# Patient Record
Sex: MALE | Race: WHITE | NOT HISPANIC OR LATINO | Employment: OTHER | URBAN - METROPOLITAN AREA
[De-identification: names, ages, dates, MRNs, and addresses within clinical notes are randomized per-mention and may not be internally consistent; named-entity substitution may affect disease eponyms.]

---

## 2017-01-16 ENCOUNTER — OFFICE VISIT (OUTPATIENT)
Dept: SPORTS MEDICINE | Facility: CLINIC | Age: 62
End: 2017-01-16
Payer: COMMERCIAL

## 2017-01-16 VITALS — HEIGHT: 68 IN | TEMPERATURE: 98 F | BODY MASS INDEX: 31.83 KG/M2 | WEIGHT: 210 LBS

## 2017-01-16 DIAGNOSIS — M12.811 ROTATOR CUFF ARTHROPATHY, RIGHT: Primary | ICD-10-CM

## 2017-01-16 PROCEDURE — 20610 DRAIN/INJ JOINT/BURSA W/O US: CPT | Mod: RT,S$GLB,, | Performed by: FAMILY MEDICINE

## 2017-01-16 PROCEDURE — 99214 OFFICE O/P EST MOD 30 MIN: CPT | Mod: 25,S$GLB,, | Performed by: FAMILY MEDICINE

## 2017-01-16 PROCEDURE — 99999 PR PBB SHADOW E&M-EST. PATIENT-LVL III: CPT | Mod: PBBFAC,,, | Performed by: FAMILY MEDICINE

## 2017-01-16 RX ORDER — TRIAMCINOLONE ACETONIDE 40 MG/ML
40 INJECTION, SUSPENSION INTRA-ARTICULAR; INTRAMUSCULAR
Status: DISCONTINUED | OUTPATIENT
Start: 2017-01-16 | End: 2017-01-16 | Stop reason: HOSPADM

## 2017-01-16 RX ADMIN — TRIAMCINOLONE ACETONIDE 40 MG: 40 INJECTION, SUSPENSION INTRA-ARTICULAR; INTRAMUSCULAR at 04:01

## 2017-01-16 NOTE — PROCEDURES
Large Joint Aspiration/Injection  Date/Time: 1/16/2017 4:34 PM  Performed by: JANAK OLIVER  Authorized by: JANAK OLIVER     Consent Done?:  Yes (Verbal)  Indications:  Pain  Procedure site marked: Yes    Timeout: Prior to procedure the correct patient, procedure, and site was verified      Location:  Shoulder  Site:  R subacromial bursa  Prep: Patient was prepped and draped in usual sterile fashion    Ultrasonic Guidance for needle placement: No  Needle size:  22 G  Approach:  Posterior  Medications:  40 mg triamcinolone acetonide 40 mg/mL  Patient tolerance:  Patient tolerated the procedure well with no immediate complications

## 2017-01-16 NOTE — PROGRESS NOTES
"Subjective:          Chief Complaint: Jm Deleon is a 61 y.o. male who had concerns including Follow-up of the Right Shoulder.    Mr. Deleon returns to [facility] for R shoulder follow up.     HPI  Hand dominance, right    Location:  anterior and lateral, right  Onset:  Insidious, since ~ April 2016   Palliative:     Relative rest   Oral analgesics (NSAIDs)   CSI, SAB, 10/19/16, significant improvement   Provocative:     Abduction and GH flexion   ADLs   Lying in bed   Prior:     09/2014 - L hip - [facility]   Progression: worsening discomfort since early January 2017  Quality:     Deep ache ("deep in the joint")   Pain is worse in the evening/when lying in bed   Radiation:  none  Severity:  per nursing documentation  Timing:  intermittent with use  Trauma:  none known    ROS  Review of systems (ROS):  A 10+ review of systems was performed with pertinent positives and negatives noted above in the history of present illness. Other systems were negative unless otherwise specified.    Pain Related Questions  Over the past 3 days, what was your average pain during activity? (I.e. running, jogging, walking, climbing stairs, getting dressed, ect.): 5  Over the past 3 days, what was your highest pain level?: 7  Over the past 3 days, what was your lowest pain level? : 3    Other  How many nights a week are you awakened by your affected body part?: 7  Was the patient's HEIGHT measured or patient reported?: Patient Reported  Was the patient's WEIGHT measured or patient reported?: Measured      Objective:        General: Jm is well-developed, well-nourished, appears stated age, in no acute distress, alert and oriented to time, place and person.     Ortho/SPM Exam    Constitutional:     -Vital signs: height, weight, and temperature: reviewed    -General appearance: no apparent distress  Dermatologic/skin:     -Inspection: No acute lesions, ulceration, or induration of the skin noted about  the area " evaluated   -Palpation: No subcutaneous crepitus noted about the area evaluated  Musculoskeletal:   Observation:  There is no edema, erythema, or ecchymoses about the affected shoulder.      PAIN WITH active GH abduction to 180 degrees, and THROUGH THE PAINFUL ARC  PAIN WITH active GH flexion to 180 degrees  POSITIVE empty can test (humeral internal rotation and flexion against resistance)   OJuan Franciscos test is negative for evidence of glenoid labral pathology  Active internal humeral rotation against resistance does not produce pain   Active external rotation against rotation does not produce pain   Back scratch test is PAINFUL   POS tenderness with palpation of the long head of the biceps tendon  Neer's and Hawkins tests are negative for evidence of subacromial impingment   Scarf movement does not produce pain  Speed's test is POS     Strength:  Strength testing is 5/5 in all significant muscle groups of the shoulder, EXCEPT AS DESCRIBED ABOVE DURING PAINFUL TESTS    Neg sulcus sign in affected shoulder.  Neg anterior/posterior laxity of humeral head in affected shoulder.  Neg anterior humeral head apprehension in affected shoulder.  Neg posterior humeral head apprehension in affected shoulder.      Cardiovascular:   -Examined extremity is warm and well perfused   Neurologic:   -Examined extremitys sensation is appropriate     AAFP/FPM: Pocket Guide Documentation Guidelines, (c)2014    (70708=9+ systems/areas or 12+ bullets (8 MSK)   37016=1+ systems/areas or 18+ bullets (12 MSK))      Assessment:       Encounter Diagnosis   Name Primary?    Rotator cuff arthropathy, right Yes          Plan:       Assessment:   #1 supraspinatus tendinosis, right    W/ OA changes of the AC joint    No evidence of neurologic pathology  No evidence of vascular pathology    Imaging studies reviewed:   X-ray shoulder, right 16.10    Plan:    We discussed options including:  #1 watchful waiting  #2 physical therapy aimed at:   GH  RoM   Rotator cuff strengthening   Scapular stability  #3 injection therapy:   CSI SAB    Right: worked well, repeat     CSI ia   orthobiologics   #4 perc tenotomy supraspinatus  #5 MRI for further evaluation     The patient chooses #3 csi sab right    Pain management: handout given  Bracing:   Physical therapy:   Activity (e.g. sports, work) restrictions: as tolerated   school/vocation: owns a construction company, very active     Son is an [employer] grad ([employer])    Follow up per pt  Should symptoms worsen or fail to resolve, consider:  Revisiting the above options      Patient questionnaires may have been collected.

## 2017-01-16 NOTE — MR AVS SNAPSHOT
[facility]  [address]  [facility] [address]  Phone: [phone]                  Jm Deleon   2017 4:15 PM   Appointment    Description:  Male : 1955   Provider:  Patricio Multani MD   Department:  [facility]                To Do List           Future Appointments        Provider Department Dept Phone    2017 4:15 PM Patricio Multani MD [facility] [phone]      Goals (5 Years of Data)     None      [facility]     [facility] -  Assistance  Registered nurses in the [facility] provide clinical advisement, health education, appointment booking, and other advisory services.  Call for this free service at [phone].             Medications           Message regarding Medications     Verify the changes and/or additions to your medication regime listed below are the same as discussed with your clinician today.  If any of these changes or additions are incorrect, please notify your healthcare provider.             Verify that the below list of medications is an accurate representation of the medications you are currently taking.  If none reported, the list may be blank. If incorrect, please contact your healthcare provider. Carry this list with you in case of emergency.           Current Medications     aspirin 81 mg Tab Take by mouth once daily.     atenolol (TENORMIN) 25 MG tablet TAKE 1 TABLET EVERY DAY    atorvastatin (LIPITOR) 80 MG tablet TAKE 1 TABLET (80 MG TOTAL) BY MOUTH ONCE DAILY.    atorvastatin (LIPITOR) 80 MG tablet TAKE 1 TABLET (80 MG TOTAL) BY MOUTH ONCE DAILY.    blood sugar diagnostic (ONE TOUCH VERIO) Strp Patient tests sugar 3 times daily please dispense strips and lancets    canagliflozin (INVOKANA) 300 mg Tab Take 1 tablet by mouth once daily.    celecoxib (CELEBREX) 200 MG capsule Take 1 capsule (200 mg total) by mouth once daily.    diphenhydrAMINE  "(BENADRYL) 25 mg capsule Take 25 mg by mouth nightly as needed for Itching.    FOLIC ACID/MULTIVITS-MIN (MULTIVITAMIN FOR CHOLESTEROL ORAL) Take 1 tablet by mouth once daily.     furosemide (LASIX) 40 MG tablet TAKE 1 TABLET BY MOUTH ONCE DAILY    insulin aspart (NOVOLOG FLEXPEN) 100 unit/mL InPn pen Inject 10 Units into the skin 3 (three) times daily with meals.    insulin detemir (LEVEMIR FLEXTOUCH) 100 unit/mL (3 mL) SubQ InPn pen Inject 35 Units into the skin every evening.    insulin needles, disposable, (BD INSULIN PEN NEEDLE UF ORIG) 29 x 1/2 " Ndle USE TWICE DAILY AS DIRECTED    lancets (ONE TOUCH DELICA LANCETS) 33 gauge Misc Use 3 times Daily.    metformin (GLUCOPHAGE) 1000 MG tablet TAKE 1 TABLET (1,000 MG TOTAL) BY MOUTH 2 (TWO) TIMES DAILY.    omega-3 acid ethyl esters (LOVAZA) 1 gram capsule TAKE 4 CAPSULES BY MOUTH DAILY OR AS DIRECTED.    ramipril (ALTACE) 5 MG capsule TAKE 1 CAPSULE (5 MG TOTAL) BY MOUTH ONCE DAILY.           Clinical Reference Information           Allergies as of 1/16/2017     No Known Drug Allergies      Immunizations Administered on Date of Encounter - 1/16/2017     None      "

## 2017-01-23 RX ORDER — ATORVASTATIN CALCIUM 80 MG/1
TABLET, FILM COATED ORAL
Qty: 90 TABLET | Refills: 0 | Status: SHIPPED | OUTPATIENT
Start: 2017-01-23 | End: 2017-04-11

## 2017-02-01 ENCOUNTER — PATIENT MESSAGE (OUTPATIENT)
Dept: ENDOCRINOLOGY | Facility: CLINIC | Age: 62
End: 2017-02-01

## 2017-02-02 RX ORDER — CANAGLIFLOZIN 300 MG/1
TABLET, FILM COATED ORAL
Qty: 30 TABLET | Refills: 4 | Status: SHIPPED | OUTPATIENT
Start: 2017-02-02 | End: 2017-07-09 | Stop reason: SDUPTHER

## 2017-02-28 ENCOUNTER — PATIENT MESSAGE (OUTPATIENT)
Dept: CARDIOLOGY | Facility: CLINIC | Age: 62
End: 2017-02-28

## 2017-02-28 ENCOUNTER — PATIENT MESSAGE (OUTPATIENT)
Dept: ENDOCRINOLOGY | Facility: CLINIC | Age: 62
End: 2017-02-28

## 2017-03-01 ENCOUNTER — TELEPHONE (OUTPATIENT)
Dept: ENDOCRINOLOGY | Facility: CLINIC | Age: 62
End: 2017-03-01

## 2017-03-02 DIAGNOSIS — Z00.00 PHYSICAL EXAM: Primary | ICD-10-CM

## 2017-03-07 ENCOUNTER — PATIENT MESSAGE (OUTPATIENT)
Dept: ENDOCRINOLOGY | Facility: CLINIC | Age: 62
End: 2017-03-07

## 2017-03-07 RX ORDER — METFORMIN HYDROCHLORIDE 1000 MG/1
TABLET ORAL
Qty: 180 TABLET | Refills: 1 | Status: SHIPPED | OUTPATIENT
Start: 2017-03-07 | End: 2017-09-10 | Stop reason: SDUPTHER

## 2017-03-28 ENCOUNTER — OFFICE VISIT (OUTPATIENT)
Dept: SPORTS MEDICINE | Facility: CLINIC | Age: 62
End: 2017-03-28
Payer: COMMERCIAL

## 2017-03-28 VITALS — HEIGHT: 68 IN | TEMPERATURE: 99 F | WEIGHT: 210 LBS | BODY MASS INDEX: 31.83 KG/M2

## 2017-03-28 DIAGNOSIS — M12.811 ROTATOR CUFF ARTHROPATHY, RIGHT: Primary | ICD-10-CM

## 2017-03-28 PROCEDURE — 20610 DRAIN/INJ JOINT/BURSA W/O US: CPT | Mod: RT,S$GLB,, | Performed by: FAMILY MEDICINE

## 2017-03-28 PROCEDURE — 99999 PR PBB SHADOW E&M-EST. PATIENT-LVL III: CPT | Mod: PBBFAC,,, | Performed by: FAMILY MEDICINE

## 2017-03-28 PROCEDURE — 99214 OFFICE O/P EST MOD 30 MIN: CPT | Mod: 25,S$GLB,, | Performed by: FAMILY MEDICINE

## 2017-03-28 RX ORDER — TRIAMCINOLONE ACETONIDE 40 MG/ML
40 INJECTION, SUSPENSION INTRA-ARTICULAR; INTRAMUSCULAR
Status: DISCONTINUED | OUTPATIENT
Start: 2017-03-28 | End: 2017-03-28 | Stop reason: HOSPADM

## 2017-03-28 RX ADMIN — TRIAMCINOLONE ACETONIDE 40 MG: 40 INJECTION, SUSPENSION INTRA-ARTICULAR; INTRAMUSCULAR at 04:03

## 2017-03-28 NOTE — MR AVS SNAPSHOT
[facility]  [address]  [facility] [address]  Phone: [phone]                  Jm Deleon   3/28/2017 3:45 PM   Appointment    Description:  Male : 1955   Provider:  Patricio Multani MD   Department:  [facility]                To Do List           Future Appointments        Provider Department Dept Phone    3/28/2017 3:45 PM Patricio Multani MD [facility] [phone]    2017 7:00 AM LAB, APPOINTMENT [facility] [phone]    2017 7:20 AM LAB, APPOINTMENT [facility] [phone]    2017 2:15 PM EKG, APPT [facility] [phone]    2017 3:00 PM Bc Salomon MD [facility] Cardiology [phone]      Goals (5 Years of Data)     None      [facility]     [facility] On Call Nurse Care Line -  Assistance  Registered nurses in the [facility] On Call Center provide clinical advisement, health education, appointment booking, and other advisory services.  Call for this free service at [phone].             Medications           Message regarding Medications     Verify the changes and/or additions to your medication regime listed below are the same as discussed with your clinician today.  If any of these changes or additions are incorrect, please notify your healthcare provider.             Verify that the below list of medications is an accurate representation of the medications you are currently taking.  If none reported, the list may be blank. If incorrect, please contact your healthcare provider. Carry this list with you in case of emergency.           Current Medications     aspirin 81 mg Tab Take by mouth once daily.     atenolol (TENORMIN) 25 MG tablet TAKE 1 TABLET EVERY DAY    atorvastatin (LIPITOR) 80 MG tablet TAKE 1 TABLET (80 MG TOTAL) BY MOUTH ONCE DAILY.    atorvastatin (LIPITOR) 80 MG tablet TAKE 1 TABLET (80 MG TOTAL) BY  "MOUTH ONCE DAILY.    blood sugar diagnostic (ONE TOUCH VERIO) Strp Patient tests sugar 3 times daily please dispense strips and lancets    celecoxib (CELEBREX) 200 MG capsule Take 1 capsule (200 mg total) by mouth once daily.    diphenhydrAMINE (BENADRYL) 25 mg capsule Take 25 mg by mouth nightly as needed for Itching.    FOLIC ACID/MULTIVITS-MIN (MULTIVITAMIN FOR CHOLESTEROL ORAL) Take 1 tablet by mouth once daily.     furosemide (LASIX) 40 MG tablet TAKE 1 TABLET BY MOUTH ONCE DAILY    insulin aspart (NOVOLOG FLEXPEN) 100 unit/mL InPn pen Inject 10 Units into the skin 3 (three) times daily with meals.    insulin detemir (LEVEMIR FLEXTOUCH) 100 unit/mL (3 mL) SubQ InPn pen Inject 35 Units into the skin every evening.    insulin needles, disposable, (BD INSULIN PEN NEEDLE UF ORIG) 29 x 1/2 " Ndle USE TWICE DAILY AS DIRECTED    INVOKANA 300 mg Tab TAKE 1 TABLET BY MOUTH ONCE DAILY.    lancets (ONE TOUCH DELICA LANCETS) 33 gauge Misc Use 3 times Daily.    metformin (GLUCOPHAGE) 1000 MG tablet TAKE 1 TABLET (1,000 MG TOTAL) BY MOUTH 2 (TWO) TIMES DAILY.    omega-3 acid ethyl esters (LOVAZA) 1 gram capsule TAKE 4 CAPSULES BY MOUTH DAILY OR AS DIRECTED.    ramipril (ALTACE) 5 MG capsule TAKE 1 CAPSULE (5 MG TOTAL) BY MOUTH ONCE DAILY.           Clinical Reference Information           Allergies as of 3/28/2017     No Known Drug Allergies      Immunizations Administered on Date of Encounter - 3/28/2017     None      Language Assistance Services     ATTENTION: Language assistance services are available, free of charge. Please call [phone].      ATENCIÓN: Si habla leana, tiene a whitaker disposición servicios gratuitos de asistencia lingüística. Llame al [phone].     ISMA Ý: N?u b?n nói Ti?ng Vi?t, có các d?ch v? h? tr? ngôn ng? mi?n phí dành cho b?n. G?i s? [phone].         [facility] complies with applicable Federal civil rights laws and does not discriminate on the basis of race, color, " national origin, age, disability, or sex.

## 2017-03-28 NOTE — PROCEDURES
Large Joint Aspiration/Injection  Date/Time: 3/28/2017 4:26 PM  Performed by: JANAK OLIVER  Authorized by: JANAK OLIVER     Consent Done?:  Yes (Verbal)  Indications:  Pain  Procedure site marked: Yes    Timeout: Prior to procedure the correct patient, procedure, and site was verified      Location:  Shoulder  Site:  R subacromial bursa  Prep: Patient was prepped and draped in usual sterile fashion    Ultrasonic Guidance for needle placement: No  Needle size:  22 G  Approach:  Posterior  Medications:  40 mg triamcinolone acetonide 40 mg/mL  Patient tolerance:  Patient tolerated the procedure well with no immediate complications

## 2017-03-28 NOTE — PROGRESS NOTES
"  Jm Deleon, a 62 y.o. male, is here for evaluation of RIGHT shoulder pain.     HISTORY OF PRESENT ILLNESS  Hand dominance, right     Location: anterior and lateral, right  Onset: Insidious, since ~ April 2016   Palliative:    Relative rest   Oral analgesics (NSAIDs)   CSI, DEYVI, 10/19/16, significant improvement   Provocative:    Abduction and GH flexion   ADLs   Lying in bed   Prior:    09/2014 [facility]   Progression: worsening discomfort since early January 2017  Quality:    Deep ache ("deep in the joint")   Pain is worse in the evening/when lying in bed   Radiation: none  Severity: per nursing documentation  Timing: intermittent with use  Trauma: none known     Review of systems (ROS):  A 10+ review of systems was performed with pertinent positives and negatives noted above in the history of present illness. Other systems were negative unless otherwise specified.    PHYSICAL EXAMINATION  General:  The patient is alert and oriented x 3. Mood is pleasant. Observation of ears, eyes and nose reveal no gross abnormalities. HEENT: NCAT, sclera anicteric. Lungs: Respirations are equal and unlabored.     RIGHT SHOULDER EXAMINATION     OBSERVATION:     Swelling  none  Deformity  none   Discoloration  none   Scapular winging none   Scars   none  Atrophy  none    TENDERNESS / CREPITUS (T/C):          T/C      T/C   Clavicle   -/-  SUPRAspinatus    -/-     AC Jt.    -/-  INFRAspinatus  -/-    SC Jt.    -/-  Deltoid    -/-      G. Tuberosity  -/-  LH BICEP groove  -/-   Acromion:  -/-  Midline Neck   -/-     Scapular Spine -/-  Trapezium   -/-   SMA Scapula  -/-  GH jt. line - post  -/-     Scapulothoracic  -/-         ROM:     Right shoulder   Left shoulder        AROM (PROM)   AROM (PROM)   FE    170° (175°)     170° (175°)     ER at 0°    60°  (65°)    60°  (65°)   ER at 90° ABD  90°  (90°)    90°  (90°)   IR at 90°  ABD   NA  (40°)     NA  (40°)      IR (spine level)   T10     T10    STRENGTH: (* = " with pain) RIGHT SHOULDER  LEFT SHOULDER   SCAPTION   5/5    5/5    IR    5/5    5/5   ER    5/5    5/5   BICEPS   5/5    5/5   Deltoid    5/5    5/5     SIGNS:  Painful side       NEER   -   OLORYS        -    HOOVER   -   SPEEDS        -   DROP ARM   -   BELLY PRESS       -    X-Body ADD    -   LIFT-OFF        -   HORNBLOWERS      -              STABILITY TESTING  RIGHT SHOULDER  LEFT SHOULDER     Translation     Anterior up face    up face    Posterior up face   up face    Sulcus  < 10mm   < 10 mm     Signs   Apprehension   neg     neg       Relocation   no change    no change      Jerk test  neg    neg    EXTREMITY NEURO-VASCULAR EXAM    Sensation grossly intact to light touch all dermatomal regions.    DTR 2+ Biceps, Triceps, BR and Negative Usmans sign   Grossly intact motor function at Elbow, Wrist and Hand   Distal pulses radial and ulnar 2+, brisk cap refill, symmetric.      NECK:  Painless FROM and spinous processes non-tender. Negative Spurlings sign.       Other Findings:    ASSESSMENT & PLAN   Assessment:   #1 supraspinatus tendinosis, right    No evidence of neurologic pathology  No evidence of vascular pathology    Imaging studies reviewed:   X-ray shoulder, right 16.10    Plan:  We discussed options including:  #1 watchful waiting  #2 physical therapy aimed at:   RoM glenohumeral joint   Strengthening rotator cuff   Scapular stability  #3 injection therapy:   CSI SAB    Right, effective xx%, repeat     Left,    CSI iaGH    Right,     Left,    orthobiologics   #4 MRI for further evaluation     The patient chooses #3 csi sab right    Pain management: handout given  Bracing:   Physical therapy:    nfpt  Activity (e.g. 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)     Follow up appointment offered, deferred by patient  A/e csi sab right  Did we give hgPT at prior visit?  Should symptoms worsen  or fail to resolve, consider:  Revisiting the above options

## 2017-04-07 ENCOUNTER — LAB VISIT (OUTPATIENT)
Dept: LAB | Facility: HOSPITAL | Age: 62
End: 2017-04-07
Attending: INTERNAL MEDICINE
Payer: COMMERCIAL

## 2017-04-07 LAB
ALBUMIN SERPL BCP-MCNC: 3.7 G/DL
ALP SERPL-CCNC: 64 U/L
ALT SERPL W/O P-5'-P-CCNC: 20 U/L
ANION GAP SERPL CALC-SCNC: 5 MMOL/L
AST SERPL-CCNC: 16 U/L
BILIRUB SERPL-MCNC: 0.7 MG/DL
BUN SERPL-MCNC: 23 MG/DL
CALCIUM SERPL-MCNC: 9.2 MG/DL
CHLORIDE SERPL-SCNC: 103 MMOL/L
CHOLEST/HDLC SERPL: 3.4 {RATIO}
CO2 SERPL-SCNC: 30 MMOL/L
CREAT SERPL-MCNC: 0.9 MG/DL
CREAT UR-MCNC: 70 MG/DL
EST. GFR  (AFRICAN AMERICAN): >60 ML/MIN/1.73 M^2
EST. GFR  (NON AFRICAN AMERICAN): >60 ML/MIN/1.73 M^2
ESTIMATED AVG GLUCOSE: 169 MG/DL
GLUCOSE SERPL-MCNC: 141 MG/DL
HBA1C MFR BLD HPLC: 7.5 %
HDL/CHOLESTEROL RATIO: 29.2 %
HDLC SERPL-MCNC: 137 MG/DL
HDLC SERPL-MCNC: 40 MG/DL
LDLC SERPL CALC-MCNC: 80 MG/DL
MICROALBUMIN UR DL<=1MG/L-MCNC: 8 UG/ML
MICROALBUMIN/CREATININE RATIO: 11.4 UG/MG
NONHDLC SERPL-MCNC: 97 MG/DL
POTASSIUM SERPL-SCNC: 4.6 MMOL/L
PROT SERPL-MCNC: 6.7 G/DL
SODIUM SERPL-SCNC: 138 MMOL/L
TRIGL SERPL-MCNC: 85 MG/DL
TSH SERPL DL<=0.005 MIU/L-ACNC: 1.59 UIU/ML

## 2017-04-07 PROCEDURE — 84443 ASSAY THYROID STIM HORMONE: CPT

## 2017-04-07 PROCEDURE — 36415 COLL VENOUS BLD VENIPUNCTURE: CPT

## 2017-04-07 PROCEDURE — 83036 HEMOGLOBIN GLYCOSYLATED A1C: CPT

## 2017-04-07 PROCEDURE — 80061 LIPID PANEL: CPT

## 2017-04-07 PROCEDURE — 80053 COMPREHEN METABOLIC PANEL: CPT

## 2017-04-07 PROCEDURE — 82570 ASSAY OF URINE CREATININE: CPT

## 2017-04-11 ENCOUNTER — HOSPITAL ENCOUNTER (OUTPATIENT)
Dept: CARDIOLOGY | Facility: CLINIC | Age: 62
Discharge: HOME | End: 2017-04-11
Payer: COMMERCIAL

## 2017-04-11 ENCOUNTER — OFFICE VISIT (OUTPATIENT)
Dept: CARDIOLOGY | Facility: CLINIC | Age: 62
End: 2017-04-11
Payer: COMMERCIAL

## 2017-04-11 VITALS
DIASTOLIC BLOOD PRESSURE: 72 MMHG | HEIGHT: 68 IN | BODY MASS INDEX: 32.74 KG/M2 | SYSTOLIC BLOOD PRESSURE: 130 MMHG | WEIGHT: 216.06 LBS | HEART RATE: 58 BPM

## 2017-04-11 DIAGNOSIS — R94.39 ABNORMAL NUCLEAR STRESS TEST: ICD-10-CM

## 2017-04-11 DIAGNOSIS — I25.10 CORONARY ARTERY DISEASE INVOLVING NATIVE CORONARY ARTERY OF NATIVE HEART WITHOUT ANGINA PECTORIS: Primary | ICD-10-CM

## 2017-04-11 DIAGNOSIS — I10 ESSENTIAL HYPERTENSION: ICD-10-CM

## 2017-04-11 DIAGNOSIS — E78.5 DYSLIPIDEMIA: ICD-10-CM

## 2017-04-11 DIAGNOSIS — I83.10 VARICOSE VEINS OF LOWER EXTREMITY WITH INFLAMMATION, UNSPECIFIED LATERALITY: ICD-10-CM

## 2017-04-11 DIAGNOSIS — I87.2 VENOUS INSUFFICIENCY: ICD-10-CM

## 2017-04-11 DIAGNOSIS — N52.01 ERECTILE DYSFUNCTION DUE TO ARTERIAL INSUFFICIENCY: ICD-10-CM

## 2017-04-11 DIAGNOSIS — E08.59 DIABETES MELLITUS DUE TO UNDERLYING CONDITION WITH OTHER CIRCULATORY COMPLICATION: ICD-10-CM

## 2017-04-11 DIAGNOSIS — Z79.01 LONG TERM (CURRENT) USE OF ANTICOAGULANTS: ICD-10-CM

## 2017-04-11 DIAGNOSIS — M54.40 ACUTE RIGHT-SIDED LOW BACK PAIN WITH SCIATICA, SCIATICA LATERALITY UNSPECIFIED: ICD-10-CM

## 2017-04-11 DIAGNOSIS — Z95.1 S/P CABG (CORONARY ARTERY BYPASS GRAFT): ICD-10-CM

## 2017-04-11 DIAGNOSIS — R94.39 ABNORMAL CARDIOVASCULAR STRESS TEST: ICD-10-CM

## 2017-04-11 DIAGNOSIS — Z00.00 PHYSICAL EXAM: ICD-10-CM

## 2017-04-11 PROCEDURE — 99999 PR PBB SHADOW E&M-EST. PATIENT-LVL III: CPT | Mod: PBBFAC,,, | Performed by: INTERNAL MEDICINE

## 2017-04-11 PROCEDURE — 93000 ELECTROCARDIOGRAM COMPLETE: CPT | Mod: S$GLB,,, | Performed by: INTERNAL MEDICINE

## 2017-04-11 PROCEDURE — 99215 OFFICE O/P EST HI 40 MIN: CPT | Mod: S$GLB,,, | Performed by: INTERNAL MEDICINE

## 2017-04-11 RX ORDER — CARVEDILOL 25 MG/1
25 TABLET ORAL 2 TIMES DAILY
Qty: 180 TABLET | Refills: 3 | Status: SHIPPED | OUTPATIENT
Start: 2017-04-11 | End: 2018-03-27 | Stop reason: SDUPTHER

## 2017-04-11 RX ORDER — EZETIMIBE 10 MG/1
10 TABLET ORAL DAILY
Qty: 90 TABLET | Refills: 3 | Status: SHIPPED | OUTPATIENT
Start: 2017-04-11 | End: 2018-03-28 | Stop reason: SDUPTHER

## 2017-04-11 RX ORDER — SILDENAFIL 100 MG/1
100 TABLET, FILM COATED ORAL DAILY PRN
Qty: 10 TABLET | Refills: 10 | Status: SHIPPED | OUTPATIENT
Start: 2017-04-11 | End: 2017-05-02

## 2017-04-11 RX ORDER — RAMIPRIL 10 MG/1
10 CAPSULE ORAL DAILY
Qty: 90 CAPSULE | Refills: 3 | Status: SHIPPED | OUTPATIENT
Start: 2017-04-11 | End: 2018-03-27 | Stop reason: SDUPTHER

## 2017-04-11 NOTE — MR AVS SNAPSHOT
[facility]  [address]  [facility] [address]  Phone: [phone]                  Jm Deleon   2017 3:00 PM   Office Visit    Description:  Male : 1955   Provider:  Bc Salomon MD   Department:  [facility]           Reason for Visit     Coronary Artery Disease           Diagnoses this Visit        Comments    Coronary artery disease involving native coronary artery of native heart without angina pectoris    -  Primary     Abnormal nuclear stress test         Abnormal cardiovascular stress test         S/P CABG (coronary artery bypass graft)         Varicose veins of lower extremity with inflammation, unspecified laterality         Venous insufficiency         Long term (current) use of anticoagulants         Dyslipidemia         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    Follow-up and Disposition History       These Medications        Disp Refills Start End    ramipril (ALTACE) 10 MG capsule 90 capsule 3 2017     Take 1 capsule (10 mg total) by mouth once daily. - Oral    Pharmacy: [facility]/pharmacy #[id number] - [place], [place] - [address] AT [facility] Ph #: [phone]       ezetimibe (ZETIA) 10 mg tablet 90 tablet 3 2017    Take 1 tablet (10 mg total) by mouth once daily. - Oral    Pharmacy: [facility]/pharmacy #[id number] - [place], [place] - [address]  [facility] #: [phone]       carvedilol (COREG) 25 MG tablet 180 tablet 3 4/11/2017     Take 1 tablet (25 mg total) by mouth 2 (two) times daily. .5-1 twice daily or as directed - Oral    Pharmacy: [facility]/pharmacy #[id number] [facility] [place] - [address] [facility] #: [phone]       sildenafil (VIAGRA) 100 MG tablet 10 tablet 10 4/11/2017 4/11/2018    Take 1 tablet (100 mg total) by mouth daily as needed for Erectile Dysfunction. - Oral    Pharmacy: [facility]/pharmacy #[id number] [facility] [place] - [address] [facility] AT [facility] #: [phone]         [facility]     [facility] Nurse Care Line - 24/7 Assistance  Unless otherwise directed by your provider, please contact [facility], our nurse care line that is available for 24/7 assistance.     Registered nurses in the [facility] provide: appointment scheduling, clinical advisement, health education, and other advisory services.  Call: [phone] (toll free)               Medications           Message regarding Medications     Verify the changes and/or additions to your medication regime listed below are the same as discussed with your clinician today.  If any of these changes or additions are incorrect, please notify your healthcare provider.        START taking these NEW medications        Refills    ezetimibe (ZETIA) 10 mg tablet 3    Sig: Take 1 tablet (10 mg total) by mouth once daily.    Class: Normal    Route: Oral    carvedilol (COREG) 25 MG tablet 3    Sig: Take 1 tablet (25 mg total) by mouth 2 (two) times daily. .5-1 twice daily or as directed    Class: Normal    Route: Oral    sildenafil (VIAGRA) 100 MG tablet 10    Sig: Take 1 tablet (100 mg total) by mouth daily as needed for Erectile Dysfunction.    Class: Normal    Route: Oral      CHANGE how you are taking these medications     Start Taking Instead of    ramipril (ALTACE) 10 MG capsule ramipril (ALTACE) 5 MG capsule    Dosage:  Take 1 capsule  "(10 mg total) by mouth once daily. Dosage:  TAKE 1 CAPSULE (5 MG TOTAL) BY MOUTH ONCE DAILY.    Reason for Change:  Reorder       STOP taking these medications     celecoxib (CELEBREX) 200 MG capsule Take 1 capsule (200 mg total) by mouth once daily.    atenolol (TENORMIN) 25 MG tablet TAKE 1 TABLET EVERY DAY           Verify that the below list of medications is an accurate representation of the medications you are currently taking.  If none reported, the list may be blank. If incorrect, please contact your healthcare provider. Carry this list with you in case of emergency.           Current Medications     aspirin 81 mg Tab Take by mouth once daily.     atorvastatin (LIPITOR) 80 MG tablet TAKE 1 TABLET (80 MG TOTAL) BY MOUTH ONCE DAILY.    blood sugar diagnostic (ONE TOUCH VERIO) Strp Patient tests sugar 3 times daily please dispense strips and lancets    diphenhydrAMINE (BENADRYL) 25 mg capsule Take 25 mg by mouth nightly as needed for Itching.    FOLIC ACID/MULTIVITS-MIN (MULTIVITAMIN FOR CHOLESTEROL ORAL) Take 1 tablet by mouth once daily.     furosemide (LASIX) 40 MG tablet TAKE 1 TABLET BY MOUTH ONCE DAILY    insulin aspart (NOVOLOG FLEXPEN) 100 unit/mL InPn pen Inject 10 Units into the skin 3 (three) times daily with meals.    insulin detemir (LEVEMIR FLEXTOUCH) 100 unit/mL (3 mL) SubQ InPn pen Inject 35 Units into the skin every evening.    insulin needles, disposable, (BD INSULIN PEN NEEDLE UF ORIG) 29 x 1/2 " Ndle USE TWICE DAILY AS DIRECTED    INVOKANA 300 mg Tab TAKE 1 TABLET BY MOUTH ONCE DAILY.    lancets (ONE TOUCH DELICA LANCETS) 33 gauge Misc Use 3 times Daily.    metformin (GLUCOPHAGE) 1000 MG tablet TAKE 1 TABLET (1,000 MG TOTAL) BY MOUTH 2 (TWO) TIMES DAILY.    omega-3 acid ethyl esters (LOVAZA) 1 gram capsule TAKE 4 CAPSULES BY MOUTH DAILY OR AS DIRECTED.    ramipril (ALTACE) 10 MG capsule Take 1 capsule (10 mg total) by mouth once daily.    carvedilol (COREG) 25 MG tablet Take 1 tablet (25 mg " "total) by mouth 2 (two) times daily. .5-1 twice daily or as directed    ezetimibe (ZETIA) 10 mg tablet Take 1 tablet (10 mg total) by mouth once daily.    sildenafil (VIAGRA) 100 MG tablet Take 1 tablet (100 mg total) by mouth daily as needed for Erectile Dysfunction.           Clinical Reference Information           Your Vitals Were     BP Pulse Height Weight BMI    130/72 (BP Location: Left arm, Patient Position: Sitting, BP Method: Automatic) 58 5' 8" (1.727 m) 98 kg (216 lb 0.8 oz) 32.85 kg/m2      Blood Pressure          Most Recent Value    Right Arm BP - Sitting  128/73    Left Arm BP - Sitting  130/72    BP  130/72      Allergies as of 4/11/2017     No Known Drug Allergies      Immunizations Administered on Date of Encounter - 4/11/2017     None      Orders Placed During Today's Visit     Future Labs/Procedures Expected by Expires    NM Multi Tread Stress Cardiac Component  6/11/2017 (Approximate) 6/27/2017    EKG 12-lead  4/11/2018 (Approximate) 7/11/2018    PSA, Screening  4/11/2018 (Approximate) 7/11/2018    TSH  4/11/2018 (Approximate) 7/11/2018    Recurring Lab Work Interval Expires    Comprehensive metabolic panel   4/11/2018    Hemoglobin A1c   4/11/2018    Lipid panel   4/11/2018      Instructions    Discussed diet , achieving and maintaining ideal body weight, and exercise.   We reviewed meds in detail.  Reassured  Discussed goals  Increase Ramipril to 10 mg  Add Zetia half pill  Instead of atenolol, just half of carvedilol 25 mg twice  OK to use generic Viagra .25-1 as needed but no NTG for 24 hours before of after       Language Assistance Services     ATTENTION: Language assistance services are available, free of charge. Please call [phone].      ATENCIÓN: Si habla español, tiene a whitaker disposición servicios gratuitos de asistencia lingüística. Llame al [phone].     CHÚ Ý: N?u b?n nói Ti?ng Vi?t, có các d?ch v? h? tr? ngôn ng? mi?n phí dành cho b?n. G?i s? [phone].         " [facility] complies with applicable Federal civil rights laws and does not discriminate on the basis of race, color, national origin, age, disability, or sex.

## 2017-04-11 NOTE — PROGRESS NOTES
Subjective:   Patient ID:  Jm Deleon is a 62 y.o. male who presents for follow-up of Coronary Artery Disease      HPI: coronary artery disease   The patient has no chest pain, SOB, TIA, palpitations, syncope or pre-syncope.Patient does not exercise as much as directed but more than at times.        Review of Systems   Constitution: Negative for chills, decreased appetite, diaphoresis, fever, weakness, malaise/fatigue, night sweats, weight gain and weight loss.   HENT: Negative for congestion, headaches, hoarse voice, nosebleeds, sore throat and tinnitus.    Eyes: Negative for blurred vision, double vision, vision loss in left eye, vision loss in right eye, visual disturbance and visual halos.   Cardiovascular: Negative for chest pain, claudication, cyanosis, dyspnea on exertion, irregular heartbeat, leg swelling, near-syncope, orthopnea, palpitations, paroxysmal nocturnal dyspnea and syncope.   Respiratory: Negative for cough, hemoptysis, shortness of breath, sleep disturbances due to breathing, snoring, sputum production and wheezing.    Endocrine: Negative for cold intolerance, heat intolerance, polydipsia, polyphagia and polyuria.   Hematologic/Lymphatic: Negative for adenopathy and bleeding problem. Does not bruise/bleed easily.   Skin: Negative for color change, dry skin, flushing, itching, nail changes, poor wound healing, rash, skin cancer, suspicious lesions and unusual hair distribution.   Musculoskeletal: Positive for arthritis and back pain. Negative for falls, gout, joint pain, joint swelling, muscle cramps, muscle weakness, myalgias and stiffness.   Gastrointestinal: Negative for abdominal pain, anorexia, change in bowel habit, constipation, diarrhea, dysphagia, heartburn, hematemesis, hematochezia, melena and vomiting.   Genitourinary: Negative for decreased libido, dysuria, hematuria, hesitancy and urgency.   Neurological: Negative for excessive daytime sleepiness, dizziness, focal weakness,  "light-headedness, loss of balance, numbness, paresthesias, seizures, sensory change, tremors and vertigo.   Psychiatric/Behavioral: Negative for altered mental status, depression, hallucinations, memory loss, substance abuse and suicidal ideas. The patient does not have insomnia and is not nervous/anxious.    Allergic/Immunologic: Negative for environmental allergies and hives.       Objective: /72 (BP Location: Left arm, Patient Position: Sitting, BP Method: Automatic)  Pulse (!) 58  Ht 5' 8" (1.727 m)  Wt 98 kg (216 lb 0.8 oz)  BMI 32.85 kg/m2     Physical Exam   Constitutional: He is oriented to person, place, and time. He appears well-developed and well-nourished. No distress.   HENT:   Head: Normocephalic.   Eyes: EOM are normal. Pupils are equal, round, and reactive to light.   Neck: Normal range of motion. No thyromegaly present.   Cardiovascular: Normal rate, regular rhythm, normal heart sounds and intact distal pulses.  Exam reveals no gallop and no friction rub.    No murmur heard.  Pulses:       Carotid pulses are 3+ on the right side, and 3+ on the left side.       Radial pulses are 3+ on the right side, and 3+ on the left side.        Femoral pulses are 3+ on the right side, and 3+ on the left side.       Popliteal pulses are 3+ on the right side, and 3+ on the left side.        Dorsalis pedis pulses are 3+ on the right side, and 3+ on the left side.        Posterior tibial pulses are 3+ on the right side, and 3+ on the left side.   Pulmonary/Chest: Effort normal and breath sounds normal. No respiratory distress. He has no wheezes. He has no rales. He exhibits no tenderness.   Abdominal: Soft. He exhibits no distension and no mass. There is no tenderness.   Musculoskeletal: Normal range of motion.   Lymphadenopathy:     He has no cervical adenopathy.   Neurological: He is alert and oriented to person, place, and time.   Skin: Skin is warm. He is not diaphoretic. No cyanosis. Nails show no " clubbing.   Psychiatric: He has a normal mood and affect. His speech is normal and behavior is normal. Judgment and thought content normal. Cognition and memory are normal.       Assessment:     1. Coronary artery disease involving native coronary artery of native heart without angina pectoris    2. Abnormal nuclear stress test    3. Abnormal cardiovascular stress test    4. S/P CABG (coronary artery bypass graft)    5. Varicose veins of lower extremity with inflammation, unspecified laterality    6. Venous insufficiency    7. Long term (current) use of anticoagulants    8. Dyslipidemia    9. Diabetes mellitus due to underlying condition with other circulatory complication    10. Acute right-sided low back pain with sciatica, sciatica laterality unspecified    11. Essential hypertension        Plan:   Discussed diet , achieving and maintaining ideal body weight, and exercise.   We reviewed meds in detail.  Reassured  Discussed goals  Increase Ramipril to 10 mg  Add Zetia half pill  Instead of atenolol, just half of carvedilol 25 mg twice  OK to use generic Viagra .25-1 as needed but no NTG for 24 hours before of after    Jm was seen today for coronary artery disease.    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

## 2017-04-11 NOTE — PATIENT INSTRUCTIONS
Discussed diet , achieving and maintaining ideal body weight, and exercise.   We reviewed meds in detail.  Reassured  Discussed goals  Increase Ramipril to 10 mg  Add Zetia half pill  Instead of atenolol, just half of carvedilol 25 mg twice  OK to use generic Viagra .25-1 as needed but no NTG for 24 hours before of after

## 2017-05-01 RX ORDER — ATENOLOL 25 MG/1
TABLET ORAL
Qty: 90 TABLET | Refills: 3 | Status: SHIPPED | OUTPATIENT
Start: 2017-05-01 | End: 2017-09-08

## 2017-05-02 ENCOUNTER — OFFICE VISIT (OUTPATIENT)
Dept: ENDOCRINOLOGY | Facility: CLINIC | Age: 62
End: 2017-05-02
Payer: COMMERCIAL

## 2017-05-02 VITALS
SYSTOLIC BLOOD PRESSURE: 126 MMHG | HEIGHT: 68 IN | WEIGHT: 215.19 LBS | HEART RATE: 53 BPM | DIASTOLIC BLOOD PRESSURE: 78 MMHG | BODY MASS INDEX: 32.61 KG/M2

## 2017-05-02 DIAGNOSIS — E08.59 DIABETES MELLITUS DUE TO UNDERLYING CONDITION WITH OTHER CIRCULATORY COMPLICATION: Primary | ICD-10-CM

## 2017-05-02 PROCEDURE — 99214 OFFICE O/P EST MOD 30 MIN: CPT | Mod: S$GLB,,, | Performed by: INTERNAL MEDICINE

## 2017-05-02 PROCEDURE — 99999 PR PBB SHADOW E&M-EST. PATIENT-LVL III: CPT | Mod: PBBFAC,,, | Performed by: INTERNAL MEDICINE

## 2017-05-02 NOTE — PROGRESS NOTES
CHIEF COMPLAINT:  Diabetes.                                                                                                                               HISTORY OF PRESENT ILLNESS:  The patient comes in for diabetic check.  He    has had diabetes since , about five years.  He is currently on Levemir   35 units daily, Novolog 10-10-10 and metformin 1 g b.i.d.and Invokanna 30 mg daily.  He has lost weight, however, 27 pounds over the past few months.      He has no unusual complaints today.  Blood sugars up to 118 in AM and 120 in PM. Control is better over past 2 months.    Lowest glucose has been 103.                                                                                                                                               PAST MEDICAL HISTORY:  Surgery:  Coronary bypass, left dorsal                torsalexostectomy.  Medical illnesses:  Coronary artery disease, diabetes,   hyperlipidemia.                                                                                                                                           FAMILY HISTORY:  Father  at 63, lung cancer and diabetes.  Mother    at 73, heart disease.                                                                                                                                     SOCIAL HISTORY:  Does not smoke.  Drinks occasionally.                                                                                                    REVIEW OF SYSTEMS:                                                           HEENT:  Good eyesight and hearing.  No evidence of retinopathy on today's eye visit..              CARDIOPULMONARY:  Denies chest pain, cough or shortness of breath.           GASTROINTESTINAL:  Denies nausea, vomiting, diarrhea, constipation or        abdominal pain.                                                              GENITOURINARY:  Denies dysuria, hematuria, has occasional nocturia.          NEUROMUSCULAR:   The patient has some pains in his joints, but denies         paresthesias and numbness in his lower extremities.Left hip pain severe at night.                          All other systems reviewed and are negative.                                                                                                              PHYSICAL EXAMINATION:                                                      VITAL SIGNS:  Weight  211.7, blood pressure 118/70,  Pulse 60  EYES:  No eye signs of Graves' disease.  No scleral icterus.  Conjunctivae   clear.                                                                       ENT:  No lesion on tongue, hard palate, soft palate or posterior pharynx.    NECK:  No thyromegaly.  No neck vein distention.  Trachea midline.           LYMPHATICS:  No anterior or posterior cervical adenopathy.  No               supraclavicular adenopathy.                                                  HEART:  Normal sinus rhythm.  No murmurs.  No rubs.  No carotid bruits.      LUNGS:  Clear.  Percussion normal.  No respiratory difficulty.               ABDOMEN:  No masses, tenderness or organomegaly.                             EXTREMITIES:  No clubbing, cyanosis or edema.  Monofilament intact.          MUSCULOSKELETAL:  Good muscle strength.  Moves all four extremities.         Normal gait.                                                                     Results for orders placed or performed in visit on 04/07/17   TSH   Result Value Ref Range    TSH 1.592 0.400 - 4.000 uIU/mL   Hemoglobin A1c   Result Value Ref Range    Hemoglobin A1C 7.5 (H) 4.5 - 6.2 %    Estimated Avg Glucose 169 (H) 68 - 131 mg/dL   Comprehensive metabolic panel   Result Value Ref Range    Sodium 138 136 - 145 mmol/L    Potassium 4.6 3.5 - 5.1 mmol/L    Chloride 103 95 - 110 mmol/L    CO2 30 (H) 23 - 29 mmol/L    Glucose 141 (H) 70 - 110 mg/dL    BUN, Bld 23 8 - 23 mg/dL    Creatinine 0.9 0.5 - 1.4 mg/dL    Calcium 9.2 8.7 - 10.5  mg/dL    Total Protein 6.7 6.0 - 8.4 g/dL    Albumin 3.7 3.5 - 5.2 g/dL    Total Bilirubin 0.7 0.1 - 1.0 mg/dL    Alkaline Phosphatase 64 55 - 135 U/L    AST 16 10 - 40 U/L    ALT 20 10 - 44 U/L    Anion Gap 5 (L) 8 - 16 mmol/L    eGFR if African American >60.0 >60 mL/min/1.73 m^2    eGFR if non African American >60.0 >60 mL/min/1.73 m^2   Lipid panel   Result Value Ref Range    Cholesterol 137 120 - 199 mg/dL    Triglycerides 85 30 - 150 mg/dL    HDL 40 40 - 75 mg/dL    LDL Cholesterol 80.0 63.0 - 159.0 mg/dL    HDL/Chol Ratio 29.2 20.0 - 50.0 %    Total Cholesterol/HDL Ratio 3.4 2.0 - 5.0    Non-HDL Cholesterol 97 mg/dL         Results for orders placed or performed in visit on 04/07/17   TSH   Result Value Ref Range    TSH 1.592 0.400 - 4.000 uIU/mL   Hemoglobin A1c   Result Value Ref Range    Hemoglobin A1C 7.5 (H) 4.5 - 6.2 %    Estimated Avg Glucose 169 (H) 68 - 131 mg/dL   Comprehensive metabolic panel   Result Value Ref Range    Sodium 138 136 - 145 mmol/L    Potassium 4.6 3.5 - 5.1 mmol/L    Chloride 103 95 - 110 mmol/L    CO2 30 (H) 23 - 29 mmol/L    Glucose 141 (H) 70 - 110 mg/dL    BUN, Bld 23 8 - 23 mg/dL    Creatinine 0.9 0.5 - 1.4 mg/dL    Calcium 9.2 8.7 - 10.5 mg/dL    Total Protein 6.7 6.0 - 8.4 g/dL    Albumin 3.7 3.5 - 5.2 g/dL    Total Bilirubin 0.7 0.1 - 1.0 mg/dL    Alkaline Phosphatase 64 55 - 135 U/L    AST 16 10 - 40 U/L    ALT 20 10 - 44 U/L    Anion Gap 5 (L) 8 - 16 mmol/L    eGFR if African American >60.0 >60 mL/min/1.73 m^2    eGFR if non African American >60.0 >60 mL/min/1.73 m^2   Lipid panel   Result Value Ref Range    Cholesterol 137 120 - 199 mg/dL    Triglycerides 85 30 - 150 mg/dL    HDL 40 40 - 75 mg/dL    LDL Cholesterol 80.0 63.0 - 159.0 mg/dL    HDL/Chol Ratio 29.2 20.0 - 50.0 %    Total Cholesterol/HDL Ratio 3.4 2.0 - 5.0    Non-HDL Cholesterol 97 mg/dL   A1C pending                                                                     IMPRESSION:  Type 2 diabetes,  uncontrolled; obesity.   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                                                                                                     RECOMMENDATIONS:    6 months with an A1c level.      Continue same meds

## 2017-05-03 ENCOUNTER — PATIENT MESSAGE (OUTPATIENT)
Dept: CARDIOLOGY | Facility: CLINIC | Age: 62
End: 2017-05-03

## 2017-05-05 RX ORDER — ATORVASTATIN CALCIUM 80 MG/1
TABLET, FILM COATED ORAL
Qty: 90 TABLET | Refills: 3 | Status: SHIPPED | OUTPATIENT
Start: 2017-05-05 | End: 2018-03-27 | Stop reason: SDUPTHER

## 2017-05-09 ENCOUNTER — TELEPHONE (OUTPATIENT)
Dept: DERMATOLOGY | Facility: CLINIC | Age: 62
End: 2017-05-09

## 2017-05-09 NOTE — TELEPHONE ENCOUNTER
Spoke to pt.Scheduled appt for pt due to a spot he is concern on his scalp and stated he is always under the sun, Scheduled pt his appt and he was very happy with the date and time.Sent reminder to pt.

## 2017-05-09 NOTE — TELEPHONE ENCOUNTER
----- Message from Eileen Guardado sent at 5/9/2017 10:56 AM CDT -----  Contact: [phone]  Please call above patient wife said her  has a spot on top head wants to be seen soon waiting on a call from the nurse thanks

## 2017-05-22 ENCOUNTER — OFFICE VISIT (OUTPATIENT)
Dept: DERMATOLOGY | Facility: CLINIC | Age: 62
End: 2017-05-22
Payer: COMMERCIAL

## 2017-05-22 DIAGNOSIS — L82.1 SEBORRHEIC KERATOSIS: ICD-10-CM

## 2017-05-22 DIAGNOSIS — L57.0 AK (ACTINIC KERATOSIS): ICD-10-CM

## 2017-05-22 DIAGNOSIS — Z12.83 SCREENING EXAM FOR SKIN CANCER: ICD-10-CM

## 2017-05-22 DIAGNOSIS — D23.9 DERMATOFIBROMA: ICD-10-CM

## 2017-05-22 DIAGNOSIS — D22.9 MULTIPLE BENIGN NEVI: ICD-10-CM

## 2017-05-22 DIAGNOSIS — D48.9 NEOPLASM OF UNCERTAIN BEHAVIOR: Primary | ICD-10-CM

## 2017-05-22 DIAGNOSIS — L73.8 SEBACEOUS GLAND HYPERPLASIA: ICD-10-CM

## 2017-05-22 DIAGNOSIS — L81.4 LENTIGO: ICD-10-CM

## 2017-05-22 PROCEDURE — 11101 PR BIOPSY, EACH ADDED LESION: CPT | Mod: 59,S$GLB,, | Performed by: DERMATOLOGY

## 2017-05-22 PROCEDURE — 88305 TISSUE EXAM BY PATHOLOGIST: CPT | Performed by: PATHOLOGY

## 2017-05-22 PROCEDURE — 17000 DESTRUCT PREMALG LESION: CPT | Mod: S$GLB,,, | Performed by: DERMATOLOGY

## 2017-05-22 PROCEDURE — 88305 TISSUE EXAM BY PATHOLOGIST: CPT | Mod: 26,,, | Performed by: PATHOLOGY

## 2017-05-22 PROCEDURE — 99999 PR PBB SHADOW E&M-EST. PATIENT-LVL II: CPT | Mod: PBBFAC,,, | Performed by: DERMATOLOGY

## 2017-05-22 PROCEDURE — 99202 OFFICE O/P NEW SF 15 MIN: CPT | Mod: 25,S$GLB,, | Performed by: DERMATOLOGY

## 2017-05-22 PROCEDURE — 11100 PR BIOPSY OF SKIN LESION: CPT | Mod: 59,S$GLB,, | Performed by: DERMATOLOGY

## 2017-05-22 RX ORDER — OMEGA-3-ACID ETHYL ESTERS 1 G/1
CAPSULE, LIQUID FILLED ORAL
Qty: 120 CAPSULE | Refills: 3 | Status: SHIPPED | OUTPATIENT
Start: 2017-05-22 | End: 2017-09-18 | Stop reason: SDUPTHER

## 2017-05-22 NOTE — PROGRESS NOTES
Subjective:       Patient ID:  Jm Deleon is a 62 y.o. male who presents for   Chief Complaint   Patient presents with    Spot     scalp x 2-3 months, bigger seems non healing shaves everyday no prior tx     Skin Check     UBSE     No h/o nmsc      Spot  - Initial  Affected locations: scalp  Duration: 3 months  Signs / symptoms: growing  Aggravated by: nothing  Relieving factors/Treatments tried: nothing        Review of Systems   Skin: Positive for activity-related sunscreen use (rare). Negative for daily sunscreen use, recent sunburn and wears hat.   Hematologic/Lymphatic: Bruises/bleeds easily (on asa).        Objective:    Physical Exam   Constitutional: He appears well-developed and well-nourished. No distress.   Neurological: He is alert and oriented to person, place, and time. He is not disoriented.   Psychiatric: He has a normal mood and affect.   Skin:   Areas Examined (abnormalities noted in diagram):   Scalp / Hair Palpated and Inspected  Head / Face Inspection Performed  Neck Inspection Performed  Chest / Axilla Inspection Performed  Back Inspection Performed  RUE Inspected  LUE Inspection Performed  Nails and Digits Inspection Performed                       Diagram Legend     Erythematous scaling macule/papule c/w actinic keratosis       Vascular papule c/w angioma      Pigmented verrucoid papule/plaque c/w seborrheic keratosis      Yellow umbilicated papule c/w sebaceous hyperplasia      Irregularly shaped tan macule c/w lentigo     1-2 mm smooth white papules consistent with Milia      Movable subcutaneous cyst with punctum c/w epidermal inclusion cyst      Subcutaneous movable cyst c/w pilar cyst      Firm pink to brown papule c/w dermatofibroma      Pedunculated fleshy papule(s) c/w skin tag(s)      Evenly pigmented macule c/w junctional nevus     Mildly variegated pigmented, slightly irregular-bordered macule c/w mildly atypical nevus      Flesh colored to evenly pigmented papule c/w  intradermal nevus       Pink pearly papule/plaque c/w basal cell carcinoma      Erythematous hyperkeratotic cursted plaque c/w SCC      Surgical scar with no sign of skin cancer recurrence      Open and closed comedones      Inflammatory papules and pustules      Verrucoid papule consistent consistent with wart     Erythematous eczematous patches and plaques     Dystrophic onycholytic nail with subungual debris c/w onychomycosis     Umbilicated papule    Erythematous-base heme-crusted tan verrucoid plaque consistent with inflamed seborrheic keratosis     Erythematous Silvery Scaling Plaque c/w Psoriasis     See annotation              Assessment / Plan:      Pathology Orders:      Normal Orders This Visit    Tissue Specimen To Pathology, Dermatology     Questions:    Directional Terms:  Other(comment)    Clinical information:  r/o scc vs hak    Specific Site:  left ant scalp    Tissue Specimen To Pathology, Dermatology     Questions:    Directional Terms:  Other(comment)    Clinical information:  r/o bcc vs idn    Specific Site:  mid back        Neoplasm of uncertain behavior  -     Tissue Specimen To Pathology, Dermatology  -     Tissue Specimen To Pathology, Dermatology    Shave biopsy procedure note:    Shave biopsy performed after verbal consent including risk of infection, scar, recurrence, need for additional treatment of site. Area prepped with alcohol, anesthetized with approximately 1.0cc of 1% lidocaine with epinephrine. Lesional tissue shaved with razor blade. Hemostasis achieved with application of aluminum chloride followed by hyfrecation. No complications. Dressing applied. Wound care explained.    If back biopsy positive, schedule procedure for definitive excision.   If scalp biopsy positive for malignancy, refer to Dr. Raymundo for Mohs surgery consultation.      AK (actinic keratosis)  Cryosurgery Procedure Note    Verbal consent from the patient is obtained and the patient is aware of the precancerous  quality and need for treatment of these lesions. Liquid nitrogen cryosurgery is applied to the 1 actinic keratoses, as detailed in the physical exam, to produce a freeze injury. The patient is aware that blisters may form and is instructed on wound care with gentle cleansing and use of vaseline ointment to keep moist until healed. The patient is supplied a handout on cryosurgery and is instructed to call if lesions do not completely resolve.  Wear hat    Lentigo  This is a benign hyperpigmented sun induced lesion. Daily sun protection will reduce the number of new lesions. Treatment of these benign lesions are considered cosmetic.      Seborrheic keratosis  These are benign inherited growths without a malignant potential. Reassurance given to patient. No treatment is necessary.       Sebaceous gland hyperplasia  This is a common condition representing benign enlargement of the sebaceous lobule. It typically occurs in adulthood. Reassurance given to patient.     Dermatofibroma  This is a benign scar-like lesion secondary to minor trauma. No treatment required.     Multiple benign nevi  Reassurance.     Screening exam for skin cancer  Area(s) of previous NMSC evaluated with no signs of recurrence.    Upper body skin examination performed today including at least 6 points as noted in physical examination. Suspicious lesions noted.             Return in about 6 months (around 11/22/2017).

## 2017-05-22 NOTE — PATIENT INSTRUCTIONS
Shave Biopsy Wound Care    Your doctor has performed a shave biopsy today.  A band aid and vaseline ointment has been placed over the site.  This should remain in place for 24 hours.  It is recommended that you keep the area dry for the first 24 hours.  After 24 hours, you may remove the band aid and wash the area with warm soap and water and apply Vaseline jelly.  Many patients prefer to use Neosporin or Bacitracin ointment.  This is acceptable; however, know that you can develop an allergy to this medication even if you have used it safely for years.  It is important to keep the area moist.  Letting it dry out and get air slows healing time, and will worsen the scar.  Band aid is optional after first 24 hours.      If you notice increasing redness, tenderness, pain, or yellow drainage at the biopsy site, please notify your doctor.  These are signs of an infection.    If your biopsy site is bleeding, apply firm pressure for 15 minutes straight.  Repeat for another 15 minutes, if it is still bleeding.   If the surgical site continues to bleed, then please contact your doctor.      [facility] [place] [address]/ [phone] FAX/ [email or web address]      CRYOSURGERY      Your doctor has used a method called cryosurgery to treat your skin condition. Cryosurgery refers to the use of very cold substances to treat a variety of skin conditions such as warts, pre-skin cancers, molluscum contagiosum, sun spots, and several benign growths. The substance we use in cryosurgery is liquid nitrogen and is so cold (-195 degrees Celsius) that is burns when administered.     Following treatment in the office, the skin may immediately burn and become red. You may find the area around the lesion is affected as well. It is sometimes necessary to treat not only the lesion, but a small area of the surrounding normal skin to achieve a good response.     A blister, and even a blood filled blister, may form  after treatment.   This is a normal response. If the blister is painful, it is acceptable to sterilize a needle and with rubbing alcohol and gently pop the blister. It is important that you gently wash the area with soap and warm water as the blister fluid may contain wart virus if a wart was treated. Do no remove the roof of the blister.     The area treated can take anywhere from 1-3 weeks to heal. Healing time depends on the kind skin lesion treated, the location, and how aggressively the lesion was treated. It is recommended that the areas treated are covered with Vaseline or bacitracin ointment and a band-aid. If a band-aid is not practical, just ointment applied several times per day will do. Keeping these areas moist will speed the healing time.    Treatment with liquid nitrogen can leave a scar. In dark skin, it may be a light or dark scar, in light skin it may be a white or pink scar. These will generally fade with time, but may never go away completely.     If you have any concerns after your treatment, please feel free to call the office.       [facility] [place] [address]/ [phone] FAX/ [email or web address]

## 2017-05-26 ENCOUNTER — CLINICAL SUPPORT (OUTPATIENT)
Dept: CARDIOLOGY | Facility: CLINIC | Age: 62
End: 2017-05-26
Payer: COMMERCIAL

## 2017-05-26 DIAGNOSIS — I25.10 CORONARY ARTERY DISEASE INVOLVING NATIVE CORONARY ARTERY OF NATIVE HEART WITHOUT ANGINA PECTORIS: ICD-10-CM

## 2017-05-26 DIAGNOSIS — R94.39 ABNORMAL NUCLEAR STRESS TEST: ICD-10-CM

## 2017-05-26 DIAGNOSIS — I10 ESSENTIAL HYPERTENSION: ICD-10-CM

## 2017-05-26 DIAGNOSIS — R94.39 ABNORMAL CARDIOVASCULAR STRESS TEST: ICD-10-CM

## 2017-05-26 DIAGNOSIS — Z95.1 S/P CABG (CORONARY ARTERY BYPASS GRAFT): ICD-10-CM

## 2017-05-26 LAB — DIASTOLIC DYSFUNCTION: NO

## 2017-05-26 PROCEDURE — 93015 CV STRESS TEST SUPVJ I&R: CPT | Mod: S$GLB,,, | Performed by: INTERNAL MEDICINE

## 2017-05-26 PROCEDURE — 99999 PR PBB SHADOW E&M-EST. PATIENT-LVL II: CPT | Mod: PBBFAC,,,

## 2017-05-26 PROCEDURE — A9502 TC99M TETROFOSMIN: HCPCS | Mod: S$GLB,,, | Performed by: INTERNAL MEDICINE

## 2017-05-26 PROCEDURE — 78452 HT MUSCLE IMAGE SPECT MULT: CPT | Mod: S$GLB,,, | Performed by: INTERNAL MEDICINE

## 2017-05-29 ENCOUNTER — OFFICE VISIT (OUTPATIENT)
Dept: SPORTS MEDICINE | Facility: CLINIC | Age: 62
End: 2017-05-29
Payer: COMMERCIAL

## 2017-05-29 VITALS — WEIGHT: 213 LBS | BODY MASS INDEX: 32.28 KG/M2 | TEMPERATURE: 99 F | HEIGHT: 68 IN

## 2017-05-29 DIAGNOSIS — M12.811 ROTATOR CUFF ARTHROPATHY, RIGHT: Primary | ICD-10-CM

## 2017-05-29 PROCEDURE — 20610 DRAIN/INJ JOINT/BURSA W/O US: CPT | Mod: RT,S$GLB,, | Performed by: FAMILY MEDICINE

## 2017-05-29 PROCEDURE — 99999 PR PBB SHADOW E&M-EST. PATIENT-LVL III: CPT | Mod: PBBFAC,,, | Performed by: FAMILY MEDICINE

## 2017-05-29 PROCEDURE — 99214 OFFICE O/P EST MOD 30 MIN: CPT | Mod: 25,S$GLB,, | Performed by: FAMILY MEDICINE

## 2017-05-29 RX ORDER — TRIAMCINOLONE ACETONIDE 40 MG/ML
40 INJECTION, SUSPENSION INTRA-ARTICULAR; INTRAMUSCULAR
Status: DISCONTINUED | OUTPATIENT
Start: 2017-05-29 | End: 2017-05-29 | Stop reason: HOSPADM

## 2017-05-29 RX ADMIN — TRIAMCINOLONE ACETONIDE 40 MG: 40 INJECTION, SUSPENSION INTRA-ARTICULAR; INTRAMUSCULAR at 04:05

## 2017-05-29 NOTE — PROCEDURES
Large Joint Aspiration/Injection  Date/Time: 5/29/2017 4:31 PM  Performed by: JANAK OLIVER  Authorized by: JANAK OLIVER     Consent Done?:  Yes (Verbal)  Indications:  Pain  Procedure site marked: Yes    Timeout: Prior to procedure the correct patient, procedure, and site was verified      Location:  Shoulder  Site:  R subacromial bursa  Prep: Patient was prepped and draped in usual sterile fashion    Ultrasonic Guidance for needle placement: No  Needle size:  22 G  Approach:  Posterior  Medications:  40 mg triamcinolone acetonide 40 mg/mL  Patient tolerance:  Patient tolerated the procedure well with no immediate complications

## 2017-05-29 NOTE — PROGRESS NOTES
"  Jm Deleon, a 62 y.o. male, is here for evaluation of RIGHT shoulder pain.     HISTORY OF PRESENT ILLNESS  Hand dominance, right     Location: anterior and lateral, right  Onset: Insidious, since ~ April 2016   Palliative:    Relative rest   Oral analgesics (NSAIDs)   CSI, SAB, 10/19/16, significant improvement    CSI, SAB, 03/28/17, moderate improvement for ~ 3 weeks   Provocative:    Abduction and GH flexion   ADLs   Lying in bed   Prior:    09/2014 - L hip - [facility] - [facility]    Recent skin cancer diagnosis      Progression: worsening discomfort since mid-May 2017  Quality:    Deep ache ("deep in the joint")   Pain is worse in the evening/when lying in bed   Radiation: none  Severity: per nursing documentation  Timing: intermittent with use  Trauma: none known     Review of systems (ROS):  A 10+ review of systems was performed with pertinent positives and negatives noted above in the history of present illness. Other systems were negative unless otherwise specified.    PHYSICAL EXAMINATION  General:  The patient is alert and oriented x 3. Mood is pleasant. Observation of ears, eyes and nose reveal no gross abnormalities. HEENT: NCAT, sclera anicteric. Lungs: Respirations are equal and unlabored.     RIGHT SHOULDER EXAMINATION     OBSERVATION:     Swelling  none  Deformity  none   Discoloration  none   Scapular winging none   Scars   none  Atrophy  none    TENDERNESS / CREPITUS (T/C):          T/C      T/C   Clavicle   -/-  SUPRAspinatus    -/-     AC Jt.    -/-  INFRAspinatus  -/-    SC Jt.    -/-  Deltoid    -/-      G. Tuberosity  -/-  LH BICEP groove  -/-   Acromion:  -/-  Midline Neck   -/-     Scapular Spine -/-  Trapezium   -/-   SMA Scapula  -/-  GH jt. line - post  -/-     Scapulothoracic  -/-         ROM:     Right shoulder   Left shoulder        AROM (PROM)   AROM (PROM)   FE    170° (175°)*     170° (175°)     ER at 0°    60°  (65°)*   60°  (65°)   ER at 90° ABD  90°  (90°)*    90°  (90°)   IR at " 90°  ABD   NA  (40°)*     NA  (40°)      IR (spine level)   T10     T10    STRENGTH: (* = with pain) RIGHT SHOULDER  LEFT SHOULDER   SCAPTION   5/5    5/5    IR    5/5    5/5   ER    5/5    5/5   BICEPS   5/5    5/5   Deltoid    5/5    5/5     SIGNS:  Painful side       NEER   -   OLORYS        +    HOOVER   +   SPEEDS        +   DROP ARM   -   BELLY PRESS       -    X-Body ADD    -   LIFT-OFF        -   HORNBLOWERS      -              STABILITY TESTING  RIGHT SHOULDER  LEFT SHOULDER     Translation     Anterior up face    up face    Posterior up face   up face    Sulcus  < 10mm   < 10 mm     Signs   Apprehension   neg     neg       Relocation   no change    no change      Jerk test  neg    neg    EXTREMITY NEURO-VASCULAR EXAM    Sensation grossly intact to light touch all dermatomal regions.    DTR 2+ Biceps, Triceps, BR and Negative Usmans sign   Grossly intact motor function at Elbow, Wrist and Hand   Distal pulses radial and ulnar 2+, brisk cap refill, symmetric.      NECK:  Painless FROM and spinous processes non-tender. Negative Spurlings sign.       Other Findings:    ASSESSMENT & PLAN   Assessment:   #1 supraspinatus tendinosis, right    No evidence of neurologic pathology  No evidence of vascular pathology    Imaging studies reviewed:   X-ray shoulder, right 16.10    Plan:  We discussed options including:  #1 watchful waiting  #2 physical therapy aimed at:   RoM glenohumeral joint   Strengthening rotator cuff   Scapular stability  #3 injection therapy:   CSI SAB    Right, effective good%, though not long, repeat    CSI iaGH    Right,    orthobiologics   #4 MRI for further evaluation     The patient chooses #2 and #3 csi sab right    Pain management: handout given  Bracing:   Physical therapy: hgPT, handout given, begin as above   nfpt  Activity (e.g. sports, work) restrictions: as tolerated   school/vocation: owns a construction company, very active     Son is an [employer] grad (Lt[employer]  [employer])     Follow up appointment offered, deferred by patient  How was onc appt?  A/e csi sab right  A/e hgPT  Should symptoms worsen or fail to resolve, consider:  Revisiting the above options    68618 HOME EXERCISE PROGRAM (HEP):  The patient was taught a homegoing physical therapy regimen as described above and based on the appropriate chapter of "The Sports Medicine Patient Advisor," by Markos Arevalo, [id number]. The patient demonstrated understanding of the exercises and proper technique of their execution. This interaction took 15 minutes.     "

## 2017-05-30 ENCOUNTER — INITIAL CONSULT (OUTPATIENT)
Dept: DERMATOLOGY | Facility: CLINIC | Age: 62
End: 2017-05-30
Payer: COMMERCIAL

## 2017-05-30 VITALS
BODY MASS INDEX: 32.28 KG/M2 | HEART RATE: 62 BPM | HEIGHT: 68 IN | SYSTOLIC BLOOD PRESSURE: 129 MMHG | WEIGHT: 213 LBS | DIASTOLIC BLOOD PRESSURE: 74 MMHG

## 2017-05-30 DIAGNOSIS — C44.42 SQUAMOUS CELL CARCINOMA, SCALP/NECK: Primary | ICD-10-CM

## 2017-05-30 PROCEDURE — 99214 OFFICE O/P EST MOD 30 MIN: CPT | Mod: 24,S$GLB,, | Performed by: DERMATOLOGY

## 2017-05-30 PROCEDURE — 99999 PR PBB SHADOW E&M-EST. PATIENT-LVL III: CPT | Mod: PBBFAC,,, | Performed by: DERMATOLOGY

## 2017-05-30 RX ORDER — FUROSEMIDE 40 MG/1
TABLET ORAL
Qty: 90 TABLET | Refills: 3 | Status: SHIPPED | OUTPATIENT
Start: 2017-05-30 | End: 2018-03-28 | Stop reason: SDUPTHER

## 2017-05-30 NOTE — PROGRESS NOTES
REFERRING MD:  Dana Coelho M.D.    CHIEF COMPLAINT:  New patient being consulted for Mohs' surgery evaluation.    HISTORY OF PRESENT ILLNESS:  62 y.o. male presents with a 3 month(s) history of growth on the scalp. Pt states it was a bump that he knicked the area while shaving.    Negative for scabbing.  Negative for crusting.  Negative for bleeding.  Negative for itching.    Biopsy consistent with squamous cell carcinoma.     No prior treatment.    Pacemaker: No  Defibrillator: No  Artificial joints: No  Artificial heart valves: No    PAST MEDICAL HISTORY:  Past Medical History:   Diagnosis Date    Allergy     seasonal    Arthritis     Coronary artery disease     Diabetes mellitus     Hyperlipidemia     Hypertension     Joint pain     Squamous cell carcinoma        PAST SURGICAL HISTORY:  Past Surgical History:   Procedure Laterality Date    BELPHAROPTOSIS REPAIR  10 years ago    both eyes    CORONARY ARTERY BYPASS GRAFT  x4 age 45 2000    HERNIA REPAIR      HIP SURGERY  9-24-14    left YANICK        SOCIAL HISTORY:  Dependencies:  never smoked    PERTINENT MEDICATIONS:  See medications list.  aspirin and fish oil    ALLERGIES:  No known drug allergies    ROS:  Skin: See HPI  Constitutional: No fatigue, fever, malaise, weight loss, or night sweats.  Cardiovascular: No chest pain, palpitations, or edema.  Respiratory: No coughing, wheezing, SOB, or sputum production.    Physical Exam   HENT:   Head:             General: Mood and affect normal. Alert and orient X3. Normal appearance.  Scalp: L frontal scalp with a 4 x 4 mm pink bx site located 4 cm anteriorly from the left superior ear attachment and 10 cm superiorly.   Neck:  no suspicious lesions  Lymph nodes: Bilateral pre-auricular, post-auricular, anterior cervical, posterior cervical, sublingual, submental, and occipital are not enlarged    IMPRESSION:  Biopsy proven well-differentiated squamous cell carcinoma, L anterior scalp, path#  [id number].    PLAN:  The diagnosis and the pathology report were discussed in detail with the patient. Treatment options were reviewed, including Mohs Micrographic Surgery, radiation, topical therapy, and standard excision.  After careful review of patient's history and physical exam, and after discussion of treatment options, the decision was made to perform Mohs micrographic surgery.    Scheduled patient for Mohs Micrographic Surgery. Risks, benefits, and alternatives of Mohs' surgery discussed with the patient. Discussed repair options including complex closure, skin flap, skin graft and second intention healing with the patient. Pre-operative instructions provided to the patient. Okay to stay on aspirin. Stop fish oil a week prior to procedure.    Spent 30 minutes in coordination of care and/or consultation with patient discussing diagnosis, treatment options, risks and benefits of each. All questions answered.

## 2017-06-01 ENCOUNTER — PROCEDURE VISIT (OUTPATIENT)
Dept: DERMATOLOGY | Facility: CLINIC | Age: 62
End: 2017-06-01
Payer: COMMERCIAL

## 2017-06-01 VITALS
DIASTOLIC BLOOD PRESSURE: 68 MMHG | HEART RATE: 61 BPM | HEIGHT: 68 IN | BODY MASS INDEX: 32.28 KG/M2 | SYSTOLIC BLOOD PRESSURE: 102 MMHG | WEIGHT: 213 LBS

## 2017-06-01 DIAGNOSIS — C44.42 SQUAMOUS CELL CARCINOMA OF SCALP: Primary | ICD-10-CM

## 2017-06-01 PROCEDURE — 99499 UNLISTED E&M SERVICE: CPT | Mod: S$GLB,,, | Performed by: DERMATOLOGY

## 2017-06-01 PROCEDURE — 17311 MOHS 1 STAGE H/N/HF/G: CPT | Mod: S$GLB,,, | Performed by: DERMATOLOGY

## 2017-06-01 PROCEDURE — 13121 CMPLX RPR S/A/L 2.6-7.5 CM: CPT | Mod: 51,S$GLB,, | Performed by: DERMATOLOGY

## 2017-06-01 RX ORDER — OXYCODONE AND ACETAMINOPHEN 5; 325 MG/1; MG/1
1 TABLET ORAL
Qty: 20 TABLET | Refills: 0 | Status: SHIPPED | OUTPATIENT
Start: 2017-06-01 | End: 2017-07-18

## 2017-06-01 RX ORDER — CEPHALEXIN 500 MG/1
500 CAPSULE ORAL 3 TIMES DAILY
Qty: 30 CAPSULE | Refills: 0 | Status: SHIPPED | OUTPATIENT
Start: 2017-06-01 | End: 2017-06-11

## 2017-06-01 NOTE — PROGRESS NOTES
PROCEDURE: Mohs' Micrographic Surgery    INDICATION: Biopsy-proven skin cancer of cosmetically and functionally important areas, including head, neck, genital, hand, foot, or areas known for having difficulty in healing, such as the lower anterior legs. Tumor with ill-defined borders.    REFERRING MD: Dana Coelho M.D.    CASE NUMBER:     ANESTHETIC: 4 cc 0.5% Lidocaine with Epi 1:200,000 mixed 1:1 with 0.5% Bupivacaine    SURGICAL PREP: Hibiclens    SURGEON: Agustin Raymundo MD    ASSISTANTS: Zhane Leon PA-C and Melinda Elias, [facility]    PREOPERATIVE DIAGNOSIS: squamous cell carcinoma    POSTOPERATIVE DIAGNOSIS: squamous cell carcinoma    PATHOLOGIC DIAGNOSIS: squamous cell carcinoma- invasive, well to moderately differentiated    HISTOLOGY OF SPECIMENS IN FIRST STAGE:   Tumor Type: No tumor seen.    STAGES OF MOHS' SURGERY PERFORMED: 1    TUMOR-FREE PLANE ACHIEVED: Yes    HEMOSTASIS: electrocoagulation     SPECIMENS: 2     LOCATION: left anterior scalp. Patient verified location.    INITIAL LESION SIZE: 0.5 x 0.5 cm    FINAL DEFECT SIZE: 0.9 x 0.9 cm    WOUND REPAIR/DISPOSITION: The patient tolerated Mohs' Micrographic Surgery for a squamous cell carcinoma very well. When the tumor was completely removed, a repair of the surgical defect was undertaken.      PROCEDURE: Complex Linear Repair    INDICATION: Status post Mohs' Micrographic Surgery for squamous cell carcinoma.    CASE NUMBER:     SURGEON: Agustin Raymundo MD    ASSISTANTS: Zhane Leon PA-C and Aleyda Lay MA    ANESTHETIC: 2 cc 0.5% Lidocaine with Epi 1:200,000 mixed 1:1 with 0.5% Bupivacaine    SURGICAL PREP: Hibiclens    LOCATION: left anterior scalp    DEFECT SIZE: 0.9 x 0.9 cm    WOUND REPAIR/DISPOSITION:  After the patient's carcinoma had been completely removed with Mohs' Micrographic Surgery, a repair of the surgical defect was undertaken. The patient was returned to the operating suite where the area of left anterior scalp  "was prepped, draped, and anesthetized in the usual sterile fashion. The wound was widely undermined in all directions. Then, electrocoagulation was used to obtain meticulous hemostasis. 3-0 Vicryl buried vertical mattress sutures were placed into the subcutaneous and dermal plane to close the wound and alvarez the cutaneous wound edge. Bilateral dog ears were identified and were removed by a standard Burow's triangle technique. The cutaneous wound edges were closed using interrupted 4-0 Prolene suture.    The patient tolerated the procedure well.    The area was cleaned and dressed appropriately and the patient was given wound care instructions, as well as appointment for follow-up evaluation. Patient was placed on Percocet 5 prn postop pain annd Keflex 500 mg TID x 10 days.    LENGTH OF REPAIR: 2.7 cm    Vitals:    06/01/17 0753 06/01/17 0957   BP: 110/71 102/68   BP Location: Right arm    Patient Position: Sitting    BP Method: Automatic    Pulse: 69 61   Weight: 96.6 kg (213 lb)    Height: 5' 8" (1.727 m)          "

## 2017-06-02 ENCOUNTER — TELEPHONE (OUTPATIENT)
Dept: CARDIOLOGY | Facility: CLINIC | Age: 62
End: 2017-06-02

## 2017-06-02 ENCOUNTER — PATIENT MESSAGE (OUTPATIENT)
Dept: ENDOCRINOLOGY | Facility: CLINIC | Age: 62
End: 2017-06-02

## 2017-06-02 RX ORDER — INSULIN ASPART 100 [IU]/ML
10 INJECTION, SOLUTION INTRAVENOUS; SUBCUTANEOUS
Qty: 3 BOX | Refills: 3 | Status: SHIPPED | OUTPATIENT
Start: 2017-06-02 | End: 2018-03-28 | Stop reason: SDUPTHER

## 2017-06-02 NOTE — TELEPHONE ENCOUNTER
Results of recent stress echo were given to patient on 5/26. Pt was told Stress echo looked good.

## 2017-06-08 ENCOUNTER — CLINICAL SUPPORT (OUTPATIENT)
Dept: OPHTHALMOLOGY | Facility: CLINIC | Age: 62
End: 2017-06-08
Payer: COMMERCIAL

## 2017-06-08 ENCOUNTER — OFFICE VISIT (OUTPATIENT)
Dept: OPTOMETRY | Facility: CLINIC | Age: 62
End: 2017-06-08
Payer: COMMERCIAL

## 2017-06-08 DIAGNOSIS — I10 ESSENTIAL HYPERTENSION: ICD-10-CM

## 2017-06-08 DIAGNOSIS — H40.003 GLAUCOMA SUSPECT, BILATERAL: ICD-10-CM

## 2017-06-08 DIAGNOSIS — E11.9 TYPE 2 DIABETES MELLITUS WITHOUT OPHTHALMIC MANIFESTATIONS: Primary | ICD-10-CM

## 2017-06-08 DIAGNOSIS — H04.123 DRY EYE SYNDROME, BILATERAL: ICD-10-CM

## 2017-06-08 DIAGNOSIS — H25.13 NS (NUCLEAR SCLEROSIS), BILATERAL: ICD-10-CM

## 2017-06-08 DIAGNOSIS — H52.4 PRESBYOPIA: ICD-10-CM

## 2017-06-08 PROCEDURE — 99999 PR PBB SHADOW E&M-EST. PATIENT-LVL II: CPT | Mod: PBBFAC,,, | Performed by: OPTOMETRIST

## 2017-06-08 PROCEDURE — 92083 EXTENDED VISUAL FIELD XM: CPT | Mod: S$GLB,,, | Performed by: OPTOMETRIST

## 2017-06-08 PROCEDURE — 92014 COMPRE OPH EXAM EST PT 1/>: CPT | Mod: S$GLB,,, | Performed by: OPTOMETRIST

## 2017-06-08 PROCEDURE — 92133 CPTRZD OPH DX IMG PST SGM ON: CPT | Mod: S$GLB,,, | Performed by: OPTOMETRIST

## 2017-06-08 PROCEDURE — 92015 DETERMINE REFRACTIVE STATE: CPT | Mod: S$GLB,,, | Performed by: OPTOMETRIST

## 2017-06-08 RX ORDER — INSULIN GLARGINE 100 [IU]/ML
35 INJECTION, SOLUTION SUBCUTANEOUS NIGHTLY
Qty: 45 ML | Refills: 1 | Status: SHIPPED | OUTPATIENT
Start: 2017-06-08 | End: 2018-02-09 | Stop reason: SDUPTHER

## 2017-06-08 NOTE — PROGRESS NOTES
HPI     Patient's age: 62 y.o.    Approximate date of last eye examination:  6/26/15  Name of last eye doctor seen: Dr. Long    Pt states that he is here for exam, may need to change glasses have   trouble with vision especially when reading.    Wears glasses? yes      Wears CLs?:  no            Headaches?  no  Eye pain/discomfort?  no                                                                                     Flashes?  no  Floaters?  yes  Diplopia/Double vision?  no    Patient's Ocular History:         Any eye surgeries? no        Family history of eye disease?  no    Significant patient medical history:         1. Diabetes?  yes       If yes, IDDM or NIDDM? both   2. HBP?  Yes, medication and diet control                 ! OTC eyedrops currently using:  none   ! Prescription eye meds currently using:  None    Hemoglobin A1C       Date                     Value               Ref Range             Status                04/07/2017               7.5 (H)             4.5 - 6.2 %           Final                    05/23/2016               7.6 (H)             4.5 - 6.2 %           Final                 02/03/2016               7.0 (H)             4.5 - 6.2 %           Final            ----------             Last edited by Monse Lazo MA on 6/8/2017  3:23 PM. (History)            Assessment /Plan     For exam results, see Encounter Report.    NS (nuclear sclerosis), bilateral   Mild, monitor yearly    Dry eye syndrome, bilateral   Use refresh liquigel BID OU    Type II or unspecified type diabetes mellitus without mention of complication, uncontrolled  Essential hypertension                        No retinopathy, monitor      Presbyopia                        Rx specs     Glaucoma suspect, bilateral    Posterior Segment OCT Optic Nerve-Milde defects OU, stable compared to prior tests  OCT: sectoral thinning inf nasal OD, sup temp OS     (+) large c/d ration OU    HVF 2012: WNL, scattered defects today, no  repeatable defects    PACHY: 541/537     IOP: normal     Return 1 year, DFE, OCT HVF

## 2017-06-08 NOTE — TELEPHONE ENCOUNTER
----- Message from Magda Degroot sent at 6/8/2017 10:29 AM CDT -----  Contact: [facility]  [phone]  dipp   -  Pharmacy calling to get an prior authorization on the pt Levemir , pt is completely out   Call back number [phone]  Thanks,

## 2017-06-15 ENCOUNTER — OFFICE VISIT (OUTPATIENT)
Dept: DERMATOLOGY | Facility: CLINIC | Age: 62
End: 2017-06-15
Payer: COMMERCIAL

## 2017-06-15 DIAGNOSIS — Z09 POSTOP CHECK: Primary | ICD-10-CM

## 2017-06-15 PROCEDURE — 99024 POSTOP FOLLOW-UP VISIT: CPT | Mod: S$GLB,,, | Performed by: DERMATOLOGY

## 2017-06-15 PROCEDURE — 99999 PR PBB SHADOW E&M-EST. PATIENT-LVL III: CPT | Mod: PBBFAC,,, | Performed by: DERMATOLOGY

## 2017-06-15 NOTE — PROGRESS NOTES
62 y.o. male patient is here for suture removal following Mohs' surgery.    Patient reports no problems with left anterior scalp.    WOUND PE:  The left anterior scalp sutures intact. Wound healing well. Good skin edges. No signs or symptoms of infection.    IMPRESSION:  Healing operative site from Mohs' surgery SCC, left anterior scalp s/p Mohs with CLC, postop day # 14..    PLAN:  Sutures removed today. Steri-strips applied.  Continue wound care.  Keep moist with Aquaphor.    RTC:  In 1 month.

## 2017-07-10 RX ORDER — CANAGLIFLOZIN 300 MG/1
TABLET, FILM COATED ORAL
Qty: 30 TABLET | Refills: 4 | Status: SHIPPED | OUTPATIENT
Start: 2017-07-10 | End: 2017-12-11 | Stop reason: SDUPTHER

## 2017-07-17 ENCOUNTER — OFFICE VISIT (OUTPATIENT)
Dept: SPORTS MEDICINE | Facility: CLINIC | Age: 62
End: 2017-07-17
Payer: COMMERCIAL

## 2017-07-17 VITALS — WEIGHT: 205 LBS | BODY MASS INDEX: 31.07 KG/M2 | TEMPERATURE: 98 F | HEIGHT: 68 IN

## 2017-07-17 DIAGNOSIS — M12.811 ROTATOR CUFF ARTHROPATHY, RIGHT: Primary | ICD-10-CM

## 2017-07-17 PROCEDURE — 20610 DRAIN/INJ JOINT/BURSA W/O US: CPT | Mod: RT,S$GLB,, | Performed by: FAMILY MEDICINE

## 2017-07-17 PROCEDURE — 99999 PR PBB SHADOW E&M-EST. PATIENT-LVL III: CPT | Mod: PBBFAC,,, | Performed by: FAMILY MEDICINE

## 2017-07-17 PROCEDURE — 99214 OFFICE O/P EST MOD 30 MIN: CPT | Mod: 25,S$GLB,, | Performed by: FAMILY MEDICINE

## 2017-07-17 RX ORDER — TRIAMCINOLONE ACETONIDE 40 MG/ML
40 INJECTION, SUSPENSION INTRA-ARTICULAR; INTRAMUSCULAR
Status: DISCONTINUED | OUTPATIENT
Start: 2017-07-17 | End: 2017-07-17 | Stop reason: HOSPADM

## 2017-07-17 RX ADMIN — TRIAMCINOLONE ACETONIDE 40 MG: 40 INJECTION, SUSPENSION INTRA-ARTICULAR; INTRAMUSCULAR at 04:07

## 2017-07-17 NOTE — PROGRESS NOTES
"  Jm Deleon, a 62 y.o. male, is here for evaluation of RIGHT shoulder pain.     HISTORY OF PRESENT ILLNESS  Hand dominance, right     Location: anterior and lateral, right  Onset: Insidious, since ~ April 2016   Palliative:    Relative rest   Oral analgesics (NSAIDs)   CSI, SAB, 10/19/16, significant improvement    CSI, SAB, 03/28/17, moderate improvement for ~ 3 weeks    CSI, SAB, 05/29/17, moderate improvement    hgPT  Provocative:    ADLs   Prior:    09/2014 - L hip - [facility] - [facility]    Recent  removal of squamous cell carcinoma (scalp) - [facility]  Progression: worsening discomfort   Quality:    Deep ache ("deep in the joint")   Pain is worse in the evening/when lying in bed   Unable to get comfortable when sleeping   Radiation: none  Severity: per nursing documentation  Timing: intermittent with use  Trauma: none known     Review of systems (ROS):  A 10+ review of systems was performed with pertinent positives and negatives noted above in the history of present illness. Other systems were negative unless otherwise specified.    PHYSICAL EXAMINATION  General:  The patient is alert and oriented x 3. Mood is pleasant. Observation of ears, eyes and nose reveal no gross abnormalities. HEENT: NCAT, sclera anicteric. Lungs: Respirations are equal and unlabored.     RIGHT SHOULDER EXAMINATION     OBSERVATION:     Swelling  none  Deformity  none   Discoloration  none   Scapular winging none   Scars   none  Atrophy  none    TENDERNESS / CREPITUS (T/C):          T/C      T/C   Clavicle   -/-  SUPRAspinatus    -/-     AC Jt.    -/-  INFRAspinatus  -/-    SC Jt.    -/-  Deltoid    -/-      G. Tuberosity  -/-  LH BICEP groove  -/-   Acromion:  -/-  Midline Neck   -/-     Scapular Spine -/-  Trapezium   -/-   SMA Scapula  -/-  GH jt. line - post  -/-     Scapulothoracic  -/-         ROM:     Right shoulder   Left shoulder        AROM (PROM)   AROM (PROM)   FE    170° (175°)*     170° (175°)     ER at 0°    60°  (65°)*   60° "  (65°)   ER at 90° ABD  90°  (90°)*    90°  (90°)   IR at 90°  ABD   NA  (40°)*     NA  (40°)      IR (spine level)   T10     T10    STRENGTH: (* = with pain) RIGHT SHOULDER  LEFT SHOULDER   SCAPTION   5/5*    5/5    IR    5/5*    5/5   ER    5/5*    5/5   BICEPS   5/5*    5/5   Deltoid    5/5*    5/5     SIGNS:  Painful side       NEER   -   OLORYS        +    HOOVER   +   SPEEDS        +   DROP ARM   -   BELLY PRESS       -    X-Body ADD    -   LIFT-OFF        -   HORNBLOWERS      -              STABILITY TESTING  RIGHT SHOULDER  LEFT SHOULDER     Translation     Anterior up face    up face    Posterior up face   up face    Sulcus  < 10mm   < 10 mm     Signs   Apprehension   neg     neg       Relocation   no change    no change      Jerk test  neg    neg    EXTREMITY NEURO-VASCULAR EXAM    Sensation grossly intact to light touch all dermatomal regions.    DTR 2+ Biceps, Triceps, BR and Negative Usmans sign   Grossly intact motor function at Elbow, Wrist and Hand   Distal pulses radial and ulnar 2+, brisk cap refill, symmetric.      NECK:  Painless FROM and spinous processes non-tender. Negative Spurlings sign.       Other Findings:    ASSESSMENT & PLAN   Assessment:   #1 concern for supraspinatus tearing, right    No evidence of neurologic pathology  No evidence of vascular pathology    Imaging studies reviewed:   X-ray shoulder, right 16.10    Plan:  Given extreme dysfunction and discomfort, coupled with physical examination suggesting suprior rotator cuff pathology, and with non-diagnostic x-ray imaging, we will obtain MRI imaging for further evaluation of the soft tissue structures of the shoulder.    [We discussed options including:  #1 watchful waiting  #2 re start physical therapy aimed at:   RoM glenohumeral joint   Strengthening rotator cuff   Scapular stability  #3 injection therapy:   CSI SAB    Right, effective good%, though not long, repeat    CSI iaGH    Right,    orthobiologics   #4 MRI  for further evaluation     The patient chooses #3 csi sab right]    Pain management: handout given  Bracing:   Physical therapy:    hgPT, handout given, prior as above    nfpt  Activity (e.g. sports, work) restrictions: as tolerated   school/vocation: owns a construction company, very active     Son is an [employer] grad ([employer])     Follow up w/ [facility] after MRI shoulder right  Should symptoms worsen or fail to resolve, consider:  Revisiting the above options

## 2017-07-17 NOTE — PROCEDURES
Large Joint Aspiration/Injection  Date/Time: 7/17/2017 4:54 PM  Performed by: JANAK OLIVER  Authorized by: JANAK OLIVER     Consent Done?:  Yes (Verbal)  Indications:  Pain  Procedure site marked: Yes    Timeout: Prior to procedure the correct patient, procedure, and site was verified      Location:  Shoulder  Site:  R subacromial bursa  Prep: Patient was prepped and draped in usual sterile fashion    Ultrasonic Guidance for needle placement: No  Needle size:  22 G  Approach:  Posterior  Medications:  40 mg triamcinolone acetonide 40 mg/mL  Patient tolerance:  Patient tolerated the procedure well with no immediate complications

## 2017-07-18 ENCOUNTER — OFFICE VISIT (OUTPATIENT)
Dept: DERMATOLOGY | Facility: CLINIC | Age: 62
End: 2017-07-18
Payer: COMMERCIAL

## 2017-07-18 DIAGNOSIS — C44.42 SQUAMOUS CELL CARCINOMA, SCALP/NECK: Primary | ICD-10-CM

## 2017-07-18 PROCEDURE — 99999 PR PBB SHADOW E&M-EST. PATIENT-LVL II: CPT | Mod: PBBFAC,,, | Performed by: DERMATOLOGY

## 2017-07-18 PROCEDURE — 99212 OFFICE O/P EST SF 10 MIN: CPT | Mod: S$GLB,,, | Performed by: DERMATOLOGY

## 2017-07-18 NOTE — PROGRESS NOTES
62 y.o. male patient is here for wound check after surgery.    Patient reports no problems.    WOUND PE:  The L anterior scalp incision is well healed. Mild firmness and erythema of scar. No nodularity.      IMPRESSION:  Healing operative site from Mohs' surgery, SCC L anterior scalp s/p Mohs with CLC, postop week #6, well-healed and no evidence of recurrence    PLAN:    Daily SPF.  Regular skin checks.    RTC:  In 3-6 months with Dana Coelho M.D. for skin check or sooner if new concern arises.

## 2017-07-27 ENCOUNTER — HOSPITAL ENCOUNTER (OUTPATIENT)
Dept: RADIOLOGY | Facility: HOSPITAL | Age: 62
Discharge: HOME | End: 2017-07-27
Attending: FAMILY MEDICINE
Payer: COMMERCIAL

## 2017-07-27 DIAGNOSIS — M12.811 ROTATOR CUFF ARTHROPATHY, RIGHT: ICD-10-CM

## 2017-07-27 PROCEDURE — 73221 MRI JOINT UPR EXTREM W/O DYE: CPT | Mod: TC,RT

## 2017-07-27 PROCEDURE — 73221 MRI JOINT UPR EXTREM W/O DYE: CPT | Mod: 26,RT,, | Performed by: RADIOLOGY

## 2017-07-28 ENCOUNTER — HOSPITAL ENCOUNTER (OUTPATIENT)
Dept: RADIOLOGY | Facility: HOSPITAL | Age: 62
Discharge: HOME | End: 2017-07-28
Attending: FAMILY MEDICINE
Payer: COMMERCIAL

## 2017-07-28 DIAGNOSIS — M25.511 CHRONIC RIGHT SHOULDER PAIN: Primary | ICD-10-CM

## 2017-07-28 DIAGNOSIS — M25.511 CHRONIC RIGHT SHOULDER PAIN: ICD-10-CM

## 2017-07-28 DIAGNOSIS — G89.29 CHRONIC RIGHT SHOULDER PAIN: Primary | ICD-10-CM

## 2017-07-28 DIAGNOSIS — G89.29 CHRONIC RIGHT SHOULDER PAIN: ICD-10-CM

## 2017-07-28 PROCEDURE — 73221 MRI JOINT UPR EXTREM W/O DYE: CPT | Mod: 26,RT,, | Performed by: RADIOLOGY

## 2017-07-28 PROCEDURE — 73221 MRI JOINT UPR EXTREM W/O DYE: CPT | Mod: TC,RT

## 2017-07-28 NOTE — TELEPHONE ENCOUNTER
Spoke c pt.     Pt stated MRI informed him he could be worked in this weekend to complete MRI as he was not able to get through more than 10minutes. He stated Pilar was his point of contact.     RX for valium called in per Dr. Multani to pt's pharmacy.     Informed pt I would contact MRI to get pt r/s this weekend.     ==    MRI r/s 07/28/17 @ 6pm.     MRI auth with the assistance of Chilo Navarrete & Brittani Lamb.

## 2017-07-28 NOTE — TELEPHONE ENCOUNTER
----- Message from Cheyanne Marc sent at 7/28/2017  8:55 AM CDT -----  Contact: self   Pt stated that he was not able to complete his MRI last night due his pain level and is requesting a sedative to be prescribed to him so he can have his MRI rescheduled before his appt with in 8/1. Please call him  [phone]

## 2017-07-31 RX ORDER — DIAZEPAM 10 MG/1
10 TABLET ORAL ONCE AS NEEDED
Qty: 1 TABLET | Refills: 0 | Status: SHIPPED | OUTPATIENT
Start: 2017-07-31 | End: 2017-09-29 | Stop reason: CLARIF

## 2017-08-01 ENCOUNTER — OFFICE VISIT (OUTPATIENT)
Dept: SPORTS MEDICINE | Facility: CLINIC | Age: 62
End: 2017-08-01
Payer: COMMERCIAL

## 2017-08-01 ENCOUNTER — PATIENT OUTREACH (OUTPATIENT)
Dept: ADMINISTRATIVE | Facility: HOSPITAL | Age: 62
End: 2017-08-01

## 2017-08-01 VITALS
HEART RATE: 60 BPM | BODY MASS INDEX: 31.07 KG/M2 | HEIGHT: 68 IN | SYSTOLIC BLOOD PRESSURE: 123 MMHG | DIASTOLIC BLOOD PRESSURE: 76 MMHG | WEIGHT: 205 LBS

## 2017-08-01 DIAGNOSIS — M75.100 ROTATOR CUFF TEAR: Primary | ICD-10-CM

## 2017-08-01 DIAGNOSIS — G89.29 CHRONIC RIGHT SHOULDER PAIN: ICD-10-CM

## 2017-08-01 DIAGNOSIS — M25.511 CHRONIC RIGHT SHOULDER PAIN: ICD-10-CM

## 2017-08-01 PROCEDURE — 99999 PR PBB SHADOW E&M-EST. PATIENT-LVL III: CPT | Mod: PBBFAC,,, | Performed by: ORTHOPAEDIC SURGERY

## 2017-08-01 PROCEDURE — 99214 OFFICE O/P EST MOD 30 MIN: CPT | Mod: S$GLB,,, | Performed by: ORTHOPAEDIC SURGERY

## 2017-08-01 NOTE — PROGRESS NOTES
CC: RIGHT shoulder pain     62 y.o. Male with a history of right shoulder pain since April 2016.  Has previously tried cortisone injections on 3 separate occasions.   He states that the pain is severe and not responding to any conservative care.      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    He reports that the pain and weakness is worse with overhead activity. It also bothers him at night.    Is affecting ADLs.  Pain is 4/10 at it's worst.      Past Medical History:   Diagnosis Date    Allergy     seasonal    Arthritis     Coronary artery disease     Diabetes mellitus     Hyperlipidemia     Hypertension     Joint pain     Squamous cell carcinoma        Past Surgical History:   Procedure Laterality Date    BELPHAROPTOSIS REPAIR  10 years ago    both eyes    CORONARY ARTERY BYPASS GRAFT  x4 age 45 2000    HERNIA REPAIR      HIP SURGERY  9-24-14    left YANICK       Family History   Problem Relation Age of Onset    Coronary artery disease Mother     Cancer Father      lung cancer, smoker    Hypertension Brother     Cancer Brother      colon    Amblyopia Neg Hx     Blindness Neg Hx     Cataracts Neg Hx     Glaucoma Neg Hx     Macular degeneration Neg Hx     Retinal detachment Neg Hx     Strabismus Neg Hx     Melanoma Neg Hx          Current Outpatient Prescriptions:     aspirin 81 mg Tab, Take by mouth once daily. , Disp: , Rfl:     atenolol (TENORMIN) 25 MG tablet, TAKE 1 TABLET BY MOUTH EVERY DAY, Disp: 90 tablet, Rfl: 3    atorvastatin (LIPITOR) 80 MG tablet, TAKE 1 TABLET (80 MG TOTAL) BY MOUTH ONCE DAILY., Disp: 90 tablet, Rfl: 3    blood sugar diagnostic (ONE TOUCH VERIO) Strp, Patient tests sugar 3 times daily please dispense strips and lancets, Disp: 300 each, Rfl: 3    carvedilol (COREG) 25 MG tablet, Take 1 tablet (25 mg total) by mouth 2 (two) times daily. .5-1 twice daily or as directed, Disp: 180 tablet, Rfl: 3    diphenhydrAMINE (BENADRYL) 25 mg capsule, Take 25 mg by mouth  "nightly as needed for Itching., Disp: , Rfl:     FOLIC ACID/MULTIVITS-MIN (MULTIVITAMIN FOR CHOLESTEROL ORAL), Take 1 tablet by mouth once daily. , Disp: , Rfl:     furosemide (LASIX) 40 MG tablet, TAKE 1 TABLET BY MOUTH ONCE DAILY, Disp: 90 tablet, Rfl: 3    insulin aspart (NOVOLOG FLEXPEN) 100 unit/mL InPn pen, Inject 10 Units into the skin 3 (three) times daily with meals., Disp: 3 Box, Rfl: 3    insulin glargine (LANTUS SOLOSTAR) 100 unit/mL (3 mL) InPn pen, Inject 35 Units into the skin every evening., Disp: 45 mL, Rfl: 1    insulin needles, disposable, (BD INSULIN PEN NEEDLE UF ORIG) 29 x 1/2 " Ndle, USE TWICE DAILY AS DIRECTED, Disp: 100 each, Rfl: 2    INVOKANA 300 mg Tab tablet, TAKE 1 TABLET BY MOUTH ONCE DAILY., Disp: 30 tablet, Rfl: 4    lancets (ONE TOUCH DELICA LANCETS) 33 gauge Misc, Use 3 times Daily., Disp: 300 each, Rfl: 3    metformin (GLUCOPHAGE) 1000 MG tablet, TAKE 1 TABLET (1,000 MG TOTAL) BY MOUTH 2 (TWO) TIMES DAILY., Disp: 180 tablet, Rfl: 1    omega-3 acid ethyl esters (LOVAZA) 1 gram capsule, TAKE 4 CAPSULES BY MOUTH DAILY OR AS DIRECTED., Disp: 120 capsule, Rfl: 3    ramipril (ALTACE) 10 MG capsule, Take 1 capsule (10 mg total) by mouth once daily., Disp: 90 capsule, Rfl: 3    diazePAM (VALIUM) 10 MG Tab, Take 1 tablet (10 mg total) by mouth once as needed (Take 60 minutes prior to procedure)., Disp: 1 tablet, Rfl: 0    ezetimibe (ZETIA) 10 mg tablet, Take 1 tablet (10 mg total) by mouth once daily., Disp: 90 tablet, Rfl: 3  No current facility-administered medications for this visit.     Facility-Administered Medications Ordered in Other Visits:     triamcinolone acetonide injection 40 mg, 40 mg, Intra-articular, , Patricio Multani MD, 40 mg at 10/26/16 0841    Review of patient's allergies indicates:   Allergen Reactions    No known drug allergies           REVIEW OF SYSTEMS:  Constitution: Negative. Negative for chills, fever and night sweats.   HENT: Negative for " "congestion and headaches.    Eyes: Negative for blurred vision, left vision loss and right vision loss.   Cardiovascular: Negative for chest pain and syncope.   Respiratory: Negative for cough and shortness of breath.    Endocrine: Negative for polydipsia, polyphagia and polyuria.   Hematologic/Lymphatic: Negative for bleeding problem. Does not bruise/bleed easily.   Skin: Negative for dry skin, itching and rash.   Musculoskeletal: Negative for falls.  Positive for right shoulder pain and muscle weakness.   Gastrointestinal: Negative for abdominal pain and bowel incontinence.   Genitourinary: Negative for bladder incontinence and nocturia.   Neurological: Negative for disturbances in coordination, loss of balance and seizures.   Psychiatric/Behavioral: Negative for depression. The patient does not have insomnia.    Allergic/Immunologic: Negative for hives and persistent infections.      PHYSICAL EXAMINATION:  Vitals:  /76   Pulse 60   Ht 5' 8" (1.727 m)   Wt 93 kg (205 lb)   BMI 31.17 kg/m²    General: The patient is alert and oriented x 3.  Mood is pleasant.  Observation of ears, eyes and nose reveal no gross abnormalities.  No labored breathing observed.  Gait is coordinated. Patient can toe walk and heel walk without difficulty.      RIGHT Shoulder / Upper Extremity Exam    OBSERVATION:     Swelling  none  Deformity  none   Discoloration  none   Scapular winging none   Scars   none  Atrophy  none    TENDERNESS / CREPITUS (T/C):          T/C      T/C   Clavicle   -/-  SUPRAspinatus    -/-     AC Jt.    -/-  INFRAspinatus  -/-    SC Jt.    -/-  Deltoid    -/-      G. Tuberosity  -/-  LH BICEP groove  -/-   Acromion:  -/-  Midline Neck   -/-     Scapular Spine -/-  Trapezium   -/-   SMA Scapula  -/-  GH jt. line - post  -/-     Scapulothoracic  -/-         ROM: (* = with pain)  Left shoulder   Right shoulder        AROM (PROM)   AROM (PROM)   FE    170° (175°)     170° (175°)     ER at 0°    60°  (65°) "    60°  (65°)   ER at 90° ABD  90°  (90°)    90°  (90°)   IR at 90°  ABD   NA  (40°)     NA  (40°)      IR (spine level)   T10     T10    STRENGTH: (* = with pain) Left shoulder   Right shoulder   SCAPTION   5/5    4+/5    IR    5/5    4+/5   ER    5/5    4+/5   BICEPS   5/5    5/5   Deltoid    5/5    5/5     SIGNS:  Painful side       NEER   +    OLORYS  neg    HOOVER   +    SPEEDS  neg     DROP ARM   -   BELLY PRESS neg   Superior escape none    LIFT-OFF  neg   X-Body ADD    neg    MOVING VALGUS neg        STABILITY TESTING    Left shoulder   Right shoulder    Translation     Anterior  up face     up face    Posterior  up face    up face    Sulcus   < 10mm    < 10 mm     Signs   Apprehension   neg      neg       Relocation   no change     no change      Jerk test  neg     neg    EXTREMITY NEURO-VASCULAR EXAM:    Sensation grossly intact to light touch all dermatomal regions.    DTR 2+ Biceps, Triceps, BR and Negative Usmans sign   Grossly intact motor function at Elbow, Wrist and Hand   Distal pulses radial and ulnar 2+, brisk cap refill, symmetric.      NECK:  Painless FROM and spinous processes non-tender. Negative Spurlings sign.      OTHER FINDINGS:  + scapular dyskinesia    XRAYS (10/19/16): Mild degenerative changes. No evidence of fracture or dislocation.    MRI (7/28/17):  Complete tear of the supraspinatus, infraspinatus and subscapularis with retraction as noted above.    No significant muscular atrophy.    Subluxation of the biceps tendon come intra-articular portion with severe tendinosis/interstitial tear      ASSESSMENT:     Right shoulder pain  Right shoulder RC tear    PLAN:      1. Treatment options were discussed with the patient about shoulder.  I reviewed the MRI images with his and what this means for his shoulder.    We discussed both non-operative and operative options for his shoulder and the risks and benefits of each. Time was given for questions to be asked and all concerns  were answered.    He would like to go forward with surgery for his shoulder which I think is reasonable.  All specific risks and benefits were reviewed.    These risks include but are not limited to: bleeding, infection, scarring, re-tear of repair, irreparability of the tear, damage to neurovascular structures, damage to cartilage, stiffness, blood clots, pulmonary embolism, swelling, compartment syndrome, need for further surgery, and the risks of anesthesia.      He verbalized her understanding of these risks and wished to proceed with surgery.    Time was spent face-to-face with the patient during this encounter on counseling about treatment options including surgery and coordination of his care for preoperative visits, surgery and post-operative rehab.     The operative plan will be:    right   1. Arthroscopic rotator cuff repair  2. Arthroscopic subacromial decompression  3. Arthroscopic distal clavicle excision  4. Possible open biceps subpectoral tenodesis    Patient will  need medical clearance prior to the pre-operative appointment.    All questions were answered, patient will contact us for questions or concerns in the interim.

## 2017-08-07 ENCOUNTER — PATIENT MESSAGE (OUTPATIENT)
Dept: SPORTS MEDICINE | Facility: CLINIC | Age: 62
End: 2017-08-07

## 2017-08-11 ENCOUNTER — PATIENT MESSAGE (OUTPATIENT)
Dept: CARDIOLOGY | Facility: CLINIC | Age: 62
End: 2017-08-11

## 2017-08-11 ENCOUNTER — LAB VISIT (OUTPATIENT)
Dept: LAB | Facility: HOSPITAL | Age: 62
End: 2017-08-11
Attending: INTERNAL MEDICINE
Payer: COMMERCIAL

## 2017-08-11 DIAGNOSIS — E78.5 DYSLIPIDEMIA: ICD-10-CM

## 2017-08-11 DIAGNOSIS — E08.59 DIABETES MELLITUS DUE TO UNDERLYING CONDITION WITH OTHER CIRCULATORY COMPLICATION: ICD-10-CM

## 2017-08-11 DIAGNOSIS — I10 ESSENTIAL HYPERTENSION: ICD-10-CM

## 2017-08-11 DIAGNOSIS — I25.10 CORONARY ARTERY DISEASE INVOLVING NATIVE CORONARY ARTERY OF NATIVE HEART WITHOUT ANGINA PECTORIS: ICD-10-CM

## 2017-08-11 LAB
ALBUMIN SERPL BCP-MCNC: 3.6 G/DL
ALP SERPL-CCNC: 52 U/L
ALT SERPL W/O P-5'-P-CCNC: 22 U/L
ANION GAP SERPL CALC-SCNC: 6 MMOL/L
AST SERPL-CCNC: 17 U/L
BILIRUB SERPL-MCNC: 0.6 MG/DL
BUN SERPL-MCNC: 20 MG/DL
CALCIUM SERPL-MCNC: 9.3 MG/DL
CHLORIDE SERPL-SCNC: 106 MMOL/L
CHOLEST/HDLC SERPL: 3.2 {RATIO}
CO2 SERPL-SCNC: 28 MMOL/L
CREAT SERPL-MCNC: 1 MG/DL
EST. GFR  (AFRICAN AMERICAN): >60 ML/MIN/1.73 M^2
EST. GFR  (NON AFRICAN AMERICAN): >60 ML/MIN/1.73 M^2
ESTIMATED AVG GLUCOSE: 151 MG/DL
GLUCOSE SERPL-MCNC: 133 MG/DL
HBA1C MFR BLD HPLC: 6.9 %
HDL/CHOLESTEROL RATIO: 31.6 %
HDLC SERPL-MCNC: 114 MG/DL
HDLC SERPL-MCNC: 36 MG/DL
LDLC SERPL CALC-MCNC: 53 MG/DL
NONHDLC SERPL-MCNC: 78 MG/DL
POTASSIUM SERPL-SCNC: 4.8 MMOL/L
PROT SERPL-MCNC: 6.3 G/DL
SODIUM SERPL-SCNC: 140 MMOL/L
TRIGL SERPL-MCNC: 125 MG/DL

## 2017-08-11 PROCEDURE — 83036 HEMOGLOBIN GLYCOSYLATED A1C: CPT

## 2017-08-11 PROCEDURE — 80061 LIPID PANEL: CPT

## 2017-08-11 PROCEDURE — 80053 COMPREHEN METABOLIC PANEL: CPT

## 2017-08-11 PROCEDURE — 36415 COLL VENOUS BLD VENIPUNCTURE: CPT

## 2017-08-16 DIAGNOSIS — M75.101 NON-TRAUMATIC ROTATOR CUFF TEAR, RIGHT: Primary | ICD-10-CM

## 2017-08-16 DIAGNOSIS — M19.019 AC (ACROMIOCLAVICULAR) ARTHRITIS: ICD-10-CM

## 2017-08-16 DIAGNOSIS — M75.21 RIGHT BICIPITAL TENOSYNOVITIS: ICD-10-CM

## 2017-08-29 ENCOUNTER — TELEPHONE (OUTPATIENT)
Dept: INTERNAL MEDICINE | Facility: CLINIC | Age: 62
End: 2017-08-29

## 2017-08-29 DIAGNOSIS — I25.10 CORONARY ARTERY DISEASE INVOLVING NATIVE CORONARY ARTERY OF NATIVE HEART WITHOUT ANGINA PECTORIS: Primary | ICD-10-CM

## 2017-08-29 DIAGNOSIS — I10 ESSENTIAL HYPERTENSION: ICD-10-CM

## 2017-08-29 DIAGNOSIS — Z01.818 PREOP EXAMINATION: ICD-10-CM

## 2017-08-29 NOTE — TELEPHONE ENCOUNTER
----- Message from Karin Irvin sent at 8/29/2017  9:41 AM CDT -----  Contact: pt   Pt would like to be called back regarding speaking with nurse about clearing for surgery.       Pt can be reached at [phone]. Or cell [phone].

## 2017-08-29 NOTE — TELEPHONE ENCOUNTER
Called pt back and pt states that his surgery is oct 2 for rotator cuff surgery. Pt states he is in a lot of pain. Pt also states that he needs a EKG I said that he can just have his EKG done the same day that he comes in to see Dr Martin. Just need order placed

## 2017-09-08 ENCOUNTER — OFFICE VISIT (OUTPATIENT)
Dept: INTERNAL MEDICINE | Facility: CLINIC | Age: 62
End: 2017-09-08
Payer: COMMERCIAL

## 2017-09-08 ENCOUNTER — LAB VISIT (OUTPATIENT)
Dept: LAB | Facility: HOSPITAL | Age: 62
End: 2017-09-08
Attending: INTERNAL MEDICINE
Payer: COMMERCIAL

## 2017-09-08 VITALS
SYSTOLIC BLOOD PRESSURE: 114 MMHG | BODY MASS INDEX: 32.61 KG/M2 | HEIGHT: 68 IN | DIASTOLIC BLOOD PRESSURE: 68 MMHG | WEIGHT: 215.19 LBS | HEART RATE: 58 BPM

## 2017-09-08 DIAGNOSIS — Z12.5 SCREENING FOR PROSTATE CANCER: ICD-10-CM

## 2017-09-08 DIAGNOSIS — I25.10 CORONARY ARTERY DISEASE INVOLVING NATIVE CORONARY ARTERY OF NATIVE HEART WITHOUT ANGINA PECTORIS: ICD-10-CM

## 2017-09-08 DIAGNOSIS — Z79.4 TYPE 2 DIABETES MELLITUS WITHOUT COMPLICATION, WITH LONG-TERM CURRENT USE OF INSULIN: ICD-10-CM

## 2017-09-08 DIAGNOSIS — Z01.818 PREOP EXAMINATION: ICD-10-CM

## 2017-09-08 DIAGNOSIS — Z79.4 TYPE 2 DIABETES MELLITUS WITHOUT COMPLICATION, WITH LONG-TERM CURRENT USE OF INSULIN: Primary | ICD-10-CM

## 2017-09-08 DIAGNOSIS — E78.5 DYSLIPIDEMIA: ICD-10-CM

## 2017-09-08 DIAGNOSIS — E11.9 TYPE 2 DIABETES MELLITUS WITHOUT COMPLICATION, WITH LONG-TERM CURRENT USE OF INSULIN: ICD-10-CM

## 2017-09-08 DIAGNOSIS — I10 ESSENTIAL HYPERTENSION: ICD-10-CM

## 2017-09-08 DIAGNOSIS — E11.9 TYPE 2 DIABETES MELLITUS WITHOUT COMPLICATION, WITH LONG-TERM CURRENT USE OF INSULIN: Primary | ICD-10-CM

## 2017-09-08 LAB
BASOPHILS # BLD AUTO: 0.01 K/UL
BASOPHILS NFR BLD: 0.1 %
COMPLEXED PSA SERPL-MCNC: 0.76 NG/ML
DIFFERENTIAL METHOD: ABNORMAL
EOSINOPHIL # BLD AUTO: 0.1 K/UL
EOSINOPHIL NFR BLD: 0.9 %
ERYTHROCYTE [DISTWIDTH] IN BLOOD BY AUTOMATED COUNT: 13.9 %
HCT VFR BLD AUTO: 44.8 %
HGB BLD-MCNC: 15.5 G/DL
LYMPHOCYTES # BLD AUTO: 3.2 K/UL
LYMPHOCYTES NFR BLD: 34.6 %
MCH RBC QN AUTO: 31.7 PG
MCHC RBC AUTO-ENTMCNC: 34.6 G/DL
MCV RBC AUTO: 92 FL
MONOCYTES # BLD AUTO: 0.6 K/UL
MONOCYTES NFR BLD: 6.3 %
NEUTROPHILS # BLD AUTO: 5.3 K/UL
NEUTROPHILS NFR BLD: 57.7 %
PLATELET # BLD AUTO: 165 K/UL
PMV BLD AUTO: 12.2 FL
RBC # BLD AUTO: 4.89 M/UL
WBC # BLD AUTO: 9.15 K/UL

## 2017-09-08 PROCEDURE — 4010F ACE/ARB THERAPY RXD/TAKEN: CPT | Mod: S$GLB,,, | Performed by: INTERNAL MEDICINE

## 2017-09-08 PROCEDURE — 3008F BODY MASS INDEX DOCD: CPT | Mod: S$GLB,,, | Performed by: INTERNAL MEDICINE

## 2017-09-08 PROCEDURE — 83036 HEMOGLOBIN GLYCOSYLATED A1C: CPT

## 2017-09-08 PROCEDURE — 3078F DIAST BP <80 MM HG: CPT | Mod: S$GLB,,, | Performed by: INTERNAL MEDICINE

## 2017-09-08 PROCEDURE — 3044F HG A1C LEVEL LT 7.0%: CPT | Mod: S$GLB,,, | Performed by: INTERNAL MEDICINE

## 2017-09-08 PROCEDURE — 3074F SYST BP LT 130 MM HG: CPT | Mod: S$GLB,,, | Performed by: INTERNAL MEDICINE

## 2017-09-08 PROCEDURE — 99214 OFFICE O/P EST MOD 30 MIN: CPT | Mod: S$GLB,,, | Performed by: INTERNAL MEDICINE

## 2017-09-08 PROCEDURE — 36415 COLL VENOUS BLD VENIPUNCTURE: CPT | Mod: PO

## 2017-09-08 PROCEDURE — 99999 PR PBB SHADOW E&M-EST. PATIENT-LVL III: CPT | Mod: PBBFAC,,, | Performed by: INTERNAL MEDICINE

## 2017-09-08 PROCEDURE — 93005 ELECTROCARDIOGRAM TRACING: CPT | Mod: S$GLB,,, | Performed by: INTERNAL MEDICINE

## 2017-09-08 PROCEDURE — 85025 COMPLETE CBC W/AUTO DIFF WBC: CPT

## 2017-09-08 PROCEDURE — 84153 ASSAY OF PSA TOTAL: CPT

## 2017-09-08 PROCEDURE — 93010 ELECTROCARDIOGRAM REPORT: CPT | Mod: S$GLB,,, | Performed by: INTERNAL MEDICINE

## 2017-09-08 NOTE — PROGRESS NOTES
Answers for HPI/ROS submitted by the patient on 2017   activity change: No  unexpected weight change: No  neck pain: No  hearing loss: No  rhinorrhea: No  trouble swallowing: No  eye discharge: No  chest tightness: No  wheezing: No  chest pain: No  palpitations: No  blood in stool: No  constipation: No  vomiting: No  diarrhea: No  polydipsia: No  polyuria: No  difficulty urinating: No  urgency: No  hematuria: No  joint swelling: No  arthralgias: Yes  headaches: No  weakness: No  confusion: No  dysphoric mood: No    PAST MEDICAL HISTORY:   Coronary artery disease with coronary bypass surgery in year .  Hypertension.  Hyperlipidemia.  Type 2 diabetes, which he states has done well since he has been on Invokana.  Osteoarthritis of the hip with left total hip replacement.  Right inguinal hernia repair.  Left foot surgery.  Fistulotomy.    SOCIAL HISTORY:  Tobacco use, none.  Alcohol use, limited.  , has three   children.  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    FAMILY HISTORY:  Father is , lung cancer.  Mother is , heart   disease.  One brother , colon cancer, and another brother with   Hypertension.    REASON FOR VISIT:  This is a 62-year-old male who is here for Internal Medicine   evaluation, as well as pre-op evaluation.  He has a significant rotator cuff   tear of the right shoulder and is scheduled to have surgery by Dr. Johnny Ventura on .    Overall in general, he feels very well.  He had a cardiac stress test with Dr. Mateo Salomon on 2017 and the report was very good, free of myocardial   ischemia with a normal ejection fraction.  This is better when compared to the   one in 2014.    He also feels that his Invokana has been very effective in controlling his   diabetes.  Four weeks ago, hemoglobin A1c was 6.9.    MEDICATIONS:  Atorvastatin 80 mg.  Aspirin 81 mg.  Carvedilol 25 mg half tablet twice a day.  Zetia 10  mg.  Multivitamin.  Lasix 40 mg.  Lantus 35 units in the evening.  Invokana 300 mg.  Metformin 1000 mg twice a day.  Omega-3 fatty acids.  Ramipril 10 mg a day.    REVIEW OF SYSTEMS:  No pains in the chest, palpitations, shortness of breath, or   abdominal pain.  The patient reports regular bowel function.  No difficulty   urinating.  Nocturia x1.  He says he has felt the best than he has in a while.    PHYSICAL EXAMINATION:  VITAL SIGNS:  His weight is 215, pulse 60, blood pressure 130/62.  NECK:  No thyromegaly.  LUNGS:  Clear.  HEART:  Regular rate and rhythm.  ABDOMEN:  Active bowel sounds, soft, nontender.  No hepatosplenomegaly or   abdominal masses.  PULSES:  2+ carotid pulses, no bruits.  EXTREMITIES:  No edema.  Hyperkeratotic toenails.    IMPRESSION:  1.  Type 2 diabetes, appears to be under optimal control.  2.  Hypertension.  3.  Hyperlipidemia.  4.  Coronary artery disease, clinically stable.  5.  Pre-op evaluation.    PLAN:  Today for completeness, we will get a CBC, PSA, EKG, and repeat a   hemoglobin A1c.  He is cleared for anesthesia and surgery.  On the morning of   surgery, he can hold his Lasix and Altace and depending on test results, final   disposition regarding his diabetic medication.    /sc [id number] review      JAM/ELLI  dd: 09/08/2017 14:53:45 (CDT)  td: 09/09/2017 03:20:27 (CDT)  Doc ID   #[id number]  Job ID #[id number]    CC:

## 2017-09-09 LAB
ESTIMATED AVG GLUCOSE: 148 MG/DL
HBA1C MFR BLD HPLC: 6.8 %

## 2017-09-11 RX ORDER — METFORMIN HYDROCHLORIDE 1000 MG/1
TABLET ORAL
Qty: 180 TABLET | Refills: 3 | Status: SHIPPED | OUTPATIENT
Start: 2017-09-11 | End: 2018-03-28 | Stop reason: SDUPTHER

## 2017-09-13 ENCOUNTER — DOCUMENTATION ONLY (OUTPATIENT)
Dept: INTERNAL MEDICINE | Facility: CLINIC | Age: 62
End: 2017-09-13

## 2017-09-13 ENCOUNTER — PATIENT MESSAGE (OUTPATIENT)
Dept: INTERNAL MEDICINE | Facility: CLINIC | Age: 62
End: 2017-09-13

## 2017-09-13 NOTE — PROGRESS NOTES
Recent test results and electrocardiogram was fine    He is cleared for anesthesia and surgery    He is to take the carvedilol in the morning and hold the other medications including his diabetic medications     he can continue with Lantus 35 units daily, however I will ask that he contact his endocrinologist regarding management pre-and perioperatively

## 2017-09-18 NOTE — TELEPHONE ENCOUNTER
----- Message from Savita English sent at 9/18/2017  1:23 PM CDT -----  Contact: [facility]([place])  [facility]([place]) called, requesting a 90 day supply for omego 3. Pt of Dr. Salomon and LOV 4/11/17.Ph for pharmacy is [phone]. Thank you

## 2017-09-19 RX ORDER — OMEGA-3-ACID ETHYL ESTERS 1 G/1
CAPSULE, LIQUID FILLED ORAL
Qty: 360 CAPSULE | Refills: 3 | Status: SHIPPED | OUTPATIENT
Start: 2017-09-19 | End: 2019-10-16 | Stop reason: ALTCHOICE

## 2017-09-25 ENCOUNTER — PATIENT MESSAGE (OUTPATIENT)
Dept: ENDOCRINOLOGY | Facility: CLINIC | Age: 62
End: 2017-09-25

## 2017-09-29 ENCOUNTER — HOSPITAL ENCOUNTER (OUTPATIENT)
Dept: PREADMISSION TESTING | Facility: OTHER | Age: 62
Discharge: HOME | End: 2017-09-29
Attending: ORTHOPAEDIC SURGERY
Payer: COMMERCIAL

## 2017-09-29 ENCOUNTER — ANESTHESIA EVENT (OUTPATIENT)
Dept: SURGERY | Facility: OTHER | Age: 62
End: 2017-09-29
Payer: COMMERCIAL

## 2017-09-29 ENCOUNTER — OFFICE VISIT (OUTPATIENT)
Dept: SPORTS MEDICINE | Facility: CLINIC | Age: 62
End: 2017-09-29
Payer: COMMERCIAL

## 2017-09-29 VITALS
BODY MASS INDEX: 32.58 KG/M2 | SYSTOLIC BLOOD PRESSURE: 111 MMHG | HEIGHT: 68 IN | TEMPERATURE: 98 F | DIASTOLIC BLOOD PRESSURE: 69 MMHG | OXYGEN SATURATION: 99 % | HEART RATE: 54 BPM | WEIGHT: 215 LBS

## 2017-09-29 VITALS
WEIGHT: 215 LBS | SYSTOLIC BLOOD PRESSURE: 123 MMHG | BODY MASS INDEX: 32.58 KG/M2 | HEIGHT: 68 IN | HEART RATE: 62 BPM | DIASTOLIC BLOOD PRESSURE: 77 MMHG

## 2017-09-29 DIAGNOSIS — M75.121 COMPLETE TEAR OF RIGHT ROTATOR CUFF: ICD-10-CM

## 2017-09-29 DIAGNOSIS — M25.511 CHRONIC RIGHT SHOULDER PAIN: Primary | ICD-10-CM

## 2017-09-29 DIAGNOSIS — G89.29 CHRONIC RIGHT SHOULDER PAIN: Primary | ICD-10-CM

## 2017-09-29 DIAGNOSIS — M25.511 RIGHT SHOULDER PAIN: Chronic | ICD-10-CM

## 2017-09-29 PROCEDURE — 99999 PR PBB SHADOW E&M-EST. PATIENT-LVL V: CPT | Mod: PBBFAC,,, | Performed by: PHYSICIAN ASSISTANT

## 2017-09-29 PROCEDURE — 99024 POSTOP FOLLOW-UP VISIT: CPT | Mod: S$GLB,,, | Performed by: PHYSICIAN ASSISTANT

## 2017-09-29 RX ORDER — FAMOTIDINE 20 MG/1
20 TABLET, FILM COATED ORAL
Status: CANCELLED | OUTPATIENT
Start: 2017-09-29 | End: 2017-09-29

## 2017-09-29 RX ORDER — LIDOCAINE HYDROCHLORIDE 10 MG/ML
1 INJECTION, SOLUTION EPIDURAL; INFILTRATION; INTRACAUDAL; PERINEURAL ONCE
Status: CANCELLED | OUTPATIENT
Start: 2017-09-29 | End: 2017-09-29

## 2017-09-29 RX ORDER — MUPIROCIN 20 MG/G
1 OINTMENT TOPICAL
Status: CANCELLED | OUTPATIENT
Start: 2017-09-29

## 2017-09-29 RX ORDER — ASPIRIN 325 MG
325 TABLET, DELAYED RELEASE (ENTERIC COATED) ORAL EVERY 12 HOURS
Qty: 42 TABLET | Refills: 0 | Status: SHIPPED | OUTPATIENT
Start: 2017-09-29 | End: 2019-05-31

## 2017-09-29 RX ORDER — SODIUM CHLORIDE, SODIUM LACTATE, POTASSIUM CHLORIDE, CALCIUM CHLORIDE 600; 310; 30; 20 MG/100ML; MG/100ML; MG/100ML; MG/100ML
INJECTION, SOLUTION INTRAVENOUS CONTINUOUS
Status: CANCELLED | OUTPATIENT
Start: 2017-09-29

## 2017-09-29 RX ORDER — TRAMADOL HYDROCHLORIDE 50 MG/1
50-100 TABLET ORAL EVERY 6 HOURS PRN
Qty: 60 TABLET | Refills: 0 | Status: SHIPPED | OUTPATIENT
Start: 2017-09-29 | End: 2017-11-14 | Stop reason: SDUPTHER

## 2017-09-29 RX ORDER — OXYCODONE AND ACETAMINOPHEN 10; 325 MG/1; MG/1
TABLET ORAL
Qty: 60 TABLET | Refills: 0 | Status: SHIPPED | OUTPATIENT
Start: 2017-09-29 | End: 2017-10-16 | Stop reason: SDUPTHER

## 2017-09-29 RX ORDER — PROMETHAZINE HYDROCHLORIDE 25 MG/1
25 TABLET ORAL EVERY 6 HOURS PRN
Qty: 16 TABLET | Refills: 0 | Status: SHIPPED | OUTPATIENT
Start: 2017-09-29 | End: 2018-10-26

## 2017-09-29 NOTE — ANESTHESIA PREPROCEDURE EVALUATION
09/29/2017  Jm Deleon is a 62 y.o., male.    Anesthesia Evaluation    I have reviewed the Patient Summary Reports.    I have reviewed the Nursing Notes.   I have reviewed the Medications.     Review of Systems  Anesthesia Hx:  History of prior surgery of interest to airway management or planning: Previous anesthesia: Epidural  2014 YANICK with epidural.  Denies Family Hx of Anesthesia complications.   Denies Personal Hx of Anesthesia complications.   Social:  Non-Smoker    Hematology/Oncology:  Hematology Normal   Oncology Normal     EENT/Dental:EENT/Dental Normal   Cardiovascular:   Exercise tolerance: good Hypertension CAD (5/17 neg EST) asymptomatic CABG/stent (CABG 2000)  hyperlipidemia ECG has been reviewed.    Pulmonary:  Pulmonary Normal    Renal/:  Renal/ Normal     Hepatic/GI:  Hepatic/GI Normal    Musculoskeletal:   Arthritis     Neurological:  Neurology Normal    Endocrine:   Diabetes, well controlled, type 2    Dermatological:  Skin Normal    Psych:  Psychiatric Normal           Physical Exam  General:  Obesity    Airway/Jaw/Neck:  Airway Findings: Mouth Opening: Normal Mallampati: III  Jaw/Neck Findings:  Neck ROM: Normal ROM  Neck Findings:  Girth Increased      Dental:  Dental Findings: In tact        Mental Status:  Mental Status Findings:  Cooperative, Alert and Oriented         Anesthesia Plan  Type of Anesthesia, risks & benefits discussed:  Anesthesia Type:  general  Patient's Preference:   Intra-op Monitoring Plan:   Intra-op Monitoring Plan Comments:   Post Op Pain Control Plan: peripheral nerve block  Post Op Pain Control Plan Comments:   Induction:    Beta Blocker:         Informed Consent: Patient understands risks and agrees with Anesthesia plan.  Questions answered. Anesthesia consent signed with patient.  ASA Score: 3     Day of Surgery Review of History & Physical:    H&P  update referred to the surgeon.     Anesthesia Plan Notes: Clearance/EKG/labs in Epic, reviewed        Ready For Surgery From Anesthesia Perspective.

## 2017-09-29 NOTE — PRE-PROCEDURE INSTRUCTIONS
Reviewed 's shoulder arthroscopy discharge instructions and demonstrated postoperative equipment (polar ice and BREG arm sling with pillow), with verbalized understanding per patient.                                  .

## 2017-09-29 NOTE — H&P
Jm Deleon  is here for a completion of his perioperative paperwork. he  Is scheduled to undergo     right   1. Arthroscopic rotator cuff repair  2. Arthroscopic subacromial decompression  3. Arthroscopic distal clavicle excision  4. Possible open biceps subpectoral tenodesis on 10/4/17      He is a healthy individual and does need clearance for this procedure which he has received from Dr. Martin at [facility].     Risks, indications and benefits of the surgical procedure were discussed with the patient. All questions with regard to surgery, rehab, expected return to functional activities, activities of daily living and recreational endeavors were answered to his satisfaction.    It was explained to the patient that there may be an increase in surgical risks if the patient has certain co-morbidities such as but not limited to: Obesity, Cardiovascular issues (CHF, CAD, Arrhythmias), chronic pulmonary issues, previous or current neurovascular/neurological issues, previous strokes, diabetes mellitus, previous wound healing issues, previous wound or skin infections, PVD, clotting disorders, if the patient uses chronic steroids, if the patient takes or has immune compromising medications or diseases, or has previously or currently used tobacco products.     The patient verbalized that he/she does not have any additional clotting, bleeding, or blood disorders, other than what is list in her chart on today's review.     Then a brief history and physical exam were performed.      PHYSICAL EXAM:  GEN: A&Ox3, WD WN NAD  HEENT: WNL  CHEST: CTAB, no W/R/R  HEART: RRR, no M/R/G  ABD: Soft, NT ND, BS x4 QUADS  MS; See Epic  NEURO: CN II-XII intact       The surgical consent was then reviewed with the patient, who agreed with all the contents of the consent form and it was signed. he was then given the [facility] surgery packet to bring with him to [facility] for the anesthesia portion of his perioperative paperwork.   For all  physicians except for Dr. Ventura, we will email and possibly fax the consent forms and booking sheets to [facility] pre-admit.    The patient was given the opportunity to ask questions about the surgical plan and consent form, and once no other questions were asked, I proceeded with the pre-op appointment.    PHYSICAL THERAPY:  He was also instructed regarding physical therapy and will begin on  POD3-5. He was given a copy of the original prescription to schedule. Another copy of this prescription was also faxed to [facility].    POST OP CARE:instructions were reviewed including care of the wound and dressing after surgery and when he can shower.     PAIN MANAGEMENT: Jm Deleon was also given his pain management regime, which includes the TENS unit given to him by [facility] along with the education required for its use. He was also instructed regarding the Polar ice unit that will be in place after surgery and his postoperative pain medications.     PAIN MEDICATION:  Percocet 10/325mg 1 po q 4-6 hours prn pain  Ultram 50 mg Take 1-2 p.o. q.6 hours p.r.n. breakthrough pain,   Phenergan 25 mg one p.o. q.6 hours p.r.n. nausea and vomiting.    DVT prophylaxis was discussed with the patient today including risk factors for developing DVTs and history of DVTs. The patient was asked if any specific recommendations were given from the doctor/s that did pre-operative surgical clearance. The patient was then given an education sheet about DVTs and PE with warning signs and symptoms of both and steps to take if they suspect either of these.    This along with the Modified Caprini risk assessment model for VTE in general surgical patients was used to determine the patient's DVT risk.     From: Todd THAO, Adam DA, Kayleigh GODINEZ, et al. Prevention of VTE in nonorthopedic surgical patients: antithrombotic therapy and prevention of thrombosis, 9th ed: American College of Chest Physicians evidence-based clinical  practical guidelines. Chest 2012; 141:e227S. Copyright © 2012. Reproduced with permission from the American College of Chest Physicians.    The below listed DVT prophylaxis regimen along with bilateral SAEID compression stockings will be used post-op. Length of treatment has been determined to be 10-42 days post-op by the above noted Caprini assessment model.     Patient was instructed to buy and take:  Aspirin 325mg BID x 3 weeks for DVT prophylaxis starting on the evening after surgery.  Patient will also use bilateral TEDs on lower extremities, SCDs during surgery, and early ambulation post-op. If the patient was previously taking 81mg baby aspirin, they were told to not take it will using the above stated aspirin and to restart the 81mg aspirin after completion of the aspirin dose.      Patient was also told to buy over the counter Prilosec medication and take it once daily for GI protection as long as they are taking NSAIDs or Aspirin.     Published data in Taurus JEAN BAPTISTE et al. J Arthroplasty. Oct; 31(10):2237-40, 2016; showed that aspirin use as prophylaxis during revision total joint arthroplasty was more effective than warfarin in preventing symptomatic venous thromboembolic events and was associated with lower complications.  Patients in the study were also treated with intermittent pneumatic compression devices. Compression stockings would be our method of mechanical prophylaxis, which has been shown to be similar to pneumatic compression in the systematic review, Rodo RJ, et al. Janna Surg. Feb; 239(2): 162-171, 2004.     Results showed a significantly higher incidence of symptomatic venous thromboembolic events among patients in the warfarin group vs. the aspirin group (1.75% vs. 0.56%). Researchers also noted a bleeding event rate of 1.5% among patients who received warfarin compared with a rate of 0.4% among patients who received aspirin.  Patient denies history of seizures.       The patient was told  that narcotic pain medications may make them drowsy and instructions were given to not sign legal documents, drive or operate heavy machinery, cars, or equipment while under the influence of narcotic medications. The patient was told and understands that narcotic pain medications should only be used as needed to control pain and that other options of pain control include TENs unit and ice packs/unit.     As there were no other questions to be asked, he was given my business card along with Johnny Ventura MD business card if he has any questions or concerns prior to surgery or in the postop period.     At the end of our encounter today, the patient was given the option and asked if they would like to see the surgeon regarding questions or concerns that they have about the consent form or the surgical procedure. The patient declined.

## 2017-09-29 NOTE — DISCHARGE INSTRUCTIONS
PRE-ADMIT TESTING -  [phone]    [address]  [place] [facility]          Your surgery has been scheduled at [facility]. We are pleased to have the opportunity to serve you. For Further Information please call [phone].    On the day of surgery please report to the Information Desk on the 1st floor.    · CONTACT YOUR PHYSICIAN'S OFFICE THE DAY PRIOR TO YOUR SURGERY TO OBTAIN YOUR ARRIVAL TIME.     · The evening before surgery do not eat anything after 9 p.m. ( this includes hard candy, chewing gum and mints).  You may only have GATORADE, POWERADE AND WATER  from 9 p.m. until you leave your home.   DO NOT DRINK ANY LIQUIDS ON THE WAY TO THE HOSPITAL.      SPECIAL MEDICATION INSTRUCTIONS: TAKE medications checked off by the Anesthesiologist on your Medication List.    Angiogram Patients: Take medications as instructed by your physician, including aspirin.     Surgery Patients:    If you take ASPIRIN - Your PHYSICIAN/SURGEON will need to inform you IF/OR when you need to stop taking aspirin prior to your surgery.     Do Not take any medications containing IBUPROFEN.  Do Not Wear any make-up or dark nail polish   (especially eye make-up) to surgery. If you come to surgery with makeup on you will be required to remove the makeup or nail polish.    Do not shave your surgical area at least 5 days prior to your surgery. The surgical prep will be performed at the hospital according to Infection Control regulations.    Leave all valuables at home.   Do Not wear any jewelry or watches, including any metal in body piercings.  Contact Lens must be removed before surgery. Either do not wear the contact lens or bring a case and solution for storage.  Please bring a container for eyeglasses or dentures as required.  Bring any paperwork your physician has provided, such as consent forms,  history and physicals, doctor's orders, etc.   Bring comfortable clothes that are loose fitting to wear upon  discharge. Take into consideration the type of surgery being performed.  Maintain your diet as advised per your physician the day prior to surgery.      Adequate rest the night before surgery is advised.   Park in the Parking lot behind the hospital or in the [facility] across the street from the parking lot. Parking is complimentary.  If you will be discharged the same day as your procedure, please arrange for a responsible adult to drive you home or to accompany you if traveling by taxi.   YOU WILL NOT BE PERMITTED TO DRIVE OR TO LEAVE THE HOSPITAL ALONE AFTER SURGERY.   It is strongly recommended that you arrange for someone to remain with you for the first 24 hrs following your surgery.       Thank you for your cooperation.  The Staff of [facility].        Bathing Instructions                                                                 Please shower the evening before and morning of your procedure with    ANTIBACTERIAL SOAP. ( DIAL, etc )  Concentrate on the surgical area   for at least 3 minutes and rinse completely. Dry off as usual.   Do not use any deodorant, powder, body lotions, perfume, after shave or    cologne.

## 2017-10-04 ENCOUNTER — ANESTHESIA (OUTPATIENT)
Dept: SURGERY | Facility: OTHER | Age: 62
End: 2017-10-04
Payer: COMMERCIAL

## 2017-10-04 ENCOUNTER — HOSPITAL ENCOUNTER (OUTPATIENT)
Facility: OTHER | Age: 62
Discharge: HOME | End: 2017-10-04
Attending: ORTHOPAEDIC SURGERY | Admitting: ORTHOPAEDIC SURGERY
Payer: COMMERCIAL

## 2017-10-04 VITALS
HEART RATE: 78 BPM | OXYGEN SATURATION: 93 % | RESPIRATION RATE: 16 BRPM | SYSTOLIC BLOOD PRESSURE: 150 MMHG | BODY MASS INDEX: 32.58 KG/M2 | DIASTOLIC BLOOD PRESSURE: 88 MMHG | WEIGHT: 215 LBS | HEIGHT: 68 IN | TEMPERATURE: 98 F

## 2017-10-04 DIAGNOSIS — M25.511 RIGHT SHOULDER PAIN: ICD-10-CM

## 2017-10-04 DIAGNOSIS — G89.29 CHRONIC RIGHT SHOULDER PAIN: ICD-10-CM

## 2017-10-04 DIAGNOSIS — M25.511 CHRONIC RIGHT SHOULDER PAIN: ICD-10-CM

## 2017-10-04 LAB
POCT GLUCOSE: 124 MG/DL (ref 70–110)
POCT GLUCOSE: 130 MG/DL (ref 70–110)

## 2017-10-04 PROCEDURE — 71000039 HC RECOVERY, EACH ADD'L HOUR: Performed by: ORTHOPAEDIC SURGERY

## 2017-10-04 PROCEDURE — 82962 GLUCOSE BLOOD TEST: CPT | Performed by: ORTHOPAEDIC SURGERY

## 2017-10-04 PROCEDURE — 90686 IIV4 VACC NO PRSV 0.5 ML IM: CPT | Performed by: ORTHOPAEDIC SURGERY

## 2017-10-04 PROCEDURE — 25000003 PHARM REV CODE 250: Performed by: PHYSICIAN ASSISTANT

## 2017-10-04 PROCEDURE — C1713 ANCHOR/SCREW BN/BN,TIS/BN: HCPCS | Performed by: ORTHOPAEDIC SURGERY

## 2017-10-04 PROCEDURE — 29827 SHO ARTHRS SRG RT8TR CUF RPR: CPT | Mod: 22,RT,, | Performed by: ORTHOPAEDIC SURGERY

## 2017-10-04 PROCEDURE — 90471 IMMUNIZATION ADMIN: CPT | Performed by: ORTHOPAEDIC SURGERY

## 2017-10-04 PROCEDURE — 27201423 OPTIME MED/SURG SUP & DEVICES STERILE SUPPLY: Performed by: ORTHOPAEDIC SURGERY

## 2017-10-04 PROCEDURE — 36000711: Performed by: ORTHOPAEDIC SURGERY

## 2017-10-04 PROCEDURE — 25000003 PHARM REV CODE 250: Performed by: ANESTHESIOLOGY

## 2017-10-04 PROCEDURE — 63600175 PHARM REV CODE 636 W HCPCS: Performed by: ANESTHESIOLOGY

## 2017-10-04 PROCEDURE — 63600175 PHARM REV CODE 636 W HCPCS: Performed by: NURSE ANESTHETIST, CERTIFIED REGISTERED

## 2017-10-04 PROCEDURE — 25000003 PHARM REV CODE 250: Performed by: NURSE ANESTHETIST, CERTIFIED REGISTERED

## 2017-10-04 PROCEDURE — 23430 REPAIR BICEPS TENDON: CPT | Mod: 51,RT,, | Performed by: ORTHOPAEDIC SURGERY

## 2017-10-04 PROCEDURE — 71000015 HC POSTOP RECOV 1ST HR: Performed by: ORTHOPAEDIC SURGERY

## 2017-10-04 PROCEDURE — 29824 SHO ARTHRS SRG DSTL CLAVICLC: CPT | Mod: 51,RT,, | Performed by: ORTHOPAEDIC SURGERY

## 2017-10-04 PROCEDURE — S0077 INJECTION, CLINDAMYCIN PHOSP: HCPCS | Performed by: PHYSICIAN ASSISTANT

## 2017-10-04 PROCEDURE — 29826 SHO ARTHRS SRG DECOMPRESSION: CPT | Mod: RT,,, | Performed by: ORTHOPAEDIC SURGERY

## 2017-10-04 PROCEDURE — 36000710: Performed by: ORTHOPAEDIC SURGERY

## 2017-10-04 PROCEDURE — 71000016 HC POSTOP RECOV ADDL HR: Performed by: ORTHOPAEDIC SURGERY

## 2017-10-04 PROCEDURE — 63600175 PHARM REV CODE 636 W HCPCS: Performed by: ORTHOPAEDIC SURGERY

## 2017-10-04 PROCEDURE — 37000008 HC ANESTHESIA 1ST 15 MINUTES: Performed by: ORTHOPAEDIC SURGERY

## 2017-10-04 PROCEDURE — 71000033 HC RECOVERY, INTIAL HOUR: Performed by: ORTHOPAEDIC SURGERY

## 2017-10-04 PROCEDURE — 37000009 HC ANESTHESIA EA ADD 15 MINS: Performed by: ORTHOPAEDIC SURGERY

## 2017-10-04 DEVICE — ANCHOR HEALIX 5.5 ADV BR3: Type: IMPLANTABLE DEVICE | Site: SHOULDER | Status: FUNCTIONAL

## 2017-10-04 DEVICE — ANCHOR FORKED TIP 7MM PEEI: Type: IMPLANTABLE DEVICE | Site: SHOULDER | Status: FUNCTIONAL

## 2017-10-04 RX ORDER — ROCURONIUM BROMIDE 10 MG/ML
INJECTION, SOLUTION INTRAVENOUS
Status: DISCONTINUED | OUTPATIENT
Start: 2017-10-04 | End: 2017-10-04

## 2017-10-04 RX ORDER — FENTANYL CITRATE 50 UG/ML
25 INJECTION, SOLUTION INTRAMUSCULAR; INTRAVENOUS EVERY 5 MIN PRN
Status: DISCONTINUED | OUTPATIENT
Start: 2017-10-04 | End: 2017-10-04 | Stop reason: HOSPADM

## 2017-10-04 RX ORDER — SODIUM CHLORIDE 0.9 % (FLUSH) 0.9 %
3 SYRINGE (ML) INJECTION
Status: DISCONTINUED | OUTPATIENT
Start: 2017-10-04 | End: 2017-10-04 | Stop reason: HOSPADM

## 2017-10-04 RX ORDER — ROPIVACAINE HYDROCHLORIDE 5 MG/ML
INJECTION, SOLUTION EPIDURAL; INFILTRATION; PERINEURAL
Status: DISCONTINUED | OUTPATIENT
Start: 2017-10-04 | End: 2017-10-04

## 2017-10-04 RX ORDER — MUPIROCIN 20 MG/G
1 OINTMENT TOPICAL
Status: COMPLETED | OUTPATIENT
Start: 2017-10-04 | End: 2017-10-04

## 2017-10-04 RX ORDER — HYDROMORPHONE HYDROCHLORIDE 2 MG/ML
0.4 INJECTION, SOLUTION INTRAMUSCULAR; INTRAVENOUS; SUBCUTANEOUS EVERY 5 MIN PRN
Status: DISCONTINUED | OUTPATIENT
Start: 2017-10-04 | End: 2017-10-04 | Stop reason: HOSPADM

## 2017-10-04 RX ORDER — GLYCOPYRROLATE 0.2 MG/ML
INJECTION INTRAMUSCULAR; INTRAVENOUS
Status: DISCONTINUED | OUTPATIENT
Start: 2017-10-04 | End: 2017-10-04

## 2017-10-04 RX ORDER — PROPOFOL 10 MG/ML
VIAL (ML) INTRAVENOUS
Status: DISCONTINUED | OUTPATIENT
Start: 2017-10-04 | End: 2017-10-04

## 2017-10-04 RX ORDER — CLINDAMYCIN PHOSPHATE 900 MG/50ML
900 INJECTION, SOLUTION INTRAVENOUS
Status: COMPLETED | OUTPATIENT
Start: 2017-10-04 | End: 2017-10-04

## 2017-10-04 RX ORDER — MEPERIDINE HYDROCHLORIDE 50 MG/ML
12.5 INJECTION INTRAMUSCULAR; INTRAVENOUS; SUBCUTANEOUS ONCE AS NEEDED
Status: DISCONTINUED | OUTPATIENT
Start: 2017-10-04 | End: 2017-10-04 | Stop reason: HOSPADM

## 2017-10-04 RX ORDER — MIDAZOLAM HYDROCHLORIDE 1 MG/ML
INJECTION INTRAMUSCULAR; INTRAVENOUS
Status: DISCONTINUED | OUTPATIENT
Start: 2017-10-04 | End: 2017-10-04

## 2017-10-04 RX ORDER — LIDOCAINE HYDROCHLORIDE 10 MG/ML
1 INJECTION, SOLUTION EPIDURAL; INFILTRATION; INTRACAUDAL; PERINEURAL ONCE
Status: DISCONTINUED | OUTPATIENT
Start: 2017-10-04 | End: 2017-10-04 | Stop reason: HOSPADM

## 2017-10-04 RX ORDER — ONDANSETRON 2 MG/ML
INJECTION INTRAMUSCULAR; INTRAVENOUS
Status: DISCONTINUED | OUTPATIENT
Start: 2017-10-04 | End: 2017-10-04

## 2017-10-04 RX ORDER — PHENYLEPHRINE HYDROCHLORIDE 10 MG/ML
INJECTION INTRAVENOUS
Status: DISCONTINUED | OUTPATIENT
Start: 2017-10-04 | End: 2017-10-04

## 2017-10-04 RX ORDER — FENTANYL CITRATE 50 UG/ML
INJECTION, SOLUTION INTRAMUSCULAR; INTRAVENOUS
Status: DISCONTINUED | OUTPATIENT
Start: 2017-10-04 | End: 2017-10-04

## 2017-10-04 RX ORDER — EPINEPHRINE 1 MG/ML
INJECTION, SOLUTION INTRACARDIAC; INTRAMUSCULAR; INTRAVENOUS; SUBCUTANEOUS
Status: DISCONTINUED | OUTPATIENT
Start: 2017-10-04 | End: 2017-10-04 | Stop reason: HOSPADM

## 2017-10-04 RX ORDER — NEOSTIGMINE METHYLSULFATE 1 MG/ML
INJECTION, SOLUTION INTRAVENOUS
Status: DISCONTINUED | OUTPATIENT
Start: 2017-10-04 | End: 2017-10-04

## 2017-10-04 RX ORDER — LIDOCAINE HCL/PF 100 MG/5ML
SYRINGE (ML) INTRAVENOUS
Status: DISCONTINUED | OUTPATIENT
Start: 2017-10-04 | End: 2017-10-04

## 2017-10-04 RX ORDER — ASPIRIN 81 MG/1
81 TABLET ORAL DAILY
Status: ON HOLD | COMMUNITY
End: 2017-10-04 | Stop reason: HOSPADM

## 2017-10-04 RX ORDER — FAMOTIDINE 20 MG/1
20 TABLET, FILM COATED ORAL
Status: COMPLETED | OUTPATIENT
Start: 2017-10-04 | End: 2017-10-04

## 2017-10-04 RX ORDER — OXYCODONE HYDROCHLORIDE 5 MG/1
5 TABLET ORAL
Status: DISCONTINUED | OUTPATIENT
Start: 2017-10-04 | End: 2017-10-04 | Stop reason: HOSPADM

## 2017-10-04 RX ORDER — MIDAZOLAM HYDROCHLORIDE 1 MG/ML
2 INJECTION INTRAMUSCULAR; INTRAVENOUS EVERY 5 MIN PRN
Status: DISCONTINUED | OUTPATIENT
Start: 2017-10-04 | End: 2017-10-04 | Stop reason: HOSPADM

## 2017-10-04 RX ORDER — ACETAMINOPHEN 10 MG/ML
INJECTION, SOLUTION INTRAVENOUS
Status: DISCONTINUED | OUTPATIENT
Start: 2017-10-04 | End: 2017-10-04

## 2017-10-04 RX ORDER — ONDANSETRON 2 MG/ML
4 INJECTION INTRAMUSCULAR; INTRAVENOUS DAILY PRN
Status: DISCONTINUED | OUTPATIENT
Start: 2017-10-04 | End: 2017-10-04 | Stop reason: HOSPADM

## 2017-10-04 RX ORDER — FENTANYL CITRATE 50 UG/ML
100 INJECTION, SOLUTION INTRAMUSCULAR; INTRAVENOUS EVERY 5 MIN PRN
Status: DISCONTINUED | OUTPATIENT
Start: 2017-10-04 | End: 2017-10-04 | Stop reason: HOSPADM

## 2017-10-04 RX ORDER — SODIUM CHLORIDE, SODIUM LACTATE, POTASSIUM CHLORIDE, CALCIUM CHLORIDE 600; 310; 30; 20 MG/100ML; MG/100ML; MG/100ML; MG/100ML
INJECTION, SOLUTION INTRAVENOUS CONTINUOUS
Status: DISCONTINUED | OUTPATIENT
Start: 2017-10-04 | End: 2017-10-04 | Stop reason: HOSPADM

## 2017-10-04 RX ADMIN — ROPIVACAINE HYDROCHLORIDE 25 ML: 5 INJECTION, SOLUTION EPIDURAL; INFILTRATION; PERINEURAL at 11:10

## 2017-10-04 RX ADMIN — EPHEDRINE SULFATE 5 MG: 50 INJECTION INTRAMUSCULAR; INTRAVENOUS; SUBCUTANEOUS at 02:10

## 2017-10-04 RX ADMIN — OXYCODONE HYDROCHLORIDE 5 MG: 5 TABLET ORAL at 05:10

## 2017-10-04 RX ADMIN — EPHEDRINE SULFATE 5 MG: 50 INJECTION INTRAMUSCULAR; INTRAVENOUS; SUBCUTANEOUS at 01:10

## 2017-10-04 RX ADMIN — MIDAZOLAM HYDROCHLORIDE 2 MG: 1 INJECTION, SOLUTION INTRAMUSCULAR; INTRAVENOUS at 11:10

## 2017-10-04 RX ADMIN — PHENYLEPHRINE HYDROCHLORIDE 100 MCG: 10 INJECTION INTRAVENOUS at 12:10

## 2017-10-04 RX ADMIN — OXYCODONE HYDROCHLORIDE 5 MG: 5 TABLET ORAL at 06:10

## 2017-10-04 RX ADMIN — ACETAMINOPHEN 1000 MG: 10 INJECTION, SOLUTION INTRAVENOUS at 12:10

## 2017-10-04 RX ADMIN — EPHEDRINE SULFATE 5 MG: 50 INJECTION INTRAMUSCULAR; INTRAVENOUS; SUBCUTANEOUS at 03:10

## 2017-10-04 RX ADMIN — PROPOFOL 250 MG: 10 INJECTION, EMULSION INTRAVENOUS at 12:10

## 2017-10-04 RX ADMIN — MUPIROCIN 1 G: 20 OINTMENT TOPICAL at 10:10

## 2017-10-04 RX ADMIN — SODIUM CHLORIDE, SODIUM LACTATE, POTASSIUM CHLORIDE, AND CALCIUM CHLORIDE: 600; 310; 30; 20 INJECTION, SOLUTION INTRAVENOUS at 11:10

## 2017-10-04 RX ADMIN — PHENYLEPHRINE HYDROCHLORIDE 0.35 MCG/KG/MIN: 10 INJECTION INTRAVENOUS at 12:10

## 2017-10-04 RX ADMIN — ONDANSETRON 4 MG: 2 INJECTION INTRAMUSCULAR; INTRAVENOUS at 02:10

## 2017-10-04 RX ADMIN — EPHEDRINE SULFATE 10 MG: 50 INJECTION INTRAMUSCULAR; INTRAVENOUS; SUBCUTANEOUS at 12:10

## 2017-10-04 RX ADMIN — GLYCOPYRROLATE 0.6 MG: 0.2 INJECTION, SOLUTION INTRAMUSCULAR; INTRAVENOUS at 03:10

## 2017-10-04 RX ADMIN — FAMOTIDINE 20 MG: 20 TABLET, FILM COATED ORAL at 10:10

## 2017-10-04 RX ADMIN — INFLUENZA A VIRUS A/MICHIGAN/45/2015 X-275 (H1N1) ANTIGEN (FORMALDEHYDE INACTIVATED), INFLUENZA A VIRUS A/HONG KONG/4801/2014 X-263B (H3N2) ANTIGEN (FORMALDEHYDE INACTIVATED), INFLUENZA B VIRUS B/PHUKET/3073/2013 ANTIGEN (FORMALDEHYDE INACTIVATED), AND INFLUENZA B VIRUS B/BRISBANE/60/2008 ANTIGEN (FORMALDEHYDE INACTIVATED) 0.5 ML: 15; 15; 15; 15 INJECTION, SUSPENSION INTRAMUSCULAR at 06:10

## 2017-10-04 RX ADMIN — EPHEDRINE SULFATE 5 MG: 50 INJECTION INTRAMUSCULAR; INTRAVENOUS; SUBCUTANEOUS at 12:10

## 2017-10-04 RX ADMIN — NEOSTIGMINE METHYLSULFATE 4 MG: 1 INJECTION INTRAVENOUS at 03:10

## 2017-10-04 RX ADMIN — ROCURONIUM BROMIDE 50 MG: 10 INJECTION, SOLUTION INTRAVENOUS at 12:10

## 2017-10-04 RX ADMIN — LIDOCAINE HYDROCHLORIDE 75 MG: 20 INJECTION, SOLUTION INTRAVENOUS at 12:10

## 2017-10-04 RX ADMIN — FENTANYL CITRATE 100 MCG: 50 INJECTION, SOLUTION INTRAMUSCULAR; INTRAVENOUS at 11:10

## 2017-10-04 RX ADMIN — CLINDAMYCIN PHOSPHATE 900 MG: 18 INJECTION, SOLUTION INTRAVENOUS at 12:10

## 2017-10-04 NOTE — OR NURSING
Pt O2 D/C'ed. Pt sats drop to 86-87 and maintain there. Bilat lung fields clear and equal. Pt denies SOB. O2 initiated again at 4L to keep Sats at 92%

## 2017-10-04 NOTE — ANESTHESIA POSTPROCEDURE EVALUATION
"Anesthesia Post Evaluation    Patient: mJ Deleon    Procedure(s) Performed: Procedure(s) (LRB):  REPAIR ROTATOR CUFF ARTHROSCOPIC (Right)  REPAIR-TENDON-BICEP (Right)  [id number] END (Right)  ARTHROSCOPY-SHOULDER WITH SUBACROMIAL DECOMPRESSION (Right)  DEBRIDEMENT-LABRUM-ARTHROSCOPIC (Right)    Final Anesthesia Type: general  Patient location during evaluation: PACU  Patient participation: Yes- Able to Participate  Level of consciousness: awake and alert  Post-procedure vital signs: reviewed and stable  Pain management: adequate  Airway patency: patent  PONV status at discharge: No PONV  Anesthetic complications: no      Cardiovascular status: blood pressure returned to baseline and stable  Respiratory status: unassisted, spontaneous ventilation and room air (required prompting to deep breath to get off O2)  Hydration status: euvolemic  Follow-up not needed.        Visit Vitals  BP (!) 162/82   Pulse 83   Temp 37.5 °C (99.5 °F) (Oral)   Resp 16   Ht 5' 8" (1.727 m)   Wt 97.5 kg (215 lb)   SpO2 (!) 92%   BMI 32.69 kg/m²       Pain/Franco Score: Pain Assessment Performed: Yes (10/4/2017  5:24 PM)  Presence of Pain: complains of pain/discomfort (10/4/2017  5:24 PM)  Pain Rating Prior to Med Admin: 5 (10/4/2017  5:24 PM)  Franco Score: 10 (10/4/2017  5:24 PM)      "

## 2017-10-04 NOTE — H&P (VIEW-ONLY)
Jm Deleon  is here for a completion of his perioperative paperwork. he  Is scheduled to undergo     right   1. Arthroscopic rotator cuff repair  2. Arthroscopic subacromial decompression  3. Arthroscopic distal clavicle excision  4. Possible open biceps subpectoral tenodesis on 10/4/17      He is a healthy individual and does need clearance for this procedure which he has received from Dr. Martin at [facility].     Risks, indications and benefits of the surgical procedure were discussed with the patient. All questions with regard to surgery, rehab, expected return to functional activities, activities of daily living and recreational endeavors were answered to his satisfaction.    It was explained to the patient that there may be an increase in surgical risks if the patient has certain co-morbidities such as but not limited to: Obesity, Cardiovascular issues (CHF, CAD, Arrhythmias), chronic pulmonary issues, previous or current neurovascular/neurological issues, previous strokes, diabetes mellitus, previous wound healing issues, previous wound or skin infections, PVD, clotting disorders, if the patient uses chronic steroids, if the patient takes or has immune compromising medications or diseases, or has previously or currently used tobacco products.     The patient verbalized that he/she does not have any additional clotting, bleeding, or blood disorders, other than what is list in her chart on today's review.     Then a brief history and physical exam were performed.      PHYSICAL EXAM:  GEN: A&Ox3, WD WN NAD  HEENT: WNL  CHEST: CTAB, no W/R/R  HEART: RRR, no M/R/G  ABD: Soft, NT ND, BS x4 QUADS  MS; See Epic  NEURO: CN II-XII intact       The surgical consent was then reviewed with the patient, who agreed with all the contents of the consent form and it was signed. he was then given the [facility] surgery packet to bring with him to [facility] for the anesthesia portion of his perioperative paperwork.   For all  physicians except for Dr. Ventura, we will email and possibly fax the consent forms and booking sheets to [facility] pre-admit.    The patient was given the opportunity to ask questions about the surgical plan and consent form, and once no other questions were asked, I proceeded with the pre-op appointment.    PHYSICAL THERAPY:  He was also instructed regarding physical therapy and will begin on  POD3-5. He was given a copy of the original prescription to schedule. Another copy of this prescription was also faxed to [facility].    POST OP CARE:instructions were reviewed including care of the wound and dressing after surgery and when he can shower.     PAIN MANAGEMENT: Jm Deleon was also given his pain management regime, which includes the TENS unit given to him by [facility] along with the education required for its use. He was also instructed regarding the Polar ice unit that will be in place after surgery and his postoperative pain medications.     PAIN MEDICATION:  Percocet 10/325mg 1 po q 4-6 hours prn pain  Ultram 50 mg Take 1-2 p.o. q.6 hours p.r.n. breakthrough pain,   Phenergan 25 mg one p.o. q.6 hours p.r.n. nausea and vomiting.    DVT prophylaxis was discussed with the patient today including risk factors for developing DVTs and history of DVTs. The patient was asked if any specific recommendations were given from the doctor/s that did pre-operative surgical clearance. The patient was then given an education sheet about DVTs and PE with warning signs and symptoms of both and steps to take if they suspect either of these.    This along with the Modified Caprini risk assessment model for VTE in general surgical patients was used to determine the patient's DVT risk.     From: Todd THAO, Adam DA, Kayleigh GODINEZ, et al. Prevention of VTE in nonorthopedic surgical patients: antithrombotic therapy and prevention of thrombosis, 9th ed: American College of Chest Physicians evidence-based clinical  practical guidelines. Chest 2012; 141:e227S. Copyright © 2012. Reproduced with permission from the American College of Chest Physicians.    The below listed DVT prophylaxis regimen along with bilateral SAEID compression stockings will be used post-op. Length of treatment has been determined to be 10-42 days post-op by the above noted Caprini assessment model.     Patient was instructed to buy and take:  Aspirin 325mg BID x 3 weeks for DVT prophylaxis starting on the evening after surgery.  Patient will also use bilateral TEDs on lower extremities, SCDs during surgery, and early ambulation post-op. If the patient was previously taking 81mg baby aspirin, they were told to not take it will using the above stated aspirin and to restart the 81mg aspirin after completion of the aspirin dose.      Patient was also told to buy over the counter Prilosec medication and take it once daily for GI protection as long as they are taking NSAIDs or Aspirin.     Published data in Taurus JEAN BAPTISTE et al. J Arthroplasty. Oct; 31(10):2237-40, 2016; showed that aspirin use as prophylaxis during revision total joint arthroplasty was more effective than warfarin in preventing symptomatic venous thromboembolic events and was associated with lower complications.  Patients in the study were also treated with intermittent pneumatic compression devices. Compression stockings would be our method of mechanical prophylaxis, which has been shown to be similar to pneumatic compression in the systematic review, Rodo RJ, et al. Janna Surg. Feb; 239(2): 162-171, 2004.     Results showed a significantly higher incidence of symptomatic venous thromboembolic events among patients in the warfarin group vs. the aspirin group (1.75% vs. 0.56%). Researchers also noted a bleeding event rate of 1.5% among patients who received warfarin compared with a rate of 0.4% among patients who received aspirin.  Patient denies history of seizures.       The patient was told  that narcotic pain medications may make them drowsy and instructions were given to not sign legal documents, drive or operate heavy machinery, cars, or equipment while under the influence of narcotic medications. The patient was told and understands that narcotic pain medications should only be used as needed to control pain and that other options of pain control include TENs unit and ice packs/unit.     As there were no other questions to be asked, he was given my business card along with Johnny Ventura MD business card if he has any questions or concerns prior to surgery or in the postop period.     At the end of our encounter today, the patient was given the option and asked if they would like to see the surgeon regarding questions or concerns that they have about the consent form or the surgical procedure. The patient declined.

## 2017-10-04 NOTE — OR NURSING
Anesthesia Jonel at bedside. Updated on pt respiratory status. Instructed pt to take deep breaths. Report to Nano Aponte RN. Pt wihtout change from previous assessment. No c/o pain.

## 2017-10-04 NOTE — TRANSFER OF CARE
"Anesthesia Transfer of Care Note    Patient: Jm Deleon    Procedure(s) Performed: Procedure(s) (LRB):  REPAIR ROTATOR CUFF ARTHROSCOPIC (Right)  REPAIR-TENDON-BICEP (Right)  [id number] END (Right)  ARTHROSCOPY-SHOULDER WITH SUBACROMIAL DECOMPRESSION (Right)  DEBRIDEMENT-LABRUM-ARTHROSCOPIC (Right)    Patient location: PACU    Anesthesia Type: general    Transport from OR: Transported from OR on 6-10 L/min O2 by face mask with adequate spontaneous ventilation    Post pain: adequate analgesia    Post assessment: no apparent anesthetic complications    Post vital signs: stable    Level of consciousness: awake, alert and oriented    Nausea/Vomiting: no nausea/vomiting    Complications: none    Transfer of care protocol was followed      Last vitals:   Visit Vitals  BP (!) 161/79 (BP Location: Left arm, Patient Position: Lying)   Pulse 82   Temp 37.5 °C (99.5 °F) (Oral)   Resp 16   Ht 5' 8" (1.727 m)   Wt 97.5 kg (215 lb)   SpO2 (!) 92%   BMI 32.69 kg/m²     "

## 2017-10-04 NOTE — BRIEF OP NOTE
Operative Note       Surgery Date: 10/4/2017     Surgeon(s) and Role:     * Johnny Ventura MD - Primary     * Tony Grant MD - Fellow     * Araceli Argueta PA-C    Pre-op Diagnosis:  AC (acromioclavicular) arthritis [M19.019]  Non-traumatic rotator cuff tear, right [M75.101]  Right bicipital tenosynovitis [M75.21]    Post-op Diagnosis:  Right shoulder arthroscopy with open biceps tenodesis    Procedure(s) (LRB):  REPAIR ROTATOR CUFF ARTHROSCOPIC (Right)  REPAIR-TENDON-BICEP (Right)  [id number] END (Right)  ARTHROSCOPY-SHOULDER WITH SUBACROMIAL DECOMPRESSION (Right)  DEBRIDEMENT-LABRUM-ARTHROSCOPIC (Right)    Anesthesia: General    Findings/Key Components:  As above, see Dr. Ventura's op note for full details    Core Measure Documentation:  Were antibiotics extended? No  Was the patient administered a VTE Prophylaxis? No. Short procedure; low risk  Estimated Blood Loss: minimal           Specimens     None        Implants:   Implant Name Type Inv. Item Serial No.  Lot No. LRB No. Used   ANCHOR FORKED TIP 7MM PEEI - [id number]  ANCHOR FORKED TIP 7MM PEEI  [employer] [id number] Right 1   HEALIX 5.5 ADV BR3 - [id number]   HEALIX 5.5 ADV BR3   [email or web address] MEDICAL [id number] Right 2       Complications: none           Disposition: PACU - hemodynamically stable.           Condition: Stable    Attestation:  I was present for the entire procedure.

## 2017-10-04 NOTE — DISCHARGE SUMMARY
.Discharge Note  Short Stay      SUMMARY     Admit Date: 10/4/2017    Attending Physician: Johnny Ventura MD     Discharge Physician: Johnny Ventura MD    Final Diagnosis: right shoulder arthroscopy with open biceps tenodesis    Disposition: Home or Self Care    Patient Instructions:   Current Discharge Medication List      CONTINUE these medications which have NOT CHANGED    Details   atorvastatin (LIPITOR) 80 MG tablet TAKE 1 TABLET (80 MG TOTAL) BY MOUTH ONCE DAILY.  Qty: 90 tablet, Refills: 3      blood sugar diagnostic (ONE TOUCH VERIO) Strp Patient tests sugar 3 times daily please dispense strips and lancets  Qty: 300 each, Refills: 3    Associated Diagnoses: Type II or unspecified type diabetes mellitus without mention of complication, uncontrolled; CAD (coronary artery disease); Hypertension      carvedilol (COREG) 25 MG tablet Take 1 tablet (25 mg total) by mouth 2 (two) times daily. .5-1 twice daily or as directed  Qty: 180 tablet, Refills: 3    Associated Diagnoses: Coronary artery disease involving native coronary artery of native heart without angina pectoris; Diabetes mellitus due to underlying condition with other circulatory complication; Essential hypertension      diphenhydrAMINE (BENADRYL) 25 mg capsule Take 25 mg by mouth nightly as needed for Itching.      ezetimibe (ZETIA) 10 mg tablet Take 1 tablet (10 mg total) by mouth once daily.  Qty: 90 tablet, Refills: 3    Associated Diagnoses: Coronary artery disease involving native coronary artery of native heart without angina pectoris; Dyslipidemia      FOLIC ACID/MULTIVITS-MIN (MULTIVITAMIN FOR CHOLESTEROL ORAL) Take 1 tablet by mouth once daily.       furosemide (LASIX) 40 MG tablet TAKE 1 TABLET BY MOUTH ONCE DAILY  Qty: 90 tablet, Refills: 3      insulin aspart (NOVOLOG FLEXPEN) 100 unit/mL InPn pen Inject 10 Units into the skin 3 (three) times daily with meals.  Qty: 3 Box, Refills: 3    Associated Diagnoses: Uncontrolled type 2  "diabetes mellitus without complication, with long-term current use of insulin      insulin glargine (LANTUS SOLOSTAR) 100 unit/mL (3 mL) InPn pen Inject 35 Units into the skin every evening.  Qty: 45 mL, Refills: 1      insulin needles, disposable, (BD INSULIN PEN NEEDLE UF ORIG) 29 x 1/2 " Ndle USE TWICE DAILY AS DIRECTED  Qty: 100 each, Refills: 2      INVOKANA 300 mg Tab tablet TAKE 1 TABLET BY MOUTH ONCE DAILY.  Qty: 30 tablet, Refills: 4      lancets (ONE TOUCH DELICA LANCETS) 33 gauge Misc Use 3 times Daily.  Qty: 300 each, Refills: 3      metformin (GLUCOPHAGE) 1000 MG tablet TAKE 1 TABLET (1,000 MG TOTAL) BY MOUTH 2 (TWO) TIMES DAILY.  Qty: 180 tablet, Refills: 3      omega-3 acid ethyl esters (LOVAZA) 1 gram capsule TAKE 4 CAPSULES BY MOUTH DAILY OR AS DIRECTED.  Qty: 360 capsule, Refills: 3      ramipril (ALTACE) 10 MG capsule Take 1 capsule (10 mg total) by mouth once daily.  Qty: 90 capsule, Refills: 3    Associated Diagnoses: Coronary artery disease involving native coronary artery of native heart without angina pectoris; Diabetes mellitus due to underlying condition with other circulatory complication; Essential hypertension      aspirin (ECOTRIN) 325 MG EC tablet Take 1 tablet (325 mg total) by mouth every 12 (twelve) hours.  Qty: 42 tablet, Refills: 0      oxycodone-acetaminophen (PERCOCET)  mg per tablet Take 1 tablet by mouth every 4-6 hours as needed for pain. Take stool softener with this medication.  Qty: 60 tablet, Refills: 0    Associated Diagnoses: Chronic right shoulder pain      promethazine (PHENERGAN) 25 MG tablet Take 1 tablet (25 mg total) by mouth every 6 (six) hours as needed for Nausea.  Qty: 16 tablet, Refills: 0    Associated Diagnoses: Chronic right shoulder pain      tramadol (ULTRAM) 50 mg tablet Take 1-2 tablets ( mg total) by mouth every 6 (six) hours as needed (surgical pain).  Qty: 60 tablet, Refills: 0    Associated Diagnoses: Chronic right shoulder pain       "         Discharge Procedure Orders (must include Diet, Follow-up, Activity)  Diet general     Shower on day dressing removed (No bath)   Order Comments: Keep incisions covered with waterproof bandage     Keep surgical extremity elevated     Ice to affected area     Lifting restrictions     Call MD for:  temperature >100.4     Call MD for:  persistent nausea and vomiting or diarrhea     Call MD for:  severe uncontrolled pain     Call MD for:  redness, tenderness, or signs of infection (pain, swelling, redness, odor or green/yellow discharge around incision site)     Remove dressing in 72 hours

## 2017-10-04 NOTE — ANESTHESIA PROCEDURE NOTES
ISB    Patient location during procedure: holding area   Block not for primary anesthetic.  Reason for block: at surgeon's request and post-op pain management   Post-op Pain Location: SHOULDER PAIN R  Start time: 10/4/2017 11:27 AM  Timeout: 10/4/2017 11:26 AM   End time: 10/4/2017 11:34 AM  Staffing  Anesthesiologist: ADA GONZALEZ  Performed: anesthesiologist   Preanesthetic Checklist  Completed: patient identified, site marked, surgical consent, pre-op evaluation, timeout performed, IV checked, risks and benefits discussed and monitors and equipment checked     Additional Notes  INTERSCALENE BLOCK, R, single shot  Supine position  O2 nasal cannula, sedation titrated  Chloraprep, sterile technique  Monitors: Continuous EKG, pulse Oximeter, Intermittent BP  22G, short-bevel, 3.5 in needle  Ultrasound Guided needle positioning  Local visualized surrounding nerve  Images saved to disc  Muscle twitch elicited at 0.5 mA  Ropivicaine 0.5%, 25cc  Attempt x 1 pass  Negative aspiration every 5 cc  Low injection pressure  Negative paresthesia  Pt. Tolerated procedure well

## 2017-10-05 DIAGNOSIS — Z98.890 S/P ROTATOR CUFF REPAIR: Primary | ICD-10-CM

## 2017-10-05 RX ORDER — OXYCODONE HCL 10 MG/1
10 TABLET, FILM COATED, EXTENDED RELEASE ORAL EVERY 12 HOURS PRN
Qty: 8 TABLET | Refills: 0 | Status: SHIPPED | OUTPATIENT
Start: 2017-10-05 | End: 2018-05-15

## 2017-10-05 NOTE — OP NOTE
DATE OF SURGERY:  10/4/17     PREOPERATIVE DIAGNOSES:   1. Right shoulder rotator cuff tear (massive).   2. Right shoulder AC joint arthritis  3. Right shoulder labral tear / biceps tendinopathy    POSTOPERATIVE DIAGNOSES:   1. Right shoulder rotator cuff tear (massive).   2. Right shoulder AC joint arthritis  3. Right shoulder labral tear / biceps tendinopathy    PROCEDURE:   1. Right shoulder arthroscopic rotator cuff repair (massive / complex).   2. Right shoulder open subpectoral biceps tenodesis  3. Right shoulder arthroscopic distal clavicle excision  4. Right shoulder arthroscopic subacromial decompression.   5. Right shoulder arthroscopic debridement labrum    SURGEON: Johnny Ventura M.D.     **Of note, because of the massive nature of the posterior L-shaped tear of the supra and infra-spinatus tendons, with the need for releases and convergence suturing, twice the normal time and resources were used for the case    ASSISTANTS:   1. Tony Grant M.D.   2. Bee Harmon  3. Araceli Argueta    COMPLICATIONS: None.     POSITION: Beach chair.     ANESTHESIA: General endotracheal plus right upper extremity interscalene   block.     EXAMINATION UNDER ANESTHESIA: Right shoulder forward flexion 180 degrees,   abduction 120 degrees, full internal and external rotation   1+ anterior drawer, 1+ sulcus sign, 1+ posterior drawer.     ARTHROSCOPIC FINDINGS:   1. Full thickness, posterior L-shaped massive size supraspinatus / upper infraspinatus rotator cuff tear.   2. Small anterior acromial spur.   3. Arthritic distal clavicle  4. Intact glenohumeral cartilage surface  5. Biceps: high grade tendinopathy with medial subluxation  6. Subscapularis: partial tear  7. Labrum:  SLAP1      INDICATIONS FOR PROCEDURE: The patient is a 62 y.o.  year-old male  who has pain in his right shoulder. MRI confirms a tear of his rotator cuff.  After risks and benefits of surgery were discussed, he elects to proceed with operative   intervention. All risks and benefits have been discussed including the risks of stiffness for recurrent instability, irreparability of the tear, damage to neurovascular structures, damage to cartilage and the risk of anesthesia including stroke and heart attack. The patient seemed to understand and wished to proceed.     DESCRIPTION OF PROCEDURE: The patient was brought in the room. After undergoing general endotracheal anesthesia and right upper extremity interscalene block, he was placed in a well-padded modified beachchair position. Perioperative antibiotics were given.  his right upper extremity was prepped and draped in usual sterile fashion including the use of a sterile Spider arm cerda.     After time-out was performed, the standard posterior portal and anterior superior portal were created. Diagnostic arthroscopy performed. The glenohumeral joint was entered. There was no chondromalacia noted in the glenoid or humeral head. There was minimal mild fraying at the superior labrum. The anterior inferior labrum and posterior labrum were intact without evidence of tearing. The subscapularis had mild fraying.  Damaged subscap tissue was debrided down.  The remainder of the insertion was intact.    There was a massive size full-thickness posterior L-shaped tear of the supraspinatus and infraspinatus tendons.     DEBRIDEMENT: Oscillating shaver, straight and curved biters were used to debride a small flap of tissue off the superior labrum and the undersurface of the subscap. The biceps was released for planned biceps tenodesis.    SUBACROMIAL DECOMPRESSION: The scope was taken out and redirected in the subacromial space. After excellent visualization was achieved, a lateral portal was created. The bursal tissue was cleaned off. The full-thickness crescent-shaped medium size rotator cuff tear was identified. The area was cleaned off for later repair. The undersurface of the acromion was cleaned off of soft tissues  including releasing the CA ligament. A 5-mm shruthi was introduced through the posterior portal and decompression was completed using cutting block technique down to a type 1 configuration.     DISTAL CLAVICLE EXCISION:  The soft tissues were cleaned off of the undersurface of the AC joint with Mitek VAPR device. The arthritic aspect of the distal clavicle was visualized and excellent hemostasis was achieved.  A 5-mm shruthi was used to resect between 8-9 mm of bone from the distal aspect of the clavicle.  Careful attention was paid to resect adequate bone from the posterior and superior aspect of the AC joint and not to disrupt the superior aspect of the AC joint capsule.    ROTATOR CUFF REPAIR: The footprint of rotator cuff was cleaned off with Mitek VAPR device and oscillating shaver / shruthi. This was judged to be amenable for repair with releases, convergence sutures and suture anchors.    **Significant releases were made superiorly and inferiorly on the supraspinatus and infraspinatus to allow mobilization of the rotator cuff.  Anteriorly, the tissue was released to the base of the coracoid.  Posteriorly, the tissue was released to the base of the scapular spine.  Following releases, this was judged to be amenable for repair as a massive L-shaped tear, with multiple margin convergence sutures and one centrally placed suture anchor.     **Margin convergence sutures were placed starting posterior-medially and finishing garth-laterally.  A total of 3 margin convergence sutures were placed which converged our cuff tissue well and balanced our repair from posterior to anterior.  Our rotator cuff tissue covered the humeral head and was brought to cover the footprint.  The tissue was mobile enough for suture anchor repair on the to cover the supraspinatus and infraspinatus footprint.     Two 5.5 mm triple loaded anchors were placed at the medial aspect of the footprint of the supraspinatus. All 6 suture limbs from each  anchor were brought through in a large horizontal mattress convergence type sutures configuration using a suture penetrator and Esspressew. One lateral convergence suture was placed along our posterior L limb laterally following microvents in the lateral tuberosity.  These were tied arthroscopically down from lateral to medial. Excellent footprint coverage was achieved after all arthroscopic knots were tied down. Internal and external rotation confirmed excellent footprint compression with no evidence of gap formation.     OPEN SUBPECTORAL BICEPS TENODESIS: The scope was taken out of the joint.  A 3 cm incision was made in the axillary fold for our open subpec biceps tenodesis.  Excellent hemostasis was achieved.  Blunt dissection was taken down to the fascia.  The fascia was released.  A pointed Riya was placed laterally under the pec.  A blunt Adalberto was placed medially to protect the neurovascular structures.  An Army-Navy was placed superiorly to complete exposure. Excellent visualization of the biceps groove and biceps tendon was achieved.  The tendon was brought into the wound with a right angle hemostat.  Frayed and tendinopathic aspect were debrided down.  An Arthrex Fiberloop suture started at the musculotendinous junction and was sutured several cm distally.  The excess tendon was removed and excellent control of our biceps was achieved.  A guide wire and 7 mm  hole was drilled into the humerus in the sub-pec position.  Excess bone debris was removed.  An Arthrex 7x15 mm PEEK bio-tenodesis screw was placed along with the tendon into the drilled hole without difficulty.  Excellent purchase was achieved and the sutures were tied to provide a suture anchor effect as well.  The correct length-tension relationship was restored for the biceps as the muscle tendon junction rested directly deep to the pectoralis major.  All areas were irrigated.  The wound was closed with 2-0 vicryl and 4-0 subcuticular  monocryl.      Portal sites were closed with 4-0 Monocryl, covered with Mastisol, Steri-Strips, Xeroform, 4 x 4 fluffs, ABD pads and tape. The patient was placed in a sling and pillow for protection, was extubated in the room, transferred to recovery room in stable condition accompanied by his physician.    There were no complications in the case. I was present, scrubbed and did perform all critical portions of the case.     Postop plan for the patient is to follow the massive size rotator cuff repair protocol.         Johnny Ventura M.D.

## 2017-10-05 NOTE — DISCHARGE INSTRUCTIONS
[address], [place]                                    [phone]             Postoperative Instructions for Shoulder Surgery               Your Surgery Included:   Open              Arthroscopic [] Instability Repair     [] Diagnostic   [] Rotator Cuff Repair     [] Lysis of Adhesions / Manipulation [] Distal Clavicle Resection    [x] Debridement [x] Biceps Tenodesis       [] Labrum  [x] Rotator Cuff   [] Cartilage   [] Contracture Release    [] SLAP Repair   [] Fracture Fixation    [] Instability Repair  [] AC Joint Reconstruction      [x] Rotator Cuff Repair [] Joint Replacement     [x] Subacromial Decompression / Bursectomy   [] Hemiarthroplasty  [] Total Shoulder    [x] Biceps Tenotomy / Tenodesis    [] Reverse Total Shoulder       [x] Distal Clavicle Resection          [] Amniox Arthrocentesis    [] Contracture Release                 Call our office ([phone]) immediately if you experience any of the following:      Excessive bleeding or pus like drainage at the incision site      Uncontrollable pain not relieved by pain medication      Excessive swelling or redness at the incision site      Fever above 101.5 degrees not controlled with Tylenol or Motrin      Shortness of Breath      Any foul odor or blistering from the surgery site   FOR EMERGENCIES: If any unusual problems or difficulties occur, call our office at [phone], or page the  at [phone] who will direct your call appropriately   1.   Pain Management: A cold therapy cuff, pain medications, local injections, and in some cases, regional anesthesia injections are used to manage your post-operative pain. The decision to use each of these options is based on their risks and benefits.    Medications: You were given one or more of the following medication prescriptions before leaving the hospital. Have the prescriptions filled at a pharmacy on your way home and follow the instructions on  the bottles. If you need a refill, please call your pharmacy.     Narcotic Medication (usually Vicodin ES, Lortab, Percocet or Nucynta): Begin taking the medication before your shoulder starts to hurt. Some patients do not like to take any medication, but if you wait until your pain is severe before taking, you will be very uncomfortable for several hours waiting for the narcotic to work. Always take with food.    Nausea / Vomiting: For this issue, we prescribe Phenergan, use this medication as directed.    Cold Therapy: You may have been sent home with a [employer] cold therapy unit and wrap for your shoulder. Fill with ice and water to the indicated fill line and use throughout the day for the first two days and then as needed to help relieve pain and control swelling.     Regional Anesthesia Injections (Blocks): You may have been given a regional nerve block either before or after surgery. This may make your entire shoulder numb for 24-36 hours.                    2.   Diet: Eat a bland diet for the first day after surgery. Progress your diet as tolerated. Constipation may occur with Narcotic usage, contact our office if you are experiencing constipation.   3.   Activity: After you arrive at home, spend most of the first 24 hours resting in bed, on the couch, or in a reclining chair. After the first 24 hours at home, slowly increase your activity level based on your symptoms.   4.   Dressing Change: Remove the dressing on the 3rd day. It is normal for some blood to be seen on the dressings. It is also normal for you to see apparent bruising on the skin around your shoulder when you remove the dressing. If present, leave the steri-strip tape across the incisions. If you are concerned by the drainage or the appearance of your shoulder, please call one of the numbers listed below.   5.   Showering: You may shower on the 3rd day after surgery with waterproof bandages over small incisions. If you have an incision  with Prineo (clear mesh), it does not need to be covered. Do not submerge in any water until after your postoperative appointment in clinic.   6.   Shoulder Sling (with/without Pillow attachment): You may have been sent home with a sling / pillow attachment holding your arm away from your body. You may remove the sling when changing clothes or bathing. Make sure to wear the sling while sleeping unless instructed otherwise. You may remove at rest or for exercises.           [x] You need to wear the sling with pillow for 24 hours a day for 6-8 weeks.   7.  Shoulder Exercises: Begin these exercises the first day after surgery in order to help you regain your motion and strength. You may do the following marked exercises for 2-5 mins five times a day:   [x] Shoulder shrugs - Shrug your shoulders up and down.   [] Pendulums - Bend forward allowing your arm to hang down in front of you. Gently swing your arm side-to-side and front to back.                                                                                                                               [] Passive abduction - Have a family member gently lift your arm away from your body bringing your elbow up to the level of your shoulder.                                                                                                                   [] Shoulder rotation - With your arm at your side, have a family member gently rotate your arm internally and externally.                                                                                                                  [x] Scapular retractions - (Squeeze shoulder blades together): Squeeze shoulder blades together while slightly pulling them down (do not shrug your shoulders upward); You can perform 10-15 reps, several times throughout the day, when seated at your desk, driving in the car, etc.                                                                                                                      [] Pulley exercises - Put a towel or long sleeve shirt over the top of a door. Stand facing the door. Use your good arm to gently pull your operative arm up in front of you.   [] Elbow motion - Straighten and bend your elbow.                                                                                                               [x] Ball squeezes - use ball attached to sling/pillow or soft (nerf) ball for  strengthening                                                                                                                 8.  Physical Therapy: Physical therapy is an essential component to your recovery from surgery. Your physical therapy will start in 3 days.   FIRST POSTOPERATIVE VISIT: As scheduled.         Anesthesia: After Your Surgery  Youve just had surgery. During surgery, you received medication called anesthesia to keep you comfortable and pain-free. After surgery, you may experience some pain or nausea. This is common. Here are some tips for feeling better and recovering after surgery.    Going home  Your doctor or nurse will show you how to take care of yourself when you go home. He or she will also answer your questions. Have an adult family member or friend drive you home. For the first 24 hours after your surgery:  · Do not drive or use heavy equipment.  · Do not make important decisions or sign legal documents.  · Avoid alcohol.  · Have someone stay with you, if needed. He or she can watch for problems and help keep you safe.  Be sure to keep all follow-up appointments with your doctor. And rest after your procedure for as long as your doctor tells you to.    Coping with pain  If you have pain after surgery, pain medication will help you feel better. Take it as directed, before pain becomes severe. Also, ask your doctor or pharmacist about other ways to control pain, such as with heat, ice, and relaxation. And follow any other instructions your surgeon or nurse gives  you.    Tips for taking pain medication  To get the best relief possible, remember these points:  · Pain medications can upset your stomach. Taking them with a little food may help.  · Most pain relievers taken by mouth need at least 20 to 30 minutes to take effect.  · Taking medication on a schedule can help you remember to take it. Try to time your medication so that you can take it before beginning an activity, such as dressing, walking, or sitting down for dinner.  · Constipation is a common side effect of pain medications. Contact your doctor before taking any medications like laxatives or stool softeners to help relieve constipation. Also ask about any dietary restrictions, because drinking lots of fluids and eating foods like fruits and vegetables that are high in fiber can also help. Remember, dont take laxatives unless your surgeon has prescribed them.  · Mixing alcohol and pain medication can cause dizziness and slow your breathing. It can even be fatal. Dont drink alcohol while taking pain medication.  · Pain medication can slow your reflexes. Dont drive or operate machinery while taking pain medication.  If your health care provider tells you to take acetaminophen to help relieve your pain, ask him or her how much you are supposed to take each day. (Acetaminophen is the generic name for Tylenol and other brand-name pain relievers.) Acetaminophen or other pain relievers may interact with your prescription medicines or other over-the-counter (OTC) drugs. Some prescription medications contain acetaminophen along with other active ingredients. Using both prescription and OTC acetaminophen for pain can cause you to overdose. The FDA recommends that you read the labels on your OTC medications carefully. This will help you to clearly understand the list of active ingredients, dosing instructions, and any warnings. It may also help you avoid taking too much acetaminophen. If you have questions or don't  understand the information, ask your pharmacist or health care provider to explain it to you before you take the OTC medication.    Managing nausea  Some people have an upset stomach after surgery. This is often due to anesthesia, pain, pain medications, or the stress of surgery. The following tips will help you manage nausea and get good nutrition as you recover. If you were on a special diet before surgery, ask your doctor if you should follow it during recovery. These tips may help:  · Dont push yourself to eat. Your body will tell you when to eat and how much.  · Start off with clear liquids and soup. They are easier to digest.  · Progress to semi-solid foods (mashed potatoes, applesauce, and gelatin) as you feel ready.  · Slowly move to solid foods. Dont eat fatty, rich, or spicy foods at first.  · Dont force yourself to have three large meals a day. Instead, eat smaller amounts more often.  · Take pain medications with a small amount of solid food, such as crackers or toast to avoid nausea.      Call your surgeon if  · You still have pain an hour after taking medication (it may not be strong enough).  · You feel too sleepy, dizzy, or groggy (medication may be too strong).  · You have side effects like nausea, vomiting, or skin changes (rash, itching, or hives).       Select on Hyperlink below to print updated instructions from [email or web address]  [email or web address]          Select on Hyperlink below to print updated instructions from [email or web address]  Shoulder Sling Shot 2 [address] [place]              Select on Hyperlink below to print updated instructions from [email or web address]  [email or web address]

## 2017-10-09 ENCOUNTER — CLINICAL SUPPORT (OUTPATIENT)
Dept: REHABILITATION | Facility: HOSPITAL | Age: 62
End: 2017-10-09
Attending: PHYSICIAN ASSISTANT
Payer: COMMERCIAL

## 2017-10-09 DIAGNOSIS — R29.898 WEAKNESS OF SHOULDER: ICD-10-CM

## 2017-10-09 DIAGNOSIS — M25.511 ACUTE POSTOPERATIVE PAIN OF RIGHT SHOULDER: Primary | ICD-10-CM

## 2017-10-09 DIAGNOSIS — M25.611 DECREASED RIGHT SHOULDER RANGE OF MOTION: ICD-10-CM

## 2017-10-09 DIAGNOSIS — G89.18 ACUTE POSTOPERATIVE PAIN OF RIGHT SHOULDER: Primary | ICD-10-CM

## 2017-10-09 PROCEDURE — 97161 PT EVAL LOW COMPLEX 20 MIN: CPT

## 2017-10-09 PROCEDURE — 97110 THERAPEUTIC EXERCISES: CPT

## 2017-10-09 NOTE — PLAN OF CARE
"Physical Therapy Evaluation    Name: Jm Deleon  Clinic Number: [id number]    Diagnosis:   Encounter Diagnoses   Name Primary?    Acute postoperative pain of right shoulder Yes    Decreased right shoulder range of motion     Weakness of shoulder      Physician: Dallas Deshpande III, *  Treatment Orders: PT Eval and Treat    History     Past Medical History:   Diagnosis Date    Allergy     seasonal    Arthritis     Coronary artery disease     Diabetes mellitus     Hyperlipidemia     Hypertension     Joint pain     Squamous cell carcinoma      Current Outpatient Prescriptions   Medication Sig    aspirin (ECOTRIN) 325 MG EC tablet Take 1 tablet (325 mg total) by mouth every 12 (twelve) hours.    atorvastatin (LIPITOR) 80 MG tablet TAKE 1 TABLET (80 MG TOTAL) BY MOUTH ONCE DAILY.    blood sugar diagnostic (ONE TOUCH VERIO) Strp Patient tests sugar 3 times daily please dispense strips and lancets    carvedilol (COREG) 25 MG tablet Take 1 tablet (25 mg total) by mouth 2 (two) times daily. .5-1 twice daily or as directed    diphenhydrAMINE (BENADRYL) 25 mg capsule Take 25 mg by mouth nightly as needed for Itching.    ezetimibe (ZETIA) 10 mg tablet Take 1 tablet (10 mg total) by mouth once daily.    FOLIC ACID/MULTIVITS-MIN (MULTIVITAMIN FOR CHOLESTEROL ORAL) Take 1 tablet by mouth once daily.     furosemide (LASIX) 40 MG tablet TAKE 1 TABLET BY MOUTH ONCE DAILY    insulin aspart (NOVOLOG FLEXPEN) 100 unit/mL InPn pen Inject 10 Units into the skin 3 (three) times daily with meals.    insulin glargine (LANTUS SOLOSTAR) 100 unit/mL (3 mL) InPn pen Inject 35 Units into the skin every evening.    insulin needles, disposable, (BD INSULIN PEN NEEDLE UF ORIG) 29 x 1/2 " Ndle USE TWICE DAILY AS DIRECTED    INVOKANA 300 mg Tab tablet TAKE 1 TABLET BY MOUTH ONCE DAILY.    lancets (ONE TOUCH DELICA LANCETS) 33 gauge Misc Use 3 times Daily.    metformin (GLUCOPHAGE) 1000 MG tablet TAKE 1 TABLET (1,000 MG " "TOTAL) BY MOUTH 2 (TWO) TIMES DAILY.    omega-3 acid ethyl esters (LOVAZA) 1 gram capsule TAKE 4 CAPSULES BY MOUTH DAILY OR AS DIRECTED.    oxycodone (OXYCONTIN) 10 mg 12 hr tablet Take 1 tablet (10 mg total) by mouth every 12 (twelve) hours as needed for Pain.    oxycodone-acetaminophen (PERCOCET)  mg per tablet Take 1 tablet by mouth every 4-6 hours as needed for pain. Take stool softener with this medication.    promethazine (PHENERGAN) 25 MG tablet Take 1 tablet (25 mg total) by mouth every 6 (six) hours as needed for Nausea.    ramipril (ALTACE) 10 MG capsule Take 1 capsule (10 mg total) by mouth once daily.    tramadol (ULTRAM) 50 mg tablet Take 1-2 tablets ( mg total) by mouth every 6 (six) hours as needed (surgical pain).     No current facility-administered medications for this visit.      Facility-Administered Medications Ordered in Other Visits   Medication    triamcinolone acetonide injection 40 mg     Review of patient's allergies indicates:   Allergen Reactions    No known drug allergies        Precautions: s/p R RTC Repair; Biceps Tenodesis; Subacromial Decompression; and Distal Clavicle Excision    Evaluation Date: 10/09/2017  Authorization period: 12/31/2017  Plan of care expiration: 12/29/2017  Visit # authorized: 1/30    Subjective     History of Present Condition: Jm is a 62 y.o. male that presents to [facility] secondary to s/p R shoulder RTC repair with biceps tenodesis on 10/5/2017. Pt reports pain in R shoulder has been going on since October of 2016. 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 Pain really increased around October of last year. Pt reports having several injections in his R shoulder - "1st round worked, 2nd round only lasted about 2 days." Had MRI after 2nd shot and they saw a RTC tear. On 10/4/2017 pt underwent R RTC repair surgery with Dr. Ventura. Pt states he has been wearing " "the shoulder sling consistently and has been compliant with post-op precautions. Pt's wife reports the pt has not been doing the exercises prescribed at home (i.e.  squeezes, shoulder shrugs, scapular retractions, etc.) Pt has been using Game Ready Ice machine at home and has a TENS unit, but has not utilized the machine yet.     Overall pt has been tolerating the pain moderately well except reports he had an increase in "R shoulder pain last Wednesday once the block had worn off". Pt reports the only pain medication that helped was the Oxycodone 20 mg - all other pain medication "did not help".  Pt's wife report they will be going to [employer] at the end of October and will be taking their grand-kids to [place] the first week of November; wife expressed concern over patient's ability to attend trip.    PMH: CABG; L Hip Replacement    Onset Date: R shoulder pain in 10/2016; Post-op Pain on 10/5/2017  Date of Surgery: 10/5/2017    Pain Location: R shoulder  Pain Description: Deep Constant Aching; occasional sharp pain  Current Pain: 6/10  Least Pain: 2/10 - on medication or at sleep    Worst Pain: 10/10  Aggravating Factors: Lying down at night, trouble sleeping  Relieving Factors: Ice, stopped taking the Oxycodone although helped take pain away; percocet daily for pain, and Ultram for "break through pain"    Diagnostic Tests: Pre-op MRI 7/28/2017 , "Complete tear of the supraspinatus, infraspinatus and subscapularis with retraction as noted above. No significant muscular atrophy. Subluxation of the biceps tendon come intra-articular portion with severe tendinosis/interstitial tear"  Prior Therapy: No    Occupation: Owns a Construction Company  Job Status: Full time - however right now is not working  Job Duties: Walking around job sites; actively done     Sports/Recreational Activities: None at this time  Extremity Dominance: R hand   Home Environment: Pt lives with wife home     Prior Level of Function: " "Independent  Functional ADL Deficits Leading to Referral/Nature of Injury: Sleeping, Getting Dressed, Showering, Cooking   Patient Therapy Goals: Pt would like to decrease pain and increase function so that he can return to getting back to work pain free as well as pain-free completion of all normal daily activities.   Cultural/Environmental/Spiritual Barriers to Treatment or Learning: None      Objective     Observation/Posture: forward head posture; RUE in sling     Shoulder Passive Range of Motion (measured in degrees; * denotes pain):   Shoulder Left Right   Flexion (180) 175 50 * pain    Abduction (180) 178    ER (90) WNL N/A POD 5   IR (70) WNL N/A POD 5       Strength Assessment: (grading 1-5 out of 5; * denotes pain)   Myotome Right UE Left UE   C4: Shoulder Elevation 3/5 5/5   C5: Shoulder Abduction N/A 5/5          Shoulder Flexion: N/A 4+/5          Shoulder ER: N/A 4+/5          Shoulder IR: N/A 4+/5   C6: Elbow Flexion N/A 5/5   C7: Elbow Extension N/A 5/5   C6: Wrist Extension 3/5 5/5   C7: Wrist Flexion 3/5 5/5   C8: Finger Flexion 3/5 5/5          Finger Extension: 3/5 5/5         Gross Assessment: 75% of WNL WNL     NOTE: RUE MMT not formally assessed as the pt is POD 5 of massive RTC repair       Sensation: Dermatomes: Intact to light touch in BUE     Integumentary: Scar incisions appear closed with no drainage; All but the most superior incision have sterry-strips in place; biceps tendon insertion has cotton stuck in the incision in addition to a sterry-strip. Pt's wife reported "this is how it has looked since we took the initial bandages off since surgery." Post-op bruising noted in the R shoulder and R axilla as expected.     Flexibility: Not formally assessed as the pt is POD 5 of massive RTC repair.    Functional Status Measures:    Intake Score     Pts Physical FS Primary Measure      4                          Risk Adjustment Statistical FOTO     29        PT reviewed FOTO scores for " "Jm ONEAL Adrienne on 10/09/2017.   FOTO scores were entered into [employer] - see media section.    PT Evaluation Completed? Yes  Discussed Plan of Care with patient: Yes    TREATMENT:  Therapeutic exercise: Jm received therapeutic exercises to develop strength and endurance, flexibility for 15 minutes including:  · Shoulder Shrugs: 1x20  · Scapular Retractions: 1x20  ·  Squeezes: 1x15x3" hold  · AROM R wrist extension/flexion: 1x20   · Pendulums: 3x30 sec - n/p today    Manual therapy: Jm  received the following manual therapy techniques x 4 min. To include soft tissue and joint mobilization applied to the R shoulder:  · PROM to the R shoulder from 0-45 degrees in pain free R shoulder ROM      Modalities: Pt. received cold pack x 10 min. To R shoulder following treatment. - n/p today HEP (GameReady Machine at home)    Pt. education:   · Pt education on the importance of keeping the entire RUE relaxed when performing therex at home - pt exhibited improved form and reported no increase in pain with exercises when he was able to do so.   · Pt ed on utilizing ice (GameReady Machine) at home as well as using TENS unit prescribed for home for pain mamagement. Pt ed on not using the TENS immediately after icing the shoulder as it adjusts the patient's sensation; pt also instructed to avoid placing tens unit pads on top or too close to the incisions. Pt & pt wife with verbal agreement and understanding.   · Instructed pt. regarding: Posture reeducation, body mechanics, activity modification/avoidance, POC/goals/outcomes, and proper technique with all exercises.  · Jm lisa good understanding and return demonstration of the education provided.   · No cultural, environmental, or spiritual barriers identified to treatment or learning.    No cultural, environmental, or spiritual barriers identified to treatment or learning.      Patient History Examination Clinical Presentation Clinical Decision Making "   Comorbidities:  CAD; Arthritis; HTN    Personal Factors:  None       Activity and Participation Restriction:  Mobility  General tasks and demands    Body Systems:  Musculoskeletal    Body Regions:  Upper Extremity -   Shoulder Stable and uncomplicated   Low      Medical necessity is demonstrated by the following IMPAIRMENTS/PROBLEM LIST:   1) Function is limited by pain   2) Posture dysfunction   3) RC/jo ann-scapular/serratus anterior weakness   4) Pectoralis major/minor tightness   5) Decreased shoulder A/PROM   6) Lack of HEP    GOALS:   Short Term Goals:  6 weeks  1. Pt. to report decreased R shoulder pain </= 6/10 at worst to increase tolerance for progressed therapy protocol for s/p RTC repair.  2. Pt. to demonstrate proper cervical and scapula retraction requiring min. to no verbal cues from PT  3. Pt. to demonstrate an increase in sh. elevation PROM to 90 deg. to promote greater ease with reaching tasks.  4. Pt to be compliant with post-op protocol and tolerate HEP to improve ROM and independence with ADL's    Long Term Goals: 12 weeks  1. Pt. to report decreased R shoulder pain </= 4/10 at worst to increase tolerance for progressed therapy protocol for s/p RTC repair.  2. Pt. to demonstrate proper cervical and scapula retraction requiring no verbal cues from PT  2. Pt. to increase R shoulder PROM ER/IR from 0 to 20 deg in order to improve ability to perform ADLs.  3. Pt. to demonstrate an increase in sh. elevation PROM to 90 deg. to promote greater ease with reaching tasks.  4. Pt to be compliant with post-op protocol and tolerate HEP to improve ROM and independence with ADL's  5. Pt to be Independent with HEP to improve ROM and independence with ADL's    Assessment     This is a 62 y.o. male referred to outpatient physical therapy who presents with a medical PT diagnosis of acute post-operative pain of the right shoulder demonstrating joint dysfunction and functional limitation as described above. Level of  complexity is Low;  based on patient's past medical history including the above co-morbidities and personal factors; functional limitations, and clinical presentation directly impacting his/her plan of care.     The following goals and POC were discussed with the patient and patient is in agreement with them as to be addressed in the treatment plan. Pt was given a HEP consisting of scapular retractions, Shoulder shrugs, AROM wrist extension/flexion,  squeezes as presrcibed by the physician post-op. Pt and pt wife verbally understood the instructions as they were given and demonstrated proper form and technique during therapy. Pt was advised to perform these exercises free of pain, and to stop performing them if pain occurs. Pt will benefit from physical therapy services in order to maximize pain free and/or functional use of right shoulder.       Plan     Pt will be treated by physical therapy 1-3 times a week for 12 weeks for Pt education, HEP, therapeutic exercises, neuromuscular re-education, soft tissue and joint mobilizations, modalities prn to achieve established goals. Jm may at times be seen by a PTA as part of the Rehab Team.     Cont PT for 12 weeks.     Jazmin Louise, PT, DPT  10/09/2017    I certify the need for these services furnished under this plan of treatment and while under my care.    ______________________________ Physician/Referring Practitioner  Date of Signature

## 2017-10-16 ENCOUNTER — OFFICE VISIT (OUTPATIENT)
Dept: SPORTS MEDICINE | Facility: CLINIC | Age: 62
End: 2017-10-16
Payer: COMMERCIAL

## 2017-10-16 VITALS
SYSTOLIC BLOOD PRESSURE: 138 MMHG | HEART RATE: 59 BPM | BODY MASS INDEX: 31.07 KG/M2 | DIASTOLIC BLOOD PRESSURE: 73 MMHG | WEIGHT: 205 LBS | HEIGHT: 68 IN

## 2017-10-16 DIAGNOSIS — G89.18 ACUTE POST-OPERATIVE PAIN: Primary | ICD-10-CM

## 2017-10-16 DIAGNOSIS — M25.511 CHRONIC RIGHT SHOULDER PAIN: ICD-10-CM

## 2017-10-16 DIAGNOSIS — G89.29 CHRONIC RIGHT SHOULDER PAIN: ICD-10-CM

## 2017-10-16 PROCEDURE — 99024 POSTOP FOLLOW-UP VISIT: CPT | Mod: S$GLB,,, | Performed by: PHYSICIAN ASSISTANT

## 2017-10-16 PROCEDURE — 99999 PR PBB SHADOW E&M-EST. PATIENT-LVL IV: CPT | Mod: PBBFAC,,, | Performed by: PHYSICIAN ASSISTANT

## 2017-10-16 RX ORDER — OXYCODONE AND ACETAMINOPHEN 10; 325 MG/1; MG/1
TABLET ORAL
Qty: 30 TABLET | Refills: 0 | Status: SHIPPED | OUTPATIENT
Start: 2017-10-16 | End: 2018-05-15

## 2017-10-16 NOTE — PROGRESS NOTES
"CC: Right shoulder post op 2 weeks    DATE OF SURGERY:  10/4/17      PROCEDURE:   1. Right shoulder arthroscopic rotator cuff repair (massive / complex).   2. Right shoulder open subpectoral biceps tenodesis  3. Right shoulder arthroscopic distal clavicle excision  4. Right shoulder arthroscopic subacromial decompression.   5. Right shoulder arthroscopic debridement labrum     SURGEON: Johnny Ventura M.D.       Pain well tolerated on pain medication only nightly  Sling in place with pillow  No issues reported    Review of Systems   Constitution: Negative. Negative for chills, fever and night sweats.    Cardiovascular: Negative for chest pain and syncope.   Respiratory: Negative for cough and shortness of breath.    Gastrointestinal: Negative for abdominal pain and bowel incontinence.    PE:    /73   Pulse (!) 59   Ht 5' 8" (1.727 m)   Wt 93 kg (205 lb)   BMI 31.17 kg/m²      Incision clean/dry/intact  No sign of infection  Swelling mild and resolving  Compartments soft  Neurovascular status intact in extremity  Steris removed  -wenceslao's sign and no swelling of the bilateral lower legs.     Assessment:  Appropriate rotator cuff surgery recovery at 2 weeks    Plan:  1. Begin PT at this point; HEP demonstrated including pendulum exercises and elbow/wrist ROM  2. Pain medication as needed for PT; wean off narcotic use  3. Continue Sling and pillow. Advised patient not to  or operate heavy machinery for another 6 weeks. Educated him on the risks of doing so.   4. Return to clinic in 4 weeks for 6 week post op follow up    All patients questions were answered. Patient was advised to call us with any concerns or questions.     "

## 2017-10-17 ENCOUNTER — TELEPHONE (OUTPATIENT)
Dept: INTERNAL MEDICINE | Facility: CLINIC | Age: 62
End: 2017-10-17

## 2017-10-17 NOTE — TELEPHONE ENCOUNTER
----- Message from Maday Lyle sent at 10/17/2017  4:47 PM CDT -----  Contact: pt   Pt called to update Dr. Martin on a procedure he had.pt is constipated from pain meds the doctor gave him and wanted if he could call him in something     Pt can be contacted at [phone]

## 2017-10-17 NOTE — TELEPHONE ENCOUNTER
Called pt to get more information, left vm to give office a call.        Pt called to update Dr. Martin on a procedure he had.pt is constipated from pain meds the doctor gave him and wanted if he could call him in something

## 2017-10-18 ENCOUNTER — CLINICAL SUPPORT (OUTPATIENT)
Dept: REHABILITATION | Facility: HOSPITAL | Age: 62
End: 2017-10-18
Attending: PHYSICIAN ASSISTANT
Payer: COMMERCIAL

## 2017-10-18 DIAGNOSIS — M25.611 DECREASED RIGHT SHOULDER RANGE OF MOTION: ICD-10-CM

## 2017-10-18 DIAGNOSIS — M25.511 ACUTE POSTOPERATIVE PAIN OF RIGHT SHOULDER: Primary | ICD-10-CM

## 2017-10-18 DIAGNOSIS — R29.898 WEAKNESS OF SHOULDER: ICD-10-CM

## 2017-10-18 DIAGNOSIS — G89.18 ACUTE POSTOPERATIVE PAIN OF RIGHT SHOULDER: Primary | ICD-10-CM

## 2017-10-18 PROCEDURE — 97110 THERAPEUTIC EXERCISES: CPT

## 2017-10-18 PROCEDURE — 97140 MANUAL THERAPY 1/> REGIONS: CPT

## 2017-10-18 NOTE — TELEPHONE ENCOUNTER
Spoke to pt wife, she stated that he had surgery on his shoulder and was given percocet for the pain which caused constipation. She gave him a enema which didn't work at first but he eventually had a bowel movement. I advised you said to take mirian lax otc but she said it didn't work. She will give the office a call

## 2017-10-18 NOTE — PROGRESS NOTES
Physical Therapy Progress Note     Name: Jm Deleon  Clinic Number: [id number]  Diagnosis:   Encounter Diagnoses   Name Primary?    Acute postoperative pain of right shoulder Yes    Decreased right shoulder range of motion     Weakness of shoulder      Physician: Dallas Deshpande III, *  Treatment Orders: PT Eval and Treat  Past Medical History:   Diagnosis Date    Allergy     seasonal    Arthritis     Coronary artery disease     Diabetes mellitus     Hyperlipidemia     Hypertension     Joint pain     Squamous cell carcinoma        Precautions: s/p R RTC Repair; Biceps Tenodesis; Subacromial Decompression; and Distal Clavicle Excision     Evaluation Date: 10/09/2017  Authorization period: 12/31/2017  Plan of care expiration: 12/29/2017  Visit # authorized: 2/30  Referring Provider: Dallas Deshpande III, *    Subjective     Pt reports: Pt reports R shoulder is feeling better overall with decreased pain. Pt reports sleeping with less pain. Compliance reported with HEP at home. Pt using ice and TENS unit for pain relief - no issues reported with it.     Pain Scale: before treatment: 2 currently; after treatment: 2    Objective     Objective Findings:   · Pt exhibits decreased mm guarding today as well as improved ability to fully relax muscles during PROM exercises  · PROM R Shoulder Elevation: 90 degrees with no increase in pain/symptoms    RTC Massive Repair Protocol:    Sling  ×  8 Weeks (SLEEP INCLUDED) - AVOID Shoulder IR and Ext               Diagnosis:       Rotator Cuff Repair (Large/Massive)               Post-OP Week 1-7: Begin:        Goals:  1. Minimize pain and inflammation      2. Protect repair, allow healing               Exercises: 1. Pendulums (NO active movement), shoulder shrugs      2. Elbow, wrist and hand AROM, ball squeezes      3. PROM forward elevation in scapular plane (supine to 90°) at physical therapy                                                                  (1 visit per week)       Patient received individual therapy to increase strength, endurance, ROM and flexibility with 0 patients with activities as follows:     Therapeutic Exercise: Jm received therapeutic exercises to develop strength, endurance, ROM and flexibility for 33 minutes including:   · Shoulder Shrugs: 1x20  · Scapular Retractions: 1x20  · Yellow Putty  Squeezes: 2 min  · AROM R wrist extension/flexion: 2x15   · Pendulums vertical: 3x30 sec  · Pendulums horizontal: 3x30 sec    · Cold Pack to the R shoulder x 10 minutes    Manual Therapy: Jm received the following manual therapy techniques: Joint mobilizations and Soft tissue Mobilization were applied to the R shoulder for 22 minutes. Manual techniques include the following:  Joint mobilization:   · PROM to 90 deg - shoulder elevation   · PROM Elbow extension stretch  · PROM Elbow flexion/extension  · PROM Wrist flex/ext stretch    Written Home Exercises Provided:   · HEP Exercises added this visit: Pendulums vertical/horizontal (Hep2Go Code = 9MZNVBE)  · Updated HEP includes the following: scapular retractions, Shoulder shrugs, AROM wrist extension/flexion,  squeezes, Pendulums vertical/horizontal (Hep2Go Code = 9MZNVBE)  · Pt demo good understanding of the education provided. Jm demonstrated good return demonstration of activities.     Pt. Education:  · Posture reeducation: cervical/scapula retraction, body mechanics, HEP, importance of compliance; and activity modification/avoidance   · No spiritual or educational barriers to learning provided  · Pt has no cultural, educational or language barriers to learning provided.    Assessment     Pt tolerated treatment very well today. Incisions are healing well; sterry strips have fallen off. Pt robbie PROM to 90 deg of shoulder elevation well with no increase in symptoms. Pt exhibited significantly improved ability to relax his UE mm during PROM and  Pendulums. Pt exhibited good form with Pendulums today reporting no increase in pain. PT to progress pt continue with protocol progression as prescribed. Pt will continue to benefit from skilled outpatient physical therapy to address the remaining functional deficits, provide pt/family education, and to maximize pt's level of independence in the home and community environment.     Anticipated barriers to physical therapy: None    · Pt's spiritual, cultural and educational needs considered and pt agreeable to plan of care and goals as stated below:        Plan   Continue with established Plan of Care towards PT goals.

## 2017-10-25 ENCOUNTER — CLINICAL SUPPORT (OUTPATIENT)
Dept: REHABILITATION | Facility: HOSPITAL | Age: 62
End: 2017-10-25
Attending: PHYSICIAN ASSISTANT
Payer: COMMERCIAL

## 2017-10-25 DIAGNOSIS — M25.511 ACUTE POSTOPERATIVE PAIN OF RIGHT SHOULDER: Primary | ICD-10-CM

## 2017-10-25 DIAGNOSIS — M25.611 DECREASED RIGHT SHOULDER RANGE OF MOTION: ICD-10-CM

## 2017-10-25 DIAGNOSIS — G89.18 ACUTE POSTOPERATIVE PAIN OF RIGHT SHOULDER: Primary | ICD-10-CM

## 2017-10-25 DIAGNOSIS — R29.898 WEAKNESS OF SHOULDER: ICD-10-CM

## 2017-10-25 PROCEDURE — 97140 MANUAL THERAPY 1/> REGIONS: CPT

## 2017-10-25 PROCEDURE — 97110 THERAPEUTIC EXERCISES: CPT

## 2017-10-25 NOTE — PROGRESS NOTES
Physical Therapy Progress Note     Name: Jm Deleon  Clinic Number: [id number]  Diagnosis:   Encounter Diagnoses   Name Primary?    Acute postoperative pain of right shoulder Yes    Decreased right shoulder range of motion     Weakness of shoulder      Physician: Dallas Deshpande III, *  Treatment Orders: PT Eval and Treat  Past Medical History:   Diagnosis Date    Allergy     seasonal    Arthritis     Coronary artery disease     Diabetes mellitus     Hyperlipidemia     Hypertension     Joint pain     Squamous cell carcinoma        Precautions: s/p R RTC Repair; Biceps Tenodesis; Subacromial Decompression; and Distal Clavicle Excision     Evaluation Date: 10/09/2017  Authorization period: 12/31/2017  Plan of care expiration: 12/29/2017  Visit # authorized: 3/30  Referring Provider: Dallas Deshpande III, *    Subjective     Pt reports: Pt reports R shoulder had increased pain today when he was getting out of his truck that was about 3-4/10 in pain; however pain decreased with hot shower and exercises. Pt reports compliance with HEP and no symptoms with therex. Pt using ice and TENS unit for pain relief - no issues reported with it. Pt is not going to be able to attend therapy next week due to his son's wedding in [place]. Pt reports he will be compliant with HEP while gone.     Pain Scale: before treatment: 2 currently; after treatment: 0    Objective     Objective Findings:   · Pt exhibits decreased mm guarding today as well as improved ability to fully relax muscles during PROM exercises  · PROM R Shoulder Elevation: 90 degrees with no increase in pain/symptoms    RTC Massive Repair Protocol:    Sling  ×  8 Weeks (SLEEP INCLUDED) - AVOID Shoulder IR and Ext               Diagnosis:       Rotator Cuff Repair (Large/Massive)               Post-OP Week 1-7: Begin:        Goals:  1. Minimize pain and inflammation      2. Protect repair, allow healing        "        Exercises: 1. Pendulums (NO active movement), shoulder shrugs      2. Elbow, wrist and hand AROM, ball squeezes      3. PROM forward elevation in scapular plane (supine to 90°) at physical therapy                                                                 (1 visit per week)       Patient received individual therapy to increase strength, endurance, ROM and flexibility with 0 patients with activities as follows:     Therapeutic Exercise: Jm received therapeutic exercises to develop strength, endurance, ROM and flexibility for 32 minutes including:   · Shoulder Shrugs: 1x30  · Scapular Retractions: 1x30x3"   · Yellow Putty  Squeezes: 2 min  · AROM R wrist extension/flexion: 1x30   · Pendulums vertical: 2x1 min   · Pendulums horizontal: 2x1 min    · Cold Pack to the R shoulder x 10 minutes    Manual Therapy: Jm received the following manual therapy techniques: Joint mobilizations and Soft tissue Mobilization were applied to the R shoulder for 20 minutes. Manual techniques include the following:  Joint mobilization:   · PROM to 90 deg - shoulder elevation   · PROM Elbow extension stretch  · PROM Elbow flexion/extension  · PROM Wrist flex/ext stretch     Written Home Exercises Provided:   · HEP Exercises added this visit: None  · Updated HEP includes the following: scapular retractions, Shoulder shrugs, AROM wrist extension/flexion,  squeezes, Pendulums vertical/horizontal (Hep2Go Code = 9MZNVBE)  · Pt demo good understanding of the education provided. Jm demonstrated good return demonstration of activities.     Pt. Education:  · Posture reeducation: cervical/scapula retraction, body mechanics, HEP, importance of compliance; and activity modification/avoidance   · No spiritual or educational barriers to learning provided  · Pt has no cultural, educational or language barriers to learning provided.    Assessment     Pt tolerated manual therapy well today; slight increase in difficulty " maintaining relaxed posture during manual but overall did well keeping the RTC mm relaxed. Pt has been doing well with therex and is progressing well overall. All incisions are healing well and have fully closed except for the distal biceps insertion still has some scabbing over it. PT to progress pt continue with protocol progression as prescribed. Pt will continue to benefit from skilled outpatient physical therapy to address the remaining functional deficits, provide pt/family education, and to maximize pt's level of independence in the home and community environment.     Anticipated barriers to physical therapy: None    · Pt's spiritual, cultural and educational needs considered and pt agreeable to plan of care and goals as stated below:        Plan   Continue with established Plan of Care towards PT goals.

## 2017-11-08 ENCOUNTER — CLINICAL SUPPORT (OUTPATIENT)
Dept: REHABILITATION | Facility: HOSPITAL | Age: 62
End: 2017-11-08
Attending: PHYSICIAN ASSISTANT
Payer: COMMERCIAL

## 2017-11-08 DIAGNOSIS — M25.511 ACUTE POSTOPERATIVE PAIN OF RIGHT SHOULDER: Primary | ICD-10-CM

## 2017-11-08 DIAGNOSIS — G89.18 ACUTE POSTOPERATIVE PAIN OF RIGHT SHOULDER: Primary | ICD-10-CM

## 2017-11-08 DIAGNOSIS — M25.611 DECREASED RIGHT SHOULDER RANGE OF MOTION: ICD-10-CM

## 2017-11-08 DIAGNOSIS — R29.898 WEAKNESS OF SHOULDER: ICD-10-CM

## 2017-11-08 PROCEDURE — 97140 MANUAL THERAPY 1/> REGIONS: CPT

## 2017-11-08 PROCEDURE — 97110 THERAPEUTIC EXERCISES: CPT

## 2017-11-08 NOTE — PROGRESS NOTES
Physical Therapy Progress Note     Name: Jm Deleon  Clinic Number: [id number]  Diagnosis:   Encounter Diagnoses   Name Primary?    Decreased right shoulder range of motion     Weakness of shoulder     Acute postoperative pain of right shoulder Yes     Physician: Dallas Deshpande III, *  Treatment Orders: PT Eval and Treat  Past Medical History:   Diagnosis Date    Allergy     seasonal    Arthritis     Coronary artery disease     Diabetes mellitus     Hyperlipidemia     Hypertension     Joint pain     Squamous cell carcinoma        Precautions: s/p R RTC Repair; Biceps Tenodesis; Subacromial Decompression; and Distal Clavicle Excision     Evaluation Date: 10/09/2017  Authorization period: 12/31/2017  Plan of care expiration: 12/29/2017  Visit # authorized: 4/30  Referring Provider: Dallas Deshpande III, *    Subjective     Pt reports: Pt reports taking a week break for his sons wedding - pt reports minimal to no symptoms and that he was able to do some of his exercises at home. Pt did report that prior to leaving for his trip he fell at work while stooping down - landing directly onto his R post-op shoulder. Pt reported significant increase in pain that day, however after several hours - pain dissipated. Pt reports since then he has had minimal pain and feels that his ROM (within precautions) has improved greatly. Pt expressed concern with insurance coverage after this month as he and his wife are going through job related changes as well as insurance changes.     Pain Scale: before treatment: 0 currently; after treatment: 0    Objective     Objective Findings:   · Pt exhibits decreased mm guarding today as well as improved ability to fully relax muscles during PROM exercises  · PROM R Shoulder Elevation: 90 degrees with no increase in pain/symptoms; less mm guarding noted    RTC Massive Repair Protocol:    Sling  ×  8 Weeks (SLEEP INCLUDED) - AVOID  "Shoulder IR and Ext               Diagnosis:       Rotator Cuff Repair (Large/Massive)               Post-OP Week 1-7: Begin:        Goals:  1. Minimize pain and inflammation      2. Protect repair, allow healing               Exercises: 1. Pendulums (NO active movement), shoulder shrugs      2. Elbow, wrist and hand AROM, ball squeezes      3. PROM forward elevation in scapular plane (supine to 90°) at physical therapy                                                                 (1 visit per week)       Patient received individual therapy to increase strength, endurance, ROM and flexibility with activities as follows:     Therapeutic Exercise: Jm received therapeutic exercises to develop strength, endurance, ROM and flexibility for 33 minutes including:   · Shoulder Shrugs: 1x30  · Scapular Retractions: 1x40x3"   · Yellow Putty  Squeezes: 3 min  · AROM R wrist extension/flexion 1#: 1x30   · AROM R wrist ulnar/radial deviation 1#: 1x30  · Pendulums vertical: 2x1 min   · Pendulums horizontal: 2x1 min    · Cold Pack to the R shoulder x 10 minutes    Manual Therapy: Jm received the following manual therapy techniques: Joint mobilizations and Soft tissue Mobilization were applied to the R shoulder for 22 minutes. Manual techniques include the following:  Joint mobilization:   · PROM to 90 deg - shoulder elevation   · PROM Elbow extension stretch  · PROM Elbow flexion/extension  · PROM Wrist flex/ext stretch     Written Home Exercises Provided:   · HEP Exercises added this visit: None  · Updated HEP includes the following: scapular retractions, Shoulder shrugs, AROM wrist extension/flexion,  squeezes, Pendulums vertical/horizontal (Hep2Go Code = 9MZNVBE)  · Pt demo good understanding of the education provided. Jm demonstrated good return demonstration of activities.     Pt. Education:  · Posture reeducation: cervical/scapula retraction, body mechanics, HEP, importance of compliance; and activity " modification/avoidance   · No spiritual or educational barriers to learning provided  · Pt has no cultural, educational or language barriers to learning provided.    Assessment     Pt robbie therapy well today with minimal to no symptoms. PROM is much smoother with minimal mm guarding. Pt educated on the importance of safety at work and be careful of environments with increased fall risks. PT to progress pt continue with protocol progression as prescribed. Pt will continue to benefit from skilled outpatient physical therapy to address the remaining functional deficits, provide pt/family education, and to maximize pt's level of independence in the home and community environment.     Anticipated barriers to physical therapy: None    · Pt's spiritual, cultural and educational needs considered and pt agreeable to plan of care and goals as stated below:        Plan   Continue with established Plan of Care towards PT goals.

## 2017-11-14 ENCOUNTER — OFFICE VISIT (OUTPATIENT)
Dept: SPORTS MEDICINE | Facility: CLINIC | Age: 62
End: 2017-11-14
Payer: COMMERCIAL

## 2017-11-14 VITALS
SYSTOLIC BLOOD PRESSURE: 103 MMHG | BODY MASS INDEX: 31.07 KG/M2 | WEIGHT: 205 LBS | HEART RATE: 72 BPM | DIASTOLIC BLOOD PRESSURE: 60 MMHG | HEIGHT: 68 IN

## 2017-11-14 DIAGNOSIS — Z98.890 S/P ROTATOR CUFF REPAIR: Primary | ICD-10-CM

## 2017-11-14 DIAGNOSIS — M25.511 CHRONIC RIGHT SHOULDER PAIN: ICD-10-CM

## 2017-11-14 DIAGNOSIS — G89.29 CHRONIC RIGHT SHOULDER PAIN: ICD-10-CM

## 2017-11-14 PROCEDURE — 99024 POSTOP FOLLOW-UP VISIT: CPT | Mod: S$GLB,,, | Performed by: ORTHOPAEDIC SURGERY

## 2017-11-14 PROCEDURE — 99999 PR PBB SHADOW E&M-EST. PATIENT-LVL III: CPT | Mod: PBBFAC,,, | Performed by: ORTHOPAEDIC SURGERY

## 2017-11-14 RX ORDER — TRAMADOL HYDROCHLORIDE 50 MG/1
50 TABLET ORAL EVERY 8 HOURS PRN
Qty: 30 TABLET | Refills: 0 | Status: SHIPPED | OUTPATIENT
Start: 2017-11-14 | End: 2018-10-26

## 2017-11-14 NOTE — PROGRESS NOTES
"CC: Hannah shoulder post op 5 weeks    DATE OF SURGERY:  10/4/17      PREOPERATIVE DIAGNOSES:   1. Right shoulder rotator cuff tear (massive).   2. Right shoulder AC joint arthritis  3. Right shoulder labral tear / biceps tendinopathy     POSTOPERATIVE DIAGNOSES:   1. Right shoulder rotator cuff tear (massive).   2. Right shoulder AC joint arthritis  3. Right shoulder labral tear / biceps tendinopathy     PROCEDURE:   1. Right shoulder arthroscopic rotator cuff repair (massive / complex).   2. Right shoulder open subpectoral biceps tenodesis  3. Right shoulder arthroscopic distal clavicle excision  4. Right shoulder arthroscopic subacromial decompression.   5. Right shoulder arthroscopic debridement labrum     SURGEON: Johnny Ventura M.D.      **Of note, because of the massive nature of the posterior L-shaped tear of the supra and infra-spinatus tendons, with the need for releases and convergence suturing, twice the normal time and resources were used for the case    Pain well tolerated on pain medication  Sling in place  He reports falling   Physical therapy at [facility] 2 x week    Review of Systems   Constitution: Negative. Negative for chills, fever and night sweats.    Cardiovascular: Negative for chest pain and syncope.   Respiratory: Negative for cough and shortness of breath.   Gastrointestinal: Negative for abdominal pain and bowel incontinence.     PE:  Vitals:    11/14/17 1110   BP: 103/60   Pulse: 72   Weight: 93 kg (205 lb)   Height: 5' 8" (1.727 m)   PainSc: 0-No pain       Right shoulder  Incision healed  No sign of infection  Swelling resolved  Compartments soft  Neurovascular status intact in extremity    AROM  Forward elevation 90 degrees  External rotation 60 degrees    Assessment:  Appropriate rotator cuff surgery recovery at 5 weeks    Plan:  1. Continue PT at this point, focusing on ROM and begin active assisted and active range of motion  2. Pain medication as needed for PT; try to wean off for " next visit  3. Refill Tramadol  4. Wean out of sling  5. Return to clinic in 6 weeks for 3 months post op follow up

## 2017-11-15 ENCOUNTER — CLINICAL SUPPORT (OUTPATIENT)
Dept: REHABILITATION | Facility: HOSPITAL | Age: 62
End: 2017-11-15
Attending: PHYSICIAN ASSISTANT
Payer: COMMERCIAL

## 2017-11-15 DIAGNOSIS — G89.18 ACUTE POSTOPERATIVE PAIN OF RIGHT SHOULDER: Primary | ICD-10-CM

## 2017-11-15 DIAGNOSIS — R29.898 WEAKNESS OF SHOULDER: ICD-10-CM

## 2017-11-15 DIAGNOSIS — M25.511 ACUTE POSTOPERATIVE PAIN OF RIGHT SHOULDER: Primary | ICD-10-CM

## 2017-11-15 DIAGNOSIS — M25.611 DECREASED RIGHT SHOULDER RANGE OF MOTION: ICD-10-CM

## 2017-11-15 PROCEDURE — 97140 MANUAL THERAPY 1/> REGIONS: CPT

## 2017-11-15 PROCEDURE — 97110 THERAPEUTIC EXERCISES: CPT

## 2017-11-15 NOTE — PROGRESS NOTES
Physical Therapy Progress Note     Name: Jm Deleon  Clinic Number: [id number]  Diagnosis:   Encounter Diagnoses   Name Primary?    Acute postoperative pain of right shoulder Yes    Decreased right shoulder range of motion     Weakness of shoulder      Physician: Dallas Deshpande III, *  Treatment Orders: PT Eval and Treat  Past Medical History:   Diagnosis Date    Allergy     seasonal    Arthritis     Coronary artery disease     Diabetes mellitus     Hyperlipidemia     Hypertension     Joint pain     Squamous cell carcinoma        Precautions: s/p R RTC Repair; Biceps Tenodesis; Subacromial Decompression; and Distal Clavicle Excision     Evaluation Date: 10/09/2017  Authorization period: 12/31/2017  Plan of care expiration: 12/29/2017  Visit # authorized: 5/30  Referring Provider: Dallas Deshpande III, *    Subjective     Pt reports: Pt reports follow-up visit at the doctor went well and that he was happy with his progress overall. Pt reports good compliance with HEP at home and is motivated to participate in therapy today.      Pain Scale: before treatment: 0 currently; after treatment: 0    Objective     Objective Findings:   · Pt exhibits decreased mm guarding today as well as improved ability to fully relax muscles during PROM exercises  · PROM R Shoulder Elevation: 90 degrees with no increase in pain/symptoms; less mm guarding noted    RTC Massive Repair Protocol:    Sling  ×  8 Weeks (SLEEP INCLUDED) - AVOID Shoulder IR and Ext               Diagnosis:       Rotator Cuff Repair (Large/Massive)               Post-OP Week 1-7: Begin:        Goals:  1. Minimize pain and inflammation      2. Protect repair, allow healing               Exercises: 1. Pendulums (NO active movement), shoulder shrugs      2. Elbow, wrist and hand AROM, ball squeezes      3. PROM forward elevation in scapular plane (supine to 90°) at physical therapy                       "                                           (1 visit per week)       Patient received individual therapy to increase strength, endurance, ROM and flexibility with activities as follows:     Therapeutic Exercise: Jm received therapeutic exercises to develop strength, endurance, ROM and flexibility for 38 minutes including:   · Shoulder Shrugs: 1x30  · Scapular Retractions: 1x40x3"   · Yellow Putty  Squeezes: 3 min  · AROM R wrist extension/flexion 1#: 1x30   · AROM R wrist ulnar/radial deviation 1#: 1x30  · Pendulums vertical: 2x1 min   · Pendulums horizontal: 2x1 min    · Cold Pack to the R shoulder x 10 minutes    Manual Therapy: Jm received the following manual therapy techniques: Joint mobilizations and Soft tissue Mobilization were applied to the R shoulder for 18 minutes. Manual techniques include the following:  Joint mobilization:   · PROM to 90 deg - shoulder elevation   · PROM Elbow extension stretch  · PROM Elbow flexion/extension  · PROM Wrist flex/ext stretch     Written Home Exercises Provided:   · HEP Exercises added this visit: None  · Updated HEP includes the following: scapular retractions, Shoulder shrugs, AROM wrist extension/flexion,  squeezes, Pendulums vertical/horizontal (Hep2Go Code = 9MZNVBE)  · Pt demo good understanding of the education provided. Jm demonstrated good return demonstration of activities.     Pt. Education:  · Posture reeducation: cervical/scapula retraction, body mechanics, HEP, importance of compliance; and activity modification/avoidance   · No spiritual or educational barriers to learning provided  · Pt has no cultural, educational or language barriers to learning provided.    Assessment     Pt continues to exhibit good ROM and robbie to therex. Pt exhibits good form with pendulums and is progressing well overall with therapy. PT to progress pt continue with protocol progression as prescribed. Pt will continue to benefit from skilled outpatient physical " therapy to address the remaining functional deficits, provide pt/family education, and to maximize pt's level of independence in the home and community environment.     Anticipated barriers to physical therapy: None    · Pt's spiritual, cultural and educational needs considered and pt agreeable to plan of care and goals as stated below:        Plan   Continue with established Plan of Care towards PT goals.

## 2017-11-29 ENCOUNTER — CLINICAL SUPPORT (OUTPATIENT)
Dept: REHABILITATION | Facility: HOSPITAL | Age: 62
End: 2017-11-29
Attending: PHYSICIAN ASSISTANT
Payer: COMMERCIAL

## 2017-11-29 DIAGNOSIS — M25.511 ACUTE POSTOPERATIVE PAIN OF RIGHT SHOULDER: Primary | ICD-10-CM

## 2017-11-29 DIAGNOSIS — G89.18 ACUTE POSTOPERATIVE PAIN OF RIGHT SHOULDER: Primary | ICD-10-CM

## 2017-11-29 DIAGNOSIS — M25.611 DECREASED RIGHT SHOULDER RANGE OF MOTION: ICD-10-CM

## 2017-11-29 DIAGNOSIS — R29.898 WEAKNESS OF SHOULDER: ICD-10-CM

## 2017-11-29 PROCEDURE — 97140 MANUAL THERAPY 1/> REGIONS: CPT

## 2017-11-29 PROCEDURE — 97110 THERAPEUTIC EXERCISES: CPT

## 2017-11-29 NOTE — PROGRESS NOTES
"                                                    Physical Therapy Discharge Note     Name: Jm Deleon  Clinic Number: [id number]  Diagnosis:   Encounter Diagnoses   Name Primary?    Acute postoperative pain of right shoulder Yes    Decreased right shoulder range of motion     Weakness of shoulder      Physician: Dallas Deshpande III, *  Treatment Orders: PT Eval and Treat  Past Medical History:   Diagnosis Date    Allergy     seasonal    Arthritis     Coronary artery disease     Diabetes mellitus     Hyperlipidemia     Hypertension     Joint pain     Squamous cell carcinoma        Initial visit: 10/09/2017  Date of Last visit: 11/29/2017  Date of Discharge Note: 11/29/2017  Total Visits Received: 6  Missed Visits: 1    SUBJECTIVE     Pt reports: Pt feels that he has done exceptionally well with therapy thus far post-op and feels that his ROM and strength have really improved. Pt reports he will be unable to attend therapy until February due to insurance coverage not taking effect until February 2018. Overall pt is doing well and is to be discharged from therapy today until he can return in February.    Pain Scale: before treatment: 0 currently; after treatment: 0      OBJECTIVE     Objective Findings:   · Pt exhibits decreased mm guarding today as well as improved ability to fully relax muscles during PROM exercises  · PROM R Shoulder Elevation: 90 degrees with no increase in pain/symptoms; less mm guarding noted      TREATMENT:     Patient received individual therapy to increase strength, endurance, ROM and flexibility with activities as follows:     Therapeutic Exercise: Jm received therapeutic exercises to develop strength, endurance, ROM and flexibility for 40 minutes (15 direct minutes) including:   · AAROM with bar flexion: 1x15 pain free ROM  · AAROM with bar Er: 1x15 pain free ROM  · Shoulder Shrugs: 1x30  · Scapular Retractions: 1x40x3"   · Yellow Putty  Squeezes: 3 min  · AROM R " wrist extension/flexion 2#: 1x30   · AROM R wrist ulnar/radial deviation 2#: 1x30  · Pendulums vertical: 2x1 min   · Pendulums horizontal: 2x1 min    · Cold Pack to the R shoulder x 10 minutes    Manual Therapy: Jm received the following manual therapy techniques: Joint mobilizations and Soft tissue Mobilization were applied to the R shoulder for 18 minutes. Manual techniques include the following:  Joint mobilization:   · PROM to 100 deg - shoulder elevation - pain free  · PROM ER to 20 deg    · PROM Elbow extension stretch  · PROM Elbow flexion/extension  · PROM Wrist flex/ext stretch     Written Home Exercises Provided:   · HEP Exercises added this visit: None  · Updated HEP includes the following: scapular retractions, Shoulder shrugs, AROM wrist extension/flexion,  squeezes, Pendulums vertical/horizontal (Hep2Go Code = 9MZNVBE)  · Pt demo good understanding of the education provided. Jm demonstrated good return demonstration of activities.     Pt. Education:  · Posture reeducation: cervical/scapula retraction, body mechanics, HEP, importance of compliance; and activity modification/avoidance   · No spiritual or educational barriers to learning provided  · Pt has no cultural, educational or language barriers to learning provided.      ASSESSMENT     Pt has done exceptionally well in therapy meeting all of his short and long term goals. Pt is to be discharged from therapy at this time until he is able to return to therapy in February 2017.     Status Towards Goals:   Short Term Goals:  6 weeks  1. Pt. to report decreased R shoulder pain </= 6/10 at worst to increase tolerance for progressed therapy protocol for s/p RTC repair. GOAL MET  2. Pt. to demonstrate proper cervical and scapula retraction requiring min. to no verbal cues from PT  GOAL MET  3. Pt. to demonstrate an increase in sh. elevation PROM to 90 deg. to promote greater ease with reaching tasks.  GOAL MET  4. Pt to be compliant with post-op  protocol and tolerate HEP to improve ROM and independence with ADL's  GOAL MET     Long Term Goals: 12 weeks  1. Pt. to report decreased R shoulder pain </= 4/10 at worst to increase tolerance for progressed therapy protocol for s/p RTC repair. GOAL MET  2. Pt. to demonstrate proper cervical and scapula retraction requiring no verbal cues from PT  GOAL MET  2. Pt. to increase R shoulder PROM ER/IR from 0 to 20 deg in order to improve ability to perform ADLs.  GOAL MET  3. Pt. to demonstrate an increase in sh. elevation PROM to 90 deg. to promote greater ease with reaching tasks.  GOAL MET  4. Pt to be compliant with post-op protocol and tolerate HEP to improve ROM and independence with ADL's  GOAL MET  5. Pt to be Independent with HEP to improve ROM and independence with ADL's GOAL MET    Discharge reason : Pt has had a change of insurance and will not be able to return to therapy until February 2017    Functional Status Measures:    Intake Score   6th Visit Score  Pts Physical FS Primary Measure     4    59                           Risk Adjustment Statistical FOTO  29            PT reviewed FOTO scores for Jm Deleon on 11/29/2017.   FOTO scores were entered into [facility] - see media section.      PLAN   This patient is discharged from Physical Therapy Services.     Jazmin Louise, PT, DPT  11/29/2017

## 2017-12-11 RX ORDER — CANAGLIFLOZIN 300 MG/1
TABLET, FILM COATED ORAL
Qty: 30 TABLET | Refills: 12 | Status: SHIPPED | OUTPATIENT
Start: 2017-12-11 | End: 2018-02-28

## 2018-01-04 ENCOUNTER — OFFICE VISIT (OUTPATIENT)
Dept: SPORTS MEDICINE | Facility: CLINIC | Age: 63
End: 2018-01-04
Payer: COMMERCIAL

## 2018-01-04 VITALS
HEART RATE: 81 BPM | DIASTOLIC BLOOD PRESSURE: 86 MMHG | WEIGHT: 205 LBS | BODY MASS INDEX: 31.07 KG/M2 | SYSTOLIC BLOOD PRESSURE: 151 MMHG | HEIGHT: 68 IN

## 2018-01-04 DIAGNOSIS — M25.511 RIGHT SHOULDER PAIN: Primary | ICD-10-CM

## 2018-01-04 DIAGNOSIS — Z98.890 POST-OPERATIVE STATE: Primary | ICD-10-CM

## 2018-01-04 DIAGNOSIS — M62.838 MUSCLE SPASMS OF NECK: ICD-10-CM

## 2018-01-04 PROCEDURE — 99024 POSTOP FOLLOW-UP VISIT: CPT | Mod: S$GLB,,, | Performed by: ORTHOPAEDIC SURGERY

## 2018-01-04 PROCEDURE — 99999 PR PBB SHADOW E&M-EST. PATIENT-LVL III: CPT | Mod: PBBFAC,,, | Performed by: ORTHOPAEDIC SURGERY

## 2018-01-04 RX ORDER — CYCLOBENZAPRINE HCL 10 MG
10 TABLET ORAL 3 TIMES DAILY PRN
Qty: 30 TABLET | Refills: 0 | Status: SHIPPED | OUTPATIENT
Start: 2018-01-04 | End: 2018-01-14

## 2018-01-04 NOTE — PROGRESS NOTES
CC: Hannah shoulder post op 3 mo    DATE OF SURGERY:  10/4/17      PREOPERATIVE DIAGNOSES:   1. Right shoulder rotator cuff tear (massive).   2. Right shoulder AC joint arthritis  3. Right shoulder labral tear / biceps tendinopathy     POSTOPERATIVE DIAGNOSES:   1. Right shoulder rotator cuff tear (massive).   2. Right shoulder AC joint arthritis  3. Right shoulder labral tear / biceps tendinopathy     PROCEDURE:   1. Right shoulder arthroscopic rotator cuff repair (massive / complex).   2. Right shoulder open subpectoral biceps tenodesis  3. Right shoulder arthroscopic distal clavicle excision  4. Right shoulder arthroscopic subacromial decompression.   5. Right shoulder arthroscopic debridement labrum     SURGEON: Johnny Ventura M.D.      **Of note, because of the massive nature of the posterior L-shaped tear of the supra and infra-spinatus tendons, with the need for releases and convergence suturing, twice the normal time and resources were used for the case    Pain well tolerated on pain medication. More pain over last two days  Physical therapy at [facility] 2 x week    Review of Systems   Constitution: Negative. Negative for chills, fever and night sweats.    Cardiovascular: Negative for chest pain and syncope.   Respiratory: Negative for cough and shortness of breath.   Gastrointestinal: Negative for abdominal pain and bowel incontinence.     PAST MEDICAL HISTORY:   Past Medical History:   Diagnosis Date    Allergy     seasonal    Arthritis     Coronary artery disease     Diabetes mellitus     Hyperlipidemia     Hypertension     Joint pain     Squamous cell carcinoma      PAST SURGICAL HISTORY:   Past Surgical History:   Procedure Laterality Date    BELPHAROPTOSIS REPAIR  10 years ago    both eyes    CARDIAC SURGERY      3 vessel bypass    CORONARY ARTERY BYPASS GRAFT  x4 age 45 2000    HERNIA REPAIR      HIP SURGERY  9-24-14    left YANICK    JOINT REPLACEMENT      left hip    SHOULDER ARTHROSCOPY       SHOULDER SURGERY       FAMILY HISTORY:   Family History   Problem Relation Age of Onset    Coronary artery disease Mother     Cancer Father      lung cancer, smoker    Hypertension Brother     Cancer Brother      colon    Amblyopia Neg Hx     Blindness Neg Hx     Cataracts Neg Hx     Glaucoma Neg Hx     Macular degeneration Neg Hx     Retinal detachment Neg Hx     Strabismus Neg Hx     Melanoma Neg Hx      SOCIAL HISTORY:   Social History     Social History    Marital status:      Spouse name: N/A    Number of children: N/A    Years of education: N/A     Occupational History    Not on file.     Social History Main Topics    Smoking status: Never Smoker    Smokeless tobacco: Not on file    Alcohol use Not on file    Drug use: Unknown    Sexual activity: Not on file     Other Topics Concern    Not on file     Social History Narrative    No narrative on file       MEDICATIONS:   Current Outpatient Prescriptions:     aspirin (ECOTRIN) 325 MG EC tablet, Take 1 tablet (325 mg total) by mouth every 12 (twelve) hours. (Patient taking differently: Take 81 mg by mouth every 12 (twelve) hours. ), Disp: 42 tablet, Rfl: 0    atorvastatin (LIPITOR) 80 MG tablet, TAKE 1 TABLET (80 MG TOTAL) BY MOUTH ONCE DAILY., Disp: 90 tablet, Rfl: 3    blood sugar diagnostic (ONE TOUCH VERIO) Strp, Patient tests sugar 3 times daily please dispense strips and lancets, Disp: 300 each, Rfl: 3    carvedilol (COREG) 25 MG tablet, Take 1 tablet (25 mg total) by mouth 2 (two) times daily. .5-1 twice daily or as directed, Disp: 180 tablet, Rfl: 3    diphenhydrAMINE (BENADRYL) 25 mg capsule, Take 25 mg by mouth nightly as needed for Itching., Disp: , Rfl:     ezetimibe (ZETIA) 10 mg tablet, Take 1 tablet (10 mg total) by mouth once daily., Disp: 90 tablet, Rfl: 3    FOLIC ACID/MULTIVITS-MIN (MULTIVITAMIN FOR CHOLESTEROL ORAL), Take 1 tablet by mouth once daily. , Disp: , Rfl:     furosemide (LASIX) 40 MG  "tablet, TAKE 1 TABLET BY MOUTH ONCE DAILY, Disp: 90 tablet, Rfl: 3    insulin aspart (NOVOLOG FLEXPEN) 100 unit/mL InPn pen, Inject 10 Units into the skin 3 (three) times daily with meals., Disp: 3 Box, Rfl: 3    insulin glargine (LANTUS SOLOSTAR) 100 unit/mL (3 mL) InPn pen, Inject 35 Units into the skin every evening., Disp: 45 mL, Rfl: 1    insulin needles, disposable, (BD INSULIN PEN NEEDLE UF ORIG) 29 x 1/2 " Ndle, USE TWICE DAILY AS DIRECTED, Disp: 100 each, Rfl: 2    INVOKANA 300 mg Tab tablet, TAKE 1 TABLET BY MOUTH EVERY DAY, Disp: 30 tablet, Rfl: 12    lancets (ONE TOUCH DELICA LANCETS) 33 gauge Misc, Use 3 times Daily., Disp: 300 each, Rfl: 3    metformin (GLUCOPHAGE) 1000 MG tablet, TAKE 1 TABLET (1,000 MG TOTAL) BY MOUTH 2 (TWO) TIMES DAILY., Disp: 180 tablet, Rfl: 3    omega-3 acid ethyl esters (LOVAZA) 1 gram capsule, TAKE 4 CAPSULES BY MOUTH DAILY OR AS DIRECTED., Disp: 360 capsule, Rfl: 3    ramipril (ALTACE) 10 MG capsule, Take 1 capsule (10 mg total) by mouth once daily., Disp: 90 capsule, Rfl: 3    oxycodone (OXYCONTIN) 10 mg 12 hr tablet, Take 1 tablet (10 mg total) by mouth every 12 (twelve) hours as needed for Pain., Disp: 8 tablet, Rfl: 0    oxycodone-acetaminophen (PERCOCET)  mg per tablet, Take 1 tablet by mouth every 4-6 hours as needed for pain. Take stool softener with this medication., Disp: 30 tablet, Rfl: 0    promethazine (PHENERGAN) 25 MG tablet, Take 1 tablet (25 mg total) by mouth every 6 (six) hours as needed for Nausea., Disp: 16 tablet, Rfl: 0    traMADol (ULTRAM) 50 mg tablet, Take 1 tablet (50 mg total) by mouth every 8 (eight) hours as needed (surgical pain)., Disp: 30 tablet, Rfl: 0  No current facility-administered medications for this visit.     Facility-Administered Medications Ordered in Other Visits:     triamcinolone acetonide injection 40 mg, 40 mg, Intra-articular, , Patricio Multani MD, 40 mg at 10/26/16 7814  ALLERGIES:   Review of " "patient's allergies indicates:   Allergen Reactions    No known drug allergies        VITAL SIGNS: BP (!) 151/86   Pulse 81   Ht 5' 8" (1.727 m)   Wt 93 kg (205 lb)   BMI 31.17 kg/m²      PE:  Vitals:    01/04/18 1405   BP: (!) 151/86   Pulse: 81   Weight: 93 kg (205 lb)   Height: 5' 8" (1.727 m)   PainSc: 0-No pain       Right shoulder  Incision healed  No sign of infection  Swelling resolved  Compartments soft  Neurovascular status intact in extremity    AROM  Forward elevation 130 degrees  External rotation 50 degrees    Assessment:  Appropriate rotator cuff surgery recovery at 3 mo    Plan:  1. Continue PT  2. Pain medication as needed for PT  3. Return to clinic in 2-3 mo      "

## 2018-01-08 PROBLEM — M25.511 ACUTE POSTOPERATIVE PAIN OF RIGHT SHOULDER: Status: RESOLVED | Noted: 2017-10-04 | Resolved: 2018-01-08

## 2018-01-08 PROBLEM — G89.18 ACUTE POSTOPERATIVE PAIN OF RIGHT SHOULDER: Status: RESOLVED | Noted: 2017-10-04 | Resolved: 2018-01-08

## 2018-02-05 ENCOUNTER — PATIENT MESSAGE (OUTPATIENT)
Dept: INTERNAL MEDICINE | Facility: CLINIC | Age: 63
End: 2018-02-05

## 2018-02-05 NOTE — TELEPHONE ENCOUNTER
I have recently enrolled in Medicare and [employer] SNP-Diabetes and Heart supplement. [employer] has requested that I get a Verification of Chronic Condition form signed by Dr. Martin. It needs to have the Diabetes and Cardiovascular Disease boxes checked off. If it is OK I would like to fax this to you and have you fax it back after Dr. Martin has signed it. Please let me know if this can be done. Thanks for all your help and have a great day!     Jm Deleon

## 2018-02-09 ENCOUNTER — PATIENT MESSAGE (OUTPATIENT)
Dept: ENDOCRINOLOGY | Facility: CLINIC | Age: 63
End: 2018-02-09

## 2018-02-09 RX ORDER — INSULIN GLARGINE 100 [IU]/ML
35 INJECTION, SOLUTION SUBCUTANEOUS NIGHTLY
Qty: 45 ML | Refills: 1 | Status: SHIPPED | OUTPATIENT
Start: 2018-02-09 | End: 2018-03-28 | Stop reason: SDUPTHER

## 2018-02-09 RX ORDER — INSULIN GLARGINE 100 [IU]/ML
35 INJECTION, SOLUTION SUBCUTANEOUS NIGHTLY
Qty: 45 SYRINGE | Refills: 1 | Status: CANCELLED | OUTPATIENT
Start: 2018-02-09

## 2018-02-11 ENCOUNTER — PATIENT MESSAGE (OUTPATIENT)
Dept: ENDOCRINOLOGY | Facility: CLINIC | Age: 63
End: 2018-02-11

## 2018-02-28 ENCOUNTER — PATIENT MESSAGE (OUTPATIENT)
Dept: ENDOCRINOLOGY | Facility: CLINIC | Age: 63
End: 2018-02-28

## 2018-03-27 DIAGNOSIS — I10 ESSENTIAL HYPERTENSION: ICD-10-CM

## 2018-03-27 DIAGNOSIS — I25.10 CORONARY ARTERY DISEASE INVOLVING NATIVE CORONARY ARTERY OF NATIVE HEART WITHOUT ANGINA PECTORIS: ICD-10-CM

## 2018-03-27 DIAGNOSIS — E78.5 DYSLIPIDEMIA: ICD-10-CM

## 2018-03-27 RX ORDER — CARVEDILOL 25 MG/1
25 TABLET ORAL 2 TIMES DAILY
Qty: 180 TABLET | Refills: 3 | Status: SHIPPED | OUTPATIENT
Start: 2018-03-27 | End: 2018-03-29 | Stop reason: SDUPTHER

## 2018-03-27 RX ORDER — RAMIPRIL 10 MG/1
10 CAPSULE ORAL DAILY
Qty: 90 CAPSULE | Refills: 3 | Status: SHIPPED | OUTPATIENT
Start: 2018-03-27 | End: 2019-03-11 | Stop reason: SDUPTHER

## 2018-03-27 RX ORDER — ATORVASTATIN CALCIUM 80 MG/1
TABLET, FILM COATED ORAL
Qty: 90 TABLET | Refills: 3 | Status: SHIPPED | OUTPATIENT
Start: 2018-03-27 | End: 2019-03-11 | Stop reason: SDUPTHER

## 2018-03-28 RX ORDER — BLOOD SUGAR DIAGNOSTIC
1 STRIP MISCELLANEOUS 3 TIMES DAILY
Qty: 300 STRIP | Refills: 0 | Status: SHIPPED | OUTPATIENT
Start: 2018-03-28 | End: 2018-04-09 | Stop reason: SDUPTHER

## 2018-03-28 RX ORDER — DEXTROSE 4 G
TABLET,CHEWABLE ORAL
Qty: 1 EACH | Refills: 0 | Status: SHIPPED | OUTPATIENT
Start: 2018-03-28 | End: 2018-04-09 | Stop reason: SDUPTHER

## 2018-03-28 RX ORDER — PEN NEEDLE, DIABETIC 30 GX3/16"
1 NEEDLE, DISPOSABLE MISCELLANEOUS 4 TIMES DAILY
Qty: 400 EACH | Refills: 0 | Status: SHIPPED | OUTPATIENT
Start: 2018-03-28 | End: 2018-04-09 | Stop reason: SDUPTHER

## 2018-03-28 RX ORDER — EZETIMIBE 10 MG/1
10 TABLET ORAL DAILY
Qty: 90 TABLET | Refills: 3 | Status: SHIPPED | OUTPATIENT
Start: 2018-03-28 | End: 2019-03-31 | Stop reason: SDUPTHER

## 2018-03-28 RX ORDER — FUROSEMIDE 40 MG/1
40 TABLET ORAL DAILY
Qty: 90 TABLET | Refills: 3 | Status: SHIPPED | OUTPATIENT
Start: 2018-03-28 | End: 2019-03-11 | Stop reason: SDUPTHER

## 2018-03-28 RX ORDER — INSULIN GLARGINE 100 [IU]/ML
35 INJECTION, SOLUTION SUBCUTANEOUS NIGHTLY
Qty: 3 BOX | Refills: 0 | Status: SHIPPED | OUTPATIENT
Start: 2018-03-28 | End: 2018-04-09 | Stop reason: SDUPTHER

## 2018-03-28 RX ORDER — LANCETS
1 EACH MISCELLANEOUS 3 TIMES DAILY
Qty: 300 EACH | Refills: 0 | Status: SHIPPED | OUTPATIENT
Start: 2018-03-28 | End: 2018-04-09 | Stop reason: SDUPTHER

## 2018-03-28 RX ORDER — METFORMIN HYDROCHLORIDE 1000 MG/1
TABLET ORAL
Qty: 180 TABLET | Refills: 0 | Status: SHIPPED | OUTPATIENT
Start: 2018-03-28 | End: 2018-04-09 | Stop reason: SDUPTHER

## 2018-03-28 RX ORDER — INSULIN ASPART 100 [IU]/ML
10 INJECTION, SOLUTION INTRAVENOUS; SUBCUTANEOUS
Qty: 2 BOX | Refills: 0 | Status: SHIPPED | OUTPATIENT
Start: 2018-03-28 | End: 2018-04-09 | Stop reason: SDUPTHER

## 2018-03-29 DIAGNOSIS — I25.10 CORONARY ARTERY DISEASE INVOLVING NATIVE CORONARY ARTERY OF NATIVE HEART WITHOUT ANGINA PECTORIS: ICD-10-CM

## 2018-03-29 DIAGNOSIS — I10 ESSENTIAL HYPERTENSION: ICD-10-CM

## 2018-03-29 RX ORDER — CARVEDILOL 25 MG/1
TABLET ORAL
Qty: 180 TABLET | Refills: 3 | Status: SHIPPED | OUTPATIENT
Start: 2018-03-29 | End: 2020-08-19 | Stop reason: SDUPTHER

## 2018-04-02 ENCOUNTER — OFFICE VISIT (OUTPATIENT)
Dept: SURGERY | Facility: CLINIC | Age: 63
End: 2018-04-02
Payer: MEDICARE

## 2018-04-02 VITALS
SYSTOLIC BLOOD PRESSURE: 116 MMHG | DIASTOLIC BLOOD PRESSURE: 70 MMHG | HEART RATE: 54 BPM | BODY MASS INDEX: 32.16 KG/M2 | TEMPERATURE: 98 F | HEIGHT: 68 IN | WEIGHT: 212.19 LBS

## 2018-04-02 DIAGNOSIS — L72.3 SEBACEOUS CYST: Primary | ICD-10-CM

## 2018-04-02 PROCEDURE — 99999 PR PBB SHADOW E&M-EST. PATIENT-LVL IV: CPT | Mod: PBBFAC,,, | Performed by: SURGERY

## 2018-04-02 PROCEDURE — 99202 OFFICE O/P NEW SF 15 MIN: CPT | Mod: S$GLB,,, | Performed by: SURGERY

## 2018-04-02 PROCEDURE — 3078F DIAST BP <80 MM HG: CPT | Mod: CPTII,S$GLB,, | Performed by: SURGERY

## 2018-04-02 PROCEDURE — 3074F SYST BP LT 130 MM HG: CPT | Mod: CPTII,S$GLB,, | Performed by: SURGERY

## 2018-04-02 NOTE — PROGRESS NOTES
Office Visit  General Surgery    CC:   Chief Complaint   Patient presents with    Consult    Cyst     HPI: 62 yo male with hx of CAD s/p CABGx3 in 2000, DM, HTN, HLD who presents to clinic for cyst to lower back. Has had for several years. About 2 weeks ago, it swelled and became painful to touch. No longer causing him pain. Denies any drainage from the wound. No fever/chills.     No blood thinners.     PMH:   Past Medical History:   Diagnosis Date    Allergy     seasonal    Arthritis     Coronary artery disease     Diabetes mellitus     Hyperlipidemia     Hypertension     Joint pain     Squamous cell carcinoma        PSH:   Past Surgical History:   Procedure Laterality Date    BELPHAROPTOSIS REPAIR  10 years ago    both eyes    CARDIAC SURGERY      3 vessel bypass    CORONARY ARTERY BYPASS GRAFT  x4 age 45 2000    HERNIA REPAIR      HIP SURGERY  9-24-14    left YANICK    JOINT REPLACEMENT      left hip    SHOULDER ARTHROSCOPY      SHOULDER SURGERY         Allergies:   Review of patient's allergies indicates:   Allergen Reactions    No known drug allergies      Meds:   Patient's Medications   New Prescriptions    No medications on file   Previous Medications    ACCU-CHEK SMARTVIEW TEST STRIP STRP    1 strip by Misc.(Non-Drug; Combo Route) route 3 (three) times daily. Patient needs an appointment    ASPIRIN (ECOTRIN) 325 MG EC TABLET    Take 1 tablet (325 mg total) by mouth every 12 (twelve) hours.    ATORVASTATIN (LIPITOR) 80 MG TABLET    TAKE 1 TABLET (80 MG TOTAL) BY MOUTH ONCE DAILY.    BLOOD-GLUCOSE METER MISC    Use to check sugar 3 times daily. Accu-chek Emily. Patient needs an appointment    CARVEDILOL (COREG) 25 MG TABLET    Take .5-1 tablet twice daily or as directed    DIPHENHYDRAMINE (BENADRYL) 25 MG CAPSULE    Take 25 mg by mouth nightly as needed for Itching.    EMPAGLIFLOZIN (JARDIANCE) 25 MG TAB    Take 25 mg by mouth once daily. Patient needs an appointment    EZETIMIBE (ZETIA) 10 MG  "TABLET    Take 1 tablet (10 mg total) by mouth once daily.    FOLIC ACID/MULTIVITS-MIN (MULTIVITAMIN FOR CHOLESTEROL ORAL)    Take 1 tablet by mouth once daily.     FUROSEMIDE (LASIX) 40 MG TABLET    Take 1 tablet (40 mg total) by mouth once daily.    INSULIN ASPART U-100 (NOVOLOG FLEXPEN U-100 INSULIN) 100 UNIT/ML INPN PEN    Inject 10 Units into the skin 3 (three) times daily with meals. Patient needs an appointment    INSULIN GLARGINE (LANTUS SOLOSTAR U-100 INSULIN) 100 UNIT/ML (3 ML) INPN PEN    Inject 35 Units into the skin every evening.    INSULIN NEEDLES, DISPOSABLE, (BD INSULIN PEN NEEDLE UF ORIG) 29 X 1/2 " NDLE    USE TWICE DAILY AS DIRECTED    LANCETS MISC    1 lancet by Misc.(Non-Drug; Combo Route) route 3 (three) times daily. accu-chek Fastclix. Patient needs an appointment    METFORMIN (GLUCOPHAGE) 1000 MG TABLET    TAKE 1 TABLET (1,000 MG TOTAL) BY MOUTH 2 (TWO) TIMES DAILY..Patient needs an appointment    OMEGA-3 ACID ETHYL ESTERS (LOVAZA) 1 GRAM CAPSULE    TAKE 4 CAPSULES BY MOUTH DAILY OR AS DIRECTED.    OXYCODONE (OXYCONTIN) 10 MG 12 HR TABLET    Take 1 tablet (10 mg total) by mouth every 12 (twelve) hours as needed for Pain.    OXYCODONE-ACETAMINOPHEN (PERCOCET)  MG PER TABLET    Take 1 tablet by mouth every 4-6 hours as needed for pain. Take stool softener with this medication.    PEN NEEDLE, DIABETIC (BD INSULIN PEN NEEDLE UF MINI) 31 GAUGE X 3/16" NDLE    1 each by Misc.(Non-Drug; Combo Route) route 4 (four) times daily. Patient needs an appointment    PROMETHAZINE (PHENERGAN) 25 MG TABLET    Take 1 tablet (25 mg total) by mouth every 6 (six) hours as needed for Nausea.    RAMIPRIL (ALTACE) 10 MG CAPSULE    Take 1 capsule (10 mg total) by mouth once daily.    TRAMADOL (ULTRAM) 50 MG TABLET    Take 1 tablet (50 mg total) by mouth every 8 (eight) hours as needed (surgical pain).   Modified Medications    No medications on file   Discontinued Medications    No medications on file "       Family History   Problem Relation Age of Onset    Coronary artery disease Mother     Cancer Father      lung cancer, smoker    Hypertension Brother     Cancer Brother      colon    Amblyopia Neg Hx     Blindness Neg Hx     Cataracts Neg Hx     Glaucoma Neg Hx     Macular degeneration Neg Hx     Retinal detachment Neg Hx     Strabismus Neg Hx     Melanoma Neg Hx        Review of Systems - For pertinent positives please see HPI   Constitutional: Negative for fever and chills.   HENT: Negative for congestion and ear pain.   Respiratory: Negative for cough and shortness of breath.   Cardiovascular: Negative for chest pain and palpitations.   Gastrointestinal: Negative for nausea, vomiting, abdominal pain, diarrhea, constipation and abdominal distention.   Genitourinary: Negative for dysuria and hematuria.   Musculoskeletal: Negative for myalgias and arthralgias.   Skin: See HPI   Neurological: Negative for weakness and numbness.   Psychiatric/Behavioral: Negative for confusion and dysphoric mood    OBJECTIVE:     Vital Signs (Most Recent)  Temp: 98.4 °F (36.9 °C) (04/02/18 1036)  Pulse: (!) 54 (04/02/18 1036)  BP: 116/70 (04/02/18 1036)      Physical Exam:  General: well developed, well nourished, no distress  Lungs:  clear to auscultation bilaterally and normal respiratory effort  Heart: regular rate and rhythm, S1, S2 normal, no murmur, rub or gallop  Abdomen: soft, non-tender non-distented; bowel sounds normal; no masses,  no organomegaly   Back: lower right back with subcutaneous mass - possible cyst vs lipoma. No erythema, punctum or drainage. Not superficial.   Neuro: A&O x3, no focal deficits   Ext: wwp, no edema       ASSESSMENT/PLAN:     62 yo male with subcutaneous cyst vs. Lipoma on lower back    Will plan for removal in minors   Will wait a couple weeks due to recent infection     Maximo Hernandez MD

## 2018-04-10 RX ORDER — METFORMIN HYDROCHLORIDE 1000 MG/1
TABLET ORAL
Qty: 180 TABLET | Refills: 0 | Status: SHIPPED | OUTPATIENT
Start: 2018-04-10 | End: 2018-09-06 | Stop reason: SDUPTHER

## 2018-04-10 RX ORDER — INSULIN ASPART 100 [IU]/ML
10 INJECTION, SOLUTION INTRAVENOUS; SUBCUTANEOUS
Qty: 2 BOX | Refills: 0 | Status: SHIPPED | OUTPATIENT
Start: 2018-04-10 | End: 2019-03-21 | Stop reason: SDUPTHER

## 2018-04-10 RX ORDER — INSULIN GLARGINE 100 [IU]/ML
35 INJECTION, SOLUTION SUBCUTANEOUS NIGHTLY
Qty: 3 BOX | Refills: 0 | Status: SHIPPED | OUTPATIENT
Start: 2018-04-10 | End: 2018-09-06 | Stop reason: SDUPTHER

## 2018-04-10 RX ORDER — DEXTROSE 4 G
TABLET,CHEWABLE ORAL
Qty: 1 EACH | Refills: 0 | Status: SHIPPED | OUTPATIENT
Start: 2018-04-10

## 2018-04-10 RX ORDER — LANCETS
1 EACH MISCELLANEOUS 3 TIMES DAILY
Qty: 300 EACH | Refills: 0 | Status: SHIPPED | OUTPATIENT
Start: 2018-04-10

## 2018-04-10 RX ORDER — BLOOD SUGAR DIAGNOSTIC
1 STRIP MISCELLANEOUS 3 TIMES DAILY
Qty: 300 STRIP | Refills: 0 | Status: SHIPPED | OUTPATIENT
Start: 2018-04-10

## 2018-04-10 RX ORDER — PEN NEEDLE, DIABETIC 30 GX3/16"
1 NEEDLE, DISPOSABLE MISCELLANEOUS 4 TIMES DAILY
Qty: 400 EACH | Refills: 0 | Status: SHIPPED | OUTPATIENT
Start: 2018-04-10

## 2018-04-12 ENCOUNTER — OFFICE VISIT (OUTPATIENT)
Dept: SPORTS MEDICINE | Facility: CLINIC | Age: 63
End: 2018-04-12
Payer: MEDICARE

## 2018-04-12 VITALS
SYSTOLIC BLOOD PRESSURE: 120 MMHG | DIASTOLIC BLOOD PRESSURE: 73 MMHG | WEIGHT: 212 LBS | BODY MASS INDEX: 32.13 KG/M2 | HEART RATE: 63 BPM | HEIGHT: 68 IN

## 2018-04-12 DIAGNOSIS — Z98.890 S/P ROTATOR CUFF REPAIR: Primary | ICD-10-CM

## 2018-04-12 PROCEDURE — 99999 PR PBB SHADOW E&M-EST. PATIENT-LVL III: CPT | Mod: PBBFAC,,, | Performed by: ORTHOPAEDIC SURGERY

## 2018-04-12 PROCEDURE — 99214 OFFICE O/P EST MOD 30 MIN: CPT | Mod: S$GLB,,, | Performed by: ORTHOPAEDIC SURGERY

## 2018-04-12 PROCEDURE — 3074F SYST BP LT 130 MM HG: CPT | Mod: CPTII,S$GLB,, | Performed by: ORTHOPAEDIC SURGERY

## 2018-04-12 PROCEDURE — 3078F DIAST BP <80 MM HG: CPT | Mod: CPTII,S$GLB,, | Performed by: ORTHOPAEDIC SURGERY

## 2018-04-12 NOTE — PROGRESS NOTES
CC: Sonyasamantha shoulder post op 6 mo.  Finished with physical therapy (due to insurance in November 2017).  No issues; happy with his shoulder.    DATE OF SURGERY:  10/4/17      PREOPERATIVE DIAGNOSES:   1. Right shoulder rotator cuff tear (massive).   2. Right shoulder AC joint arthritis  3. Right shoulder labral tear / biceps tendinopathy     POSTOPERATIVE DIAGNOSES:   1. Right shoulder rotator cuff tear (massive).   2. Right shoulder AC joint arthritis  3. Right shoulder labral tear / biceps tendinopathy     PROCEDURE:   1. Right shoulder arthroscopic rotator cuff repair (massive / complex).   2. Right shoulder open subpectoral biceps tenodesis  3. Right shoulder arthroscopic distal clavicle excision  4. Right shoulder arthroscopic subacromial decompression.   5. Right shoulder arthroscopic debridement labrum     SURGEON: Jhonny Ventura M.D.      **Of note, because of the massive nature of the posterior L-shaped tear of the supra and infra-spinatus tendons, with the need for releases and convergence suturing, twice the normal time and resources were used for the case    Review of Systems   Constitution: Negative. Negative for chills, fever and night sweats.    Cardiovascular: Negative for chest pain and syncope.   Respiratory: Negative for cough and shortness of breath.   Gastrointestinal: Negative for abdominal pain and bowel incontinence.     PAST MEDICAL HISTORY:   Past Medical History:   Diagnosis Date    Allergy     seasonal    Arthritis     Coronary artery disease     Diabetes mellitus     Hyperlipidemia     Hypertension     Joint pain     Squamous cell carcinoma      PAST SURGICAL HISTORY:   Past Surgical History:   Procedure Laterality Date    BELPHAROPTOSIS REPAIR  10 years ago    both eyes    CARDIAC SURGERY      3 vessel bypass    CORONARY ARTERY BYPASS GRAFT  x4 age 45 2000    HERNIA REPAIR      HIP SURGERY  9-24-14    left YANICK    JOINT REPLACEMENT      left hip    SHOULDER ARTHROSCOPY       SHOULDER SURGERY       FAMILY HISTORY:   Family History   Problem Relation Age of Onset    Coronary artery disease Mother     Cancer Father      lung cancer, smoker    Hypertension Brother     Cancer Brother      colon    Amblyopia Neg Hx     Blindness Neg Hx     Cataracts Neg Hx     Glaucoma Neg Hx     Macular degeneration Neg Hx     Retinal detachment Neg Hx     Strabismus Neg Hx     Melanoma Neg Hx      SOCIAL HISTORY:   Social History     Social History    Marital status:      Spouse name: N/A    Number of children: N/A    Years of education: N/A     Occupational History    Not on file.     Social History Main Topics    Smoking status: Never Smoker    Smokeless tobacco: Not on file    Alcohol use Not on file    Drug use: Unknown    Sexual activity: Not on file     Other Topics Concern    Not on file     Social History Narrative    No narrative on file       MEDICATIONS:   Current Outpatient Prescriptions:     ACCU-CHEK SMARTVIEW TEST STRIP Strp, 1 strip by Misc.(Non-Drug; Combo Route) route 3 (three) times daily. Patient needs an appointment, Disp: 300 strip, Rfl: 0    aspirin (ECOTRIN) 325 MG EC tablet, Take 1 tablet (325 mg total) by mouth every 12 (twelve) hours. (Patient taking differently: Take 81 mg by mouth every 12 (twelve) hours. ), Disp: 42 tablet, Rfl: 0    atorvastatin (LIPITOR) 80 MG tablet, TAKE 1 TABLET (80 MG TOTAL) BY MOUTH ONCE DAILY., Disp: 90 tablet, Rfl: 3    blood-glucose meter Misc, Use to check sugar 3 times daily. Accu-chek Emily. Patient needs an appointment, Disp: 1 each, Rfl: 0    carvedilol (COREG) 25 MG tablet, Take .5-1 tablet twice daily or as directed, Disp: 180 tablet, Rfl: 3    diphenhydrAMINE (BENADRYL) 25 mg capsule, Take 25 mg by mouth nightly as needed for Itching., Disp: , Rfl:     empagliflozin (JARDIANCE) 25 mg Tab, Take 25 mg by mouth once daily. Patient needs an appointment, Disp: 90 tablet, Rfl: 1    ezetimibe (ZETIA) 10 mg  "tablet, Take 1 tablet (10 mg total) by mouth once daily., Disp: 90 tablet, Rfl: 3    FOLIC ACID/MULTIVITS-MIN (MULTIVITAMIN FOR CHOLESTEROL ORAL), Take 1 tablet by mouth once daily. , Disp: , Rfl:     furosemide (LASIX) 40 MG tablet, Take 1 tablet (40 mg total) by mouth once daily., Disp: 90 tablet, Rfl: 3    insulin aspart U-100 (NOVOLOG FLEXPEN U-100 INSULIN) 100 unit/mL InPn pen, Inject 10 Units into the skin 3 (three) times daily with meals. Patient needs an appointment, Disp: 2 Box, Rfl: 0    insulin glargine (LANTUS SOLOSTAR U-100 INSULIN) 100 unit/mL (3 mL) InPn pen, Inject 35 Units into the skin every evening., Disp: 3 Box, Rfl: 0    insulin needles, disposable, (BD INSULIN PEN NEEDLE UF ORIG) 29 x 1/2 " Ndle, USE TWICE DAILY AS DIRECTED, Disp: 100 each, Rfl: 2    lancets Misc, 1 lancet by Misc.(Non-Drug; Combo Route) route 3 (three) times daily. accu-chek Fastclix. Patient needs an appointment, Disp: 300 each, Rfl: 0    metFORMIN (GLUCOPHAGE) 1000 MG tablet, TAKE 1 TABLET (1,000 MG TOTAL) BY MOUTH 2 (TWO) TIMES DAILY..Patient needs an appointment, Disp: 180 tablet, Rfl: 0    omega-3 acid ethyl esters (LOVAZA) 1 gram capsule, TAKE 4 CAPSULES BY MOUTH DAILY OR AS DIRECTED., Disp: 360 capsule, Rfl: 3    pen needle, diabetic (BD INSULIN PEN NEEDLE UF MINI) 31 gauge x 3/16" Ndle, 1 each by Misc.(Non-Drug; Combo Route) route 4 (four) times daily. Patient needs an appointment, Disp: 400 each, Rfl: 0    ramipril (ALTACE) 10 MG capsule, Take 1 capsule (10 mg total) by mouth once daily., Disp: 90 capsule, Rfl: 3    oxycodone (OXYCONTIN) 10 mg 12 hr tablet, Take 1 tablet (10 mg total) by mouth every 12 (twelve) hours as needed for Pain., Disp: 8 tablet, Rfl: 0    oxycodone-acetaminophen (PERCOCET)  mg per tablet, Take 1 tablet by mouth every 4-6 hours as needed for pain. Take stool softener with this medication., Disp: 30 tablet, Rfl: 0    promethazine (PHENERGAN) 25 MG tablet, Take 1 tablet (25 mg " "total) by mouth every 6 (six) hours as needed for Nausea., Disp: 16 tablet, Rfl: 0    traMADol (ULTRAM) 50 mg tablet, Take 1 tablet (50 mg total) by mouth every 8 (eight) hours as needed (surgical pain)., Disp: 30 tablet, Rfl: 0  No current facility-administered medications for this visit.     Facility-Administered Medications Ordered in Other Visits:     triamcinolone acetonide injection 40 mg, 40 mg, Intra-articular, , Patricio Multani MD, 40 mg at 10/26/16 0841  ALLERGIES:   Review of patient's allergies indicates:   Allergen Reactions    No known drug allergies        VITAL SIGNS: /73   Pulse 63   Ht 5' 8" (1.727 m)   Wt 96.2 kg (212 lb)   BMI 32.23 kg/m²      PE:  Vitals:    04/12/18 1339   BP: 120/73   Pulse: 63   Weight: 96.2 kg (212 lb)   Height: 5' 8" (1.727 m)   PainSc: 0-No pain       Right shoulder  Incision healed  No sign of infection  Swelling resolved  Compartments soft  Neurovascular status intact in extremity    AROM  Forward elevation 170 degrees  External rotation 85 degrees  Internal rotation L5 *    Assessment:  Appropriate rotator cuff surgery recovery at 6 mo    Plan:  Follow up as needed      "

## 2018-04-18 DIAGNOSIS — E11.9 TYPE 2 DIABETES MELLITUS WITHOUT COMPLICATION: ICD-10-CM

## 2018-05-02 ENCOUNTER — PROCEDURE VISIT (OUTPATIENT)
Dept: SURGERY | Facility: CLINIC | Age: 63
End: 2018-05-02
Payer: MEDICARE

## 2018-05-02 VITALS
HEIGHT: 68 IN | WEIGHT: 212 LBS | DIASTOLIC BLOOD PRESSURE: 73 MMHG | HEART RATE: 55 BPM | TEMPERATURE: 99 F | SYSTOLIC BLOOD PRESSURE: 128 MMHG | BODY MASS INDEX: 32.13 KG/M2

## 2018-05-02 DIAGNOSIS — L72.9 CYST OF SKIN: Primary | ICD-10-CM

## 2018-05-02 PROCEDURE — 88304 TISSUE EXAM BY PATHOLOGIST: CPT | Mod: 26,,, | Performed by: PATHOLOGY

## 2018-05-02 PROCEDURE — 88304 TISSUE EXAM BY PATHOLOGIST: CPT | Performed by: PATHOLOGY

## 2018-05-02 PROCEDURE — 11402 EXC TR-EXT B9+MARG 1.1-2 CM: CPT | Mod: S$GLB,,, | Performed by: SURGERY

## 2018-05-04 NOTE — PROCEDURES
PREOPERATIVE DIAGNOSIS:    1. Cyst of skin  Tissue Specimen To Pathology, Surgery       POSTOPERATIVE DIAGNOSIS:   1. Cyst of skin  Tissue Specimen To Pathology, Surgery       PROCEDURE PERFORMED: excision sebaceous cyst trunk    ATTENDING SURGEON: Markus Yoder MD      HOUSESTAFF SURGEON:Leonard    ANESTHESIA:  Local    ESTIMATED BLOOD LOSS: 3 ml    FINDINGS: cyst    INDICATIONS: Jm Deleon is a 63 y.o. male referred to my General Surgery Clinic with soft tissue mass in the torso.  We recommended excisional biopsy.  This procedure has been fully reviewed with the patient and written informed consent has been obtained.      PROCEDURE IN DETAIL: The patient was identified and brought back to minor   procedure room in the General Surgery Clinic. Patient was positioned appropriately   and prepped and draped sterilely. Local anesthesia was administered and a 2cm skin incision was made with the scalpel. Subcutaneous tissue was divided sharply and the cyst was sharply excised away from the normal surrounding   subcutaneous tissue sharply, was passed off the field as specimen. Hemostasis   was obtained. The wound was closed in layers including vicryl deep and monocryl on the skin. A sterile dressing was applied. The patient tolerated the procedure well, was discharged from the minor procedure room and will follow up in two weeks.

## 2018-05-15 ENCOUNTER — OFFICE VISIT (OUTPATIENT)
Dept: SURGERY | Facility: CLINIC | Age: 63
End: 2018-05-15
Payer: MEDICARE

## 2018-05-15 VITALS
DIASTOLIC BLOOD PRESSURE: 74 MMHG | SYSTOLIC BLOOD PRESSURE: 115 MMHG | HEART RATE: 57 BPM | HEIGHT: 68 IN | WEIGHT: 212 LBS | BODY MASS INDEX: 32.13 KG/M2

## 2018-05-15 DIAGNOSIS — D17.9 LIPOMA, UNSPECIFIED SITE: Primary | ICD-10-CM

## 2018-05-15 PROCEDURE — 99024 POSTOP FOLLOW-UP VISIT: CPT | Mod: S$GLB,,, | Performed by: PHYSICIAN ASSISTANT

## 2018-05-15 PROCEDURE — 99999 PR PBB SHADOW E&M-EST. PATIENT-LVL IV: CPT | Mod: PBBFAC,,, | Performed by: PHYSICIAN ASSISTANT

## 2018-05-15 NOTE — PROGRESS NOTES
Jm Deleon  63 y.o.    No diagnosis found.    SUBJECTIVE:  63 y.o.male presents s/p Excision of fibroadipose tissue.  Patient reports that area is healing well.  Denies drainage, fever or chills. States the swelling returned after surgery. Has minimal pain, but this is getting better.    OBJECTIVE:  Posterior right hip - incision healing well, no erythema, no drainage, no tenderness with palpation.     Pathology- reviewed  FINAL PATHOLOGIC DIAGNOSIS  Right posterior hip, biopsy:  Benign fibroadipose tissue and vascular tissue with focal congested vasculature.  No evidence of malignancy.    ASSESSMENT:  62 yo male s/p fibroadipose tissue excision    PLAN:  Patient doing well after removal  Return to clinic prn.

## 2018-07-02 RX ORDER — EMPAGLIFLOZIN 25 MG/1
TABLET, FILM COATED ORAL
Qty: 90 TABLET | Refills: 0 | Status: SHIPPED | OUTPATIENT
Start: 2018-07-02 | End: 2018-09-30 | Stop reason: SDUPTHER

## 2018-07-13 ENCOUNTER — OFFICE VISIT (OUTPATIENT)
Dept: OPTOMETRY | Facility: CLINIC | Age: 63
End: 2018-07-13
Payer: COMMERCIAL

## 2018-07-13 DIAGNOSIS — H52.4 PRESBYOPIA: ICD-10-CM

## 2018-07-13 DIAGNOSIS — H52.223 REGULAR ASTIGMATISM OF BOTH EYES: Primary | ICD-10-CM

## 2018-07-13 DIAGNOSIS — H25.13 NS (NUCLEAR SCLEROSIS), BILATERAL: ICD-10-CM

## 2018-07-13 DIAGNOSIS — E11.9 TYPE 2 DIABETES MELLITUS WITHOUT OPHTHALMIC MANIFESTATIONS: ICD-10-CM

## 2018-07-13 DIAGNOSIS — H40.023 OPEN ANGLE WITH BORDERLINE FINDINGS AND HIGH GLAUCOMA RISK IN BOTH EYES: ICD-10-CM

## 2018-07-13 PROCEDURE — 92014 COMPRE OPH EXAM EST PT 1/>: CPT | Mod: S$GLB,,, | Performed by: OPTOMETRIST

## 2018-07-13 PROCEDURE — 92015 DETERMINE REFRACTIVE STATE: CPT | Mod: S$GLB,,, | Performed by: OPTOMETRIST

## 2018-07-13 PROCEDURE — 99999 PR PBB SHADOW E&M-EST. PATIENT-LVL II: CPT | Mod: PBBFAC,,, | Performed by: OPTOMETRIST

## 2018-07-13 NOTE — PROGRESS NOTES
HPI     63yr old male present for Annual IDDM DFE. Patient states BSL average at   128 in the mornings. Hemoglobin A1C       Date                     Value               Ref Range             Status                09/08/2017               6.8 (H)             4.0 - 5.6 %           Final     Patient feels his reading vision is getting very difficult this past year.   Pt seeing floaters on occasion, with no flashes of light. Denies   headaches.               Last edited by Chente Keith on 7/13/2018  2:52 PM. (History)            Assessment /Plan     For exam results, see Encounter Report.    Regular astigmatism of both eyes  Presbyopia   Rx specs    Open angle with borderline findings and high glaucoma risk in both eyes  -     Yang Visual Field - OU - Extended - Both Eyes; Future  -     OCT, Optic Nerve - OU - Both Eyes; Future     Glaucoma suspect, bilateral  -           Posterior Segment OCT Optic Nerve- today  HVF 2012: WNL, 2017 scattered defects  OCT: sectoral thinning inf nasal OD, sup temp OS  PACHY: 541/537   IOP: normal     Return for HVF 24-2/ OCT optic nerve    NS (nuclear sclerosis), bilateral   Mild, monitor    Type II or unspecified type diabetes mellitus without mention of complication, uncontrolled  Type 2 diabetes mellitus without ophthalmic manifestations  Essential hypertension              No retinopathy, monitor

## 2018-07-20 ENCOUNTER — CLINICAL SUPPORT (OUTPATIENT)
Dept: OPHTHALMOLOGY | Facility: CLINIC | Age: 63
End: 2018-07-20
Payer: MEDICARE

## 2018-07-20 DIAGNOSIS — H40.023 OPEN ANGLE WITH BORDERLINE FINDINGS AND HIGH GLAUCOMA RISK IN BOTH EYES: ICD-10-CM

## 2018-08-22 DIAGNOSIS — E11.9 TYPE 2 DIABETES MELLITUS WITHOUT COMPLICATION: ICD-10-CM

## 2018-09-06 RX ORDER — METFORMIN HYDROCHLORIDE 1000 MG/1
TABLET ORAL
Qty: 180 TABLET | Refills: 0 | Status: SHIPPED | OUTPATIENT
Start: 2018-09-06 | End: 2018-12-16 | Stop reason: SDUPTHER

## 2018-09-06 RX ORDER — INSULIN GLARGINE 100 [IU]/ML
INJECTION, SOLUTION SUBCUTANEOUS
Qty: 30 ML | Refills: 0 | Status: SHIPPED | OUTPATIENT
Start: 2018-09-06 | End: 2019-03-12 | Stop reason: SDUPTHER

## 2018-09-11 ENCOUNTER — OFFICE VISIT (OUTPATIENT)
Dept: ORTHOPEDICS | Facility: CLINIC | Age: 63
End: 2018-09-11
Payer: MEDICARE

## 2018-09-11 ENCOUNTER — HOSPITAL ENCOUNTER (OUTPATIENT)
Dept: RADIOLOGY | Facility: HOSPITAL | Age: 63
Discharge: HOME | End: 2018-09-11
Attending: ORTHOPAEDIC SURGERY
Payer: MEDICARE

## 2018-09-11 VITALS — HEIGHT: 68 IN | WEIGHT: 213.75 LBS | BODY MASS INDEX: 32.4 KG/M2

## 2018-09-11 DIAGNOSIS — M16.11 PRIMARY OSTEOARTHRITIS OF RIGHT HIP: ICD-10-CM

## 2018-09-11 DIAGNOSIS — M25.551 RIGHT HIP PAIN: ICD-10-CM

## 2018-09-11 DIAGNOSIS — M25.551 RIGHT HIP PAIN: Primary | ICD-10-CM

## 2018-09-11 PROCEDURE — 3008F BODY MASS INDEX DOCD: CPT | Mod: CPTII,,, | Performed by: ORTHOPAEDIC SURGERY

## 2018-09-11 PROCEDURE — 99999 PR PBB SHADOW E&M-EST. PATIENT-LVL III: CPT | Mod: PBBFAC,,, | Performed by: ORTHOPAEDIC SURGERY

## 2018-09-11 PROCEDURE — 99213 OFFICE O/P EST LOW 20 MIN: CPT | Mod: PBBFAC,25 | Performed by: ORTHOPAEDIC SURGERY

## 2018-09-11 PROCEDURE — 73502 X-RAY EXAM HIP UNI 2-3 VIEWS: CPT | Mod: TC,RT

## 2018-09-11 PROCEDURE — 99214 OFFICE O/P EST MOD 30 MIN: CPT | Mod: S$PBB,,, | Performed by: ORTHOPAEDIC SURGERY

## 2018-09-11 PROCEDURE — 1101F PT FALLS ASSESS-DOCD LE1/YR: CPT | Mod: CPTII,,, | Performed by: ORTHOPAEDIC SURGERY

## 2018-09-11 PROCEDURE — 73502 X-RAY EXAM HIP UNI 2-3 VIEWS: CPT | Mod: 26,RT,, | Performed by: RADIOLOGY

## 2018-09-11 NOTE — PROGRESS NOTES
"HPI:    Patient is a 63 year old male  with history of left YANICK  In 2014 for osteoarthritis presents for evaluation of right sided hip pain similar to that in the left hip prior to replacement.  The patient reports that the pain in the right hip began about 2-3 months ago. Takes NSAIDS for pain relief. Pain is worst in the middle of the day and when he's reclined. Also worse when ambulating. Describes the pain as on the posterior side of his buttocks and pain in his groin.    Past Medical History:   Diagnosis Date    Allergy     seasonal    Arthritis     Coronary artery disease     Diabetes mellitus     Hyperlipidemia     Hypertension     Joint pain     Squamous cell carcinoma           Current Outpatient Medications:     atorvastatin (LIPITOR) 80 MG tablet, TAKE 1 TABLET (80 MG TOTAL) BY MOUTH ONCE DAILY., Disp: 90 tablet, Rfl: 3    blood-glucose meter Misc, Use to check sugar 3 times daily. Accu-chek Emily. Patient needs an appointment, Disp: 1 each, Rfl: 0    carvedilol (COREG) 25 MG tablet, Take .5-1 tablet twice daily or as directed, Disp: 180 tablet, Rfl: 3    diphenhydrAMINE (BENADRYL) 25 mg capsule, Take 25 mg by mouth nightly as needed for Itching., Disp: , Rfl:     FOLIC ACID/MULTIVITS-MIN (MULTIVITAMIN FOR CHOLESTEROL ORAL), Take 1 tablet by mouth once daily. , Disp: , Rfl:     furosemide (LASIX) 40 MG tablet, Take 1 tablet (40 mg total) by mouth once daily., Disp: 90 tablet, Rfl: 3    insulin aspart U-100 (NOVOLOG FLEXPEN U-100 INSULIN) 100 unit/mL InPn pen, Inject 10 Units into the skin 3 (three) times daily with meals. Patient needs an appointment, Disp: 2 Box, Rfl: 0    insulin needles, disposable, (BD INSULIN PEN NEEDLE UF ORIG) 29 x 1/2 " Ndle, USE TWICE DAILY AS DIRECTED, Disp: 100 each, Rfl: 2    JARDIANCE 25 mg Tab, TAKE 1 TABLET ONE TIME DAILY (NEED MD APPOINTMENT), Disp: 90 tablet, Rfl: 0    lancets Misc, 1 lancet by Misc.(Non-Drug; Combo Route) route 3 " "(three) times daily. accu-chek Fastclix. Patient needs an appointment, Disp: 300 each, Rfl: 0    LANTUS SOLOSTAR U-100 INSULIN 100 unit/mL (3 mL) InPn pen, INJECT 35 UNITS INTO THE SKIN EVERY EVENING., Disp: 30 mL, Rfl: 0    metFORMIN (GLUCOPHAGE) 1000 MG tablet, TAKE 1 TABLET (1,000 MG TOTAL) BY MOUTH 2 (TWO) TIMES DAILY.  PATIENT NEEDS AN APPOINTMENT, Disp: 180 tablet, Rfl: 0    omega-3 acid ethyl esters (LOVAZA) 1 gram capsule, TAKE 4 CAPSULES BY MOUTH DAILY OR AS DIRECTED., Disp: 360 capsule, Rfl: 3    pen needle, diabetic (BD INSULIN PEN NEEDLE UF MINI) 31 gauge x 3/16" Ndle, 1 each by Misc.(Non-Drug; Combo Route) route 4 (four) times daily. Patient needs an appointment, Disp: 400 each, Rfl: 0    ramipril (ALTACE) 10 MG capsule, Take 1 capsule (10 mg total) by mouth once daily., Disp: 90 capsule, Rfl: 3    ACCU-CHEK SMARTVIEW TEST STRIP Strp, 1 strip by Misc.(Non-Drug; Combo Route) route 3 (three) times daily. Patient needs an appointment, Disp: 300 strip, Rfl: 0    aspirin (ECOTRIN) 325 MG EC tablet, Take 1 tablet (325 mg total) by mouth every 12 (twelve) hours. (Patient taking differently: Take 81 mg by mouth every 12 (twelve) hours. ), Disp: 42 tablet, Rfl: 0    ezetimibe (ZETIA) 10 mg tablet, Take 1 tablet (10 mg total) by mouth once daily., Disp: 90 tablet, Rfl: 3    promethazine (PHENERGAN) 25 MG tablet, Take 1 tablet (25 mg total) by mouth every 6 (six) hours as needed for Nausea., Disp: 16 tablet, Rfl: 0    traMADol (ULTRAM) 50 mg tablet, Take 1 tablet (50 mg total) by mouth every 8 (eight) hours as needed (surgical pain)., Disp: 30 tablet, Rfl: 0  No current facility-administered medications for this visit.     Facility-Administered Medications Ordered in Other Visits:     triamcinolone acetonide injection 40 mg, 40 mg, Intra-articular, , Patricio Multani MD, 40 mg at 10/26/16 0858     Review of patient's allergies indicates:   Allergen Reactions    No known drug allergies     " "    Exam:  Ht 5' 8" (1.727 m)   Wt 97 kg (213 lb 11.8 oz)   BMI 32.50 kg/m²   The R hip is examined and there is mild tenderness about the posterior aspect.  ROM show flexion 0-90 , internal rotation 0-35, external rotation 0-40 .   There is No back tenderness.     Radiograph   Osteoarthritis: mild changes from film in 2014 of R hip  Left hip YANICK component in place.    Assessment:  Patient is 63 year old male with right hip pain.  Plan:  Patient's pain is not limiting his daily activities currently. With only mild joint space narrowing and no significant limitations of his activity, the patient will return when he so desires.         "

## 2018-10-02 RX ORDER — EMPAGLIFLOZIN 25 MG/1
TABLET, FILM COATED ORAL
Qty: 90 TABLET | Refills: 0 | Status: SHIPPED | OUTPATIENT
Start: 2018-10-02 | End: 2019-01-06 | Stop reason: SDUPTHER

## 2018-10-19 ENCOUNTER — PATIENT MESSAGE (OUTPATIENT)
Dept: ENDOCRINOLOGY | Facility: CLINIC | Age: 63
End: 2018-10-19

## 2018-10-19 ENCOUNTER — PATIENT MESSAGE (OUTPATIENT)
Dept: CARDIOLOGY | Facility: CLINIC | Age: 63
End: 2018-10-19

## 2018-10-26 ENCOUNTER — LAB VISIT (OUTPATIENT)
Dept: LAB | Facility: HOSPITAL | Age: 63
End: 2018-10-26
Attending: INTERNAL MEDICINE
Payer: MEDICARE

## 2018-10-26 ENCOUNTER — IMMUNIZATION (OUTPATIENT)
Dept: INTERNAL MEDICINE | Facility: CLINIC | Age: 63
End: 2018-10-26
Payer: MEDICARE

## 2018-10-26 ENCOUNTER — OFFICE VISIT (OUTPATIENT)
Dept: INTERNAL MEDICINE | Facility: CLINIC | Age: 63
End: 2018-10-26
Payer: MEDICARE

## 2018-10-26 VITALS
DIASTOLIC BLOOD PRESSURE: 70 MMHG | OXYGEN SATURATION: 98 % | WEIGHT: 213 LBS | SYSTOLIC BLOOD PRESSURE: 130 MMHG | HEART RATE: 69 BPM | HEIGHT: 68 IN | BODY MASS INDEX: 32.28 KG/M2

## 2018-10-26 DIAGNOSIS — Z79.4 TYPE 2 DIABETES MELLITUS WITHOUT COMPLICATION, WITH LONG-TERM CURRENT USE OF INSULIN: ICD-10-CM

## 2018-10-26 DIAGNOSIS — M19.90 OSTEOARTHRITIS, UNSPECIFIED OSTEOARTHRITIS TYPE, UNSPECIFIED SITE: ICD-10-CM

## 2018-10-26 DIAGNOSIS — Z00.00 ANNUAL PHYSICAL EXAM: ICD-10-CM

## 2018-10-26 DIAGNOSIS — E11.9 TYPE 2 DIABETES MELLITUS WITHOUT COMPLICATION, WITH LONG-TERM CURRENT USE OF INSULIN: ICD-10-CM

## 2018-10-26 DIAGNOSIS — I10 ESSENTIAL HYPERTENSION: ICD-10-CM

## 2018-10-26 DIAGNOSIS — N52.9 ERECTILE DYSFUNCTION, UNSPECIFIED ERECTILE DYSFUNCTION TYPE: ICD-10-CM

## 2018-10-26 DIAGNOSIS — I25.10 CORONARY ARTERY DISEASE INVOLVING NATIVE CORONARY ARTERY OF NATIVE HEART WITHOUT ANGINA PECTORIS: ICD-10-CM

## 2018-10-26 DIAGNOSIS — Z12.5 ENCOUNTER FOR SCREENING FOR MALIGNANT NEOPLASM OF PROSTATE: ICD-10-CM

## 2018-10-26 DIAGNOSIS — Z00.00 ANNUAL PHYSICAL EXAM: Primary | ICD-10-CM

## 2018-10-26 DIAGNOSIS — E78.5 DYSLIPIDEMIA: ICD-10-CM

## 2018-10-26 LAB
ALBUMIN SERPL BCP-MCNC: 3.8 G/DL
ALP SERPL-CCNC: 65 U/L
ALT SERPL W/O P-5'-P-CCNC: 33 U/L
ANION GAP SERPL CALC-SCNC: 6 MMOL/L
ANISOCYTOSIS BLD QL SMEAR: SLIGHT
AST SERPL-CCNC: 27 U/L
BASOPHILS # BLD AUTO: 0.05 K/UL
BASOPHILS NFR BLD: 0.5 %
BILIRUB SERPL-MCNC: 0.8 MG/DL
BUN SERPL-MCNC: 19 MG/DL
BURR CELLS BLD QL SMEAR: ABNORMAL
CALCIUM SERPL-MCNC: 9.5 MG/DL
CHLORIDE SERPL-SCNC: 104 MMOL/L
CHOLEST SERPL-MCNC: 117 MG/DL
CHOLEST/HDLC SERPL: 3.7 {RATIO}
CO2 SERPL-SCNC: 29 MMOL/L
COMPLEXED PSA SERPL-MCNC: 0.97 NG/ML
CREAT SERPL-MCNC: 0.9 MG/DL
DIFFERENTIAL METHOD: ABNORMAL
EOSINOPHIL # BLD AUTO: 0 K/UL
EOSINOPHIL NFR BLD: 0.1 %
ERYTHROCYTE [DISTWIDTH] IN BLOOD BY AUTOMATED COUNT: 13.7 %
EST. GFR  (AFRICAN AMERICAN): >60 ML/MIN/1.73 M^2
EST. GFR  (NON AFRICAN AMERICAN): >60 ML/MIN/1.73 M^2
ESTIMATED AVG GLUCOSE: 154 MG/DL
GLUCOSE SERPL-MCNC: 160 MG/DL
HBA1C MFR BLD HPLC: 7 %
HCT VFR BLD AUTO: 48.3 %
HDLC SERPL-MCNC: 32 MG/DL
HDLC SERPL: 27.4 %
HGB BLD-MCNC: 15.9 G/DL
IMM GRANULOCYTES # BLD AUTO: 0.03 K/UL
IMM GRANULOCYTES NFR BLD AUTO: 0.3 %
LDLC SERPL CALC-MCNC: 56.2 MG/DL
LYMPHOCYTES # BLD AUTO: 2.7 K/UL
LYMPHOCYTES NFR BLD: 29.5 %
MCH RBC QN AUTO: 30.8 PG
MCHC RBC AUTO-ENTMCNC: 32.9 G/DL
MCV RBC AUTO: 94 FL
MONOCYTES # BLD AUTO: 0.7 K/UL
MONOCYTES NFR BLD: 7.7 %
NEUTROPHILS # BLD AUTO: 5.8 K/UL
NEUTROPHILS NFR BLD: 61.9 %
NONHDLC SERPL-MCNC: 85 MG/DL
NRBC BLD-RTO: 0 /100 WBC
PLATELET # BLD AUTO: 156 K/UL
PLATELET BLD QL SMEAR: ABNORMAL
PMV BLD AUTO: 14.1 FL
POIKILOCYTOSIS BLD QL SMEAR: SLIGHT
POTASSIUM SERPL-SCNC: 5 MMOL/L
PROT SERPL-MCNC: 6.7 G/DL
RBC # BLD AUTO: 5.16 M/UL
SODIUM SERPL-SCNC: 139 MMOL/L
TESTOST SERPL-MCNC: 339 NG/DL
TRIGL SERPL-MCNC: 144 MG/DL
TSH SERPL DL<=0.005 MIU/L-ACNC: 1.51 UIU/ML
WBC # BLD AUTO: 9.3 K/UL

## 2018-10-26 PROCEDURE — 84153 ASSAY OF PSA TOTAL: CPT | Mod: HCNC

## 2018-10-26 PROCEDURE — 80053 COMPREHEN METABOLIC PANEL: CPT | Mod: HCNC

## 2018-10-26 PROCEDURE — 80061 LIPID PANEL: CPT | Mod: HCNC

## 2018-10-26 PROCEDURE — 90662 IIV NO PRSV INCREASED AG IM: CPT | Mod: PBBFAC,HCNC,PO

## 2018-10-26 PROCEDURE — 84403 ASSAY OF TOTAL TESTOSTERONE: CPT | Mod: HCNC

## 2018-10-26 PROCEDURE — 36415 COLL VENOUS BLD VENIPUNCTURE: CPT | Mod: HCNC,PO

## 2018-10-26 PROCEDURE — 3075F SYST BP GE 130 - 139MM HG: CPT | Mod: CPTII,HCNC,, | Performed by: INTERNAL MEDICINE

## 2018-10-26 PROCEDURE — 85025 COMPLETE CBC W/AUTO DIFF WBC: CPT | Mod: HCNC

## 2018-10-26 PROCEDURE — 3045F PR MOST RECENT HEMOGLOBIN A1C LEVEL 7.0-9.0%: CPT | Mod: CPTII,HCNC,, | Performed by: INTERNAL MEDICINE

## 2018-10-26 PROCEDURE — 99215 OFFICE O/P EST HI 40 MIN: CPT | Mod: PBBFAC,25,HCNC,PO | Performed by: INTERNAL MEDICINE

## 2018-10-26 PROCEDURE — 83036 HEMOGLOBIN GLYCOSYLATED A1C: CPT | Mod: HCNC

## 2018-10-26 PROCEDURE — 90670 PCV13 VACCINE IM: CPT | Mod: PBBFAC,HCNC,PO

## 2018-10-26 PROCEDURE — 3078F DIAST BP <80 MM HG: CPT | Mod: CPTII,HCNC,, | Performed by: INTERNAL MEDICINE

## 2018-10-26 PROCEDURE — 99397 PER PM REEVAL EST PAT 65+ YR: CPT | Mod: S$PBB,HCNC,, | Performed by: INTERNAL MEDICINE

## 2018-10-26 PROCEDURE — 99999 PR PBB SHADOW E&M-EST. PATIENT-LVL V: CPT | Mod: PBBFAC,HCNC,, | Performed by: INTERNAL MEDICINE

## 2018-10-26 PROCEDURE — 84443 ASSAY THYROID STIM HORMONE: CPT | Mod: HCNC

## 2018-10-26 RX ORDER — TADALAFIL 20 MG/1
20 TABLET ORAL DAILY PRN
Qty: 10 TABLET | Refills: 11 | Status: SHIPPED | OUTPATIENT
Start: 2018-10-26 | End: 2018-11-29 | Stop reason: SDUPTHER

## 2018-10-26 NOTE — PROGRESS NOTES
PAST MEDICAL HISTORY:   Coronary artery disease with coronary bypass surgery in year .  Hypertension.  Hyperlipidemia.  Type 2 diabetes, which he states has done well since he has been on Invokana.  Osteoarthritis of the hip with left total hip replacement.  Right inguinal hernia repair.  Left foot surgery.  Fistulotomy.    SOCIAL HISTORY:  Tobacco use, none.  Alcohol use, limited.  , has three   children.  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    FAMILY HISTORY:  Father is , lung cancer.  Mother is , heart   disease.  One brother , colon cancer, and another brother with   Hypertension.    MEDICATIONS:  Atorvastatin 80 mg.  Aspirin 81 mg.  Carvedilol 25 mg half tablet twice a day.  Zetia 10 mg.  Multivitamin.  Lasix 40 mg.  Lantus 35 units in the evening.  Jardiance 25 mg  Metformin 1000 mg twice a day.  Omega-3 fatty acids.  Ramipril 10 mg a day.            REASON FOR VISIT:  This is a 63-year-old male who comes in for an annual routine   visit.  Presently, he feels well, no particular complaints.  He is inquiring   about a prescription for Cialis regarding erectile dysfunction.    In the interim, he had surgery on his right shoulder for rotator cuff repair.    He responded well, but it was very painful at first.    A month ago, he met with Orthopedics regarding arthritic flare involving the   right hip, but this has passed.  He has had a hip replacement on the left.    In regard to his medicines, he is now on Jardiance in place of Invokana because   of insurance coverage.    Regarding diabetes, occasional blood glucose readings in the morning will be in   the 130s.    SYSTEMS REVIEW:  Endorses no chest pain, shortness of breath, palpitations or   abdominal pain.  Regular bowel function.  No difficulty urinating.  It is   frequent because of Lasix, but there is no incontinence and maybe nocturia x1 at   most.  Presently, no arthralgias, headaches or  indigestion.    PHYSICAL EXAMINATION:  VITAL SIGNS:  Weight is 213 pounds, blood pressure by me 130/68, pulse 68.  HEENT:  Tympanic membranes normal.  Nasal mucosa is clear.  Oropharynx, no   abnormal findings.  NECK:  No thyromegaly.  LUNGS:  Clear.  HEART:  Regular rate and rhythm.  ABDOMEN:  Active bowel sounds, soft, nontender.  No hepatosplenomegaly or   abdominal masses.  PULSES:  2+ carotid pulses, 2+ pedal pulses.  EXTREMITIES:  No edema.  LYMPH GLAND:  No palpable adenopathy.  GENITALIA:  No scrotal masses, no hernias.  DIGITAL RECTAL EXAM:  Stool is brown, heme-negative.  Prostate minimally   enlarged at most.    IMPRESSION:  1.  General exam.  2.  Type 2 diabetes.  3.  Hypertension.  4.  Hyperlipidemia.  5.  Coronary artery disease.  6.  Osteoarthritis.  7.  Erectile dysfunction.    PLAN:  Routine labs, also get testosterone and PSA.  Refer to Podiatry.    Prescription for Cialis was given.  Continue with attention to proper diet and   physical activity and phone review to follow up.        /kanwal [id number] review        AUGUSTO/ELLI  dd: 10/26/2018 09:39:42 (CDT)  td: 10/26/2018 22:44:27 (CDT)  Doc ID   #[id number]  Job ID #[id number]    CC:

## 2018-11-29 ENCOUNTER — PATIENT MESSAGE (OUTPATIENT)
Dept: INTERNAL MEDICINE | Facility: CLINIC | Age: 63
End: 2018-11-29

## 2018-11-29 RX ORDER — TADALAFIL 20 MG/1
20 TABLET ORAL DAILY PRN
Qty: 10 TABLET | Refills: 3 | Status: SHIPPED | OUTPATIENT
Start: 2018-11-29 | End: 2022-01-26

## 2018-11-30 ENCOUNTER — OFFICE VISIT (OUTPATIENT)
Dept: PODIATRY | Facility: CLINIC | Age: 63
End: 2018-11-30
Payer: MEDICARE

## 2018-11-30 VITALS
BODY MASS INDEX: 30.49 KG/M2 | SYSTOLIC BLOOD PRESSURE: 135 MMHG | HEART RATE: 62 BPM | DIASTOLIC BLOOD PRESSURE: 75 MMHG | WEIGHT: 213 LBS | HEIGHT: 70 IN

## 2018-11-30 DIAGNOSIS — E08.59 DIABETES MELLITUS DUE TO UNDERLYING CONDITION WITH OTHER CIRCULATORY COMPLICATION, UNSPECIFIED WHETHER LONG TERM INSULIN USE: Primary | ICD-10-CM

## 2018-11-30 DIAGNOSIS — L85.3 DRY SKIN: ICD-10-CM

## 2018-11-30 PROCEDURE — 1101F PT FALLS ASSESS-DOCD LE1/YR: CPT | Mod: CPTII,HCNC,S$GLB, | Performed by: PODIATRIST

## 2018-11-30 PROCEDURE — 3078F DIAST BP <80 MM HG: CPT | Mod: CPTII,HCNC,S$GLB, | Performed by: PODIATRIST

## 2018-11-30 PROCEDURE — 3008F BODY MASS INDEX DOCD: CPT | Mod: CPTII,HCNC,S$GLB, | Performed by: PODIATRIST

## 2018-11-30 PROCEDURE — 3075F SYST BP GE 130 - 139MM HG: CPT | Mod: CPTII,HCNC,S$GLB, | Performed by: PODIATRIST

## 2018-11-30 PROCEDURE — 99999 PR PBB SHADOW E&M-EST. PATIENT-LVL IV: CPT | Mod: PBBFAC,HCNC,, | Performed by: PODIATRIST

## 2018-11-30 PROCEDURE — 99203 OFFICE O/P NEW LOW 30 MIN: CPT | Mod: HCNC,S$GLB,, | Performed by: PODIATRIST

## 2018-11-30 RX ORDER — AMMONIUM LACTATE 12 G/100G
CREAM TOPICAL
Qty: 1 TUBE | Refills: 3 | Status: SHIPPED | OUTPATIENT
Start: 2018-11-30

## 2018-11-30 NOTE — PATIENT INSTRUCTIONS
Lotrimin Spray for in between the toes    Diabetic Foot Care  Diabetes can lead to a number of foot complications. Fortunately, you can prevent most of these with a little extra foot care. If diabetes is not well controlled, it can cause damage to blood vessels and result in poor circulation to the foot. When the skin does not get enough blood flow, it becomes prone to pressure sores and ulcers, which heal slowly.  Diabetes can also damage nerves, interfering with the ability to feel pain and pressure. When you cant feel your foot normally, it is easy to injure your skin, bones, and joints without knowing it. For these reasons diabetes increases the risk of fungal infections, bunions, and ulcers. An ulcer is a sore or break in the skin. With ulcers, often the skin seems to have worn away. Deep ulcers can lead to bone infection.  Gangrene is the most serious foot complication of diabetes. It usually occurs on the tips of the toes as blackened areas of skin. The black area is dead tissue. In severe cases, gangrene spreads to involve the entire toe, other toes, and the entire foot. Foot or toe amputation may be required. Good foot care and blood sugar control can prevent this.  Home care  · Wear comfortable, well-fitting shoes.  · Wash your feet daily with warm water and mild soap.  · After drying, apply a moisturizing cream or lotion to the top and bottom of your feet. Don't put lotion between toes.  · Check your feet daily for skin breaks, blisters, swelling, or redness. Look between your toes as well. If you cannot see the bottoms of your feet, ask someone to look or use a mirror.  · Wear cotton socks and change them every day.  · Trim toenails carefully, and do not cut your cuticles.  · Strive to keep your blood sugar under control with a combination of medicines, diet, and activity.  · If you smoke and have diabetes, it is very important that you stop. Smoking reduces blood flow to your foot.  · Schedule foot  exams at least every year, or more often if you have foot problems.  · Put your feet up when sitting, wiggle toes, and move ankles to help improve blood flow.  Avoid activities that increase your risk of foot injury:  · Do not walk barefoot.  · Do not use heating pads or hot water bottles on your feet.  · Do not put your foot in a hot tub without first checking the temperature with your hand.  Follow-up care  Follow up with your healthcare provider, or as advised. Be sure to take off your shoes and socks before your appointment starts so your healthcare provider will be sure to check your feet. Report any cut, puncture, scrape, blister, or other injury to your foot. Also report if you have a bunion, hammertoes, ingrown toenail, or ulcer on your foot.  When to seek medical advice  Call your healthcare provider right away if any of these occur:  · Black skin color anywhere on the foot  · Open ulcer with pus draining from the wound  · Increasing foot or leg pain  · New areas of redness or swelling or tender areas of the foot  · Fever of 100.4°F (38°C) or greater  Date Last Reviewed: 5/25/2016  © 9999-3771 [employer]. [address] [place] [address], [place] [address]. All rights reserved. This information is not intended as a substitute for professional medical care. Always follow your healthcare professional's instructions.

## 2018-11-30 NOTE — LETTER
December 4, 2018      Dallas Martin MD  [address]  [place] [address]           [facility] Podiatry  [address]  [place] [address]  Phone: [phone]          Patient: Jm Deleon   MR Number: [id number]   YOB: 1955   Date of Visit: 11/30/2018       Dear Dr. Dallas Martin:    Thank you for referring Jm Deleon to me for evaluation. Attached you will find relevant portions of my assessment and plan of care.    If you have questions, please do not hesitate to call me. I look forward to following Jm Deleon along with you.    Sincerely,    Cristian Hagen, PAZ    Enclosure  CC:  No Recipients    If you would like to receive this communication electronically, please contact [email or web address] or [phone] to request more information on [facility] Link access.    For providers and/or their staff who would like to refer a patient to [facility], please contact us through our one-stop-shop provider referral line, [facility], at [phone].    If you feel you have received this communication in error or would no longer like to receive these types of communications, please e-mail [email or web address]

## 2018-12-04 NOTE — PROGRESS NOTES
Subjective:      Patient ID: Jm Deleon is a 63 y.o. male.    Chief Complaint: Diabetes Mellitus (dr martin10/26/2018) and Diabetic Foot Exam    Jm is a 63 y.o. male who presents to the clinic for evaluation and treatment of high risk feet. Jm has a past medical history of Allergy, Arthritis, Coronary artery disease, Diabetes mellitus, Hyperlipidemia, Hypertension, Joint pain, and Squamous cell carcinoma. The patient's chief complaint is diabetic foot exam. This patient has documented high risk feet requiring routine maintenance secondary to peripheral vascular disease.    PCP: Dallas Martin MD    Date Last Seen by PCP: dr martin10/26/2018    Current shoe gear:  Affected Foot: Tennis shoes     Unaffected Foot: Tennis shoes    Hemoglobin A1C   Date Value Ref Range Status   10/26/2018 7.0 (H) 4.0 - 5.6 % Final     Comment:     ADA Screening Guidelines:  5.7-6.4%  Consistent with prediabetes  >or=6.5%  Consistent with diabetes  High levels of fetal hemoglobin interfere with the HbA1C  assay. Heterozygous hemoglobin variants (HbS, HgC, etc)do  not significantly interfere with this assay.   However, presence of multiple variants may affect accuracy.     09/08/2017 6.8 (H) 4.0 - 5.6 % Final     Comment:     According to ADA guidelines, hemoglobin A1c <7.0% represents  optimal control in non-pregnant diabetic patients. Different  metrics may apply to specific patient populations.   Standards of Medical Care in Diabetes-2016.  For the purpose of screening for the presence of diabetes:  <5.7%     Consistent with the absence of diabetes  5.7-6.4%  Consistent with increasing risk for diabetes   (prediabetes)  >or=6.5%  Consistent with diabetes  Currently, no consensus exists for use of hemoglobin A1c  for diagnosis of diabetes for children.  This Hemoglobin A1c assay has significant interference with fetal   hemoglobin   (HbF). The results are invalid for patients with abnormal amounts of   HbF,   including those  with known Hereditary Persistence   of Fetal Hemoglobin. Heterozygous hemoglobin variants (HbAS, HbAC,   HbAD, HbAE, HbA2) do not significantly interfere with this assay;   however, presence of multiple variants in a sample may impact the %   interference.     08/11/2017 6.9 (H) 4.0 - 5.6 % Final     Comment:     According to ADA guidelines, hemoglobin A1c <7.0% represents  optimal control in non-pregnant diabetic patients. Different  metrics may apply to specific patient populations.   Standards of Medical Care in Diabetes-2016.  For the purpose of screening for the presence of diabetes:  <5.7%     Consistent with the absence of diabetes  5.7-6.4%  Consistent with increasing risk for diabetes   (prediabetes)  >or=6.5%  Consistent with diabetes  Currently, no consensus exists for use of hemoglobin A1c  for diagnosis of diabetes for children.  This Hemoglobin A1c assay has significant interference with fetal   hemoglobin   (HbF). The results are invalid for patients with abnormal amounts of   HbF,   including those with known Hereditary Persistence   of Fetal Hemoglobin. Heterozygous hemoglobin variants (HbAS, HbAC,   HbAD, HbAE, HbA2) do not significantly interfere with this assay;   however, presence of multiple variants in a sample may impact the %   interference.         Review of Systems   Constitution: Negative for chills, fever and malaise/fatigue.   HENT: Negative for hearing loss.    Cardiovascular: Negative for claudication.   Respiratory: Negative for shortness of breath.    Skin: Positive for color change, dry skin and nail changes. Negative for flushing and rash.   Musculoskeletal: Negative for joint pain and myalgias.   Neurological: Negative for loss of balance, numbness, paresthesias and sensory change.   Psychiatric/Behavioral: Negative for altered mental status.           Objective:      Physical Exam   Constitutional: He is oriented to person, place, and time. He appears well-developed and  well-nourished.   Cardiovascular: Exam reveals decreased pulses.   no edema noted to b/L LEs   Musculoskeletal:        Right knee: He exhibits no swelling and no ecchymosis.        Left knee: He exhibits no swelling and no ecchymosis.        Right ankle: He exhibits normal range of motion, no swelling, no ecchymosis and normal pulse. No lateral malleolus, no medial malleolus and no head of 5th metatarsal tenderness found. Achilles tendon exhibits no pain, no defect and normal Parson's test results.        Left ankle: He exhibits normal range of motion, no swelling, no ecchymosis and normal pulse. No lateral malleolus, no medial malleolus and no head of 5th metatarsal tenderness found. Achilles tendon exhibits no pain and normal Parson's test results.        Right lower leg: He exhibits no tenderness, no bony tenderness, no swelling, no edema and no deformity.        Left lower leg: He exhibits no tenderness, no swelling and no edema.        Right foot: There is normal range of motion and no deformity.        Left foot: There is normal range of motion and no deformity.   Adequate joint ROM noted to all lower extremity muscle groups with no pain or crepitation noted. Muscle strength is 5/5 in all groups bilaterally.     Feet:   Right Foot:   Protective Sensation: 5 sites tested. 5 sites sensed.   Left Foot:   Protective Sensation: 5 sites tested. 5 sites sensed.   Neurological: He is alert and oriented to person, place, and time.   Gross sensation intact to b/L lower extremities   Skin: Skin is warm. Capillary refill takes more than 3 seconds. No abrasion, no bruising, no burn and no ecchymosis noted.   No open lesions noted to b/L lower extremities.   Xerosis noted to b/L heels  Nails x10 short and dystrophic     Psychiatric: He has a normal mood and affect. His speech is normal and behavior is normal. He is attentive.             Assessment:       Encounter Diagnoses   Name Primary?    Diabetes mellitus due to  underlying condition with other circulatory complication, unspecified whether long term insulin use Yes    Dry skin          Plan:       Jm was seen today for diabetes mellitus and diabetic foot exam.    Diagnoses and all orders for this visit:    Diabetes mellitus due to underlying condition with other circulatory complication, unspecified whether long term insulin use    Dry skin  -     ammonium lactate 12 % Crea; Apply small amount to feet except for in between toes twice a day      I counseled the patient on his conditions, their implications and medical management.        - Shoe inspection. Diabetic Foot Education. Patient reminded of the importance of good nutrition and blood sugar control to help prevent podiatric complications of diabetes. Patient instructed on proper foot hygeine. We discussed wearing proper shoe gear, daily foot inspections, never walking without protective shoe gear, never putting sharp instruments to feet,   Rx uche lact for dry skin  RTC in 6 months or sooner if any new pedal problems should arise.

## 2018-12-12 ENCOUNTER — PATIENT MESSAGE (OUTPATIENT)
Dept: INTERNAL MEDICINE | Facility: CLINIC | Age: 63
End: 2018-12-12

## 2018-12-16 ENCOUNTER — PATIENT MESSAGE (OUTPATIENT)
Dept: CARDIOLOGY | Facility: CLINIC | Age: 63
End: 2018-12-16

## 2018-12-17 RX ORDER — METFORMIN HYDROCHLORIDE 1000 MG/1
TABLET ORAL
Qty: 180 TABLET | Refills: 0 | Status: SHIPPED | OUTPATIENT
Start: 2018-12-17 | End: 2019-03-18 | Stop reason: SDUPTHER

## 2019-01-04 ENCOUNTER — PATIENT MESSAGE (OUTPATIENT)
Dept: CARDIOLOGY | Facility: CLINIC | Age: 64
End: 2019-01-04

## 2019-01-06 RX ORDER — EMPAGLIFLOZIN 25 MG/1
TABLET, FILM COATED ORAL
Qty: 90 TABLET | Refills: 0 | Status: SHIPPED | OUTPATIENT
Start: 2019-01-06 | End: 2019-03-18 | Stop reason: SDUPTHER

## 2019-01-08 ENCOUNTER — PATIENT MESSAGE (OUTPATIENT)
Dept: CARDIOLOGY | Facility: CLINIC | Age: 64
End: 2019-01-08

## 2019-01-08 RX ORDER — NITROGLYCERIN 0.4 MG/1
0.4 TABLET SUBLINGUAL EVERY 5 MIN PRN
Qty: 25 TABLET | Refills: 4 | Status: SHIPPED | OUTPATIENT
Start: 2019-01-08 | End: 2020-01-23

## 2019-03-11 ENCOUNTER — PATIENT MESSAGE (OUTPATIENT)
Dept: CARDIOLOGY | Facility: CLINIC | Age: 64
End: 2019-03-11

## 2019-03-11 ENCOUNTER — TELEPHONE (OUTPATIENT)
Dept: ENDOCRINOLOGY | Facility: CLINIC | Age: 64
End: 2019-03-11

## 2019-03-11 ENCOUNTER — PATIENT MESSAGE (OUTPATIENT)
Dept: ENDOCRINOLOGY | Facility: CLINIC | Age: 64
End: 2019-03-11

## 2019-03-11 DIAGNOSIS — I10 ESSENTIAL HYPERTENSION: ICD-10-CM

## 2019-03-11 DIAGNOSIS — I25.10 CORONARY ARTERY DISEASE INVOLVING NATIVE CORONARY ARTERY OF NATIVE HEART WITHOUT ANGINA PECTORIS: ICD-10-CM

## 2019-03-11 RX ORDER — RAMIPRIL 10 MG/1
10 CAPSULE ORAL DAILY
Qty: 90 CAPSULE | Refills: 3 | Status: SHIPPED | OUTPATIENT
Start: 2019-03-11 | End: 2019-08-27 | Stop reason: ALTCHOICE

## 2019-03-11 RX ORDER — INSULIN GLARGINE 100 [IU]/ML
INJECTION, SOLUTION SUBCUTANEOUS
Qty: 30 ML | Refills: 0 | OUTPATIENT
Start: 2019-03-11

## 2019-03-11 RX ORDER — PEN NEEDLE, DIABETIC 31 GX5/16"
NEEDLE, DISPOSABLE MISCELLANEOUS
Refills: 0 | OUTPATIENT
Start: 2019-03-11

## 2019-03-11 RX ORDER — FUROSEMIDE 40 MG/1
40 TABLET ORAL DAILY
Qty: 90 TABLET | Refills: 3 | Status: SHIPPED | OUTPATIENT
Start: 2019-03-11 | End: 2020-12-02 | Stop reason: SDUPTHER

## 2019-03-11 RX ORDER — ATORVASTATIN CALCIUM 80 MG/1
TABLET, FILM COATED ORAL
Qty: 90 TABLET | Refills: 3 | Status: SHIPPED | OUTPATIENT
Start: 2019-03-11 | End: 2020-05-07

## 2019-03-11 NOTE — TELEPHONE ENCOUNTER
----- Message from Fany Astudillo sent at 3/11/2019  4:12 PM CDT -----  Contact: Patient   Patient needed to make an appointment to get his medication refill. He also needs orders for blood work.i attempted to make an appointment but was unable to    Callback: [phone]    Thank you

## 2019-03-12 ENCOUNTER — PATIENT MESSAGE (OUTPATIENT)
Dept: INTERNAL MEDICINE | Facility: CLINIC | Age: 64
End: 2019-03-12

## 2019-03-12 ENCOUNTER — PATIENT MESSAGE (OUTPATIENT)
Dept: CARDIOLOGY | Facility: CLINIC | Age: 64
End: 2019-03-12

## 2019-03-12 DIAGNOSIS — E78.5 DYSLIPIDEMIA: ICD-10-CM

## 2019-03-12 DIAGNOSIS — E08.59 DIABETES MELLITUS DUE TO UNDERLYING CONDITION WITH OTHER CIRCULATORY COMPLICATION, UNSPECIFIED WHETHER LONG TERM INSULIN USE: ICD-10-CM

## 2019-03-12 DIAGNOSIS — I10 ESSENTIAL HYPERTENSION: Primary | ICD-10-CM

## 2019-03-12 DIAGNOSIS — I25.10 CORONARY ARTERY DISEASE INVOLVING NATIVE CORONARY ARTERY OF NATIVE HEART WITHOUT ANGINA PECTORIS: ICD-10-CM

## 2019-03-12 RX ORDER — INSULIN GLARGINE 100 [IU]/ML
INJECTION, SOLUTION SUBCUTANEOUS
Qty: 30 ML | Refills: 1 | Status: SHIPPED | OUTPATIENT
Start: 2019-03-12 | End: 2019-03-21 | Stop reason: SDUPTHER

## 2019-03-12 NOTE — TELEPHONE ENCOUNTER
Control is very good   Please contact pcp for refills and visit   Ok with me to send in 1 months worth of refills in the interim   rtc if control worsens or new problems  pc

## 2019-03-18 ENCOUNTER — PATIENT MESSAGE (OUTPATIENT)
Dept: INTERNAL MEDICINE | Facility: CLINIC | Age: 64
End: 2019-03-18

## 2019-03-18 RX ORDER — METFORMIN HYDROCHLORIDE 1000 MG/1
TABLET ORAL
Qty: 180 TABLET | Refills: 1 | Status: SHIPPED | OUTPATIENT
Start: 2019-03-18 | End: 2019-03-21 | Stop reason: SDUPTHER

## 2019-03-21 RX ORDER — INSULIN GLARGINE 100 [IU]/ML
INJECTION, SOLUTION SUBCUTANEOUS
Qty: 30 ML | Refills: 1 | Status: SHIPPED | OUTPATIENT
Start: 2019-03-21 | End: 2019-09-24 | Stop reason: SDUPTHER

## 2019-03-21 RX ORDER — METFORMIN HYDROCHLORIDE 1000 MG/1
TABLET ORAL
Qty: 180 TABLET | Refills: 1 | Status: SHIPPED | OUTPATIENT
Start: 2019-03-21 | End: 2019-12-05 | Stop reason: SDUPTHER

## 2019-03-21 RX ORDER — INSULIN ASPART 100 [IU]/ML
10 INJECTION, SOLUTION INTRAVENOUS; SUBCUTANEOUS
Qty: 2 BOX | Refills: 0 | Status: SHIPPED | OUTPATIENT
Start: 2019-03-21 | End: 2020-10-27 | Stop reason: SDUPTHER

## 2019-03-31 DIAGNOSIS — E78.5 DYSLIPIDEMIA: ICD-10-CM

## 2019-03-31 DIAGNOSIS — I25.10 CORONARY ARTERY DISEASE INVOLVING NATIVE CORONARY ARTERY OF NATIVE HEART WITHOUT ANGINA PECTORIS: ICD-10-CM

## 2019-04-01 RX ORDER — EZETIMIBE 10 MG/1
10 TABLET ORAL DAILY
Qty: 90 TABLET | Refills: 3 | Status: SHIPPED | OUTPATIENT
Start: 2019-04-01 | End: 2021-01-04 | Stop reason: SDUPTHER

## 2019-05-27 ENCOUNTER — PATIENT MESSAGE (OUTPATIENT)
Dept: INTERNAL MEDICINE | Facility: CLINIC | Age: 64
End: 2019-05-27

## 2019-05-30 NOTE — PROGRESS NOTES
PAST MEDICAL HISTORY:   Coronary artery disease with coronary bypass surgery in year 2000.  Hypertension.  Hyperlipidemia.  Type 2 diabetes  Osteoarthritis of the hip with left total hip replacement.  Right inguinal hernia repair.  Left foot surgery.  Fistulotomy.     SOCIAL HISTORY:  Tobacco use, none.  Alcohol use, limited    MEDICATIONS:  Atorvastatin 80 mg.  Aspirin 81 mg.  Carvedilol 25 mg half tablet twice a day.  Zetia 10 mg.half  Multivitamin.  Lasix 40 mg.  Lantus 35 units in the evening.  Jardiance 25 mg  Metformin 1000 mg twice a day.  Omega-3 fatty acids.  Ramipril 10 mg a day.          REASON FOR VISIT:  This is a 64-year-old male who is coming in for a regular   routine follow-up.  Overall in general, no acute changes, may feel achy in the   morning, but as he continues to work, it gets better.    PAST MEDICAL HISTORY & MEDICATIONS:  Outlined above.& MEDICATIONS:  Outlined   above.    Glucose readings in the morning average anywhere from 120 to 150 and he states   it is usually low in the afternoon.    Again, he feels that when he was started on Invokana, now Jardiance his blood   sugar readings have been better.    REVIEW OF SYMPTOMS:  Reports no pains in the chest, palpitations, shortness of   breath or abdominal pain.  Bowel function is regular.  As far as urination, it   tends to be frequent, nocturia x1.    PHYSICAL EXAMINATION:  VITAL SIGNS:  Weight is 218, pulse 60, blood pressure 108/62.  NECK:  No thyromegaly.  No masses.  LUNGS:  Clear breath sounds, good effort.  HEART:  Regular rate and rhythm.  ABDOMEN:  Active bowel sounds, soft, nontender.  No hepatosplenomegaly or   abdominal masses.  PULSES:  2+ carotid pulses, no bruits.  1+ pedal pulses.    IMPRESSION:  1. Type 2 diabetes.  2. Hypertension.  3. Hyperlipidemia.  4. Coronary artery disease.    PLAN:  Today we will get a chemistry, lipid, and hemoglobin A1c.  Discussed more   active physical therapy and proper diet was discussed.  Phone  review to follow   up.        AUGUSTO/ELLI  dd: 05/31/2019 08:12:49 (CDT)  td: 05/31/2019 22:51:18 (CDT)  Doc ID   #[id number]  Job ID #[id number]    CC:

## 2019-05-31 ENCOUNTER — OFFICE VISIT (OUTPATIENT)
Dept: INTERNAL MEDICINE | Facility: CLINIC | Age: 64
End: 2019-05-31
Payer: MEDICARE

## 2019-05-31 ENCOUNTER — LAB VISIT (OUTPATIENT)
Dept: LAB | Facility: HOSPITAL | Age: 64
End: 2019-05-31
Attending: INTERNAL MEDICINE
Payer: MEDICARE

## 2019-05-31 VITALS
HEIGHT: 69 IN | WEIGHT: 218 LBS | BODY MASS INDEX: 32.29 KG/M2 | OXYGEN SATURATION: 98 % | DIASTOLIC BLOOD PRESSURE: 62 MMHG | SYSTOLIC BLOOD PRESSURE: 108 MMHG | HEART RATE: 57 BPM

## 2019-05-31 DIAGNOSIS — I25.10 CORONARY ARTERY DISEASE INVOLVING NATIVE CORONARY ARTERY OF NATIVE HEART WITHOUT ANGINA PECTORIS: ICD-10-CM

## 2019-05-31 DIAGNOSIS — E11.9 TYPE 2 DIABETES MELLITUS WITHOUT COMPLICATION, WITH LONG-TERM CURRENT USE OF INSULIN: Primary | ICD-10-CM

## 2019-05-31 DIAGNOSIS — E78.5 DYSLIPIDEMIA: ICD-10-CM

## 2019-05-31 DIAGNOSIS — I10 ESSENTIAL HYPERTENSION: ICD-10-CM

## 2019-05-31 DIAGNOSIS — E08.59 DIABETES MELLITUS DUE TO UNDERLYING CONDITION WITH OTHER CIRCULATORY COMPLICATION, UNSPECIFIED WHETHER LONG TERM INSULIN USE: ICD-10-CM

## 2019-05-31 DIAGNOSIS — Z79.4 TYPE 2 DIABETES MELLITUS WITHOUT COMPLICATION, WITH LONG-TERM CURRENT USE OF INSULIN: Primary | ICD-10-CM

## 2019-05-31 LAB
ALBUMIN SERPL BCP-MCNC: 3.9 G/DL (ref 3.5–5.2)
ALP SERPL-CCNC: 56 U/L (ref 55–135)
ALT SERPL W/O P-5'-P-CCNC: 27 U/L (ref 10–44)
ANION GAP SERPL CALC-SCNC: 7 MMOL/L (ref 8–16)
AST SERPL-CCNC: 19 U/L (ref 10–40)
BILIRUB SERPL-MCNC: 0.7 MG/DL (ref 0.1–1)
BUN SERPL-MCNC: 18 MG/DL (ref 8–23)
CALCIUM SERPL-MCNC: 9.8 MG/DL (ref 8.7–10.5)
CHLORIDE SERPL-SCNC: 104 MMOL/L (ref 95–110)
CHOLEST SERPL-MCNC: 115 MG/DL (ref 120–199)
CHOLEST/HDLC SERPL: 3.8 {RATIO} (ref 2–5)
CO2 SERPL-SCNC: 27 MMOL/L (ref 23–29)
CREAT SERPL-MCNC: 0.9 MG/DL (ref 0.5–1.4)
EST. GFR  (AFRICAN AMERICAN): >60 ML/MIN/1.73 M^2
EST. GFR  (NON AFRICAN AMERICAN): >60 ML/MIN/1.73 M^2
ESTIMATED AVG GLUCOSE: 171 MG/DL (ref 68–131)
GLUCOSE SERPL-MCNC: 166 MG/DL (ref 70–110)
HBA1C MFR BLD HPLC: 7.6 % (ref 4–5.6)
HDLC SERPL-MCNC: 30 MG/DL (ref 40–75)
HDLC SERPL: 26.1 % (ref 20–50)
LDLC SERPL CALC-MCNC: 59 MG/DL (ref 63–159)
NONHDLC SERPL-MCNC: 85 MG/DL
POTASSIUM SERPL-SCNC: 4.5 MMOL/L (ref 3.5–5.1)
PROT SERPL-MCNC: 6.6 G/DL (ref 6–8.4)
SODIUM SERPL-SCNC: 138 MMOL/L (ref 136–145)
TRIGL SERPL-MCNC: 130 MG/DL (ref 30–150)

## 2019-05-31 PROCEDURE — 36415 COLL VENOUS BLD VENIPUNCTURE: CPT | Mod: HCNC

## 2019-05-31 PROCEDURE — 3045F PR MOST RECENT HEMOGLOBIN A1C LEVEL 7.0-9.0%: ICD-10-PCS | Mod: HCNC,CPTII,S$GLB, | Performed by: INTERNAL MEDICINE

## 2019-05-31 PROCEDURE — 3045F PR MOST RECENT HEMOGLOBIN A1C LEVEL 7.0-9.0%: CPT | Mod: HCNC,CPTII,S$GLB, | Performed by: INTERNAL MEDICINE

## 2019-05-31 PROCEDURE — 3074F SYST BP LT 130 MM HG: CPT | Mod: HCNC,CPTII,S$GLB, | Performed by: INTERNAL MEDICINE

## 2019-05-31 PROCEDURE — 3078F PR MOST RECENT DIASTOLIC BLOOD PRESSURE < 80 MM HG: ICD-10-PCS | Mod: HCNC,CPTII,S$GLB, | Performed by: INTERNAL MEDICINE

## 2019-05-31 PROCEDURE — 3074F PR MOST RECENT SYSTOLIC BLOOD PRESSURE < 130 MM HG: ICD-10-PCS | Mod: HCNC,CPTII,S$GLB, | Performed by: INTERNAL MEDICINE

## 2019-05-31 PROCEDURE — 99214 OFFICE O/P EST MOD 30 MIN: CPT | Mod: HCNC,S$GLB,, | Performed by: INTERNAL MEDICINE

## 2019-05-31 PROCEDURE — 99999 PR PBB SHADOW E&M-EST. PATIENT-LVL IV: ICD-10-PCS | Mod: PBBFAC,HCNC,, | Performed by: INTERNAL MEDICINE

## 2019-05-31 PROCEDURE — 80053 COMPREHEN METABOLIC PANEL: CPT | Mod: HCNC

## 2019-05-31 PROCEDURE — 3078F DIAST BP <80 MM HG: CPT | Mod: HCNC,CPTII,S$GLB, | Performed by: INTERNAL MEDICINE

## 2019-05-31 PROCEDURE — 99214 PR OFFICE/OUTPT VISIT, EST, LEVL IV, 30-39 MIN: ICD-10-PCS | Mod: HCNC,S$GLB,, | Performed by: INTERNAL MEDICINE

## 2019-05-31 PROCEDURE — 83036 HEMOGLOBIN GLYCOSYLATED A1C: CPT | Mod: HCNC

## 2019-05-31 PROCEDURE — 1101F PR PT FALLS ASSESS DOC 0-1 FALLS W/OUT INJ PAST YR: ICD-10-PCS | Mod: HCNC,CPTII,S$GLB, | Performed by: INTERNAL MEDICINE

## 2019-05-31 PROCEDURE — 99999 PR PBB SHADOW E&M-EST. PATIENT-LVL IV: CPT | Mod: PBBFAC,HCNC,, | Performed by: INTERNAL MEDICINE

## 2019-05-31 PROCEDURE — 1101F PT FALLS ASSESS-DOCD LE1/YR: CPT | Mod: HCNC,CPTII,S$GLB, | Performed by: INTERNAL MEDICINE

## 2019-05-31 PROCEDURE — 80061 LIPID PANEL: CPT | Mod: HCNC

## 2019-06-01 ENCOUNTER — PATIENT MESSAGE (OUTPATIENT)
Dept: INTERNAL MEDICINE | Facility: CLINIC | Age: 64
End: 2019-06-01

## 2019-06-01 DIAGNOSIS — E11.9 TYPE 2 DIABETES MELLITUS WITHOUT COMPLICATION, WITH LONG-TERM CURRENT USE OF INSULIN: Primary | ICD-10-CM

## 2019-06-01 DIAGNOSIS — Z79.4 TYPE 2 DIABETES MELLITUS WITHOUT COMPLICATION, WITH LONG-TERM CURRENT USE OF INSULIN: Primary | ICD-10-CM

## 2019-06-01 NOTE — PROGRESS NOTES
Results discussed with patient    He will attempt to make coreections and adjustments with diet     Repeat in 3 months

## 2019-06-03 ENCOUNTER — TELEPHONE (OUTPATIENT)
Dept: INTERNAL MEDICINE | Facility: CLINIC | Age: 64
End: 2019-06-03

## 2019-06-03 DIAGNOSIS — Z79.4 TYPE 2 DIABETES MELLITUS WITHOUT COMPLICATION, WITH LONG-TERM CURRENT USE OF INSULIN: ICD-10-CM

## 2019-06-03 DIAGNOSIS — E11.9 TYPE 2 DIABETES MELLITUS WITHOUT COMPLICATION, WITH LONG-TERM CURRENT USE OF INSULIN: ICD-10-CM

## 2019-06-03 NOTE — TELEPHONE ENCOUNTER
Set up lab order in 3 months    Main clinic early morning 7 am    Routing comment      Patient scheduled for labs, mailed out.

## 2019-07-21 ENCOUNTER — PATIENT MESSAGE (OUTPATIENT)
Dept: INTERNAL MEDICINE | Facility: CLINIC | Age: 64
End: 2019-07-21

## 2019-08-05 ENCOUNTER — PATIENT MESSAGE (OUTPATIENT)
Dept: DERMATOLOGY | Facility: CLINIC | Age: 64
End: 2019-08-05

## 2019-08-06 ENCOUNTER — TELEPHONE (OUTPATIENT)
Dept: DERMATOLOGY | Facility: CLINIC | Age: 64
End: 2019-08-06

## 2019-08-13 ENCOUNTER — PATIENT MESSAGE (OUTPATIENT)
Dept: CARDIOLOGY | Facility: CLINIC | Age: 64
End: 2019-08-13

## 2019-08-20 ENCOUNTER — TELEPHONE (OUTPATIENT)
Dept: INTERNAL MEDICINE | Facility: CLINIC | Age: 64
End: 2019-08-20

## 2019-08-20 ENCOUNTER — PATIENT MESSAGE (OUTPATIENT)
Dept: INTERNAL MEDICINE | Facility: CLINIC | Age: 64
End: 2019-08-20

## 2019-08-26 ENCOUNTER — TELEPHONE (OUTPATIENT)
Dept: ENDOSCOPY | Facility: HOSPITAL | Age: 64
End: 2019-08-26

## 2019-08-26 DIAGNOSIS — Z80.0 FAMILY HISTORY OF COLON CANCER: Primary | ICD-10-CM

## 2019-08-26 DIAGNOSIS — Z12.11 SPECIAL SCREENING FOR MALIGNANT NEOPLASMS, COLON: Primary | ICD-10-CM

## 2019-08-26 RX ORDER — SODIUM, POTASSIUM,MAG SULFATES 17.5-3.13G
1 SOLUTION, RECONSTITUTED, ORAL ORAL DAILY
Qty: 1 KIT | Refills: 0 | Status: SHIPPED | OUTPATIENT
Start: 2019-08-26 | End: 2019-08-28

## 2019-08-26 NOTE — TELEPHONE ENCOUNTER
Dr. Salomon,    Patient needs to be scheduled for Colonoscopy procedure.  Is patient cleared from a Cardiology Standpoint to proceed with Colonoscopy?  Please advise.    Thank you,  Marly

## 2019-08-26 NOTE — TELEPHONE ENCOUNTER
Bc Salomon MD  You; Umm ANDRE Staff; Vikki Packer MA; Mala Gill MA 11 minutes ago (2:05 PM)      Yes cleared -I do not see ASA listed but he definitely should be on ASA and if so he can hold 5 days before. I have been changing everyone from ACEI to ARB so if his BP is great below 130< 80, he can switch to losartan 100 mg but if higher instead switch to Valsartan 320 mg! Chip    Routing comment

## 2019-08-27 ENCOUNTER — TELEPHONE (OUTPATIENT)
Dept: CARDIOLOGY | Facility: CLINIC | Age: 64
End: 2019-08-27

## 2019-08-27 NOTE — TELEPHONE ENCOUNTER
Spoke with patient about switching from Ramipril to Losartan as per Dr. Salomon's note below. Patient does not know how his blood pressures have been because he has not been checking it. He is going to check it for one week and send us the readings so that Dr. Salomon can prescribe either Losartan or valsartan , depending on how the blood pressures are.    MD Tayler Messina, TAHIRA Cc: ELAINE ANDRE Staff; Vikki Packer MA; Mala Gill MA   Caller: Unspecified (Yesterday, 10:31 AM)             Yes cleared -I do not see ASA listed but he definitely should be on ASA and if so he can hold 5 days before. I have been changing everyone from ACEI to ARB so if his BP is great below 130< 80, he can switch to losartan 100 mg but if higher instead switch to Valsartan 320 mg! Chip

## 2019-09-06 DIAGNOSIS — H40.023 OPEN ANGLE WITH BORDERLINE FINDINGS AND HIGH GLAUCOMA RISK IN BOTH EYES: Primary | ICD-10-CM

## 2019-09-09 ENCOUNTER — PATIENT MESSAGE (OUTPATIENT)
Dept: CARDIOLOGY | Facility: CLINIC | Age: 64
End: 2019-09-09

## 2019-09-09 DIAGNOSIS — I25.10 CORONARY ARTERY DISEASE INVOLVING NATIVE CORONARY ARTERY OF NATIVE HEART WITHOUT ANGINA PECTORIS: Primary | ICD-10-CM

## 2019-09-09 DIAGNOSIS — E78.5 DYSLIPIDEMIA: ICD-10-CM

## 2019-09-11 DIAGNOSIS — E11.9 TYPE 2 DIABETES MELLITUS WITHOUT COMPLICATION: ICD-10-CM

## 2019-09-18 ENCOUNTER — CLINICAL SUPPORT (OUTPATIENT)
Dept: OPHTHALMOLOGY | Facility: CLINIC | Age: 64
End: 2019-09-18
Payer: MEDICARE

## 2019-09-18 ENCOUNTER — OFFICE VISIT (OUTPATIENT)
Dept: OPTOMETRY | Facility: CLINIC | Age: 64
End: 2019-09-18
Payer: COMMERCIAL

## 2019-09-18 DIAGNOSIS — H25.13 NS (NUCLEAR SCLEROSIS), BILATERAL: ICD-10-CM

## 2019-09-18 DIAGNOSIS — H40.023 OPEN ANGLE WITH BORDERLINE FINDINGS AND HIGH GLAUCOMA RISK IN BOTH EYES: ICD-10-CM

## 2019-09-18 DIAGNOSIS — E11.9 TYPE 2 DIABETES MELLITUS WITHOUT OPHTHALMIC MANIFESTATIONS: ICD-10-CM

## 2019-09-18 DIAGNOSIS — H52.4 PRESBYOPIA: Primary | ICD-10-CM

## 2019-09-18 DIAGNOSIS — H52.223 REGULAR ASTIGMATISM OF BOTH EYES: ICD-10-CM

## 2019-09-18 PROCEDURE — 99499 RISK ADDL DX/OHS AUDIT: ICD-10-PCS | Mod: S$GLB,,, | Performed by: OPTOMETRIST

## 2019-09-18 PROCEDURE — 92133 OCT, OPTIC NERVE - OU - BOTH EYES: ICD-10-PCS | Mod: HCNC,S$GLB,, | Performed by: OPTOMETRIST

## 2019-09-18 PROCEDURE — 92083 HUMPHREY VISUAL FIELD - OU - BOTH EYES: ICD-10-PCS | Mod: HCNC,S$GLB,, | Performed by: OPTOMETRIST

## 2019-09-18 PROCEDURE — 92133 CPTRZD OPH DX IMG PST SGM ON: CPT | Mod: HCNC,S$GLB,, | Performed by: OPTOMETRIST

## 2019-09-18 PROCEDURE — 99999 PR PBB SHADOW E&M-EST. PATIENT-LVL III: CPT | Mod: PBBFAC,,, | Performed by: OPTOMETRIST

## 2019-09-18 PROCEDURE — 92083 EXTENDED VISUAL FIELD XM: CPT | Mod: HCNC,S$GLB,, | Performed by: OPTOMETRIST

## 2019-09-18 PROCEDURE — 92014 COMPRE OPH EXAM EST PT 1/>: CPT | Mod: S$GLB,,, | Performed by: OPTOMETRIST

## 2019-09-18 PROCEDURE — 92014 PR EYE EXAM, EST PATIENT,COMPREHESV: ICD-10-PCS | Mod: S$GLB,,, | Performed by: OPTOMETRIST

## 2019-09-18 PROCEDURE — 99499 UNLISTED E&M SERVICE: CPT | Mod: S$GLB,,, | Performed by: OPTOMETRIST

## 2019-09-18 PROCEDURE — 92015 DETERMINE REFRACTIVE STATE: CPT | Mod: S$GLB,,, | Performed by: OPTOMETRIST

## 2019-09-18 PROCEDURE — 99999 PR PBB SHADOW E&M-EST. PATIENT-LVL III: ICD-10-PCS | Mod: PBBFAC,,, | Performed by: OPTOMETRIST

## 2019-09-18 PROCEDURE — 92015 PR REFRACTION: ICD-10-PCS | Mod: S$GLB,,, | Performed by: OPTOMETRIST

## 2019-09-19 NOTE — PROGRESS NOTES
HPI     Last eye exam was 7/13/18 with Dr Long.    Pt here for annual  Having problems with OS x recently blurs up randomly throughout the day   almost every day.  Pt states he will rubs his eye and usually will subside after a little   while.   Has floaters sometimes  Patient denies diplopia, headaches, flashes, pain, and   itching/burning/tearing.    Pt does not use any eye drops.        Last edited by Jazz Elliott on 9/18/2019  3:05 PM. (History)            Assessment /Plan     For exam results, see Encounter Report.              Regular astigmatism of both eyes  Presbyopia              Rx specs     Open angle with borderline findings and high glaucoma risk in both eyes  -     Yang Visual Field - OU - Extended - WNL  -     OCT, Optic Nerve - OU - WNL     Glaucoma suspect, bilateral  -          HVF 2012: WNL, 2017 scattered defects/ stable WNL  OCT: sectoral thinning inf nasal OD, sup temp OS  PACHY: 541/537   IOP: normal     Repeat tests in 2 years     NS (nuclear sclerosis), bilateral              Mild, monitor     Type II or unspecified type diabetes mellitus without mention of complication, uncontrolled  Type 2 diabetes mellitus without ophthalmic manifestations  Essential hypertension              No retinopathy, monitor       Dry eye OU  Soothe XP daily/ PRN         RTC 1 year annual

## 2019-09-20 ENCOUNTER — OFFICE VISIT (OUTPATIENT)
Dept: DERMATOLOGY | Facility: CLINIC | Age: 64
End: 2019-09-20
Payer: MEDICARE

## 2019-09-20 DIAGNOSIS — Z85.828 HISTORY OF NONMELANOMA SKIN CANCER: ICD-10-CM

## 2019-09-20 DIAGNOSIS — L81.4 LENTIGO: ICD-10-CM

## 2019-09-20 DIAGNOSIS — L57.0 AK (ACTINIC KERATOSIS): ICD-10-CM

## 2019-09-20 DIAGNOSIS — L82.1 SK (SEBORRHEIC KERATOSIS): ICD-10-CM

## 2019-09-20 DIAGNOSIS — D23.9 DERMATOFIBROMA: ICD-10-CM

## 2019-09-20 DIAGNOSIS — D18.00 ANGIOMA: ICD-10-CM

## 2019-09-20 DIAGNOSIS — D48.5 NEOPLASM OF UNCERTAIN BEHAVIOR OF SKIN: Primary | ICD-10-CM

## 2019-09-20 PROCEDURE — 88305 TISSUE EXAM BY PATHOLOGIST: CPT | Mod: 26,HCNC,, | Performed by: PATHOLOGY

## 2019-09-20 PROCEDURE — 17000 DESTRUCT PREMALG LESION: CPT | Mod: 59,HCNC,S$GLB, | Performed by: DERMATOLOGY

## 2019-09-20 PROCEDURE — 1101F PT FALLS ASSESS-DOCD LE1/YR: CPT | Mod: HCNC,CPTII,S$GLB, | Performed by: DERMATOLOGY

## 2019-09-20 PROCEDURE — 99213 OFFICE O/P EST LOW 20 MIN: CPT | Mod: 25,HCNC,S$GLB, | Performed by: DERMATOLOGY

## 2019-09-20 PROCEDURE — 11102 PR TANGENTIAL BIOPSY, SKIN, SINGLE LESION: ICD-10-PCS | Mod: HCNC,S$GLB,, | Performed by: DERMATOLOGY

## 2019-09-20 PROCEDURE — 99999 PR PBB SHADOW E&M-EST. PATIENT-LVL III: CPT | Mod: PBBFAC,HCNC,, | Performed by: DERMATOLOGY

## 2019-09-20 PROCEDURE — 1101F PR PT FALLS ASSESS DOC 0-1 FALLS W/OUT INJ PAST YR: ICD-10-PCS | Mod: HCNC,CPTII,S$GLB, | Performed by: DERMATOLOGY

## 2019-09-20 PROCEDURE — 11102 TANGNTL BX SKIN SINGLE LES: CPT | Mod: HCNC,S$GLB,, | Performed by: DERMATOLOGY

## 2019-09-20 PROCEDURE — 88305 TISSUE EXAM BY PATHOLOGIST: CPT | Mod: HCNC | Performed by: PATHOLOGY

## 2019-09-20 PROCEDURE — 17000 PR DESTRUCTION(LASER SURGERY,CRYOSURGERY,CHEMOSURGERY),PREMALIGNANT LESIONS,FIRST LESION: ICD-10-PCS | Mod: 59,HCNC,S$GLB, | Performed by: DERMATOLOGY

## 2019-09-20 PROCEDURE — 99213 PR OFFICE/OUTPT VISIT, EST, LEVL III, 20-29 MIN: ICD-10-PCS | Mod: 25,HCNC,S$GLB, | Performed by: DERMATOLOGY

## 2019-09-20 PROCEDURE — 88305 TISSUE SPECIMEN TO PATHOLOGY, DERMATOLOGY: ICD-10-PCS | Mod: 26,HCNC,, | Performed by: PATHOLOGY

## 2019-09-20 PROCEDURE — 99999 PR PBB SHADOW E&M-EST. PATIENT-LVL III: ICD-10-PCS | Mod: PBBFAC,HCNC,, | Performed by: DERMATOLOGY

## 2019-09-20 NOTE — PROGRESS NOTES
Subjective:       Patient ID:  Jm Deleon is a 64 y.o. male who presents for   Chief Complaint   Patient presents with    Skin Check     spot on scalp     History of Present Illness: The patient presents for follow up of skin check.    The patient was last seen on: 5/2017 for cryosurgery to actinic keratoses which have resolved and bx of scc left ant scalp - excised by SSW and bx of focal mucinosis on mid back - wife is concerned about area  This is a high risk patient here to check for the development of new lesions.      Other skin complaints:   Patient complains of lesion(s)  Location: scalp  Duration: 3 months  Symptoms: tender  Relieving factors/Previous treatments: none          Review of Systems   Skin: Positive for activity-related sunscreen use and wears hat (always). Negative for daily sunscreen use and recent sunburn.   Hematologic/Lymphatic: Bruises/bleeds easily (on asa).        Objective:    Physical Exam   Constitutional: He appears well-developed and well-nourished. No distress.   Neurological: He is alert and oriented to person, place, and time. He is not disoriented.   Psychiatric: He has a normal mood and affect.   Skin:   Areas Examined (abnormalities noted in diagram):   Scalp / Hair Palpated and Inspected  Head / Face Inspection Performed  Neck Inspection Performed  Chest / Axilla Inspection Performed  Back Inspection Performed  RUE Inspected  LUE Inspection Performed                   Diagram Legend     Erythematous scaling macule/papule c/w actinic keratosis       Vascular papule c/w angioma      Pigmented verrucoid papule/plaque c/w seborrheic keratosis      Yellow umbilicated papule c/w sebaceous hyperplasia      Irregularly shaped tan macule c/w lentigo     1-2 mm smooth white papules consistent with Milia      Movable subcutaneous cyst with punctum c/w epidermal inclusion cyst      Subcutaneous movable cyst c/w pilar cyst      Firm pink to brown papule c/w dermatofibroma       Pedunculated fleshy papule(s) c/w skin tag(s)      Evenly pigmented macule c/w junctional nevus     Mildly variegated pigmented, slightly irregular-bordered macule c/w mildly atypical nevus      Flesh colored to evenly pigmented papule c/w intradermal nevus       Pink pearly papule/plaque c/w basal cell carcinoma      Erythematous hyperkeratotic cursted plaque c/w SCC      Surgical scar with no sign of skin cancer recurrence      Open and closed comedones      Inflammatory papules and pustules      Verrucoid papule consistent consistent with wart     Erythematous eczematous patches and plaques     Dystrophic onycholytic nail with subungual debris c/w onychomycosis     Umbilicated papule    Erythematous-base heme-crusted tan verrucoid plaque consistent with inflamed seborrheic keratosis     Erythematous Silvery Scaling Plaque c/w Psoriasis     See annotation              Assessment / Plan:      Pathology Orders:     Normal Orders This Visit    Tissue Specimen To Pathology, Dermatology     Questions:    Directional Terms:  Other(comment)    Clinical Information:  r/o inflamed sk vs bcc    Specific Site:  left back        Neoplasm of uncertain behavior of skin  -     Tissue Specimen To Pathology, Dermatology    Shave biopsy procedure note:    Shave biopsy performed after verbal consent including risk of infection, scar, recurrence, need for additional treatment of site. Area prepped with alcohol, anesthetized with approximately 1.0cc of 1% lidocaine with epinephrine. Lesional tissue shaved with razor blade. Hemostasis achieved with application of aluminum chloride followed by hyfrecation. No complications. Dressing applied. Wound care explained.        Lentigo  This is a benign hyperpigmented sun induced lesion. Daily sun protection will reduce the number of new lesions. Treatment of these benign lesions are considered cosmetic.    SK (seborrheic keratosis)  These are benign inherited growths without a malignant  potential. Reassurance given to patient. No treatment is necessary.       Dermatofibromas  This is a benign scar-like lesion secondary to minor trauma. No treatment required.       Angioma  This is a benign vascular lesion. Reassurance given. No treatment required.       History of nonmelanoma skin cancer  Area(s) of previous NMSC evaluated with no signs of recurrence.    Upper body skin examination performed today including at least 6 points as noted in physical examination. Suspicious lesions noted.      AK (actinic keratosis) - scalp  Cryosurgery Procedure Note    Verbal consent from the patient is obtained including, but not limited to, risk of hypopigmentation/hyperpigmentation, scar, recurrence of lesion. The patient is aware of the precancerous quality and need for treatment of these lesions. Liquid nitrogen cryosurgery is applied to the 1 actinic keratoses, as detailed in the physical exam, to produce a freeze injury. The patient is aware that blisters may form and is instructed on wound care with gentle cleansing and use of vaseline ointment to keep moist until healed. The patient is supplied a handout on cryosurgery and is instructed to call if lesions do not completely resolve.    Cont wear hat always           Follow up in about 1 year (around 9/20/2020).

## 2019-09-20 NOTE — PATIENT INSTRUCTIONS
" Shave Biopsy Wound Care    Your doctor has performed a shave biopsy today.  A band aid and vaseline ointment has been placed over the site.  This should remain in place for 24 hours.  It is recommended that you keep the area dry for the first 24 hours.  After 24 hours, you may remove the band aid and wash the area with warm soap and water and apply Vaseline jelly.  Many patients prefer to use Neosporin or Bacitracin ointment.  This is acceptable; however, know that you can develop an allergy to this medication even if you have used it safely for years.  It is important to keep the area moist.  Letting it dry out and get air slows healing time, and will worsen the scar.  Band aid is optional after first 24 hours.      If you notice increasing redness, tenderness, pain, or yellow drainage at the biopsy site, please notify your doctor.  These are signs of an infection.    If your biopsy site is bleeding, apply firm pressure for 15 minutes straight.  Repeat for another 15 minutes, if it is still bleeding.   If the surgical site continues to bleed, then please contact your doctor.      For [facility] users:   You will receive a [facility] notification after the pathologist has finished reviewing your biopsy specimen. Pathology results, however, will not be released online so you will see a "no content" message. Once your dermatologist reviews and clinically correlates your biopsy results, you will either receive a letter in the mail with the results of a phone call from your doctor's office if further explanation or treatment is warranted.       [address] [place] [address]/ [phone] FAX/ [email or web address]      CRYOSURGERY      Your doctor has used a method called cryosurgery to treat your skin condition. Cryosurgery refers to the use of very cold substances to treat a variety of skin conditions such as warts, pre-skin cancers, molluscum contagiosum, sun spots, and several benign " growths. The substance we use in cryosurgery is liquid nitrogen and is so cold (-195 degrees Celsius) that is burns when administered.     Following treatment in the office, the skin may immediately burn and become red. You may find the area around the lesion is affected as well. It is sometimes necessary to treat not only the lesion, but a small area of the surrounding normal skin to achieve a good response.     A blister, and even a blood filled blister, may form after treatment.   This is a normal response. If the blister is painful, it is acceptable to sterilize a needle and with rubbing alcohol and gently pop the blister. It is important that you gently wash the area with soap and warm water as the blister fluid may contain wart virus if a wart was treated. Do no remove the roof of the blister.     The area treated can take anywhere from 1-3 weeks to heal. Healing time depends on the kind skin lesion treated, the location, and how aggressively the lesion was treated. It is recommended that the areas treated are covered with Vaseline or bacitracin ointment and a band-aid. If a band-aid is not practical, just ointment applied several times per day will do. Keeping these areas moist will speed the healing time.    Treatment with liquid nitrogen can leave a scar. In dark skin, it may be a light or dark scar, in light skin it may be a white or pink scar. These will generally fade with time, but may never go away completely.     If you have any concerns after your treatment, please feel free to call the office.       [facility] [place] [address]/ [phone] FAX/ [email or web address]

## 2019-09-23 ENCOUNTER — PATIENT MESSAGE (OUTPATIENT)
Dept: INTERNAL MEDICINE | Facility: CLINIC | Age: 64
End: 2019-09-23

## 2019-09-23 ENCOUNTER — PATIENT MESSAGE (OUTPATIENT)
Dept: CARDIOLOGY | Facility: CLINIC | Age: 64
End: 2019-09-23

## 2019-09-24 RX ORDER — INSULIN GLARGINE 100 [IU]/ML
35 INJECTION, SOLUTION SUBCUTANEOUS NIGHTLY
Qty: 31.5 ML | Refills: 3 | Status: SHIPPED | OUTPATIENT
Start: 2019-09-24 | End: 2020-08-15

## 2019-10-04 ENCOUNTER — ANESTHESIA (OUTPATIENT)
Dept: ENDOSCOPY | Facility: HOSPITAL | Age: 64
End: 2019-10-04
Payer: MEDICARE

## 2019-10-04 ENCOUNTER — HOSPITAL ENCOUNTER (OUTPATIENT)
Facility: HOSPITAL | Age: 64
Discharge: HOME | End: 2019-10-04
Attending: COLON & RECTAL SURGERY | Admitting: COLON & RECTAL SURGERY
Payer: MEDICARE

## 2019-10-04 ENCOUNTER — ANESTHESIA EVENT (OUTPATIENT)
Dept: ENDOSCOPY | Facility: HOSPITAL | Age: 64
End: 2019-10-04
Payer: MEDICARE

## 2019-10-04 VITALS
BODY MASS INDEX: 33.34 KG/M2 | WEIGHT: 220 LBS | OXYGEN SATURATION: 97 % | HEART RATE: 54 BPM | DIASTOLIC BLOOD PRESSURE: 58 MMHG | TEMPERATURE: 98 F | RESPIRATION RATE: 16 BRPM | HEIGHT: 68 IN | SYSTOLIC BLOOD PRESSURE: 127 MMHG

## 2019-10-04 DIAGNOSIS — Z80.0 FAMILY HISTORY OF COLON CANCER REQUIRING SCREENING COLONOSCOPY: Primary | ICD-10-CM

## 2019-10-04 LAB — POCT GLUCOSE: 117 MG/DL (ref 70–110)

## 2019-10-04 PROCEDURE — 37000008 HC ANESTHESIA 1ST 15 MINUTES: Mod: HCNC | Performed by: COLON & RECTAL SURGERY

## 2019-10-04 PROCEDURE — 82962 GLUCOSE BLOOD TEST: CPT | Mod: HCNC | Performed by: COLON & RECTAL SURGERY

## 2019-10-04 PROCEDURE — E9220 PRA ENDO ANESTHESIA: ICD-10-PCS | Mod: PT,HCNC,, | Performed by: NURSE ANESTHETIST, CERTIFIED REGISTERED

## 2019-10-04 PROCEDURE — 45385 COLONOSCOPY W/LESION REMOVAL: CPT | Mod: HCNC | Performed by: COLON & RECTAL SURGERY

## 2019-10-04 PROCEDURE — E9220 PRA ENDO ANESTHESIA: HCPCS | Mod: PT,HCNC,, | Performed by: NURSE ANESTHETIST, CERTIFIED REGISTERED

## 2019-10-04 PROCEDURE — 45385 PR COLONOSCOPY,REMV LESN,SNARE: ICD-10-PCS | Mod: HCNC,PT,, | Performed by: COLON & RECTAL SURGERY

## 2019-10-04 PROCEDURE — 45385 COLONOSCOPY W/LESION REMOVAL: CPT | Mod: HCNC,PT,, | Performed by: COLON & RECTAL SURGERY

## 2019-10-04 PROCEDURE — 27201089 HC SNARE, DISP (ANY): Mod: HCNC | Performed by: COLON & RECTAL SURGERY

## 2019-10-04 PROCEDURE — 88305 TISSUE SPECIMEN TO PATHOLOGY - SURGERY: ICD-10-PCS | Mod: 26,HCNC,, | Performed by: PATHOLOGY

## 2019-10-04 PROCEDURE — 63600175 PHARM REV CODE 636 W HCPCS: Mod: HCNC | Performed by: NURSE ANESTHETIST, CERTIFIED REGISTERED

## 2019-10-04 PROCEDURE — 37000009 HC ANESTHESIA EA ADD 15 MINS: Mod: HCNC | Performed by: COLON & RECTAL SURGERY

## 2019-10-04 PROCEDURE — 88305 TISSUE EXAM BY PATHOLOGIST: CPT | Mod: HCNC | Performed by: PATHOLOGY

## 2019-10-04 PROCEDURE — 88305 TISSUE EXAM BY PATHOLOGIST: CPT | Mod: 26,HCNC,, | Performed by: PATHOLOGY

## 2019-10-04 PROCEDURE — 63600175 PHARM REV CODE 636 W HCPCS: Mod: HCNC | Performed by: COLON & RECTAL SURGERY

## 2019-10-04 RX ORDER — SODIUM CHLORIDE 9 MG/ML
INJECTION, SOLUTION INTRAVENOUS CONTINUOUS
Status: DISCONTINUED | OUTPATIENT
Start: 2019-10-04 | End: 2019-10-04 | Stop reason: HOSPADM

## 2019-10-04 RX ORDER — PROPOFOL 10 MG/ML
INJECTION, EMULSION INTRAVENOUS CONTINUOUS PRN
Status: DISCONTINUED | OUTPATIENT
Start: 2019-10-04 | End: 2019-10-04

## 2019-10-04 RX ORDER — PROPOFOL 10 MG/ML
INJECTION, EMULSION INTRAVENOUS
Status: DISCONTINUED | OUTPATIENT
Start: 2019-10-04 | End: 2019-10-04

## 2019-10-04 RX ORDER — LIDOCAINE HCL/PF 100 MG/5ML
SYRINGE (ML) INTRAVENOUS
Status: DISCONTINUED | OUTPATIENT
Start: 2019-10-04 | End: 2019-10-04

## 2019-10-04 RX ADMIN — PROPOFOL 90 MG: 10 INJECTION, EMULSION INTRAVENOUS at 08:10

## 2019-10-04 RX ADMIN — SODIUM CHLORIDE: 0.9 INJECTION, SOLUTION INTRAVENOUS at 07:10

## 2019-10-04 RX ADMIN — PROPOFOL 200 MCG/KG/MIN: 10 INJECTION, EMULSION INTRAVENOUS at 08:10

## 2019-10-04 RX ADMIN — LIDOCAINE HYDROCHLORIDE 100 MG: 20 INJECTION, SOLUTION INTRAVENOUS at 08:10

## 2019-10-04 NOTE — ANESTHESIA POSTPROCEDURE EVALUATION
Anesthesia Post Evaluation    Patient: Jm Deleon    Procedure(s) Performed: Procedure(s) (LRB):  COLONOSCOPY (N/A)    Final Anesthesia Type: general  Patient location during evaluation: PACU  Patient participation: Yes- Able to Participate  Level of consciousness: awake and alert and oriented  Post-procedure vital signs: reviewed and stable  Pain management: adequate  Airway patency: patent  PONV status at discharge: No PONV  Anesthetic complications: no      Cardiovascular status: blood pressure returned to baseline  Respiratory status: unassisted, spontaneous ventilation and room air  Hydration status: euvolemic  Follow-up not needed.          Vitals Value Taken Time   /58 10/4/2019  9:12 AM   Temp 36.6 °C (97.9 °F) 10/4/2019  8:42 AM   Pulse 54 10/4/2019  9:12 AM   Resp 16 10/4/2019  9:12 AM   SpO2 97 % 10/4/2019  9:12 AM         Event Time     Out of Recovery 09:17:49          Pain/Franco Score: Franco Score: 10 (10/4/2019  9:17 AM)

## 2019-10-04 NOTE — [employer] PATIENT INSTRUCTIONS
Discharge Summary/Instructions after an Endoscopic Procedure  Patient Name: Jm Deleon  Patient MRN: [id number]  Patient YOB: 1955  Friday, October 04, 2019  Isaiah Booker MD  RESTRICTIONS:  During your procedure today, you received medications for sedation.  These   medications may affect your judgment, balance and coordination.  Therefore,   for 24 hours, you have the following restrictions:   - DO NOT drive a car, operate machinery, make legal/financial decisions,   sign important papers or drink alcohol.    ACTIVITY:  Today: no heavy lifting, straining or running due to procedural   sedation/anesthesia.  The following day: return to full activity including work.  DIET:  Eat and drink normally unless instructed otherwise.     TREATMENT FOR COMMON SIDE EFFECTS:  - Mild abdominal pain, nausea, belching, bloating or excessive gas:  rest,   eat lightly and use a heating pad.  - Sore Throat: treat with throat lozenges and/or gargle with warm salt   water.  - Because air was used during the procedure, expelling large amounts of air   from your rectum or belching is normal.  - If a bowel prep was taken, you may not have a bowel movement for 1-3 days.    This is normal.  SYMPTOMS TO WATCH FOR AND REPORT TO YOUR PHYSICIAN:  1. Abdominal pain or bloating, other than gas cramps.  2. Chest pain.  3. Back pain.  4. Signs of infection such as: chills or fever occurring within 24 hours   after the procedure.  5. Rectal bleeding, which would show as bright red, maroon, or black stools.   (A tablespoon of blood from the rectum is not serious, especially if   hemorrhoids are present.)  6. Vomiting.  7. Weakness or dizziness.  GO DIRECTLY TO THE NEAREST EMERGENCY ROOM IF YOU HAVE ANY OF THE FOLLOWING:      Difficulty breathing              Chills and/or fever over 101 F   Persistent vomiting and/or vomiting blood   Severe abdominal pain   Severe chest pain   Black, tarry stools   Bleeding- more than one  tablespoon   Any other symptom or condition that you feel may need urgent attention  Your doctor recommends these additional instructions:  If any biopsies were taken, your doctors clinic will contact you in 1 to 2   weeks with any results.  - Discharge patient to home (ambulatory).   - Resume previous diet.   - Continue present medications.   - Await pathology results.   - Repeat colonoscopy in 5 years for adenoma surveillance.  For questions, problems or results please call your physician - Isaiah Booker MD at Work:  [phone].  [facility], EMERGENCY ROOM PHONE NUMBER: [phone]  IF A COMPLICATION OR EMERGENCY SITUATION ARISES AND YOU ARE UNABLE TO REACH   YOUR PHYSICIAN - GO DIRECTLY TO THE EMERGENCY ROOM.  Isaiah Booker MD  10/4/2019 8:37:43 AM  This report has been verified and signed electronically.  [employer]

## 2019-10-04 NOTE — DISCHARGE INSTRUCTIONS
Understanding Colon and Rectal Polyps    The colon (also called the large intestine) is a muscular tube that forms the last part of the digestive tract. It absorbs water and stores food waste. The colon is about 4 to 6 feet long. The rectum is the last 6 inches of the colon. The colon and rectum have a smooth lining composed of millions of cells. Changes in these cells can lead to growths in the colon that can become cancerous and should be removed. Multiple tests are available to screen for colon cancer, but the colonoscopy is the most recommended test. During colonoscopy, these polyps can be removed. How often you need this test depends on many things including your condition, your family history, symptoms, and what the findings were at the previous colonoscopy.   When the colon lining changes  Changes that happen in the cells that line the colon or rectum can lead to growths called polyps. Over a period of years, polyps can turn cancerous. Removing polyps early may prevent cancer from ever forming.  Polyps  Polyps are fleshy clumps of tissue that form on the lining of the colon or rectum. Small polyps are usually benign (not cancerous). However, over time, cells in a polyp can change and become cancerous. Certain types of polyps known as adenomatous polyps are premalignant. The risk for invasive cancer increases with the size of the polyp and certain cell and gene features. This means that they can become cancerous if they're not removed. Hyperplastic polyps are benign. They can grow quite large and not turn cancerous.   Cancer  Almost all colorectal cancers start when polyp cells begin growing abnormally. As a cancerous tumor grows, it may involve more and more of the colon or rectum. In time, cancer can also grow beyond the colon or rectum and spread to nearby organs or to glands called lymph nodes. The cells can also travel to other parts of the body. This is known as metastasis. The earlier a cancerous  tumor is removed, the better the chance of preventing its spread.    Date Last Reviewed: 8/1/2016  © 4427-0676 [employer]. [address] [place] [address], [place] [address]. All rights reserved. This information is not intended as a substitute for professional medical care. Always follow your healthcare professional's instructions.        Colonoscopy     A camera attached to a flexible tube with a viewing lens is used to take video pictures.     Colonoscopy is a test to view the inside of your lower digestive tract (colon and rectum). Sometimes it can show the last part of the small intestine (ileum). During the test, small pieces of tissue may be removed for testing. This is called a biopsy. Small growths, such as polyps, may also be removed.   Why is colonoscopy done?  The test is done to help look for colon cancer. And it can help find the source of abdominal pain, bleeding, and changes in bowel habits. It may be needed once a year, depending on factors such as your:  · Age  · Health history  · Family health history  · Symptoms  · Results from any prior colonoscopy  Risks and possible complications  These include:  · Bleeding               · A puncture or tear in the colon   · Risks of anesthesia  · A cancer lesion not being seen  Getting ready   To prepare for the test:  · Talk with your healthcare provider about the risks of the test (see below). Also ask your healthcare provider about alternatives to the test.  · Tell your healthcare provider about any medicines you take. Also tell him or her about any health conditions you may have.  · Make sure your rectum and colon are empty for the test. Follow the diet and bowel prep instructions exactly. If you dont, the test may need to be rescheduled.  · Plan for a friend or family member to drive you home after the test.     Colonoscopy provides an inside view of the entire colon.     You may discuss the results with your doctor right away or at a future  visit.  During the test   The test is usually done in the hospital on an outpatient basis. This means you go home the same day. The procedure takes about 30 minutes. During that time:  · You are given relaxing (sedating) medicine through an IV line. You may be drowsy, or fully asleep.  · The healthcare provider will first give you a physical exam to check for anal and rectal problems.  · Then the anus is lubricated and the scope inserted.  · If you are awake, you may have a feeling similar to needing to have a bowel movement. You may also feel pressure as air is pumped into the colon. Its OK to pass gas during the procedure.  · Biopsy, polyp removal, or other treatments may be done during the test.  After the test   You may have gas right after the test. It can help to try to pass it to help prevent later bloating. Your healthcare provider may discuss the results with you right away. Or you may need to schedule a follow-up visit to talk about the results. After the test, you can go back to your normal eating and other activities. You may be tired from the sedation and need to rest for a few hours.  Date Last Reviewed: 11/1/2016  © 7231-0483 [employer]. [address] [place] [address], [place] [address]. All rights reserved. This information is not intended as a substitute for professional medical care. Always follow your healthcare professional's instructions.

## 2019-10-04 NOTE — ANESTHESIA PREPROCEDURE EVALUATION
10/04/2019  Jm Deleon is a 64 y.o., male.  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    CAD (coronary artery disease)    Hypertension    S/P CABG (coronary artery bypass graft)    Lack of physical exercise    Venous insufficiency    Abnormal cardiovascular stress test    Obese    Backache    Dyslipidemia    Varicose veins    Hyperkalemia    Hip disease    Left leg weakness    Osteoarthritis of left hip    Long term (current) use of anticoagulants    Deep vein thrombosis prophylaxis    Status post hip replacement left, 9/24/2014    Abnormal nuclear stress test    Dehydration    Special screening for malignant neoplasms, colon    Varicose veins of lower extremities with inflammation    Decreased right shoulder range of motion    Weakness of shoulder    Family history of colon cancer requiring screening colonoscopy     Past Surgical History:   Procedure Laterality Date    BELPHAROPTOSIS REPAIR  10 years ago    both eyes    CARDIAC SURGERY      3 vessel bypass    CORONARY ARTERY BYPASS GRAFT  x4 age 45 2000    HERNIA REPAIR      HIP SURGERY  9-24-14    left YANICK    JOINT REPLACEMENT      left hip    SHOULDER ARTHROSCOPY      SHOULDER SURGERY       Current Facility-Administered Medications on File Prior to Encounter   Medication Dose Route Frequency Provider Last Rate Last Dose    triamcinolone acetonide injection 40 mg  40 mg Intra-articular  Patricio Multani MD   40 mg at 10/26/16 0841     Current Outpatient Medications on File Prior to Encounter   Medication Sig Dispense Refill    atorvastatin (LIPITOR) 80 MG tablet TAKE 1 TABLET (80 MG TOTAL) BY MOUTH ONCE DAILY. 90 tablet 3    carvedilol (COREG) 25 MG tablet Take .5-1 tablet twice daily or as directed (Patient taking differently: Take  "12.5 mg by mouth 2 (two) times daily with meals. ) 180 tablet 3    diphenhydrAMINE (BENADRYL) 25 mg capsule Take 25 mg by mouth nightly as needed for Itching.      ezetimibe (ZETIA) 10 mg tablet TAKE 1 TABLET (10 MG TOTAL) BY MOUTH ONCE DAILY. (Patient taking differently: Take 5 mg by mouth once daily. ) 90 tablet 3    FOLIC ACID/MULTIVITS-MIN (MULTIVITAMIN FOR CHOLESTEROL ORAL) Take 1 tablet by mouth once daily.       furosemide (LASIX) 40 MG tablet Take 1 tablet (40 mg total) by mouth once daily. 90 tablet 3    metFORMIN (GLUCOPHAGE) 1000 MG tablet TAKE 1 TABLET TWICE DAILY (NEED MD APPOINTMENT) 180 tablet 1    omega-3 acid ethyl esters (LOVAZA) 1 gram capsule TAKE 4 CAPSULES BY MOUTH DAILY OR AS DIRECTED. 360 capsule 3    ACCU-CHEK SMARTVIEW TEST STRIP Strp 1 strip by Misc.(Non-Drug; Combo Route) route 3 (three) times daily. Patient needs an appointment 300 strip 0    ammonium lactate 12 % Crea Apply small amount to feet except for in between toes twice a day 1 Tube 3    blood-glucose meter Misc Use to check sugar 3 times daily. Accu-chek Emily. Patient needs an appointment 1 each 0    empagliflozin (JARDIANCE) 25 mg Tab Take 25 mg by mouth once daily. 90 tablet 1    insulin aspart U-100 (NOVOLOG FLEXPEN U-100 INSULIN) 100 unit/mL InPn pen Inject 10 Units into the skin 3 (three) times daily with meals. Patient needs an appointment 2 Box 0    insulin needles, disposable, (BD INSULIN PEN NEEDLE UF ORIG) 29 x 1/2 " Ndle USE TWICE DAILY AS DIRECTED 100 each 2    lancets Misc 1 lancet by Misc.(Non-Drug; Combo Route) route 3 (three) times daily. accu-chek Fastclix. Patient needs an appointment 300 each 0    nitroGLYCERIN (NITROSTAT) 0.4 MG SL tablet Place 1 tablet (0.4 mg total) under the tongue every 5 (five) minutes as needed for Chest pain. 25 tablet 4    pen needle, diabetic (BD INSULIN PEN NEEDLE UF MINI) 31 gauge x 3/16" Ndle 1 each by Misc.(Non-Drug; Combo Route) route 4 (four) times daily. Patient " needs an appointment 400 each 0    tadalafil (CIALIS) 20 MG Tab Take 1 tablet (20 mg total) by mouth daily as needed. 10 tablet 3     Anesthesia Evaluation    I have reviewed the Patient Summary Reports.    I have reviewed the Nursing Notes.      Review of Systems  Anesthesia Hx:  History of prior surgery of interest to airway management or planning: Previous anesthesia: General   Cardiovascular:   Hypertension CAD  CABG/stent   Functional Capacity good / => 4 METS    Endocrine:   Diabetes        Physical Exam  General:  Obesity    Airway/Jaw/Neck:  Airway Findings: Mouth Opening: Normal Tongue: Normal  General Airway Assessment: Adult  Mallampati: II  TM Distance: Normal, at least 6 cm  Jaw/Neck Findings:  Neck ROM: Normal ROM     Eyes/Ears/Nose:  EYES/EARS/NOSE FINDINGS: Normal   Dental:  Dental Findings: In tact   Chest/Lungs:  Chest/Lungs Findings: Clear to auscultation, Normal Respiratory Rate     Heart/Vascular:  Heart Findings: Rate: Normal  Rhythm: Regular Rhythm  Sounds: Normal  Vascular Findings: Normal    Abdomen:  Abdomen Findings:  Normal, Soft, Nontender      Skin:  Skin Findings: Normal    Mental Status:  Mental Status Findings:  Cooperative, Alert and Oriented         Anesthesia Plan  Type of Anesthesia, risks & benefits discussed:  Anesthesia Type:  general, MAC  Patient's Preference:   Intra-op Monitoring Plan: standard ASA monitors  Intra-op Monitoring Plan Comments:   Post Op Pain Control Plan: multimodal analgesia, IV/PO Opioids PRN and per primary service following discharge from PACU  Post Op Pain Control Plan Comments:   Induction:   IV  Beta Blocker:  Patient is on a Beta-Blocker and has received one dose within the past 24 hours (No further documentation required).       Informed Consent: Patient understands risks and agrees with Anesthesia plan.  Questions answered. Anesthesia consent signed with patient.  ASA Score: 3     Day of Surgery Review of History & Physical:    H&P update  referred to the surgeon.         Ready For Surgery From Anesthesia Perspective.

## 2019-10-04 NOTE — H&P
COLONOSCOPY HISTORY AND PHYSICAL EXAM    Procedure : Colonoscopy      INDICATIONS: family history of colon cancer (brother)    Family Hx of CRC: yes, brother diagnosed at 53 yoa    Last Colonoscopy:  2015  Findings: no polyps       Past Medical History:   Diagnosis Date    Allergy     seasonal    Arthritis     Coronary artery disease     Diabetes mellitus     Hyperlipidemia     Hypertension     Joint pain     Squamous cell carcinoma 2017    scalp - SSW     Sedation Problems: NO  Family History   Problem Relation Age of Onset    Coronary artery disease Mother     Cancer Father         lung cancer, smoker    Hypertension Brother     Cancer Brother         colon    Amblyopia Neg Hx     Blindness Neg Hx     Cataracts Neg Hx     Glaucoma Neg Hx     Macular degeneration Neg Hx     Retinal detachment Neg Hx     Strabismus Neg Hx     Melanoma Neg Hx      Fam Hx of Sedation Problems: NO  Social History     Socioeconomic History    Marital status:      Spouse name: Not on file    Number of children: Not on file    Years of education: Not on file    Highest education level: Not on file   Occupational History    Not on file   Social Needs    Financial resource strain: Not on file    Food insecurity:     Worry: Not on file     Inability: Not on file    Transportation needs:     Medical: Not on file     Non-medical: Not on file   Tobacco Use    Smoking status: Never Smoker   Substance and Sexual Activity    Alcohol use: Yes     Alcohol/week: 3.0 standard drinks     Types: 3 Cans of beer per week     Comment: on weekend    Drug use: Never    Sexual activity: Not on file   Lifestyle    Physical activity:     Days per week: Not on file     Minutes per session: Not on file    Stress: Not on file   Relationships    Social connections:     Talks on phone: Not on file     Gets together: Not on file     Attends [employer] service: Not on file     Active member of club or organization: Not on file  "    Attends meetings of clubs or organizations: Not on file     Relationship status: Not on file   Other Topics Concern    Not on file   Social History Narrative    Not on file       Review of Systems - Negative except   Respiratory ROS: no dyspnea  Cardiovascular ROS: no exertional chest pain  Gastrointestinal ROS: NO abdominal discomfort,  NO rectal bleeding  Musculoskeletal ROS: no muscular pain  Neurological ROS: no recent stroke    Physical Exam:  /63   Pulse (!) 58   Temp 97.7 °F (36.5 °C) (Temporal)   Ht 5' 8" (1.727 m)   Wt 99.8 kg (220 lb)   SpO2 95%   BMI 33.45 kg/m²   General: no distress  Head: normocephalic  Mallampati Score   Neck: supple, symmetrical, trachea midline  Lungs:  clear to auscultation bilaterally and normal respiratory effort  Heart: regular rate and rhythm and no murmur  Abdomen: soft, non-tender non-distented; bowel sounds normal; no masses,  no organomegaly  Extremities: no cyanosis or edema, or clubbing    ASA:  II    PLAN  COLONOSCOPY.    SedationPlan :MAC    The details of the procedure, the possible need for biopsy or polypectomy and the potential risks including bleeding, perforation, missed polyps were discussed in detail.    "

## 2019-10-04 NOTE — TRANSFER OF CARE
"Anesthesia Transfer of Care Note    Patient: Jm Deleon    Procedure(s) Performed: Procedure(s) (LRB):  COLONOSCOPY (N/A)    Patient location: PACU    Anesthesia Type: general    Transport from OR: Transported from OR on 6-10 L/min O2 by face mask with adequate spontaneous ventilation    Post pain: adequate analgesia    Post assessment: no apparent anesthetic complications    Post vital signs: stable    Level of consciousness: sedated    Nausea/Vomiting: no nausea/vomiting    Complications: none          Last vitals:   Visit Vitals  /63   Pulse (!) 58   Temp 36.5 °C (97.7 °F) (Temporal)   Ht 5' 8" (1.727 m)   Wt 99.8 kg (220 lb)   SpO2 95%   BMI 33.45 kg/m²     "

## 2019-10-09 ENCOUNTER — TELEPHONE (OUTPATIENT)
Dept: SURGERY | Facility: CLINIC | Age: 64
End: 2019-10-09

## 2019-10-09 NOTE — TELEPHONE ENCOUNTER
Spoke with patient to confirm appointment with Dr. Villeda tomorrow. Patient advised to report to 2nd floor at [facility] to Gen/Surg Clinic. Patient verbalized understanding.

## 2019-10-10 ENCOUNTER — INITIAL CONSULT (OUTPATIENT)
Dept: SURGERY | Facility: CLINIC | Age: 64
End: 2019-10-10
Payer: MEDICARE

## 2019-10-10 VITALS
WEIGHT: 219.38 LBS | HEIGHT: 68 IN | DIASTOLIC BLOOD PRESSURE: 67 MMHG | BODY MASS INDEX: 33.25 KG/M2 | SYSTOLIC BLOOD PRESSURE: 127 MMHG

## 2019-10-10 DIAGNOSIS — C43.59 MALIGNANT MELANOMA OF BACK: Primary | ICD-10-CM

## 2019-10-10 PROCEDURE — 99499 RISK ADDL DX/OHS AUDIT: ICD-10-PCS | Mod: HCNC,S$GLB,, | Performed by: SURGERY

## 2019-10-10 PROCEDURE — 99999 PR PBB SHADOW E&M-EST. PATIENT-LVL III: ICD-10-PCS | Mod: PBBFAC,HCNC,, | Performed by: SURGERY

## 2019-10-10 PROCEDURE — 99999 PR PBB SHADOW E&M-EST. PATIENT-LVL III: CPT | Mod: PBBFAC,HCNC,, | Performed by: SURGERY

## 2019-10-10 PROCEDURE — 3074F PR MOST RECENT SYSTOLIC BLOOD PRESSURE < 130 MM HG: ICD-10-PCS | Mod: HCNC,CPTII,S$GLB, | Performed by: SURGERY

## 2019-10-10 PROCEDURE — 3078F PR MOST RECENT DIASTOLIC BLOOD PRESSURE < 80 MM HG: ICD-10-PCS | Mod: HCNC,CPTII,S$GLB, | Performed by: SURGERY

## 2019-10-10 PROCEDURE — 1101F PR PT FALLS ASSESS DOC 0-1 FALLS W/OUT INJ PAST YR: ICD-10-PCS | Mod: HCNC,CPTII,S$GLB, | Performed by: SURGERY

## 2019-10-10 PROCEDURE — 1101F PT FALLS ASSESS-DOCD LE1/YR: CPT | Mod: HCNC,CPTII,S$GLB, | Performed by: SURGERY

## 2019-10-10 PROCEDURE — 99499 UNLISTED E&M SERVICE: CPT | Mod: HCNC,S$GLB,, | Performed by: SURGERY

## 2019-10-10 PROCEDURE — 99214 PR OFFICE/OUTPT VISIT, EST, LEVL IV, 30-39 MIN: ICD-10-PCS | Mod: HCNC,S$GLB,, | Performed by: SURGERY

## 2019-10-10 PROCEDURE — 3074F SYST BP LT 130 MM HG: CPT | Mod: HCNC,CPTII,S$GLB, | Performed by: SURGERY

## 2019-10-10 PROCEDURE — 3078F DIAST BP <80 MM HG: CPT | Mod: HCNC,CPTII,S$GLB, | Performed by: SURGERY

## 2019-10-10 PROCEDURE — 99214 OFFICE O/P EST MOD 30 MIN: CPT | Mod: HCNC,S$GLB,, | Performed by: SURGERY

## 2019-10-10 NOTE — PROGRESS NOTES
GENERAL SURGERY  History and Physical  Clinic Note    SUBJECTIVE:     HISTORY OF PRESENT ILLNESS:  Jm Deleon is a 64 y.o. male with a history of DMII, CAD (s/p CABGx4 in 2000), and SCC who has been referred to surgery clinic for newly diagnosed melanoma. Patient and wife state they first noticed darkening of a mole on his back, left of midline, two years ago. At that time a dermatologist looked at it but was not concerned. Since then, the lesion has begun to itch but otherwise has stayed the same without change in size or coloration. He was seen back at the dermatologist for SCC on his scalp, and due to continued concern regarding back lesion it was shave biopsied. Biopsy revealed melanoma, superficial spreading at least 2.7mm in depth- pT3a.  Patient does report history of sun exposure in the area. No additional lesions of concern. No family history of melanoma.   Of note he had a SCC left forehead removed surgically 2-3 years ago.     Pathology:   FINAL PATHOLOGIC DIAGNOSIS  Skin, left back, shave biopsy:  -MALIGNANT MELANOMA, SUPERFICIAL SPREADING, NON-ULCERATED, at least 2.70 MM IN DEPTH (at least  pT3a), see synoptic report below  SYNOPTIC REPORT  Procedure: biopsy, shave  Specimen Laterality: left  Tumor Site: back  Macroscopic Satellite Nodule(s): not identified  Histologic Type: Superficial spreading  Maximum Tumor (Breslow) Thickness: at least 2.70 mm  Ulceration: Not identified  Microsatellite(s): not identified  Margins:  Peripheral: Involved by invasive melanoma  Deep: Involved by invasive melanoma.  Mitotic rate: 2 mm2  Anatomic (Parker) level: at least IV  Lympho-vascular invasion: not identified  Neurotropism: not identified  Tumor infiltrating lymphocytes: present, non-brisk  Tumor regression: not identified  Pathologic Stage Classification (pTNM, AJCC 8th Ed): at least pT3a    MEDICATIONS:  Home Medications:  Current Outpatient Medications on File Prior to Visit   Medication Sig Dispense Refill  "   ACCU-CHEK SMARTVIEW TEST STRIP Strp 1 strip by Misc.(Non-Drug; Combo Route) route 3 (three) times daily. Patient needs an appointment 300 strip 0    ammonium lactate 12 % Crea Apply small amount to feet except for in between toes twice a day 1 Tube 3    atorvastatin (LIPITOR) 80 MG tablet TAKE 1 TABLET (80 MG TOTAL) BY MOUTH ONCE DAILY. 90 tablet 3    blood-glucose meter Misc Use to check sugar 3 times daily. Accu-chek Emily. Patient needs an appointment 1 each 0    carvedilol (COREG) 25 MG tablet Take .5-1 tablet twice daily or as directed (Patient taking differently: Take 12.5 mg by mouth 2 (two) times daily with meals. ) 180 tablet 3    diphenhydrAMINE (BENADRYL) 25 mg capsule Take 25 mg by mouth nightly as needed for Itching.      empagliflozin (JARDIANCE) 25 mg Tab Take 25 mg by mouth once daily. 90 tablet 1    FOLIC ACID/MULTIVITS-MIN (MULTIVITAMIN FOR CHOLESTEROL ORAL) Take 1 tablet by mouth once daily.       furosemide (LASIX) 40 MG tablet Take 1 tablet (40 mg total) by mouth once daily. 90 tablet 3    insulin (LANTUS SOLOSTAR U-100 INSULIN) glargine 100 units/mL (3mL) SubQ pen Inject 35 Units into the skin every evening. 31.5 mL 3    insulin aspart U-100 (NOVOLOG FLEXPEN U-100 INSULIN) 100 unit/mL InPn pen Inject 10 Units into the skin 3 (three) times daily with meals. Patient needs an appointment 2 Box 0    insulin needles, disposable, (BD INSULIN PEN NEEDLE UF ORIG) 29 x 1/2 " Ndle USE TWICE DAILY AS DIRECTED 100 each 2    lancets Misc 1 lancet by Misc.(Non-Drug; Combo Route) route 3 (three) times daily. accu-chek Fastclix. Patient needs an appointment 300 each 0    metFORMIN (GLUCOPHAGE) 1000 MG tablet TAKE 1 TABLET TWICE DAILY (NEED MD APPOINTMENT) 180 tablet 1    nitroGLYCERIN (NITROSTAT) 0.4 MG SL tablet Place 1 tablet (0.4 mg total) under the tongue every 5 (five) minutes as needed for Chest pain. 25 tablet 4    omega-3 acid ethyl esters (LOVAZA) 1 gram capsule TAKE 4 CAPSULES BY " "MOUTH DAILY OR AS DIRECTED. 360 capsule 3    pen needle, diabetic (BD INSULIN PEN NEEDLE UF MINI) 31 gauge x 3/16" Ndle 1 each by Misc.(Non-Drug; Combo Route) route 4 (four) times daily. Patient needs an appointment 400 each 0    tadalafil (CIALIS) 20 MG Tab Take 1 tablet (20 mg total) by mouth daily as needed. 10 tablet 3    ezetimibe (ZETIA) 10 mg tablet TAKE 1 TABLET (10 MG TOTAL) BY MOUTH ONCE DAILY. (Patient taking differently: Take 5 mg by mouth once daily. ) 90 tablet 3     Current Facility-Administered Medications on File Prior to Visit   Medication Dose Route Frequency Provider Last Rate Last Dose    triamcinolone acetonide injection 40 mg  40 mg Intra-articular  Patricio Multani MD   40 mg at 10/26/16 0841       ALLERGIES:    Review of patient's allergies indicates:   Allergen Reactions    No known drug allergies        PAST MEDICAL HISTORY:    Past Medical History:   Diagnosis Date    Allergy     seasonal    Arthritis     Coronary artery disease     Diabetes mellitus     Hyperlipidemia     Hypertension     Joint pain     Squamous cell carcinoma 2017    scalp - SSW       SURGICAL HISTORY:  Past Surgical History:   Procedure Laterality Date    BELPHAROPTOSIS REPAIR  10 years ago    both eyes    CARDIAC SURGERY      3 vessel bypass    COLONOSCOPY N/A 10/4/2019    Procedure: COLONOSCOPY;  Surgeon: Isaiah Booker MD;  Location: [facility] ([address] [place] [address]);  Service: Endoscopy;  Laterality: N/A;  Cardilogy Clearance received rom Dr. QUEENIE Salomon, see telephone encounter 8/26/19-BB    CORONARY ARTERY BYPASS GRAFT  x4 age 45 2000    HERNIA REPAIR      HIP SURGERY  9-24-14    left YANICK    JOINT REPLACEMENT      left hip    SHOULDER ARTHROSCOPY      SHOULDER SURGERY         FAMILY HISTORY:  Family History   Problem Relation Age of Onset    Coronary artery disease Mother     Cancer Father         lung cancer, smoker    Hypertension Brother     Cancer Brother         colon    Amblyopia Neg Hx "     Blindness Neg Hx     Cataracts Neg Hx     Glaucoma Neg Hx     Macular degeneration Neg Hx     Retinal detachment Neg Hx     Strabismus Neg Hx     Melanoma Neg Hx        SOCIAL HISTORY:  Social History     Tobacco Use    Smoking status: Never Smoker   Substance Use Topics    Alcohol use: Yes     Alcohol/week: 3.0 standard drinks     Types: 3 Cans of beer per week     Comment: on weekend    Drug use: Never        REVIEW OF SYSTEMS:  Review of Systems   Constitutional: Negative for activity change, chills, diaphoresis, fatigue, fever and unexpected weight change.   Respiratory: Negative for apnea, cough and shortness of breath.    Cardiovascular: Negative for chest pain, palpitations and leg swelling.   Gastrointestinal: Negative for abdominal distention, nausea and vomiting.   Neurological: Negative for dizziness and weakness.   Hematological: Negative for adenopathy. Does not bruise/bleed easily.   All other systems reviewed and are negative.      OBJECTIVE:     Most Recent Vitals:  BP: 127/67 (10/10/19 1614)    PHYSICAL EXAM:  Physical Exam   Constitutional: He is oriented to person, place, and time and well-developed, well-nourished, and in no distress.   HENT:   Head: Normocephalic.   Cardiovascular: Normal rate and intact distal pulses.   Pulmonary/Chest: Effort normal. No respiratory distress.   Musculoskeletal: Normal range of motion. He exhibits no edema or deformity.   Neurological: He is alert and oriented to person, place, and time.   Skin: Skin is warm and dry. He is not diaphoretic.                LABORATORY VALUES:  Lab Results   Component Value Date    WBC 9.30 10/26/2018    HGB 15.9 10/26/2018    HCT 48.3 10/26/2018     10/26/2018    HGBA1C 7.6 (H) 05/31/2019     Lab Results   Component Value Date     05/31/2019    K 4.5 05/31/2019     05/31/2019    CO2 27 05/31/2019    BUN 18 05/31/2019    CREATININE 0.9 05/31/2019     (H) 05/31/2019    CALCIUM 9.8 05/31/2019     MG 2.2 09/26/2014    PHOS 3.5 09/26/2014    AST 19 05/31/2019    ALT 27 05/31/2019    ALKPHOS 56 05/31/2019    BILITOT 0.7 05/31/2019    BILIDIR 0.2 04/04/2005    PROT 6.6 05/31/2019    ALBUMIN 3.9 05/31/2019    AMYLASE 48 03/27/2006    LIPASE 22 03/27/2006     Lab Results   Component Value Date    INR 1.1 10/20/2014     Lab Results   Component Value Date    TSH 1.514 10/26/2018       ASSESSMENT:     Jm Deleon is a 64 y.o. male with newly diagnosed melanoma of the back (2.7mm deep, superficial spreading, no ulceration)    PLAN:  -Will plan for wide local excision with 2cm margins of left back melanoma with SLNBx on 10/18/19.   -Will schedule for full body pre-operative lymphoscintigraphy to locate sentinel lymph node, likely left axillary   -Consent signed for the procedure today in clinic   -Patient schedule for cardiology appointment on 10/16, will require cardiac clearance due to history of CAD with CABGx4    L. Deanne Love MD   General Surgery- PGYIII  [phone]    I have personally taken the history and examined this patient and agree with the resident's note as stated above.  As above.  T3 melanoma of the back.  Needs pre-op lymphoscintigraphy mapping of the back to assess for drainage to regional court basin, likely axilla.

## 2019-10-10 NOTE — H&P (VIEW-ONLY)
GENERAL SURGERY  History and Physical  Clinic Note    SUBJECTIVE:     HISTORY OF PRESENT ILLNESS:  Jm Deleon is a 64 y.o. male with a history of DMII, CAD (s/p CABGx4 in 2000), and SCC who has been referred to surgery clinic for newly diagnosed melanoma. Patient and wife state they first noticed darkening of a mole on his back, left of midline, two years ago. At that time a dermatologist looked at it but was not concerned. Since then, the lesion has begun to itch but otherwise has stayed the same without change in size or coloration. He was seen back at the dermatologist for SCC on his scalp, and due to continued concern regarding back lesion it was shave biopsied. Biopsy revealed melanoma, superficial spreading at least 2.7mm in depth- pT3a.  Patient does report history of sun exposure in the area. No additional lesions of concern. No family history of melanoma.   Of note he had a SCC left forehead removed surgically 2-3 years ago.     Pathology:   FINAL PATHOLOGIC DIAGNOSIS  Skin, left back, shave biopsy:  -MALIGNANT MELANOMA, SUPERFICIAL SPREADING, NON-ULCERATED, at least 2.70 MM IN DEPTH (at least  pT3a), see synoptic report below  SYNOPTIC REPORT  Procedure: biopsy, shave  Specimen Laterality: left  Tumor Site: back  Macroscopic Satellite Nodule(s): not identified  Histologic Type: Superficial spreading  Maximum Tumor (Breslow) Thickness: at least 2.70 mm  Ulceration: Not identified  Microsatellite(s): not identified  Margins:  Peripheral: Involved by invasive melanoma  Deep: Involved by invasive melanoma.  Mitotic rate: 2 mm2  Anatomic (Parker) level: at least IV  Lympho-vascular invasion: not identified  Neurotropism: not identified  Tumor infiltrating lymphocytes: present, non-brisk  Tumor regression: not identified  Pathologic Stage Classification (pTNM, AJCC 8th Ed): at least pT3a    MEDICATIONS:  Home Medications:  Current Outpatient Medications on File Prior to Visit   Medication Sig Dispense Refill  "   ACCU-CHEK SMARTVIEW TEST STRIP Strp 1 strip by Misc.(Non-Drug; Combo Route) route 3 (three) times daily. Patient needs an appointment 300 strip 0    ammonium lactate 12 % Crea Apply small amount to feet except for in between toes twice a day 1 Tube 3    atorvastatin (LIPITOR) 80 MG tablet TAKE 1 TABLET (80 MG TOTAL) BY MOUTH ONCE DAILY. 90 tablet 3    blood-glucose meter Misc Use to check sugar 3 times daily. Accu-chek Emily. Patient needs an appointment 1 each 0    carvedilol (COREG) 25 MG tablet Take .5-1 tablet twice daily or as directed (Patient taking differently: Take 12.5 mg by mouth 2 (two) times daily with meals. ) 180 tablet 3    diphenhydrAMINE (BENADRYL) 25 mg capsule Take 25 mg by mouth nightly as needed for Itching.      empagliflozin (JARDIANCE) 25 mg Tab Take 25 mg by mouth once daily. 90 tablet 1    FOLIC ACID/MULTIVITS-MIN (MULTIVITAMIN FOR CHOLESTEROL ORAL) Take 1 tablet by mouth once daily.       furosemide (LASIX) 40 MG tablet Take 1 tablet (40 mg total) by mouth once daily. 90 tablet 3    insulin (LANTUS SOLOSTAR U-100 INSULIN) glargine 100 units/mL (3mL) SubQ pen Inject 35 Units into the skin every evening. 31.5 mL 3    insulin aspart U-100 (NOVOLOG FLEXPEN U-100 INSULIN) 100 unit/mL InPn pen Inject 10 Units into the skin 3 (three) times daily with meals. Patient needs an appointment 2 Box 0    insulin needles, disposable, (BD INSULIN PEN NEEDLE UF ORIG) 29 x 1/2 " Ndle USE TWICE DAILY AS DIRECTED 100 each 2    lancets Misc 1 lancet by Misc.(Non-Drug; Combo Route) route 3 (three) times daily. accu-chek Fastclix. Patient needs an appointment 300 each 0    metFORMIN (GLUCOPHAGE) 1000 MG tablet TAKE 1 TABLET TWICE DAILY (NEED MD APPOINTMENT) 180 tablet 1    nitroGLYCERIN (NITROSTAT) 0.4 MG SL tablet Place 1 tablet (0.4 mg total) under the tongue every 5 (five) minutes as needed for Chest pain. 25 tablet 4    omega-3 acid ethyl esters (LOVAZA) 1 gram capsule TAKE 4 CAPSULES BY " "MOUTH DAILY OR AS DIRECTED. 360 capsule 3    pen needle, diabetic (BD INSULIN PEN NEEDLE UF MINI) 31 gauge x 3/16" Ndle 1 each by Misc.(Non-Drug; Combo Route) route 4 (four) times daily. Patient needs an appointment 400 each 0    tadalafil (CIALIS) 20 MG Tab Take 1 tablet (20 mg total) by mouth daily as needed. 10 tablet 3    ezetimibe (ZETIA) 10 mg tablet TAKE 1 TABLET (10 MG TOTAL) BY MOUTH ONCE DAILY. (Patient taking differently: Take 5 mg by mouth once daily. ) 90 tablet 3     Current Facility-Administered Medications on File Prior to Visit   Medication Dose Route Frequency Provider Last Rate Last Dose    triamcinolone acetonide injection 40 mg  40 mg Intra-articular  Patricio Multani MD   40 mg at 10/26/16 0841       ALLERGIES:    Review of patient's allergies indicates:   Allergen Reactions    No known drug allergies        PAST MEDICAL HISTORY:    Past Medical History:   Diagnosis Date    Allergy     seasonal    Arthritis     Coronary artery disease     Diabetes mellitus     Hyperlipidemia     Hypertension     Joint pain     Squamous cell carcinoma 2017    scalp - SSW       SURGICAL HISTORY:  Past Surgical History:   Procedure Laterality Date    BELPHAROPTOSIS REPAIR  10 years ago    both eyes    CARDIAC SURGERY      3 vessel bypass    COLONOSCOPY N/A 10/4/2019    Procedure: COLONOSCOPY;  Surgeon: Isaiah Booker MD;  Location: [facility] ([address] [place] [address]);  Service: Endoscopy;  Laterality: N/A;  Cardilogy Clearance received rom Dr. QUEENIE Salomon, see telephone encounter 8/26/19-BB    CORONARY ARTERY BYPASS GRAFT  x4 age 45 2000    HERNIA REPAIR      HIP SURGERY  9-24-14    left YANICK    JOINT REPLACEMENT      left hip    SHOULDER ARTHROSCOPY      SHOULDER SURGERY         FAMILY HISTORY:  Family History   Problem Relation Age of Onset    Coronary artery disease Mother     Cancer Father         lung cancer, smoker    Hypertension Brother     Cancer Brother         colon    Amblyopia Neg Hx "     Blindness Neg Hx     Cataracts Neg Hx     Glaucoma Neg Hx     Macular degeneration Neg Hx     Retinal detachment Neg Hx     Strabismus Neg Hx     Melanoma Neg Hx        SOCIAL HISTORY:  Social History     Tobacco Use    Smoking status: Never Smoker   Substance Use Topics    Alcohol use: Yes     Alcohol/week: 3.0 standard drinks     Types: 3 Cans of beer per week     Comment: on weekend    Drug use: Never        REVIEW OF SYSTEMS:  Review of Systems   Constitutional: Negative for activity change, chills, diaphoresis, fatigue, fever and unexpected weight change.   Respiratory: Negative for apnea, cough and shortness of breath.    Cardiovascular: Negative for chest pain, palpitations and leg swelling.   Gastrointestinal: Negative for abdominal distention, nausea and vomiting.   Neurological: Negative for dizziness and weakness.   Hematological: Negative for adenopathy. Does not bruise/bleed easily.   All other systems reviewed and are negative.      OBJECTIVE:     Most Recent Vitals:  BP: 127/67 (10/10/19 1614)    PHYSICAL EXAM:  Physical Exam   Constitutional: He is oriented to person, place, and time and well-developed, well-nourished, and in no distress.   HENT:   Head: Normocephalic.   Cardiovascular: Normal rate and intact distal pulses.   Pulmonary/Chest: Effort normal. No respiratory distress.   Musculoskeletal: Normal range of motion. He exhibits no edema or deformity.   Neurological: He is alert and oriented to person, place, and time.   Skin: Skin is warm and dry. He is not diaphoretic.                LABORATORY VALUES:  Lab Results   Component Value Date    WBC 9.30 10/26/2018    HGB 15.9 10/26/2018    HCT 48.3 10/26/2018     10/26/2018    HGBA1C 7.6 (H) 05/31/2019     Lab Results   Component Value Date     05/31/2019    K 4.5 05/31/2019     05/31/2019    CO2 27 05/31/2019    BUN 18 05/31/2019    CREATININE 0.9 05/31/2019     (H) 05/31/2019    CALCIUM 9.8 05/31/2019     MG 2.2 09/26/2014    PHOS 3.5 09/26/2014    AST 19 05/31/2019    ALT 27 05/31/2019    ALKPHOS 56 05/31/2019    BILITOT 0.7 05/31/2019    BILIDIR 0.2 04/04/2005    PROT 6.6 05/31/2019    ALBUMIN 3.9 05/31/2019    AMYLASE 48 03/27/2006    LIPASE 22 03/27/2006     Lab Results   Component Value Date    INR 1.1 10/20/2014     Lab Results   Component Value Date    TSH 1.514 10/26/2018       ASSESSMENT:     Jm Deleon is a 64 y.o. male with newly diagnosed melanoma of the back (2.7mm deep, superficial spreading, no ulceration)    PLAN:  -Will plan for wide local excision with 2cm margins of left back melanoma with SLNBx on 10/18/19.   -Will schedule for full body pre-operative lymphoscintigraphy to locate sentinel lymph node, likely left axillary   -Consent signed for the procedure today in clinic   -Patient schedule for cardiology appointment on 10/16, will require cardiac clearance due to history of CAD with CABGx4    L. Deanne Love MD   General Surgery- PGYIII  [phone]    I have personally taken the history and examined this patient and agree with the resident's note as stated above.  As above.  T3 melanoma of the back.  Needs pre-op lymphoscintigraphy mapping of the back to assess for drainage to regional court basin, likely axilla.

## 2019-10-10 NOTE — LETTER
October 12, 2019      Dana Coelho MD  [address]  [facility] [address]           [facility] - Gen S[facility]/Surg Onc  [address]  [facility] [address]  Phone: [phone]          Patient: Jm Deleon   MR Number: [id number]   YOB: 1955   Date of Visit: 10/10/2019       Dear Dr. Dana Coelho:    Thank you for referring Jm Deleon to me for evaluation. Attached you will find relevant portions of my assessment and plan of care.    If you have questions, please do not hesitate to call me. I look forward to following Jm Deleon along with you.    Sincerely,    Fabián Villeda MD    Enclosure  CC:  No Recipients    If you would like to receive this communication electronically, please contact [email or web address] or [phone] to request more information on TM Link access.    For providers and/or their staff who would like to refer a patient to [facility], please contact us through our one-stop-shop provider referral line, [facility], at [phone].    If you feel you have received this communication in error or would no longer like to receive these types of communications, please e-mail [email or web address]

## 2019-10-11 ENCOUNTER — TELEPHONE (OUTPATIENT)
Dept: ENDOSCOPY | Facility: HOSPITAL | Age: 64
End: 2019-10-11

## 2019-10-11 DIAGNOSIS — C43.59 MELANOMA OF BACK: Primary | ICD-10-CM

## 2019-10-12 PROBLEM — C43.59 MALIGNANT MELANOMA OF BACK: Status: ACTIVE | Noted: 2019-10-12

## 2019-10-14 ENCOUNTER — LAB VISIT (OUTPATIENT)
Dept: LAB | Facility: HOSPITAL | Age: 64
End: 2019-10-14
Attending: INTERNAL MEDICINE
Payer: MEDICARE

## 2019-10-14 DIAGNOSIS — E78.5 DYSLIPIDEMIA: ICD-10-CM

## 2019-10-14 DIAGNOSIS — I25.10 CORONARY ARTERY DISEASE INVOLVING NATIVE CORONARY ARTERY OF NATIVE HEART WITHOUT ANGINA PECTORIS: ICD-10-CM

## 2019-10-14 DIAGNOSIS — C43.59 MELANOMA OF BACK: Primary | ICD-10-CM

## 2019-10-14 LAB
ALBUMIN SERPL BCP-MCNC: 4.2 G/DL (ref 3.5–5.2)
ALP SERPL-CCNC: 64 U/L (ref 55–135)
ALT SERPL W/O P-5'-P-CCNC: 24 U/L (ref 10–44)
ANION GAP SERPL CALC-SCNC: 10 MMOL/L (ref 8–16)
AST SERPL-CCNC: 16 U/L (ref 10–40)
BILIRUB SERPL-MCNC: 0.6 MG/DL (ref 0.1–1)
BUN SERPL-MCNC: 17 MG/DL (ref 8–23)
CALCIUM SERPL-MCNC: 9.8 MG/DL (ref 8.7–10.5)
CHLORIDE SERPL-SCNC: 104 MMOL/L (ref 95–110)
CHOLEST SERPL-MCNC: 141 MG/DL (ref 120–199)
CHOLEST/HDLC SERPL: 4.7 {RATIO} (ref 2–5)
CO2 SERPL-SCNC: 27 MMOL/L (ref 23–29)
CREAT SERPL-MCNC: 1 MG/DL (ref 0.5–1.4)
CRP SERPL-MCNC: 0.88 MG/L (ref 0–3.19)
EST. GFR  (AFRICAN AMERICAN): >60 ML/MIN/1.73 M^2
EST. GFR  (NON AFRICAN AMERICAN): >60 ML/MIN/1.73 M^2
GLUCOSE SERPL-MCNC: 227 MG/DL (ref 70–110)
HDLC SERPL-MCNC: 30 MG/DL (ref 40–75)
HDLC SERPL: 21.3 % (ref 20–50)
LDLC SERPL CALC-MCNC: 58.8 MG/DL (ref 63–159)
NONHDLC SERPL-MCNC: 111 MG/DL
POTASSIUM SERPL-SCNC: 4.5 MMOL/L (ref 3.5–5.1)
PROT SERPL-MCNC: 7.2 G/DL (ref 6–8.4)
SODIUM SERPL-SCNC: 141 MMOL/L (ref 136–145)
TRIGL SERPL-MCNC: 261 MG/DL (ref 30–150)

## 2019-10-14 PROCEDURE — 80053 COMPREHEN METABOLIC PANEL: CPT | Mod: HCNC

## 2019-10-14 PROCEDURE — 80061 LIPID PANEL: CPT | Mod: HCNC

## 2019-10-14 PROCEDURE — 36415 COLL VENOUS BLD VENIPUNCTURE: CPT | Mod: HCNC

## 2019-10-14 PROCEDURE — 86141 C-REACTIVE PROTEIN HS: CPT | Mod: HCNC

## 2019-10-15 ENCOUNTER — PATIENT OUTREACH (OUTPATIENT)
Dept: ADMINISTRATIVE | Facility: OTHER | Age: 64
End: 2019-10-15

## 2019-10-15 NOTE — PROGRESS NOTES
Subjective:   Patient ID:  Jm Deleon is a 64 y.o. male who presents for follow-up of CAD    HPI: The patient is here for CAD.     The patient has no chest pain, SOB, TIA, palpitations, syncope or pre-syncope.Patient does not exercise.  BP has been up and down.      Review of Systems   Constitution: Negative for chills, decreased appetite, diaphoresis, fever, malaise/fatigue, night sweats, weight gain and weight loss.   HENT: Negative for congestion, hoarse voice, nosebleeds, sore throat and tinnitus.    Eyes: Negative for blurred vision, double vision, vision loss in left eye, vision loss in right eye, visual disturbance and visual halos.   Cardiovascular: Negative for chest pain, claudication, cyanosis, dyspnea on exertion, irregular heartbeat, leg swelling, near-syncope, orthopnea, palpitations, paroxysmal nocturnal dyspnea and syncope.   Respiratory: Negative for cough, hemoptysis, shortness of breath, sleep disturbances due to breathing, snoring, sputum production and wheezing.    Endocrine: Negative for cold intolerance, heat intolerance, polydipsia, polyphagia and polyuria.   Hematologic/Lymphatic: Negative for adenopathy and bleeding problem. Does not bruise/bleed easily.   Skin: Negative for color change, dry skin, flushing, itching, nail changes, poor wound healing, rash, skin cancer, suspicious lesions and unusual hair distribution.   Musculoskeletal: Positive for arthritis, back pain and joint pain. Negative for falls, gout, joint swelling, muscle cramps, muscle weakness, myalgias and stiffness.   Gastrointestinal: Negative for abdominal pain, anorexia, change in bowel habit, constipation, diarrhea, dysphagia, heartburn, hematemesis, hematochezia, melena and vomiting.   Genitourinary: Negative for decreased libido, dysuria, hematuria, hesitancy and urgency.   Neurological: Negative for excessive daytime sleepiness, dizziness, focal weakness, headaches, light-headedness, loss of balance, numbness,  "paresthesias, seizures, sensory change, tremors, vertigo and weakness.   Psychiatric/Behavioral: Negative for altered mental status, depression, hallucinations, memory loss, substance abuse and suicidal ideas. The patient does not have insomnia and is not nervous/anxious.    Allergic/Immunologic: Negative for environmental allergies and hives.       Objective: BP (!) 149/76 (BP Location: Left arm, Patient Position: Sitting, BP Method: X-Large (Automatic))   Pulse 70   Ht 5' 8" (1.727 m)   Wt 98.7 kg (217 lb 9.5 oz)   BMI 33.09 kg/m²      Physical Exam   Constitutional: He is oriented to person, place, and time. He appears well-developed and well-nourished. No distress.   HENT:   Head: Normocephalic.   Eyes: Pupils are equal, round, and reactive to light. EOM are normal.   Neck: Normal range of motion. No thyromegaly present.   Cardiovascular: Normal rate, regular rhythm, normal heart sounds and intact distal pulses. Exam reveals no gallop and no friction rub.   No murmur heard.  Pulses:       Carotid pulses are 3+ on the right side, and 3+ on the left side.       Radial pulses are 3+ on the right side, and 3+ on the left side.        Femoral pulses are 3+ on the right side, and 3+ on the left side.       Popliteal pulses are 3+ on the right side, and 3+ on the left side.        Dorsalis pedis pulses are 3+ on the right side, and 3+ on the left side.        Posterior tibial pulses are 3+ on the right side, and 3+ on the left side.   Pulmonary/Chest: Effort normal and breath sounds normal. No respiratory distress. He has no wheezes. He has no rales. He exhibits no tenderness.   Abdominal: Soft. He exhibits no distension and no mass. There is no tenderness.   Musculoskeletal: Normal range of motion.   Lymphadenopathy:     He has no cervical adenopathy.   Neurological: He is alert and oriented to person, place, and time.   Skin: Skin is warm. He is not diaphoretic. No cyanosis. Nails show no clubbing.   Psychiatric: " He has a normal mood and affect. His speech is normal and behavior is normal. Judgment and thought content normal. Cognition and memory are normal.       Assessment:     1. Coronary artery disease involving native coronary artery of native heart without angina pectoris    2. Abnormal nuclear stress test    3. Abnormal cardiovascular stress test    4. Low back pain with sciatica, sciatica laterality unspecified, unspecified back pain laterality, unspecified chronicity    5. Dyslipidemia    6. Hyperkalemia    7. Essential hypertension    8. Lack of physical exercise    9. Long term (current) use of anticoagulants    10. Varicose veins of lower extremity with inflammation, unspecified laterality    11. Venous insufficiency    12. Status post hip replacement, unspecified laterality    13. S/P CABG (coronary artery bypass graft)    14. 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 Preop cardiovascular exam    16. Type 2 diabetes, uncontrolled, with peripheral circulatory disorder        Plan:   Discussed diet , achieving and maintaining ideal body weight, and exercise.   We reviewed meds in detail.  Reassured-discussed goals, options, plan.    Good candidate for Vascepa 2 twice daily  Valsartan 320 mg instead of Ramipril  Cleared for A/S        Jm was seen today for coronary artery disease involving native coronary artery of  and pre-op exam.    Diagnoses and all orders for this visit:    Coronary artery disease involving native coronary artery of native heart without angina pectoris  -     valsartan (DIOVAN) 320 MG tablet; Take 1 tablet (320 mg total) by mouth once daily.  -     Lipid panel; Standing  -     Comprehensive metabolic panel; Standing  -     icosapent ethyl (VASCEPA) 1 gram Cap; Take 2 capsules by mouth 2 (two) times daily.    Abnormal nuclear stress test    Abnormal cardiovascular stress test    Low back pain with sciatica, sciatica laterality unspecified,  unspecified back pain laterality, unspecified chronicity    Dyslipidemia  -     Comprehensive metabolic panel; Standing  -     icosapent ethyl (VASCEPA) 1 gram Cap; Take 2 capsules by mouth 2 (two) times daily.    Hyperkalemia  -     Comprehensive metabolic panel; Standing    Essential hypertension  -     valsartan (DIOVAN) 320 MG tablet; Take 1 tablet (320 mg total) by mouth once daily.  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            Follow up in about 15 months (around 1/16/2021) for WITH LABS; LABS 3 MONTHS.

## 2019-10-16 ENCOUNTER — PATIENT MESSAGE (OUTPATIENT)
Dept: SURGERY | Facility: CLINIC | Age: 64
End: 2019-10-16

## 2019-10-16 ENCOUNTER — OFFICE VISIT (OUTPATIENT)
Dept: CARDIOLOGY | Facility: CLINIC | Age: 64
End: 2019-10-16
Payer: MEDICARE

## 2019-10-16 VITALS
DIASTOLIC BLOOD PRESSURE: 76 MMHG | HEIGHT: 68 IN | SYSTOLIC BLOOD PRESSURE: 149 MMHG | HEART RATE: 70 BPM | BODY MASS INDEX: 32.98 KG/M2 | WEIGHT: 217.63 LBS

## 2019-10-16 DIAGNOSIS — I10 ESSENTIAL HYPERTENSION: ICD-10-CM

## 2019-10-16 DIAGNOSIS — Z72.3 LACK OF PHYSICAL EXERCISE: ICD-10-CM

## 2019-10-16 DIAGNOSIS — R94.39 ABNORMAL NUCLEAR STRESS TEST: ICD-10-CM

## 2019-10-16 DIAGNOSIS — I87.2 VENOUS INSUFFICIENCY: ICD-10-CM

## 2019-10-16 DIAGNOSIS — Z96.649 STATUS POST HIP REPLACEMENT, UNSPECIFIED LATERALITY: ICD-10-CM

## 2019-10-16 DIAGNOSIS — M54.40 LOW BACK PAIN WITH SCIATICA, SCIATICA LATERALITY UNSPECIFIED, UNSPECIFIED BACK PAIN LATERALITY, UNSPECIFIED CHRONICITY: ICD-10-CM

## 2019-10-16 DIAGNOSIS — R94.39 ABNORMAL CARDIOVASCULAR STRESS TEST: ICD-10-CM

## 2019-10-16 DIAGNOSIS — E87.5 HYPERKALEMIA: ICD-10-CM

## 2019-10-16 DIAGNOSIS — E78.5 DYSLIPIDEMIA: ICD-10-CM

## 2019-10-16 DIAGNOSIS — Z01.810 PREOP CARDIOVASCULAR EXAM: ICD-10-CM

## 2019-10-16 DIAGNOSIS — I25.10 CORONARY ARTERY DISEASE INVOLVING NATIVE CORONARY ARTERY OF NATIVE HEART WITHOUT ANGINA PECTORIS: Primary | ICD-10-CM

## 2019-10-16 DIAGNOSIS — E66.1 CLASS 1 DRUG-INDUCED OBESITY WITH SERIOUS COMORBIDITY IN ADULT, UNSPECIFIED BMI: ICD-10-CM

## 2019-10-16 DIAGNOSIS — Z79.01 LONG TERM (CURRENT) USE OF ANTICOAGULANTS: ICD-10-CM

## 2019-10-16 DIAGNOSIS — Z95.1 S/P CABG (CORONARY ARTERY BYPASS GRAFT): ICD-10-CM

## 2019-10-16 DIAGNOSIS — I83.10 VARICOSE VEINS OF LOWER EXTREMITY WITH INFLAMMATION, UNSPECIFIED LATERALITY: ICD-10-CM

## 2019-10-16 PROCEDURE — 99499 RISK ADDL DX/OHS AUDIT: ICD-10-PCS | Mod: HCNC,S$GLB,, | Performed by: INTERNAL MEDICINE

## 2019-10-16 PROCEDURE — 3077F SYST BP >= 140 MM HG: CPT | Mod: HCNC,CPTII,S$GLB, | Performed by: INTERNAL MEDICINE

## 2019-10-16 PROCEDURE — 99499 UNLISTED E&M SERVICE: CPT | Mod: HCNC,S$GLB,, | Performed by: INTERNAL MEDICINE

## 2019-10-16 PROCEDURE — 3078F PR MOST RECENT DIASTOLIC BLOOD PRESSURE < 80 MM HG: ICD-10-PCS | Mod: HCNC,CPTII,S$GLB, | Performed by: INTERNAL MEDICINE

## 2019-10-16 PROCEDURE — 1101F PT FALLS ASSESS-DOCD LE1/YR: CPT | Mod: HCNC,CPTII,S$GLB, | Performed by: INTERNAL MEDICINE

## 2019-10-16 PROCEDURE — 99999 PR PBB SHADOW E&M-EST. PATIENT-LVL III: CPT | Mod: PBBFAC,HCNC,, | Performed by: INTERNAL MEDICINE

## 2019-10-16 PROCEDURE — 99215 PR OFFICE/OUTPT VISIT, EST, LEVL V, 40-54 MIN: ICD-10-PCS | Mod: HCNC,S$GLB,, | Performed by: INTERNAL MEDICINE

## 2019-10-16 PROCEDURE — 3078F DIAST BP <80 MM HG: CPT | Mod: HCNC,CPTII,S$GLB, | Performed by: INTERNAL MEDICINE

## 2019-10-16 PROCEDURE — 99215 OFFICE O/P EST HI 40 MIN: CPT | Mod: HCNC,S$GLB,, | Performed by: INTERNAL MEDICINE

## 2019-10-16 PROCEDURE — 3077F PR MOST RECENT SYSTOLIC BLOOD PRESSURE >= 140 MM HG: ICD-10-PCS | Mod: HCNC,CPTII,S$GLB, | Performed by: INTERNAL MEDICINE

## 2019-10-16 PROCEDURE — 1101F PR PT FALLS ASSESS DOC 0-1 FALLS W/OUT INJ PAST YR: ICD-10-PCS | Mod: HCNC,CPTII,S$GLB, | Performed by: INTERNAL MEDICINE

## 2019-10-16 PROCEDURE — 99999 PR PBB SHADOW E&M-EST. PATIENT-LVL III: ICD-10-PCS | Mod: PBBFAC,HCNC,, | Performed by: INTERNAL MEDICINE

## 2019-10-16 RX ORDER — VALSARTAN 320 MG/1
320 TABLET ORAL DAILY
Qty: 90 TABLET | Refills: 3 | Status: SHIPPED | OUTPATIENT
Start: 2019-10-16 | End: 2020-10-05

## 2019-10-16 RX ORDER — ICOSAPENT ETHYL 1000 MG/1
2 CAPSULE ORAL 2 TIMES DAILY
Qty: 360 CAPSULE | Refills: 3 | Status: SHIPPED | OUTPATIENT
Start: 2019-10-16 | End: 2021-05-28 | Stop reason: SDUPTHER

## 2019-10-16 NOTE — PATIENT INSTRUCTIONS
Discussed diet , achieving and maintaining ideal body weight, and exercise.   We reviewed meds in detail.  Reassured-discussed goals, options, plan.  Good candidate for Vascepa 2 twice daily  Valsartan 320 mg instead of Ramipril  Cleared for A/S

## 2019-10-17 ENCOUNTER — TELEPHONE (OUTPATIENT)
Dept: SURGERY | Facility: CLINIC | Age: 64
End: 2019-10-17

## 2019-10-17 ENCOUNTER — ANESTHESIA EVENT (OUTPATIENT)
Dept: SURGERY | Facility: HOSPITAL | Age: 64
End: 2019-10-17
Payer: MEDICARE

## 2019-10-17 NOTE — PRE-PROCEDURE INSTRUCTIONS
Preop instructions:     NPO instructions: NO solids foods,non-clear liquids including milk, milk products, creamers, gum, mints, or hard candy after midnight the night prior to your surgery.     We encourage a limited consumption of CLEAR LIQUIDS after midnight the night before your surgery.     Acceptable Clear Liquids are listed below:    Water,  SUGAR-FREE Garorade/Powerade sports drinks, Black coffee with without sugar/NO milk, cream or creamer or Jello.   You may drink a total of 20 ounces of any of the acceptable Clear Liquids after midnight up to ONE (1) HOUR before you are told to arrive for your surgery.0700   Your last clear liquid beverage should NOT be more than eight (8) ounces, consumed within the last hour you are allowed to have clear liquids.   If you have received specific instructions from your Surgeon/Surgeon's staff, please follow their instructions.     Showering instructions, directions, leave all valuables at home, medication instructions for PM prior & am of procedure explained. Patient stated an understanding.      Patient denies any side effects or issues with anesthesia or sedation.

## 2019-10-17 NOTE — TELEPHONE ENCOUNTER
Spoke to Patient's Wife - a retired [facility] Nurse.  Arrival time of 0800 given to check in at Nuclear Medicine on the 2nd Floor of the Hospital on [facility].  Will report to the Surgery Center whenever he is finished in Nuclear Medicine.  Instructed that he can have clear liquids between 3703-6282 and then to remain NPO after 0700, to wash up tonight and in the morning with an antibacterial soap, not to wear anything metal or to apply any lotions, powders, or deodorant, and to wear something easy to remove.  Cleared for surgery by Dr. Salomon - Cardiology.  Wife verbalized understanding of instructions.

## 2019-10-18 ENCOUNTER — HOSPITAL ENCOUNTER (OUTPATIENT)
Facility: HOSPITAL | Age: 64
Discharge: HOME | End: 2019-10-18
Attending: SURGERY | Admitting: SURGERY
Payer: MEDICARE

## 2019-10-18 ENCOUNTER — ANESTHESIA (OUTPATIENT)
Dept: SURGERY | Facility: HOSPITAL | Age: 64
End: 2019-10-18
Payer: MEDICARE

## 2019-10-18 ENCOUNTER — HOSPITAL ENCOUNTER (OUTPATIENT)
Dept: RADIOLOGY | Facility: HOSPITAL | Age: 64
Discharge: HOME | End: 2019-10-18
Attending: SURGERY | Admitting: SURGERY
Payer: MEDICARE

## 2019-10-18 VITALS
OXYGEN SATURATION: 96 % | TEMPERATURE: 98 F | HEIGHT: 68 IN | SYSTOLIC BLOOD PRESSURE: 140 MMHG | DIASTOLIC BLOOD PRESSURE: 71 MMHG | BODY MASS INDEX: 32.89 KG/M2 | WEIGHT: 217 LBS | RESPIRATION RATE: 16 BRPM | HEART RATE: 76 BPM

## 2019-10-18 DIAGNOSIS — C43.59 MELANOMA OF BACK: ICD-10-CM

## 2019-10-18 DIAGNOSIS — C43.9 MELANOMA: Primary | ICD-10-CM

## 2019-10-18 LAB
POCT GLUCOSE: 127 MG/DL (ref 70–110)
POCT GLUCOSE: 167 MG/DL (ref 70–110)

## 2019-10-18 PROCEDURE — 88341 PR IHC OR ICC EACH ADD'L SINGLE ANTIBODY  STAINPR: ICD-10-PCS | Mod: 26,HCNC,, | Performed by: PATHOLOGY

## 2019-10-18 PROCEDURE — 11606 PR EXC SKIN MALIG >4 CM TRUNK,ARM,LEG: ICD-10-PCS | Mod: 51,HCNC,, | Performed by: SURGERY

## 2019-10-18 PROCEDURE — 38525 BIOPSY/REMOVAL LYMPH NODES: CPT | Mod: HCNC,LT,, | Performed by: SURGERY

## 2019-10-18 PROCEDURE — 12035 INTMD RPR S/A/T/EXT 12.6-20: CPT | Mod: 59,HCNC,, | Performed by: SURGERY

## 2019-10-18 PROCEDURE — 63600175 PHARM REV CODE 636 W HCPCS: Mod: HCNC | Performed by: STUDENT IN AN ORGANIZED HEALTH CARE EDUCATION/TRAINING PROGRAM

## 2019-10-18 PROCEDURE — 63600175 PHARM REV CODE 636 W HCPCS: Mod: HCNC | Performed by: NURSE ANESTHETIST, CERTIFIED REGISTERED

## 2019-10-18 PROCEDURE — 71000033 HC RECOVERY, INTIAL HOUR: Mod: HCNC | Performed by: SURGERY

## 2019-10-18 PROCEDURE — 71000039 HC RECOVERY, EACH ADD'L HOUR: Mod: HCNC | Performed by: SURGERY

## 2019-10-18 PROCEDURE — 25000003 PHARM REV CODE 250: Mod: HCNC

## 2019-10-18 PROCEDURE — 27201423 OPTIME MED/SURG SUP & DEVICES STERILE SUPPLY: Mod: HCNC | Performed by: SURGERY

## 2019-10-18 PROCEDURE — 12035 PR LAYR CLOS WND TRUNK,ARM,LEG 12.6-20 CM: ICD-10-PCS | Mod: 59,HCNC,, | Performed by: SURGERY

## 2019-10-18 PROCEDURE — 88305 TISSUE EXAM BY PATHOLOGIST: CPT | Mod: HCNC,59 | Performed by: PATHOLOGY

## 2019-10-18 PROCEDURE — D9220A PRA ANESTHESIA: ICD-10-PCS | Mod: HCNC,ANES,, | Performed by: ANESTHESIOLOGY

## 2019-10-18 PROCEDURE — 63600175 PHARM REV CODE 636 W HCPCS: Mod: HCNC | Performed by: SURGERY

## 2019-10-18 PROCEDURE — 88342 TISSUE SPECIMEN TO PATHOLOGY - SURGERY: ICD-10-PCS | Mod: 26,HCNC,, | Performed by: PATHOLOGY

## 2019-10-18 PROCEDURE — 36000707: Mod: HCNC | Performed by: SURGERY

## 2019-10-18 PROCEDURE — 63600175 PHARM REV CODE 636 W HCPCS: Mod: HCNC

## 2019-10-18 PROCEDURE — 37000009 HC ANESTHESIA EA ADD 15 MINS: Mod: HCNC | Performed by: SURGERY

## 2019-10-18 PROCEDURE — 94761 N-INVAS EAR/PLS OXIMETRY MLT: CPT | Mod: HCNC

## 2019-10-18 PROCEDURE — 82962 GLUCOSE BLOOD TEST: CPT | Mod: HCNC | Performed by: SURGERY

## 2019-10-18 PROCEDURE — 36000706: Mod: HCNC | Performed by: SURGERY

## 2019-10-18 PROCEDURE — 78195 NM LYMPHATICS AND LYMPH NODE IMAGING: ICD-10-PCS | Mod: 26,HCNC,, | Performed by: RADIOLOGY

## 2019-10-18 PROCEDURE — 38900 PR INTRAOPERATIVE SENTINEL LYMPH NODE ID W DYE INJECTION: ICD-10-PCS | Mod: HCNC,LT,, | Performed by: SURGERY

## 2019-10-18 PROCEDURE — D9220A PRA ANESTHESIA: Mod: HCNC,ANES,, | Performed by: ANESTHESIOLOGY

## 2019-10-18 PROCEDURE — 78195 LYMPH SYSTEM IMAGING: CPT | Mod: 26,HCNC,, | Performed by: RADIOLOGY

## 2019-10-18 PROCEDURE — 88305 TISSUE SPECIMEN TO PATHOLOGY - SURGERY: ICD-10-PCS | Mod: 26,HCNC,, | Performed by: PATHOLOGY

## 2019-10-18 PROCEDURE — 38900 IO MAP OF SENT LYMPH NODE: CPT | Mod: HCNC,LT,, | Performed by: SURGERY

## 2019-10-18 PROCEDURE — 63600175 PHARM REV CODE 636 W HCPCS: Mod: HCNC | Performed by: ANESTHESIOLOGY

## 2019-10-18 PROCEDURE — 11606 EXC TR-EXT MAL+MARG >4 CM: CPT | Mod: 51,HCNC,, | Performed by: SURGERY

## 2019-10-18 PROCEDURE — A9520 TC99 TILMANOCEPT DIAG 0.5MCI: HCPCS | Mod: HCNC

## 2019-10-18 PROCEDURE — 25000003 PHARM REV CODE 250: Mod: HCNC | Performed by: NURSE ANESTHETIST, CERTIFIED REGISTERED

## 2019-10-18 PROCEDURE — 88341 IMHCHEM/IMCYTCHM EA ADD ANTB: CPT | Mod: 26,HCNC,, | Performed by: PATHOLOGY

## 2019-10-18 PROCEDURE — 71000015 HC POSTOP RECOV 1ST HR: Mod: HCNC | Performed by: SURGERY

## 2019-10-18 PROCEDURE — 71000016 HC POSTOP RECOV ADDL HR: Mod: HCNC | Performed by: SURGERY

## 2019-10-18 PROCEDURE — 88342 IMHCHEM/IMCYTCHM 1ST ANTB: CPT | Mod: 26,HCNC,, | Performed by: PATHOLOGY

## 2019-10-18 PROCEDURE — 82962 GLUCOSE BLOOD TEST: CPT | Mod: HCNC,91 | Performed by: SURGERY

## 2019-10-18 PROCEDURE — 37000008 HC ANESTHESIA 1ST 15 MINUTES: Mod: HCNC | Performed by: SURGERY

## 2019-10-18 PROCEDURE — 38525 PR BIOPSY/REM LYMPH NODES, AXILLARY: ICD-10-PCS | Mod: HCNC,LT,, | Performed by: SURGERY

## 2019-10-18 RX ORDER — ROCURONIUM BROMIDE 10 MG/ML
INJECTION, SOLUTION INTRAVENOUS
Status: DISCONTINUED | OUTPATIENT
Start: 2019-10-18 | End: 2019-10-18

## 2019-10-18 RX ORDER — MIDAZOLAM HYDROCHLORIDE 1 MG/ML
INJECTION, SOLUTION INTRAMUSCULAR; INTRAVENOUS
Status: DISCONTINUED | OUTPATIENT
Start: 2019-10-18 | End: 2019-10-18

## 2019-10-18 RX ORDER — HYDROCODONE BITARTRATE AND ACETAMINOPHEN 5; 325 MG/1; MG/1
TABLET ORAL
Status: COMPLETED
Start: 2019-10-18 | End: 2019-10-18

## 2019-10-18 RX ORDER — FENTANYL CITRATE 50 UG/ML
INJECTION, SOLUTION INTRAMUSCULAR; INTRAVENOUS
Status: DISCONTINUED | OUTPATIENT
Start: 2019-10-18 | End: 2019-10-18

## 2019-10-18 RX ORDER — HYDROMORPHONE HYDROCHLORIDE 1 MG/ML
0.2 INJECTION, SOLUTION INTRAMUSCULAR; INTRAVENOUS; SUBCUTANEOUS EVERY 5 MIN PRN
Status: DISCONTINUED | OUTPATIENT
Start: 2019-10-18 | End: 2019-10-18 | Stop reason: HOSPADM

## 2019-10-18 RX ORDER — LORAZEPAM 2 MG/ML
0.25 INJECTION INTRAMUSCULAR ONCE AS NEEDED
Status: DISCONTINUED | OUTPATIENT
Start: 2019-10-18 | End: 2019-10-18 | Stop reason: HOSPADM

## 2019-10-18 RX ORDER — ONDANSETRON 8 MG/1
8 TABLET, ORALLY DISINTEGRATING ORAL EVERY 6 HOURS PRN
Qty: 15 TABLET | Refills: 0 | Status: SHIPPED | OUTPATIENT
Start: 2019-10-18 | End: 2022-01-26

## 2019-10-18 RX ORDER — KETAMINE HCL IN 0.9 % NACL 50 MG/5 ML
SYRINGE (ML) INTRAVENOUS
Status: DISCONTINUED | OUTPATIENT
Start: 2019-10-18 | End: 2019-10-18

## 2019-10-18 RX ORDER — HYDROCODONE BITARTRATE AND ACETAMINOPHEN 5; 325 MG/1; MG/1
1 TABLET ORAL EVERY 4 HOURS PRN
Status: DISCONTINUED | OUTPATIENT
Start: 2019-10-18 | End: 2019-10-18 | Stop reason: HOSPADM

## 2019-10-18 RX ORDER — SODIUM CHLORIDE 9 MG/ML
INJECTION, SOLUTION INTRAVENOUS CONTINUOUS
Status: DISCONTINUED | OUTPATIENT
Start: 2019-10-18 | End: 2019-10-18 | Stop reason: HOSPADM

## 2019-10-18 RX ORDER — ONDANSETRON 2 MG/ML
INJECTION INTRAMUSCULAR; INTRAVENOUS
Status: DISCONTINUED | OUTPATIENT
Start: 2019-10-18 | End: 2019-10-18

## 2019-10-18 RX ORDER — LIDOCAINE HYDROCHLORIDE 10 MG/ML
1 INJECTION, SOLUTION EPIDURAL; INFILTRATION; INTRACAUDAL; PERINEURAL ONCE
Status: ACTIVE | OUTPATIENT
Start: 2019-10-18

## 2019-10-18 RX ORDER — HYDROCODONE BITARTRATE AND ACETAMINOPHEN 5; 325 MG/1; MG/1
1 TABLET ORAL EVERY 6 HOURS PRN
Qty: 30 TABLET | Refills: 0 | Status: SHIPPED | OUTPATIENT
Start: 2019-10-18 | End: 2020-07-20

## 2019-10-18 RX ORDER — LIDOCAINE HCL/PF 100 MG/5ML
SYRINGE (ML) INTRAVENOUS
Status: DISCONTINUED | OUTPATIENT
Start: 2019-10-18 | End: 2019-10-18

## 2019-10-18 RX ORDER — PROPOFOL 10 MG/ML
VIAL (ML) INTRAVENOUS
Status: DISCONTINUED | OUTPATIENT
Start: 2019-10-18 | End: 2019-10-18

## 2019-10-18 RX ORDER — ONDANSETRON 8 MG/1
8 TABLET, ORALLY DISINTEGRATING ORAL EVERY 6 HOURS PRN
Status: DISCONTINUED | OUTPATIENT
Start: 2019-10-18 | End: 2019-10-18 | Stop reason: HOSPADM

## 2019-10-18 RX ORDER — ISOSULFAN BLUE 50 MG/5ML
INJECTION, SOLUTION SUBCUTANEOUS
Status: DISCONTINUED | OUTPATIENT
Start: 2019-10-18 | End: 2019-10-18 | Stop reason: HOSPADM

## 2019-10-18 RX ORDER — MEPERIDINE HYDROCHLORIDE 50 MG/ML
12.5 INJECTION INTRAMUSCULAR; INTRAVENOUS; SUBCUTANEOUS ONCE AS NEEDED
Status: COMPLETED | OUTPATIENT
Start: 2019-10-18 | End: 2019-10-18

## 2019-10-18 RX ORDER — MEPERIDINE HYDROCHLORIDE 50 MG/ML
INJECTION INTRAMUSCULAR; INTRAVENOUS; SUBCUTANEOUS
Status: COMPLETED
Start: 2019-10-18 | End: 2019-10-18

## 2019-10-18 RX ORDER — GLYCOPYRROLATE 0.2 MG/ML
INJECTION INTRAMUSCULAR; INTRAVENOUS
Status: DISCONTINUED | OUTPATIENT
Start: 2019-10-18 | End: 2019-10-18

## 2019-10-18 RX ORDER — CEFAZOLIN SODIUM 1 G/3ML
2 INJECTION, POWDER, FOR SOLUTION INTRAMUSCULAR; INTRAVENOUS
Status: COMPLETED | OUTPATIENT
Start: 2019-10-18 | End: 2019-10-18

## 2019-10-18 RX ADMIN — Medication 30 MG: at 11:10

## 2019-10-18 RX ADMIN — HYDROCODONE BITARTRATE AND ACETAMINOPHEN 1 TABLET: 5; 325 TABLET ORAL at 02:10

## 2019-10-18 RX ADMIN — HYDROMORPHONE HYDROCHLORIDE 0.2 MG: 1 INJECTION, SOLUTION INTRAMUSCULAR; INTRAVENOUS; SUBCUTANEOUS at 02:10

## 2019-10-18 RX ADMIN — ONDANSETRON 4 MG: 2 INJECTION INTRAMUSCULAR; INTRAVENOUS at 12:10

## 2019-10-18 RX ADMIN — SODIUM CHLORIDE: 0.9 INJECTION, SOLUTION INTRAVENOUS at 11:10

## 2019-10-18 RX ADMIN — MEPERIDINE HYDROCHLORIDE 12.5 MG: 50 INJECTION INTRAMUSCULAR; INTRAVENOUS; SUBCUTANEOUS at 02:10

## 2019-10-18 RX ADMIN — SODIUM CHLORIDE, SODIUM GLUCONATE, SODIUM ACETATE, POTASSIUM CHLORIDE, MAGNESIUM CHLORIDE, SODIUM PHOSPHATE, DIBASIC, AND POTASSIUM PHOSPHATE: .53; .5; .37; .037; .03; .012; .00082 INJECTION, SOLUTION INTRAVENOUS at 12:10

## 2019-10-18 RX ADMIN — MIDAZOLAM HYDROCHLORIDE 2 MG: 1 INJECTION, SOLUTION INTRAMUSCULAR; INTRAVENOUS at 11:10

## 2019-10-18 RX ADMIN — HYDROMORPHONE HYDROCHLORIDE 0.2 MG: 1 INJECTION, SOLUTION INTRAMUSCULAR; INTRAVENOUS; SUBCUTANEOUS at 04:10

## 2019-10-18 RX ADMIN — ROCURONIUM BROMIDE 20 MG: 10 INJECTION, SOLUTION INTRAVENOUS at 12:10

## 2019-10-18 RX ADMIN — CEFAZOLIN 2 G: 330 INJECTION, POWDER, FOR SOLUTION INTRAMUSCULAR; INTRAVENOUS at 11:10

## 2019-10-18 RX ADMIN — Medication 20 MG: at 12:10

## 2019-10-18 RX ADMIN — SODIUM CHLORIDE: 0.9 INJECTION, SOLUTION INTRAVENOUS at 10:10

## 2019-10-18 RX ADMIN — HYDROMORPHONE HYDROCHLORIDE 0.2 MG: 1 INJECTION, SOLUTION INTRAMUSCULAR; INTRAVENOUS; SUBCUTANEOUS at 05:10

## 2019-10-18 RX ADMIN — LIDOCAINE HYDROCHLORIDE 100 MG: 20 INJECTION, SOLUTION INTRAVENOUS at 11:10

## 2019-10-18 RX ADMIN — PROPOFOL 200 MG: 10 INJECTION, EMULSION INTRAVENOUS at 11:10

## 2019-10-18 RX ADMIN — SUGAMMADEX 200 MG: 100 INJECTION, SOLUTION INTRAVENOUS at 01:10

## 2019-10-18 RX ADMIN — FENTANYL CITRATE 100 MCG: 50 INJECTION, SOLUTION INTRAMUSCULAR; INTRAVENOUS at 11:10

## 2019-10-18 RX ADMIN — SUGAMMADEX 150 MG: 100 INJECTION, SOLUTION INTRAVENOUS at 01:10

## 2019-10-18 RX ADMIN — ROCURONIUM BROMIDE 50 MG: 10 INJECTION, SOLUTION INTRAVENOUS at 11:10

## 2019-10-18 RX ADMIN — GLYCOPYRROLATE 0.2 MG: 0.2 INJECTION, SOLUTION INTRAMUSCULAR; INTRAVENOUS at 11:10

## 2019-10-18 NOTE — BRIEF OP NOTE
[facility]  Brief Operative Note     SUMMARY     Surgery Date: 10/18/2019     Surgeon(s) and Role:     * Fabián Villeda MD - Primary     * Maureen Love MD - Resident - Assisting    Pre-op Diagnosis:  Melanoma of back [C43.59]    Post-op Diagnosis:  Post-Op Diagnosis Codes:     * Melanoma of back [C43.59]    Procedure(s) (LRB):  EXCISION-WIDE LOCAL-Left back (Left)  BIOPSY, LYMPH NODE, SENTINEL (Left)    Anesthesia: General    Description of the findings of the procedure: Left upper back wide local excision for melanoma-2cm margins. Elliptical, vertical incision measuring 5cm x15cm. Closed with interrupted nylon sutures.   Left axillary sentinel lymph node biopsy using Neoprobe as well as blue dye. One hot, blue node removed. See detail below     Findings/Key Components: See above    Estimated Blood Loss: minimal         Specimens:   Specimen (12h ago, onward)     Start     Ordered    10/18/19 1321  Specimen to Pathology - Surgery  Once     Comments:  Pre-op Diagnosis: Melanoma of back [C43.59]Procedure(s):EXCISION-WIDE LOCAL-Left backBIOPSY, LYMPH NODE, SENTINEL-possible Right vs Left axilla or groinRECONSTRUCTION, DEFECT, USING PEDICLE FLAP OR EXISTING ROTATIONAL FLAP REVISION Number of specimens: 2Name of specimens: 1)2 CM wide local excision melanoma, LEFT upper back; short stitch superior, long stitch lateral. PERM.2)Left axillary sentinel lymph node HOT/BLUE 250. PERM.      10/18/19 1335                Discharge Note    SUMMARY     Admit Date: 10/18/2019    Discharge Date and Time:  10/18/2019 2:02 PM    Hospital Course (synopsis of major diagnoses, care, treatment, and services provided during the course of the hospital stay): Patient presented for scheduled procedure today. Tolerated the procedure well, without complication. Extubated in OR and transferred to recovery. Plan to discharge home today with follow up appointment in 2 weeks.       Final Diagnosis: Post-Op Diagnosis  "Codes:     * Melanoma of back [C43.59]    Disposition: Home or Self Care    Follow Up/Patient Instructions:     Medications:  Reconciled Home Medications:      Medication List      START taking these medications    HYDROcodone-acetaminophen 5-325 mg per tablet  Commonly known as:  NORCO  Take 1 tablet by mouth every 6 (six) hours as needed.     ondansetron 8 MG Tbdl  Commonly known as:  ZOFRAN-ODT  Take 1 tablet (8 mg total) by mouth every 6 (six) hours as needed.        CHANGE how you take these medications    atorvastatin 80 MG tablet  Commonly known as:  LIPITOR  TAKE 1 TABLET (80 MG TOTAL) BY MOUTH ONCE DAILY.  What changed:    · how much to take  · how to take this  · when to take this     carvedilol 25 MG tablet  Commonly known as:  COREG  Take .5-1 tablet twice daily or as directed  What changed:    · how much to take  · how to take this  · when to take this  · additional instructions     empagliflozin 25 mg Tab  Commonly known as:  JARDIANCE  Take 25 mg by mouth once daily.  What changed:  when to take this     ezetimibe 10 mg tablet  Commonly known as:  ZETIA  TAKE 1 TABLET (10 MG TOTAL) BY MOUTH ONCE DAILY.  What changed:    · how much to take  · when to take this        CONTINUE taking these medications    ACCU-CHEK SMARTVIEW TEST STRIP Strp  Generic drug:  blood sugar diagnostic  1 strip by Misc.(Non-Drug; Combo Route) route 3 (three) times daily. Patient needs an appointment     ammonium lactate 12 % Crea  Apply small amount to feet except for in between toes twice a day     BD ULTRA-FINE ORIG PEN NEEDLE 29 gauge x 1/2" Ndle  Generic drug:  pen needle, diabetic  USE TWICE DAILY AS DIRECTED     blood-glucose meter Misc  Use to check sugar 3 times daily. Accu-chek Emily. Patient needs an appointment     diphenhydrAMINE 25 mg capsule  Commonly known as:  BENADRYL  Take 50 mg by mouth nightly as needed for Itching.     furosemide 40 MG tablet  Commonly known as:  LASIX  Take 1 tablet (40 mg total) by mouth " "once daily.     icosapent ethyl 1 gram Cap  Commonly known as:  VASCEPA  Take 2 capsules by mouth 2 (two) times daily.     insulin aspart U-100 100 unit/mL (3 mL) Inpn pen  Commonly known as:  NovoLOG Flexpen U-100 Insulin  Inject 10 Units into the skin 3 (three) times daily with meals. Patient needs an appointment     insulin glargine 100 units/mL (3mL) SubQ pen  Commonly known as:  LANTUS SOLOSTAR U-100 INSULIN  Inject 35 Units into the skin every evening.     lancets Misc  1 lancet by Misc.(Non-Drug; Combo Route) route 3 (three) times daily. accu-chek Fastclix. Patient needs an appointment     metFORMIN 1000 MG tablet  Commonly known as:  GLUCOPHAGE  TAKE 1 TABLET TWICE DAILY (NEED MD APPOINTMENT)     MULTIVITAMIN FOR CHOLESTEROL ORAL  Take 1 tablet by mouth nightly.     nitroGLYCERIN 0.4 MG SL tablet  Commonly known as:  NITROSTAT  Place 1 tablet (0.4 mg total) under the tongue every 5 (five) minutes as needed for Chest pain.     pen needle, diabetic 31 gauge x 3/16" Ndle  Commonly known as:  BD ULTRA-FINE MINI PEN NEEDLE  1 each by Misc.(Non-Drug; Combo Route) route 4 (four) times daily. Patient needs an appointment     tadalafil 20 MG Tab  Commonly known as:  CIALIS  Take 1 tablet (20 mg total) by mouth daily as needed.     valsartan 320 MG tablet  Commonly known as:  DIOVAN  Take 1 tablet (320 mg total) by mouth once daily.          Discharge Procedure Orders   Diet Adult Regular     Other restrictions (specify):   Order Comments: Okay to sleep on back if comfortable.     Notify your health care provider if you experience any of the following:  increased confusion or weakness     Notify your health care provider if you experience any of the following:  persistent dizziness, light-headedness, or visual disturbances     Notify your health care provider if you experience any of the following:  worsening rash     Notify your health care provider if you experience any of the following:  severe persistent headache "     Notify your health care provider if you experience any of the following:  difficulty breathing or increased cough     Notify your health care provider if you experience any of the following:  redness, tenderness, or signs of infection (pain, swelling, redness, odor or green/yellow discharge around incision site)     Notify your health care provider if you experience any of the following:  severe uncontrolled pain     Notify your health care provider if you experience any of the following:  temperature >100.4     Notify your health care provider if you experience any of the following:  persistent nausea and vomiting or diarrhea     Remove dressing in 48 hours   Order Comments: Okay to remove back dressing in 24-48 hours. After this, it is okay to keep back incision uncovered but you can cover it if it some comfortable. Incision is closed with nylon sutures that will removed in clinic   Your left underarm incision closed with under-the-skin sutures. Incisions covered with purple skin glue. Okay to get wet in the shower. Glue will start to peel off in 7-10 days     Activity as tolerated     Shower on day dressing removed (No bath)   Order Comments: Okay to take a shower in 48 hours after surgery. Wash with soap and water, pat dry. Do not soak/submerge incision (aka no bathing, swimming)until cleared in clinic.     Follow-up Information     Fabián Villeda MD In 2 weeks.    Specialty:  General Surgery  Contact information:  [address]  [facility] [address]  [phone]                 BOB Love MD   General Surgery- PGYIII  [phone]

## 2019-10-18 NOTE — PLAN OF CARE
Awake and alert. VSS. Deniesnausea. Tolerating liquids well.Pain is tolerable. DC instructions given to patient and family and they verbalize understanding.

## 2019-10-18 NOTE — ANESTHESIA PREPROCEDURE EVALUATION
10/18/2019  Jm Deleon is a 64 y.o., male.    Anesthesia Evaluation    I have reviewed the Patient Summary Reports.    I have reviewed the Nursing Notes.      Review of Systems  Anesthesia Hx:  No problems with previous Anesthesia    Hematology/Oncology:  Hematology Normal   Oncology Normal     EENT/Dental:EENT/Dental Normal   Cardiovascular:   Hypertension CAD      Pulmonary:  Pulmonary Normal    Renal/:  Renal/ Normal     Hepatic/GI:  Hepatic/GI Normal    Musculoskeletal:   Arthritis     Neurological:  Neurology Normal    Endocrine:   Diabetes    Dermatological:  Skin Normal    Psych:  Psychiatric Normal           Physical Exam  General:  Well nourished    Airway/Jaw/Neck:  Airway Findings: Mouth Opening: Normal Tongue: Normal  General Airway Assessment: Adult  Mallampati: III  Improves to II with phonation.  TM Distance: Normal, at least 6 cm        Eyes/Ears/Nose:  EYES/EARS/NOSE FINDINGS: Normal   Dental:  Dental Findings: In tact   Chest/Lungs:  Chest/Lungs Clear    Heart/Vascular:  Heart Findings: Normal Heart murmur: negative Vascular Findings: Normal    Abdomen:  Abdomen Findings: Normal    Musculoskeletal:  Musculoskeletal Findings: Normal   Skin:  Skin Findings: Normal    Mental Status:  Mental Status Findings: Normal        Anesthesia Plan  Type of Anesthesia, risks & benefits discussed:  Anesthesia Type:  general  Patient's Preference:   Intra-op Monitoring Plan:   Intra-op Monitoring Plan Comments:   Post Op Pain Control Plan:   Post Op Pain Control Plan Comments:   Induction:   IV  Beta Blocker:  Patient is on a Beta-Blocker and has received one dose within the past 24 hours (No further documentation required).       Informed Consent: Patient understands risks and agrees with Anesthesia plan.  Questions answered. Anesthesia consent signed with patient.  ASA Score: 3     Day of Surgery  Review of History & Physical:    H&P update referred to the surgeon.         Ready For Surgery From Anesthesia Perspective.

## 2019-10-18 NOTE — TRANSFER OF CARE
"Anesthesia Transfer of Care Note    Patient: Jm Deleon    Procedure(s) Performed: Procedure(s) (LRB):  EXCISION-WIDE LOCAL-Left back (Left)  BIOPSY, LYMPH NODE, SENTINEL (Left)    Patient location: PACU    Anesthesia Type: MAC    Transport from OR: Transported from OR on 6-10 L/min O2 by face mask with adequate spontaneous ventilation    Post pain: adequate analgesia    Post assessment: no apparent anesthetic complications and tolerated procedure well    Post vital signs: stable    Level of consciousness: responds to stimulation and sedated    Nausea/Vomiting: no nausea/vomiting    Complications: none    Transfer of care protocol was followed      Last vitals:   Visit Vitals  BP (!) 176/100   Pulse 77   Temp 36.7 °C (98 °F) (Axillary)   Resp (!) 22   Ht 5' 8" (1.727 m)   Wt 98.4 kg (217 lb)   SpO2 95%   BMI 32.99 kg/m²     "

## 2019-10-18 NOTE — ANESTHESIA POSTPROCEDURE EVALUATION
Anesthesia Post Evaluation    Patient: Jm Deleon    Procedure(s) Performed: Procedure(s) (LRB):  EXCISION-WIDE LOCAL-Left back (Left)  BIOPSY, LYMPH NODE, SENTINEL (Left)    Final Anesthesia Type: general  Patient location during evaluation: PACU  Patient participation: Yes- Able to Participate  Level of consciousness: awake and alert  Post-procedure vital signs: reviewed and stable  Pain management: adequate  Airway patency: patent  PONV status at discharge: No PONV  Anesthetic complications: no      Cardiovascular status: blood pressure returned to baseline  Respiratory status: spontaneous ventilation and room air  Hydration status: euvolemic  Follow-up not needed.          Vitals Value Taken Time   /71 10/18/2019  2:16 PM   Temp 36.7 °C (98 °F) 10/18/2019  2:06 PM   Pulse 72 10/18/2019  2:30 PM   Resp 23 10/18/2019  2:30 PM   SpO2 91 % 10/18/2019  2:30 PM   Vitals shown include unvalidated device data.      No case tracking events are documented in the log.      Pain/Franco Score: Pain Rating Prior to Med Admin: 5 (10/18/2019  2:20 PM)  Franco Score: 4 (10/18/2019  2:06 PM)

## 2019-10-18 NOTE — OP NOTE
DATE OF PROCEDURE:  10/18/2019    PRIMARY SURGEON:  Fabián Villeda M.D.    ASSISTANT SURGEON:  Maureen Love M.D. (RES), Surgery Resident.    PREOPERATIVE DIAGNOSIS:  At least 2.70 mm Breslow depth T3a melanoma of the mid   upper back just to the left of midline.    POSTOPERATIVE DIAGNOSIS:  At least 2.70 mm Breslow depth T3a melanoma of the mid   upper back just to the left of midline.    PROCEDURES PERFORMED:  A 2 cm wide local excision of the prior 1 cm biopsy site   making specimen of resection from the mid upper back just to the left of   midline, measuring 5 cm x 15 cm; primary closure of wound defect in multiple   layers of suture with incision length 15 cm; injection of left mid upper back   intradermally around the biopsy site with isosulfan Lymphazurin blue dye for   sentinel lymph node mapping and identification; identification and mapping of   left deep axillary sentinel lymph node biopsy x1;  left deep axillary excisional   sentinel lymph node biopsy x1.    PROCEDURE IN DETAIL:  The patient underwent informed consent.  The history and   physical examination was reviewed and updated.  The mid upper back just to the   left of midline was marked and confirmed with the patient and his wife in the   preop holding area.  The left axilla was marked by the Nuclear Medicine   Department after the patient underwent preoperative lymphoscintigraphy, which   revealed a single sentinel lymph node on lymphoscintigraphy mapping to the left   axilla.  He was brought to the Operating Room.  He underwent a general   endotracheal anesthesia.  He was placed in a prone position.  We then marked a   vertical ellipse measuring 5 cm wide to achieve a 2 cm wide local excision   around the primary one cm biopsy site.  The length of the ellipse from superior   to inferior was approximately 15 cm.  We intradermally injected isosulfan   Lymphazurin blue dye using approximately 3 mL  This was allowed to map to the   left axilla  while the upper back was prepped and draped in a sterile fashion.    Under sterile prep and drape, the skin was incised sharply and the   full-thickness skin and subcutaneous tissue was taken down to the underlying   muscular fascia, which was preserved.  Specimen of resection was performed with   electrocautery and the specimen was oriented with a short stitch superiorly on   the long axis of the ellipse and a long stitch laterally on the short axis of   the ellipse.  Hemostasis was achieved with electrocautery.  We then   reapproximated the subcutaneous tissue and superficial fascial layer with   interrupted 2-0 Vicryl suture with intermittent deep 3-point fixation sutures to   obliterate any potential dead space within the subcutaneous tissue and wound   bed.  Approximately seven of these sutures were placed.  We then reapproximated   the deep dermal layer with 3-0 Vicryl suture to reapproximate the skin without   tension.  With the skin closed without tension, the skin itself was closed with   interrupted 3-0 nylon vertical mattress sutures.  Antibiotic ointment and   sterile dressing was applied.  He tolerated this portion of the procedure well   without complication and the specimen was sent to Pathology for permanent   sectioning.    We then repositioned the patient in a supine position.  We shaved and prepped   and draped the right axilla in a sterile fashion.  A hot spot was noted using   the handheld gamma probe in the left axilla.  A small transverse inferior   axillary incision was made over the hot spot.  The skin was incised sharply.    The subcutaneous tissue was dissected with electrocautery.  We opened the   clavipectoral fascia and we found a hot blue level 1 sentinel lymph node, which   was dissected and completely excised.  It was hot and blue with an ex vivo count   of approximately 250.  It was submitted for permanent sectioning as left deep   axillary sentinel node #1, hot and blue with a  count of 250.  Once that lymph   node was removed, there was no further palpable nodes in the axilla and no   further blue dye staining and the background radioactivity counts were actually   zero with no radioactivity noted in the axilla once the sentinel node was   removed, thus indicating adequate and proper removal and identification of the   true left axillary sentinel lymph node for axillary staging of his T3a melanoma.    With hemostasis achieved, the axilla was irrigated.  The clavipectoral fascia   was reapproximated with interrupted Vicryl sutures.  Hemostasis had been   achieved with clips and cautery.  The subcutaneous and deep dermal layers were   further closed with Vicryl suture and the skin closed with a running 4-0   Monocryl subcuticular skin closure.  Sterile skin Dermabond was applied to the   axilla and sterile dressing was applied.  He tolerated the entire procedure well   without complication.  No drains were placed.  Estimated blood loss was   minimal.  All needle, instrument and sponge counts were correct.  All specimens   were sent to Pathology for permanent sectioning as he only had a single sentinel   lymph node identified at the time of axillary staging.  There may be some   additional lymphatic tissue within that single sentinel lymph node biopsy   specimen.      JUAN C/IN  dd: 10/18/2019 14:43:30 (CDT)  td: 10/18/2019 17:09:01 (CDT)  Doc ID   #[id number]  Job ID #[id number]    CC:

## 2019-10-18 NOTE — INTERVAL H&P NOTE
The patient has been examined and the H&P has been reviewed:    No acute interval changes.    Anesthesia/Surgery risks, benefits and alternative options discussed and understood by patient/family.          Active Hospital Problems    Diagnosis  POA    Melanoma [C43.9]  Yes      Resolved Hospital Problems   No resolved problems to display.

## 2019-10-22 ENCOUNTER — TELEPHONE (OUTPATIENT)
Dept: SURGERY | Facility: CLINIC | Age: 64
End: 2019-10-22

## 2019-10-22 NOTE — TELEPHONE ENCOUNTER
Spoke with patient to reschedule post-op appointment. Patient will be going out of town and needs his stiches taken out before original post-op appointment. Patient rescheduled for 10-31-19 at 1130 at [facility]. Patient verbalized understanding.

## 2019-10-28 ENCOUNTER — PATIENT MESSAGE (OUTPATIENT)
Dept: CARDIOLOGY | Facility: CLINIC | Age: 64
End: 2019-10-28

## 2019-10-31 ENCOUNTER — TELEPHONE (OUTPATIENT)
Dept: HEMATOLOGY/ONCOLOGY | Facility: CLINIC | Age: 64
End: 2019-10-31

## 2019-10-31 ENCOUNTER — OFFICE VISIT (OUTPATIENT)
Dept: SURGERY | Facility: CLINIC | Age: 64
End: 2019-10-31
Payer: MEDICARE

## 2019-10-31 VITALS
SYSTOLIC BLOOD PRESSURE: 118 MMHG | WEIGHT: 216.94 LBS | HEART RATE: 62 BPM | BODY MASS INDEX: 32.88 KG/M2 | DIASTOLIC BLOOD PRESSURE: 72 MMHG | HEIGHT: 68 IN | TEMPERATURE: 99 F

## 2019-10-31 DIAGNOSIS — C43.59 MELANOMA OF BACK: Primary | ICD-10-CM

## 2019-10-31 DIAGNOSIS — C43.59 MALIGNANT MELANOMA OF BACK: ICD-10-CM

## 2019-10-31 PROCEDURE — 99024 POSTOP FOLLOW-UP VISIT: CPT | Mod: HCNC,S$GLB,, | Performed by: SURGERY

## 2019-10-31 PROCEDURE — 99999 PR PBB SHADOW E&M-EST. PATIENT-LVL III: ICD-10-PCS | Mod: PBBFAC,HCNC,, | Performed by: SURGERY

## 2019-10-31 PROCEDURE — 99999 PR PBB SHADOW E&M-EST. PATIENT-LVL III: CPT | Mod: PBBFAC,HCNC,, | Performed by: SURGERY

## 2019-10-31 PROCEDURE — 99024 PR POST-OP FOLLOW-UP VISIT: ICD-10-PCS | Mod: HCNC,S$GLB,, | Performed by: SURGERY

## 2019-10-31 NOTE — TELEPHONE ENCOUNTER
----- Message from Sushila Pickens RN sent at 10/31/2019 11:44 AM CDT -----  Good morning,    Dr. Villeda would like attached patient to see Dr. Colon for melanoma consult. Please contact patient to schedule appointment.     Thank you,    Sushila Pickens RN

## 2019-10-31 NOTE — PROGRESS NOTES
GENERAL SURGERY  Post Operative Follow Up Note    SUBJECTIVE:     INTERVAL HISTORY: s/p Left upper back wide local excision for melanoma-2cm margins and left axillary slnbx. Pathology revealed residual melanoma in situ, no additional malignancy.  5 negative nodes. T3aN0 -Stage 2  Patient doing well with no complaints other than itching at the incision.     HISTORY OF PRESENT ILLNESS:  Jm Deleon is a 64 y.o. male with a history of DMII, CAD (s/p CABGx4 in 2000), and SCC who has been referred to surgery clinic for newly diagnosed melanoma. Patient and wife state they first noticed darkening of a mole on his back, left of midline, two years ago. At that time a dermatologist looked at it but was not concerned. Since then, the lesion has begun to itch but otherwise has stayed the same without change in size or coloration. He was seen back at the dermatologist for SCC on his scalp, and due to continued concern regarding back lesion it was shave biopsied. Biopsy revealed melanoma, superficial spreading at least 2.7mm in depth- pT3a.  Patient does report history of sun exposure in the area. No additional lesions of concern. No family history of melanoma.   Of note he had a SCC left forehead removed surgically 2-3 years ago.     Pathology: SPECIMEN  1) 2 cm wide local excision melanoma, left upper back, short stitch superior, long stitch lateral.  2) Left axillary sentinel lymph node, hot/blue 250.  FINAL PATHOLOGIC DIAGNOSIS  1. Skin, left upper back, excision:  - RESIDUAL MALIGNANT MELANOMA IN SITU, PRESENT WITHIN BLOCK AB. MARGINS ARE NEGATIVE.  THE TUMOR IS 15.6 MM FROM THE NEAREST PERIPHERAL MARGIN (GREEN).  - PREVIOUS BIOPSY SITE CHANGES.  - INCIDENTAL MELANOCYTIC NEVI, JUNCTIONAL TYPE WITH ARCHITECTURAL DISORDER AND MILD  CYTOLOGIC ATYPIA (YOSI'S NEVI), PRESENT WITHIN BLOCKS Y AND AK. MARGINS ARE NEGATIVE.  - INCIDENTAL MELANOCYTIC NEVI, COMPOUND TYPE WITH ARCHITECTURAL DISORDER AND MILD  CYTOLOGIC ATYPIA  (PARKER'S NEVUS), PRESENT WITHIN BLOCKS G AND U. THE NEVUS WITHIN BLOCK  G IS ADJACENT TO AND EXTENDS TO THE BLUE INKED (12:00 TO 6:00) PERIPHERAL MARGIN. MARGINS  ARE NEGATIVE FOR THE NEVUS WITHIN BLOCK U.  - INCIDENTAL MELANOCYTIC NEVI, JUNCTIONAL TYPE WITH ARCHITECTURAL DISORDER AND  MODERATE CYTOLOGIC ATYPIA (PARKER'S NEVI), PRESENT WITHIN BLOCKS F AND I. MARGINS ARE  NEGATIVE.  2. Lymph nodes, left axillary sentinel lymph node, excision:  - FIVE LYMPH NODES NEGATIVE FOR METASTATIC MELANOMA (0/5).  - INCIDENTAL ANTHRACOSIS VERSUS TATTOO PIGMENTATION PRESENT IN BLOCKS B AND D.  MICROSCOPIC DESCRIPTION: Sections show lymphoid tissue. Atypical melanocytes are not noted. S-100,  Mart-1 and HMB-45 immunohistochemical stains are performed and fail to identify atypical melanocytes.  Appropriately reactive controls are reviewed. Multiple levels are examined.  MELANOMA STAGING SUMMARY:  - Procedure: excision  - Malignant melanoma, invasive  - Histologic type: Superficial spreading melanoma  - Breslow depth: 2.7 mm.  - Parker's level: IV  - Ulceration: Not identified  - Peripheral margins: Negative; melanoma in situ extends to within 15.6 mm of the closest peripheral margin  (green inked, 6:00-12:00)  - Deep margin: Negative  - Mitotic index: 2/mm2.  - Lymphovascular invasion: Not identified  - Perineural invasion: Not identified  - Tumor-infiltrating lymphocytes: Present, non-brisk  - Tumor regression: Not identified  - Growth Phase: Vertical  - Pathologic staging: pT3aN0    OBJECTIVE:     Most Recent Vitals:  Temp: 98.7 °F (37.1 °C) (10/31/19 1123)  Pulse: 62 (10/31/19 1123)  BP: 118/72 (10/31/19 1123)    PHYSICAL EXAM:  Physical Exam   Constitutional: He is oriented to person, place, and time and well-developed, well-nourished, and in no distress.   HENT:   Head: Normocephalic.   Cardiovascular: Normal rate and intact distal pulses.   Pulmonary/Chest: Effort normal. No respiratory distress.   Musculoskeletal: Normal range  of motion. He exhibits no edema or deformity.   Neurological: He is alert and oriented to person, place, and time.   Skin: Skin is warm and dry. He is not diaphoretic.                LABORATORY VALUES:  Lab Results   Component Value Date    WBC 9.30 10/26/2018    HGB 15.9 10/26/2018    HCT 48.3 10/26/2018     10/26/2018    HGBA1C 7.6 (H) 05/31/2019     Lab Results   Component Value Date     10/14/2019    K 4.5 10/14/2019     10/14/2019    CO2 27 10/14/2019    BUN 17 10/14/2019    CREATININE 1.0 10/14/2019     (H) 10/14/2019    CALCIUM 9.8 10/14/2019    MG 2.2 09/26/2014    PHOS 3.5 09/26/2014    AST 16 10/14/2019    ALT 24 10/14/2019    ALKPHOS 64 10/14/2019    BILITOT 0.6 10/14/2019    BILIDIR 0.2 04/04/2005    PROT 7.2 10/14/2019    ALBUMIN 4.2 10/14/2019    AMYLASE 48 03/27/2006    LIPASE 22 03/27/2006     Lab Results   Component Value Date    INR 1.1 10/20/2014     Lab Results   Component Value Date    TSH 1.514 10/26/2018       ASSESSMENT:     Jm Deleon is a 64 y.o. male with newly diagnosed melanoma of the back (2.7mm deep, superficial spreading, no ulceration) s/p WLE and slnbx. Pathology residual melanoma in situ, no additional malignancy.  5 negative nodes. T3aN0. Stage 2    -No further surgical intervention required. Follow up PRN.  -Will refer to melanoma medical oncology for follow up.   -Continue to see dermatologist regularly.    I have personally taken the history and examined this patient and agree with the resident's note as stated above.  Doing well s/p 2 cm WLE of T3aN0 melanoma of the left upper back.  Vertical paramedian WLE incision well-healed.  Nylon sutures removed.  Left axillary SLNB site CDI without sign of seroma, hematoma, drainage, or infection.  No role for adjuvant therapy with stage II melanoma.    Will refer to medical oncology for placement in high risk screening and surveillance follow up protocol for patient with stage II melanoma.

## 2019-11-01 ENCOUNTER — TELEPHONE (OUTPATIENT)
Dept: HEMATOLOGY/ONCOLOGY | Facility: CLINIC | Age: 64
End: 2019-11-01

## 2019-11-01 NOTE — TELEPHONE ENCOUNTER
----- Message from Casimiro Colon MD sent at 11/1/2019  9:57 AM CDT -----  Regarding: New melanoma patient  I was forwarded this chart by Fabián Villeda, could we get him scheduled next week sometime?  Thanks    Shravan    ----- Message -----  From: Fabián Villeda MD  Sent: 11/1/2019   7:16 AM CDT  To: Dana Coelho MD, Dallas Martin MD, #

## 2019-11-04 ENCOUNTER — OFFICE VISIT (OUTPATIENT)
Dept: HEMATOLOGY/ONCOLOGY | Facility: CLINIC | Age: 64
End: 2019-11-04
Payer: MEDICARE

## 2019-11-04 ENCOUNTER — TELEPHONE (OUTPATIENT)
Dept: HEMATOLOGY/ONCOLOGY | Facility: CLINIC | Age: 64
End: 2019-11-04

## 2019-11-04 VITALS
HEIGHT: 68 IN | SYSTOLIC BLOOD PRESSURE: 127 MMHG | WEIGHT: 220.88 LBS | HEART RATE: 57 BPM | DIASTOLIC BLOOD PRESSURE: 66 MMHG | TEMPERATURE: 98 F | BODY MASS INDEX: 33.48 KG/M2 | RESPIRATION RATE: 18 BRPM

## 2019-11-04 DIAGNOSIS — E08.59 DIABETES MELLITUS DUE TO UNDERLYING CONDITION WITH OTHER CIRCULATORY COMPLICATION, UNSPECIFIED WHETHER LONG TERM INSULIN USE: ICD-10-CM

## 2019-11-04 DIAGNOSIS — Z95.1 S/P CABG (CORONARY ARTERY BYPASS GRAFT): ICD-10-CM

## 2019-11-04 DIAGNOSIS — C43.59 MALIGNANT MELANOMA OF BACK: Primary | ICD-10-CM

## 2019-11-04 DIAGNOSIS — I25.10 CORONARY ARTERY DISEASE INVOLVING NATIVE CORONARY ARTERY OF NATIVE HEART WITHOUT ANGINA PECTORIS: ICD-10-CM

## 2019-11-04 PROCEDURE — 99204 OFFICE O/P NEW MOD 45 MIN: CPT | Mod: HCNC,S$GLB,, | Performed by: INTERNAL MEDICINE

## 2019-11-04 PROCEDURE — 99499 RISK ADDL DX/OHS AUDIT: ICD-10-PCS | Mod: HCNC,S$GLB,, | Performed by: INTERNAL MEDICINE

## 2019-11-04 PROCEDURE — 3078F DIAST BP <80 MM HG: CPT | Mod: HCNC,CPTII,S$GLB, | Performed by: INTERNAL MEDICINE

## 2019-11-04 PROCEDURE — 3078F PR MOST RECENT DIASTOLIC BLOOD PRESSURE < 80 MM HG: ICD-10-PCS | Mod: HCNC,CPTII,S$GLB, | Performed by: INTERNAL MEDICINE

## 2019-11-04 PROCEDURE — 99204 PR OFFICE/OUTPT VISIT, NEW, LEVL IV, 45-59 MIN: ICD-10-PCS | Mod: HCNC,S$GLB,, | Performed by: INTERNAL MEDICINE

## 2019-11-04 PROCEDURE — 1101F PR PT FALLS ASSESS DOC 0-1 FALLS W/OUT INJ PAST YR: ICD-10-PCS | Mod: HCNC,CPTII,S$GLB, | Performed by: INTERNAL MEDICINE

## 2019-11-04 PROCEDURE — 99999 PR PBB SHADOW E&M-EST. PATIENT-LVL III: CPT | Mod: PBBFAC,HCNC,, | Performed by: INTERNAL MEDICINE

## 2019-11-04 PROCEDURE — 99999 PR PBB SHADOW E&M-EST. PATIENT-LVL III: ICD-10-PCS | Mod: PBBFAC,HCNC,, | Performed by: INTERNAL MEDICINE

## 2019-11-04 PROCEDURE — 1101F PT FALLS ASSESS-DOCD LE1/YR: CPT | Mod: HCNC,CPTII,S$GLB, | Performed by: INTERNAL MEDICINE

## 2019-11-04 PROCEDURE — 3074F SYST BP LT 130 MM HG: CPT | Mod: HCNC,CPTII,S$GLB, | Performed by: INTERNAL MEDICINE

## 2019-11-04 PROCEDURE — 99499 UNLISTED E&M SERVICE: CPT | Mod: HCNC,S$GLB,, | Performed by: INTERNAL MEDICINE

## 2019-11-04 PROCEDURE — 3074F PR MOST RECENT SYSTOLIC BLOOD PRESSURE < 130 MM HG: ICD-10-PCS | Mod: HCNC,CPTII,S$GLB, | Performed by: INTERNAL MEDICINE

## 2019-11-04 NOTE — PROGRESS NOTES
NEW ONCOLOGY VISIT-ESTABLISH CARE.     Reason for visit: New Medical Oncology visit-establish care for stage IIA melanoma    Best Contact Phone Number(s): [phone] (home) [phone] (work)     Cancer/Stage/TNM:   Cancer Staging  Malignant melanoma of back  Staging form: Melanoma of the Skin, AJCC 8th Edition  - Pathologic: Stage IIA (pT3a, pN0, cM0) - Unsigned      HPI:   64 y.o. male with suspicious mole on back identified 2 years ago, diagnosed with invasive melanoma this year now s/p WLE with SNLBx 10/18/19.  Patient had a T3a lesion with 0/5 lymph nodes positive.  He has recovered well from surgery without issues.  Does not endorse HA, vision changes, SOB, cough, lymphadenopathy.     Practical Problems Physical Problems   : No Appearance: No   Housing: No Bathing / Dressing: No   Insurance / Financial: No Breathing: No    Transportation: No  Changes in Urination: No    Work / School: Yes  Constipation: No   Treatment Decisions: No  Diarrhea: No     Eating: No    Family Problems Fatigue: No    Dealing with Children: No Feeling Swollen: No    Dealing with Partner: No Fevers: No    Ability to Have Children: No  Getting Around: No    Family Health Issues: No  Indigestion: No     Memory / Concentration: No   Emotional Problems Mouth Sores: No    Depression: No  Nausea: No    Fears: No  Nose Dry / Congested: No    Nervousness: No  Pain: No    Sadness: No Sexual: No    Worry: No Skin Dry / Itchy: No    Loss of Interest in Usual Activities: No Sleep: No     Substance Abuse: No    Spiritual/Religions Concerns Tingling in Hands / Feet: No   Spritual / Orthodox Concerns: No         Other Problems            ROS:   Review of Systems   Constitutional: Negative for activity change, appetite change, chills, fatigue, fever and unexpected weight change.   HENT: Negative for mouth sores, nosebleeds and sore throat.    Eyes: Negative for visual disturbance.   Respiratory: Negative for cough, shortness of breath  and wheezing.    Cardiovascular: Negative for chest pain, palpitations and leg swelling.   Gastrointestinal: Negative for abdominal distention, abdominal pain, blood in stool and diarrhea.   Endocrine: Negative for cold intolerance and heat intolerance.   Genitourinary: Negative for dysuria, flank pain and frequency.   Musculoskeletal: Negative for arthralgias, back pain, joint swelling and myalgias.   Skin: Negative for color change, rash and wound.   Neurological: Negative for dizziness, light-headedness and headaches.   Hematological: Negative for adenopathy. Does not bruise/bleed easily.   Psychiatric/Behavioral: The patient is not nervous/anxious.         Past Medical History:   Past Medical History:   Diagnosis Date    Allergy     seasonal    Arthritis     Coronary artery disease     Diabetes mellitus     Hyperlipidemia     Hypertension     Joint pain     Squamous cell carcinoma 2017    scalp - SSW        Past Surgical History:   Past Surgical History:   Procedure Laterality Date    BELPHAROPTOSIS REPAIR  10 years ago    both eyes    CARDIAC SURGERY      3 vessel bypass    COLONOSCOPY N/A 10/4/2019    Procedure: COLONOSCOPY;  Surgeon: Isaiah Booker MD;  Location: [facility] (4TH FLR);  Service: Endoscopy;  Laterality: N/A;  Cardilogy Clearance received rom Dr. QUEENIE Salomon, see telephone encounter 8/26/19-BB    CORONARY ARTERY BYPASS GRAFT  x4 age 45 2000    HERNIA REPAIR      HIP SURGERY  9-24-14    left YANICK    JOINT REPLACEMENT      left hip    SENTINEL LYMPH NODE BIOPSY Left 10/18/2019    Procedure: BIOPSY, LYMPH NODE, SENTINEL;  Surgeon: Fabián Villeda MD;  Location: [facility] 2ND FLR;  Service: General;  Laterality: Left;    SHOULDER ARTHROSCOPY      SHOULDER SURGERY          Family History:   Family History   Problem Relation Age of Onset    Coronary artery disease Mother     Cancer Father         lung cancer, smoker    Hypertension Brother     Cancer Brother         colon     Amblyopia Neg Hx     Blindness Neg Hx     Cataracts Neg Hx     Glaucoma Neg Hx     Macular degeneration Neg Hx     Retinal detachment Neg Hx     Strabismus Neg Hx     Melanoma Neg Hx         Social History:   Social History     Tobacco Use    Smoking status: Never Smoker   Substance Use Topics    Alcohol use: Yes     Alcohol/week: 3.0 standard drinks     Types: 3 Cans of beer per week     Comment: on weekend        Allergies:   Review of patient's allergies indicates:   Allergen Reactions    No known drug allergies         Medications:   Current Outpatient Medications   Medication Sig Dispense Refill    ACCU-CHEK SMARTVIEW TEST STRIP Strp 1 strip by Misc.(Non-Drug; Combo Route) route 3 (three) times daily. Patient needs an appointment 300 strip 0    atorvastatin (LIPITOR) 80 MG tablet TAKE 1 TABLET (80 MG TOTAL) BY MOUTH ONCE DAILY. (Patient taking differently: Take 80 mg by mouth every evening. TAKE 1 TABLET (80 MG TOTAL) BY MOUTH ONCE DAILY.) 90 tablet 3    blood-glucose meter Misc Use to check sugar 3 times daily. Accu-chek Emily. Patient needs an appointment 1 each 0    carvedilol (COREG) 25 MG tablet Take .5-1 tablet twice daily or as directed (Patient taking differently: Take 12.5 mg by mouth 2 (two) times daily with meals. ) 180 tablet 3    diphenhydrAMINE (BENADRYL) 25 mg capsule Take 50 mg by mouth nightly as needed for Itching.       empagliflozin (JARDIANCE) 25 mg Tab Take 25 mg by mouth once daily. (Patient taking differently: Take 25 mg by mouth every morning. ) 90 tablet 1    FOLIC ACID/MULTIVITS-MIN (MULTIVITAMIN FOR CHOLESTEROL ORAL) Take 1 tablet by mouth nightly.       furosemide (LASIX) 40 MG tablet Take 1 tablet (40 mg total) by mouth once daily. 90 tablet 3    icosapent ethyl (VASCEPA) 1 gram Cap Take 2 capsules by mouth 2 (two) times daily. 360 capsule 3    insulin (LANTUS SOLOSTAR U-100 INSULIN) glargine 100 units/mL (3mL) SubQ pen Inject 35 Units into the skin every  "evening. 31.5 mL 3    insulin aspart U-100 (NOVOLOG FLEXPEN U-100 INSULIN) 100 unit/mL InPn pen Inject 10 Units into the skin 3 (three) times daily with meals. Patient needs an appointment (Patient taking differently: Inject 12 Units into the skin 3 (three) times daily with meals. Patient needs an appointment) 2 Box 0    insulin needles, disposable, (BD INSULIN PEN NEEDLE UF ORIG) 29 x 1/2 " Ndle USE TWICE DAILY AS DIRECTED 100 each 2    lancets Misc 1 lancet by Misc.(Non-Drug; Combo Route) route 3 (three) times daily. accu-chek Fastclix. Patient needs an appointment 300 each 0    metFORMIN (GLUCOPHAGE) 1000 MG tablet TAKE 1 TABLET TWICE DAILY (NEED MD APPOINTMENT) 180 tablet 1    pen needle, diabetic (BD INSULIN PEN NEEDLE UF MINI) 31 gauge x 3/16" Ndle 1 each by Misc.(Non-Drug; Combo Route) route 4 (four) times daily. Patient needs an appointment 400 each 0    tadalafil (CIALIS) 20 MG Tab Take 1 tablet (20 mg total) by mouth daily as needed. 10 tablet 3    ammonium lactate 12 % Crea Apply small amount to feet except for in between toes twice a day (Patient not taking: Reported on 11/4/2019) 1 Tube 3    ezetimibe (ZETIA) 10 mg tablet TAKE 1 TABLET (10 MG TOTAL) BY MOUTH ONCE DAILY. (Patient taking differently: Take 5 mg by mouth every evening. ) 90 tablet 3    HYDROcodone-acetaminophen (NORCO) 5-325 mg per tablet Take 1 tablet by mouth every 6 (six) hours as needed. (Patient not taking: Reported on 11/4/2019) 30 tablet 0    nitroGLYCERIN (NITROSTAT) 0.4 MG SL tablet Place 1 tablet (0.4 mg total) under the tongue every 5 (five) minutes as needed for Chest pain. (Patient not taking: Reported on 11/4/2019) 25 tablet 4    ondansetron (ZOFRAN-ODT) 8 MG TbDL Dissolve 1 tablet (8 mg total) by mouth every 6 (six) hours as needed. (Patient not taking: Reported on 11/4/2019) 15 tablet 0    valsartan (DIOVAN) 320 MG tablet Take 1 tablet (320 mg total) by mouth once daily. (Patient not taking: Reported on " "11/4/2019) 90 tablet 3     No current facility-administered medications for this visit.      Facility-Administered Medications Ordered in Other Visits   Medication Dose Route Frequency Provider Last Rate Last Dose    lidocaine (PF) 10 mg/ml (1%) injection 10 mg  1 mL Intradermal Once Maureen Love MD        triamcinolone acetonide injection 40 mg  40 mg Intra-articular  Patricio Multani MD   40 mg at 10/26/16 0841        Physical Exam:   /66 (BP Location: Right arm, Patient Position: Sitting, BP Method: Medium (Automatic))   Pulse (!) 57   Temp 97.8 °F (36.6 °C) (Oral)   Resp 18   Ht 5' 8" (1.727 m)   Wt 100.2 kg (220 lb 14.4 oz)   BMI 33.59 kg/m²      ECOG Performance Status: (foot note - ECOG PS provided by Eastern Cooperative Oncology Group) 0 - Asymptomatic    Physical Exam   Constitutional: He is oriented to person, place, and time. He appears well-developed and well-nourished.   HENT:   Head: Normocephalic and atraumatic.   Eyes: Pupils are equal, round, and reactive to light. Conjunctivae and EOM are normal.   Neck: Normal range of motion. Neck supple.   Pulmonary/Chest: Effort normal. No respiratory distress.   Abdominal: Soft. Bowel sounds are normal. There is no tenderness.   Musculoskeletal: Normal range of motion. He exhibits no edema.   Lymphadenopathy:     He has no cervical adenopathy.     He has no axillary adenopathy.   Neurological: He is alert and oriented to person, place, and time.   Skin: Skin is warm and dry.   Psychiatric: He has a normal mood and affect. His behavior is normal.              Diagnoses:       1. Malignant melanoma of back    2. Diabetes mellitus due to underlying condition with other circulatory complication, unspecified whether long term insulin use    3. S/P CABG (coronary artery bypass graft)    4. Coronary artery disease involving native coronary artery of native heart without angina pectoris          Assessment and Plan:         1. Stage IIA Cutaneous " Melanoma:   -s/p WLE and SNLBx with 0/5 negative lymph nodes.  No evidence for adjuvant immunotherapy or targeted for stage IIA melanoma.  Will initiate active surveillance with CT scans every 3 months.  Will broaden to every 6 months after 2 years.  - needs to continue skin exams every 3-6 months with dermatology  - CT CAP in early December with follow up with scans 3 months after.    2. DMII  Last HbA1c 7.6, follows with PCP          40 minutes were spent face to face with the patient and his family to discuss the disease, natural history, treatment options and survival statistics. Greater than 50% of this time involved counseling or coordination of care. I have provided the patient with an opportunity to ask questions and have all questions answered to his satisfaction.     he will return to clinic in 3 months, but knows to call in the interim if symptoms change or should a problem arise.    Casimiro Colon MD  Medical Oncology [facility]

## 2019-11-04 NOTE — TELEPHONE ENCOUNTER
----- Message from Casimiro Colon MD sent at 11/4/2019  3:37 PM CST -----  - RTC 3/2/2020 with CBC, CMP, LDH  - Schedule CT CAP 12/2/19  - Schedule next CT CAP 3/2/2020 in AM.

## 2019-11-04 NOTE — LETTER
November 4, 2019      Fabián iVlleda MD  [address]  [facility] [address]           [facility]  [address]  [facility] [address]  Phone: [phone]          Patient: Jm Deleon   MR Number: [id number]   YOB: 1955   Date of Visit: 11/4/2019       Dear Dr. Fabián Villeda:    Thank you for referring Jm Deleon to me for evaluation. Attached you will find relevant portions of my assessment and plan of care.    If you have questions, please do not hesitate to call me. I look forward to following Jm Deleon along with you.    Sincerely,    Casimiro Colon MD    Enclosure  CC:  No Recipients    If you would like to receive this communication electronically, please contact [email or web address] or [phone] to request more information on [employer] access.    For providers and/or their staff who would like to refer a patient to [facility], please contact us through our one-stop-shop provider referral line, [facility], at [phone].    If you feel you have received this communication in error or would no longer like to receive these types of communications, please e-mail [email or web address]

## 2019-12-05 ENCOUNTER — PATIENT MESSAGE (OUTPATIENT)
Dept: CARDIOLOGY | Facility: CLINIC | Age: 64
End: 2019-12-05

## 2019-12-05 DIAGNOSIS — I10 ESSENTIAL HYPERTENSION: ICD-10-CM

## 2019-12-05 DIAGNOSIS — I25.10 CORONARY ARTERY DISEASE INVOLVING NATIVE CORONARY ARTERY OF NATIVE HEART WITHOUT ANGINA PECTORIS: ICD-10-CM

## 2019-12-05 RX ORDER — CARVEDILOL 25 MG/1
25 TABLET ORAL 2 TIMES DAILY WITH MEALS
COMMUNITY
End: 2019-12-09 | Stop reason: SDUPTHER

## 2019-12-05 RX ORDER — CARVEDILOL 25 MG/1
TABLET ORAL
Qty: 180 TABLET | Refills: 3 | Status: CANCELLED | OUTPATIENT
Start: 2019-12-05

## 2019-12-06 ENCOUNTER — HOSPITAL ENCOUNTER (OUTPATIENT)
Dept: RADIOLOGY | Facility: HOSPITAL | Age: 64
Discharge: HOME | End: 2019-12-06
Attending: INTERNAL MEDICINE
Payer: MEDICARE

## 2019-12-06 DIAGNOSIS — C43.59 MALIGNANT MELANOMA OF BACK: ICD-10-CM

## 2019-12-06 LAB
CREAT SERPL-MCNC: 0.9 MG/DL (ref 0.5–1.4)
SAMPLE: NORMAL

## 2019-12-06 PROCEDURE — 74177 CT CHEST ABDOMEN PELVIS WITH CONTRAST (XPD): ICD-10-PCS | Mod: 26,HCNC,, | Performed by: RADIOLOGY

## 2019-12-06 PROCEDURE — 71260 CT THORAX DX C+: CPT | Mod: 26,HCNC,, | Performed by: RADIOLOGY

## 2019-12-06 PROCEDURE — 74177 CT ABD & PELVIS W/CONTRAST: CPT | Mod: 26,HCNC,, | Performed by: RADIOLOGY

## 2019-12-06 PROCEDURE — 25500020 PHARM REV CODE 255: Mod: HCNC | Performed by: INTERNAL MEDICINE

## 2019-12-06 PROCEDURE — 71260 CT CHEST ABDOMEN PELVIS WITH CONTRAST (XPD): ICD-10-PCS | Mod: 26,HCNC,, | Performed by: RADIOLOGY

## 2019-12-06 PROCEDURE — 74177 CT ABD & PELVIS W/CONTRAST: CPT | Mod: TC,HCNC

## 2019-12-06 RX ADMIN — IOHEXOL 15 ML: 350 INJECTION, SOLUTION INTRAVENOUS at 07:12

## 2019-12-06 RX ADMIN — IOHEXOL 15 ML: 350 INJECTION, SOLUTION INTRAVENOUS at 08:12

## 2019-12-06 RX ADMIN — IOHEXOL 100 ML: 350 INJECTION, SOLUTION INTRAVENOUS at 09:12

## 2019-12-07 RX ORDER — EMPAGLIFLOZIN 25 MG/1
TABLET, FILM COATED ORAL
Qty: 90 TABLET | Refills: 1 | Status: SHIPPED | OUTPATIENT
Start: 2019-12-07 | End: 2019-12-09 | Stop reason: SDUPTHER

## 2019-12-07 RX ORDER — METFORMIN HYDROCHLORIDE 1000 MG/1
1000 TABLET ORAL 2 TIMES DAILY
Qty: 180 TABLET | Refills: 1 | Status: SHIPPED | OUTPATIENT
Start: 2019-12-07 | End: 2019-12-09 | Stop reason: SDUPTHER

## 2019-12-08 ENCOUNTER — PATIENT MESSAGE (OUTPATIENT)
Dept: INTERNAL MEDICINE | Facility: CLINIC | Age: 64
End: 2019-12-08

## 2019-12-09 ENCOUNTER — PATIENT MESSAGE (OUTPATIENT)
Dept: CARDIOLOGY | Facility: CLINIC | Age: 64
End: 2019-12-09

## 2019-12-10 RX ORDER — METFORMIN HYDROCHLORIDE 1000 MG/1
1000 TABLET ORAL 2 TIMES DAILY
Qty: 180 TABLET | Refills: 1 | Status: SHIPPED | OUTPATIENT
Start: 2019-12-10 | End: 2020-05-07

## 2019-12-10 RX ORDER — CARVEDILOL 25 MG/1
25 TABLET ORAL 2 TIMES DAILY WITH MEALS
Qty: 90 TABLET | Refills: 3 | Status: SHIPPED | OUTPATIENT
Start: 2019-12-10 | End: 2020-10-05

## 2019-12-15 ENCOUNTER — PATIENT MESSAGE (OUTPATIENT)
Dept: CARDIOLOGY | Facility: CLINIC | Age: 64
End: 2019-12-15

## 2019-12-15 ENCOUNTER — PATIENT MESSAGE (OUTPATIENT)
Dept: HEMATOLOGY/ONCOLOGY | Facility: CLINIC | Age: 64
End: 2019-12-15

## 2019-12-16 DIAGNOSIS — C43.59 MALIGNANT MELANOMA OF BACK: Primary | ICD-10-CM

## 2020-01-14 ENCOUNTER — PATIENT OUTREACH (OUTPATIENT)
Dept: ADMINISTRATIVE | Facility: OTHER | Age: 65
End: 2020-01-14

## 2020-01-16 ENCOUNTER — HOSPITAL ENCOUNTER (OUTPATIENT)
Dept: RADIOLOGY | Facility: HOSPITAL | Age: 65
Discharge: HOME | End: 2020-01-16
Attending: ORTHOPAEDIC SURGERY
Payer: MEDICARE

## 2020-01-16 ENCOUNTER — OFFICE VISIT (OUTPATIENT)
Dept: SPORTS MEDICINE | Facility: CLINIC | Age: 65
End: 2020-01-16
Payer: MEDICARE

## 2020-01-16 VITALS
SYSTOLIC BLOOD PRESSURE: 103 MMHG | WEIGHT: 220 LBS | HEIGHT: 68 IN | HEART RATE: 65 BPM | BODY MASS INDEX: 33.34 KG/M2 | DIASTOLIC BLOOD PRESSURE: 61 MMHG

## 2020-01-16 DIAGNOSIS — M25.512 LEFT SHOULDER PAIN, UNSPECIFIED CHRONICITY: ICD-10-CM

## 2020-01-16 DIAGNOSIS — M25.512 LEFT SHOULDER PAIN, UNSPECIFIED CHRONICITY: Primary | ICD-10-CM

## 2020-01-16 DIAGNOSIS — M75.102 ROTATOR CUFF SYNDROME OF LEFT SHOULDER: ICD-10-CM

## 2020-01-16 PROCEDURE — 1159F MED LIST DOCD IN RCRD: CPT | Mod: HCNC,S$GLB,, | Performed by: ORTHOPAEDIC SURGERY

## 2020-01-16 PROCEDURE — 3078F PR MOST RECENT DIASTOLIC BLOOD PRESSURE < 80 MM HG: ICD-10-PCS | Mod: HCNC,CPTII,S$GLB, | Performed by: ORTHOPAEDIC SURGERY

## 2020-01-16 PROCEDURE — 73030 X-RAY EXAM OF SHOULDER: CPT | Mod: 26,HCNC,LT, | Performed by: RADIOLOGY

## 2020-01-16 PROCEDURE — 1101F PT FALLS ASSESS-DOCD LE1/YR: CPT | Mod: HCNC,CPTII,S$GLB, | Performed by: ORTHOPAEDIC SURGERY

## 2020-01-16 PROCEDURE — 1159F PR MEDICATION LIST DOCUMENTED IN MEDICAL RECORD: ICD-10-PCS | Mod: HCNC,S$GLB,, | Performed by: ORTHOPAEDIC SURGERY

## 2020-01-16 PROCEDURE — 1101F PR PT FALLS ASSESS DOC 0-1 FALLS W/OUT INJ PAST YR: ICD-10-PCS | Mod: HCNC,CPTII,S$GLB, | Performed by: ORTHOPAEDIC SURGERY

## 2020-01-16 PROCEDURE — 99214 OFFICE O/P EST MOD 30 MIN: CPT | Mod: HCNC,25,S$GLB, | Performed by: ORTHOPAEDIC SURGERY

## 2020-01-16 PROCEDURE — 99999 PR PBB SHADOW E&M-EST. PATIENT-LVL IV: CPT | Mod: PBBFAC,HCNC,, | Performed by: ORTHOPAEDIC SURGERY

## 2020-01-16 PROCEDURE — 1125F PR PAIN SEVERITY QUANTIFIED, PAIN PRESENT: ICD-10-PCS | Mod: HCNC,S$GLB,, | Performed by: ORTHOPAEDIC SURGERY

## 2020-01-16 PROCEDURE — 20610 DRAIN/INJ JOINT/BURSA W/O US: CPT | Mod: HCNC,LT,S$GLB, | Performed by: ORTHOPAEDIC SURGERY

## 2020-01-16 PROCEDURE — 99999 PR PBB SHADOW E&M-EST. PATIENT-LVL IV: ICD-10-PCS | Mod: PBBFAC,HCNC,, | Performed by: ORTHOPAEDIC SURGERY

## 2020-01-16 PROCEDURE — 73030 XR SHOULDER COMPLETE 2 OR MORE VIEWS LEFT: ICD-10-PCS | Mod: 26,HCNC,LT, | Performed by: RADIOLOGY

## 2020-01-16 PROCEDURE — 73030 X-RAY EXAM OF SHOULDER: CPT | Mod: TC,HCNC,LT

## 2020-01-16 PROCEDURE — 99214 PR OFFICE/OUTPT VISIT, EST, LEVL IV, 30-39 MIN: ICD-10-PCS | Mod: HCNC,25,S$GLB, | Performed by: ORTHOPAEDIC SURGERY

## 2020-01-16 PROCEDURE — 3074F SYST BP LT 130 MM HG: CPT | Mod: HCNC,CPTII,S$GLB, | Performed by: ORTHOPAEDIC SURGERY

## 2020-01-16 PROCEDURE — 20610 LARGE JOINT ASPIRATION/INJECTION: L SUBACROMIAL BURSA: ICD-10-PCS | Mod: HCNC,LT,S$GLB, | Performed by: ORTHOPAEDIC SURGERY

## 2020-01-16 PROCEDURE — 3078F DIAST BP <80 MM HG: CPT | Mod: HCNC,CPTII,S$GLB, | Performed by: ORTHOPAEDIC SURGERY

## 2020-01-16 PROCEDURE — 1125F AMNT PAIN NOTED PAIN PRSNT: CPT | Mod: HCNC,S$GLB,, | Performed by: ORTHOPAEDIC SURGERY

## 2020-01-16 PROCEDURE — 3074F PR MOST RECENT SYSTOLIC BLOOD PRESSURE < 130 MM HG: ICD-10-PCS | Mod: HCNC,CPTII,S$GLB, | Performed by: ORTHOPAEDIC SURGERY

## 2020-01-16 RX ORDER — TRIAMCINOLONE ACETONIDE 40 MG/ML
40 INJECTION, SUSPENSION INTRA-ARTICULAR; INTRAMUSCULAR
Status: DISCONTINUED | OUTPATIENT
Start: 2020-01-16 | End: 2020-01-16 | Stop reason: HOSPADM

## 2020-01-16 RX ORDER — MELOXICAM 15 MG/1
15 TABLET ORAL DAILY
Qty: 90 TABLET | Refills: 0 | Status: SHIPPED | OUTPATIENT
Start: 2020-01-16 | End: 2020-04-15

## 2020-01-16 RX ADMIN — TRIAMCINOLONE ACETONIDE 40 MG: 40 INJECTION, SUSPENSION INTRA-ARTICULAR; INTRAMUSCULAR at 03:01

## 2020-01-16 NOTE — PROGRESS NOTES
CC: LEFT shoulder pain     64 y.o. Male with a history of left shoulder pain who presents for evaluation.  He states that the pain is severe and not responding to any conservative care.  The patient reports having a right rotator cuff repair 2 years ago.  The right side is doing well, however now reports with complaints of left shoulder pain for the past 3 months.  Denies any inciting traumatic event, falls, or twisting or injuries.  He has not tried any anti-inflammatory medications, physical therapy or injections. He has pain at night and with overhead activities.  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  He is right-hand dominant.  He had melanoma removal from the back recently, which is stage I.  Denies any neck pain or radiculopathy.  No other concerns or complaints.    He reports that the pain and weakness is worse with overhead activity. It also bothers him at night.    Is affecting ADLs.  Pain is 7/10 at it's worst.      Past Medical History:   Diagnosis Date    Allergy     seasonal    Arthritis     Coronary artery disease     Diabetes mellitus     Hyperlipidemia     Hypertension     Joint pain     Squamous cell carcinoma 2017    scalp - SSW       Past Surgical History:   Procedure Laterality Date    BELPHAROPTOSIS REPAIR  10 years ago    both eyes    CARDIAC SURGERY      3 vessel bypass    COLONOSCOPY N/A 10/4/2019    Procedure: COLONOSCOPY;  Surgeon: Isaiah Booker MD;  Location: [facility] (4TH FLR);  Service: Endoscopy;  Laterality: N/A;  Cardilogy Clearance received rom Dr. QUEENIE Salomon, see telephone encounter 8/26/19-BB    CORONARY ARTERY BYPASS GRAFT  x4 age 45 2000    HERNIA REPAIR      HIP SURGERY  9-24-14    left YANICK    JOINT REPLACEMENT      left hip    SENTINEL LYMPH NODE BIOPSY Left 10/18/2019    Procedure: BIOPSY, LYMPH NODE, SENTINEL;  Surgeon: Fabián Villeda MD;  Location: [facility] OR 2ND FLR;  Service: General;  Laterality: Left;     SHOULDER ARTHROSCOPY      SHOULDER SURGERY         Family History   Problem Relation Age of Onset    Coronary artery disease Mother     Cancer Father         lung cancer, smoker    Hypertension Brother     Cancer Brother         colon    Amblyopia Neg Hx     Blindness Neg Hx     Cataracts Neg Hx     Glaucoma Neg Hx     Macular degeneration Neg Hx     Retinal detachment Neg Hx     Strabismus Neg Hx     Melanoma Neg Hx          Current Outpatient Medications:     ACCU-CHEK SMARTVIEW TEST STRIP Strp, 1 strip by Misc.(Non-Drug; Combo Route) route 3 (three) times daily. Patient needs an appointment, Disp: 300 strip, Rfl: 0    ammonium lactate 12 % Crea, Apply small amount to feet except for in between toes twice a day, Disp: 1 Tube, Rfl: 3    atorvastatin (LIPITOR) 80 MG tablet, TAKE 1 TABLET (80 MG TOTAL) BY MOUTH ONCE DAILY. (Patient taking differently: Take 80 mg by mouth every evening. TAKE 1 TABLET (80 MG TOTAL) BY MOUTH ONCE DAILY.), Disp: 90 tablet, Rfl: 3    blood-glucose meter Misc, Use to check sugar 3 times daily. Accu-chek Emily. Patient needs an appointment, Disp: 1 each, Rfl: 0    carvedilol (COREG) 25 MG tablet, Take .5-1 tablet twice daily or as directed (Patient taking differently: Take 12.5 mg by mouth 2 (two) times daily with meals. ), Disp: 180 tablet, Rfl: 3    carvedilol (COREG) 25 MG tablet, Take 1 tablet (25 mg total) by mouth 2 (two) times daily with meals. One twice a day or as directed, Disp: 90 tablet, Rfl: 3    diphenhydrAMINE (BENADRYL) 25 mg capsule, Take 50 mg by mouth nightly as needed for Itching. , Disp: , Rfl:     empagliflozin (JARDIANCE) 25 mg Tab, Take 1 tablet by mouth once daily., Disp: 90 tablet, Rfl: 1    FOLIC ACID/MULTIVITS-MIN (MULTIVITAMIN FOR CHOLESTEROL ORAL), Take 1 tablet by mouth nightly. , Disp: , Rfl:     furosemide (LASIX) 40 MG tablet, Take 1 tablet (40 mg total) by mouth once daily., Disp: 90 tablet, Rfl: 3    HYDROcodone-acetaminophen  "(NORCO) 5-325 mg per tablet, Take 1 tablet by mouth every 6 (six) hours as needed., Disp: 30 tablet, Rfl: 0    icosapent ethyl (VASCEPA) 1 gram Cap, Take 2 capsules by mouth 2 (two) times daily., Disp: 360 capsule, Rfl: 3    insulin (LANTUS SOLOSTAR U-100 INSULIN) glargine 100 units/mL (3mL) SubQ pen, Inject 35 Units into the skin every evening., Disp: 31.5 mL, Rfl: 3    insulin aspart U-100 (NOVOLOG FLEXPEN U-100 INSULIN) 100 unit/mL InPn pen, Inject 10 Units into the skin 3 (three) times daily with meals. Patient needs an appointment (Patient taking differently: Inject 12 Units into the skin 3 (three) times daily with meals. Patient needs an appointment), Disp: 2 Box, Rfl: 0    insulin needles, disposable, (BD INSULIN PEN NEEDLE UF ORIG) 29 x 1/2 " Ndle, USE TWICE DAILY AS DIRECTED, Disp: 100 each, Rfl: 2    lancets Misc, 1 lancet by Misc.(Non-Drug; Combo Route) route 3 (three) times daily. accu-chek Fastclix. Patient needs an appointment, Disp: 300 each, Rfl: 0    metFORMIN (GLUCOPHAGE) 1000 MG tablet, Take 1 tablet (1,000 mg total) by mouth 2 (two) times daily., Disp: 180 tablet, Rfl: 1    ondansetron (ZOFRAN-ODT) 8 MG TbDL, Dissolve 1 tablet (8 mg total) by mouth every 6 (six) hours as needed., Disp: 15 tablet, Rfl: 0    pen needle, diabetic (BD INSULIN PEN NEEDLE UF MINI) 31 gauge x 3/16" Ndle, 1 each by Misc.(Non-Drug; Combo Route) route 4 (four) times daily. Patient needs an appointment, Disp: 400 each, Rfl: 0    tadalafil (CIALIS) 20 MG Tab, Take 1 tablet (20 mg total) by mouth daily as needed., Disp: 10 tablet, Rfl: 3    valsartan (DIOVAN) 320 MG tablet, Take 1 tablet (320 mg total) by mouth once daily., Disp: 90 tablet, Rfl: 3    ezetimibe (ZETIA) 10 mg tablet, TAKE 1 TABLET (10 MG TOTAL) BY MOUTH ONCE DAILY. (Patient taking differently: Take 5 mg by mouth every evening. ), Disp: 90 tablet, Rfl: 3    nitroGLYCERIN (NITROSTAT) 0.4 MG SL tablet, Place 1 tablet (0.4 mg total) under the tongue " "every 5 (five) minutes as needed for Chest pain. (Patient not taking: Reported on 11/4/2019), Disp: 25 tablet, Rfl: 4  No current facility-administered medications for this visit.     Facility-Administered Medications Ordered in Other Visits:     lidocaine (PF) 10 mg/ml (1%) injection 10 mg, 1 mL, Intradermal, Once, Maureen Love MD    triamcinolone acetonide injection 40 mg, 40 mg, Intra-articular, , Patricio Multani MD, 40 mg at 10/26/16 0841    Review of patient's allergies indicates:   Allergen Reactions    No known drug allergies           REVIEW OF SYSTEMS:  Constitution: Negative. Negative for chills, fever and night sweats.   HENT: Negative for congestion and headaches.    Eyes: Negative for blurred vision, left vision loss and right vision loss.   Cardiovascular: Negative for chest pain and syncope.   Respiratory: Negative for cough and shortness of breath.    Endocrine: Negative for polydipsia, polyphagia and polyuria.   Hematologic/Lymphatic: Negative for bleeding problem. Does not bruise/bleed easily.   Skin: Negative for dry skin, itching and rash.   Musculoskeletal: Negative for falls.  Positive for left shoulder pain and muscle weakness.   Gastrointestinal: Negative for abdominal pain and bowel incontinence.   Genitourinary: Negative for bladder incontinence and nocturia.   Neurological: Negative for disturbances in coordination, loss of balance and seizures.   Psychiatric/Behavioral: Negative for depression. The patient does not have insomnia.    Allergic/Immunologic: Negative for hives and persistent infections.      PHYSICAL EXAMINATION:  Vitals:  /61   Pulse 65   Ht 5' 8" (1.727 m)   Wt 99.8 kg (220 lb)   BMI 33.45 kg/m²    General: The patient is alert and oriented x 3.  Mood is pleasant.  Observation of ears, eyes and nose reveal no gross abnormalities.  No labored breathing observed.  Gait is coordinated. Patient can toe walk and heel walk without difficulty.      LEFT " Shoulder / Upper Extremity Exam    OBSERVATION:     Swelling  none  Deformity  none   Discoloration  none   Scapular winging none   Scars   none  Atrophy  none    TENDERNESS / CREPITUS (T/C):          T/C      T/C   Clavicle   -/-  SUPRAspinatus    -/-     AC Jt.    +/-  INFRAspinatus  -/-    SC Jt.    -/-  Deltoid    -/-      G. Tuberosity  -/-  LH BICEP groove  -/-   Acromion:  -/-  Midline Neck   -/-     Scapular Spine -/-  Trapezium   -/-   SMA Scapula  -/-  GH jt. line - post  -/-     Scapulothoracic  -/-         ROM: (* = with pain)  Right shoulder   Left shoulder        AROM (PROM)   AROM (PROM)   FE    170° (175°)     160° (175°)     ER at 0°    60°  (65°)    40°  (65°)   ER at 90° ABD  90°  (90°)    40°  (60°)   IR at 90°  ABD   NA  (40°)     NA  (40°)      IR (spine level)   T10     T10    STRENGTH: (* = with pain) Right shoulder   Left shoulder    SCAPTION   5/5    4+/5    IR    5/5    5/5   ER    5/5    5/5   BICEPS   5/5    4+/5   Deltoid    5/5    5/5     SIGNS:  Painful side       NEER   +    OLORYS  neg    HOOVER   +    SPEEDS  pos     DROP ARM   -   BELLY PRESS neg   Superior escape none    LIFT-OFF  neg   X-Body ADD    neg    MOVING VALGUS neg        STABILITY TESTING    Right shoulder   Left shoulder    Translation     Anterior  up face     up face    Posterior  up face    up face    Sulcus   < 10mm    < 10 mm     Signs   Apprehension   neg      neg       Relocation   no change     no change      Jerk test  neg     neg    EXTREMITY NEURO-VASCULAR EXAM:    Sensation grossly intact to light touch all dermatomal regions.    DTR 2+ Biceps, Triceps, BR and Negative Usmans sign   Grossly intact motor function at Elbow, Wrist and Hand   Distal pulses radial and ulnar 2+, brisk cap refill, symmetric.      NECK:  Painless FROM and spinous processes non-tender. Negative Spurlings sign.      OTHER FINDINGS:  Mild scapular dyskinesia    XRAYS:  Xrays including AP, Outlet and Axillary Lateral of  Left shoulder are ordered / images reviewed by me:   No fracture dislocation or other pathology   Acromion type 2   Proximal migration of humeral head: None   GH arthritis: None          ASSESSMENT:   Left shoulder pain, possible:  1. SLAP   2.  Partial RC tear  3.    PLAN:      1.  Steroid injection provided left subacromial space  2.  Mobic 15 mg daily prescribed  3.  Physical therapy prescribed for rotator cuff strengthening and scapular stabilizing exercises.  4.  HEP 87341 - Tyrese Madden MD, instructed and demonstrated a Shoulder HEP. The patient then demonstrated understanding of exercises and proper technique. This program was performed for 15 minutes.       All questions were answered, patient will contact us for questions or concerns in the interim.

## 2020-01-16 NOTE — PROCEDURES
Large Joint Aspiration/Injection: L subacromial bursa  Date/Time: 1/16/2020 3:45 PM  Performed by: Johnny Ventura MD  Authorized by: Johnny Ventura MD     Consent Done?:  Yes (Verbal)  Indications:  Pain  Procedure site marked: Yes    Timeout: Prior to procedure the correct patient, procedure, and site was verified    Anesthesia  Local anesthesia not used      Location:  Shoulder  Site:  L subacromial bursa  Prep: Patient was prepped and draped in usual sterile fashion    Needle size:  22 G  Approach:  Posterior  Medications:  40 mg triamcinolone acetonide 40 mg/mL  Patient tolerance:  Patient tolerated the procedure well with no immediate complications

## 2020-01-23 RX ORDER — NITROGLYCERIN 0.4 MG/1
TABLET SUBLINGUAL
Qty: 25 TABLET | Refills: 4 | Status: SHIPPED | OUTPATIENT
Start: 2020-01-23 | End: 2023-11-20 | Stop reason: SDUPTHER

## 2020-01-27 ENCOUNTER — OFFICE VISIT (OUTPATIENT)
Dept: DERMATOLOGY | Facility: CLINIC | Age: 65
End: 2020-01-27
Payer: MEDICARE

## 2020-01-27 DIAGNOSIS — Z85.828 HISTORY OF NONMELANOMA SKIN CANCER: ICD-10-CM

## 2020-01-27 DIAGNOSIS — D48.5 NEOPLASM OF UNCERTAIN BEHAVIOR OF SKIN: Primary | ICD-10-CM

## 2020-01-27 DIAGNOSIS — L81.4 LENTIGO: ICD-10-CM

## 2020-01-27 DIAGNOSIS — D18.01 ANGIOMA OF SKIN: ICD-10-CM

## 2020-01-27 DIAGNOSIS — L82.1 SK (SEBORRHEIC KERATOSIS): ICD-10-CM

## 2020-01-27 DIAGNOSIS — Z85.820 HISTORY OF MALIGNANT MELANOMA OF SKIN: ICD-10-CM

## 2020-01-27 DIAGNOSIS — D23.9 DERMATOFIBROMA: ICD-10-CM

## 2020-01-27 PROCEDURE — 88305 TISSUE EXAM BY PATHOLOGIST: CPT | Mod: 26,HCNC,, | Performed by: PATHOLOGY

## 2020-01-27 PROCEDURE — 99999 PR PBB SHADOW E&M-EST. PATIENT-LVL III: ICD-10-PCS | Mod: PBBFAC,HCNC,, | Performed by: DERMATOLOGY

## 2020-01-27 PROCEDURE — 88342 IMHCHEM/IMCYTCHM 1ST ANTB: CPT | Mod: 26,HCNC,, | Performed by: PATHOLOGY

## 2020-01-27 PROCEDURE — 1101F PT FALLS ASSESS-DOCD LE1/YR: CPT | Mod: HCNC,CPTII,S$GLB, | Performed by: DERMATOLOGY

## 2020-01-27 PROCEDURE — 11103 TANGNTL BX SKIN EA SEP/ADDL: CPT | Mod: HCNC,S$GLB,, | Performed by: DERMATOLOGY

## 2020-01-27 PROCEDURE — 1159F MED LIST DOCD IN RCRD: CPT | Mod: HCNC,S$GLB,, | Performed by: DERMATOLOGY

## 2020-01-27 PROCEDURE — 88342 IMHCHEM/IMCYTCHM 1ST ANTB: CPT | Mod: HCNC | Performed by: PATHOLOGY

## 2020-01-27 PROCEDURE — 1126F AMNT PAIN NOTED NONE PRSNT: CPT | Mod: HCNC,S$GLB,, | Performed by: DERMATOLOGY

## 2020-01-27 PROCEDURE — 88305 TISSUE EXAM BY PATHOLOGIST: ICD-10-PCS | Mod: 26,HCNC,, | Performed by: PATHOLOGY

## 2020-01-27 PROCEDURE — 99214 PR OFFICE/OUTPT VISIT, EST, LEVL IV, 30-39 MIN: ICD-10-PCS | Mod: 25,HCNC,S$GLB, | Performed by: DERMATOLOGY

## 2020-01-27 PROCEDURE — 88305 TISSUE EXAM BY PATHOLOGIST: CPT | Mod: HCNC | Performed by: PATHOLOGY

## 2020-01-27 PROCEDURE — 1101F PR PT FALLS ASSESS DOC 0-1 FALLS W/OUT INJ PAST YR: ICD-10-PCS | Mod: HCNC,CPTII,S$GLB, | Performed by: DERMATOLOGY

## 2020-01-27 PROCEDURE — 1159F PR MEDICATION LIST DOCUMENTED IN MEDICAL RECORD: ICD-10-PCS | Mod: HCNC,S$GLB,, | Performed by: DERMATOLOGY

## 2020-01-27 PROCEDURE — 99999 PR PBB SHADOW E&M-EST. PATIENT-LVL III: CPT | Mod: PBBFAC,HCNC,, | Performed by: DERMATOLOGY

## 2020-01-27 PROCEDURE — 11103 PR TANGENTIAL BIOPSY, SKIN, EA ADDTL LESION: ICD-10-PCS | Mod: HCNC,S$GLB,, | Performed by: DERMATOLOGY

## 2020-01-27 PROCEDURE — 1126F PR PAIN SEVERITY QUANTIFIED, NO PAIN PRESENT: ICD-10-PCS | Mod: HCNC,S$GLB,, | Performed by: DERMATOLOGY

## 2020-01-27 PROCEDURE — 88342 CHG IMMUNOCYTOCHEMISTRY: ICD-10-PCS | Mod: 26,HCNC,, | Performed by: PATHOLOGY

## 2020-01-27 PROCEDURE — 11102 PR TANGENTIAL BIOPSY, SKIN, SINGLE LESION: ICD-10-PCS | Mod: HCNC,S$GLB,, | Performed by: DERMATOLOGY

## 2020-01-27 PROCEDURE — 11102 TANGNTL BX SKIN SINGLE LES: CPT | Mod: HCNC,S$GLB,, | Performed by: DERMATOLOGY

## 2020-01-27 PROCEDURE — 99214 OFFICE O/P EST MOD 30 MIN: CPT | Mod: 25,HCNC,S$GLB, | Performed by: DERMATOLOGY

## 2020-01-27 NOTE — PROGRESS NOTES
Subjective:       Patient ID:  Jm Deleon is a 64 y.o. male who presents for   Chief Complaint   Patient presents with    Skin Check     Pt presents today for TBSE h/o MM, SCC     History of Present Illness: The patient presents for follow up of skin check.    The patient was last seen on: 9/19 for cryosurgery to actinic keratoses which have resolved and bx of MM 2.7mm left back - excised by Ronal with neg LN's. Also seen by EMANI Colon (med onc) - ordering ct scans q 3 months   This is a high risk patient here to check for the development of new lesions.       Other skin complaints: none        Review of Systems   Constitutional: Negative for fever, chills, weight loss, fatigue, night sweats and malaise.   HENT: Negative for headaches.    Respiratory: Negative for cough and shortness of breath.    Gastrointestinal: Negative for indigestion.   Skin: Positive for activity-related sunscreen use and wears hat. Negative for daily sunscreen use and recent sunburn.   Neurological: Negative for headaches.   Hematologic/Lymphatic: Negative for adenopathy. Does not bruise/bleed easily.        Objective:    Physical Exam   Constitutional: He appears well-developed and well-nourished. No distress.   Neurological: He is alert and oriented to person, place, and time. He is not disoriented.   Psychiatric: He has a normal mood and affect.   Skin:   Areas Examined (abnormalities noted in diagram):   Scalp / Hair Palpated and Inspected  Head / Face Inspection Performed  Neck Inspection Performed  Chest / Axilla Inspection Performed  Abdomen Inspection Performed  Genitals / Buttocks / Groin Inspection Performed  Back Inspection Performed  RUE Inspected  LUE Inspection Performed  RLE Inspected  LLE Inspection Performed  Nails and Digits Inspection Performed                       Diagram Legend     Erythematous scaling macule/papule c/w actinic keratosis       Vascular papule c/w angioma      Pigmented verrucoid papule/plaque  c/w seborrheic keratosis      Yellow umbilicated papule c/w sebaceous hyperplasia      Irregularly shaped tan macule c/w lentigo     1-2 mm smooth white papules consistent with Milia      Movable subcutaneous cyst with punctum c/w epidermal inclusion cyst      Subcutaneous movable cyst c/w pilar cyst      Firm pink to brown papule c/w dermatofibroma      Pedunculated fleshy papule(s) c/w skin tag(s)      Evenly pigmented macule c/w junctional nevus     Mildly variegated pigmented, slightly irregular-bordered macule c/w mildly atypical nevus      Flesh colored to evenly pigmented papule c/w intradermal nevus       Pink pearly papule/plaque c/w basal cell carcinoma      Erythematous hyperkeratotic cursted plaque c/w SCC      Surgical scar with no sign of skin cancer recurrence      Open and closed comedones      Inflammatory papules and pustules      Verrucoid papule consistent consistent with wart     Erythematous eczematous patches and plaques     Dystrophic onycholytic nail with subungual debris c/w onychomycosis     Umbilicated papule    Erythematous-base heme-crusted tan verrucoid plaque consistent with inflamed seborrheic keratosis     Erythematous Silvery Scaling Plaque c/w Psoriasis     See annotation              Assessment / Plan:      Pathology Orders:     Normal Orders This Visit    Specimen to Pathology, Dermatology     Questions:    Procedure Type:  Dermatology and skin neoplasms    Number of Specimens:  2    ------------------------:  -------------------------    Spec 1 Procedure:  Biopsy    Spec 1 Clinical Impression:  r/o atypical nevus    Spec 1 Source:  left back    ------------------------:  -------------------------    Spec 2 Procedure:  Biopsy    Spec 2 Clinical Impression:  r/o atypical nevus    Spec 2 Source:  right lower back        Neoplasm of uncertain behavior of skin  -     Specimen to Pathology, Dermatology    ,Shave biopsy procedure note:    Shave biopsy x 2 performed after verbal  consent including risk of infection, scar, recurrence, need for additional treatment of site. Area prepped with alcohol, anesthetized with approximately 1.0cc of 1% lidocaine with epinephrine. Lesional tissue shaved with razor blade. Hemostasis achieved with application of aluminum chloride followed by hyfrecation. No complications. Dressing applied. Wound care explained.        SK (seborrheic keratosis)  These are benign inherited growths without a malignant potential. Reassurance given to patient. No treatment is necessary.       Dermatofibromas   - stable and chronic      Lentigo   - stable and chronic      Angioma of skin   - stable and chronic      History of nonmelanoma skin cancer and History of malignant melanoma of skin  Area of previous melanoma examined. Site well healed with no signs of recurrence.    Total body skin examination performed today including at least 12 points as noted in physical examination. Suspicious lesions noted.             Follow up in about 3 months (around 4/27/2020) for for MM clinic.

## 2020-01-27 NOTE — PATIENT INSTRUCTIONS

## 2020-02-05 LAB
FINAL PATHOLOGIC DIAGNOSIS: NORMAL
GROSS: NORMAL

## 2020-02-26 ENCOUNTER — CLINICAL SUPPORT (OUTPATIENT)
Dept: REHABILITATION | Facility: HOSPITAL | Age: 65
End: 2020-02-26
Payer: MEDICARE

## 2020-02-26 DIAGNOSIS — M25.511 BILATERAL SHOULDER PAIN, UNSPECIFIED CHRONICITY: ICD-10-CM

## 2020-02-26 DIAGNOSIS — M25.512 BILATERAL SHOULDER PAIN, UNSPECIFIED CHRONICITY: ICD-10-CM

## 2020-02-26 DIAGNOSIS — M25.612 DECREASED RANGE OF MOTION OF LEFT SHOULDER: ICD-10-CM

## 2020-02-26 DIAGNOSIS — R29.898 DECREASED STRENGTH OF UPPER EXTREMITY: ICD-10-CM

## 2020-02-26 PROCEDURE — 97110 THERAPEUTIC EXERCISES: CPT | Mod: HCNC

## 2020-02-26 PROCEDURE — 97161 PT EVAL LOW COMPLEX 20 MIN: CPT | Mod: HCNC

## 2020-02-27 NOTE — PLAN OF CARE
[facility]  Physical Therapy Initial Evaluation    Date: 2/26/2020   Name: Jm Deleon  Clinic Number: [id number]    Therapy Diagnosis:   Encounter Diagnoses   Name Primary?    Bilateral shoulder pain, unspecified chronicity     Decreased range of motion of left shoulder     Decreased strength of upper extremity      Physician: Tyrese Madden MD    Physician Orders: PT Eval and Treat. From 1/16/2020 referral: Right shoulder partial rotator cuff tear and slap tear. Work on rotator cuff strengthening, scapular stabilizing exercises, and range of motion.  Medical Diagnosis from Referral:   Diagnosis   M25.512 (ICD-10-CM) - Left shoulder pain, unspecified chronicity   M75.102 (ICD-10-CM) - Rotator cuff syndrome of left shoulder     Evaluation Date: 2/26/2020  Authorization Period Expiration: 2/29/2020  Plan of Care Expiration: 6/26/2020   Visit # / Visits authorized: 1/ 1 - pending    Time In: 2:03 pm  Time Out: 3:05 pm  Total Appointment Time (timed & untimed codes): 62 minutes    Precautions: Standard    Subjective   Date of onset: R shoulder has been in pain since surgery on 10/04/2017. Was only in PT for 1 month before his wife retired and he lost his insurance. Did not return to PT for R shoulder after. L shoulder started getting worse around the end of summer. No known MARSHA.  History of current condition - Jm reports: L shoulder pain started around the end of the summer. Overhead activities are difficult for him to perform at this time. Would normally spend about 75% of day performing overhead tasks at work but only able to do these tasks for about half an hour with multiple breaks before having to delegate to his employees. Difficulty holding 2 lb screwdriver away from body at shoulder height. 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 Plans on continuing work for 2-3 more years. Also having difficulty putting on shirt, reaching behind back to put on  belt. Wife currently helps him to put on jackets due to his increased shoulder pain. Has had a cortisone injection in L shoulder that improved sx for about 2 days. Also taking Mobic. Got 4-5 injections for R shoulder before 2017 surgery. Goal is to avoid surgery with L shoulder. B shoulders hurt with weather changes and when he sleeps. Usually sleeps on L side but is in pain when he wakes up. RHD. Son sent him crossover symmetry bands and told him it would help- advised too much resistance for hi at this time.      Medical History:   Past Medical History:   Diagnosis Date    Allergy     seasonal    Arthritis     Coronary artery disease     Diabetes mellitus     Hyperlipidemia     Hypertension     Joint pain     Melanoma 10/2019    MM left back 2.7mm; neg LNs    Squamous cell carcinoma 2017    scalp - SSW       Surgical History:   Jm Deleon  has a past surgical history that includes Blepharoptosis repair (10 years ago); Hernia repair; Coronary artery bypass graft (x4 age 45 2000); Hip surgery (9-24-14); Cardiac surgery; Joint replacement; Shoulder arthroscopy; Shoulder surgery; Colonoscopy (N/A, 10/4/2019); and [place] lymph node biopsy (Left, 10/18/2019).    Medications:   Jm has a current medication list which includes the following prescription(s): accu-chek smartview test strip, ammonium lactate, atorvastatin, blood-glucose meter, carvedilol, carvedilol, diphenhydramine, empagliflozin, ezetimibe, multivit-minerals/folic acid, furosemide, hydrocodone-acetaminophen, icosapent ethyl, insulin, insulin aspart u-100, pen needle, diabetic, lancets, meloxicam, metformin, nitroglycerin, ondansetron, pen needle, diabetic, tadalafil, and valsartan, and the following Facility-Administered Medications: lidocaine (pf) 10 mg/ml (1%) and triamcinolone acetonide.    Allergies:   Review of patient's allergies indicates:   Allergen Reactions    No known drug allergies         Imaging, X-ray 1/16/2020: The  glenohumeral joint demonstrates normal alignment.  There is minimal osteophyte formation at the glenoid region. The acromioclavicular joint appears normal.  There are small ossicles projecting just superior to the humeral head could be imbedded in the supraspinatus tendon, could represent intra-articular ossicles.  It is unchanged from 08/27/2014.  No acute fracture or osseous lesions. Sternotomy wires are noted.    No plans for MRI of L shoulder at this time.    Prior Therapy: PT for 1 month following R shoulder surgery in October 2017-massive RTC and biceps tenodesis  Social History: Lives with their spouse  Occupation: . 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   Current Level of Function: Painful for him to get dressed. Requires wife's assistance to help him put on jacket. Difficult to put on belt. Able to perform overhead tasks at work for about 30 minutes with multiple breaks before having to delegate to other employees.     Pain:  Current 2/10, worst 7/10, best 2/10; pain goes back to baseline levels after he changes his arm position  Location: bilateral shoulder   Description: Throbbing  Aggravating Factors: Overhead activities, putting on shirt or belt  Easing Factors: Injection (eased sx for 2 days), pain medication, taking hot showers, changing arm position    Pts goals: Get strength back in B shoulders, be able to dress himself without his wife's assistance    Objective     Observation:  Appears hesitant to move B shoulders due to increased pain with overhead motions.     Posture: Increased throacic kyphosis. Anterior tilt and internal rotation of B scapula. B scapular abduction. R scapula appears to be elevated compared to L scapula.     Passive Range of Motion (*= pain):   Shoulder Right Left   Flexion 120* 155*   Abduction  "115* 150*   ER at 45 10* 50*   IR at 45 25* 40*      Active Range of Motion(*= pain):   Shoulder Right Left   Flexion 115* shrug 85* shrug   Abduction 100* 95*   ER at 0  40  35   IR at 0 To stomach To stomach     Strength (*= pain):  Shoulder Right Left   Flexion 3-/5* 3-/5*   Abduction 3-/5* 3-/5*   ER 3-/5* 4/5   IR 4-/5* 5/5   Elbow flx 3+/5* 5/5   Elbow ext 5/5 5/5     Scapular Control/Dyskinesis:     Normal / Subtle / Obvious  Comments    Left  Subtle    Right  Subtle        Joint Mobility: B limitation of posterior and inferior glides of GHJ, more obvious on the R than the L. Increased pain noted with both posterior and inferior joint glides of R GHJ but more notable with inferior joint glide. Contraction of R shoulder musculature upon feelings of pain.     Palpation: Pt denies any tenderness to palpation about B shoulders    Proximal/distal screen:   Cervical: Pt denies hx of cervical radiculopathy, or N/T into the UEs.    Sensation: Pt denies altered sensation of BUEs.     Flexibility: Flexibility deficits of suboccipitals, pectoral musculature noted at this time.       See media for details.       TREATMENT   Treatment Time In: 2:45 pm  Treatment Time Out: 3:00 pm  Total Treatment time (time-based codes) separate from Evaluation: 15 minutes    Jm received therapeutic exercises to develop strength, endurance and posture for 15 minutes including:  Scapular retractions 10 x 5" hold  Shoulder isometrics flex/abd/ER/IR 10 x 5" hold e margarita      Home Exercises and Patient Education Provided    Education provided:   - HEP, prognosis, treatment timeline, treatment expectations and rationale, review of prior surgery including shoulder anatomy    Written Home Exercises Provided: yes.  Exercises were reviewed and Jm was able to demonstrate them prior to the end of the session.  Jm demonstrated good  understanding of the education provided.     See EMR under Patient Instructions for exercises provided " 2/26/2020.    Assessment   Jm is a 64 y.o. male referred to outpatient Physical Therapy with a medical diagnosis of L shoulder pain and rotator cuff syndrome of L shoulder. Pt presents with increased B shoulder pain as well as decreased B shoulder ROM and strength. Jm also presents with hypomobility of B shoulder joints. Due to pt's deficits, he is currently having difficulty performing overhead activities as well as ADLs such as putting on a shirt or belt.    Pt prognosis is Good.   Pt will benefit from skilled outpatient Physical Therapy to address the deficits stated above and in the chart below, provide pt/family education, and to maximize pt's level of independence.     Plan of care discussed with patient: Yes  Pt's spiritual, cultural and educational needs considered and patient is agreeable to the plan of care and goals as stated below:     Anticipated Barriers for therapy: Delay in R shoulder rehabilitation, continued performance of overhead tasks at job    Medical Necessity is demonstrated by the following  History  Co-morbidities and personal factors that may impact the plan of care Co-morbidities:   See problem list for details    Personal Factors:   no deficits     low   Examination  Body Structures and Functions, activity limitations and participation restrictions that may impact the plan of care Body Regions:   Bilateral shoulders    Body Systems:    ROM  strength  motor control    Participation Restrictions:   Requires wife's assistance to dress    Activity limitations:   Learning and applying knowledge  no deficits    General Tasks and Commands  no deficits    Communication  no deficits    Mobility  Performing overhead tasks    Self care  dressing    Domestic Life  doing house work (cleaning house, washing dishes, laundry)    Interactions/Relationships  no deficits    Life Areas  no deficits    Community and Social Life  community life         low   Clinical Presentation stable and  uncomplicated low   Decision Making/ Complexity Score: low     GOALS: Short Term Goals: 6 weeks  1. Report decreased B shoulder pain < / =  5/10  to increase tolerance for ADLs such as dressing and performing overhead activities.   2. Increase B shoulder PROM by > / = 30 degrees where limited to improve ability to dress independently.  3. Increased strength by 1/3 MMT grade where limited in B shoulder musculature to increase tolerance for ADL and work activities.  4. Pt to tolerate HEP to improve ROM and independence with ADL's.    Long Term Goals: 12 weeks  1. Report decreased B shoulder pain  < / =  3 /10  to increase tolerance for work activities such as using screw  at shoulder height for prolonged periods.   2. Increase B shoulder AROM to = PROM to improve ability to perform overhead work tasks.   3. Increase strength to >/= 4/5 in B shoulder musculature to increase tolerance for overhead work activities.  4. Pt will be able to hold a 2 lb weight at 90 degrees of shoulder flexion for 3 minutes without significant increase in R shoulder pain to demonstrate increased strength for performing work activities.   5. Pt will have score of 20-40% limitation on FOTO shoulder in order to demonstrate true functional improvement.      Plan   Plan of care Certification: 2/26/2020 to 6/26/2020.    Outpatient Physical Therapy 2 times weekly weaning to 1 time biweekly for 3 months to include the following interventions: Electrical Stimulation NMES/IFC/TENS, Manual Therapy, Moist Heat/ Ice, Neuromuscular Re-ed, Patient Education, Self Care, Therapeutic Activites, Therapeutic Exercise and dry needling, BFR, IASTYM.     Pascual Blue, PT   Mi Garcia SPT

## 2020-03-03 ENCOUNTER — CLINICAL SUPPORT (OUTPATIENT)
Dept: REHABILITATION | Facility: HOSPITAL | Age: 65
End: 2020-03-03
Payer: MEDICARE

## 2020-03-03 DIAGNOSIS — M25.511 BILATERAL SHOULDER PAIN, UNSPECIFIED CHRONICITY: ICD-10-CM

## 2020-03-03 DIAGNOSIS — M25.612 DECREASED RANGE OF MOTION OF LEFT SHOULDER: ICD-10-CM

## 2020-03-03 DIAGNOSIS — R29.898 DECREASED STRENGTH OF UPPER EXTREMITY: ICD-10-CM

## 2020-03-03 DIAGNOSIS — M25.512 BILATERAL SHOULDER PAIN, UNSPECIFIED CHRONICITY: ICD-10-CM

## 2020-03-03 PROCEDURE — 97110 THERAPEUTIC EXERCISES: CPT | Mod: HCNC,CQ

## 2020-03-03 PROCEDURE — 97140 MANUAL THERAPY 1/> REGIONS: CPT | Mod: HCNC,CQ

## 2020-03-03 NOTE — PROGRESS NOTES
"  Physical Therapy Daily Treatment Note     Name: Jm Deleon  Clinic Number: [id number]    Therapy Diagnosis:   Encounter Diagnoses   Name Primary?    Bilateral shoulder pain, unspecified chronicity     Decreased range of motion of left shoulder     Decreased strength of upper extremity      Physician: Tyrese Madden MD    Visit Date: 3/3/2020    Physician Orders: PT Eval and Treat. From 1/16/2020 referral: Right shoulder partial rotator cuff tear and slap tear. Work on rotator cuff strengthening, scapular stabilizing exercises, and range of motion.  Medical Diagnosis from Referral:   Diagnosis   M25.512 (ICD-10-CM) - Left shoulder pain, unspecified chronicity   M75.102 (ICD-10-CM) - Rotator cuff syndrome of left shoulder      Evaluation Date: 2/26/2020  Authorization Period Expiration: 2/29/2020  Plan of Care Expiration: 6/26/2020   Visit # / Visits authorized: 1/30    Time In: 1507  Time Out: 1600  Total Billable Time: 23 minutes    Precautions: Standard  Procedures: R RTC repair, biceps tenodesis, SAD, DCE 10/04/17    Subjective     Pt reports: Pt returns to therapy this pm with c/o continued R shldr pain with activity. No pain at rest upon entry.    He was compliant with home exercise program.  Response to previous treatment: No adverse effects  Functional change: N/A    Pain: 0/10  Location: bilateral shoulder      Objective     Jm received therapeutic exercises to develop strength, endurance, ROM and flexibility for 35 minutes including:    UBE (fwd/bwd) x 4'/4'  Pulleys (flx) x 5'  Rows c blue tb 30 x 5"  IR c green tb x 30 choco   Choco ER c orange tb 3 x 10 x 3"    Jm received the following manual therapy techniques: Joint mobilizations and PROM were applied for 15 minutes including:    Choco inferior, posterior GH mobs (gr3)  Choco PROM in all planes    Jm participated in neuromuscular re-education activities to improve: Posture and Neuromuscular Control for 00 minutes. The following activities were " included:      Jm participated in dynamic functional therapeutic activities to improve functional performance for 00 minutes, including:      Jm declined ice at the end of tx.       Home Exercises Provided and Patient Education Provided     Education provided:   - HEP, prognosis, treatment timeline, treatment expectations and rationale, review of prior surgery including shoulder anatomy    Written Home Exercises Provided: yes.  Exercises were reviewed and Jm was able to demonstrate them prior to the end of the session.  Jm demonstrated good  understanding of the education provided.     See EMR under Patient Instructions for exercises provided 2/26/2020.    Assessment     Pt was able to complete all exercises including progressions with c/o increased R shldr pain at end range PROM flx/ER and R posterior GH mob. Pt demonstrates difficulty with [place] AROM d/t pain and weakness. Pt required verbal and tactile cues for proper scapular retraction/posture throughout tx. Sig R posterior capsular restrictions with sig pain reported during posterior GH mob. Encouraged continued compliance with HEP. No adverse effects reported p tx.     Jm is progressing well towards his goals.   Pt prognosis is Good.     Pt will continue to benefit from skilled outpatient physical therapy to address the deficits listed in the problem list box on initial evaluation, provide pt/family education and to maximize pt's level of independence in the home and community environment.     Pt's spiritual, cultural and educational needs considered and pt agreeable to plan of care and goals.     Anticipated barriers to physical therapy: Delay in R shoulder rehabilitation, continued performance of overhead tasks at job    GOALS: Short Term Goals: 6 weeks  1. Report decreased B shoulder pain < / =  5/10  to increase tolerance for ADLs such as dressing and performing overhead activities.   2. Increase B shoulder PROM by > / = 30 degrees where  limited to improve ability to dress independently.  3. Increased strength by 1/3 MMT grade where limited in B shoulder musculature to increase tolerance for ADL and work activities.  4. Pt to tolerate HEP to improve ROM and independence with ADL's.     Long Term Goals: 12 weeks  1. Report decreased B shoulder pain  < / =  3 /10  to increase tolerance for work activities such as using screw  at shoulder height for prolonged periods.   2. Increase B shoulder AROM to = PROM to improve ability to perform overhead work tasks.   3. Increase strength to >/= 4/5 in B shoulder musculature to increase tolerance for overhead work activities.  4. Pt will be able to hold a 2 lb weight at 90 degrees of shoulder flexion for 3 minutes without significant increase in R shoulder pain to demonstrate increased strength for performing work activities.   5. Pt will have score of 20-40% limitation on FOTO shoulder in order to demonstrate true functional improvement.      Plan   Plan of care Certification: 2/26/2020 to 6/26/2020.     Outpatient Physical Therapy 2 times weekly weaning to 1 time biweekly for 3 months to include the following interventions: Electrical Stimulation NMES/IFC/TENS, Manual Therapy, Moist Heat/ Ice, Neuromuscular Re-ed, Patient Education, Self Care, Therapeutic Activites, Therapeutic Exercise and dry needling, BFR, IASTYM.     Continue to progress as tolerated according to PT POC and MD order.     Debbie Olmstead, PTA

## 2020-03-05 ENCOUNTER — CLINICAL SUPPORT (OUTPATIENT)
Dept: REHABILITATION | Facility: HOSPITAL | Age: 65
End: 2020-03-05
Payer: MEDICARE

## 2020-03-05 DIAGNOSIS — R29.898 DECREASED STRENGTH OF UPPER EXTREMITY: ICD-10-CM

## 2020-03-05 DIAGNOSIS — M25.512 BILATERAL SHOULDER PAIN, UNSPECIFIED CHRONICITY: ICD-10-CM

## 2020-03-05 DIAGNOSIS — M25.612 DECREASED RANGE OF MOTION OF LEFT SHOULDER: ICD-10-CM

## 2020-03-05 DIAGNOSIS — M25.511 BILATERAL SHOULDER PAIN, UNSPECIFIED CHRONICITY: ICD-10-CM

## 2020-03-05 PROCEDURE — 97140 MANUAL THERAPY 1/> REGIONS: CPT | Mod: HCNC,CQ

## 2020-03-05 PROCEDURE — 97110 THERAPEUTIC EXERCISES: CPT | Mod: HCNC,CQ

## 2020-03-05 NOTE — PROGRESS NOTES
"  Physical Therapy Daily Treatment Note     Name: Jm Deleon  Clinic Number: [id number]    Therapy Diagnosis:   Encounter Diagnoses   Name Primary?    Bilateral shoulder pain, unspecified chronicity     Decreased range of motion of left shoulder     Decreased strength of upper extremity      Physician: Tyrese Madden MD    Visit Date: 3/5/2020    Physician Orders: PT Eval and Treat. From 1/16/2020 referral: Right shoulder partial rotator cuff tear and slap tear. Work on rotator cuff strengthening, scapular stabilizing exercises, and range of motion.  Medical Diagnosis from Referral:   Diagnosis   M25.512 (ICD-10-CM) - Left shoulder pain, unspecified chronicity   M75.102 (ICD-10-CM) - Rotator cuff syndrome of left shoulder      Evaluation Date: 2/26/2020  Authorization Period Expiration: 2/29/2020  Plan of Care Expiration: 6/26/2020   Visit # / Visits authorized: 2/30    Time In: 1703  Time Out: 1803  Total Billable Time: 53 minutes    Precautions: Standard  Procedures: R RTC repair, biceps tenodesis, SAD, DCE 10/04/17    Subjective     Pt reports: Pt returns to therapy this pm with no c/o shldr pain upon entry.    He was compliant with home exercise program.  Response to previous treatment: No adverse effects  Functional change: N/A    Pain: 0/10  Location: bilateral shoulder      Objective     Jm received therapeutic exercises to develop strength, endurance, ROM and flexibility for 45 minutes including:    UBE (fwd/bwd) x 4'/4'  Pulleys (flx) x 5'  Wall slides 20 x 5" choco  Rows c blue tb 30 x 5"  Shldr ext c green tb x 30  IR c green tb x 20 choco   Choco ER c peach tb 2 x 10 x 3"    Next tx:  Half foam roller pec stretch     Jm received the following manual therapy techniques: Joint mobilizations and PROM were applied for 15 minutes including:    Choco inferior, posterior GH mobs (gr3)  Choco PROM in all planes    Jm participated in neuromuscular re-education activities to improve: Posture and Neuromuscular " Control for 00 minutes. The following activities were included:      Jm participated in dynamic functional therapeutic activities to improve functional performance for 00 minutes, including:      Jm declined ice at the end of tx.       Home Exercises Provided and Patient Education Provided     Education provided:   - HEP, prognosis, treatment timeline, treatment expectations and rationale, review of prior surgery including shoulder anatomy    Written Home Exercises Provided: yes.  Exercises were reviewed and Jm was able to demonstrate them prior to the end of the session.  Jm demonstrated good  understanding of the education provided.     See EMR under Patient Instructions for exercises provided 2/26/2020.    Assessment     Pt was able to complete all exercises including progressions with c/o increased R shldr pain at end range PROM flx/ER and R posterior GH mob. Choco active and passive ROM much improved since IE. Pt required verbal and tactile cues for proper scapular retraction/posture throughout tx and to avoid compensatory shrug with OH movement. Continued sig R posterior capsular restrictions. Issued updated HEP: row, ext, ER, IR. Encouraged continued compliance with HEP. No adverse effects reported p tx.     Jm is progressing well towards his goals.   Pt prognosis is Good.     Pt will continue to benefit from skilled outpatient physical therapy to address the deficits listed in the problem list box on initial evaluation, provide pt/family education and to maximize pt's level of independence in the home and community environment.     Pt's spiritual, cultural and educational needs considered and pt agreeable to plan of care and goals.     Anticipated barriers to physical therapy: Delay in R shoulder rehabilitation, continued performance of overhead tasks at job    GOALS: Short Term Goals: 6 weeks  1. Report decreased B shoulder pain < / =  5/10  to increase tolerance for ADLs such as dressing and  performing overhead activities.   2. Increase B shoulder PROM by > / = 30 degrees where limited to improve ability to dress independently.  3. Increased strength by 1/3 MMT grade where limited in B shoulder musculature to increase tolerance for ADL and work activities.  4. Pt to tolerate HEP to improve ROM and independence with ADL's.     Long Term Goals: 12 weeks  1. Report decreased B shoulder pain  < / =  3 /10  to increase tolerance for work activities such as using screw  at shoulder height for prolonged periods.   2. Increase B shoulder AROM to = PROM to improve ability to perform overhead work tasks.   3. Increase strength to >/= 4/5 in B shoulder musculature to increase tolerance for overhead work activities.  4. Pt will be able to hold a 2 lb weight at 90 degrees of shoulder flexion for 3 minutes without significant increase in R shoulder pain to demonstrate increased strength for performing work activities.   5. Pt will have score of 20-40% limitation on FOTO shoulder in order to demonstrate true functional improvement.      Plan   Plan of care Certification: 2/26/2020 to 6/26/2020.     Outpatient Physical Therapy 2 times weekly weaning to 1 time biweekly for 3 months to include the following interventions: Electrical Stimulation NMES/IFC/TENS, Manual Therapy, Moist Heat/ Ice, Neuromuscular Re-ed, Patient Education, Self Care, Therapeutic Activites, Therapeutic Exercise and dry needling, BFR, IASTYM.     Continue to progress as tolerated according to PT POC and MD order.     Debbie Olmstead, PTA

## 2020-03-06 ENCOUNTER — OFFICE VISIT (OUTPATIENT)
Dept: HEMATOLOGY/ONCOLOGY | Facility: CLINIC | Age: 65
End: 2020-03-06
Payer: MEDICARE

## 2020-03-06 ENCOUNTER — HOSPITAL ENCOUNTER (OUTPATIENT)
Dept: RADIOLOGY | Facility: HOSPITAL | Age: 65
Discharge: HOME | End: 2020-03-06
Attending: INTERNAL MEDICINE
Payer: MEDICARE

## 2020-03-06 VITALS
DIASTOLIC BLOOD PRESSURE: 70 MMHG | SYSTOLIC BLOOD PRESSURE: 134 MMHG | OXYGEN SATURATION: 97 % | WEIGHT: 222.25 LBS | HEIGHT: 68 IN | BODY MASS INDEX: 33.68 KG/M2 | HEART RATE: 52 BPM | RESPIRATION RATE: 16 BRPM | TEMPERATURE: 98 F

## 2020-03-06 DIAGNOSIS — C43.59 MALIGNANT MELANOMA OF BACK: Primary | ICD-10-CM

## 2020-03-06 DIAGNOSIS — C43.59 MALIGNANT MELANOMA OF BACK: ICD-10-CM

## 2020-03-06 DIAGNOSIS — Z95.1 S/P CABG (CORONARY ARTERY BYPASS GRAFT): ICD-10-CM

## 2020-03-06 DIAGNOSIS — E08.59 DIABETES MELLITUS DUE TO UNDERLYING CONDITION WITH OTHER CIRCULATORY COMPLICATION, UNSPECIFIED WHETHER LONG TERM INSULIN USE: ICD-10-CM

## 2020-03-06 PROCEDURE — 99214 PR OFFICE/OUTPT VISIT, EST, LEVL IV, 30-39 MIN: ICD-10-PCS | Mod: HCNC,S$GLB,, | Performed by: INTERNAL MEDICINE

## 2020-03-06 PROCEDURE — 25500020 PHARM REV CODE 255: Mod: HCNC | Performed by: INTERNAL MEDICINE

## 2020-03-06 PROCEDURE — 99999 PR PBB SHADOW E&M-EST. PATIENT-LVL III: ICD-10-PCS | Mod: PBBFAC,HCNC,, | Performed by: INTERNAL MEDICINE

## 2020-03-06 PROCEDURE — 71260 CT THORAX DX C+: CPT | Mod: 26,HCNC,, | Performed by: RADIOLOGY

## 2020-03-06 PROCEDURE — 3078F PR MOST RECENT DIASTOLIC BLOOD PRESSURE < 80 MM HG: ICD-10-PCS | Mod: HCNC,CPTII,S$GLB, | Performed by: INTERNAL MEDICINE

## 2020-03-06 PROCEDURE — 74177 CT CHEST ABDOMEN PELVIS WITH CONTRAST (XPD): ICD-10-PCS | Mod: 26,HCNC,, | Performed by: RADIOLOGY

## 2020-03-06 PROCEDURE — 99499 UNLISTED E&M SERVICE: CPT | Mod: HCNC,S$GLB,, | Performed by: INTERNAL MEDICINE

## 2020-03-06 PROCEDURE — 3078F DIAST BP <80 MM HG: CPT | Mod: HCNC,CPTII,S$GLB, | Performed by: INTERNAL MEDICINE

## 2020-03-06 PROCEDURE — 1100F PR PT FALLS ASSESS DOC 2+ FALLS/FALL W/INJURY/YR: ICD-10-PCS | Mod: HCNC,CPTII,S$GLB, | Performed by: INTERNAL MEDICINE

## 2020-03-06 PROCEDURE — 74177 CT ABD & PELVIS W/CONTRAST: CPT | Mod: TC,HCNC

## 2020-03-06 PROCEDURE — 1159F MED LIST DOCD IN RCRD: CPT | Mod: HCNC,S$GLB,, | Performed by: INTERNAL MEDICINE

## 2020-03-06 PROCEDURE — 3075F SYST BP GE 130 - 139MM HG: CPT | Mod: HCNC,CPTII,S$GLB, | Performed by: INTERNAL MEDICINE

## 2020-03-06 PROCEDURE — 1159F PR MEDICATION LIST DOCUMENTED IN MEDICAL RECORD: ICD-10-PCS | Mod: HCNC,S$GLB,, | Performed by: INTERNAL MEDICINE

## 2020-03-06 PROCEDURE — 3075F PR MOST RECENT SYSTOLIC BLOOD PRESS GE 130-139MM HG: ICD-10-PCS | Mod: HCNC,CPTII,S$GLB, | Performed by: INTERNAL MEDICINE

## 2020-03-06 PROCEDURE — 1126F PR PAIN SEVERITY QUANTIFIED, NO PAIN PRESENT: ICD-10-PCS | Mod: HCNC,S$GLB,, | Performed by: INTERNAL MEDICINE

## 2020-03-06 PROCEDURE — 1126F AMNT PAIN NOTED NONE PRSNT: CPT | Mod: HCNC,S$GLB,, | Performed by: INTERNAL MEDICINE

## 2020-03-06 PROCEDURE — 3288F PR FALLS RISK ASSESSMENT DOCUMENTED: ICD-10-PCS | Mod: HCNC,CPTII,S$GLB, | Performed by: INTERNAL MEDICINE

## 2020-03-06 PROCEDURE — 74177 CT ABD & PELVIS W/CONTRAST: CPT | Mod: 26,HCNC,, | Performed by: RADIOLOGY

## 2020-03-06 PROCEDURE — 99214 OFFICE O/P EST MOD 30 MIN: CPT | Mod: HCNC,S$GLB,, | Performed by: INTERNAL MEDICINE

## 2020-03-06 PROCEDURE — 99499 RISK ADDL DX/OHS AUDIT: ICD-10-PCS | Mod: HCNC,S$GLB,, | Performed by: INTERNAL MEDICINE

## 2020-03-06 PROCEDURE — 3288F FALL RISK ASSESSMENT DOCD: CPT | Mod: HCNC,CPTII,S$GLB, | Performed by: INTERNAL MEDICINE

## 2020-03-06 PROCEDURE — 71260 CT CHEST ABDOMEN PELVIS WITH CONTRAST (XPD): ICD-10-PCS | Mod: 26,HCNC,, | Performed by: RADIOLOGY

## 2020-03-06 PROCEDURE — 1100F PTFALLS ASSESS-DOCD GE2>/YR: CPT | Mod: HCNC,CPTII,S$GLB, | Performed by: INTERNAL MEDICINE

## 2020-03-06 PROCEDURE — 99999 PR PBB SHADOW E&M-EST. PATIENT-LVL III: CPT | Mod: PBBFAC,HCNC,, | Performed by: INTERNAL MEDICINE

## 2020-03-06 RX ADMIN — IOHEXOL 15 ML: 350 INJECTION, SOLUTION INTRAVENOUS at 10:03

## 2020-03-06 RX ADMIN — IOHEXOL 15 ML: 350 INJECTION, SOLUTION INTRAVENOUS at 09:03

## 2020-03-06 RX ADMIN — IOHEXOL 100 ML: 350 INJECTION, SOLUTION INTRAVENOUS at 11:03

## 2020-03-06 NOTE — PROGRESS NOTES
ONCOLOGY FOLLOW UP VISIT.     Reason for visit: surveillance for stage IIA melanoma    Best Contact Phone Number(s): [phone] (home) [phone] (work)     Cancer/Stage/TNM:   Cancer Staging  Malignant melanoma of back  Staging form: Melanoma of the Skin, AJCC 8th Edition  - Pathologic: Stage IIA (pT3a, pN0, cM0) - Unsigned        No history exists.        Interval History:   Today patient reports no new symptoms     ROS:  Review of Systems   Constitutional: Negative for activity change, appetite change, chills, fatigue, fever and unexpected weight change.   HENT: Negative for mouth sores, nosebleeds and sore throat.    Eyes: Negative for visual disturbance.   Respiratory: Positive for cough. Negative for shortness of breath and wheezing.    Cardiovascular: Negative for chest pain, palpitations and leg swelling.   Gastrointestinal: Negative for abdominal distention, abdominal pain, blood in stool and diarrhea.   Endocrine: Negative for cold intolerance and heat intolerance.   Genitourinary: Negative for dysuria, flank pain and frequency.   Musculoskeletal: Negative for arthralgias, back pain, joint swelling and myalgias.   Skin: Negative for color change, rash and wound.   Neurological: Negative for dizziness, light-headedness and headaches.   Hematological: Negative for adenopathy. Does not bruise/bleed easily.   Psychiatric/Behavioral: The patient is not nervous/anxious.       A complete 12-point review of systems was reviewed and is negative except as mentioned above.     Past Medical History:   Past Medical History:   Diagnosis Date    Allergy     seasonal    Arthritis     Coronary artery disease     Diabetes mellitus     Hyperlipidemia     Hypertension     Joint pain     Melanoma 10/2019    MM left back 2.7mm; neg LNs    Squamous cell carcinoma 2017    scalp - SSW        Allergies:   Review of patient's allergies indicates:   Allergen Reactions    No known drug allergies         Medications:  "  Current Outpatient Medications   Medication Sig Dispense Refill    ACCU-CHEK SMARTVIEW TEST STRIP Strp 1 strip by Misc.(Non-Drug; Combo Route) route 3 (three) times daily. Patient needs an appointment 300 strip 0    ammonium lactate 12 % Crea Apply small amount to feet except for in between toes twice a day 1 Tube 3    atorvastatin (LIPITOR) 80 MG tablet TAKE 1 TABLET (80 MG TOTAL) BY MOUTH ONCE DAILY. (Patient taking differently: Take 80 mg by mouth every evening. TAKE 1 TABLET (80 MG TOTAL) BY MOUTH ONCE DAILY.) 90 tablet 3    blood-glucose meter Misc Use to check sugar 3 times daily. Accu-chek Emily. Patient needs an appointment 1 each 0    carvedilol (COREG) 25 MG tablet Take .5-1 tablet twice daily or as directed (Patient taking differently: Take 12.5 mg by mouth 2 (two) times daily with meals. ) 180 tablet 3    diphenhydrAMINE (BENADRYL) 25 mg capsule Take 50 mg by mouth nightly as needed for Itching.       empagliflozin (JARDIANCE) 25 mg Tab Take 1 tablet by mouth once daily. 90 tablet 1    FOLIC ACID/MULTIVITS-MIN (MULTIVITAMIN FOR CHOLESTEROL ORAL) Take 1 tablet by mouth nightly.       furosemide (LASIX) 40 MG tablet Take 1 tablet (40 mg total) by mouth once daily. 90 tablet 3    icosapent ethyl (VASCEPA) 1 gram Cap Take 2 capsules by mouth 2 (two) times daily. 360 capsule 3    insulin (LANTUS SOLOSTAR U-100 INSULIN) glargine 100 units/mL (3mL) SubQ pen Inject 35 Units into the skin every evening. 31.5 mL 3    insulin aspart U-100 (NOVOLOG FLEXPEN U-100 INSULIN) 100 unit/mL InPn pen Inject 10 Units into the skin 3 (three) times daily with meals. Patient needs an appointment (Patient taking differently: Inject 12 Units into the skin 3 (three) times daily with meals. Patient needs an appointment) 2 Box 0    insulin needles, disposable, (BD INSULIN PEN NEEDLE UF ORIG) 29 x 1/2 " Ndle USE TWICE DAILY AS DIRECTED 100 each 2    lancets Misc 1 lancet by Misc.(Non-Drug; Combo Route) route 3 (three) " "times daily. accu-chek Fastclix. Patient needs an appointment 300 each 0    meloxicam (MOBIC) 15 MG tablet Take 1 tablet (15 mg total) by mouth once daily. 90 tablet 0    metFORMIN (GLUCOPHAGE) 1000 MG tablet Take 1 tablet (1,000 mg total) by mouth 2 (two) times daily. 180 tablet 1    pen needle, diabetic (BD INSULIN PEN NEEDLE UF MINI) 31 gauge x 3/16" Ndle 1 each by Misc.(Non-Drug; Combo Route) route 4 (four) times daily. Patient needs an appointment 400 each 0    valsartan (DIOVAN) 320 MG tablet Take 1 tablet (320 mg total) by mouth once daily. 90 tablet 3    carvedilol (COREG) 25 MG tablet Take 1 tablet (25 mg total) by mouth 2 (two) times daily with meals. One twice a day or as directed 90 tablet 3    ezetimibe (ZETIA) 10 mg tablet TAKE 1 TABLET (10 MG TOTAL) BY MOUTH ONCE DAILY. (Patient taking differently: Take 5 mg by mouth every evening. ) 90 tablet 3    HYDROcodone-acetaminophen (NORCO) 5-325 mg per tablet Take 1 tablet by mouth every 6 (six) hours as needed. (Patient not taking: Reported on 3/6/2020) 30 tablet 0    nitroGLYCERIN (NITROSTAT) 0.4 MG SL tablet PLACE 1 TABLET (0.4 MG TOTAL) UNDER THE TONGUE EVERY 5 (FIVE) MINUTES AS NEEDED FOR CHEST PAIN AS DIRECTED (Patient not taking: Reported on 3/6/2020) 25 tablet 4    ondansetron (ZOFRAN-ODT) 8 MG TbDL Dissolve 1 tablet (8 mg total) by mouth every 6 (six) hours as needed. (Patient not taking: Reported on 3/6/2020) 15 tablet 0    tadalafil (CIALIS) 20 MG Tab Take 1 tablet (20 mg total) by mouth daily as needed. (Patient not taking: Reported on 3/6/2020) 10 tablet 3     No current facility-administered medications for this visit.      Facility-Administered Medications Ordered in Other Visits   Medication Dose Route Frequency Provider Last Rate Last Dose    lidocaine (PF) 10 mg/ml (1%) injection 10 mg  1 mL Intradermal Once Maureen Love MD        triamcinolone acetonide injection 40 mg  40 mg Intra-articular  Patricio Multani MD   40 mg " "at 10/26/16 0841        Physical Exam:   /70 (BP Location: Right arm, Patient Position: Sitting, BP Method: Large (Automatic))   Pulse (!) 52   Temp 97.9 °F (36.6 °C) (Oral)   Resp 16   Ht 5' 8" (1.727 m)   Wt 100.8 kg (222 lb 3.6 oz)   SpO2 97%   BMI 33.79 kg/m²      ECOG Performance Status: (foot note - ECOG PS provided by Eastern Cooperative Oncology Group) 0 - Asymptomatic    Physical Exam   Constitutional: He is oriented to person, place, and time. He appears well-developed and well-nourished.   HENT:   Head: Normocephalic and atraumatic.   Eyes: Pupils are equal, round, and reactive to light. Conjunctivae and EOM are normal.   Neck: Normal range of motion. Neck supple.   Cardiovascular: Normal rate and regular rhythm. Exam reveals no friction rub.   No murmur heard.  Pulmonary/Chest: Effort normal and breath sounds normal.   Abdominal: Soft. Bowel sounds are normal. There is no tenderness.   Musculoskeletal: Normal range of motion. He exhibits no edema.   Lymphadenopathy:     He has no cervical adenopathy.     He has no axillary adenopathy.   Neurological: He is alert and oriented to person, place, and time.   Skin: Skin is warm and dry.   Psychiatric: He has a normal mood and affect. His behavior is normal.         Labs:   No results found for this or any previous visit (from the past 48 hour(s)).       Imaging: I have personally reviewed patient's CT scnas imaging showing no evidence of recurrence.  CT Chest Abdomen Pelvis With Contrast  Narrative: EXAMINATION:  CT CHEST ABDOMEN PELVIS WITH CONTRAST (XPD)    CLINICAL HISTORY:  Stage II Melanoma re-staging scans; Malignant melanoma of other part of trunk    TECHNIQUE:  Low dose axial images, sagittal and coronal reformations were obtained from the thoracic inlet to the pubic symphysis following the IV administration of 75 mL of Omnipaque 350.  30 mL of oral Omnipaque 350 was administered.    COMPARISON:  CT 12/06/2019.    FINDINGS:  Structures at " the base of the neck are unremarkable.    There is a left-sided aortic arch with 3 branch vessels.  The thoracic aorta tapers normally without significant atherosclerotic calcification.  No dissection.    Pulmonary arteries distribute normally.  No central filling defects to suggest a thromboembolus.    Heart is normal in size.  There are postoperative changes of CABG.  No pericardial effusion.    No axillary, supraclavicular or mediastinal lymphadenopathy.  Hilar contours are unremarkable.    The esophagus is normal in caliber and course.  There are a few slightly prominent periesophageal lymph nodes, similar when compared to the previous exam.    Trachea and proximal airways are patent.  Vocal cords are opposed, presumably from breath-holding.    Lung volumes are normal and symmetric.  No mass, consolidation, pneumothorax or pleural effusions.  Patchy subsegmental atelectasis noted within the right lower lobe.  There is a stable 0.4 cm solid pulmonary nodule within the right upper lobe (series 2, image 40).  No new pulmonary nodules.    The liver is normal in size.  Hepatic parenchyma demonstrates homogeneous enhancement without focal lesions.  No intrahepatic bile duct dilatation.  Portal vasculature is patent.    Gallbladder is normal.  Common bile duct is normal in caliber.    Stomach, duodenum, spleen, and adrenal glands are within normal limits.    Kidneys are normal in size and location.  No cortical enhancing lesions.  No nephrolithiasis.  No hydronephrosis or hydroureter.  Bladder is fluid filled and unremarkable.  Prostate demonstrates a few dystrophic calcifications.    The small bowel is normal in caliber.  There is a moderate amount of retained fecal material throughout the visualized colon.  The appendix is normal.  No obstruction or inflammatory changes.    The aorta tapers normally with prominent atherosclerotic calcification.  Celiac artery, SMA, bilateral renal arteries and JONI are patent.    No  ascites or intra-abdominal free air.  No abdominal or pelvic lymphadenopathy.  No focal mesenteric masses.    Suspected surgical scar noted within the left posterior back.  No discrete measurable lesions.  Remaining visualized subcutaneous soft tissues demonstrate no significant abnormalities.    There are postoperative changes of left total hip arthroplasty.  There are degenerative changes of the spine.  No suspicious lytic or blastic lesions.  Sternotomy wires are well aligned.  Impression: 1. Postoperative changes of the left posterior paramedian back for melanoma resection.  No discrete measurable lesions.  2. Stable 0.4 cm right upper lobe pulmonary nodule.  No new pulmonary nodules.  3. Additional stable chronic findings as detailed in the body of the report.    Electronically signed by: Cruz Wolfe MD  Date:    03/06/2020  Time:    12:16            Diagnoses:       1. Malignant melanoma of back    2. Diabetes mellitus due to underlying condition with other circulatory complication, unspecified whether long term insulin use    3. S/P CABG (coronary artery bypass graft)          Assessment and Plan:         1. Stage IIA Cutaneous Melanoma:   -s/p WLE and SNLBx with 0/5 negative lymph nodes.  No evidence for adjuvant immunotherapy or targeted for stage IIA melanoma.  Will initiate active surveillance with CT scans every 3 months.  Will broaden to every 6 months after 2 years.  - needs to continue skin exams every 3-6 months with dermatology  - CT CAP every 3 months for first 2 years, then every 6 months thereafter.  Will get every 6-12 months until patient reaches 3 years     2. DMII  Last HbA1c 7.6, follows with PCP          I have provided the patient with an opportunity to ask questions and have all questions answered to his satisfaction.     he will return to clinic in 3 months, but knows to call in the interim if symptoms change or should a problem arise.    Casimiro Colon MD  Medical Oncology [facility]  and [facility]

## 2020-03-10 ENCOUNTER — DOCUMENTATION ONLY (OUTPATIENT)
Dept: REHABILITATION | Facility: HOSPITAL | Age: 65
End: 2020-03-10

## 2020-03-10 NOTE — PROGRESS NOTES
Patient canceled, was called at 2:45 PM. Patient states that he over did it at work today so decided to stay home, rest it, and cancel for today. He states that he will see us back on Thursday and thanked me for the courtesy call.

## 2020-03-12 ENCOUNTER — CLINICAL SUPPORT (OUTPATIENT)
Dept: REHABILITATION | Facility: HOSPITAL | Age: 65
End: 2020-03-12
Payer: MEDICARE

## 2020-03-12 DIAGNOSIS — M25.612 DECREASED RANGE OF MOTION OF LEFT SHOULDER: ICD-10-CM

## 2020-03-12 DIAGNOSIS — M25.511 BILATERAL SHOULDER PAIN, UNSPECIFIED CHRONICITY: ICD-10-CM

## 2020-03-12 DIAGNOSIS — M25.512 BILATERAL SHOULDER PAIN, UNSPECIFIED CHRONICITY: ICD-10-CM

## 2020-03-12 DIAGNOSIS — R29.898 DECREASED STRENGTH OF UPPER EXTREMITY: ICD-10-CM

## 2020-03-12 PROCEDURE — 97110 THERAPEUTIC EXERCISES: CPT | Mod: HCNC,CQ

## 2020-03-12 PROCEDURE — 97140 MANUAL THERAPY 1/> REGIONS: CPT | Mod: HCNC,CQ

## 2020-03-12 NOTE — PROGRESS NOTES
"Patient canceled, was called at 2:45 PM. Patient states that he over did it at work today so decided to stay home, rest it, and cancel for today. He states that he will see us back on Thursday and thanked me for the courtesy call.    Physical Therapy Daily Treatment Note     Name: Jm Deleon  Clinic Number: [id number]    Therapy Diagnosis:   Encounter Diagnoses   Name Primary?    Bilateral shoulder pain, unspecified chronicity     Decreased range of motion of left shoulder     Decreased strength of upper extremity      Physician: Tyrese Madden MD    Visit Date: 3/12/2020    Physician Orders: PT Eval and Treat. From 1/16/2020 referral: Right shoulder partial rotator cuff tear and slap tear. Work on rotator cuff strengthening, scapular stabilizing exercises, and range of motion.  Medical Diagnosis from Referral:   Diagnosis   M25.512 (ICD-10-CM) - Left shoulder pain, unspecified chronicity   M75.102 (ICD-10-CM) - Rotator cuff syndrome of left shoulder      Evaluation Date: 2/26/2020  Authorization Period Expiration: 2/29/2020  Plan of Care Expiration: 6/26/2020   Visit # / Visits authorized: 2/30    Time In: 1703  Time Out: 1803  Total Billable Time: 23 minutes    Precautions: Standard  Procedures: R RTC repair, biceps tenodesis, SAD, DCE 10/04/17    Subjective     Pt reports: Pt returns to therapy this pm with no c/o shldr pain upon entry. Pt states he was really sore on Tuesday because he overdid it at work.    He was compliant with home exercise program.  Response to previous treatment: No adverse effects  Functional change: N/A    Pain: 0/10  Location: bilateral shoulder      Objective     Jm received therapeutic exercises to develop strength, endurance, ROM and flexibility for 45 minutes including:    UBE (fwd/bwd) x 4'/4'  Pulleys (flx) x 5'  Wall slides 20 x 5" choco  Rows c blue tb 30 x 5"  Shldr ext c green tb x 30  IR c green tb x 20 choco   Choco ER c peach tb 2 x 10 x 3"  Supine SA punches 3# 2 x 10 x 5" " choco    Jm received the following manual therapy techniques: Joint mobilizations and PROM were applied for 15 minutes including:    Choco inferior, posterior GH mobs (gr3)  Choco PROM in all planes    Jm participated in neuromuscular re-education activities to improve: Posture and Neuromuscular Control for 00 minutes. The following activities were included:      Jm participated in dynamic functional therapeutic activities to improve functional performance for 00 minutes, including:      Jm declined ice at the end of tx.       Home Exercises Provided and Patient Education Provided     Education provided:   - HEP, prognosis, treatment timeline, treatment expectations and rationale, review of prior surgery including shoulder anatomy    Written Home Exercises Provided: yes.  Exercises were reviewed and Jm was able to demonstrate them prior to the end of the session.  Jm demonstrated good  understanding of the education provided.     See EMR under Patient Instructions for exercises provided 2/26/2020.    Assessment     Pt was able to complete all exercises including progressions with c/o increased R shldr pain at end range PROM flx/ER and R posterior GH mob. Pt required verbal and tactile cues for proper scapular retraction/posture throughout tx. Continued sig R posterior capsular restrictions. Encouraged continued compliance with HEP. No adverse effects reported p tx.     Jm is progressing well towards his goals.   Pt prognosis is Good.     Pt will continue to benefit from skilled outpatient physical therapy to address the deficits listed in the problem list box on initial evaluation, provide pt/family education and to maximize pt's level of independence in the home and community environment.     Pt's spiritual, cultural and educational needs considered and pt agreeable to plan of care and goals.     Anticipated barriers to physical therapy: Delay in R shoulder rehabilitation, continued performance of  overhead tasks at job    GOALS: Short Term Goals: 6 weeks  1. Report decreased B shoulder pain < / =  5/10  to increase tolerance for ADLs such as dressing and performing overhead activities.   2. Increase B shoulder PROM by > / = 30 degrees where limited to improve ability to dress independently.  3. Increased strength by 1/3 MMT grade where limited in B shoulder musculature to increase tolerance for ADL and work activities.  4. Pt to tolerate HEP to improve ROM and independence with ADL's.     Long Term Goals: 12 weeks  1. Report decreased B shoulder pain  < / =  3 /10  to increase tolerance for work activities such as using screw  at shoulder height for prolonged periods.   2. Increase B shoulder AROM to = PROM to improve ability to perform overhead work tasks.   3. Increase strength to >/= 4/5 in B shoulder musculature to increase tolerance for overhead work activities.  4. Pt will be able to hold a 2 lb weight at 90 degrees of shoulder flexion for 3 minutes without significant increase in R shoulder pain to demonstrate increased strength for performing work activities.   5. Pt will have score of 20-40% limitation on FOTO shoulder in order to demonstrate true functional improvement.      Plan   Plan of care Certification: 2/26/2020 to 6/26/2020.     Outpatient Physical Therapy 2 times weekly weaning to 1 time biweekly for 3 months to include the following interventions: Electrical Stimulation NMES/IFC/TENS, Manual Therapy, Moist Heat/ Ice, Neuromuscular Re-ed, Patient Education, Self Care, Therapeutic Activites, Therapeutic Exercise and dry needling, BFR, IASTYM.     Continue to progress as tolerated according to PT POC and MD order.     Debbie Olmstead, PTA

## 2020-03-17 ENCOUNTER — PATIENT MESSAGE (OUTPATIENT)
Dept: REHABILITATION | Facility: HOSPITAL | Age: 65
End: 2020-03-17

## 2020-03-18 ENCOUNTER — TELEPHONE (OUTPATIENT)
Dept: REHABILITATION | Facility: HOSPITAL | Age: 65
End: 2020-03-18

## 2020-03-18 NOTE — TELEPHONE ENCOUNTER
Spoke to patient and because they are not an  immediate post-op we are cancelling because of COVID-19. Patient will be contacted once everything is back to normal.

## 2020-03-19 ENCOUNTER — TELEPHONE (OUTPATIENT)
Dept: SPORTS MEDICINE | Facility: CLINIC | Age: 65
End: 2020-03-19

## 2020-03-19 NOTE — TELEPHONE ENCOUNTER
Reaching out to patient regarding appointment with Dr. Ventura for 3/24.  Calling to discuss if patient feels they need to be seen.    He is doing well.  His physical therapy is currently cancelled due to the Covid-19 virus.  He has been doing his HEP.    PLAN:  1. Return to PT when safe to do so  2. Follow up as needed

## 2020-04-17 ENCOUNTER — PATIENT MESSAGE (OUTPATIENT)
Dept: ADMINISTRATIVE | Facility: OTHER | Age: 65
End: 2020-04-17

## 2020-04-17 ENCOUNTER — PATIENT MESSAGE (OUTPATIENT)
Dept: INTERNAL MEDICINE | Facility: CLINIC | Age: 65
End: 2020-04-17

## 2020-05-07 RX ORDER — ATORVASTATIN CALCIUM 80 MG/1
TABLET, FILM COATED ORAL
Qty: 90 TABLET | Refills: 1 | Status: SHIPPED | OUTPATIENT
Start: 2020-05-07 | End: 2020-11-24 | Stop reason: SDUPTHER

## 2020-05-07 RX ORDER — METFORMIN HYDROCHLORIDE 1000 MG/1
1000 TABLET ORAL 2 TIMES DAILY
Qty: 180 TABLET | Refills: 1 | Status: SHIPPED | OUTPATIENT
Start: 2020-05-07 | End: 2020-11-11 | Stop reason: SDUPTHER

## 2020-05-14 ENCOUNTER — TELEPHONE (OUTPATIENT)
Dept: REHABILITATION | Facility: HOSPITAL | Age: 65
End: 2020-05-14

## 2020-05-25 ENCOUNTER — TELEPHONE (OUTPATIENT)
Dept: HEMATOLOGY/ONCOLOGY | Facility: CLINIC | Age: 65
End: 2020-05-25

## 2020-06-02 ENCOUNTER — PATIENT OUTREACH (OUTPATIENT)
Dept: OTHER | Facility: OTHER | Age: 65
End: 2020-06-02

## 2020-06-02 NOTE — LETTER
June 30, 2020     Jm Deleon  [id number] [facility] [id number]       Dear Jm,    Welcome to [facility]! Our goal is to make care effective, proactive and convenient by using data you send us from home to better treat your chronic conditions.              My name is Khanh Gee, and I am your dedicated Digital Medicine clinician. As an expert in medication management, I will help ensure that the medications you are taking continue to provide the intended benefits and help you reach your goals. You can reach me directly at [phone] or by sending me a message directly through your [facility] account.      I am Mandy Gupta and I will be your health . My job is to help you identify lifestyle changes to improve your disease control. We will talk about nutrition, exercise, and other ways you may be able to adjust your current habits to better your health. Additionally, we will help ensure you are completing the tests and screenings that are necessary to help manage your conditions. You can reach me directly at [phone] or by sending me a message directly through your [facility] account.    Most importantly, YOU are at the center of this team. Together, we will work to improve your overall health and encourage you to meet your goals for a healthier lifestyle.     What we expect from YOU:  · Please take frequent home blood pressure measurements. We ask that you take at least 1 blood pressure reading per week, but more information will better help us get you know you. Be sure you rest for a few minutes before taking the reading in a quiet, comfortable place.     Be available to receive phone calls or [employer] messages, when appropriate, from your care team. Please let us know if there are any specific days or times that work best for us to reach you via phone.     Complete routine tests and screenings. Dont worry, we will help keep you on track!           What you should  expect from your Digital Medicine Care Team:   We will work with you to create a personalized plan of care and provide you with encouragement and education, including regarding lifestyle changes, that could help you manage your disease states.     We will adjust your current medications, if needed, and continue to monitor your long-term progress.     We will provide you and your physician with monthly progress reports after you have been in the program for more than 30 days.     We will send you reminders through [employer] and text messages to help ensure you do not miss any testing deadlines to help manage your disease states.    You will be able to reach us by phone or through your [facility] account by clicking our names under Care Team on the right side of the home screen.    I look forward to working with you to achieve your blood pressure goals!    We look forward to working with you to help manage your health,    Sincerely,    Your Digital Medicine Team    Please visit our websites to learn more:   · Hypertension: [email or web address]      Remember, we are not available for emergencies. If you have an emergency, please contact your doctors office directly or call [facility] ([phone] or [phone]) or [phone].

## 2020-06-12 ENCOUNTER — HOSPITAL ENCOUNTER (OUTPATIENT)
Dept: RADIOLOGY | Facility: HOSPITAL | Age: 65
Discharge: HOME | End: 2020-06-12
Attending: INTERNAL MEDICINE
Payer: MEDICARE

## 2020-06-12 ENCOUNTER — OFFICE VISIT (OUTPATIENT)
Dept: HEMATOLOGY/ONCOLOGY | Facility: CLINIC | Age: 65
End: 2020-06-12
Payer: MEDICARE

## 2020-06-12 VITALS
HEART RATE: 58 BPM | DIASTOLIC BLOOD PRESSURE: 58 MMHG | HEIGHT: 68 IN | WEIGHT: 224.44 LBS | SYSTOLIC BLOOD PRESSURE: 122 MMHG | BODY MASS INDEX: 34.01 KG/M2

## 2020-06-12 DIAGNOSIS — Z95.1 S/P CABG (CORONARY ARTERY BYPASS GRAFT): ICD-10-CM

## 2020-06-12 DIAGNOSIS — E08.59 DIABETES MELLITUS DUE TO UNDERLYING CONDITION WITH OTHER CIRCULATORY COMPLICATION, UNSPECIFIED WHETHER LONG TERM INSULIN USE: ICD-10-CM

## 2020-06-12 DIAGNOSIS — C43.59 MALIGNANT MELANOMA OF BACK: ICD-10-CM

## 2020-06-12 DIAGNOSIS — C43.59 MALIGNANT MELANOMA OF BACK: Primary | ICD-10-CM

## 2020-06-12 LAB
CREAT SERPL-MCNC: 0.8 MG/DL (ref 0.5–1.4)
SAMPLE: NORMAL

## 2020-06-12 PROCEDURE — 99499 RISK ADDL DX/OHS AUDIT: ICD-10-PCS | Mod: HCNC,S$GLB,, | Performed by: INTERNAL MEDICINE

## 2020-06-12 PROCEDURE — 99999 PR PBB SHADOW E&M-EST. PATIENT-LVL III: ICD-10-PCS | Mod: PBBFAC,HCNC,, | Performed by: INTERNAL MEDICINE

## 2020-06-12 PROCEDURE — 74177 CT CHEST ABDOMEN PELVIS WITH CONTRAST (XPD): ICD-10-PCS | Mod: 26,HCNC,, | Performed by: RADIOLOGY

## 2020-06-12 PROCEDURE — 99499 UNLISTED E&M SERVICE: CPT | Mod: HCNC,S$GLB,, | Performed by: INTERNAL MEDICINE

## 2020-06-12 PROCEDURE — 99214 PR OFFICE/OUTPT VISIT, EST, LEVL IV, 30-39 MIN: ICD-10-PCS | Mod: HCNC,S$GLB,, | Performed by: INTERNAL MEDICINE

## 2020-06-12 PROCEDURE — 3074F SYST BP LT 130 MM HG: CPT | Mod: HCNC,CPTII,S$GLB, | Performed by: INTERNAL MEDICINE

## 2020-06-12 PROCEDURE — 3078F DIAST BP <80 MM HG: CPT | Mod: HCNC,CPTII,S$GLB, | Performed by: INTERNAL MEDICINE

## 2020-06-12 PROCEDURE — 74177 CT ABD & PELVIS W/CONTRAST: CPT | Mod: 26,HCNC,, | Performed by: RADIOLOGY

## 2020-06-12 PROCEDURE — 3074F PR MOST RECENT SYSTOLIC BLOOD PRESSURE < 130 MM HG: ICD-10-PCS | Mod: HCNC,CPTII,S$GLB, | Performed by: INTERNAL MEDICINE

## 2020-06-12 PROCEDURE — 1126F PR PAIN SEVERITY QUANTIFIED, NO PAIN PRESENT: ICD-10-PCS | Mod: HCNC,S$GLB,, | Performed by: INTERNAL MEDICINE

## 2020-06-12 PROCEDURE — 3078F PR MOST RECENT DIASTOLIC BLOOD PRESSURE < 80 MM HG: ICD-10-PCS | Mod: HCNC,CPTII,S$GLB, | Performed by: INTERNAL MEDICINE

## 2020-06-12 PROCEDURE — 1159F MED LIST DOCD IN RCRD: CPT | Mod: HCNC,S$GLB,, | Performed by: INTERNAL MEDICINE

## 2020-06-12 PROCEDURE — 1101F PT FALLS ASSESS-DOCD LE1/YR: CPT | Mod: HCNC,CPTII,S$GLB, | Performed by: INTERNAL MEDICINE

## 2020-06-12 PROCEDURE — 1101F PR PT FALLS ASSESS DOC 0-1 FALLS W/OUT INJ PAST YR: ICD-10-PCS | Mod: HCNC,CPTII,S$GLB, | Performed by: INTERNAL MEDICINE

## 2020-06-12 PROCEDURE — 25500020 PHARM REV CODE 255: Mod: HCNC | Performed by: INTERNAL MEDICINE

## 2020-06-12 PROCEDURE — 71260 CT CHEST ABDOMEN PELVIS WITH CONTRAST (XPD): ICD-10-PCS | Mod: 26,HCNC,, | Performed by: RADIOLOGY

## 2020-06-12 PROCEDURE — 74177 CT ABD & PELVIS W/CONTRAST: CPT | Mod: TC,HCNC

## 2020-06-12 PROCEDURE — 1126F AMNT PAIN NOTED NONE PRSNT: CPT | Mod: HCNC,S$GLB,, | Performed by: INTERNAL MEDICINE

## 2020-06-12 PROCEDURE — 99999 PR PBB SHADOW E&M-EST. PATIENT-LVL III: CPT | Mod: PBBFAC,HCNC,, | Performed by: INTERNAL MEDICINE

## 2020-06-12 PROCEDURE — 71260 CT THORAX DX C+: CPT | Mod: 26,HCNC,, | Performed by: RADIOLOGY

## 2020-06-12 PROCEDURE — 1159F PR MEDICATION LIST DOCUMENTED IN MEDICAL RECORD: ICD-10-PCS | Mod: HCNC,S$GLB,, | Performed by: INTERNAL MEDICINE

## 2020-06-12 PROCEDURE — 99214 OFFICE O/P EST MOD 30 MIN: CPT | Mod: HCNC,S$GLB,, | Performed by: INTERNAL MEDICINE

## 2020-06-12 RX ADMIN — IOHEXOL 100 ML: 350 INJECTION, SOLUTION INTRAVENOUS at 12:06

## 2020-06-12 NOTE — PROGRESS NOTES
ONCOLOGY FOLLOW UP VISIT.     Reason for visit: surveillance for stage IIA melanoma    Best Contact Phone Number(s): [phone] (home) [phone] (work)     Cancer/Stage/TNM:   Cancer Staging  Malignant melanoma of back  Staging form: Melanoma of the Skin, AJCC 8th Edition  - Pathologic: Stage IIA (pT3a, pN0, cM0) - Unsigned     Interval History:   No new symptoms     ROS:  Review of Systems   Constitutional: Negative for activity change, appetite change, chills, fatigue, fever and unexpected weight change.   HENT: Negative for mouth sores, nosebleeds and sore throat.    Eyes: Negative for visual disturbance.   Respiratory: Negative for cough, shortness of breath and wheezing.    Cardiovascular: Negative for chest pain, palpitations and leg swelling.   Gastrointestinal: Negative for abdominal distention, abdominal pain, blood in stool and diarrhea.   Endocrine: Negative for cold intolerance and heat intolerance.   Genitourinary: Negative for dysuria, flank pain and frequency.   Musculoskeletal: Negative for arthralgias, back pain, joint swelling and myalgias.   Skin: Negative for color change, rash and wound.   Neurological: Negative for dizziness, light-headedness and headaches.   Hematological: Negative for adenopathy. Does not bruise/bleed easily.   Psychiatric/Behavioral: The patient is not nervous/anxious.       A complete 12-point review of systems was reviewed and is negative except as mentioned above.     Past Medical History:   Past Medical History:   Diagnosis Date    Allergy     seasonal    Arthritis     Coronary artery disease     Diabetes mellitus     Hyperlipidemia     Hypertension     Joint pain     Melanoma 10/2019    MM left back 2.7mm; neg LNs    Squamous cell carcinoma 2017    scalp - SSW        Allergies:   Review of patient's allergies indicates:   Allergen Reactions    No known drug allergies         Medications:   Current Outpatient Medications   Medication Sig Dispense Refill  "   ACCU-CHEK SMARTVIEW TEST STRIP Strp 1 strip by Misc.(Non-Drug; Combo Route) route 3 (three) times daily. Patient needs an appointment 300 strip 0    ammonium lactate 12 % Crea Apply small amount to feet except for in between toes twice a day 1 Tube 3    atorvastatin (LIPITOR) 80 MG tablet TAKE 1 TABLET EVERY DAY 90 tablet 1    blood-glucose meter Misc Use to check sugar 3 times daily. Accu-chek Emily. Patient needs an appointment 1 each 0    carvedilol (COREG) 25 MG tablet Take .5-1 tablet twice daily or as directed (Patient taking differently: Take 12.5 mg by mouth 2 (two) times daily with meals. ) 180 tablet 3    diphenhydrAMINE (BENADRYL) 25 mg capsule Take 50 mg by mouth nightly as needed for Itching.       empagliflozin (JARDIANCE) 25 mg Tab Take 1 tablet by mouth once daily. 90 tablet 1    ezetimibe (ZETIA) 10 mg tablet TAKE 1 TABLET (10 MG TOTAL) BY MOUTH ONCE DAILY. (Patient taking differently: Take 5 mg by mouth every evening. ) 90 tablet 3    FOLIC ACID/MULTIVITS-MIN (MULTIVITAMIN FOR CHOLESTEROL ORAL) Take 1 tablet by mouth nightly.       furosemide (LASIX) 40 MG tablet Take 1 tablet (40 mg total) by mouth once daily. 90 tablet 3    icosapent ethyl (VASCEPA) 1 gram Cap Take 2 capsules by mouth 2 (two) times daily. 360 capsule 3    insulin (LANTUS SOLOSTAR U-100 INSULIN) glargine 100 units/mL (3mL) SubQ pen Inject 35 Units into the skin every evening. 31.5 mL 3    insulin aspart U-100 (NOVOLOG FLEXPEN U-100 INSULIN) 100 unit/mL InPn pen Inject 10 Units into the skin 3 (three) times daily with meals. Patient needs an appointment (Patient taking differently: Inject 12 Units into the skin 3 (three) times daily with meals. Patient needs an appointment) 2 Box 0    insulin needles, disposable, (BD INSULIN PEN NEEDLE UF ORIG) 29 x 1/2 " Ndle USE TWICE DAILY AS DIRECTED 100 each 2    lancets Misc 1 lancet by Misc.(Non-Drug; Combo Route) route 3 (three) times daily. accu-chek Fastclix. Patient " "needs an appointment 300 each 0    metFORMIN (GLUCOPHAGE) 1000 MG tablet Take 1 tablet (1,000 mg total) by mouth 2 (two) times daily. 180 tablet 1    nitroGLYCERIN (NITROSTAT) 0.4 MG SL tablet PLACE 1 TABLET (0.4 MG TOTAL) UNDER THE TONGUE EVERY 5 (FIVE) MINUTES AS NEEDED FOR CHEST PAIN AS DIRECTED 25 tablet 4    pen needle, diabetic (BD INSULIN PEN NEEDLE UF MINI) 31 gauge x 3/16" Ndle 1 each by Misc.(Non-Drug; Combo Route) route 4 (four) times daily. Patient needs an appointment 400 each 0    tadalafil (CIALIS) 20 MG Tab Take 1 tablet (20 mg total) by mouth daily as needed. 10 tablet 3    valsartan (DIOVAN) 320 MG tablet Take 1 tablet (320 mg total) by mouth once daily. 90 tablet 3    carvedilol (COREG) 25 MG tablet Take 1 tablet (25 mg total) by mouth 2 (two) times daily with meals. One twice a day or as directed 90 tablet 3    HYDROcodone-acetaminophen (NORCO) 5-325 mg per tablet Take 1 tablet by mouth every 6 (six) hours as needed. (Patient not taking: Reported on 3/6/2020) 30 tablet 0    ondansetron (ZOFRAN-ODT) 8 MG TbDL Dissolve 1 tablet (8 mg total) by mouth every 6 (six) hours as needed. (Patient not taking: Reported on 6/12/2020) 15 tablet 0     No current facility-administered medications for this visit.      Facility-Administered Medications Ordered in Other Visits   Medication Dose Route Frequency Provider Last Rate Last Dose    lidocaine (PF) 10 mg/ml (1%) injection 10 mg  1 mL Intradermal Once Maureen Love MD        triamcinolone acetonide injection 40 mg  40 mg Intra-articular  Patricio Multani MD   40 mg at 10/26/16 0841        Physical Exam:   BP (!) 122/58   Pulse (!) 58   Ht 5' 8" (1.727 m)   Wt 101.8 kg (224 lb 6.9 oz)   BMI 34.12 kg/m²      ECOG Performance Status: (foot note - ECOG PS provided by Eastern Cooperative Oncology Group) 0 - Asymptomatic    Physical Exam   Constitutional: He is oriented to person, place, and time. He appears well-developed and well-nourished. "   HENT:   Head: Normocephalic and atraumatic.   Eyes: Pupils are equal, round, and reactive to light. Conjunctivae and EOM are normal.   Neck: Normal range of motion. Neck supple.   Cardiovascular: Normal rate and regular rhythm. Exam reveals no friction rub.   No murmur heard.  Pulmonary/Chest: Effort normal and breath sounds normal.   Abdominal: Soft. Bowel sounds are normal. There is no tenderness.   Musculoskeletal: Normal range of motion. He exhibits no edema.   Lymphadenopathy:     He has no cervical adenopathy.     He has no axillary adenopathy.   Neurological: He is alert and oriented to person, place, and time.   Skin: Skin is warm and dry.   Psychiatric: He has a normal mood and affect. His behavior is normal.         Labs:   Recent Results (from the past 48 hour(s))   CBC auto differential    Collection Time: 06/12/20  1:06 PM   Result Value Ref Range    WBC 7.46 3.90 - 12.70 K/uL    RBC 4.98 4.60 - 6.20 M/uL    Hemoglobin 15.5 14.0 - 18.0 g/dL    Hematocrit 48.9 40.0 - 54.0 %    Mean Corpuscular Volume 98 82 - 98 fL    Mean Corpuscular Hemoglobin 31.1 (H) 27.0 - 31.0 pg    Mean Corpuscular Hemoglobin Conc 31.7 (L) 32.0 - 36.0 g/dL    RDW 12.6 11.5 - 14.5 %    Platelets 137 (L) 150 - 350 K/uL    MPV 12.3 9.2 - 12.9 fL    Immature Granulocytes 0.3 0.0 - 0.5 %    Gran # (ANC) 4.2 1.8 - 7.7 K/uL    Immature Grans (Abs) 0.02 0.00 - 0.04 K/uL    Lymph # 2.7 1.0 - 4.8 K/uL    Mono # 0.6 0.3 - 1.0 K/uL    Eos # 0.0 0.0 - 0.5 K/uL    Baso # 0.02 0.00 - 0.20 K/uL    nRBC 0 0 /100 WBC    Gran% 55.7 38.0 - 73.0 %    Lymph% 35.9 18.0 - 48.0 %    Mono% 7.8 4.0 - 15.0 %    Eosinophil% 0.0 0.0 - 8.0 %    Basophil% 0.3 0.0 - 1.9 %    Differential Method Automated    Comprehensive metabolic panel    Collection Time: 06/12/20  1:06 PM   Result Value Ref Range    Sodium 138 136 - 145 mmol/L    Potassium 5.5 (H) 3.5 - 5.1 mmol/L    Chloride 104 95 - 110 mmol/L    CO2 29 23 - 29 mmol/L    Glucose 166 (H) 70 - 110 mg/dL     BUN, Bld 14 8 - 23 mg/dL    Creatinine 0.9 0.5 - 1.4 mg/dL    Calcium 9.2 8.7 - 10.5 mg/dL    Total Protein 6.6 6.0 - 8.4 g/dL    Albumin 3.8 3.5 - 5.2 g/dL    Total Bilirubin 0.8 0.1 - 1.0 mg/dL    Alkaline Phosphatase 56 55 - 135 U/L    AST 18 10 - 40 U/L    ALT 24 10 - 44 U/L    Anion Gap 5 (L) 8 - 16 mmol/L    eGFR if African American >60.0 >60 mL/min/1.73 m^2    eGFR if non African American >60.0 >60 mL/min/1.73 m^2   Lactate dehydrogenase    Collection Time: 06/12/20  1:06 PM   Result Value Ref Range     110 - 260 U/L          Imaging: I have personally reviewed patient's CT scnas imaging showing no evidence of recurrence.  CT Chest Abdomen Pelvis With Contrast  Narrative: EXAMINATION:  CT CHEST ABDOMEN PELVIS WITH CONTRAST (XPD)    CLINICAL HISTORY:  Re-staging stage IIA melanoma s/p excision; Malignant melanoma of other part of trunk    TECHNIQUE:  Low dose axial images, sagittal and coronal reformations were obtained from the thoracic inlet to the pubic symphysis following the IV administration of 100 mL of Omnipaque 350 and the oral administration of 30 ml of Omnipaque 350.    COMPARISON:  03/06/2020    FINDINGS:  CT chest:    Structures at the base of the neck demonstrate no significant abnormalities.  There is no abnormal mediastinal or axillary lymph node enlargement.  Postoperative changes of prior CABG.  There is coronary atherosclerosis.  Mild aortic atherosclerosis.    Trachea and central airways are patent.  Lungs are symmetrically expanded and show no evidence of significant focal consolidation, large effusion or pneumothorax.  Stable 4 mm right upper lobe pulmonary nodule and stable punctate left upper lobe pulmonary nodule possibly small granuloma.  No new nodules or masses.    Esophagus is normal in course and caliber.    CT abdomen and pelvis: Liver is normal in contour and shows no focal lesions.  Portal vein is patent.  No biliary ductal dilatation.  Gallbladder shows no significant  abnormalities.  Stomach, spleen, pancreas and adrenal glands show no significant abnormalities.    Kidneys are normal in contour and attenuation.  No hydronephrosis.    Small and large bowel show no evidence of obstruction.  No free air or ascites.    Urinary bladder shows no significant abnormality.  Prostate is mildly enlarged.  Small fat containing inguinal hernias.    Bones show no acute abnormalities.  Postoperative changes of the left hip.  There are postoperative changes of the left posterior paraspinal soft tissues and musculature.  No recurrent lesion is in that location.    Abdominal aorta is normal in caliber with moderate atherosclerosis.  Impression: Postoperative changes of melanoma resection.  No evidence of recurrent or metastatic disease.  No measurable lesions by recist criteria.    Electronically signed by: Enrico Concepcion MD  Date:    06/12/2020  Time:    13:06            Diagnoses:       1. Malignant melanoma of back    2. Diabetes mellitus due to underlying condition with other circulatory complication, unspecified whether long term insulin use    3. S/P CABG (coronary artery bypass graft)          Assessment and Plan:         1. Stage IIA Cutaneous Melanoma:   -s/p WLE and SNLBx with 0/5 negative lymph nodes.  No evidence for adjuvant immunotherapy or targeted for stage IIA melanoma.  Will initiate active surveillance with CT scans every 3 months.  Will broaden to every 6 months after 2 years.  - needs to continue skin exams every 3-6 months with dermatology  - CT CAP every 3 months for first 2 years, then every 6 months thereafter.  Will get every 6-12 months until patient reaches 3 years     2. DMII  Last HbA1c 7.6, follows with PCP    3. Follow with PCP          I have provided the patient with an opportunity to ask questions and have all questions answered to his satisfaction.     he will return to clinic in 3 months, but knows to call in the interim if symptoms change or should a problem  arise.    Casimiro Colon MD  Medical Oncology [facility]

## 2020-06-15 NOTE — PROGRESS NOTES
Patient, Jm Deleon (MRN #[id number]), presented with a recent Platelet count less than 150 K/uL consistent with the definition of thrombocytopenia (ICD10 - D69.6).    Platelets   Date Value Ref Range Status   06/12/2020 137 (L) 150 - 350 K/uL Final     The patient's thrombocytopenia was monitored, evaluated, addressed and/or treated. This addendum to the medical record is made on 06/15/2020.

## 2020-06-17 ENCOUNTER — OFFICE VISIT (OUTPATIENT)
Dept: DERMATOLOGY | Facility: CLINIC | Age: 65
End: 2020-06-17
Payer: MEDICARE

## 2020-06-17 ENCOUNTER — PATIENT OUTREACH (OUTPATIENT)
Dept: ADMINISTRATIVE | Facility: OTHER | Age: 65
End: 2020-06-17

## 2020-06-17 DIAGNOSIS — L82.1 SK (SEBORRHEIC KERATOSIS): ICD-10-CM

## 2020-06-17 DIAGNOSIS — D48.5 NEOPLASM OF UNCERTAIN BEHAVIOR OF SKIN: ICD-10-CM

## 2020-06-17 DIAGNOSIS — C43.59 MALIGNANT MELANOMA OF BACK: ICD-10-CM

## 2020-06-17 DIAGNOSIS — D18.01 CHERRY ANGIOMA: ICD-10-CM

## 2020-06-17 DIAGNOSIS — L90.5 SCAR: ICD-10-CM

## 2020-06-17 DIAGNOSIS — Z85.820 HISTORY OF MALIGNANT MELANOMA: ICD-10-CM

## 2020-06-17 DIAGNOSIS — L81.4 LENTIGO: ICD-10-CM

## 2020-06-17 DIAGNOSIS — L57.0 AK (ACTINIC KERATOSIS): Primary | ICD-10-CM

## 2020-06-17 DIAGNOSIS — D22.9 NEVUS: ICD-10-CM

## 2020-06-17 DIAGNOSIS — Z85.828 PERSONAL HISTORY OF SKIN CANCER: ICD-10-CM

## 2020-06-17 PROCEDURE — 1159F MED LIST DOCD IN RCRD: CPT | Mod: HCNC,S$GLB,, | Performed by: DERMATOLOGY

## 2020-06-17 PROCEDURE — 11102 TANGNTL BX SKIN SINGLE LES: CPT | Mod: HCNC,S$GLB,, | Performed by: DERMATOLOGY

## 2020-06-17 PROCEDURE — 99214 PR OFFICE/OUTPT VISIT, EST, LEVL IV, 30-39 MIN: ICD-10-PCS | Mod: 25,HCNC,S$GLB, | Performed by: DERMATOLOGY

## 2020-06-17 PROCEDURE — 11102 PR TANGENTIAL BIOPSY, SKIN, SINGLE LESION: ICD-10-PCS | Mod: HCNC,S$GLB,, | Performed by: DERMATOLOGY

## 2020-06-17 PROCEDURE — 88305 TISSUE EXAM BY PATHOLOGIST: CPT | Mod: 26,HCNC,, | Performed by: PATHOLOGY

## 2020-06-17 PROCEDURE — 99999 PR PBB SHADOW E&M-EST. PATIENT-LVL IV: ICD-10-PCS | Mod: PBBFAC,HCNC,, | Performed by: DERMATOLOGY

## 2020-06-17 PROCEDURE — 1101F PT FALLS ASSESS-DOCD LE1/YR: CPT | Mod: HCNC,CPTII,S$GLB, | Performed by: DERMATOLOGY

## 2020-06-17 PROCEDURE — 1101F PR PT FALLS ASSESS DOC 0-1 FALLS W/OUT INJ PAST YR: ICD-10-PCS | Mod: HCNC,CPTII,S$GLB, | Performed by: DERMATOLOGY

## 2020-06-17 PROCEDURE — 1126F AMNT PAIN NOTED NONE PRSNT: CPT | Mod: HCNC,S$GLB,, | Performed by: DERMATOLOGY

## 2020-06-17 PROCEDURE — 17000 PR DESTRUCTION(LASER SURGERY,CRYOSURGERY,CHEMOSURGERY),PREMALIGNANT LESIONS,FIRST LESION: ICD-10-PCS | Mod: 59,HCNC,S$GLB, | Performed by: DERMATOLOGY

## 2020-06-17 PROCEDURE — 88305 TISSUE EXAM BY PATHOLOGIST: ICD-10-PCS | Mod: 26,HCNC,, | Performed by: PATHOLOGY

## 2020-06-17 PROCEDURE — 17000 DESTRUCT PREMALG LESION: CPT | Mod: 59,HCNC,S$GLB, | Performed by: DERMATOLOGY

## 2020-06-17 PROCEDURE — 99999 PR PBB SHADOW E&M-EST. PATIENT-LVL IV: CPT | Mod: PBBFAC,HCNC,, | Performed by: DERMATOLOGY

## 2020-06-17 PROCEDURE — 99214 OFFICE O/P EST MOD 30 MIN: CPT | Mod: 25,HCNC,S$GLB, | Performed by: DERMATOLOGY

## 2020-06-17 PROCEDURE — 1126F PR PAIN SEVERITY QUANTIFIED, NO PAIN PRESENT: ICD-10-PCS | Mod: HCNC,S$GLB,, | Performed by: DERMATOLOGY

## 2020-06-17 PROCEDURE — 88305 TISSUE EXAM BY PATHOLOGIST: CPT | Mod: HCNC | Performed by: PATHOLOGY

## 2020-06-17 PROCEDURE — 1159F PR MEDICATION LIST DOCUMENTED IN MEDICAL RECORD: ICD-10-PCS | Mod: HCNC,S$GLB,, | Performed by: DERMATOLOGY

## 2020-06-17 NOTE — PROGRESS NOTES
Subjective:       Patient ID:  Jm Deleon is a 65 y.o. male who presents for   Chief Complaint   Patient presents with    Skin Check         Date of biopsy: 09/20/2019  [place] test  no  Location: L Back  Depth: 2.7 mm  LN: yes L axilla x5  Date of excision: 10/18/2019    Pt has a history of  extensive sun exposure in the past.   Pt recalls several blistering sunburns in the past:  Yes teenage years  Pt has history of tanning bed use: no  Pt has had atypical moles removed in the past: yes  Pt has personal history of breast cancer: no  Pt has history of melanoma in first degree relatives:  no  Pt has family history of pancreatic cancer: no  Pt is currently immunosuppressed: no    [place] Type: 2    Last dental exam: 2015  Last eye exam: 09/2019    Pt denies any new or changing lesions today            Review of Systems   Constitutional: Negative for fever, chills, weight loss, fatigue, night sweats and malaise.   HENT: Negative for headaches.    Respiratory: Negative for cough and shortness of breath.    Gastrointestinal: Negative for indigestion.   Skin: Positive for activity-related sunscreen use and wears hat. Negative for daily sunscreen use, tendency to form keloidal scars and recent sunburn.   Neurological: Negative for headaches.   Hematologic/Lymphatic: Negative for adenopathy. Bruises/bleeds easily.        Objective:    Physical Exam   Constitutional: He appears well-developed and well-nourished. No distress.   Lymphadenopathy: No supraclavicular adenopathy is present.     He has no cervical adenopathy.     He has no axillary adenopathy.   Neurological: He is alert and oriented to person, place, and time. He is not disoriented.   Psychiatric: He has a normal mood and affect.   Skin:   Areas Examined (abnormalities noted in diagram):   Scalp / Hair Palpated and Inspected  Head / Face Inspection Performed  Neck Inspection Performed  Chest / Axilla Inspection Performed  Abdomen Inspection  Performed  Back Inspection Performed  RUE Inspected  LUE Inspection Performed  RLE Inspected  LLE Inspection Performed  Nails and Digits Inspection Performed                        * scope broken today so no dermatoscopic image       Diagram Legend     Erythematous scaling macule/papule c/w actinic keratosis       Vascular papule c/w angioma      Pigmented verrucoid papule/plaque c/w seborrheic keratosis      Yellow umbilicated papule c/w sebaceous hyperplasia      Irregularly shaped tan macule c/w lentigo     1-2 mm smooth white papules consistent with Milia      Movable subcutaneous cyst with punctum c/w epidermal inclusion cyst      Subcutaneous movable cyst c/w pilar cyst      Firm pink to brown papule c/w dermatofibroma      Pedunculated fleshy papule(s) c/w skin tag(s)      Evenly pigmented macule c/w junctional nevus     Mildly variegated pigmented, slightly irregular-bordered macule c/w mildly atypical nevus      Flesh colored to evenly pigmented papule c/w intradermal nevus       Pink pearly papule/plaque c/w basal cell carcinoma      Erythematous hyperkeratotic cursted plaque c/w SCC      Surgical scar with no sign of skin cancer recurrence      Open and closed comedones      Inflammatory papules and pustules      Verrucoid papule consistent consistent with wart     Erythematous eczematous patches and plaques     Dystrophic onycholytic nail with subungual debris c/w onychomycosis     Umbilicated papule    Erythematous-base heme-crusted tan verrucoid plaque consistent with inflamed seborrheic keratosis     Erythematous Silvery Scaling Plaque c/w Psoriasis     See annotation      Assessment / Plan:      Pathology Orders:     Normal Orders This Visit    Specimen to Pathology, Dermatology     Comments:    Number of Specimens:->1  ------------------------->-------------------------  Spec 1 Procedure:->Biopsy  Spec 1 Clinical Impression:->r/o atypical nevus  Spec 1 Source:->right lower back    Questions:     Procedure Type: Dermatology and skin neoplasms    Number of Specimens: 1    ------------------------: -------------------------    Spec 1 Procedure: Biopsy    Spec 1 Clinical Impression: r/o atypical nevus    Spec 1 Source: right lower back        AK (actinic keratosis)  Cryosurgery Procedure Note    Verbal consent from the patient is obtained including, but not limited to, risk of hypopigmentation/hyperpigmentation, scar, recurrence of lesion. The patient is aware of the precancerous quality and need for treatment of these lesions. Liquid nitrogen cryosurgery is applied to the 1 actinic keratoses, as detailed in the physical exam, to produce a freeze injury. The patient is aware that blisters may form and is instructed on wound care with gentle cleansing and use of vaseline ointment to keep moist until healed. The patient is supplied a handout on cryosurgery and is instructed to call if lesions do not completely resolve.    Neoplasm of uncertain behavior of skin  Shave biopsy procedure note:    Shave biopsy performed after verbal consent including risk of infection, scar, recurrence, need for additional treatment of site. Area prepped with alcohol, anesthetized with approximately 1.0cc of 1% lidocaine with epinephrine. Lesional tissue shaved with razor blade. Hemostasis achieved with application of aluminum chloride followed by hyfrecation. No complications. Dressing applied. Wound care explained.      -     Specimen to Pathology, Dermatology    Lentigo  This is a benign hyperpigmented sun induced lesion. Daily sun protection will reduce the number of new lesions. Treatment of these benign lesions are considered cosmetic.  The nature of sun-induced photo-aging and skin cancers is discussed.  Sun avoidance, protective clothing, and the use of 30-SPF sunscreens is advised. Observe closely for skin damage/changes, and call if such occurs.    SK (seborrheic keratosis)  These are benign inherited growths without a  malignant potential. Reassurance given to patient. No treatment is necessary.     Nevus  Discussed ABCDE's of nevi.  Monitor for new mole or moles that are becoming bigger, darker, irritated, or developing irregular borders. Brochure provided.    Cherry angioma  These are benign vascular lesions that are inherited.  Treatment is not necessary.    History of malignant melanoma-  Back 2.7 mms/p WLE and neg sln bx 10/18/2019  Personal history of skin cancer  Scar  Area of previous melanoma examined. Site well healed with no signs of recurrence.  Stage IIA     Total body skin examination performed today including at least 12 points as noted in physical examination. No lesions suspicious for malignancy noted.      Patient health maintenance:     Monthly Self Skin examinations   Regular dermatologic examinations   Need for sunscreen and sun protection- SPF 30 +, sun protective clothing,           regular use of broad brim hat for outdoor exposure, avoidance of sun in peak     hours   Family member screening/risk for 1st degree relatives   Need for yearly checks by dentist and ophthamology   Need for radiologic follow up yes   Anti-inflammatory diet and no smoking- smoking can decrease survival of melanoma patients of any stage        Referral to : no  Referral to hematology/oncology: yes, already seen       F/U for TBSE 3 months                     Follow up in about 3 months (around 9/17/2020).

## 2020-06-22 LAB
FINAL PATHOLOGIC DIAGNOSIS: NORMAL
GROSS: NORMAL

## 2020-06-30 NOTE — PROGRESS NOTES
Digital Medicine: Health  Introduction    Introduced Jm Deleon to Digital Medicine. Discussed health  role and recommended lifestyle modifications.    Patient is a 65 year old male who is newly enrolled in the Digital Medicine Program.     Patient reported he maybe joining the Diabetes program as well.     The history is provided by the patient.     HYPERTENSION  Our goal is to get BP to consistently below 130/80mmHg and make the process convenient so patient can avoid extra trips to the office. Getting your blood pressure below 130/80mmHg (definition of control) will reduce your risk for heart attack, kidney failure, stroke and death (as well as kidney failure, eye disease, & dementia)      Reviewed that the Digital Medicine care team - consisting of a clinician and a health  - will follow the most current evidence-based national guidelines for treating your condition.  The health  will focus on lifestyle modifications and motivation while the clinician will focus on medication therapy.  The care team will review all data on a regular basis and reach out as needed.      Explained that one of the key parts of the program is communication with the care team.  Asked patient to respond to outreach attempts and complete questionnaires.  Stressed importance of medication adherence.    Reviewed non-pharmacologic therapies and impact on BP.      Explained that we expect patient to obtain several blood pressures per week at random times of day.  Instructed patient not to allow anyone else to use phone and monitoring device.  Confirmed appropriate BP monitoring technique.      Explained to patient that the digital medicine team is not available for emergencies.  Patient will call [facility] on-call ([phone] or [phone]) or [phone] if needed.      Patient's BP goal is 130/80.Patient's BP average is 121/67 mmHg, which is above goal, per 2017 ACC/AHA Hypertension Guidelines.          Last 5 Patient  Entered Readings                                      Current 30 Day Average: 121/67     Recent Readings 6/24/2020 6/10/2020 6/4/2020 6/3/2020 6/2/2020    SBP (mmHg) 140 132 111 110 113    DBP (mmHg) 72 68 69 67 61    Pulse 53 58 56 60 57            INTERVENTION(S)  reviewed monitoring technique and encouragement/support    PLAN  patient verbalizes understanding and demonstrates understanding via teach back    Plan to follow up in 3-4 weeks to review readings and progress towards his goals.       There are no preventive care reminders to display for this patient.    Reviewed the importance of self-monitoring, medication adherence, and that the health  can be used as a resource for lifestyle modifications to help reduce or maintain a healthy lifestyle.    Sent link to [email or web address] webpages and my contact information via InMyShow for future questions. Follow up scheduled.             Medication Adherence Screening       Patient takes medication in the morning.       SDOH- deferred

## 2020-07-01 ENCOUNTER — TELEPHONE (OUTPATIENT)
Dept: SPORTS MEDICINE | Facility: CLINIC | Age: 65
End: 2020-07-01

## 2020-07-06 RX ORDER — MELOXICAM 15 MG/1
15 TABLET ORAL DAILY
Qty: 90 TABLET | Refills: 0 | Status: SHIPPED | OUTPATIENT
Start: 2020-07-06 | End: 2020-10-05

## 2020-07-08 ENCOUNTER — PATIENT OUTREACH (OUTPATIENT)
Dept: ADMINISTRATIVE | Facility: HOSPITAL | Age: 65
End: 2020-07-08

## 2020-07-20 ENCOUNTER — LAB VISIT (OUTPATIENT)
Dept: LAB | Facility: HOSPITAL | Age: 65
End: 2020-07-20
Attending: INTERNAL MEDICINE
Payer: MEDICARE

## 2020-07-20 ENCOUNTER — OFFICE VISIT (OUTPATIENT)
Dept: INTERNAL MEDICINE | Facility: CLINIC | Age: 65
End: 2020-07-20
Payer: MEDICARE

## 2020-07-20 VITALS
DIASTOLIC BLOOD PRESSURE: 80 MMHG | SYSTOLIC BLOOD PRESSURE: 128 MMHG | HEIGHT: 68 IN | BODY MASS INDEX: 33.34 KG/M2 | HEART RATE: 80 BPM | OXYGEN SATURATION: 98 % | WEIGHT: 220 LBS

## 2020-07-20 DIAGNOSIS — I10 ESSENTIAL HYPERTENSION: ICD-10-CM

## 2020-07-20 DIAGNOSIS — I25.10 CORONARY ARTERY DISEASE INVOLVING NATIVE CORONARY ARTERY OF NATIVE HEART WITHOUT ANGINA PECTORIS: ICD-10-CM

## 2020-07-20 DIAGNOSIS — E78.5 DYSLIPIDEMIA: ICD-10-CM

## 2020-07-20 DIAGNOSIS — Z79.4 TYPE 2 DIABETES MELLITUS WITHOUT COMPLICATION, WITH LONG-TERM CURRENT USE OF INSULIN: ICD-10-CM

## 2020-07-20 DIAGNOSIS — Z12.5 ENCOUNTER FOR SCREENING FOR MALIGNANT NEOPLASM OF PROSTATE: ICD-10-CM

## 2020-07-20 DIAGNOSIS — E11.9 TYPE 2 DIABETES MELLITUS WITHOUT COMPLICATION, WITH LONG-TERM CURRENT USE OF INSULIN: ICD-10-CM

## 2020-07-20 DIAGNOSIS — Z00.00 ANNUAL PHYSICAL EXAM: ICD-10-CM

## 2020-07-20 DIAGNOSIS — Z00.00 ANNUAL PHYSICAL EXAM: Primary | ICD-10-CM

## 2020-07-20 DIAGNOSIS — C43.9 MALIGNANT MELANOMA, UNSPECIFIED SITE: ICD-10-CM

## 2020-07-20 PROCEDURE — 99999 PR PBB SHADOW E&M-EST. PATIENT-LVL IV: CPT | Mod: PBBFAC,HCNC,, | Performed by: INTERNAL MEDICINE

## 2020-07-20 PROCEDURE — 80048 BASIC METABOLIC PNL TOTAL CA: CPT | Mod: HCNC

## 2020-07-20 PROCEDURE — 36415 COLL VENOUS BLD VENIPUNCTURE: CPT | Mod: HCNC

## 2020-07-20 PROCEDURE — 3046F PR MOST RECENT HEMOGLOBIN A1C LEVEL > 9.0%: ICD-10-PCS | Mod: HCNC,CPTII,S$GLB, | Performed by: INTERNAL MEDICINE

## 2020-07-20 PROCEDURE — 3079F DIAST BP 80-89 MM HG: CPT | Mod: HCNC,CPTII,S$GLB, | Performed by: INTERNAL MEDICINE

## 2020-07-20 PROCEDURE — 99397 PER PM REEVAL EST PAT 65+ YR: CPT | Mod: HCNC,S$GLB,, | Performed by: INTERNAL MEDICINE

## 2020-07-20 PROCEDURE — 99499 UNLISTED E&M SERVICE: CPT | Mod: S$GLB,,, | Performed by: INTERNAL MEDICINE

## 2020-07-20 PROCEDURE — 84443 ASSAY THYROID STIM HORMONE: CPT | Mod: HCNC

## 2020-07-20 PROCEDURE — 99999 PR PBB SHADOW E&M-EST. PATIENT-LVL IV: ICD-10-PCS | Mod: PBBFAC,HCNC,, | Performed by: INTERNAL MEDICINE

## 2020-07-20 PROCEDURE — 84153 ASSAY OF PSA TOTAL: CPT | Mod: HCNC

## 2020-07-20 PROCEDURE — 99499 RISK ADDL DX/OHS AUDIT: ICD-10-PCS | Mod: S$GLB,,, | Performed by: INTERNAL MEDICINE

## 2020-07-20 PROCEDURE — 3074F PR MOST RECENT SYSTOLIC BLOOD PRESSURE < 130 MM HG: ICD-10-PCS | Mod: HCNC,CPTII,S$GLB, | Performed by: INTERNAL MEDICINE

## 2020-07-20 PROCEDURE — 99397 PR PREVENTIVE VISIT,EST,65 & OVER: ICD-10-PCS | Mod: HCNC,S$GLB,, | Performed by: INTERNAL MEDICINE

## 2020-07-20 PROCEDURE — 80061 LIPID PANEL: CPT | Mod: HCNC

## 2020-07-20 PROCEDURE — 3046F HEMOGLOBIN A1C LEVEL >9.0%: CPT | Mod: HCNC,CPTII,S$GLB, | Performed by: INTERNAL MEDICINE

## 2020-07-20 PROCEDURE — 3074F SYST BP LT 130 MM HG: CPT | Mod: HCNC,CPTII,S$GLB, | Performed by: INTERNAL MEDICINE

## 2020-07-20 PROCEDURE — 83036 HEMOGLOBIN GLYCOSYLATED A1C: CPT | Mod: HCNC

## 2020-07-20 PROCEDURE — 3079F PR MOST RECENT DIASTOLIC BLOOD PRESSURE 80-89 MM HG: ICD-10-PCS | Mod: HCNC,CPTII,S$GLB, | Performed by: INTERNAL MEDICINE

## 2020-07-20 NOTE — PROGRESS NOTES
PAST MEDICAL HISTORY:   Coronary artery disease with coronary bypass surgery in year .  Hypertension.  Hyperlipidemia.  Type 2 diabetes  Osteoarthritis of the hip with left total hip replacement.  Right inguinal hernia repair.  Left foot surgery.  Fistulotomy.     SOCIAL HISTORY:  Tobacco use, none.  Alcohol use, limited.  , has three   children.  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     FAMILY HISTORY:  Father is , lung cancer.  Mother is , heart   disease.  One brother , colon cancer, and another brother with   Hypertension.     MEDICATIONS:  Atorvastatin 80 mg.  Aspirin 81 mg.  Carvedilol 25 mg  tablet twice a day.  Zetia 10 mg.half  Multivitamin.  Lasix 40 mg.  Lantus 40 units in the evening.  Humalog 10 units 3 times a day  Jardiance 25 mg  Metformin 1000 mg twice a day.  Omega-3 fatty acids.  Valsartan 320 mg            65-year-old male      Routine follow-up check      Developed melanoma of the back in 2019 and underwent wide local excision, has followed up with surgical Oncology, recent CT scan of the chest abdomen pelvis was unrevealing      Also following up with sports medicine regarding left shoulder pain due to rotator cuff arthropathy, had rotator cuff repair right shoulder    Last year followed up with cardiology in which medications were adjusted particularly valsartan and place of ramipril and vascepa was added, he is being followed by the digital hypertension medicine program in his glucose readings have been fine        Review of symptoms  Reports no chest pain palpitations shortness of breath or abdominal pain  Has regular bowel function  No difficulty urinating but has nocturia times 2-3  No indigestion or heartburn  No chronic headaches  Arthralgia of the left shoulder      Glucose readings are not check consistently but he states that he is getting readings in the 120s    Examination  Weight 220 lb  Pulse 76  Blood pressure 120/72  HEENT  exam no abnormal findings  Neck no thyromegaly no masses  Chest clear breath sounds  Heart regular rate and rhythm  Abdominal exam nontender soft no hepatosplenomegaly abdominal masses  Pulses 2+ carotid pulses 2+ pedal pulses no bruits  Extremities no edema  Rectal stool is brown prostate is mildly enlarged    Impression  General exam   type 2 diabetes  Hypertension  Hyperlipidemia  Coronary artery disease  Melanoma  BPH  Rotator cuff arthropathy    Plan  Basic metabolic profile, hemoglobin A1c, lipid, TSH, PSA        recently had a CBC and comprehensive metabolic profile  Discussed getting the shingles vaccine  Follow-up with sports medicine as needed    Answers for HPI/ROS submitted by the patient on 7/20/2020   activity change: No  unexpected weight change: No  neck pain: No  hearing loss: No  rhinorrhea: No  trouble swallowing: No  eye discharge: No  visual disturbance: No  chest tightness: No  wheezing: No  chest pain: No  palpitations: No  blood in stool: No  constipation: No  vomiting: No  diarrhea: No  polydipsia: No  polyuria: No  difficulty urinating: No  urgency: Yes  hematuria: No  joint swelling: No  arthralgias: Yes  headaches: No  weakness: No  confusion: No  dysphoric mood: No

## 2020-07-21 LAB
ANION GAP SERPL CALC-SCNC: 10 MMOL/L (ref 8–16)
BUN SERPL-MCNC: 19 MG/DL (ref 8–23)
CALCIUM SERPL-MCNC: 9.7 MG/DL (ref 8.7–10.5)
CHLORIDE SERPL-SCNC: 105 MMOL/L (ref 95–110)
CHOLEST SERPL-MCNC: 142 MG/DL (ref 120–199)
CHOLEST/HDLC SERPL: 4.7 {RATIO} (ref 2–5)
CO2 SERPL-SCNC: 26 MMOL/L (ref 23–29)
COMPLEXED PSA SERPL-MCNC: 0.89 NG/ML (ref 0–4)
CREAT SERPL-MCNC: 1 MG/DL (ref 0.5–1.4)
EST. GFR  (AFRICAN AMERICAN): >60 ML/MIN/1.73 M^2
EST. GFR  (NON AFRICAN AMERICAN): >60 ML/MIN/1.73 M^2
ESTIMATED AVG GLUCOSE: 209 MG/DL (ref 68–131)
GLUCOSE SERPL-MCNC: 140 MG/DL (ref 70–110)
HBA1C MFR BLD HPLC: 8.9 % (ref 4–5.6)
HDLC SERPL-MCNC: 30 MG/DL (ref 40–75)
HDLC SERPL: 21.1 % (ref 20–50)
LDLC SERPL CALC-MCNC: 50.2 MG/DL (ref 63–159)
NONHDLC SERPL-MCNC: 112 MG/DL
POTASSIUM SERPL-SCNC: 4.2 MMOL/L (ref 3.5–5.1)
SODIUM SERPL-SCNC: 141 MMOL/L (ref 136–145)
TRIGL SERPL-MCNC: 309 MG/DL (ref 30–150)
TSH SERPL DL<=0.005 MIU/L-ACNC: 1.66 UIU/ML (ref 0.4–4)

## 2020-07-30 DIAGNOSIS — Z96.649 PRESENCE OF HIP JOINT PROSTHESIS: Primary | ICD-10-CM

## 2020-07-31 ENCOUNTER — HOSPITAL ENCOUNTER (OUTPATIENT)
Dept: RADIOLOGY | Facility: HOSPITAL | Age: 65
Discharge: HOME | End: 2020-07-31
Attending: ORTHOPAEDIC SURGERY
Payer: MEDICARE

## 2020-07-31 ENCOUNTER — OFFICE VISIT (OUTPATIENT)
Dept: ORTHOPEDICS | Facility: CLINIC | Age: 65
End: 2020-07-31
Payer: MEDICARE

## 2020-07-31 VITALS — WEIGHT: 220 LBS | BODY MASS INDEX: 33.34 KG/M2 | HEIGHT: 68 IN

## 2020-07-31 DIAGNOSIS — Z96.649 PRESENCE OF HIP JOINT PROSTHESIS: ICD-10-CM

## 2020-07-31 DIAGNOSIS — M54.9 DORSALGIA, UNSPECIFIED: ICD-10-CM

## 2020-07-31 DIAGNOSIS — M54.42 ACUTE LEFT-SIDED LOW BACK PAIN WITH LEFT-SIDED SCIATICA: Primary | ICD-10-CM

## 2020-07-31 PROCEDURE — 1101F PT FALLS ASSESS-DOCD LE1/YR: CPT | Mod: HCNC,CPTII,S$GLB, | Performed by: ORTHOPAEDIC SURGERY

## 2020-07-31 PROCEDURE — 73502 XR HIP 2 VIEW LEFT: ICD-10-PCS | Mod: 26,HCNC,LT, | Performed by: RADIOLOGY

## 2020-07-31 PROCEDURE — 1125F PR PAIN SEVERITY QUANTIFIED, PAIN PRESENT: ICD-10-PCS | Mod: HCNC,S$GLB,, | Performed by: ORTHOPAEDIC SURGERY

## 2020-07-31 PROCEDURE — 72100 X-RAY EXAM L-S SPINE 2/3 VWS: CPT | Mod: 26,HCNC,, | Performed by: RADIOLOGY

## 2020-07-31 PROCEDURE — 73502 X-RAY EXAM HIP UNI 2-3 VIEWS: CPT | Mod: 26,HCNC,LT, | Performed by: RADIOLOGY

## 2020-07-31 PROCEDURE — 3008F BODY MASS INDEX DOCD: CPT | Mod: HCNC,CPTII,S$GLB, | Performed by: ORTHOPAEDIC SURGERY

## 2020-07-31 PROCEDURE — 72100 XR LUMBAR SPINE AP AND LATERAL: ICD-10-PCS | Mod: 26,HCNC,, | Performed by: RADIOLOGY

## 2020-07-31 PROCEDURE — 3008F PR BODY MASS INDEX (BMI) DOCUMENTED: ICD-10-PCS | Mod: HCNC,CPTII,S$GLB, | Performed by: ORTHOPAEDIC SURGERY

## 2020-07-31 PROCEDURE — 99999 PR PBB SHADOW E&M-EST. PATIENT-LVL IV: ICD-10-PCS | Mod: PBBFAC,HCNC,, | Performed by: ORTHOPAEDIC SURGERY

## 2020-07-31 PROCEDURE — 1101F PR PT FALLS ASSESS DOC 0-1 FALLS W/OUT INJ PAST YR: ICD-10-PCS | Mod: HCNC,CPTII,S$GLB, | Performed by: ORTHOPAEDIC SURGERY

## 2020-07-31 PROCEDURE — 99214 OFFICE O/P EST MOD 30 MIN: CPT | Mod: HCNC,S$GLB,, | Performed by: ORTHOPAEDIC SURGERY

## 2020-07-31 PROCEDURE — 72100 X-RAY EXAM L-S SPINE 2/3 VWS: CPT | Mod: TC,HCNC

## 2020-07-31 PROCEDURE — 1159F PR MEDICATION LIST DOCUMENTED IN MEDICAL RECORD: ICD-10-PCS | Mod: HCNC,S$GLB,, | Performed by: ORTHOPAEDIC SURGERY

## 2020-07-31 PROCEDURE — 1125F AMNT PAIN NOTED PAIN PRSNT: CPT | Mod: HCNC,S$GLB,, | Performed by: ORTHOPAEDIC SURGERY

## 2020-07-31 PROCEDURE — 73502 X-RAY EXAM HIP UNI 2-3 VIEWS: CPT | Mod: TC,HCNC,LT

## 2020-07-31 PROCEDURE — 1159F MED LIST DOCD IN RCRD: CPT | Mod: HCNC,S$GLB,, | Performed by: ORTHOPAEDIC SURGERY

## 2020-07-31 PROCEDURE — 99999 PR PBB SHADOW E&M-EST. PATIENT-LVL IV: CPT | Mod: PBBFAC,HCNC,, | Performed by: ORTHOPAEDIC SURGERY

## 2020-07-31 PROCEDURE — 99214 PR OFFICE/OUTPT VISIT, EST, LEVL IV, 30-39 MIN: ICD-10-PCS | Mod: HCNC,S$GLB,, | Performed by: ORTHOPAEDIC SURGERY

## 2020-07-31 NOTE — LETTER
July 31, 2020      [facility]  [address]  [facility] [address]           [facility] - [facility]oped[facility]  [address]  [facility] [address]  Phone: [phone]          Patient: Jm Deleon   MR Number: [id number]   YOB: 1955   Date of Visit: 7/31/2020       Dear Dr. [facility].:    Thank you for referring Jm Deleon to me for evaluation. Attached you will find relevant portions of my assessment and plan of care.    If you have questions, please do not hesitate to call me. I look forward to following Jm Deleon along with you.    Sincerely,    Brett Sweeney III, MD    Enclosure  CC:  No Recipients    If you would like to receive this communication electronically, please contact [email or web address] or [phone] to request more information on [facility] access.    For providers and/or their staff who would like to refer a patient to [facility], please contact us through our one-stop-shop provider referral line, [facility], at [phone].    If you feel you have received this communication in error or would no longer like to receive these types of communications, please e-mail [email or web address]

## 2020-07-31 NOTE — PROGRESS NOTES
"Subjective:     HPI:   Jm Deleon is a 65 y.o. male who presents for evaluation of left hip pain.  He has a "friend of [facility]" and a friend of Dr. [facility]    He had a left posterior approach total hip by Dr. [facility] September 24, 2014.  No wound healing issues no perioperative complications happy with his results.    About 2 months ago started having some new onset of pain he points to his low back and buttock.  Some occasional posterior.  No groin anterior thigh lateral hip no anterior thigh pain no radicular pain or paresthesias.  He is starting to have some balance issues.  He feels like he has difficulty picking up his foot which drags and causes him to stumble  No bowel or bladder changes does have some baseline urinary incontinence but has not changed over the past few months    He 1st noticed this at work when he is getting up and down out of a chair up and down stairs kneeling and squatting    He says this feels similar to the pain that he had prior to his hip replacement    He takes Aleve 1 tablet twice a day he has been also given some Mobic recently he combines them frequently we discussed that he should not do that.  No injections or aspirations in the hip or back since surgery.  No therapy no assistive devices he can walk a mile.    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  His wife is here with him today         ROS:  The updated medical history is in the chart and has been reviewed. A review of systems is updated and there is no reported vision changes, ear/nose/mouth/throat complaints,  chest pain, shortness of breath, abdominal pain, urological complaints, fevers or chills, psychiatric complaints. Musculoskeletal and neurologcial symptoms are as documented. All other systems are negative.      Objective:   Exam:  There were no vitals filed for this visit.  Body mass index is 33.45 kg/m².    Physical examination included assessment of the " patient's general appearance with particular attention to development, nutrition, body habitus, attention to grooming, and any evidence of distress.  Constitutional: The patient is a well-developed, well-nourished patient in no acute distress.   Cardiovascular: Vascular examination included warmth and capillary refill as well inspection for edema and assessment of pedal pulses. Pulses are palpable and regular.  Musculoskeletal: Gait was assessed as to whether it was steady, non-antalgic, and/or required the use of an assist device. The patient was also asked to walk independently and get onto the examination table.  Skin: The skin was examined for any obvious rashes or lesions in the extremity.  Neurologic: Sensation is intact to light touch in the extremity. The patient has good coordination without hyperreflexia and is alert and oriented to person, place and time and has normal mood and affect.     All of the above were examined and found to be within normal limits except for the following pertinent clinical findings:    No limp slight antalgic gait negative Trendelenburg.  He can do a single leg stance without difficulty.  He has got some minimal soreness around the greater troch but that is not where the bulk of his pain as he is much more tender at his low back buttock area.  Not the SI joint.  His incisions well-healed.  Active and passive straight leg raise recreate his back pain but no groin or anterior thigh pain.  0-110 hip flexion 30 abduction 20 abduction to 40 external 20 internal rotation does have some mild anterior lateral discomfort with flexion and internal rotation.  He has 5/5 tibialis anterior and EHL strength and good sensation distally and no increased tone or clonus      Imaging:  Indication:  Exam status post left total hip arthroplasty  Exam Ordered: Radiographs taken today include an anteroposterior pelvis, an AP and  lateral view of the proximal femur including the hip joint  Details of  Examination: Today's exam show a well fixed, well positioned total hip arthroplasty with no evidence of wear, osteolysis, or loosening.  Impression:  Status post left total hip arthroplasty, implant in good position with no abnormality    No change compared to x-rays from 2014, 2015, 2017    Lumbar spine x-rays obtained:   FINDINGS:  There is mild retrolisthesis of L2 on L3 which does not change with flexion or extension.  No evidence of dynamic instability.  There is no acute fracture or dislocation lumbar spine.  The vertebral body heights are maintained.  There is mild disc height loss at the L2-L3 and L5-S1 with associated osteophytes.  Partially imaged left hip total arthroplasty.       Assessment:     Acute left-sided low back pain with left-sided sciatica [M54.42]  Well-functioning left total hip replacement    History of melanoma wide local excision from his back October 2019  Diabetes, hemoglobin 8.9  Coronary artery disease CABG 2000, aspirin 81 daily  Right inguinal hernia repair     Plan:       The above findings were discussed with patient length. At this point I do believe that the patients discomfort is mostly related to a lower back pain exacerbation.  The treatment of lower back pain was discussed with the patient and it includes a course of anti-inflammatory medications, rest, and physical therapy for lower back stretching and strengthening.  All of the patients questions were answered.    I think his hip replacement is well-functioning.  I think his symptoms and exam localize more towards his lumbar spine, he has got significant lumbosacral degenerative changes.    We will get some lumbar spine x-rays today.    Given the fact that he has got new onset of balance issues, stumbling, subjective footdrop although good strength on exam I do think an MRI is indicated especially with his recent melanoma cancer history    I will give him a spine home exercise conditioning program.  We discussed  appropriate anti-inflammatory medication dosing of Advil or Aleve not both +Tylenol    Would appreciate evaluation by our spine team after MRI    Orders Placed This Encounter   Procedures    MRI Lumbar Spine Without Contrast     MRI L spine non-contrast: left low back pain with radicular pain, subjective weakness and balance issues, history of melanoma resection from back     Standing Status:   Future     Standing Expiration Date:   7/31/2021     Scheduling Instructions:      MRI L spine non-contrast: left low back pain with radicular pain, subjective weakness and balance issues, history of melanoma resection from back     Order Specific Question:   Does the patient have a pacemaker or a defibrilator?     Answer:   No     Order Specific Question:   Does the patient have a cerebral aneurysm or surgical clip, pump, nerve or brain stimulator, middle or inner ear prosthesis, or other metal implant or  been injured by a metal object(i.e. bullet, bb, shrapnel)?     Answer:   No     Order Specific Question:   Is the patient claustrophobic?     Answer:   No     Order Specific Question:   Will the patient require sedation?     Answer:   No     Order Specific Question:   Does the patient have any of the following conditions? Diabetes, History of Renal Disease or Hypertension requiring medical therapy?     Answer:   No     Order Specific Question:   May the Radiologist modify the order per protocol to meet the clinical needs of the patient?     Answer:   Yes     Order Specific Question:   Is this part of a Research Study?     Answer:   No     Order Specific Question:   Recist criteria?     Answer:   Yes     Order Specific Question:   Does the patient have on a skin patch for medication with aluminized backing?     Answer:   No             Past Medical History:   Diagnosis Date    Allergy     seasonal    Arthritis     Coronary artery disease     Diabetes mellitus     Hyperlipidemia     Hypertension     Joint pain      Melanoma 10/2019    MM left back 2.7mm; neg LNs    Squamous cell carcinoma 2017    scalp - SSW       Past Surgical History:   Procedure Laterality Date    BELPHAROPTOSIS REPAIR  10 years ago    both eyes    CARDIAC SURGERY      3 vessel bypass    COLONOSCOPY N/A 10/4/2019    Procedure: COLONOSCOPY;  Surgeon: Isaiah Booker MD;  Location: [facility] (4TH FLR);  Service: Endoscopy;  Laterality: N/A;  Cardilogy Clearance received rom Dr. QUEENIE Salomon, see telephone encounter 8/26/19-BB    CORONARY ARTERY BYPASS GRAFT  x4 age 45 2000    HERNIA REPAIR      HIP SURGERY  9-24-14    left YANICK    JOINT REPLACEMENT      left hip    SENTINEL LYMPH NODE BIOPSY Left 10/18/2019    Procedure: BIOPSY, LYMPH NODE, SENTINEL;  Surgeon: Fabián Villeda MD;  [facility];  Service: General;  Laterality: Left;    SHOULDER ARTHROSCOPY      SHOULDER SURGERY         Family History   Problem Relation Age of Onset    Coronary artery disease Mother     Cancer Father         lung cancer, smoker    Hypertension Brother     Cancer Brother         colon    Amblyopia Neg Hx     Blindness Neg Hx     Cataracts Neg Hx     Glaucoma Neg Hx     Macular degeneration Neg Hx     Retinal detachment Neg Hx     Strabismus Neg Hx     Melanoma Neg Hx        Social History     Socioeconomic History    Marital status:      Spouse name: Not on file    Number of children: Not on file    Years of education: Not on file    Highest education level: Not on file   Occupational History    Not on file   Social Needs    Financial resource strain: Not hard at all    Food insecurity     Worry: Never true     Inability: Never true    Transportation needs     Medical: No     Non-medical: No   Tobacco Use    Smoking status: Never Smoker   Substance and Sexual Activity    Alcohol use: Yes     Alcohol/week: 3.0 standard drinks     Types: 3 Cans of beer per week     Frequency: 2-3 times a week     Drinks per session: 3 or 4      Binge frequency: Less than monthly     Comment: on weekend    Drug use: Never    Sexual activity: Not on file   Lifestyle    Physical activity     Days per week: 0 days     Minutes per session: Not on file    Stress: Only a little   Relationships    Social connections     Talks on phone: More than three times a week     Gets together: Never     Attends Mandaen service: Not on file     Active member of club or organization: No     Attends meetings of clubs or organizations: Never     Relationship status:    Other Topics Concern    Not on file   Social History Narrative    Not on file

## 2020-08-01 ENCOUNTER — HOSPITAL ENCOUNTER (OUTPATIENT)
Dept: RADIOLOGY | Facility: HOSPITAL | Age: 65
Discharge: HOME | End: 2020-08-01
Attending: ORTHOPAEDIC SURGERY
Payer: MEDICARE

## 2020-08-01 DIAGNOSIS — M54.42 ACUTE LEFT-SIDED LOW BACK PAIN WITH LEFT-SIDED SCIATICA: ICD-10-CM

## 2020-08-01 DIAGNOSIS — M54.9 DORSALGIA, UNSPECIFIED: ICD-10-CM

## 2020-08-01 PROCEDURE — 72148 MRI LUMBAR SPINE W/O DYE: CPT | Mod: 26,HCNC,, | Performed by: RADIOLOGY

## 2020-08-01 PROCEDURE — 72148 MRI LUMBAR SPINE W/O DYE: CPT | Mod: TC,HCNC

## 2020-08-01 PROCEDURE — 72148 MRI LUMBAR SPINE WITHOUT CONTRAST: ICD-10-PCS | Mod: 26,HCNC,, | Performed by: RADIOLOGY

## 2020-08-03 ENCOUNTER — OFFICE VISIT (OUTPATIENT)
Dept: ORTHOPEDICS | Facility: CLINIC | Age: 65
End: 2020-08-03
Payer: MEDICARE

## 2020-08-03 VITALS
HEART RATE: 59 BPM | BODY MASS INDEX: 33.62 KG/M2 | SYSTOLIC BLOOD PRESSURE: 120 MMHG | DIASTOLIC BLOOD PRESSURE: 73 MMHG | HEIGHT: 68 IN | WEIGHT: 221.81 LBS

## 2020-08-03 DIAGNOSIS — M54.16 LUMBAR RADICULOPATHY: ICD-10-CM

## 2020-08-03 DIAGNOSIS — Z96.649 PRESENCE OF HIP JOINT PROSTHESIS: Primary | ICD-10-CM

## 2020-08-03 PROCEDURE — 99214 OFFICE O/P EST MOD 30 MIN: CPT | Mod: HCNC,S$GLB,, | Performed by: PHYSICIAN ASSISTANT

## 2020-08-03 PROCEDURE — 3078F DIAST BP <80 MM HG: CPT | Mod: HCNC,CPTII,S$GLB, | Performed by: PHYSICIAN ASSISTANT

## 2020-08-03 PROCEDURE — 3078F PR MOST RECENT DIASTOLIC BLOOD PRESSURE < 80 MM HG: ICD-10-PCS | Mod: HCNC,CPTII,S$GLB, | Performed by: PHYSICIAN ASSISTANT

## 2020-08-03 PROCEDURE — 99999 PR PBB SHADOW E&M-EST. PATIENT-LVL V: ICD-10-PCS | Mod: PBBFAC,HCNC,, | Performed by: PHYSICIAN ASSISTANT

## 2020-08-03 PROCEDURE — 1101F PT FALLS ASSESS-DOCD LE1/YR: CPT | Mod: HCNC,CPTII,S$GLB, | Performed by: PHYSICIAN ASSISTANT

## 2020-08-03 PROCEDURE — 99999 PR PBB SHADOW E&M-EST. PATIENT-LVL V: CPT | Mod: PBBFAC,HCNC,, | Performed by: PHYSICIAN ASSISTANT

## 2020-08-03 PROCEDURE — 3008F BODY MASS INDEX DOCD: CPT | Mod: HCNC,CPTII,S$GLB, | Performed by: PHYSICIAN ASSISTANT

## 2020-08-03 PROCEDURE — 3074F PR MOST RECENT SYSTOLIC BLOOD PRESSURE < 130 MM HG: ICD-10-PCS | Mod: HCNC,CPTII,S$GLB, | Performed by: PHYSICIAN ASSISTANT

## 2020-08-03 PROCEDURE — 1125F PR PAIN SEVERITY QUANTIFIED, PAIN PRESENT: ICD-10-PCS | Mod: HCNC,S$GLB,, | Performed by: PHYSICIAN ASSISTANT

## 2020-08-03 PROCEDURE — 3074F SYST BP LT 130 MM HG: CPT | Mod: HCNC,CPTII,S$GLB, | Performed by: PHYSICIAN ASSISTANT

## 2020-08-03 PROCEDURE — 1159F PR MEDICATION LIST DOCUMENTED IN MEDICAL RECORD: ICD-10-PCS | Mod: HCNC,S$GLB,, | Performed by: PHYSICIAN ASSISTANT

## 2020-08-03 PROCEDURE — 1125F AMNT PAIN NOTED PAIN PRSNT: CPT | Mod: HCNC,S$GLB,, | Performed by: PHYSICIAN ASSISTANT

## 2020-08-03 PROCEDURE — 99214 PR OFFICE/OUTPT VISIT, EST, LEVL IV, 30-39 MIN: ICD-10-PCS | Mod: HCNC,S$GLB,, | Performed by: PHYSICIAN ASSISTANT

## 2020-08-03 PROCEDURE — 3008F PR BODY MASS INDEX (BMI) DOCUMENTED: ICD-10-PCS | Mod: HCNC,CPTII,S$GLB, | Performed by: PHYSICIAN ASSISTANT

## 2020-08-03 PROCEDURE — 1101F PR PT FALLS ASSESS DOC 0-1 FALLS W/OUT INJ PAST YR: ICD-10-PCS | Mod: HCNC,CPTII,S$GLB, | Performed by: PHYSICIAN ASSISTANT

## 2020-08-03 PROCEDURE — 1159F MED LIST DOCD IN RCRD: CPT | Mod: HCNC,S$GLB,, | Performed by: PHYSICIAN ASSISTANT

## 2020-08-03 NOTE — PROGRESS NOTES
DATE: 8/3/2020  PATIENT: Jm Deleon    Supervising Physician: Enrico Gomez M.D.    CHIEF COMPLAINT: low back and left buttock pain    HISTORY:  Jm Deleon is a 65 y.o. male s/p left YANICK by Dr. [facility] 9/24/2014 here for initial evaluation of low back and left buttock pain (Back - 7, Leg - 0).  The pain in the back and buttock is what bothers him most.  The pain has been present for about 2 months. The patient describes the pain as dull and constant and sometimes sharp.  The pain is worse with going from sitting to standing, walking, standing and at night and improved by sitting. There is no associated numbness and tingling. There is subjective weakness.  He says he feels like he is stumbling.  Prior treatments have included tramadol, but no ESIs or surgery.    The patient denies myelopathic symptoms such as handwriting changes or difficulty with buttons/coins/keys. Denies perineal paresthesias, bowel/bladder dysfunction.    PAST MEDICAL/SURGICAL HISTORY:  Past Medical History:   Diagnosis Date    Allergy     seasonal    Arthritis     Coronary artery disease     Diabetes mellitus     Hyperlipidemia     Hypertension     Joint pain     Melanoma 10/2019    MM left back 2.7mm; neg LNs    Squamous cell carcinoma 2017    scalp - SSW     Past Surgical History:   Procedure Laterality Date    BELPHAROPTOSIS REPAIR  10 years ago    both eyes    CARDIAC SURGERY      3 vessel bypass    COLONOSCOPY N/A 10/4/2019    Procedure: COLONOSCOPY;  Surgeon: Isaiah Booker MD;  Location: [address]);  Service: Endoscopy;  Laterality: N/A;  Cardilogy Clearance received rom Dr. QUEENIE Salomon, see telephone encounter 8/26/19-BB    CORONARY ARTERY BYPASS GRAFT  x4 age 45 2000    HERNIA REPAIR      HIP SURGERY  9-24-14    left YANICK    JOINT REPLACEMENT      left hip    SENTINEL LYMPH NODE BIOPSY Left 10/18/2019    Procedure: BIOPSY, LYMPH NODE, SENTINEL;  Surgeon: Fabián Villeda MD;  Location: [facility]  "OR 2ND FLR;  Service: General;  Laterality: Left;    SHOULDER ARTHROSCOPY      SHOULDER SURGERY         Medications:   Current Outpatient Medications on File Prior to Visit   Medication Sig Dispense Refill    ACCU-CHEK SMARTVIEW TEST STRIP Strp 1 strip by Misc.(Non-Drug; Combo Route) route 3 (three) times daily. Patient needs an appointment 300 strip 0    ammonium lactate 12 % Crea Apply small amount to feet except for in between toes twice a day 1 Tube 3    atorvastatin (LIPITOR) 80 MG tablet TAKE 1 TABLET EVERY DAY 90 tablet 1    blood-glucose meter Misc Use to check sugar 3 times daily. Accu-chek Emily. Patient needs an appointment 1 each 0    carvedilol (COREG) 25 MG tablet Take .5-1 tablet twice daily or as directed (Patient taking differently: Take 12.5 mg by mouth 2 (two) times daily with meals. ) 180 tablet 3    carvedilol (COREG) 25 MG tablet Take 1 tablet (25 mg total) by mouth 2 (two) times daily with meals. One twice a day or as directed 90 tablet 3    diphenhydrAMINE (BENADRYL) 25 mg capsule Take 50 mg by mouth nightly as needed for Itching.       empagliflozin (JARDIANCE) 25 mg Tab Take 1 tablet by mouth once daily. 90 tablet 1    FOLIC ACID/MULTIVITS-MIN (MULTIVITAMIN FOR CHOLESTEROL ORAL) Take 1 tablet by mouth nightly.       furosemide (LASIX) 40 MG tablet Take 1 tablet (40 mg total) by mouth once daily. 90 tablet 3    icosapent ethyl (VASCEPA) 1 gram Cap Take 2 capsules by mouth 2 (two) times daily. 360 capsule 3    insulin (LANTUS SOLOSTAR U-100 INSULIN) glargine 100 units/mL (3mL) SubQ pen Inject 35 Units into the skin every evening. (Patient taking differently: Inject 40 Units into the skin every evening. ) 31.5 mL 3    insulin needles, disposable, (BD INSULIN PEN NEEDLE UF ORIG) 29 x 1/2 " Ndle USE TWICE DAILY AS DIRECTED 100 each 2    lancets Misc 1 lancet by Misc.(Non-Drug; Combo Route) route 3 (three) times daily. accu-chek Fastclix. Patient needs an appointment 300 each 0    " "meloxicam (MOBIC) 15 MG tablet Take 1 tablet (15 mg total) by mouth once daily. 90 tablet 0    metFORMIN (GLUCOPHAGE) 1000 MG tablet Take 1 tablet (1,000 mg total) by mouth 2 (two) times daily. 180 tablet 1    nitroGLYCERIN (NITROSTAT) 0.4 MG SL tablet PLACE 1 TABLET (0.4 MG TOTAL) UNDER THE TONGUE EVERY 5 (FIVE) MINUTES AS NEEDED FOR CHEST PAIN AS DIRECTED 25 tablet 4    ondansetron (ZOFRAN-ODT) 8 MG TbDL Dissolve 1 tablet (8 mg total) by mouth every 6 (six) hours as needed. 15 tablet 0    pen needle, diabetic (BD INSULIN PEN NEEDLE UF MINI) 31 gauge x 3/16" Ndle 1 each by Misc.(Non-Drug; Combo Route) route 4 (four) times daily. Patient needs an appointment 400 each 0    tadalafil (CIALIS) 20 MG Tab Take 1 tablet (20 mg total) by mouth daily as needed. 10 tablet 3    valsartan (DIOVAN) 320 MG tablet Take 1 tablet (320 mg total) by mouth once daily. 90 tablet 3    ezetimibe (ZETIA) 10 mg tablet TAKE 1 TABLET (10 MG TOTAL) BY MOUTH ONCE DAILY. (Patient taking differently: Take 5 mg by mouth every evening. ) 90 tablet 3    insulin aspart U-100 (NOVOLOG FLEXPEN U-100 INSULIN) 100 unit/mL InPn pen Inject 10 Units into the skin 3 (three) times daily with meals. Patient needs an appointment (Patient taking differently: Inject 12 Units into the skin 3 (three) times daily with meals. Patient needs an appointment) 2 Box 0     Current Facility-Administered Medications on File Prior to Visit   Medication Dose Route Frequency Provider Last Rate Last Dose    lidocaine (PF) 10 mg/ml (1%) injection 10 mg  1 mL Intradermal Once Maureen Love MD        triamcinolone acetonide injection 40 mg  40 mg Intra-articular  Patricio Multani MD   40 mg at 10/26/16 0841       Social History:   Social History     Socioeconomic History    Marital status:      Spouse name: Not on file    Number of children: Not on file    Years of education: Not on file    Highest education level: Not on file   Occupational History    " "Not on file   Social Needs    Financial resource strain: Not hard at all    Food insecurity     Worry: Never true     Inability: Never true    Transportation needs     Medical: No     Non-medical: No   Tobacco Use    Smoking status: Never Smoker   Substance and Sexual Activity    Alcohol use: Yes     Alcohol/week: 3.0 standard drinks     Types: 3 Cans of beer per week     Frequency: 2-3 times a week     Drinks per session: 3 or 4     Binge frequency: Less than monthly     Comment: on weekend    Drug use: Never    Sexual activity: Not on file   Lifestyle    Physical activity     Days per week: 0 days     Minutes per session: Not on file    Stress: Only a little   Relationships    Social connections     Talks on phone: More than three times a week     Gets together: Never     Attends Islam service: Not on file     Active member of club or organization: No     Attends meetings of clubs or organizations: Never     Relationship status:    Other Topics Concern    Not on file   Social History Narrative    Not on file       REVIEW OF SYSTEMS:  Constitution: Negative. Negative for chills, fever and night sweats.   Cardiovascular: Negative for chest pain and syncope.   Respiratory: Negative for cough and shortness of breath.   Gastrointestinal: See HPI. Negative for nausea/vomiting. Negative for abdominal pain.  Genitourinary: See HPI. Negative for discoloration or dysuria.  Skin: Negative for dry skin, itching and rash.   Hematologic/Lymphatic: Negative for bleeding problem. Does not bruise/bleed easily.   Musculoskeletal: Negative for falls and muscle weakness.   Neurological: See HPI. No seizures.   Endocrine: Negative for polydipsia, polyphagia and polyuria.   Allergic/Immunologic: Negative for hives and persistent infections.     EXAM:  /73 (BP Location: Left arm, Patient Position: Sitting, BP Method: Medium (Automatic))   Pulse (!) 59   Ht 5' 8" (1.727 m)   Wt 100.6 kg (221 lb 12.5 oz)   " BMI 33.72 kg/m²     General: The patient is a very pleasant 65 y.o. male in no apparent distress, the patient is oriented to person, place and time.  Psych: Normal mood and affect  HEENT: Vision grossly intact, hearing intact to the spoken word.  Lungs: Respirations unlabored.  Gait: Antalgic station and gait, no difficulty with toe or heel walk.   Skin: Dorsal lumbar skin negative for rashes, lesions, hairy patches and surgical scars. There is mild left sided lumbar tenderness to palpation.  Range of motion: Lumbar range of motion is acceptable.  Spinal Balance: Global saggital and coronal spinal balance acceptable, not significant for scoliosis and kyphosis.  Musculoskeletal: No pain with the range of motion of the bilateral hips. No trochanteric tenderness to palpation.  Vascular: Bilateral lower extremities warm and well perfused, dorsalis pedis pulses 2+ bilaterally.  Neurological: Normal strength and tone in all major motor groups in the bilateral lower extremities. Normal sensation to light touch in the L2-S1 dermatomes bilaterally.  Deep tendon reflexes symmetric 2+ in the bilateral lower extremities.  Negative Babinski bilaterally. Straight leg raise negative bilaterally.    IMAGING:      Today I personally reviewed AP and Lat upright L-spine films that demonstrate mild degenerative changes with retrolisthesis at L2/3      MRI lumbar spine demonstrates mild stenosis at L3/4.  There is a small right/central paracentral disc protrusion at L5/S1.       Body mass index is 33.72 kg/m².    Hemoglobin A1C   Date Value Ref Range Status   07/20/2020 8.9 (H) 4.0 - 5.6 % Final     Comment:     ADA Screening Guidelines:  5.7-6.4%  Consistent with prediabetes  >or=6.5%  Consistent with diabetes  High levels of fetal hemoglobin interfere with the HbA1C  assay. Heterozygous hemoglobin variants (HbS, HgC, etc)do  not significantly interfere with this assay.   However, presence of multiple variants may affect accuracy.      01/16/2020 9.2 (H) 4.0 - 5.6 % Final     Comment:     ADA Screening Guidelines:  5.7-6.4%  Consistent with prediabetes  >or=6.5%  Consistent with diabetes  High levels of fetal hemoglobin interfere with the HbA1C  assay. Heterozygous hemoglobin variants (HbS, HgC, etc)do  not significantly interfere with this assay.   However, presence of multiple variants may affect accuracy.     05/31/2019 7.6 (H) 4.0 - 5.6 % Final     Comment:     ADA Screening Guidelines:  5.7-6.4%  Consistent with prediabetes  >or=6.5%  Consistent with diabetes  High levels of fetal hemoglobin interfere with the HbA1C  assay. Heterozygous hemoglobin variants (HbS, HgC, etc)do  not significantly interfere with this assay.   However, presence of multiple variants may affect accuracy.             ASSESSMENT/PLAN:    Jm was seen today for low-back pain.    Diagnoses and all orders for this visit:    Presence of hip joint prosthesis    Lumbar radiculopathy  -     Procedure Order to Pain Management; Future        Today we discussed at length all of the different treatment options including anti-inflammatories, acetaminophen, rest, ice, heat, physical therapy including strengthening and stretching exercises, home exercises, ROM, aerobic conditioning, aqua therapy, other modalities including ultrasound, massage, and dry needling, epidural steroid injections and finally surgical intervention.      Plan for TFESI.  Follow up 2 weeks after injection.

## 2020-08-03 NOTE — LETTER
August 3, 2020      [place]rett [place]ween[place] III[place]  [address] [facility] [address]           [place] [address]  [place] [address]  Phone: [phone]          Patient: Jm Deleon   MR Number: [id number]   YOB: 1955   Date of Visit: 8/3/2020       Dear Dr. Brett Sweeney III:    Thank you for referring Jm Deleon to me for evaluation. Attached you will find relevant portions of my assessment and plan of care.    If you have questions, please do not hesitate to call me. I look forward to following Jm Deleon along with you.    Sincerely,    LEONILA Batres  CC:  No Recipients    If you would like to receive this communication electronically, please contact [email or web address] or [phone] to request more information on [facility] Link access.    For providers and/or their staff who would like to refer a patient to [facility], please contact us through our one-stop-shop provider referral line, [facility] , at [phone].    If you feel you have received this communication in error or would no longer like to receive these types of communications, please e-mail [email or web address]

## 2020-08-04 ENCOUNTER — TELEPHONE (OUTPATIENT)
Dept: PAIN MEDICINE | Facility: CLINIC | Age: 65
End: 2020-08-04

## 2020-08-04 DIAGNOSIS — M54.16 LUMBAR RADICULOPATHY: Primary | ICD-10-CM

## 2020-08-07 ENCOUNTER — HOSPITAL ENCOUNTER (OUTPATIENT)
Facility: OTHER | Age: 65
Discharge: HOME | End: 2020-08-07
Attending: ANESTHESIOLOGY | Admitting: ANESTHESIOLOGY
Payer: MEDICARE

## 2020-08-07 VITALS
WEIGHT: 220 LBS | SYSTOLIC BLOOD PRESSURE: 103 MMHG | DIASTOLIC BLOOD PRESSURE: 59 MMHG | OXYGEN SATURATION: 95 % | HEIGHT: 68 IN | BODY MASS INDEX: 33.34 KG/M2 | HEART RATE: 53 BPM | TEMPERATURE: 98 F | RESPIRATION RATE: 16 BRPM

## 2020-08-07 DIAGNOSIS — G89.29 CHRONIC PAIN: ICD-10-CM

## 2020-08-07 DIAGNOSIS — M54.16 LUMBAR RADICULOPATHY: Primary | ICD-10-CM

## 2020-08-07 LAB — POCT GLUCOSE: 197 MG/DL (ref 70–110)

## 2020-08-07 PROCEDURE — 99152 MOD SED SAME PHYS/QHP 5/>YRS: CPT | Mod: HCNC | Performed by: ANESTHESIOLOGY

## 2020-08-07 PROCEDURE — 82947 ASSAY GLUCOSE BLOOD QUANT: CPT | Mod: HCNC | Performed by: ANESTHESIOLOGY

## 2020-08-07 PROCEDURE — 64483 PR EPIDURAL INJ, ANES/STEROID, TRANSFORAMINAL, LUMB/SACR, SNGL LEVL: ICD-10-PCS | Mod: HCNC,LT,, | Performed by: ANESTHESIOLOGY

## 2020-08-07 PROCEDURE — 64484 NJX AA&/STRD TFRM EPI L/S EA: CPT | Mod: HCNC,LT,, | Performed by: ANESTHESIOLOGY

## 2020-08-07 PROCEDURE — 63600175 PHARM REV CODE 636 W HCPCS: Mod: HCNC | Performed by: ANESTHESIOLOGY

## 2020-08-07 PROCEDURE — 99152 MOD SED SAME PHYS/QHP 5/>YRS: CPT | Mod: HCNC,,, | Performed by: ANESTHESIOLOGY

## 2020-08-07 PROCEDURE — 64484 PRA INJECT ANES/STEROID FORAMEN LUMBAR/SACRAL W IMG GUIDE ,EA ADD LEVEL: ICD-10-PCS | Mod: HCNC,LT,, | Performed by: ANESTHESIOLOGY

## 2020-08-07 PROCEDURE — 64484 NJX AA&/STRD TFRM EPI L/S EA: CPT | Mod: HCNC,LT | Performed by: ANESTHESIOLOGY

## 2020-08-07 PROCEDURE — 25500020 PHARM REV CODE 255: Mod: HCNC | Performed by: ANESTHESIOLOGY

## 2020-08-07 PROCEDURE — 64483 NJX AA&/STRD TFRM EPI L/S 1: CPT | Mod: HCNC,LT,, | Performed by: ANESTHESIOLOGY

## 2020-08-07 PROCEDURE — 64483 NJX AA&/STRD TFRM EPI L/S 1: CPT | Mod: HCNC,LT | Performed by: ANESTHESIOLOGY

## 2020-08-07 PROCEDURE — 99152 PR MOD CONSCIOUS SEDATION, SAME PHYS, 5+ YRS, FIRST 15 MIN: ICD-10-PCS | Mod: HCNC,,, | Performed by: ANESTHESIOLOGY

## 2020-08-07 PROCEDURE — 25000003 PHARM REV CODE 250: Mod: HCNC | Performed by: ANESTHESIOLOGY

## 2020-08-07 RX ORDER — FENTANYL CITRATE 50 UG/ML
INJECTION, SOLUTION INTRAMUSCULAR; INTRAVENOUS
Status: DISCONTINUED | OUTPATIENT
Start: 2020-08-07 | End: 2020-08-07 | Stop reason: HOSPADM

## 2020-08-07 RX ORDER — DEXAMETHASONE SODIUM PHOSPHATE 10 MG/ML
INJECTION INTRAMUSCULAR; INTRAVENOUS
Status: DISCONTINUED | OUTPATIENT
Start: 2020-08-07 | End: 2020-08-07 | Stop reason: HOSPADM

## 2020-08-07 RX ORDER — LIDOCAINE HYDROCHLORIDE 10 MG/ML
INJECTION INFILTRATION; PERINEURAL
Status: DISCONTINUED | OUTPATIENT
Start: 2020-08-07 | End: 2020-08-07 | Stop reason: HOSPADM

## 2020-08-07 RX ORDER — MIDAZOLAM HYDROCHLORIDE 1 MG/ML
INJECTION INTRAMUSCULAR; INTRAVENOUS
Status: DISCONTINUED | OUTPATIENT
Start: 2020-08-07 | End: 2020-08-07 | Stop reason: HOSPADM

## 2020-08-07 RX ORDER — LIDOCAINE HYDROCHLORIDE 5 MG/ML
INJECTION, SOLUTION INFILTRATION; INTRAVENOUS
Status: DISCONTINUED | OUTPATIENT
Start: 2020-08-07 | End: 2020-08-07 | Stop reason: HOSPADM

## 2020-08-07 RX ORDER — SODIUM CHLORIDE 9 MG/ML
500 INJECTION, SOLUTION INTRAVENOUS CONTINUOUS
Status: DISCONTINUED | OUTPATIENT
Start: 2020-08-07 | End: 2020-08-07 | Stop reason: HOSPADM

## 2020-08-07 RX ADMIN — SODIUM CHLORIDE 500 ML: 0.9 INJECTION, SOLUTION INTRAVENOUS at 07:08

## 2020-08-07 NOTE — OP NOTE
Patient Name: Jm Deleon  MRN: [id number]    INFORMED CONSENT: The procedure, risks, benefits and options were discussed with patient. There are no contraindications to the procedure. The patient expressed understanding and agreed to proceed. The personnel performing the procedure was discussed. I verify that I personally obtained Jm's consent prior to the start of the procedure and the signed consent can be found on the patient's chart.    Procedure Date: 08/07/2020    Anesthesia: Topical    Pre Procedure diagnosis: Lumbar radiculopathy [M54.16]  1. Lumbar radiculopathy    2. Chronic pain      Post-Procedure diagnosis: SAME      Sedation: Yes - Fentanyl 50 mcg and Midazolam 2 mg  Sedation time: Less than 15 minutes    PROCEDURE:Left L3/L4 and l5/S1   TRANSFORAMINAL EPIDURAL STEROID INJECTION        DESCRIPTION OF PROCEDURE: The patient was brought to the procedure room. After performing time out IV access was obtained prior to the procedure. The patient was positioned prone on the fluoroscopy table. Continuous hemodynamic monitoring was initiated including blood pressure, EKG, and pulse oximetry. . The skin was prepped with chlorhexidine three times and draped in a sterile fashion. Skin anesthesia was achieved using 3 mL of lidocaine 1% over the respective injection site.     An oblique fluoroscopic view was obtained, with the superior articular process of the inferior vertebral body aligned with the pedicle. The tip of a 22-gauge 5-inch Quincke-type spinal needle was advanced toward the 6 oclock position of the L3  And L5 pedicle under intermittent fluoroscopic guidance. Confirmation of proper needle position was made with AP, oblique, and lateral fluoroscopic views. Negative aspiration for blood or CSF was confirmed. 2 mL of Omnipaque 300 was injected. Live fluoroscopic imaging revealed a clear outline of the spinal nerve with proximal spread of agent through the neural foramen into the anterior epidural  space. A total combination of 3 mL of Bupivacaine 0.25% and 10 mg decadron was injected at each level. Contrast spread was noted from L3 to L5 level. There was no pain on injection. The needle was removed and bleeding was nil.  A sterile dressing was applied. Jm was taken back to the recovery room for further observation.     Blood Loss: Nill  Specimen: None    The procedure is considered time sensitive and medically-necessary given the patient's current clinical condition with moderate to severe pain, and/or significant functional impairment, and /or lack of alternative that present less risk of adverse event such as but not limited to a medication-related adverse event or care needed in the emergency department or hospitalization  due to inadequate pain relief.       Ronny Rangel MD

## 2020-08-07 NOTE — DISCHARGE SUMMARY
Discharge Note  Short Stay      SUMMARY     Admit Date: 8/7/2020    Attending Physician: Patricio Rey      Discharge Physician: Patricio Rey      Discharge Date: 8/7/2020 8:40 AM    Procedure(s) (LRB):  LUMBAR TRANSFORAMINAL LEFT L3/4 AND L5/S1 DIRECT REFERRAL (Left)    Final Diagnosis: Lumbar radiculopathy [M54.16]    Disposition: Home or self care    Patient Instructions:   Current Discharge Medication List      CONTINUE these medications which have NOT CHANGED    Details   ACCU-CHEK SMARTVIEW TEST STRIP Strp 1 strip by Misc.(Non-Drug; Combo Route) route 3 (three) times daily. Patient needs an appointment  Qty: 300 strip, Refills: 0      ammonium lactate 12 % Crea Apply small amount to feet except for in between toes twice a day  Qty: 1 Tube, Refills: 3    Associated Diagnoses: Dry skin      atorvastatin (LIPITOR) 80 MG tablet TAKE 1 TABLET EVERY DAY  Qty: 90 tablet, Refills: 1      blood-glucose meter Misc Use to check sugar 3 times daily. Accu-chek Emily. Patient needs an appointment  Qty: 1 each, Refills: 0      !! carvedilol (COREG) 25 MG tablet Take .5-1 tablet twice daily or as directed  Qty: 180 tablet, Refills: 3    Associated Diagnoses: Coronary artery disease involving native coronary artery of native heart without angina pectoris; Essential hypertension      !! carvedilol (COREG) 25 MG tablet Take 1 tablet (25 mg total) by mouth 2 (two) times daily with meals. One twice a day or as directed  Qty: 90 tablet, Refills: 3      diphenhydrAMINE (BENADRYL) 25 mg capsule Take 50 mg by mouth nightly as needed for Itching.       empagliflozin (JARDIANCE) 25 mg Tab Take 1 tablet by mouth once daily.  Qty: 90 tablet, Refills: 1      ezetimibe (ZETIA) 10 mg tablet TAKE 1 TABLET (10 MG TOTAL) BY MOUTH ONCE DAILY.  Qty: 90 tablet, Refills: 3    Associated Diagnoses: Coronary artery disease involving native coronary artery of native heart without angina pectoris; Dyslipidemia      FOLIC ACID/MULTIVITS-MIN  "(MULTIVITAMIN FOR CHOLESTEROL ORAL) Take 1 tablet by mouth nightly.       furosemide (LASIX) 40 MG tablet Take 1 tablet (40 mg total) by mouth once daily.  Qty: 90 tablet, Refills: 3      icosapent ethyl (VASCEPA) 1 gram Cap Take 2 capsules by mouth 2 (two) times daily.  Qty: 360 capsule, Refills: 3    Associated Diagnoses: Coronary artery disease involving native coronary artery of native heart without angina pectoris; Dyslipidemia; Type 2 diabetes, uncontrolled, with peripheral circulatory disorder      insulin (LANTUS SOLOSTAR U-100 INSULIN) glargine 100 units/mL (3mL) SubQ pen Inject 35 Units into the skin every evening.  Qty: 31.5 mL, Refills: 3      insulin aspart U-100 (NOVOLOG FLEXPEN U-100 INSULIN) 100 unit/mL InPn pen Inject 10 Units into the skin 3 (three) times daily with meals. Patient needs an appointment  Qty: 2 Box, Refills: 0    Associated Diagnoses: Uncontrolled type 2 diabetes mellitus without complication, with long-term current use of insulin      insulin needles, disposable, (BD INSULIN PEN NEEDLE UF ORIG) 29 x 1/2 " Ndle USE TWICE DAILY AS DIRECTED  Qty: 100 each, Refills: 2      lancets Misc 1 lancet by Misc.(Non-Drug; Combo Route) route 3 (three) times daily. accu-chek Fastclix. Patient needs an appointment  Qty: 300 each, Refills: 0      meloxicam (MOBIC) 15 MG tablet Take 1 tablet (15 mg total) by mouth once daily.  Qty: 90 tablet, Refills: 0      metFORMIN (GLUCOPHAGE) 1000 MG tablet Take 1 tablet (1,000 mg total) by mouth 2 (two) times daily.  Qty: 180 tablet, Refills: 1      nitroGLYCERIN (NITROSTAT) 0.4 MG SL tablet PLACE 1 TABLET (0.4 MG TOTAL) UNDER THE TONGUE EVERY 5 (FIVE) MINUTES AS NEEDED FOR CHEST PAIN AS DIRECTED  Qty: 25 tablet, Refills: 4      ondansetron (ZOFRAN-ODT) 8 MG TbDL Dissolve 1 tablet (8 mg total) by mouth every 6 (six) hours as needed.  Qty: 15 tablet, Refills: 0      pen needle, diabetic (BD INSULIN PEN NEEDLE UF MINI) 31 gauge x 3/16" Ndle 1 each by " Misc.(Non-Drug; Combo Route) route 4 (four) times daily. Patient needs an appointment  Qty: 400 each, Refills: 0      tadalafil (CIALIS) 20 MG Tab Take 1 tablet (20 mg total) by mouth daily as needed.  Qty: 10 tablet, Refills: 3      valsartan (DIOVAN) 320 MG tablet Take 1 tablet (320 mg total) by mouth once daily.  Qty: 90 tablet, Refills: 3    Associated Diagnoses: Coronary artery disease involving native coronary artery of native heart without angina pectoris; Essential hypertension       !! - Potential duplicate medications found. Please discuss with provider.              Discharge Diagnosis: Lumbar radiculopathy [M54.16]  Condition on Discharge: Stable with no complications to procedure   Diet on Discharge: Same as before.  Activity: as per instruction sheet.  Discharge to: Home with a responsible adult.  Follow up: 2-4 weeks       Please call my office or pager at [phone] if experienced any weakness or loss of sensation, fever > 101.5, pain uncontrolled with oral medications, persistent nausea/vomiting/or diarrhea, redness or drainage from the incisions, or any other worrisome concerns. If physician on call was not reached or could not communicate with our office for any reason please go to the nearest emergency department

## 2020-08-07 NOTE — DISCHARGE INSTRUCTIONS

## 2020-08-07 NOTE — H&P
"HPI  65 year old male with chronic lower back pain consistent with lumbar radiculopathy        Past Medical History:   Diagnosis Date    Allergy     seasonal    Arthritis     Coronary artery disease     Diabetes mellitus     Hyperlipidemia     Hypertension     Joint pain     Melanoma 10/2019    MM left back 2.7mm; neg LNs    Squamous cell carcinoma 2017    scalp - SSW     Past Surgical History:   Procedure Laterality Date    BELPHAROPTOSIS REPAIR  10 years ago    both eyes    CARDIAC SURGERY      3 vessel bypass    COLONOSCOPY N/A 10/4/2019    Procedure: COLONOSCOPY;  Surgeon: Isaiah Booker MD;  Location: [facility] (4TH FLR);  Service: Endoscopy;  Laterality: N/A;  Cardilogy Clearance received rom Dr. QUEENIE Salomon, see telephone encounter 8/26/19-BB    CORONARY ARTERY BYPASS GRAFT  x4 age 45 2000    HERNIA REPAIR      HIP SURGERY  9-24-14    left YANICK    JOINT REPLACEMENT      left hip    SENTINEL LYMPH NODE BIOPSY Left 10/18/2019    Procedure: BIOPSY, LYMPH NODE, SENTINEL;  Surgeon: Fabián Villeda MD;  [facility];  Service: General;  Laterality: Left;    SHOULDER ARTHROSCOPY      SHOULDER SURGERY       Review of patient's allergies indicates:   Allergen Reactions    No known drug allergies         PMHx, PSHx, Allergies, Medications reviewed in epic      ROS negative except pain complaints in HPI    OBJECTIVE:    BP (!) 110/51   Pulse (!) 59   Temp 97.8 °F (36.6 °C) (Oral)   Resp 18   Ht 5' 8" (1.727 m)   Wt 99.8 kg (220 lb)   SpO2 (!) 93%   BMI 33.45 kg/m²     PHYSICAL EXAMINATION:    GENERAL: Well appearing, in no acute distress, alert and oriented x3.  PSYCH:  Mood and affect appropriate.  SKIN: Skin color, texture, turgor normal, no rashes or lesions.  CV: RRR with palpation of the radial artery.  PULM: No evidence of respiratory difficulty, symmetric chest rise. Clear to auscultation.  NEURO: Cranial nerves grossly intact.    Plan:    Proceed with procedure as " planned    Ronny Rangel  08/07/2020

## 2020-08-11 ENCOUNTER — PATIENT OUTREACH (OUTPATIENT)
Dept: OTHER | Facility: OTHER | Age: 65
End: 2020-08-11

## 2020-08-11 NOTE — PROGRESS NOTES
Digital Medicine: Health  Follow-Up    The history is provided by the patient.             Reason for review: Blood pressure at goal        Topics Covered on Call: physical activity and Diet    Additional Follow-up details:   Patient reported he was doing well and no concerns. Patient's current BP average is 107/60.     Patient's lifestyle was reviewed.     Patient's goal while in the program is stay healthy and lose weight.   Patient shared team can support him by checking in on him and following up with any concerns.             Diet-Change  24 hour dietary recall  Breakfast is typically between 7-8 am. Mabye a breakfast bar .  Lunch is typically between 11-12 pm. Grapes, a few chips and peanut butter crackers .   Dinner is typically between 5-6 pm.  Red beans and rice and chicken sausage with a salad .    Barriers to a Healthy Diet: Patient shared he enjoys oatmeal cookies and will have those late in the evening. Discussed monitoring portion control.     Intervention(s): portion control and DASH diet  Additional diet details: Patient reported he does not drink a lot of water. He shared he drinks diet soda and one cup of coffee. Patient shared he may have one fluid of day. Discuss adding one glass of water in his day. Patient shared he could increase water at end of the day and add flavoring into his water.     Patient reported he eats two meals a day of lunch and dinner. Patient shared he is not a breakfast eater. Patient shared his wife cooks all his meals and manages his meals. Patient shared wife monitor his carb intake. Patient shared he eats a salad every day. Patient shared he loves to eat vegetables and limits meat intake.     Physical Activity-Change      He added walking to His physical activity routine.        Additional physical activity details: Patient shared he walks about 2-3 miles a day. He shared works at [employer] and walks to each building.     Patient shared he did suffered from back and hip  pain. He stated he had injection last week and feels better.       Medication Adherence-Medication adherence was assessed.        Substance, Sleep, Stress-change  stress-assessed  Details:Patient shared he manages stress well and wade well.   Intervention(s):    Sleep-assessed  Details:Patient report improvement since having injection in his back. Patient reported he gets about 8 hours of sleep  Intervention(s):    Alcohol -assessed  Details:Patient shared he drinks 1-2 glasses of wine or beer a night  Intervention(s):    Tobacco-Assessed  Details:Denies use  Intervention(s):        Plan  There are no preventive care reminders to display for this patient.    Last 5 Patient Entered Readings                                      Current 30 Day Average: 107/60     Recent Readings 8/8/2020 8/4/2020 7/28/2020 7/26/2020 7/23/2020    SBP (mmHg) 105 98 102 100 111    DBP (mmHg) 57 55 56 63 59    Pulse 66 57 73 62 68

## 2020-08-19 ENCOUNTER — PATIENT OUTREACH (OUTPATIENT)
Dept: OTHER | Facility: OTHER | Age: 65
End: 2020-08-19

## 2020-08-19 DIAGNOSIS — I10 ESSENTIAL HYPERTENSION: Primary | ICD-10-CM

## 2020-08-19 NOTE — PROGRESS NOTES
Digital Medicine: Clinician Introduction    Jm Deleon is a 65 y.o. male who is newly enrolled in the Digital Medicine Clinic.    Patient reports doing well without concern.  Does note feeling some dizziness for the first time this morning.  Also reports that he really dislikes the furosemide due to the amount of bathroom trips that it causes him.    Does reports being in the donut hole and paying a lot of money for his diabetes medications.    The history is provided by the patient.      Review of patient's allergies indicates:   -- No known drug allergies   Completed Medication Reconciliation  Verified pharmacy information.    HYPERTENSION  Explained hypertension digital medicine goals including BP goal less than or equal to 130/80mmHg, improved convenience of BP management and reduced risk of heart attack, kidney failure, stroke, eye disease, dementia, and death.     Explained non-pharmacologic therapies like low salt diet and physical activity can reduce blood pressure.       Explained that we expect patient to submit several blood pressure readings per week at random times of the day, but at least 30 minutes after taking blood pressure medications. Instructed patient not to allow anyone else to use their blood pressure monitor and phone as data submitted is directly entered into medical record. Reviewed and confirmed appropriate blood pressure monitoring technique.         Reviewed signs/symptoms of hypotension (lightheaded, dizziness, weakness)     Patient's BP goal is less than or equal to 130/80. Patients BP average is 104/56 mmHg, which is at goal, per 2017 ACC/AHA Hypertension Guidelines.       Last 5 Patient Entered Readings                                      Current 30 Day Average: 104/56     Recent Readings 8/17/2020 8/16/2020 8/13/2020 8/8/2020 8/4/2020    SBP (mmHg) 108 101 107 105 98    DBP (mmHg) 57 51 53 57 55    Pulse 60 60 58 66 57              Depression Screening  Did not address  depression screening.  Not answered    Sleep Apnea Screening    Did not address sleep apnea screening. Not answered.     Medication Affordability Screening  Patient did not answer the medication affordability questionnaires. Patient is currently having problems affording medications  Reason: Medicare donut hole    This is not preventing medication adherence.  He is interested in speaking with someone in the patient assistance department to see if there's a way to help reduce some of these costs.           ASSESSMENT(S)  Patients BP average is 104/56 mmHg, which is at goal. Patient's BP goal is less than or equal to 130/80 per 2017 ACC/AHA Hypertension Guidelines.      Hypertension Plan  Continue current therapy. At goal.  Defer changes for now but keep close eye on symptoms  Referral to pharmacy patient assistance to investigate medication cost: Entered referral.  Provided patient education. Hypotensive symptoms and when to reach out.       Addressed any questions or concerns and patient has my contact information if needed prior to next outreach. Patient verbalizes understanding.      Explained the importance of self-monitoring and medication adherence. Encouraged the patient to communicate with their health  for lifestyle modifications to help improve or maintain a healthy lifestyle.        Sent link to [facility] Digital [facility] webpages and my contact information via [employer] for future questions.        Explained to the patient that the Digital Medicine team is not available for emergencies. Advised patient call [facility] ([phone] or [phone]) or [phone] if needed.             Topic    Eye Exam        Current Medication Regimen:  Hypertension Medications             carvedilol (COREG) 25 MG tablet Take .5-1 tablet twice daily or as directed    carvedilol (COREG) 25 MG tablet Take 1 tablet (25 mg total) by mouth 2 (two) times daily with meals. One twice a day or as directed    furosemide  (LASIX) 40 MG tablet Take 1 tablet (40 mg total) by mouth once daily.    nitroGLYCERIN (NITROSTAT) 0.4 MG SL tablet PLACE 1 TABLET (0.4 MG TOTAL) UNDER THE TONGUE EVERY 5 (FIVE) MINUTES AS NEEDED FOR CHEST PAIN AS DIRECTED    valsartan (DIOVAN) 320 MG tablet Take 1 tablet (320 mg total) by mouth once daily.

## 2020-08-31 PROCEDURE — 99457 RPM TX MGMT 1ST 20 MIN: CPT | Mod: S$GLB,,, | Performed by: INTERNAL MEDICINE

## 2020-08-31 PROCEDURE — 99458 RPM TX MGMT EA ADDL 20 MIN: CPT | Mod: S$GLB,,, | Performed by: INTERNAL MEDICINE

## 2020-08-31 PROCEDURE — 99458 PR REMOTE PHYSIOL MONIT, EA ADDTL 20 MINS: ICD-10-PCS | Mod: S$GLB,,, | Performed by: INTERNAL MEDICINE

## 2020-08-31 PROCEDURE — 99457 PR MONITORING, PHYSIOL PARAM, REMOTE, 1ST 20 MINS, PER MONTH: ICD-10-PCS | Mod: S$GLB,,, | Performed by: INTERNAL MEDICINE

## 2020-09-02 DIAGNOSIS — C43.59 MALIGNANT MELANOMA OF BACK: Primary | ICD-10-CM

## 2020-09-20 ENCOUNTER — PATIENT MESSAGE (OUTPATIENT)
Dept: INTERNAL MEDICINE | Facility: CLINIC | Age: 65
End: 2020-09-20

## 2020-09-20 DIAGNOSIS — E11.9 TYPE 2 DIABETES MELLITUS WITHOUT COMPLICATION, WITH LONG-TERM CURRENT USE OF INSULIN: Primary | ICD-10-CM

## 2020-09-20 DIAGNOSIS — Z79.4 TYPE 2 DIABETES MELLITUS WITHOUT COMPLICATION, WITH LONG-TERM CURRENT USE OF INSULIN: Primary | ICD-10-CM

## 2020-09-20 DIAGNOSIS — E78.5 DYSLIPIDEMIA: ICD-10-CM

## 2020-09-21 ENCOUNTER — PATIENT OUTREACH (OUTPATIENT)
Dept: OTHER | Facility: OTHER | Age: 65
End: 2020-09-21

## 2020-09-21 NOTE — PROGRESS NOTES
Digital Medicine: Health  Follow-Up    The history is provided by the patient.             Reason for review: Blood pressure not at goal        Topics Covered on Call: physical activity, Diet, device use and stress and sleep     Additional Follow-up details: Patient reported she was doing well with no concerns or symptoms. Patient's current BP is 119/81. At last outreach BP was 107/60 on 8/11/20.     Patient shared higher readings on 9/10/20 was due to incorrect technique. Patient retook BP and decreased to average levels.     Patient shared pharmacist is assisting him with medication affordability issues for some of his medication. Patient shared he spends over $800 on his medication.             Diet-Change      Dietary Improvements:Patient continues to follow a low sodium diet. Patient shared he is more mindful about his water intake and portion. Patient shared he noticed he is not finishing meals and following his hungry cues.               Physical Activity-no change to routine  No change to exercise routine.       Additional physical activity details: Patient shared no changes in his exercise       Medication Adherence-Medication adherence was assessed.        Substance, Sleep, Stress-change  stress-assessed  Details:Mary reported no change in stress level.   Intervention(s):    Sleep-assessed  Details:Patient reported increase in sleep quaility. Patient shared he feels more rested.   Intervention(s):    Alcohol -assessed  Details:Patient shared he only drinks for special events.   Intervention(s):    Tobacco-Not Assessed  Details:  Intervention(s):          Continue current diet/physical activity routine.  Provided patient education.  Reviewed Device Techniques.     Patient verbalizes understanding.      Explained the importance of self-monitoring and medication adherence. Encouraged the patient to communicate with their health  for lifestyle modifications to help improve or maintain a healthy  lifestyle.                Topic    Eye Exam        Last 5 Patient Entered Readings                                      Current 30 Day Average: 119/81     Recent Readings 9/20/2020 9/14/2020 9/10/2020 9/10/2020 9/8/2020    SBP (mmHg) 124 112 134 168 113    DBP (mmHg) 67 65 71 152 66    Pulse 58 60 54 55 58

## 2020-09-25 ENCOUNTER — OFFICE VISIT (OUTPATIENT)
Dept: HEMATOLOGY/ONCOLOGY | Facility: CLINIC | Age: 65
End: 2020-09-25
Payer: MEDICARE

## 2020-09-25 ENCOUNTER — HOSPITAL ENCOUNTER (OUTPATIENT)
Dept: RADIOLOGY | Facility: HOSPITAL | Age: 65
Discharge: HOME | End: 2020-09-25
Attending: INTERNAL MEDICINE
Payer: MEDICARE

## 2020-09-25 VITALS
DIASTOLIC BLOOD PRESSURE: 58 MMHG | HEART RATE: 60 BPM | HEIGHT: 68 IN | WEIGHT: 224.19 LBS | BODY MASS INDEX: 33.98 KG/M2 | TEMPERATURE: 98 F | SYSTOLIC BLOOD PRESSURE: 115 MMHG

## 2020-09-25 DIAGNOSIS — Z95.1 S/P CABG (CORONARY ARTERY BYPASS GRAFT): ICD-10-CM

## 2020-09-25 DIAGNOSIS — C43.59 MALIGNANT MELANOMA OF BACK: Primary | ICD-10-CM

## 2020-09-25 DIAGNOSIS — I25.10 CORONARY ARTERY DISEASE INVOLVING NATIVE CORONARY ARTERY OF NATIVE HEART WITHOUT ANGINA PECTORIS: ICD-10-CM

## 2020-09-25 DIAGNOSIS — E08.59 DIABETES MELLITUS DUE TO UNDERLYING CONDITION WITH OTHER CIRCULATORY COMPLICATION, UNSPECIFIED WHETHER LONG TERM INSULIN USE: ICD-10-CM

## 2020-09-25 DIAGNOSIS — C43.59 MALIGNANT MELANOMA OF BACK: ICD-10-CM

## 2020-09-25 PROCEDURE — 3008F PR BODY MASS INDEX (BMI) DOCUMENTED: ICD-10-PCS | Mod: HCNC,CPTII,S$GLB, | Performed by: INTERNAL MEDICINE

## 2020-09-25 PROCEDURE — 3074F PR MOST RECENT SYSTOLIC BLOOD PRESSURE < 130 MM HG: ICD-10-PCS | Mod: HCNC,CPTII,S$GLB, | Performed by: INTERNAL MEDICINE

## 2020-09-25 PROCEDURE — 99999 PR PBB SHADOW E&M-EST. PATIENT-LVL V: ICD-10-PCS | Mod: PBBFAC,HCNC,, | Performed by: INTERNAL MEDICINE

## 2020-09-25 PROCEDURE — 1125F PR PAIN SEVERITY QUANTIFIED, PAIN PRESENT: ICD-10-PCS | Mod: HCNC,S$GLB,, | Performed by: INTERNAL MEDICINE

## 2020-09-25 PROCEDURE — 99214 PR OFFICE/OUTPT VISIT, EST, LEVL IV, 30-39 MIN: ICD-10-PCS | Mod: HCNC,S$GLB,, | Performed by: INTERNAL MEDICINE

## 2020-09-25 PROCEDURE — 99214 OFFICE O/P EST MOD 30 MIN: CPT | Mod: HCNC,S$GLB,, | Performed by: INTERNAL MEDICINE

## 2020-09-25 PROCEDURE — 99999 PR PBB SHADOW E&M-EST. PATIENT-LVL V: CPT | Mod: PBBFAC,HCNC,, | Performed by: INTERNAL MEDICINE

## 2020-09-25 PROCEDURE — 1101F PT FALLS ASSESS-DOCD LE1/YR: CPT | Mod: HCNC,CPTII,S$GLB, | Performed by: INTERNAL MEDICINE

## 2020-09-25 PROCEDURE — 3078F DIAST BP <80 MM HG: CPT | Mod: HCNC,CPTII,S$GLB, | Performed by: INTERNAL MEDICINE

## 2020-09-25 PROCEDURE — 71260 CT CHEST ABDOMEN PELVIS WITH CONTRAST (XPD): ICD-10-PCS | Mod: 26,HCNC,, | Performed by: INTERNAL MEDICINE

## 2020-09-25 PROCEDURE — 99499 RISK ADDL DX/OHS AUDIT: ICD-10-PCS | Mod: S$GLB,,, | Performed by: INTERNAL MEDICINE

## 2020-09-25 PROCEDURE — 25500020 PHARM REV CODE 255: Mod: HCNC | Performed by: INTERNAL MEDICINE

## 2020-09-25 PROCEDURE — 1125F AMNT PAIN NOTED PAIN PRSNT: CPT | Mod: HCNC,S$GLB,, | Performed by: INTERNAL MEDICINE

## 2020-09-25 PROCEDURE — 74177 CT CHEST ABDOMEN PELVIS WITH CONTRAST (XPD): ICD-10-PCS | Mod: 26,HCNC,, | Performed by: INTERNAL MEDICINE

## 2020-09-25 PROCEDURE — 74177 CT ABD & PELVIS W/CONTRAST: CPT | Mod: TC,HCNC

## 2020-09-25 PROCEDURE — 1159F MED LIST DOCD IN RCRD: CPT | Mod: HCNC,S$GLB,, | Performed by: INTERNAL MEDICINE

## 2020-09-25 PROCEDURE — 1159F PR MEDICATION LIST DOCUMENTED IN MEDICAL RECORD: ICD-10-PCS | Mod: HCNC,S$GLB,, | Performed by: INTERNAL MEDICINE

## 2020-09-25 PROCEDURE — 74177 CT ABD & PELVIS W/CONTRAST: CPT | Mod: 26,HCNC,, | Performed by: INTERNAL MEDICINE

## 2020-09-25 PROCEDURE — 3008F BODY MASS INDEX DOCD: CPT | Mod: HCNC,CPTII,S$GLB, | Performed by: INTERNAL MEDICINE

## 2020-09-25 PROCEDURE — 99499 UNLISTED E&M SERVICE: CPT | Mod: S$GLB,,, | Performed by: INTERNAL MEDICINE

## 2020-09-25 PROCEDURE — 3074F SYST BP LT 130 MM HG: CPT | Mod: HCNC,CPTII,S$GLB, | Performed by: INTERNAL MEDICINE

## 2020-09-25 PROCEDURE — 1101F PR PT FALLS ASSESS DOC 0-1 FALLS W/OUT INJ PAST YR: ICD-10-PCS | Mod: HCNC,CPTII,S$GLB, | Performed by: INTERNAL MEDICINE

## 2020-09-25 PROCEDURE — 71260 CT THORAX DX C+: CPT | Mod: 26,HCNC,, | Performed by: INTERNAL MEDICINE

## 2020-09-25 PROCEDURE — 3078F PR MOST RECENT DIASTOLIC BLOOD PRESSURE < 80 MM HG: ICD-10-PCS | Mod: HCNC,CPTII,S$GLB, | Performed by: INTERNAL MEDICINE

## 2020-09-25 RX ADMIN — IOHEXOL 15 ML: 350 INJECTION, SOLUTION INTRAVENOUS at 11:09

## 2020-09-25 RX ADMIN — IOHEXOL 100 ML: 350 INJECTION, SOLUTION INTRAVENOUS at 12:09

## 2020-09-25 NOTE — PROGRESS NOTES
ONCOLOGY FOLLOW UP VISIT.     Reason for visit: surveillance for stage IIA melanoma    Best Contact Phone Number(s): [phone] (home) [phone] (work)     Cancer/Stage/TNM:   Cancer Staging  Malignant melanoma of back  Staging form: Melanoma of the Skin, AJCC 8th Edition  - Pathologic: Stage IIA (pT3a, pN0, cM0) - Unsigned     Interval History:   Patient returns to clinic to review imaging results.     ROS:  Review of Systems   Constitutional: Negative for activity change, appetite change, chills, fatigue, fever and unexpected weight change.   HENT: Negative for mouth sores, nosebleeds and sore throat.    Eyes: Negative for visual disturbance.   Respiratory: Negative for cough, shortness of breath and wheezing.    Cardiovascular: Negative for chest pain, palpitations and leg swelling.   Gastrointestinal: Negative for abdominal distention, abdominal pain, blood in stool and diarrhea.   Endocrine: Negative for cold intolerance and heat intolerance.   Genitourinary: Negative for dysuria, flank pain and frequency.   Musculoskeletal: Negative for arthralgias, back pain, joint swelling and myalgias.   Skin: Negative for color change, rash and wound.   Neurological: Negative for dizziness, light-headedness and headaches.   Hematological: Negative for adenopathy. Does not bruise/bleed easily.   Psychiatric/Behavioral: The patient is not nervous/anxious.       A complete 12-point review of systems was reviewed and is negative except as mentioned above.     Past Medical History:   Past Medical History:   Diagnosis Date    Allergy     seasonal    Arthritis     Coronary artery disease     Diabetes mellitus     Hyperlipidemia     Hypertension     Joint pain     Melanoma 10/2019    MM left back 2.7mm; neg LNs    Squamous cell carcinoma 2017    scalp - SSW        Allergies:   Review of patient's allergies indicates:   Allergen Reactions    No known drug allergies         Medications:   Current Outpatient  "Medications   Medication Sig Dispense Refill    ACCU-CHEK SMARTVIEW TEST STRIP Strp 1 strip by Misc.(Non-Drug; Combo Route) route 3 (three) times daily. Patient needs an appointment 300 strip 0    ammonium lactate 12 % Crea Apply small amount to feet except for in between toes twice a day 1 Tube 3    atorvastatin (LIPITOR) 80 MG tablet TAKE 1 TABLET EVERY DAY 90 tablet 1    blood-glucose meter Misc Use to check sugar 3 times daily. Accu-chek Emily. Patient needs an appointment 1 each 0    carvedilol (COREG) 25 MG tablet Take 1 tablet (25 mg total) by mouth 2 (two) times daily with meals. One twice a day or as directed 90 tablet 3    diphenhydrAMINE (BENADRYL) 25 mg capsule Take 50 mg by mouth nightly as needed for Itching.       empagliflozin (JARDIANCE) 25 mg tablet Take 1 tablet (25 mg total) by mouth once daily. 90 tablet 1    ezetimibe (ZETIA) 10 mg tablet TAKE 1 TABLET (10 MG TOTAL) BY MOUTH ONCE DAILY. (Patient taking differently: Take 5 mg by mouth every evening. ) 90 tablet 3    FOLIC ACID/MULTIVITS-MIN (MULTIVITAMIN FOR CHOLESTEROL ORAL) Take 1 tablet by mouth nightly.       furosemide (LASIX) 40 MG tablet Take 1 tablet (40 mg total) by mouth once daily. 90 tablet 3    icosapent ethyl (VASCEPA) 1 gram Cap Take 2 capsules by mouth 2 (two) times daily. 360 capsule 3    insulin (LANTUS SOLOSTAR U-100 INSULIN) glargine 100 units/mL (3mL) SubQ pen Inject 40 Units into the skin every evening. 30 mL 5    insulin (LANTUS SOLOSTAR U-100 INSULIN) glargine 100 units/mL (3mL) SubQ pen Inject 40 Units into the skin every evening. 36 mL 3    insulin aspart U-100 (NOVOLOG FLEXPEN U-100 INSULIN) 100 unit/mL InPn pen Inject 10 Units into the skin 3 (three) times daily with meals. Patient needs an appointment (Patient taking differently: Inject 12 Units into the skin 3 (three) times daily with meals. Patient needs an appointment) 2 Box 0    insulin needles, disposable, (BD INSULIN PEN NEEDLE UF ORIG) 29 x 1/2 " " "Ndle USE TWICE DAILY AS DIRECTED 100 each 2    lancets Misc 1 lancet by Misc.(Non-Drug; Combo Route) route 3 (three) times daily. accu-chek Fastclix. Patient needs an appointment 300 each 0    meloxicam (MOBIC) 15 MG tablet Take 1 tablet (15 mg total) by mouth once daily. 90 tablet 0    metFORMIN (GLUCOPHAGE) 1000 MG tablet Take 1 tablet (1,000 mg total) by mouth 2 (two) times daily. 180 tablet 1    nitroGLYCERIN (NITROSTAT) 0.4 MG SL tablet PLACE 1 TABLET (0.4 MG TOTAL) UNDER THE TONGUE EVERY 5 (FIVE) MINUTES AS NEEDED FOR CHEST PAIN AS DIRECTED 25 tablet 4    ondansetron (ZOFRAN-ODT) 8 MG TbDL Dissolve 1 tablet (8 mg total) by mouth every 6 (six) hours as needed. 15 tablet 0    pen needle, diabetic (BD INSULIN PEN NEEDLE UF MINI) 31 gauge x 3/16" Ndle 1 each by Misc.(Non-Drug; Combo Route) route 4 (four) times daily. Patient needs an appointment 400 each 0    tadalafil (CIALIS) 20 MG Tab Take 1 tablet (20 mg total) by mouth daily as needed. 10 tablet 3    valsartan (DIOVAN) 320 MG tablet Take 1 tablet (320 mg total) by mouth once daily. 90 tablet 3     No current facility-administered medications for this visit.      Facility-Administered Medications Ordered in Other Visits   Medication Dose Route Frequency Provider Last Rate Last Dose    lidocaine (PF) 10 mg/ml (1%) injection 10 mg  1 mL Intradermal Once Maureen Love MD        triamcinolone acetonide injection 40 mg  40 mg Intra-articular  Patricio Multani MD   40 mg at 10/26/16 0841        Physical Exam:   There were no vitals taken for this visit.     ECOG Performance Status: (foot note - ECOG PS provided by [employer]cology [employer]) 0 - Asymptomatic    Physical Exam  Constitutional:       Appearance: He is well-developed.   HENT:      Head: Normocephalic and atraumatic.   Eyes:      Conjunctiva/sclera: Conjunctivae normal.      Pupils: Pupils are equal, round, and reactive to light.   Neck:      Musculoskeletal: Normal range of " motion and neck supple.   Cardiovascular:      Rate and Rhythm: Normal rate and regular rhythm.      Heart sounds: No murmur. No friction rub.   Pulmonary:      Effort: Pulmonary effort is normal.      Breath sounds: Normal breath sounds.   Abdominal:      General: Bowel sounds are normal.      Palpations: Abdomen is soft.      Tenderness: There is no abdominal tenderness.   Musculoskeletal: Normal range of motion.   Lymphadenopathy:      Cervical: No cervical adenopathy.   Skin:     General: Skin is warm and dry.   Neurological:      Mental Status: He is alert and oriented to person, place, and time.   Psychiatric:         Behavior: Behavior normal.           Labs:   Recent Results (from the past 48 hour(s))   CBC auto differential    Collection Time: 09/25/20 12:56 PM   Result Value Ref Range    WBC 8.20 3.90 - 12.70 K/uL    RBC 5.02 4.60 - 6.20 M/uL    Hemoglobin 15.7 14.0 - 18.0 g/dL    Hematocrit 48.8 40.0 - 54.0 %    Mean Corpuscular Volume 97 82 - 98 fL    Mean Corpuscular Hemoglobin 31.3 (H) 27.0 - 31.0 pg    Mean Corpuscular Hemoglobin Conc 32.2 32.0 - 36.0 g/dL    RDW 14.0 11.5 - 14.5 %    Platelets 146 (L) 150 - 350 K/uL    MPV 12.2 9.2 - 12.9 fL    Immature Granulocytes 0.2 0.0 - 0.5 %    Gran # (ANC) 4.8 1.8 - 7.7 K/uL    Immature Grans (Abs) 0.02 0.00 - 0.04 K/uL    Lymph # 2.6 1.0 - 4.8 K/uL    Mono # 0.7 0.3 - 1.0 K/uL    Eos # 0.0 0.0 - 0.5 K/uL    Baso # 0.03 0.00 - 0.20 K/uL    nRBC 0 0 /100 WBC    Gran% 58.5 38.0 - 73.0 %    Lymph% 32.2 18.0 - 48.0 %    Mono% 8.7 4.0 - 15.0 %    Eosinophil% 0.0 0.0 - 8.0 %    Basophil% 0.4 0.0 - 1.9 %    Differential Method Automated    Comprehensive metabolic panel    Collection Time: 09/25/20 12:56 PM   Result Value Ref Range    Sodium 136 136 - 145 mmol/L    Potassium 4.8 3.5 - 5.1 mmol/L    Chloride 100 95 - 110 mmol/L    CO2 27 23 - 29 mmol/L    Glucose 204 (H) 70 - 110 mg/dL    BUN, Bld 17 8 - 23 mg/dL    Creatinine 1.0 0.5 - 1.4 mg/dL    Calcium 9.0 8.7 -  10.5 mg/dL    Total Protein 6.6 6.0 - 8.4 g/dL    Albumin 3.9 3.5 - 5.2 g/dL    Total Bilirubin 0.9 0.1 - 1.0 mg/dL    Alkaline Phosphatase 57 55 - 135 U/L    AST 16 10 - 40 U/L    ALT 25 10 - 44 U/L    Anion Gap 9 8 - 16 mmol/L    eGFR if African American >60.0 >60 mL/min/1.73 m^2    eGFR if non African American >60.0 >60 mL/min/1.73 m^2   Hemoglobin A1C    Collection Time: 09/25/20 12:56 PM   Result Value Ref Range    Hemoglobin A1C 9.1 (H) 4.0 - 5.6 %    Estimated Avg Glucose 214 (H) 68 - 131 mg/dL   Lipid Panel    Collection Time: 09/25/20 12:56 PM   Result Value Ref Range    Cholesterol 143 120 - 199 mg/dL    Triglycerides 318 (H) 30 - 150 mg/dL    HDL 28 (L) 40 - 75 mg/dL    LDL Cholesterol 51.4 (L) 63.0 - 159.0 mg/dL    Hdl/Cholesterol Ratio 19.6 (L) 20.0 - 50.0 %    Total Cholesterol/HDL Ratio 5.1 (H) 2.0 - 5.0    Non-HDL Cholesterol 115 mg/dL          Imaging: I have personally reviewed patient's CT scnas imaging showing no evidence of recurrence.  CT Chest Abdomen Pelvis With Contrast  Narrative: EXAMINATION:  CT CHEST ABDOMEN PELVIS WITH CONTRAST (XPD)    CLINICAL HISTORY:  malignant melanoma of back; Malignant melanoma of other part of trunk    TECHNIQUE:  Axial images of the chest, abdomen, and pelvis were acquired  after the use of 100 cc [id number] IV contrast and 30 cc [id number] oral contrast.   Coronal and sagittal reconstructions were also obtained    COMPARISON:  CT chest abdomen pelvis 06/12/2020    FINDINGS:  Additional history: Wide local excision left back melanoma/left axillary sentinel lymph node biopsy 10/18/2019.  Final path T3aN0 melanoma.    Thoracic soft tissues: Normal thyroid. Median sternotomy wires intact and aligned.  Scarring in the left upper back consistent with prior excision.    Aorta: Thoracic aorta is normal in caliber and contour with mild calcific atherosclerosis.    Heart: Normal in size. No pericardial effusion. Multivessel calcific coronary  atherosclerosis.    Malgorzata/Mediastinum: Scattered nonenlarged mediastinal lymph nodes.    Lungs: Trachea and bronchi are patent.  Lungs are well aerated, without consolidation or pleural fluid. Stable 0.4 cm solid nodule in the right upper lobe (axial series 2, image 44).  Stable left upper lobe solid micronodule, possibly calcified (axial series 2, image 35).    Liver: Normal in size and contour.  No focal hepatic lesion.    Gallbladder: No calcified gallstones.    Bile Ducts: No evidence of dilated ducts.    Pancreas: No mass or peripancreatic fat stranding.    Spleen: Unremarkable.    Stomach and duodenum: Unremarkable.    Adrenals: Unremarkable.    Kidneys/ Ureters: Normal in size and location. Normal enhancement. No hydronephrosis or nephrolithiasis. No ureteral dilatation.    Bladder: No evidence of wall thickening.    Reproductive organs: Unremarkable.    Bowel/Mesentery: Small bowel is normal in caliber with no evidence of obstruction.  No evidence of inflammation or wall thickening.  Colon demonstrates no focal wall thickening.    Lymph nodes: No retroperitoneal lymphadenopathy.    Abdominal wall:  Fat containing left inguinal hernia.    Vasculature: No aneurysm. Moderate calcific atherosclerosis of the abdominal aorta and its branches..    Bones: Degenerative changes of the spine without acute fracture or suspicious osseous lesions.  Left hip prosthesis  Impression: 1. No evidence of metastatic disease in the chest, abdomen, or pelvis in this patient with history of back melanoma.  There are no measurable lesions per RECIST criteria.  2. Additional stable findings as detailed above.    Electronically signed by resident: Broderick Velasco  Date:    09/25/2020  Time:    13:28    Electronically signed by: Dana Summers MD  Date:    09/25/2020  Time:    14:02            Diagnoses:       1. Malignant melanoma of back    2. Diabetes mellitus due to underlying condition with other circulatory complication, unspecified  whether long term insulin use    3. S/P CABG (coronary artery bypass graft)    4. Coronary artery disease involving native coronary artery of native heart without angina pectoris          Assessment and Plan:         1. Stage IIA Cutaneous Melanoma:   -s/p WLE and SNLBx 10/18/2019 with 0/5 negative lymph nodes.  No evidence for adjuvant immunotherapy or targeted for stage IIA melanoma.  Will initiate active surveillance with CT scans every 3 months.  Will broaden to every 6 months after 2 years.  - needs to continue skin exams every 3-6 months with dermatology  - CT CAP every 4 months until 10/2021, then every 6-12 months thereafter until 5 years.  - CT CAP (9/25) shows no evidence of metastatic disease in the chest, abdomen, or pelvis in this patient with history of back melanoma. There are no measurable lesions per RECIST criteria.     2. DMII  Last HbA1c 7.6, follows with PCP    3. Follow with PCP          I have provided the patient with an opportunity to ask questions and have all questions answered to his satisfaction.     he will return to clinic in 4 months, but knows to call in the interim if symptoms change or should a problem arise.    Casimiro Colon MD  Medical Oncology [facility]

## 2020-09-26 ENCOUNTER — PATIENT MESSAGE (OUTPATIENT)
Dept: INTERNAL MEDICINE | Facility: CLINIC | Age: 65
End: 2020-09-26

## 2020-10-14 ENCOUNTER — PATIENT OUTREACH (OUTPATIENT)
Dept: OTHER | Facility: OTHER | Age: 65
End: 2020-10-14

## 2020-10-15 ENCOUNTER — PATIENT OUTREACH (OUTPATIENT)
Dept: OTHER | Facility: OTHER | Age: 65
End: 2020-10-15

## 2020-10-27 ENCOUNTER — PATIENT MESSAGE (OUTPATIENT)
Dept: INTERNAL MEDICINE | Facility: CLINIC | Age: 65
End: 2020-10-27

## 2020-10-27 DIAGNOSIS — E11.9 DIABETES MELLITUS WITHOUT COMPLICATION: ICD-10-CM

## 2020-10-27 RX ORDER — INSULIN ASPART 100 [IU]/ML
12 INJECTION, SOLUTION INTRAVENOUS; SUBCUTANEOUS
Qty: 32.4 ML | Refills: 1 | Status: SHIPPED | OUTPATIENT
Start: 2020-10-27 | End: 2021-02-01 | Stop reason: SDUPTHER

## 2020-10-27 NOTE — PROGRESS NOTES
Digital Medicine: Clinician Follow-Up    Patient reports doing well without concern.    The history is provided by the patient.      Review of patient's allergies indicates:   -- No known drug allergies   Follow-up reason(s): routine follow up.     Hypertension    Patient's blood pressure is stable.   Patient is not experiencing signs/symptoms of hypotension.            Last 5 Patient Entered Readings                                      Current 30 Day Average: 110/66     Recent Readings 10/19/2020 10/17/2020 10/15/2020 10/7/2020 9/30/2020    SBP (mmHg) 114 118 107 111 122    DBP (mmHg) 71 66 60 61 70    Pulse 58 61 61 64 52                 Depression Screening  Did not address depression screening.    Sleep Apnea Screening    Did not address sleep apnea screening.     Medication Affordability Screening  Did not address medication affordability screening.     Medication Adherence-Medication adherence was assessed.          ASSESSMENT(S)  Patients BP average is 110/66 mmHg, which is at goal. Patient's BP goal is less than or equal to 130/80.    Hypertension Plan  Continue current therapy. No changes  Continue current diet/physical activity routine.       Addressed patient questions and patient has my contact information if needed prior to next outreach. Patient verbalizes understanding.                 Topic    Eye Exam      There are no preventive care reminders to display for this patient.      Hypertension Medications             carvediloL (COREG) 25 MG tablet TAKE 1 TABLET TWICE DAILY WITH MEALS  OR AS DIRECTED    furosemide (LASIX) 40 MG tablet Take 1 tablet (40 mg total) by mouth once daily.    nitroGLYCERIN (NITROSTAT) 0.4 MG SL tablet PLACE 1 TABLET (0.4 MG TOTAL) UNDER THE TONGUE EVERY 5 (FIVE) MINUTES AS NEEDED FOR CHEST PAIN AS DIRECTED    valsartan (DIOVAN) 320 MG tablet TAKE 1 TABLET EVERY DAY

## 2020-11-05 ENCOUNTER — PATIENT OUTREACH (OUTPATIENT)
Dept: ADMINISTRATIVE | Facility: OTHER | Age: 65
End: 2020-11-05

## 2020-11-06 ENCOUNTER — OFFICE VISIT (OUTPATIENT)
Dept: OPTOMETRY | Facility: CLINIC | Age: 65
End: 2020-11-06
Payer: COMMERCIAL

## 2020-11-06 DIAGNOSIS — H40.023 OPEN ANGLE WITH BORDERLINE FINDINGS AND HIGH GLAUCOMA RISK IN BOTH EYES: ICD-10-CM

## 2020-11-06 DIAGNOSIS — H52.223 REGULAR ASTIGMATISM OF BOTH EYES: ICD-10-CM

## 2020-11-06 DIAGNOSIS — E11.9 TYPE 2 DIABETES MELLITUS WITHOUT OPHTHALMIC MANIFESTATIONS: ICD-10-CM

## 2020-11-06 DIAGNOSIS — H52.4 PRESBYOPIA: Primary | ICD-10-CM

## 2020-11-06 DIAGNOSIS — H25.13 NS (NUCLEAR SCLEROSIS), BILATERAL: ICD-10-CM

## 2020-11-06 PROCEDURE — 92015 PR REFRACTION: ICD-10-PCS | Mod: S$GLB,,, | Performed by: OPTOMETRIST

## 2020-11-06 PROCEDURE — 99999 PR PBB SHADOW E&M-EST. PATIENT-LVL I: CPT | Mod: PBBFAC,,, | Performed by: OPTOMETRIST

## 2020-11-06 PROCEDURE — 92015 DETERMINE REFRACTIVE STATE: CPT | Mod: S$GLB,,, | Performed by: OPTOMETRIST

## 2020-11-06 PROCEDURE — 99499 RISK ADDL DX/OHS AUDIT: ICD-10-PCS | Mod: S$GLB,,, | Performed by: OPTOMETRIST

## 2020-11-06 PROCEDURE — 92014 COMPRE OPH EXAM EST PT 1/>: CPT | Mod: S$GLB,,, | Performed by: OPTOMETRIST

## 2020-11-06 PROCEDURE — 99999 PR PBB SHADOW E&M-EST. PATIENT-LVL I: ICD-10-PCS | Mod: PBBFAC,,, | Performed by: OPTOMETRIST

## 2020-11-06 PROCEDURE — 92014 PR EYE EXAM, EST PATIENT,COMPREHESV: ICD-10-PCS | Mod: S$GLB,,, | Performed by: OPTOMETRIST

## 2020-11-06 PROCEDURE — 99499 UNLISTED E&M SERVICE: CPT | Mod: S$GLB,,, | Performed by: OPTOMETRIST

## 2020-11-06 NOTE — PROGRESS NOTES
Chart reviewed.   Immunizations: updated  Orders placed: n/a  Upcoming appts to satisfy [facility] topics: Optometry 11/06

## 2020-11-06 NOTE — PROGRESS NOTES
HPI     Pt here for annual visit   Floaters come and go  Feels like [place] is getting worse  Night driving is becoming more of a struggle   Patient denies diplopia, headaches, flashes, pain, and   itching/burning/tearing.    Pt does not use any eye drops.    Hemoglobin A1C       Date                     Value               Ref Range             Status                09/25/2020               9.1 (H)             4.0 - 5.6 %           Final                     07/20/2020               8.9 (H)             4.0 - 5.6 %           Final                     01/16/2020               9.2 (H)             4.0 - 5.6 %           Final                  Last edited by Jazz Elliott on 11/6/2020  3:09 PM. (History)            Assessment /Plan     For exam results, see Encounter Report.    Presbyopia    Regular astigmatism of both eyes    NS (nuclear sclerosis), bilateral    Type 2 diabetes mellitus without ophthalmic manifestations    Open angle with borderline findings and high glaucoma risk in both eyes         Regular astigmatism of both eyes  Presbyopia              Rx specs     Open angle with borderline findings and high glaucoma risk in both eyes  Glaucoma suspect, bilateral  -          HVF 2012: WNL, 2017 scattered defects/ stable WNL  OCT: sectoral thinning inf nasal OD, sup temp OS  PACHY: 541/537   IOP: normal     Repeat tests Next year     NS (nuclear sclerosis), bilateral              Mild, monitor     Type II or unspecified type diabetes mellitus without mention of complication, uncontrolled  Type 2 diabetes mellitus without ophthalmic manifestations  Essential hypertension              No retinopathy, monitor       Dry eye OU  Soothe XP daily/ PRN          RTC 1 year annual

## 2020-11-10 ENCOUNTER — PATIENT MESSAGE (OUTPATIENT)
Dept: INTERNAL MEDICINE | Facility: CLINIC | Age: 65
End: 2020-11-10

## 2020-11-11 RX ORDER — METFORMIN HYDROCHLORIDE 1000 MG/1
1000 TABLET ORAL 2 TIMES DAILY
Qty: 180 TABLET | Refills: 1 | Status: SHIPPED | OUTPATIENT
Start: 2020-11-11 | End: 2022-02-08 | Stop reason: SDUPTHER

## 2020-11-15 ENCOUNTER — PATIENT MESSAGE (OUTPATIENT)
Dept: CARDIOLOGY | Facility: CLINIC | Age: 65
End: 2020-11-15

## 2020-11-15 ENCOUNTER — PATIENT MESSAGE (OUTPATIENT)
Dept: INTERNAL MEDICINE | Facility: CLINIC | Age: 65
End: 2020-11-15

## 2020-11-16 RX ORDER — EMPAGLIFLOZIN 25 MG/1
25 TABLET, FILM COATED ORAL DAILY
Qty: 90 TABLET | Refills: 1 | Status: CANCELLED | OUTPATIENT
Start: 2020-11-16

## 2020-11-24 ENCOUNTER — PATIENT MESSAGE (OUTPATIENT)
Dept: CARDIOLOGY | Facility: CLINIC | Age: 65
End: 2020-11-24

## 2020-11-25 RX ORDER — ATORVASTATIN CALCIUM 80 MG/1
TABLET, FILM COATED ORAL
Qty: 90 TABLET | Refills: 3 | Status: SHIPPED | OUTPATIENT
Start: 2020-11-25 | End: 2021-09-18

## 2020-12-02 ENCOUNTER — PATIENT MESSAGE (OUTPATIENT)
Dept: CARDIOLOGY | Facility: CLINIC | Age: 65
End: 2020-12-02

## 2020-12-02 RX ORDER — FUROSEMIDE 40 MG/1
40 TABLET ORAL DAILY
Qty: 90 TABLET | Refills: 3 | Status: SHIPPED | OUTPATIENT
Start: 2020-12-02 | End: 2022-03-18

## 2021-01-03 ENCOUNTER — PATIENT MESSAGE (OUTPATIENT)
Dept: CARDIOLOGY | Facility: CLINIC | Age: 66
End: 2021-01-03

## 2021-01-04 DIAGNOSIS — E78.5 DYSLIPIDEMIA: ICD-10-CM

## 2021-01-04 DIAGNOSIS — I25.10 CORONARY ARTERY DISEASE INVOLVING NATIVE CORONARY ARTERY OF NATIVE HEART WITHOUT ANGINA PECTORIS: ICD-10-CM

## 2021-01-04 RX ORDER — EZETIMIBE 10 MG/1
10 TABLET ORAL DAILY
Qty: 90 TABLET | Refills: 3 | Status: SHIPPED | OUTPATIENT
Start: 2021-01-04 | End: 2022-03-18

## 2021-01-08 ENCOUNTER — TELEPHONE (OUTPATIENT)
Dept: HEMATOLOGY/ONCOLOGY | Facility: CLINIC | Age: 66
End: 2021-01-08

## 2021-01-20 ENCOUNTER — PATIENT MESSAGE (OUTPATIENT)
Dept: INTERNAL MEDICINE | Facility: CLINIC | Age: 66
End: 2021-01-20

## 2021-01-27 ENCOUNTER — HOSPITAL ENCOUNTER (OUTPATIENT)
Dept: RADIOLOGY | Facility: HOSPITAL | Age: 66
Discharge: HOME | End: 2021-01-27
Attending: INTERNAL MEDICINE
Payer: MEDICARE

## 2021-01-27 DIAGNOSIS — C43.59 MALIGNANT MELANOMA OF BACK: Primary | ICD-10-CM

## 2021-01-27 DIAGNOSIS — C43.59 MALIGNANT MELANOMA OF BACK: ICD-10-CM

## 2021-01-27 LAB
CREAT SERPL-MCNC: 1 MG/DL (ref 0.5–1.4)
SAMPLE: NORMAL

## 2021-01-27 PROCEDURE — 74177 CT ABD & PELVIS W/CONTRAST: CPT | Mod: 26,,, | Performed by: INTERNAL MEDICINE

## 2021-01-27 PROCEDURE — 25500020 PHARM REV CODE 255: Performed by: INTERNAL MEDICINE

## 2021-01-27 PROCEDURE — 74177 CT ABD & PELVIS W/CONTRAST: CPT | Mod: TC

## 2021-01-27 PROCEDURE — 71260 CT THORAX DX C+: CPT | Mod: 26,,, | Performed by: INTERNAL MEDICINE

## 2021-01-27 PROCEDURE — 71260 CT CHEST ABDOMEN PELVIS WITH CONTRAST (XPD): ICD-10-PCS | Mod: 26,,, | Performed by: INTERNAL MEDICINE

## 2021-01-27 PROCEDURE — 74177 CT CHEST ABDOMEN PELVIS WITH CONTRAST (XPD): ICD-10-PCS | Mod: 26,,, | Performed by: INTERNAL MEDICINE

## 2021-01-27 RX ADMIN — IOHEXOL 100 ML: 350 INJECTION, SOLUTION INTRAVENOUS at 08:01

## 2021-01-29 ENCOUNTER — OFFICE VISIT (OUTPATIENT)
Dept: HEMATOLOGY/ONCOLOGY | Facility: CLINIC | Age: 66
End: 2021-01-29
Payer: MEDICARE

## 2021-01-29 VITALS
SYSTOLIC BLOOD PRESSURE: 144 MMHG | BODY MASS INDEX: 33.88 KG/M2 | DIASTOLIC BLOOD PRESSURE: 62 MMHG | WEIGHT: 223.56 LBS | HEIGHT: 68 IN | TEMPERATURE: 98 F | HEART RATE: 62 BPM

## 2021-01-29 DIAGNOSIS — C43.59 MALIGNANT MELANOMA OF BACK: Primary | ICD-10-CM

## 2021-01-29 DIAGNOSIS — I25.10 CORONARY ARTERY DISEASE INVOLVING NATIVE CORONARY ARTERY OF NATIVE HEART WITHOUT ANGINA PECTORIS: ICD-10-CM

## 2021-01-29 DIAGNOSIS — E08.59 DIABETES MELLITUS DUE TO UNDERLYING CONDITION WITH OTHER CIRCULATORY COMPLICATION, UNSPECIFIED WHETHER LONG TERM INSULIN USE: ICD-10-CM

## 2021-01-29 DIAGNOSIS — Z95.1 S/P CABG (CORONARY ARTERY BYPASS GRAFT): ICD-10-CM

## 2021-01-29 PROCEDURE — 99214 PR OFFICE/OUTPT VISIT, EST, LEVL IV, 30-39 MIN: ICD-10-PCS | Mod: S$GLB,,, | Performed by: INTERNAL MEDICINE

## 2021-01-29 PROCEDURE — 3008F PR BODY MASS INDEX (BMI) DOCUMENTED: ICD-10-PCS | Mod: CPTII,S$GLB,, | Performed by: INTERNAL MEDICINE

## 2021-01-29 PROCEDURE — 3077F PR MOST RECENT SYSTOLIC BLOOD PRESSURE >= 140 MM HG: ICD-10-PCS | Mod: CPTII,S$GLB,, | Performed by: INTERNAL MEDICINE

## 2021-01-29 PROCEDURE — 99499 UNLISTED E&M SERVICE: CPT | Mod: S$GLB,,, | Performed by: INTERNAL MEDICINE

## 2021-01-29 PROCEDURE — 3008F BODY MASS INDEX DOCD: CPT | Mod: CPTII,S$GLB,, | Performed by: INTERNAL MEDICINE

## 2021-01-29 PROCEDURE — 3077F SYST BP >= 140 MM HG: CPT | Mod: CPTII,S$GLB,, | Performed by: INTERNAL MEDICINE

## 2021-01-29 PROCEDURE — 1101F PR PT FALLS ASSESS DOC 0-1 FALLS W/OUT INJ PAST YR: ICD-10-PCS | Mod: CPTII,S$GLB,, | Performed by: INTERNAL MEDICINE

## 2021-01-29 PROCEDURE — 99214 OFFICE O/P EST MOD 30 MIN: CPT | Mod: S$GLB,,, | Performed by: INTERNAL MEDICINE

## 2021-01-29 PROCEDURE — 3288F FALL RISK ASSESSMENT DOCD: CPT | Mod: CPTII,S$GLB,, | Performed by: INTERNAL MEDICINE

## 2021-01-29 PROCEDURE — 1101F PT FALLS ASSESS-DOCD LE1/YR: CPT | Mod: CPTII,S$GLB,, | Performed by: INTERNAL MEDICINE

## 2021-01-29 PROCEDURE — 99999 PR PBB SHADOW E&M-EST. PATIENT-LVL III: ICD-10-PCS | Mod: PBBFAC,,, | Performed by: INTERNAL MEDICINE

## 2021-01-29 PROCEDURE — 3072F PR LOW RISK FOR RETINOPATHY: ICD-10-PCS | Mod: S$GLB,,, | Performed by: INTERNAL MEDICINE

## 2021-01-29 PROCEDURE — 3078F DIAST BP <80 MM HG: CPT | Mod: CPTII,S$GLB,, | Performed by: INTERNAL MEDICINE

## 2021-01-29 PROCEDURE — 3072F LOW RISK FOR RETINOPATHY: CPT | Mod: S$GLB,,, | Performed by: INTERNAL MEDICINE

## 2021-01-29 PROCEDURE — 99999 PR PBB SHADOW E&M-EST. PATIENT-LVL III: CPT | Mod: PBBFAC,,, | Performed by: INTERNAL MEDICINE

## 2021-01-29 PROCEDURE — 1126F PR PAIN SEVERITY QUANTIFIED, NO PAIN PRESENT: ICD-10-PCS | Mod: S$GLB,,, | Performed by: INTERNAL MEDICINE

## 2021-01-29 PROCEDURE — 1126F AMNT PAIN NOTED NONE PRSNT: CPT | Mod: S$GLB,,, | Performed by: INTERNAL MEDICINE

## 2021-01-29 PROCEDURE — 1159F MED LIST DOCD IN RCRD: CPT | Mod: S$GLB,,, | Performed by: INTERNAL MEDICINE

## 2021-01-29 PROCEDURE — 1159F PR MEDICATION LIST DOCUMENTED IN MEDICAL RECORD: ICD-10-PCS | Mod: S$GLB,,, | Performed by: INTERNAL MEDICINE

## 2021-01-29 PROCEDURE — 3288F PR FALLS RISK ASSESSMENT DOCUMENTED: ICD-10-PCS | Mod: CPTII,S$GLB,, | Performed by: INTERNAL MEDICINE

## 2021-01-29 PROCEDURE — 99499 RISK ADDL DX/OHS AUDIT: ICD-10-PCS | Mod: S$GLB,,, | Performed by: INTERNAL MEDICINE

## 2021-01-29 PROCEDURE — 3078F PR MOST RECENT DIASTOLIC BLOOD PRESSURE < 80 MM HG: ICD-10-PCS | Mod: CPTII,S$GLB,, | Performed by: INTERNAL MEDICINE

## 2021-01-31 ENCOUNTER — PATIENT MESSAGE (OUTPATIENT)
Dept: INTERNAL MEDICINE | Facility: CLINIC | Age: 66
End: 2021-01-31

## 2021-01-31 DIAGNOSIS — E11.9 DIABETES MELLITUS WITHOUT COMPLICATION: ICD-10-CM

## 2021-02-01 DIAGNOSIS — C43.59 MALIGNANT MELANOMA OF BACK: Primary | ICD-10-CM

## 2021-02-01 RX ORDER — INSULIN ASPART 100 [IU]/ML
12 INJECTION, SOLUTION INTRAVENOUS; SUBCUTANEOUS
Qty: 32.4 ML | Refills: 1 | Status: SHIPPED | OUTPATIENT
Start: 2021-02-01 | End: 2022-04-25 | Stop reason: SDUPTHER

## 2021-02-10 ENCOUNTER — IMMUNIZATION (OUTPATIENT)
Dept: PRIMARY CARE CLINIC | Facility: CLINIC | Age: 66
End: 2021-02-10
Payer: MEDICARE

## 2021-02-10 DIAGNOSIS — Z23 NEED FOR VACCINATION: Primary | ICD-10-CM

## 2021-02-10 PROCEDURE — 0001A PR IMMUNIZ ADMIN, SARS-COV-2 COVID-19 VACC, 30MCG/0.3ML, 1ST DOSE: CPT | Mod: PBBFAC | Performed by: INTERNAL MEDICINE

## 2021-02-10 PROCEDURE — 91300 PR SARS-COV- 2 COVID-19 VACCINE, NO PRSV, 30MCG/0.3ML, IM: CPT | Mod: PBBFAC | Performed by: INTERNAL MEDICINE

## 2021-02-10 RX ADMIN — RNA INGREDIENT BNT-162B2 0.3 ML: 0.23 INJECTION, SUSPENSION INTRAMUSCULAR at 03:02

## 2021-03-03 ENCOUNTER — IMMUNIZATION (OUTPATIENT)
Dept: PRIMARY CARE CLINIC | Facility: CLINIC | Age: 66
End: 2021-03-03
Payer: MEDICARE

## 2021-03-03 ENCOUNTER — PES CALL (OUTPATIENT)
Dept: ADMINISTRATIVE | Facility: CLINIC | Age: 66
End: 2021-03-03

## 2021-03-03 DIAGNOSIS — Z23 NEED FOR VACCINATION: Primary | ICD-10-CM

## 2021-03-03 PROCEDURE — 91300 PR SARS-COV- 2 COVID-19 VACCINE, NO PRSV, 30MCG/0.3ML, IM: CPT | Mod: S$GLB,,, | Performed by: INTERNAL MEDICINE

## 2021-03-03 PROCEDURE — 91300 PR SARS-COV- 2 COVID-19 VACCINE, NO PRSV, 30MCG/0.3ML, IM: ICD-10-PCS | Mod: S$GLB,,, | Performed by: INTERNAL MEDICINE

## 2021-03-03 PROCEDURE — 0002A PR IMMUNIZ ADMIN, SARS-COV-2 COVID-19 VACC, 30MCG/0.3ML, 2ND DOSE: ICD-10-PCS | Mod: CV19,S$GLB,, | Performed by: INTERNAL MEDICINE

## 2021-03-03 PROCEDURE — 0002A PR IMMUNIZ ADMIN, SARS-COV-2 COVID-19 VACC, 30MCG/0.3ML, 2ND DOSE: CPT | Mod: CV19,S$GLB,, | Performed by: INTERNAL MEDICINE

## 2021-03-03 RX ADMIN — Medication 0.3 ML: at 04:03

## 2021-04-17 ENCOUNTER — PATIENT MESSAGE (OUTPATIENT)
Dept: INTERNAL MEDICINE | Facility: CLINIC | Age: 66
End: 2021-04-17

## 2021-04-19 ENCOUNTER — PATIENT OUTREACH (OUTPATIENT)
Dept: ADMINISTRATIVE | Facility: HOSPITAL | Age: 66
End: 2021-04-19

## 2021-04-19 DIAGNOSIS — E11.9 DIABETES MELLITUS WITHOUT COMPLICATION: Primary | ICD-10-CM

## 2021-04-21 ENCOUNTER — PATIENT OUTREACH (OUTPATIENT)
Dept: ADMINISTRATIVE | Facility: OTHER | Age: 66
End: 2021-04-21

## 2021-04-22 ENCOUNTER — OFFICE VISIT (OUTPATIENT)
Dept: SPORTS MEDICINE | Facility: CLINIC | Age: 66
End: 2021-04-22
Payer: MEDICARE

## 2021-04-22 ENCOUNTER — HOSPITAL ENCOUNTER (OUTPATIENT)
Dept: RADIOLOGY | Facility: HOSPITAL | Age: 66
Discharge: HOME | End: 2021-04-22
Attending: ORTHOPAEDIC SURGERY
Payer: MEDICARE

## 2021-04-22 VITALS
HEIGHT: 68 IN | WEIGHT: 223 LBS | HEART RATE: 59 BPM | BODY MASS INDEX: 33.8 KG/M2 | SYSTOLIC BLOOD PRESSURE: 113 MMHG | DIASTOLIC BLOOD PRESSURE: 67 MMHG

## 2021-04-22 DIAGNOSIS — M25.512 LEFT SHOULDER PAIN, UNSPECIFIED CHRONICITY: ICD-10-CM

## 2021-04-22 DIAGNOSIS — M25.512 LEFT SHOULDER PAIN, UNSPECIFIED CHRONICITY: Primary | ICD-10-CM

## 2021-04-22 PROCEDURE — 3072F PR LOW RISK FOR RETINOPATHY: ICD-10-PCS | Mod: S$GLB,,, | Performed by: ORTHOPAEDIC SURGERY

## 2021-04-22 PROCEDURE — 3072F LOW RISK FOR RETINOPATHY: CPT | Mod: S$GLB,,, | Performed by: ORTHOPAEDIC SURGERY

## 2021-04-22 PROCEDURE — 1159F MED LIST DOCD IN RCRD: CPT | Mod: S$GLB,,, | Performed by: ORTHOPAEDIC SURGERY

## 2021-04-22 PROCEDURE — 3288F PR FALLS RISK ASSESSMENT DOCUMENTED: ICD-10-PCS | Mod: CPTII,S$GLB,, | Performed by: ORTHOPAEDIC SURGERY

## 2021-04-22 PROCEDURE — 1159F PR MEDICATION LIST DOCUMENTED IN MEDICAL RECORD: ICD-10-PCS | Mod: S$GLB,,, | Performed by: ORTHOPAEDIC SURGERY

## 2021-04-22 PROCEDURE — 73030 X-RAY EXAM OF SHOULDER: CPT | Mod: TC,LT

## 2021-04-22 PROCEDURE — 1126F PR PAIN SEVERITY QUANTIFIED, NO PAIN PRESENT: ICD-10-PCS | Mod: S$GLB,,, | Performed by: ORTHOPAEDIC SURGERY

## 2021-04-22 PROCEDURE — 99214 OFFICE O/P EST MOD 30 MIN: CPT | Mod: S$GLB,,, | Performed by: ORTHOPAEDIC SURGERY

## 2021-04-22 PROCEDURE — 73030 XR SHOULDER COMPLETE 2 OR MORE VIEWS LEFT: ICD-10-PCS | Mod: 26,LT,, | Performed by: RADIOLOGY

## 2021-04-22 PROCEDURE — 1101F PR PT FALLS ASSESS DOC 0-1 FALLS W/OUT INJ PAST YR: ICD-10-PCS | Mod: CPTII,S$GLB,, | Performed by: ORTHOPAEDIC SURGERY

## 2021-04-22 PROCEDURE — 99214 PR OFFICE/OUTPT VISIT, EST, LEVL IV, 30-39 MIN: ICD-10-PCS | Mod: S$GLB,,, | Performed by: ORTHOPAEDIC SURGERY

## 2021-04-22 PROCEDURE — 3288F FALL RISK ASSESSMENT DOCD: CPT | Mod: CPTII,S$GLB,, | Performed by: ORTHOPAEDIC SURGERY

## 2021-04-22 PROCEDURE — 3008F BODY MASS INDEX DOCD: CPT | Mod: CPTII,S$GLB,, | Performed by: ORTHOPAEDIC SURGERY

## 2021-04-22 PROCEDURE — 99999 PR PBB SHADOW E&M-EST. PATIENT-LVL IV: CPT | Mod: PBBFAC,,, | Performed by: ORTHOPAEDIC SURGERY

## 2021-04-22 PROCEDURE — 1126F AMNT PAIN NOTED NONE PRSNT: CPT | Mod: S$GLB,,, | Performed by: ORTHOPAEDIC SURGERY

## 2021-04-22 PROCEDURE — 73030 X-RAY EXAM OF SHOULDER: CPT | Mod: 26,LT,, | Performed by: RADIOLOGY

## 2021-04-22 PROCEDURE — 3008F PR BODY MASS INDEX (BMI) DOCUMENTED: ICD-10-PCS | Mod: CPTII,S$GLB,, | Performed by: ORTHOPAEDIC SURGERY

## 2021-04-22 PROCEDURE — 1101F PT FALLS ASSESS-DOCD LE1/YR: CPT | Mod: CPTII,S$GLB,, | Performed by: ORTHOPAEDIC SURGERY

## 2021-04-22 PROCEDURE — 99999 PR PBB SHADOW E&M-EST. PATIENT-LVL IV: ICD-10-PCS | Mod: PBBFAC,,, | Performed by: ORTHOPAEDIC SURGERY

## 2021-04-22 RX ORDER — DIAZEPAM 5 MG/1
5 TABLET ORAL ONCE AS NEEDED
Qty: 2 TABLET | Refills: 0 | Status: SHIPPED | OUTPATIENT
Start: 2021-04-22 | End: 2021-10-12 | Stop reason: SDUPTHER

## 2021-04-30 ENCOUNTER — HOSPITAL ENCOUNTER (OUTPATIENT)
Dept: RADIOLOGY | Facility: HOSPITAL | Age: 66
Discharge: HOME | End: 2021-04-30
Attending: STUDENT IN AN ORGANIZED HEALTH CARE EDUCATION/TRAINING PROGRAM
Payer: MEDICARE

## 2021-04-30 ENCOUNTER — OFFICE VISIT (OUTPATIENT)
Dept: INTERNAL MEDICINE | Facility: CLINIC | Age: 66
End: 2021-04-30
Payer: MEDICARE

## 2021-04-30 VITALS
WEIGHT: 224 LBS | BODY MASS INDEX: 33.95 KG/M2 | HEART RATE: 60 BPM | OXYGEN SATURATION: 95 % | DIASTOLIC BLOOD PRESSURE: 70 MMHG | SYSTOLIC BLOOD PRESSURE: 122 MMHG | HEIGHT: 68 IN

## 2021-04-30 DIAGNOSIS — E78.5 HYPERLIPIDEMIA, UNSPECIFIED HYPERLIPIDEMIA TYPE: ICD-10-CM

## 2021-04-30 DIAGNOSIS — Z12.5 ENCOUNTER FOR SCREENING FOR MALIGNANT NEOPLASM OF PROSTATE: ICD-10-CM

## 2021-04-30 DIAGNOSIS — H91.90 HEARING LOSS, UNSPECIFIED HEARING LOSS TYPE, UNSPECIFIED LATERALITY: ICD-10-CM

## 2021-04-30 DIAGNOSIS — E11.9 TYPE 2 DIABETES MELLITUS WITHOUT COMPLICATION, WITH LONG-TERM CURRENT USE OF INSULIN: ICD-10-CM

## 2021-04-30 DIAGNOSIS — M67.912 TENDINOPATHY OF ROTATOR CUFF, LEFT: ICD-10-CM

## 2021-04-30 DIAGNOSIS — I10 ESSENTIAL HYPERTENSION: ICD-10-CM

## 2021-04-30 DIAGNOSIS — Z00.00 ANNUAL PHYSICAL EXAM: Primary | ICD-10-CM

## 2021-04-30 DIAGNOSIS — M47.816 LUMBAR SPONDYLOSIS: ICD-10-CM

## 2021-04-30 DIAGNOSIS — Z79.4 TYPE 2 DIABETES MELLITUS WITHOUT COMPLICATION, WITH LONG-TERM CURRENT USE OF INSULIN: ICD-10-CM

## 2021-04-30 DIAGNOSIS — D22.9 NEVUS: ICD-10-CM

## 2021-04-30 DIAGNOSIS — C43.9 MALIGNANT MELANOMA, UNSPECIFIED SITE: ICD-10-CM

## 2021-04-30 DIAGNOSIS — I25.10 CORONARY ARTERY DISEASE INVOLVING NATIVE CORONARY ARTERY OF NATIVE HEART WITHOUT ANGINA PECTORIS: ICD-10-CM

## 2021-04-30 DIAGNOSIS — M25.512 LEFT SHOULDER PAIN, UNSPECIFIED CHRONICITY: ICD-10-CM

## 2021-04-30 LAB
ALBUMIN/CREAT UR: NORMAL UG/MG (ref 0–30)
CREAT UR-MCNC: 57 MG/DL (ref 23–375)
MICROALBUMIN UR DL<=1MG/L-MCNC: <2.5 UG/ML

## 2021-04-30 PROCEDURE — 99397 PER PM REEVAL EST PAT 65+ YR: CPT | Mod: S$GLB,,, | Performed by: INTERNAL MEDICINE

## 2021-04-30 PROCEDURE — 1101F PR PT FALLS ASSESS DOC 0-1 FALLS W/OUT INJ PAST YR: ICD-10-PCS | Mod: CPTII,S$GLB,, | Performed by: INTERNAL MEDICINE

## 2021-04-30 PROCEDURE — 82043 UR ALBUMIN QUANTITATIVE: CPT | Performed by: INTERNAL MEDICINE

## 2021-04-30 PROCEDURE — 3008F BODY MASS INDEX DOCD: CPT | Mod: CPTII,S$GLB,, | Performed by: INTERNAL MEDICINE

## 2021-04-30 PROCEDURE — 3288F PR FALLS RISK ASSESSMENT DOCUMENTED: ICD-10-PCS | Mod: CPTII,S$GLB,, | Performed by: INTERNAL MEDICINE

## 2021-04-30 PROCEDURE — 1101F PT FALLS ASSESS-DOCD LE1/YR: CPT | Mod: CPTII,S$GLB,, | Performed by: INTERNAL MEDICINE

## 2021-04-30 PROCEDURE — 3072F LOW RISK FOR RETINOPATHY: CPT | Mod: S$GLB,,, | Performed by: INTERNAL MEDICINE

## 2021-04-30 PROCEDURE — 99999 PR PBB SHADOW E&M-EST. PATIENT-LVL IV: ICD-10-PCS | Mod: PBBFAC,,, | Performed by: INTERNAL MEDICINE

## 2021-04-30 PROCEDURE — 73221 MRI JOINT UPR EXTREM W/O DYE: CPT | Mod: TC,LT

## 2021-04-30 PROCEDURE — 73221 MRI SHOULDER WITHOUT CONTRAST LEFT: ICD-10-PCS | Mod: 26,LT,, | Performed by: RADIOLOGY

## 2021-04-30 PROCEDURE — 82570 ASSAY OF URINE CREATININE: CPT | Performed by: INTERNAL MEDICINE

## 2021-04-30 PROCEDURE — 73221 MRI JOINT UPR EXTREM W/O DYE: CPT | Mod: 26,LT,, | Performed by: RADIOLOGY

## 2021-04-30 PROCEDURE — 3072F PR LOW RISK FOR RETINOPATHY: ICD-10-PCS | Mod: S$GLB,,, | Performed by: INTERNAL MEDICINE

## 2021-04-30 PROCEDURE — 3288F FALL RISK ASSESSMENT DOCD: CPT | Mod: CPTII,S$GLB,, | Performed by: INTERNAL MEDICINE

## 2021-04-30 PROCEDURE — 99999 PR PBB SHADOW E&M-EST. PATIENT-LVL IV: CPT | Mod: PBBFAC,,, | Performed by: INTERNAL MEDICINE

## 2021-04-30 PROCEDURE — 99397 PR PREVENTIVE VISIT,EST,65 & OVER: ICD-10-PCS | Mod: S$GLB,,, | Performed by: INTERNAL MEDICINE

## 2021-04-30 PROCEDURE — 3008F PR BODY MASS INDEX (BMI) DOCUMENTED: ICD-10-PCS | Mod: CPTII,S$GLB,, | Performed by: INTERNAL MEDICINE

## 2021-05-04 ENCOUNTER — PATIENT MESSAGE (OUTPATIENT)
Dept: HEMATOLOGY/ONCOLOGY | Facility: CLINIC | Age: 66
End: 2021-05-04

## 2021-05-04 ENCOUNTER — PATIENT MESSAGE (OUTPATIENT)
Dept: CARDIOLOGY | Facility: CLINIC | Age: 66
End: 2021-05-04

## 2021-05-04 ENCOUNTER — OFFICE VISIT (OUTPATIENT)
Dept: SPORTS MEDICINE | Facility: CLINIC | Age: 66
End: 2021-05-04
Payer: MEDICARE

## 2021-05-04 ENCOUNTER — PATIENT MESSAGE (OUTPATIENT)
Dept: INTERNAL MEDICINE | Facility: CLINIC | Age: 66
End: 2021-05-04

## 2021-05-04 VITALS
WEIGHT: 225 LBS | HEART RATE: 68 BPM | BODY MASS INDEX: 34.1 KG/M2 | DIASTOLIC BLOOD PRESSURE: 55 MMHG | SYSTOLIC BLOOD PRESSURE: 100 MMHG | HEIGHT: 68 IN

## 2021-05-04 DIAGNOSIS — Z01.818 PRE-OP TESTING: ICD-10-CM

## 2021-05-04 DIAGNOSIS — M75.102 ROTATOR CUFF SYNDROME OF LEFT SHOULDER: Primary | ICD-10-CM

## 2021-05-04 PROCEDURE — 1125F AMNT PAIN NOTED PAIN PRSNT: CPT | Mod: S$GLB,,, | Performed by: ORTHOPAEDIC SURGERY

## 2021-05-04 PROCEDURE — 99214 OFFICE O/P EST MOD 30 MIN: CPT | Mod: S$GLB,,, | Performed by: ORTHOPAEDIC SURGERY

## 2021-05-04 PROCEDURE — 3288F PR FALLS RISK ASSESSMENT DOCUMENTED: ICD-10-PCS | Mod: CPTII,S$GLB,, | Performed by: ORTHOPAEDIC SURGERY

## 2021-05-04 PROCEDURE — 99214 PR OFFICE/OUTPT VISIT, EST, LEVL IV, 30-39 MIN: ICD-10-PCS | Mod: S$GLB,,, | Performed by: ORTHOPAEDIC SURGERY

## 2021-05-04 PROCEDURE — 3072F LOW RISK FOR RETINOPATHY: CPT | Mod: S$GLB,,, | Performed by: ORTHOPAEDIC SURGERY

## 2021-05-04 PROCEDURE — 3008F PR BODY MASS INDEX (BMI) DOCUMENTED: ICD-10-PCS | Mod: CPTII,S$GLB,, | Performed by: ORTHOPAEDIC SURGERY

## 2021-05-04 PROCEDURE — 99999 PR PBB SHADOW E&M-EST. PATIENT-LVL IV: ICD-10-PCS | Mod: PBBFAC,,, | Performed by: ORTHOPAEDIC SURGERY

## 2021-05-04 PROCEDURE — 3008F BODY MASS INDEX DOCD: CPT | Mod: CPTII,S$GLB,, | Performed by: ORTHOPAEDIC SURGERY

## 2021-05-04 PROCEDURE — 3288F FALL RISK ASSESSMENT DOCD: CPT | Mod: CPTII,S$GLB,, | Performed by: ORTHOPAEDIC SURGERY

## 2021-05-04 PROCEDURE — 99999 PR PBB SHADOW E&M-EST. PATIENT-LVL IV: CPT | Mod: PBBFAC,,, | Performed by: ORTHOPAEDIC SURGERY

## 2021-05-04 PROCEDURE — 1159F MED LIST DOCD IN RCRD: CPT | Mod: S$GLB,,, | Performed by: ORTHOPAEDIC SURGERY

## 2021-05-04 PROCEDURE — 1159F PR MEDICATION LIST DOCUMENTED IN MEDICAL RECORD: ICD-10-PCS | Mod: S$GLB,,, | Performed by: ORTHOPAEDIC SURGERY

## 2021-05-04 PROCEDURE — 3072F PR LOW RISK FOR RETINOPATHY: ICD-10-PCS | Mod: S$GLB,,, | Performed by: ORTHOPAEDIC SURGERY

## 2021-05-04 PROCEDURE — 1101F PR PT FALLS ASSESS DOC 0-1 FALLS W/OUT INJ PAST YR: ICD-10-PCS | Mod: CPTII,S$GLB,, | Performed by: ORTHOPAEDIC SURGERY

## 2021-05-04 PROCEDURE — 1101F PT FALLS ASSESS-DOCD LE1/YR: CPT | Mod: CPTII,S$GLB,, | Performed by: ORTHOPAEDIC SURGERY

## 2021-05-04 PROCEDURE — 1125F PR PAIN SEVERITY QUANTIFIED, PAIN PRESENT: ICD-10-PCS | Mod: S$GLB,,, | Performed by: ORTHOPAEDIC SURGERY

## 2021-05-05 ENCOUNTER — TELEPHONE (OUTPATIENT)
Dept: SPORTS MEDICINE | Facility: CLINIC | Age: 66
End: 2021-05-05

## 2021-05-05 ENCOUNTER — PATIENT MESSAGE (OUTPATIENT)
Dept: CARDIOLOGY | Facility: CLINIC | Age: 66
End: 2021-05-05

## 2021-05-05 DIAGNOSIS — Z01.810 PRE-OPERATIVE CARDIOVASCULAR EXAMINATION: Primary | ICD-10-CM

## 2021-05-05 DIAGNOSIS — M19.012 ARTHRITIS OF LEFT ACROMIOCLAVICULAR JOINT: ICD-10-CM

## 2021-05-05 DIAGNOSIS — M75.102 NONTRAUMATIC TEAR OF LEFT ROTATOR CUFF, UNSPECIFIED TEAR EXTENT: Primary | ICD-10-CM

## 2021-05-05 DIAGNOSIS — M75.22 BICEPS TENDINITIS OF LEFT UPPER EXTREMITY: ICD-10-CM

## 2021-05-06 ENCOUNTER — OFFICE VISIT (OUTPATIENT)
Dept: CARDIOLOGY | Facility: CLINIC | Age: 66
End: 2021-05-06
Payer: MEDICARE

## 2021-05-06 VITALS
HEART RATE: 56 BPM | DIASTOLIC BLOOD PRESSURE: 61 MMHG | HEIGHT: 68 IN | BODY MASS INDEX: 34.11 KG/M2 | SYSTOLIC BLOOD PRESSURE: 104 MMHG | WEIGHT: 225.06 LBS

## 2021-05-06 DIAGNOSIS — Z95.1 S/P CABG (CORONARY ARTERY BYPASS GRAFT): ICD-10-CM

## 2021-05-06 DIAGNOSIS — R94.39 ABNORMAL CARDIOVASCULAR STRESS TEST: ICD-10-CM

## 2021-05-06 DIAGNOSIS — M54.40 LOW BACK PAIN WITH SCIATICA, SCIATICA LATERALITY UNSPECIFIED, UNSPECIFIED BACK PAIN LATERALITY, UNSPECIFIED CHRONICITY: ICD-10-CM

## 2021-05-06 DIAGNOSIS — E78.5 DYSLIPIDEMIA: ICD-10-CM

## 2021-05-06 DIAGNOSIS — Z01.810 PREOP CARDIOVASCULAR EXAM: Primary | ICD-10-CM

## 2021-05-06 DIAGNOSIS — G89.29 OTHER CHRONIC PAIN: ICD-10-CM

## 2021-05-06 DIAGNOSIS — I87.2 VENOUS INSUFFICIENCY: ICD-10-CM

## 2021-05-06 DIAGNOSIS — I25.10 CORONARY ARTERY DISEASE INVOLVING NATIVE CORONARY ARTERY OF NATIVE HEART WITHOUT ANGINA PECTORIS: ICD-10-CM

## 2021-05-06 DIAGNOSIS — Z01.810 PRE-OPERATIVE CARDIOVASCULAR EXAMINATION: ICD-10-CM

## 2021-05-06 PROCEDURE — 3008F BODY MASS INDEX DOCD: CPT | Mod: CPTII,S$GLB,, | Performed by: INTERNAL MEDICINE

## 2021-05-06 PROCEDURE — 99215 PR OFFICE/OUTPT VISIT, EST, LEVL V, 40-54 MIN: ICD-10-PCS | Mod: S$GLB,,, | Performed by: INTERNAL MEDICINE

## 2021-05-06 PROCEDURE — 3072F PR LOW RISK FOR RETINOPATHY: ICD-10-PCS | Mod: S$GLB,,, | Performed by: INTERNAL MEDICINE

## 2021-05-06 PROCEDURE — 93000 EKG 12-LEAD: ICD-10-PCS | Mod: S$GLB,,, | Performed by: INTERNAL MEDICINE

## 2021-05-06 PROCEDURE — 1159F MED LIST DOCD IN RCRD: CPT | Mod: S$GLB,,, | Performed by: INTERNAL MEDICINE

## 2021-05-06 PROCEDURE — 1159F PR MEDICATION LIST DOCUMENTED IN MEDICAL RECORD: ICD-10-PCS | Mod: S$GLB,,, | Performed by: INTERNAL MEDICINE

## 2021-05-06 PROCEDURE — 1125F AMNT PAIN NOTED PAIN PRSNT: CPT | Mod: S$GLB,,, | Performed by: INTERNAL MEDICINE

## 2021-05-06 PROCEDURE — 1125F PR PAIN SEVERITY QUANTIFIED, PAIN PRESENT: ICD-10-PCS | Mod: S$GLB,,, | Performed by: INTERNAL MEDICINE

## 2021-05-06 PROCEDURE — 3072F LOW RISK FOR RETINOPATHY: CPT | Mod: S$GLB,,, | Performed by: INTERNAL MEDICINE

## 2021-05-06 PROCEDURE — 99999 PR PBB SHADOW E&M-EST. PATIENT-LVL III: CPT | Mod: PBBFAC,,, | Performed by: INTERNAL MEDICINE

## 2021-05-06 PROCEDURE — 3008F PR BODY MASS INDEX (BMI) DOCUMENTED: ICD-10-PCS | Mod: CPTII,S$GLB,, | Performed by: INTERNAL MEDICINE

## 2021-05-06 PROCEDURE — 93000 ELECTROCARDIOGRAM COMPLETE: CPT | Mod: S$GLB,,, | Performed by: INTERNAL MEDICINE

## 2021-05-06 PROCEDURE — 99215 OFFICE O/P EST HI 40 MIN: CPT | Mod: S$GLB,,, | Performed by: INTERNAL MEDICINE

## 2021-05-06 PROCEDURE — 99999 PR PBB SHADOW E&M-EST. PATIENT-LVL III: ICD-10-PCS | Mod: PBBFAC,,, | Performed by: INTERNAL MEDICINE

## 2021-05-09 ENCOUNTER — LAB VISIT (OUTPATIENT)
Dept: SPORTS MEDICINE | Facility: CLINIC | Age: 66
End: 2021-05-09
Payer: MEDICARE

## 2021-05-09 DIAGNOSIS — Z01.818 PRE-OP TESTING: ICD-10-CM

## 2021-05-09 PROCEDURE — U0005 INFEC AGEN DETEC AMPLI PROBE: HCPCS | Performed by: ORTHOPAEDIC SURGERY

## 2021-05-09 PROCEDURE — U0003 INFECTIOUS AGENT DETECTION BY NUCLEIC ACID (DNA OR RNA); SEVERE ACUTE RESPIRATORY SYNDROME CORONAVIRUS 2 (SARS-COV-2) (CORONAVIRUS DISEASE [COVID-19]), AMPLIFIED PROBE TECHNIQUE, MAKING USE OF HIGH THROUGHPUT TECHNOLOGIES AS DESCRIBED BY CMS-2020-01-R: HCPCS | Performed by: ORTHOPAEDIC SURGERY

## 2021-05-10 LAB — SARS-COV-2 RNA RESP QL NAA+PROBE: NOT DETECTED

## 2021-05-11 ENCOUNTER — ANESTHESIA EVENT (OUTPATIENT)
Dept: SURGERY | Facility: HOSPITAL | Age: 66
End: 2021-05-11
Payer: MEDICARE

## 2021-05-11 ENCOUNTER — OFFICE VISIT (OUTPATIENT)
Dept: SPORTS MEDICINE | Facility: CLINIC | Age: 66
End: 2021-05-11
Payer: MEDICARE

## 2021-05-11 VITALS
DIASTOLIC BLOOD PRESSURE: 66 MMHG | BODY MASS INDEX: 34.71 KG/M2 | WEIGHT: 229 LBS | SYSTOLIC BLOOD PRESSURE: 110 MMHG | HEIGHT: 68 IN | HEART RATE: 58 BPM

## 2021-05-11 DIAGNOSIS — M75.102 NONTRAUMATIC TEAR OF LEFT ROTATOR CUFF, UNSPECIFIED TEAR EXTENT: Primary | ICD-10-CM

## 2021-05-11 PROCEDURE — 99999 PR PBB SHADOW E&M-EST. PATIENT-LVL V: ICD-10-PCS | Mod: PBBFAC,,, | Performed by: PHYSICIAN ASSISTANT

## 2021-05-11 PROCEDURE — 99499 NO LOS: ICD-10-PCS | Mod: S$GLB,,, | Performed by: PHYSICIAN ASSISTANT

## 2021-05-11 PROCEDURE — 99499 UNLISTED E&M SERVICE: CPT | Mod: S$GLB,,, | Performed by: PHYSICIAN ASSISTANT

## 2021-05-11 PROCEDURE — 1101F PR PT FALLS ASSESS DOC 0-1 FALLS W/OUT INJ PAST YR: ICD-10-PCS | Mod: CPTII,S$GLB,, | Performed by: PHYSICIAN ASSISTANT

## 2021-05-11 PROCEDURE — 3008F BODY MASS INDEX DOCD: CPT | Mod: CPTII,S$GLB,, | Performed by: PHYSICIAN ASSISTANT

## 2021-05-11 PROCEDURE — 3288F PR FALLS RISK ASSESSMENT DOCUMENTED: ICD-10-PCS | Mod: CPTII,S$GLB,, | Performed by: PHYSICIAN ASSISTANT

## 2021-05-11 PROCEDURE — 3072F PR LOW RISK FOR RETINOPATHY: ICD-10-PCS | Mod: S$GLB,,, | Performed by: PHYSICIAN ASSISTANT

## 2021-05-11 PROCEDURE — 99999 PR PBB SHADOW E&M-EST. PATIENT-LVL V: CPT | Mod: PBBFAC,,, | Performed by: PHYSICIAN ASSISTANT

## 2021-05-11 PROCEDURE — 1125F PR PAIN SEVERITY QUANTIFIED, PAIN PRESENT: ICD-10-PCS | Mod: S$GLB,,, | Performed by: PHYSICIAN ASSISTANT

## 2021-05-11 PROCEDURE — 3072F LOW RISK FOR RETINOPATHY: CPT | Mod: S$GLB,,, | Performed by: PHYSICIAN ASSISTANT

## 2021-05-11 PROCEDURE — 1101F PT FALLS ASSESS-DOCD LE1/YR: CPT | Mod: CPTII,S$GLB,, | Performed by: PHYSICIAN ASSISTANT

## 2021-05-11 PROCEDURE — 3008F PR BODY MASS INDEX (BMI) DOCUMENTED: ICD-10-PCS | Mod: CPTII,S$GLB,, | Performed by: PHYSICIAN ASSISTANT

## 2021-05-11 PROCEDURE — 1125F AMNT PAIN NOTED PAIN PRSNT: CPT | Mod: S$GLB,,, | Performed by: PHYSICIAN ASSISTANT

## 2021-05-11 PROCEDURE — 3288F FALL RISK ASSESSMENT DOCD: CPT | Mod: CPTII,S$GLB,, | Performed by: PHYSICIAN ASSISTANT

## 2021-05-11 RX ORDER — CEFAZOLIN SODIUM 2 G/50ML
2 SOLUTION INTRAVENOUS
Status: CANCELLED | OUTPATIENT
Start: 2021-05-11

## 2021-05-11 RX ORDER — PROMETHAZINE HYDROCHLORIDE 25 MG/1
25 TABLET ORAL EVERY 6 HOURS PRN
Qty: 20 TABLET | Refills: 0 | Status: SHIPPED | OUTPATIENT
Start: 2021-05-11 | End: 2021-11-23

## 2021-05-11 RX ORDER — OXYCODONE AND ACETAMINOPHEN 10; 325 MG/1; MG/1
1 TABLET ORAL EVERY 6 HOURS PRN
Qty: 28 TABLET | Refills: 0 | Status: SHIPPED | OUTPATIENT
Start: 2021-05-11 | End: 2021-11-23

## 2021-05-11 RX ORDER — SODIUM CHLORIDE 9 MG/ML
INJECTION, SOLUTION INTRAVENOUS CONTINUOUS
Status: CANCELLED | OUTPATIENT
Start: 2021-05-11

## 2021-05-11 RX ORDER — TRAMADOL HYDROCHLORIDE 50 MG/1
50 TABLET ORAL EVERY 6 HOURS PRN
Qty: 28 TABLET | Refills: 0 | Status: SHIPPED | OUTPATIENT
Start: 2021-05-11 | End: 2022-01-26

## 2021-05-11 RX ORDER — ASPIRIN 325 MG
325 TABLET ORAL DAILY
Qty: 21 TABLET | Refills: 0 | Status: SHIPPED | OUTPATIENT
Start: 2021-05-11 | End: 2021-11-23

## 2021-05-12 ENCOUNTER — HOSPITAL ENCOUNTER (OUTPATIENT)
Facility: HOSPITAL | Age: 66
Discharge: HOME | End: 2021-05-12
Attending: ORTHOPAEDIC SURGERY | Admitting: ORTHOPAEDIC SURGERY
Payer: MEDICARE

## 2021-05-12 ENCOUNTER — ANESTHESIA (OUTPATIENT)
Dept: SURGERY | Facility: HOSPITAL | Age: 66
End: 2021-05-12
Payer: MEDICARE

## 2021-05-12 VITALS
TEMPERATURE: 98 F | HEART RATE: 61 BPM | OXYGEN SATURATION: 99 % | DIASTOLIC BLOOD PRESSURE: 60 MMHG | BODY MASS INDEX: 34.1 KG/M2 | RESPIRATION RATE: 17 BRPM | SYSTOLIC BLOOD PRESSURE: 126 MMHG | HEIGHT: 68 IN | WEIGHT: 225 LBS

## 2021-05-12 DIAGNOSIS — M75.102 NONTRAUMATIC TEAR OF LEFT ROTATOR CUFF, UNSPECIFIED TEAR EXTENT: Primary | ICD-10-CM

## 2021-05-12 LAB — POCT GLUCOSE: 171 MG/DL (ref 70–110)

## 2021-05-12 PROCEDURE — D9220A PRA ANESTHESIA: ICD-10-PCS | Mod: ANES,,, | Performed by: ANESTHESIOLOGY

## 2021-05-12 PROCEDURE — 64450 PR NERVE BLOCK INJ, ANES/STEROID, OTHER PERIPHERAL: ICD-10-PCS | Mod: 59,LT,, | Performed by: ANESTHESIOLOGY

## 2021-05-12 PROCEDURE — 23430 PR REPAIR BICEPS LONG TENDON: ICD-10-PCS | Mod: 51,LT,, | Performed by: ORTHOPAEDIC SURGERY

## 2021-05-12 PROCEDURE — 37000009 HC ANESTHESIA EA ADD 15 MINS: Performed by: ORTHOPAEDIC SURGERY

## 2021-05-12 PROCEDURE — 64450 NJX AA&/STRD OTHER PN/BRANCH: CPT | Mod: 59,LT,, | Performed by: ANESTHESIOLOGY

## 2021-05-12 PROCEDURE — 63600175 PHARM REV CODE 636 W HCPCS: Performed by: ANESTHESIOLOGY

## 2021-05-12 PROCEDURE — D9220A PRA ANESTHESIA: Mod: CRNA,,, | Performed by: NURSE ANESTHETIST, CERTIFIED REGISTERED

## 2021-05-12 PROCEDURE — 27201423 OPTIME MED/SURG SUP & DEVICES STERILE SUPPLY: Performed by: ORTHOPAEDIC SURGERY

## 2021-05-12 PROCEDURE — 29826 PR SHLDR ARTHROSCOP,PART ACROMIOPLAS: ICD-10-PCS | Mod: LT,,, | Performed by: ORTHOPAEDIC SURGERY

## 2021-05-12 PROCEDURE — 64416 NJX AA&/STRD BRCH PL NFS IMG: CPT | Mod: 59,LT,, | Performed by: ANESTHESIOLOGY

## 2021-05-12 PROCEDURE — 29824 SHO ARTHRS SRG DSTL CLAVICLC: CPT | Mod: 51,LT,, | Performed by: ORTHOPAEDIC SURGERY

## 2021-05-12 PROCEDURE — D9220A PRA ANESTHESIA: Mod: ANES,,, | Performed by: ANESTHESIOLOGY

## 2021-05-12 PROCEDURE — 76942 ECHO GUIDE FOR BIOPSY: CPT | Mod: 26,,, | Performed by: ANESTHESIOLOGY

## 2021-05-12 PROCEDURE — 63600175 PHARM REV CODE 636 W HCPCS: Performed by: STUDENT IN AN ORGANIZED HEALTH CARE EDUCATION/TRAINING PROGRAM

## 2021-05-12 PROCEDURE — 71000015 HC POSTOP RECOV 1ST HR: Performed by: ORTHOPAEDIC SURGERY

## 2021-05-12 PROCEDURE — 64416 NJX AA&/STRD BRCH PL NFS IMG: CPT | Mod: 59,LT | Performed by: STUDENT IN AN ORGANIZED HEALTH CARE EDUCATION/TRAINING PROGRAM

## 2021-05-12 PROCEDURE — 25000003 PHARM REV CODE 250: Performed by: PHYSICIAN ASSISTANT

## 2021-05-12 PROCEDURE — 99900035 HC TECH TIME PER 15 MIN (STAT)

## 2021-05-12 PROCEDURE — 25000003 PHARM REV CODE 250: Performed by: STUDENT IN AN ORGANIZED HEALTH CARE EDUCATION/TRAINING PROGRAM

## 2021-05-12 PROCEDURE — 64416 PR NERVE BLOCK INJ, ANES/STEROID, BRACHIAL PLEXUS, CONT INFUSION, INCL IMAG GUIDANCE: ICD-10-PCS | Mod: 59,LT,, | Performed by: ANESTHESIOLOGY

## 2021-05-12 PROCEDURE — C1713 ANCHOR/SCREW BN/BN,TIS/BN: HCPCS | Performed by: ORTHOPAEDIC SURGERY

## 2021-05-12 PROCEDURE — 76942 PR U/S GUIDANCE FOR NEEDLE GUIDANCE: ICD-10-PCS | Mod: 26,,, | Performed by: ANESTHESIOLOGY

## 2021-05-12 PROCEDURE — 63600175 PHARM REV CODE 636 W HCPCS: Performed by: ORTHOPAEDIC SURGERY

## 2021-05-12 PROCEDURE — D9220A PRA ANESTHESIA: ICD-10-PCS | Mod: CRNA,,, | Performed by: NURSE ANESTHETIST, CERTIFIED REGISTERED

## 2021-05-12 PROCEDURE — 29827 SHO ARTHRS SRG RT8TR CUF RPR: CPT | Mod: LT,,, | Performed by: ORTHOPAEDIC SURGERY

## 2021-05-12 PROCEDURE — 25000003 PHARM REV CODE 250: Performed by: NURSE ANESTHETIST, CERTIFIED REGISTERED

## 2021-05-12 PROCEDURE — 64450 NJX AA&/STRD OTHER PN/BRANCH: CPT | Mod: 59,LT | Performed by: ANESTHESIOLOGY

## 2021-05-12 PROCEDURE — 29826 SHO ARTHRS SRG DECOMPRESSION: CPT | Mod: LT,,, | Performed by: ORTHOPAEDIC SURGERY

## 2021-05-12 PROCEDURE — 36000711: Performed by: ORTHOPAEDIC SURGERY

## 2021-05-12 PROCEDURE — 71000016 HC POSTOP RECOV ADDL HR: Performed by: ORTHOPAEDIC SURGERY

## 2021-05-12 PROCEDURE — 37000008 HC ANESTHESIA 1ST 15 MINUTES: Performed by: ORTHOPAEDIC SURGERY

## 2021-05-12 PROCEDURE — 23430 REPAIR BICEPS TENDON: CPT | Mod: 51,LT,, | Performed by: ORTHOPAEDIC SURGERY

## 2021-05-12 PROCEDURE — 63600175 PHARM REV CODE 636 W HCPCS: Performed by: NURSE ANESTHETIST, CERTIFIED REGISTERED

## 2021-05-12 PROCEDURE — 36000710: Performed by: ORTHOPAEDIC SURGERY

## 2021-05-12 PROCEDURE — 94761 N-INVAS EAR/PLS OXIMETRY MLT: CPT

## 2021-05-12 PROCEDURE — 63600175 PHARM REV CODE 636 W HCPCS: Performed by: PHYSICIAN ASSISTANT

## 2021-05-12 PROCEDURE — 27100025 HC TUBING, SET FLUID WARMER: Performed by: ANESTHESIOLOGY

## 2021-05-12 PROCEDURE — 29827 PR SHLDR ARTHROSCOP,SURG,W/ROTAT CUFF REPR: ICD-10-PCS | Mod: LT,,, | Performed by: ORTHOPAEDIC SURGERY

## 2021-05-12 PROCEDURE — 71000033 HC RECOVERY, INTIAL HOUR: Performed by: ORTHOPAEDIC SURGERY

## 2021-05-12 PROCEDURE — 82962 GLUCOSE BLOOD TEST: CPT | Performed by: ORTHOPAEDIC SURGERY

## 2021-05-12 PROCEDURE — 29824 PR SHLDR ARTHROSCOP,SURG,DIS CLAVICULECTOMY: ICD-10-PCS | Mod: 51,LT,, | Performed by: ORTHOPAEDIC SURGERY

## 2021-05-12 DEVICE — ANCHOR FORKED TIP 7MM PEEI: Type: IMPLANTABLE DEVICE | Site: SHOULDER | Status: FUNCTIONAL

## 2021-05-12 DEVICE — ANCHOR HEALIX 5.5 ADV BR3: Type: IMPLANTABLE DEVICE | Site: SHOULDER | Status: FUNCTIONAL

## 2021-05-12 RX ORDER — ROPIVACAINE HYDROCHLORIDE 2 MG/ML
INJECTION, SOLUTION EPIDURAL; INFILTRATION; PERINEURAL CONTINUOUS
Status: DISPENSED | OUTPATIENT
Start: 2021-05-12

## 2021-05-12 RX ORDER — FENTANYL CITRATE 50 UG/ML
25 INJECTION, SOLUTION INTRAMUSCULAR; INTRAVENOUS EVERY 5 MIN PRN
Status: DISPENSED | OUTPATIENT
Start: 2021-05-12

## 2021-05-12 RX ORDER — MIDAZOLAM HYDROCHLORIDE 1 MG/ML
INJECTION, SOLUTION INTRAMUSCULAR; INTRAVENOUS
Status: DISCONTINUED | OUTPATIENT
Start: 2021-05-12 | End: 2021-05-12

## 2021-05-12 RX ORDER — OXYCODONE HYDROCHLORIDE 5 MG/1
5 TABLET ORAL
Status: DISCONTINUED | OUTPATIENT
Start: 2021-05-12 | End: 2021-05-12 | Stop reason: HOSPADM

## 2021-05-12 RX ORDER — EPHEDRINE SULFATE 50 MG/ML
INJECTION, SOLUTION INTRAVENOUS
Status: DISCONTINUED | OUTPATIENT
Start: 2021-05-12 | End: 2021-05-12

## 2021-05-12 RX ORDER — FAMOTIDINE 10 MG/ML
INJECTION INTRAVENOUS
Status: DISCONTINUED | OUTPATIENT
Start: 2021-05-12 | End: 2021-05-12

## 2021-05-12 RX ORDER — FENTANYL CITRATE 50 UG/ML
INJECTION, SOLUTION INTRAMUSCULAR; INTRAVENOUS
Status: DISCONTINUED | OUTPATIENT
Start: 2021-05-12 | End: 2021-05-12

## 2021-05-12 RX ORDER — ROPIVACAINE HYDROCHLORIDE 5 MG/ML
INJECTION, SOLUTION EPIDURAL; INFILTRATION; PERINEURAL
Status: COMPLETED | OUTPATIENT
Start: 2021-05-12 | End: 2021-05-12

## 2021-05-12 RX ORDER — KETAMINE HCL IN 0.9 % NACL 50 MG/5 ML
SYRINGE (ML) INTRAVENOUS
Status: DISCONTINUED | OUTPATIENT
Start: 2021-05-12 | End: 2021-05-12

## 2021-05-12 RX ORDER — SODIUM CHLORIDE 9 MG/ML
INJECTION, SOLUTION INTRAVENOUS CONTINUOUS
Status: DISCONTINUED | OUTPATIENT
Start: 2021-05-12 | End: 2021-05-12 | Stop reason: HOSPADM

## 2021-05-12 RX ORDER — FENTANYL CITRATE 50 UG/ML
25 INJECTION, SOLUTION INTRAMUSCULAR; INTRAVENOUS EVERY 5 MIN PRN
Status: DISCONTINUED | OUTPATIENT
Start: 2021-05-12 | End: 2021-05-12 | Stop reason: HOSPADM

## 2021-05-12 RX ORDER — SODIUM CHLORIDE 0.9 % (FLUSH) 0.9 %
10 SYRINGE (ML) INJECTION
Status: DISCONTINUED | OUTPATIENT
Start: 2021-05-12 | End: 2021-05-12 | Stop reason: HOSPADM

## 2021-05-12 RX ORDER — DEXAMETHASONE SODIUM PHOSPHATE 4 MG/ML
INJECTION, SOLUTION INTRA-ARTICULAR; INTRALESIONAL; INTRAMUSCULAR; INTRAVENOUS; SOFT TISSUE
Status: DISCONTINUED | OUTPATIENT
Start: 2021-05-12 | End: 2021-05-12

## 2021-05-12 RX ORDER — EPINEPHRINE 1 MG/ML
INJECTION, SOLUTION INTRACARDIAC; INTRAMUSCULAR; INTRAVENOUS; SUBCUTANEOUS
Status: DISCONTINUED | OUTPATIENT
Start: 2021-05-12 | End: 2021-05-12 | Stop reason: HOSPADM

## 2021-05-12 RX ORDER — CEFAZOLIN SODIUM 1 G/3ML
2 INJECTION, POWDER, FOR SOLUTION INTRAMUSCULAR; INTRAVENOUS
Status: COMPLETED | OUTPATIENT
Start: 2021-05-12 | End: 2021-05-12

## 2021-05-12 RX ORDER — CARBOXYMETHYLCELLULOSE SODIUM 10 MG/ML
GEL OPHTHALMIC
Status: DISCONTINUED | OUTPATIENT
Start: 2021-05-12 | End: 2021-05-12

## 2021-05-12 RX ORDER — ONDANSETRON 2 MG/ML
INJECTION INTRAMUSCULAR; INTRAVENOUS
Status: DISCONTINUED | OUTPATIENT
Start: 2021-05-12 | End: 2021-05-12

## 2021-05-12 RX ORDER — CELECOXIB 200 MG/1
400 CAPSULE ORAL ONCE
Status: COMPLETED | OUTPATIENT
Start: 2021-05-12 | End: 2021-05-12

## 2021-05-12 RX ORDER — ONDANSETRON 2 MG/ML
4 INJECTION INTRAMUSCULAR; INTRAVENOUS DAILY PRN
Status: DISCONTINUED | OUTPATIENT
Start: 2021-05-12 | End: 2021-05-12 | Stop reason: HOSPADM

## 2021-05-12 RX ORDER — PHENYLEPHRINE HYDROCHLORIDE 10 MG/ML
INJECTION INTRAVENOUS
Status: DISCONTINUED | OUTPATIENT
Start: 2021-05-12 | End: 2021-05-12

## 2021-05-12 RX ORDER — HALOPERIDOL 5 MG/ML
0.5 INJECTION INTRAMUSCULAR EVERY 10 MIN PRN
Status: DISCONTINUED | OUTPATIENT
Start: 2021-05-12 | End: 2021-05-12 | Stop reason: HOSPADM

## 2021-05-12 RX ORDER — MIDAZOLAM HYDROCHLORIDE 1 MG/ML
0.5 INJECTION INTRAMUSCULAR; INTRAVENOUS
Status: DISPENSED | OUTPATIENT
Start: 2021-05-12

## 2021-05-12 RX ORDER — PROPOFOL 10 MG/ML
VIAL (ML) INTRAVENOUS
Status: DISCONTINUED | OUTPATIENT
Start: 2021-05-12 | End: 2021-05-12

## 2021-05-12 RX ORDER — ACETAMINOPHEN 500 MG
1000 TABLET ORAL
Status: COMPLETED | OUTPATIENT
Start: 2021-05-12 | End: 2021-05-12

## 2021-05-12 RX ORDER — NEOSTIGMINE METHYLSULFATE 1 MG/ML
INJECTION, SOLUTION INTRAVENOUS
Status: DISCONTINUED | OUTPATIENT
Start: 2021-05-12 | End: 2021-05-12

## 2021-05-12 RX ORDER — LIDOCAINE HYDROCHLORIDE 20 MG/ML
INJECTION INTRAVENOUS
Status: DISCONTINUED | OUTPATIENT
Start: 2021-05-12 | End: 2021-05-12

## 2021-05-12 RX ORDER — ROCURONIUM BROMIDE 10 MG/ML
INJECTION, SOLUTION INTRAVENOUS
Status: DISCONTINUED | OUTPATIENT
Start: 2021-05-12 | End: 2021-05-12

## 2021-05-12 RX ADMIN — ROCURONIUM BROMIDE 50 MG: 10 INJECTION, SOLUTION INTRAVENOUS at 01:05

## 2021-05-12 RX ADMIN — Medication 10 MG: at 04:05

## 2021-05-12 RX ADMIN — ONDANSETRON 4 MG: 2 INJECTION, SOLUTION INTRAMUSCULAR; INTRAVENOUS at 03:05

## 2021-05-12 RX ADMIN — PROPOFOL 150 MG: 10 INJECTION, EMULSION INTRAVENOUS at 01:05

## 2021-05-12 RX ADMIN — MIDAZOLAM HYDROCHLORIDE 1 MG: 1 INJECTION, SOLUTION INTRAMUSCULAR; INTRAVENOUS at 01:05

## 2021-05-12 RX ADMIN — LIDOCAINE HYDROCHLORIDE 100 MG: 20 INJECTION, SOLUTION INTRAVENOUS at 01:05

## 2021-05-12 RX ADMIN — MIDAZOLAM HYDROCHLORIDE 2 MG: 1 INJECTION, SOLUTION INTRAMUSCULAR; INTRAVENOUS at 12:05

## 2021-05-12 RX ADMIN — PHENYLEPHRINE HYDROCHLORIDE 200 MCG: 10 INJECTION INTRAVENOUS at 01:05

## 2021-05-12 RX ADMIN — FENTANYL CITRATE 50 MCG: 50 INJECTION INTRAMUSCULAR; INTRAVENOUS at 12:05

## 2021-05-12 RX ADMIN — EPHEDRINE SULFATE 5 MG: 50 INJECTION INTRAVENOUS at 02:05

## 2021-05-12 RX ADMIN — CEFAZOLIN 2 G: 330 INJECTION, POWDER, FOR SOLUTION INTRAMUSCULAR; INTRAVENOUS at 01:05

## 2021-05-12 RX ADMIN — EPHEDRINE SULFATE 5 MG: 50 INJECTION INTRAVENOUS at 01:05

## 2021-05-12 RX ADMIN — GLYCOPYRROLATE 0.6 MCG: 0.2 INJECTION, SOLUTION INTRAMUSCULAR; INTRAVITREAL at 04:05

## 2021-05-12 RX ADMIN — NEOSTIGMINE METHYLSULFATE 4 MG: 1 INJECTION INTRAVENOUS at 04:05

## 2021-05-12 RX ADMIN — CARBOXYMETHYLCELLULOSE SODIUM 4 DROP: 10 GEL OPHTHALMIC at 01:05

## 2021-05-12 RX ADMIN — FENTANYL CITRATE 75 MCG: 50 INJECTION, SOLUTION INTRAMUSCULAR; INTRAVENOUS at 01:05

## 2021-05-12 RX ADMIN — PROPOFOL 20 MG: 10 INJECTION, EMULSION INTRAVENOUS at 04:05

## 2021-05-12 RX ADMIN — DEXAMETHASONE SODIUM PHOSPHATE 8 MG: 4 INJECTION, SOLUTION INTRAMUSCULAR; INTRAVENOUS at 01:05

## 2021-05-12 RX ADMIN — CELECOXIB 400 MG: 200 CAPSULE ORAL at 11:05

## 2021-05-12 RX ADMIN — Medication: at 04:05

## 2021-05-12 RX ADMIN — SODIUM CHLORIDE: 0.9 INJECTION, SOLUTION INTRAVENOUS at 11:05

## 2021-05-12 RX ADMIN — FENTANYL CITRATE 25 MCG: 50 INJECTION, SOLUTION INTRAMUSCULAR; INTRAVENOUS at 04:05

## 2021-05-12 RX ADMIN — Medication 10 MG: at 03:05

## 2021-05-12 RX ADMIN — ACETAMINOPHEN 1000 MG: 500 TABLET ORAL at 11:05

## 2021-05-12 RX ADMIN — ROPIVACAINE HYDROCHLORIDE 7.5 ML: 5 INJECTION, SOLUTION EPIDURAL; INFILTRATION; PERINEURAL at 12:05

## 2021-05-12 RX ADMIN — ROPIVACAINE HYDROCHLORIDE 10 ML: 5 INJECTION, SOLUTION EPIDURAL; INFILTRATION; PERINEURAL at 01:05

## 2021-05-12 RX ADMIN — FAMOTIDINE 20 MG: 10 INJECTION, SOLUTION INTRAVENOUS at 01:05

## 2021-05-17 ENCOUNTER — TELEPHONE (OUTPATIENT)
Dept: SPORTS MEDICINE | Facility: CLINIC | Age: 66
End: 2021-05-17

## 2021-05-17 ENCOUNTER — DOCUMENTATION ONLY (OUTPATIENT)
Dept: REHABILITATION | Facility: HOSPITAL | Age: 66
End: 2021-05-17

## 2021-05-17 ENCOUNTER — CLINICAL SUPPORT (OUTPATIENT)
Dept: REHABILITATION | Facility: HOSPITAL | Age: 66
End: 2021-05-17
Attending: ORTHOPAEDIC SURGERY
Payer: MEDICARE

## 2021-05-17 DIAGNOSIS — M75.102 NONTRAUMATIC TEAR OF LEFT ROTATOR CUFF, UNSPECIFIED TEAR EXTENT: ICD-10-CM

## 2021-05-18 ENCOUNTER — TELEPHONE (OUTPATIENT)
Dept: SPORTS MEDICINE | Facility: CLINIC | Age: 66
End: 2021-05-18

## 2021-05-18 ENCOUNTER — PATIENT MESSAGE (OUTPATIENT)
Dept: SPORTS MEDICINE | Facility: CLINIC | Age: 66
End: 2021-05-18

## 2021-05-18 DIAGNOSIS — M75.102 NONTRAUMATIC TEAR OF LEFT ROTATOR CUFF, UNSPECIFIED TEAR EXTENT: Primary | ICD-10-CM

## 2021-05-19 ENCOUNTER — CLINICAL SUPPORT (OUTPATIENT)
Dept: REHABILITATION | Facility: HOSPITAL | Age: 66
End: 2021-05-19
Payer: MEDICARE

## 2021-05-19 DIAGNOSIS — M25.619 LIMITED RANGE OF MOTION (ROM) OF SHOULDER: ICD-10-CM

## 2021-05-19 DIAGNOSIS — M75.102 NONTRAUMATIC TEAR OF LEFT ROTATOR CUFF, UNSPECIFIED TEAR EXTENT: ICD-10-CM

## 2021-05-19 DIAGNOSIS — M25.512 ACUTE PAIN OF LEFT SHOULDER: ICD-10-CM

## 2021-05-19 PROCEDURE — 97162 PT EVAL MOD COMPLEX 30 MIN: CPT | Performed by: PHYSICAL THERAPIST

## 2021-05-19 PROCEDURE — 97110 THERAPEUTIC EXERCISES: CPT | Performed by: PHYSICAL THERAPIST

## 2021-05-23 ENCOUNTER — PATIENT MESSAGE (OUTPATIENT)
Dept: SPORTS MEDICINE | Facility: CLINIC | Age: 66
End: 2021-05-23

## 2021-05-23 ENCOUNTER — PATIENT MESSAGE (OUTPATIENT)
Dept: HEMATOLOGY/ONCOLOGY | Facility: CLINIC | Age: 66
End: 2021-05-23

## 2021-05-25 ENCOUNTER — OFFICE VISIT (OUTPATIENT)
Dept: SPORTS MEDICINE | Facility: CLINIC | Age: 66
End: 2021-05-25
Payer: MEDICARE

## 2021-05-25 VITALS
BODY MASS INDEX: 32.74 KG/M2 | HEART RATE: 64 BPM | DIASTOLIC BLOOD PRESSURE: 72 MMHG | SYSTOLIC BLOOD PRESSURE: 116 MMHG | HEIGHT: 68 IN | WEIGHT: 216 LBS

## 2021-05-25 DIAGNOSIS — M25.512 ACUTE POSTOPERATIVE PAIN OF LEFT SHOULDER: Primary | ICD-10-CM

## 2021-05-25 DIAGNOSIS — G89.18 ACUTE POSTOPERATIVE PAIN OF LEFT SHOULDER: Primary | ICD-10-CM

## 2021-05-25 DIAGNOSIS — Z98.890 S/P ROTATOR CUFF REPAIR: ICD-10-CM

## 2021-05-25 PROCEDURE — 1125F PR PAIN SEVERITY QUANTIFIED, PAIN PRESENT: ICD-10-PCS | Mod: S$GLB,,, | Performed by: PHYSICIAN ASSISTANT

## 2021-05-25 PROCEDURE — 3072F LOW RISK FOR RETINOPATHY: CPT | Mod: S$GLB,,, | Performed by: PHYSICIAN ASSISTANT

## 2021-05-25 PROCEDURE — 1101F PR PT FALLS ASSESS DOC 0-1 FALLS W/OUT INJ PAST YR: ICD-10-PCS | Mod: CPTII,S$GLB,, | Performed by: PHYSICIAN ASSISTANT

## 2021-05-25 PROCEDURE — 1125F AMNT PAIN NOTED PAIN PRSNT: CPT | Mod: S$GLB,,, | Performed by: PHYSICIAN ASSISTANT

## 2021-05-25 PROCEDURE — 3288F FALL RISK ASSESSMENT DOCD: CPT | Mod: CPTII,S$GLB,, | Performed by: PHYSICIAN ASSISTANT

## 2021-05-25 PROCEDURE — 99999 PR PBB SHADOW E&M-EST. PATIENT-LVL V: CPT | Mod: PBBFAC,,, | Performed by: PHYSICIAN ASSISTANT

## 2021-05-25 PROCEDURE — 1101F PT FALLS ASSESS-DOCD LE1/YR: CPT | Mod: CPTII,S$GLB,, | Performed by: PHYSICIAN ASSISTANT

## 2021-05-25 PROCEDURE — 3008F BODY MASS INDEX DOCD: CPT | Mod: CPTII,S$GLB,, | Performed by: PHYSICIAN ASSISTANT

## 2021-05-25 PROCEDURE — 3072F PR LOW RISK FOR RETINOPATHY: ICD-10-PCS | Mod: S$GLB,,, | Performed by: PHYSICIAN ASSISTANT

## 2021-05-25 PROCEDURE — 99024 POSTOP FOLLOW-UP VISIT: CPT | Mod: S$GLB,,, | Performed by: PHYSICIAN ASSISTANT

## 2021-05-25 PROCEDURE — 3288F PR FALLS RISK ASSESSMENT DOCUMENTED: ICD-10-PCS | Mod: CPTII,S$GLB,, | Performed by: PHYSICIAN ASSISTANT

## 2021-05-25 PROCEDURE — 3008F PR BODY MASS INDEX (BMI) DOCUMENTED: ICD-10-PCS | Mod: CPTII,S$GLB,, | Performed by: PHYSICIAN ASSISTANT

## 2021-05-25 PROCEDURE — 99024 PR POST-OP FOLLOW-UP VISIT: ICD-10-PCS | Mod: S$GLB,,, | Performed by: PHYSICIAN ASSISTANT

## 2021-05-25 PROCEDURE — 99999 PR PBB SHADOW E&M-EST. PATIENT-LVL V: ICD-10-PCS | Mod: PBBFAC,,, | Performed by: PHYSICIAN ASSISTANT

## 2021-05-28 ENCOUNTER — PATIENT MESSAGE (OUTPATIENT)
Dept: CARDIOLOGY | Facility: CLINIC | Age: 66
End: 2021-05-28

## 2021-05-28 DIAGNOSIS — I25.10 CORONARY ARTERY DISEASE INVOLVING NATIVE CORONARY ARTERY OF NATIVE HEART WITHOUT ANGINA PECTORIS: ICD-10-CM

## 2021-05-28 DIAGNOSIS — E78.5 DYSLIPIDEMIA: ICD-10-CM

## 2021-05-28 RX ORDER — ICOSAPENT ETHYL 1000 MG/1
2 CAPSULE ORAL 2 TIMES DAILY
Qty: 360 CAPSULE | Refills: 3 | Status: SHIPPED | OUTPATIENT
Start: 2021-05-28 | End: 2022-07-12

## 2021-06-01 ENCOUNTER — LAB VISIT (OUTPATIENT)
Dept: LAB | Facility: HOSPITAL | Age: 66
End: 2021-06-01
Payer: MEDICARE

## 2021-06-01 DIAGNOSIS — C43.59 MALIGNANT MELANOMA OF BACK: ICD-10-CM

## 2021-06-01 LAB
ALBUMIN SERPL BCP-MCNC: 3.4 G/DL (ref 3.5–5.2)
ALP SERPL-CCNC: 81 U/L (ref 55–135)
ALT SERPL W/O P-5'-P-CCNC: 22 U/L (ref 10–44)
ANION GAP SERPL CALC-SCNC: 13 MMOL/L (ref 8–16)
AST SERPL-CCNC: 15 U/L (ref 10–40)
BILIRUB SERPL-MCNC: 0.6 MG/DL (ref 0.1–1)
BUN SERPL-MCNC: 16 MG/DL (ref 8–23)
CALCIUM SERPL-MCNC: 10 MG/DL (ref 8.7–10.5)
CHLORIDE SERPL-SCNC: 105 MMOL/L (ref 95–110)
CO2 SERPL-SCNC: 22 MMOL/L (ref 23–29)
CREAT SERPL-MCNC: 0.9 MG/DL (ref 0.5–1.4)
ERYTHROCYTE [DISTWIDTH] IN BLOOD BY AUTOMATED COUNT: 12.9 % (ref 11.5–14.5)
EST. GFR  (AFRICAN AMERICAN): >60 ML/MIN/1.73 M^2
EST. GFR  (NON AFRICAN AMERICAN): >60 ML/MIN/1.73 M^2
GLUCOSE SERPL-MCNC: 167 MG/DL (ref 70–110)
HCT VFR BLD AUTO: 44.6 % (ref 40–54)
HGB BLD-MCNC: 14.7 G/DL (ref 14–18)
IMM GRANULOCYTES # BLD AUTO: 0.03 K/UL (ref 0–0.04)
MCH RBC QN AUTO: 30.8 PG (ref 27–31)
MCHC RBC AUTO-ENTMCNC: 33 G/DL (ref 32–36)
MCV RBC AUTO: 94 FL (ref 82–98)
NEUTROPHILS # BLD AUTO: 5.9 K/UL (ref 1.8–7.7)
PLATELET # BLD AUTO: 294 K/UL (ref 150–450)
PMV BLD AUTO: 11.3 FL (ref 9.2–12.9)
POTASSIUM SERPL-SCNC: 4.3 MMOL/L (ref 3.5–5.1)
PROT SERPL-MCNC: 7 G/DL (ref 6–8.4)
RBC # BLD AUTO: 4.77 M/UL (ref 4.6–6.2)
SODIUM SERPL-SCNC: 140 MMOL/L (ref 136–145)
WBC # BLD AUTO: 8.77 K/UL (ref 3.9–12.7)

## 2021-06-01 PROCEDURE — 36415 COLL VENOUS BLD VENIPUNCTURE: CPT | Performed by: INTERNAL MEDICINE

## 2021-06-01 PROCEDURE — 85027 COMPLETE CBC AUTOMATED: CPT | Performed by: INTERNAL MEDICINE

## 2021-06-01 PROCEDURE — 80053 COMPREHEN METABOLIC PANEL: CPT | Performed by: INTERNAL MEDICINE

## 2021-06-02 ENCOUNTER — CLINICAL SUPPORT (OUTPATIENT)
Dept: REHABILITATION | Facility: HOSPITAL | Age: 66
End: 2021-06-02
Payer: MEDICARE

## 2021-06-02 DIAGNOSIS — M25.619 LIMITED RANGE OF MOTION (ROM) OF SHOULDER: ICD-10-CM

## 2021-06-02 DIAGNOSIS — M25.512 ACUTE PAIN OF LEFT SHOULDER: ICD-10-CM

## 2021-06-02 PROCEDURE — 97140 MANUAL THERAPY 1/> REGIONS: CPT | Performed by: PHYSICAL THERAPIST

## 2021-06-02 PROCEDURE — 97110 THERAPEUTIC EXERCISES: CPT | Performed by: PHYSICAL THERAPIST

## 2021-06-04 ENCOUNTER — PES CALL (OUTPATIENT)
Dept: ADMINISTRATIVE | Facility: CLINIC | Age: 66
End: 2021-06-04

## 2021-06-09 ENCOUNTER — CLINICAL SUPPORT (OUTPATIENT)
Dept: REHABILITATION | Facility: HOSPITAL | Age: 66
End: 2021-06-09
Payer: MEDICARE

## 2021-06-09 DIAGNOSIS — M25.512 ACUTE PAIN OF LEFT SHOULDER: ICD-10-CM

## 2021-06-09 DIAGNOSIS — M25.619 LIMITED RANGE OF MOTION (ROM) OF SHOULDER: ICD-10-CM

## 2021-06-09 PROCEDURE — 97110 THERAPEUTIC EXERCISES: CPT | Performed by: PHYSICAL THERAPIST

## 2021-06-09 PROCEDURE — 97140 MANUAL THERAPY 1/> REGIONS: CPT | Performed by: PHYSICAL THERAPIST

## 2021-06-15 ENCOUNTER — PATIENT OUTREACH (OUTPATIENT)
Dept: ADMINISTRATIVE | Facility: HOSPITAL | Age: 66
End: 2021-06-15

## 2021-06-16 ENCOUNTER — CLINICAL SUPPORT (OUTPATIENT)
Dept: REHABILITATION | Facility: HOSPITAL | Age: 66
End: 2021-06-16
Payer: MEDICARE

## 2021-06-16 DIAGNOSIS — M25.619 LIMITED RANGE OF MOTION (ROM) OF SHOULDER: ICD-10-CM

## 2021-06-16 DIAGNOSIS — M25.512 ACUTE PAIN OF LEFT SHOULDER: ICD-10-CM

## 2021-06-16 PROCEDURE — 97140 MANUAL THERAPY 1/> REGIONS: CPT | Performed by: PHYSICAL THERAPIST

## 2021-06-16 PROCEDURE — 97110 THERAPEUTIC EXERCISES: CPT | Performed by: PHYSICAL THERAPIST

## 2021-06-23 ENCOUNTER — CLINICAL SUPPORT (OUTPATIENT)
Dept: REHABILITATION | Facility: HOSPITAL | Age: 66
End: 2021-06-23
Payer: MEDICARE

## 2021-06-23 DIAGNOSIS — M25.619 LIMITED RANGE OF MOTION (ROM) OF SHOULDER: ICD-10-CM

## 2021-06-23 DIAGNOSIS — M25.512 ACUTE PAIN OF LEFT SHOULDER: ICD-10-CM

## 2021-06-23 PROCEDURE — 97110 THERAPEUTIC EXERCISES: CPT | Performed by: PHYSICAL THERAPIST

## 2021-06-23 PROCEDURE — 97140 MANUAL THERAPY 1/> REGIONS: CPT | Performed by: PHYSICAL THERAPIST

## 2021-06-24 ENCOUNTER — OFFICE VISIT (OUTPATIENT)
Dept: SPORTS MEDICINE | Facility: CLINIC | Age: 66
End: 2021-06-24
Payer: MEDICARE

## 2021-06-24 VITALS
SYSTOLIC BLOOD PRESSURE: 100 MMHG | BODY MASS INDEX: 32.74 KG/M2 | HEART RATE: 61 BPM | WEIGHT: 216 LBS | HEIGHT: 68 IN | DIASTOLIC BLOOD PRESSURE: 58 MMHG

## 2021-06-24 DIAGNOSIS — Z98.890 S/P ROTATOR CUFF REPAIR: Primary | ICD-10-CM

## 2021-06-24 PROCEDURE — 99024 POSTOP FOLLOW-UP VISIT: CPT | Mod: S$GLB,,, | Performed by: ORTHOPAEDIC SURGERY

## 2021-06-24 PROCEDURE — 3046F HEMOGLOBIN A1C LEVEL >9.0%: CPT | Mod: CPTII,S$GLB,, | Performed by: ORTHOPAEDIC SURGERY

## 2021-06-24 PROCEDURE — 3288F PR FALLS RISK ASSESSMENT DOCUMENTED: ICD-10-PCS | Mod: CPTII,S$GLB,, | Performed by: ORTHOPAEDIC SURGERY

## 2021-06-24 PROCEDURE — 99024 PR POST-OP FOLLOW-UP VISIT: ICD-10-PCS | Mod: S$GLB,,, | Performed by: ORTHOPAEDIC SURGERY

## 2021-06-24 PROCEDURE — 3072F PR LOW RISK FOR RETINOPATHY: ICD-10-PCS | Mod: S$GLB,,, | Performed by: ORTHOPAEDIC SURGERY

## 2021-06-24 PROCEDURE — 1101F PR PT FALLS ASSESS DOC 0-1 FALLS W/OUT INJ PAST YR: ICD-10-PCS | Mod: CPTII,S$GLB,, | Performed by: ORTHOPAEDIC SURGERY

## 2021-06-24 PROCEDURE — 99999 PR PBB SHADOW E&M-EST. PATIENT-LVL III: CPT | Mod: PBBFAC,,, | Performed by: ORTHOPAEDIC SURGERY

## 2021-06-24 PROCEDURE — 1101F PT FALLS ASSESS-DOCD LE1/YR: CPT | Mod: CPTII,S$GLB,, | Performed by: ORTHOPAEDIC SURGERY

## 2021-06-24 PROCEDURE — 3008F PR BODY MASS INDEX (BMI) DOCUMENTED: ICD-10-PCS | Mod: CPTII,S$GLB,, | Performed by: ORTHOPAEDIC SURGERY

## 2021-06-24 PROCEDURE — 3008F BODY MASS INDEX DOCD: CPT | Mod: CPTII,S$GLB,, | Performed by: ORTHOPAEDIC SURGERY

## 2021-06-24 PROCEDURE — 3288F FALL RISK ASSESSMENT DOCD: CPT | Mod: CPTII,S$GLB,, | Performed by: ORTHOPAEDIC SURGERY

## 2021-06-24 PROCEDURE — 3046F PR MOST RECENT HEMOGLOBIN A1C LEVEL > 9.0%: ICD-10-PCS | Mod: CPTII,S$GLB,, | Performed by: ORTHOPAEDIC SURGERY

## 2021-06-24 PROCEDURE — 99999 PR PBB SHADOW E&M-EST. PATIENT-LVL III: ICD-10-PCS | Mod: PBBFAC,,, | Performed by: ORTHOPAEDIC SURGERY

## 2021-06-24 PROCEDURE — 3072F LOW RISK FOR RETINOPATHY: CPT | Mod: S$GLB,,, | Performed by: ORTHOPAEDIC SURGERY

## 2021-06-24 PROCEDURE — 1126F AMNT PAIN NOTED NONE PRSNT: CPT | Mod: S$GLB,,, | Performed by: ORTHOPAEDIC SURGERY

## 2021-06-24 PROCEDURE — 1126F PR PAIN SEVERITY QUANTIFIED, NO PAIN PRESENT: ICD-10-PCS | Mod: S$GLB,,, | Performed by: ORTHOPAEDIC SURGERY

## 2021-06-30 ENCOUNTER — CLINICAL SUPPORT (OUTPATIENT)
Dept: REHABILITATION | Facility: HOSPITAL | Age: 66
End: 2021-06-30
Payer: MEDICARE

## 2021-06-30 DIAGNOSIS — M25.512 ACUTE PAIN OF LEFT SHOULDER: ICD-10-CM

## 2021-06-30 DIAGNOSIS — M25.619 LIMITED RANGE OF MOTION (ROM) OF SHOULDER: ICD-10-CM

## 2021-06-30 PROCEDURE — 97140 MANUAL THERAPY 1/> REGIONS: CPT | Performed by: PHYSICAL THERAPIST

## 2021-07-05 ENCOUNTER — PATIENT MESSAGE (OUTPATIENT)
Dept: REHABILITATION | Facility: HOSPITAL | Age: 66
End: 2021-07-05

## 2021-07-11 ENCOUNTER — PATIENT MESSAGE (OUTPATIENT)
Dept: CARDIOLOGY | Facility: CLINIC | Age: 66
End: 2021-07-11

## 2021-07-11 ENCOUNTER — PATIENT MESSAGE (OUTPATIENT)
Dept: INTERNAL MEDICINE | Facility: CLINIC | Age: 66
End: 2021-07-11

## 2021-07-12 ENCOUNTER — PATIENT MESSAGE (OUTPATIENT)
Dept: INTERNAL MEDICINE | Facility: CLINIC | Age: 66
End: 2021-07-12

## 2021-07-12 DIAGNOSIS — I10 ESSENTIAL HYPERTENSION: ICD-10-CM

## 2021-07-12 DIAGNOSIS — I25.10 CORONARY ARTERY DISEASE INVOLVING NATIVE CORONARY ARTERY OF NATIVE HEART WITHOUT ANGINA PECTORIS: ICD-10-CM

## 2021-07-12 DIAGNOSIS — E78.5 HYPERLIPIDEMIA, UNSPECIFIED HYPERLIPIDEMIA TYPE: ICD-10-CM

## 2021-07-12 DIAGNOSIS — Z79.4 TYPE 2 DIABETES MELLITUS WITHOUT COMPLICATION, WITH LONG-TERM CURRENT USE OF INSULIN: Primary | ICD-10-CM

## 2021-07-12 DIAGNOSIS — E11.9 TYPE 2 DIABETES MELLITUS WITHOUT COMPLICATION, WITH LONG-TERM CURRENT USE OF INSULIN: Primary | ICD-10-CM

## 2021-07-12 RX ORDER — VALSARTAN 320 MG/1
320 TABLET ORAL DAILY
Qty: 90 TABLET | Refills: 3 | Status: SHIPPED | OUTPATIENT
Start: 2021-07-12 | End: 2022-09-15

## 2021-07-12 RX ORDER — EMPAGLIFLOZIN 25 MG/1
25 TABLET, FILM COATED ORAL DAILY
Qty: 90 TABLET | Refills: 1 | Status: CANCELLED | OUTPATIENT
Start: 2021-07-12

## 2021-07-13 ENCOUNTER — PATIENT MESSAGE (OUTPATIENT)
Dept: REHABILITATION | Facility: HOSPITAL | Age: 66
End: 2021-07-13

## 2021-07-14 ENCOUNTER — PATIENT MESSAGE (OUTPATIENT)
Dept: HEMATOLOGY/ONCOLOGY | Facility: CLINIC | Age: 66
End: 2021-07-14

## 2021-07-14 ENCOUNTER — CLINICAL SUPPORT (OUTPATIENT)
Dept: REHABILITATION | Facility: HOSPITAL | Age: 66
End: 2021-07-14
Payer: MEDICARE

## 2021-07-14 ENCOUNTER — TELEPHONE (OUTPATIENT)
Dept: AUDIOLOGY | Facility: CLINIC | Age: 66
End: 2021-07-14

## 2021-07-14 DIAGNOSIS — M25.512 ACUTE PAIN OF LEFT SHOULDER: ICD-10-CM

## 2021-07-14 DIAGNOSIS — H90.3 SENSORINEURAL HEARING LOSS, BILATERAL: Primary | ICD-10-CM

## 2021-07-14 DIAGNOSIS — M25.619 LIMITED RANGE OF MOTION (ROM) OF SHOULDER: ICD-10-CM

## 2021-07-14 PROCEDURE — 97140 MANUAL THERAPY 1/> REGIONS: CPT | Mod: CQ

## 2021-07-14 PROCEDURE — 97110 THERAPEUTIC EXERCISES: CPT | Mod: CQ

## 2021-07-15 DIAGNOSIS — C43.59 MALIGNANT MELANOMA OF BACK: Primary | ICD-10-CM

## 2021-07-16 ENCOUNTER — CLINICAL SUPPORT (OUTPATIENT)
Dept: AUDIOLOGY | Facility: CLINIC | Age: 66
End: 2021-07-16
Payer: MEDICARE

## 2021-07-16 ENCOUNTER — HOSPITAL ENCOUNTER (OUTPATIENT)
Dept: RADIOLOGY | Facility: HOSPITAL | Age: 66
Discharge: HOME | End: 2021-07-16
Attending: INTERNAL MEDICINE
Payer: MEDICARE

## 2021-07-16 ENCOUNTER — OFFICE VISIT (OUTPATIENT)
Dept: HEMATOLOGY/ONCOLOGY | Facility: CLINIC | Age: 66
End: 2021-07-16
Payer: MEDICARE

## 2021-07-16 VITALS
OXYGEN SATURATION: 96 % | RESPIRATION RATE: 18 BRPM | SYSTOLIC BLOOD PRESSURE: 106 MMHG | DIASTOLIC BLOOD PRESSURE: 57 MMHG | WEIGHT: 220 LBS | HEART RATE: 60 BPM | BODY MASS INDEX: 33.34 KG/M2 | HEIGHT: 68 IN

## 2021-07-16 DIAGNOSIS — I25.10 CORONARY ARTERY DISEASE INVOLVING NATIVE CORONARY ARTERY OF NATIVE HEART WITHOUT ANGINA PECTORIS: ICD-10-CM

## 2021-07-16 DIAGNOSIS — H90.3 SENSORINEURAL HEARING LOSS, BILATERAL: ICD-10-CM

## 2021-07-16 DIAGNOSIS — E08.59 DIABETES MELLITUS DUE TO UNDERLYING CONDITION WITH OTHER CIRCULATORY COMPLICATION, UNSPECIFIED WHETHER LONG TERM INSULIN USE: ICD-10-CM

## 2021-07-16 DIAGNOSIS — C43.59 MALIGNANT MELANOMA OF BACK: ICD-10-CM

## 2021-07-16 DIAGNOSIS — C43.59 MALIGNANT MELANOMA OF BACK: Primary | ICD-10-CM

## 2021-07-16 DIAGNOSIS — Z95.1 S/P CABG (CORONARY ARTERY BYPASS GRAFT): ICD-10-CM

## 2021-07-16 LAB
CREAT SERPL-MCNC: 0.7 MG/DL (ref 0.5–1.4)
SAMPLE: NORMAL

## 2021-07-16 PROCEDURE — 1125F AMNT PAIN NOTED PAIN PRSNT: CPT | Mod: S$GLB,,, | Performed by: INTERNAL MEDICINE

## 2021-07-16 PROCEDURE — 71260 CT CHEST ABDOMEN PELVIS WITH CONTRAST (XPD): ICD-10-PCS | Mod: 26,,, | Performed by: RADIOLOGY

## 2021-07-16 PROCEDURE — 99999 PR PBB SHADOW E&M-EST. PATIENT-LVL III: ICD-10-PCS | Mod: PBBFAC,,, | Performed by: INTERNAL MEDICINE

## 2021-07-16 PROCEDURE — 92567 TYMPANOMETRY: CPT | Mod: S$GLB,,, | Performed by: AUDIOLOGIST

## 2021-07-16 PROCEDURE — 74177 CT ABD & PELVIS W/CONTRAST: CPT | Mod: TC

## 2021-07-16 PROCEDURE — 92557 COMPREHENSIVE HEARING TEST: CPT | Mod: S$GLB,,, | Performed by: AUDIOLOGIST

## 2021-07-16 PROCEDURE — 1159F PR MEDICATION LIST DOCUMENTED IN MEDICAL RECORD: ICD-10-PCS | Mod: S$GLB,,, | Performed by: INTERNAL MEDICINE

## 2021-07-16 PROCEDURE — 92557 PR COMPREHENSIVE HEARING TEST: ICD-10-PCS | Mod: S$GLB,,, | Performed by: AUDIOLOGIST

## 2021-07-16 PROCEDURE — 71260 CT THORAX DX C+: CPT | Mod: TC

## 2021-07-16 PROCEDURE — 3008F BODY MASS INDEX DOCD: CPT | Mod: CPTII,S$GLB,, | Performed by: INTERNAL MEDICINE

## 2021-07-16 PROCEDURE — 99999 PR PBB SHADOW E&M-EST. PATIENT-LVL II: CPT | Mod: PBBFAC,,, | Performed by: AUDIOLOGIST

## 2021-07-16 PROCEDURE — 71260 CT THORAX DX C+: CPT | Mod: 26,,, | Performed by: RADIOLOGY

## 2021-07-16 PROCEDURE — 1125F PR PAIN SEVERITY QUANTIFIED, PAIN PRESENT: ICD-10-PCS | Mod: S$GLB,,, | Performed by: INTERNAL MEDICINE

## 2021-07-16 PROCEDURE — 74177 CT CHEST ABDOMEN PELVIS WITH CONTRAST (XPD): ICD-10-PCS | Mod: 26,,, | Performed by: RADIOLOGY

## 2021-07-16 PROCEDURE — 3072F LOW RISK FOR RETINOPATHY: CPT | Mod: S$GLB,,, | Performed by: INTERNAL MEDICINE

## 2021-07-16 PROCEDURE — 3008F PR BODY MASS INDEX (BMI) DOCUMENTED: ICD-10-PCS | Mod: CPTII,S$GLB,, | Performed by: INTERNAL MEDICINE

## 2021-07-16 PROCEDURE — 3072F PR LOW RISK FOR RETINOPATHY: ICD-10-PCS | Mod: S$GLB,,, | Performed by: INTERNAL MEDICINE

## 2021-07-16 PROCEDURE — 99999 PR PBB SHADOW E&M-EST. PATIENT-LVL II: ICD-10-PCS | Mod: PBBFAC,,, | Performed by: AUDIOLOGIST

## 2021-07-16 PROCEDURE — 99214 OFFICE O/P EST MOD 30 MIN: CPT | Mod: S$GLB,,, | Performed by: INTERNAL MEDICINE

## 2021-07-16 PROCEDURE — 1101F PT FALLS ASSESS-DOCD LE1/YR: CPT | Mod: CPTII,S$GLB,, | Performed by: INTERNAL MEDICINE

## 2021-07-16 PROCEDURE — 3288F FALL RISK ASSESSMENT DOCD: CPT | Mod: CPTII,S$GLB,, | Performed by: INTERNAL MEDICINE

## 2021-07-16 PROCEDURE — 1159F MED LIST DOCD IN RCRD: CPT | Mod: S$GLB,,, | Performed by: INTERNAL MEDICINE

## 2021-07-16 PROCEDURE — 74177 CT ABD & PELVIS W/CONTRAST: CPT | Mod: 26,,, | Performed by: RADIOLOGY

## 2021-07-16 PROCEDURE — 99999 PR PBB SHADOW E&M-EST. PATIENT-LVL III: CPT | Mod: PBBFAC,,, | Performed by: INTERNAL MEDICINE

## 2021-07-16 PROCEDURE — 99499 RISK ADDL DX/OHS AUDIT: ICD-10-PCS | Mod: S$GLB,,, | Performed by: INTERNAL MEDICINE

## 2021-07-16 PROCEDURE — 1101F PR PT FALLS ASSESS DOC 0-1 FALLS W/OUT INJ PAST YR: ICD-10-PCS | Mod: CPTII,S$GLB,, | Performed by: INTERNAL MEDICINE

## 2021-07-16 PROCEDURE — 99499 UNLISTED E&M SERVICE: CPT | Mod: S$GLB,,, | Performed by: INTERNAL MEDICINE

## 2021-07-16 PROCEDURE — 99214 PR OFFICE/OUTPT VISIT, EST, LEVL IV, 30-39 MIN: ICD-10-PCS | Mod: S$GLB,,, | Performed by: INTERNAL MEDICINE

## 2021-07-16 PROCEDURE — 92567 PR TYMPA2METRY: ICD-10-PCS | Mod: S$GLB,,, | Performed by: AUDIOLOGIST

## 2021-07-16 PROCEDURE — 25500020 PHARM REV CODE 255: Performed by: INTERNAL MEDICINE

## 2021-07-16 PROCEDURE — 3288F PR FALLS RISK ASSESSMENT DOCUMENTED: ICD-10-PCS | Mod: CPTII,S$GLB,, | Performed by: INTERNAL MEDICINE

## 2021-07-16 RX ADMIN — IOHEXOL 100 ML: 350 INJECTION, SOLUTION INTRAVENOUS at 09:07

## 2021-07-17 ENCOUNTER — PATIENT MESSAGE (OUTPATIENT)
Dept: DERMATOLOGY | Facility: CLINIC | Age: 66
End: 2021-07-17

## 2021-07-20 ENCOUNTER — CLINICAL SUPPORT (OUTPATIENT)
Dept: REHABILITATION | Facility: HOSPITAL | Age: 66
End: 2021-07-20
Payer: MEDICARE

## 2021-07-20 DIAGNOSIS — M25.619 LIMITED RANGE OF MOTION (ROM) OF SHOULDER: ICD-10-CM

## 2021-07-20 DIAGNOSIS — M25.512 ACUTE PAIN OF LEFT SHOULDER: ICD-10-CM

## 2021-07-20 PROCEDURE — 97110 THERAPEUTIC EXERCISES: CPT | Mod: CQ

## 2021-07-20 PROCEDURE — 97140 MANUAL THERAPY 1/> REGIONS: CPT | Mod: CQ

## 2021-07-27 ENCOUNTER — CLINICAL SUPPORT (OUTPATIENT)
Dept: REHABILITATION | Facility: HOSPITAL | Age: 66
End: 2021-07-27
Payer: MEDICARE

## 2021-07-27 DIAGNOSIS — M25.512 ACUTE PAIN OF LEFT SHOULDER: ICD-10-CM

## 2021-07-27 DIAGNOSIS — M25.619 LIMITED RANGE OF MOTION (ROM) OF SHOULDER: ICD-10-CM

## 2021-07-27 PROCEDURE — 97140 MANUAL THERAPY 1/> REGIONS: CPT | Performed by: PHYSICAL THERAPIST

## 2021-07-27 PROCEDURE — 97110 THERAPEUTIC EXERCISES: CPT | Performed by: PHYSICAL THERAPIST

## 2021-08-05 ENCOUNTER — OFFICE VISIT (OUTPATIENT)
Dept: SPORTS MEDICINE | Facility: CLINIC | Age: 66
End: 2021-08-05
Payer: MEDICARE

## 2021-08-05 VITALS
DIASTOLIC BLOOD PRESSURE: 70 MMHG | BODY MASS INDEX: 33.8 KG/M2 | SYSTOLIC BLOOD PRESSURE: 127 MMHG | HEIGHT: 68 IN | TEMPERATURE: 98 F | HEART RATE: 55 BPM | WEIGHT: 223 LBS

## 2021-08-05 DIAGNOSIS — Z98.890 S/P ROTATOR CUFF REPAIR: Primary | ICD-10-CM

## 2021-08-05 PROCEDURE — 3078F DIAST BP <80 MM HG: CPT | Mod: CPTII,S$GLB,, | Performed by: ORTHOPAEDIC SURGERY

## 2021-08-05 PROCEDURE — 3072F PR LOW RISK FOR RETINOPATHY: ICD-10-PCS | Mod: CPTII,S$GLB,, | Performed by: ORTHOPAEDIC SURGERY

## 2021-08-05 PROCEDURE — 1160F PR REVIEW ALL MEDS BY PRESCRIBER/CLIN PHARMACIST DOCUMENTED: ICD-10-PCS | Mod: CPTII,S$GLB,, | Performed by: ORTHOPAEDIC SURGERY

## 2021-08-05 PROCEDURE — 99024 PR POST-OP FOLLOW-UP VISIT: ICD-10-PCS | Mod: S$GLB,,, | Performed by: ORTHOPAEDIC SURGERY

## 2021-08-05 PROCEDURE — 3008F PR BODY MASS INDEX (BMI) DOCUMENTED: ICD-10-PCS | Mod: CPTII,S$GLB,, | Performed by: ORTHOPAEDIC SURGERY

## 2021-08-05 PROCEDURE — 99024 POSTOP FOLLOW-UP VISIT: CPT | Mod: S$GLB,,, | Performed by: ORTHOPAEDIC SURGERY

## 2021-08-05 PROCEDURE — 3288F FALL RISK ASSESSMENT DOCD: CPT | Mod: CPTII,S$GLB,, | Performed by: ORTHOPAEDIC SURGERY

## 2021-08-05 PROCEDURE — 1160F RVW MEDS BY RX/DR IN RCRD: CPT | Mod: CPTII,S$GLB,, | Performed by: ORTHOPAEDIC SURGERY

## 2021-08-05 PROCEDURE — 1101F PT FALLS ASSESS-DOCD LE1/YR: CPT | Mod: CPTII,S$GLB,, | Performed by: ORTHOPAEDIC SURGERY

## 2021-08-05 PROCEDURE — 3288F PR FALLS RISK ASSESSMENT DOCUMENTED: ICD-10-PCS | Mod: CPTII,S$GLB,, | Performed by: ORTHOPAEDIC SURGERY

## 2021-08-05 PROCEDURE — 3072F LOW RISK FOR RETINOPATHY: CPT | Mod: CPTII,S$GLB,, | Performed by: ORTHOPAEDIC SURGERY

## 2021-08-05 PROCEDURE — 1126F PR PAIN SEVERITY QUANTIFIED, NO PAIN PRESENT: ICD-10-PCS | Mod: CPTII,S$GLB,, | Performed by: ORTHOPAEDIC SURGERY

## 2021-08-05 PROCEDURE — 3008F BODY MASS INDEX DOCD: CPT | Mod: CPTII,S$GLB,, | Performed by: ORTHOPAEDIC SURGERY

## 2021-08-05 PROCEDURE — 99999 PR PBB SHADOW E&M-EST. PATIENT-LVL III: ICD-10-PCS | Mod: PBBFAC,,, | Performed by: ORTHOPAEDIC SURGERY

## 2021-08-05 PROCEDURE — 3078F PR MOST RECENT DIASTOLIC BLOOD PRESSURE < 80 MM HG: ICD-10-PCS | Mod: CPTII,S$GLB,, | Performed by: ORTHOPAEDIC SURGERY

## 2021-08-05 PROCEDURE — 99999 PR PBB SHADOW E&M-EST. PATIENT-LVL III: CPT | Mod: PBBFAC,,, | Performed by: ORTHOPAEDIC SURGERY

## 2021-08-05 PROCEDURE — 3046F PR MOST RECENT HEMOGLOBIN A1C LEVEL > 9.0%: ICD-10-PCS | Mod: CPTII,S$GLB,, | Performed by: ORTHOPAEDIC SURGERY

## 2021-08-05 PROCEDURE — 3074F SYST BP LT 130 MM HG: CPT | Mod: CPTII,S$GLB,, | Performed by: ORTHOPAEDIC SURGERY

## 2021-08-05 PROCEDURE — 3046F HEMOGLOBIN A1C LEVEL >9.0%: CPT | Mod: CPTII,S$GLB,, | Performed by: ORTHOPAEDIC SURGERY

## 2021-08-05 PROCEDURE — 1159F MED LIST DOCD IN RCRD: CPT | Mod: CPTII,S$GLB,, | Performed by: ORTHOPAEDIC SURGERY

## 2021-08-05 PROCEDURE — 1101F PR PT FALLS ASSESS DOC 0-1 FALLS W/OUT INJ PAST YR: ICD-10-PCS | Mod: CPTII,S$GLB,, | Performed by: ORTHOPAEDIC SURGERY

## 2021-08-05 PROCEDURE — 3074F PR MOST RECENT SYSTOLIC BLOOD PRESSURE < 130 MM HG: ICD-10-PCS | Mod: CPTII,S$GLB,, | Performed by: ORTHOPAEDIC SURGERY

## 2021-08-05 PROCEDURE — 1126F AMNT PAIN NOTED NONE PRSNT: CPT | Mod: CPTII,S$GLB,, | Performed by: ORTHOPAEDIC SURGERY

## 2021-08-05 PROCEDURE — 1159F PR MEDICATION LIST DOCUMENTED IN MEDICAL RECORD: ICD-10-PCS | Mod: CPTII,S$GLB,, | Performed by: ORTHOPAEDIC SURGERY

## 2021-08-09 ENCOUNTER — CLINICAL SUPPORT (OUTPATIENT)
Dept: REHABILITATION | Facility: HOSPITAL | Age: 66
End: 2021-08-09
Payer: MEDICARE

## 2021-08-09 DIAGNOSIS — M25.619 LIMITED RANGE OF MOTION (ROM) OF SHOULDER: ICD-10-CM

## 2021-08-09 DIAGNOSIS — M25.512 ACUTE PAIN OF LEFT SHOULDER: ICD-10-CM

## 2021-08-09 PROCEDURE — 97140 MANUAL THERAPY 1/> REGIONS: CPT | Performed by: PHYSICAL THERAPIST

## 2021-08-09 PROCEDURE — 97110 THERAPEUTIC EXERCISES: CPT | Performed by: PHYSICAL THERAPIST

## 2021-08-16 ENCOUNTER — CLINICAL SUPPORT (OUTPATIENT)
Dept: REHABILITATION | Facility: HOSPITAL | Age: 66
End: 2021-08-16
Payer: MEDICARE

## 2021-08-16 DIAGNOSIS — M25.512 ACUTE PAIN OF LEFT SHOULDER: ICD-10-CM

## 2021-08-16 DIAGNOSIS — M25.619 LIMITED RANGE OF MOTION (ROM) OF SHOULDER: ICD-10-CM

## 2021-08-16 PROCEDURE — 97140 MANUAL THERAPY 1/> REGIONS: CPT | Performed by: PHYSICAL THERAPIST

## 2021-08-16 PROCEDURE — 97110 THERAPEUTIC EXERCISES: CPT | Performed by: PHYSICAL THERAPIST

## 2021-08-23 ENCOUNTER — CLINICAL SUPPORT (OUTPATIENT)
Dept: REHABILITATION | Facility: HOSPITAL | Age: 66
End: 2021-08-23
Payer: MEDICARE

## 2021-08-23 DIAGNOSIS — M25.512 ACUTE PAIN OF LEFT SHOULDER: ICD-10-CM

## 2021-08-23 DIAGNOSIS — M25.619 LIMITED RANGE OF MOTION (ROM) OF SHOULDER: ICD-10-CM

## 2021-08-23 PROCEDURE — 97140 MANUAL THERAPY 1/> REGIONS: CPT | Performed by: PHYSICAL THERAPIST

## 2021-08-23 PROCEDURE — 97110 THERAPEUTIC EXERCISES: CPT | Performed by: PHYSICAL THERAPIST

## 2021-09-13 ENCOUNTER — TELEPHONE (OUTPATIENT)
Dept: SPORTS MEDICINE | Facility: CLINIC | Age: 66
End: 2021-09-13

## 2021-09-13 ENCOUNTER — CLINICAL SUPPORT (OUTPATIENT)
Dept: REHABILITATION | Facility: HOSPITAL | Age: 66
End: 2021-09-13
Payer: MEDICARE

## 2021-09-13 DIAGNOSIS — M25.619 LIMITED RANGE OF MOTION (ROM) OF SHOULDER: ICD-10-CM

## 2021-09-13 DIAGNOSIS — M25.512 ACUTE PAIN OF LEFT SHOULDER: ICD-10-CM

## 2021-09-13 PROCEDURE — 97140 MANUAL THERAPY 1/> REGIONS: CPT | Performed by: PHYSICAL THERAPIST

## 2021-09-14 ENCOUNTER — PES CALL (OUTPATIENT)
Dept: ADMINISTRATIVE | Facility: CLINIC | Age: 66
End: 2021-09-14

## 2021-09-17 ENCOUNTER — PATIENT MESSAGE (OUTPATIENT)
Dept: REHABILITATION | Facility: HOSPITAL | Age: 66
End: 2021-09-17

## 2021-09-20 ENCOUNTER — PATIENT OUTREACH (OUTPATIENT)
Dept: ADMINISTRATIVE | Facility: OTHER | Age: 66
End: 2021-09-20

## 2021-09-20 ENCOUNTER — CLINICAL SUPPORT (OUTPATIENT)
Dept: REHABILITATION | Facility: HOSPITAL | Age: 66
End: 2021-09-20
Payer: MEDICARE

## 2021-09-20 DIAGNOSIS — M25.619 LIMITED RANGE OF MOTION (ROM) OF SHOULDER: ICD-10-CM

## 2021-09-20 DIAGNOSIS — M25.512 ACUTE PAIN OF LEFT SHOULDER: ICD-10-CM

## 2021-09-20 PROCEDURE — 97140 MANUAL THERAPY 1/> REGIONS: CPT | Performed by: PHYSICAL THERAPIST

## 2021-09-21 ENCOUNTER — OFFICE VISIT (OUTPATIENT)
Dept: SPORTS MEDICINE | Facility: CLINIC | Age: 66
End: 2021-09-21
Payer: MEDICARE

## 2021-09-21 VITALS
SYSTOLIC BLOOD PRESSURE: 99 MMHG | DIASTOLIC BLOOD PRESSURE: 62 MMHG | BODY MASS INDEX: 32.74 KG/M2 | HEART RATE: 54 BPM | WEIGHT: 216 LBS | HEIGHT: 68 IN

## 2021-09-21 DIAGNOSIS — Z98.890 S/P ROTATOR CUFF REPAIR: Primary | ICD-10-CM

## 2021-09-21 PROCEDURE — 3066F NEPHROPATHY DOC TX: CPT | Mod: CPTII,S$GLB,, | Performed by: ORTHOPAEDIC SURGERY

## 2021-09-21 PROCEDURE — 3078F PR MOST RECENT DIASTOLIC BLOOD PRESSURE < 80 MM HG: ICD-10-PCS | Mod: CPTII,S$GLB,, | Performed by: ORTHOPAEDIC SURGERY

## 2021-09-21 PROCEDURE — 3078F DIAST BP <80 MM HG: CPT | Mod: CPTII,S$GLB,, | Performed by: ORTHOPAEDIC SURGERY

## 2021-09-21 PROCEDURE — 99999 PR PBB SHADOW E&M-EST. PATIENT-LVL II: CPT | Mod: PBBFAC,,, | Performed by: ORTHOPAEDIC SURGERY

## 2021-09-21 PROCEDURE — 3074F PR MOST RECENT SYSTOLIC BLOOD PRESSURE < 130 MM HG: ICD-10-PCS | Mod: CPTII,S$GLB,, | Performed by: ORTHOPAEDIC SURGERY

## 2021-09-21 PROCEDURE — 1159F MED LIST DOCD IN RCRD: CPT | Mod: CPTII,S$GLB,, | Performed by: ORTHOPAEDIC SURGERY

## 2021-09-21 PROCEDURE — 99999 PR PBB SHADOW E&M-EST. PATIENT-LVL II: ICD-10-PCS | Mod: PBBFAC,,, | Performed by: ORTHOPAEDIC SURGERY

## 2021-09-21 PROCEDURE — 3008F BODY MASS INDEX DOCD: CPT | Mod: CPTII,S$GLB,, | Performed by: ORTHOPAEDIC SURGERY

## 2021-09-21 PROCEDURE — 1125F AMNT PAIN NOTED PAIN PRSNT: CPT | Mod: CPTII,S$GLB,, | Performed by: ORTHOPAEDIC SURGERY

## 2021-09-21 PROCEDURE — 3072F LOW RISK FOR RETINOPATHY: CPT | Mod: CPTII,S$GLB,, | Performed by: ORTHOPAEDIC SURGERY

## 2021-09-21 PROCEDURE — 4010F PR ACE/ARB THEARPY RXD/TAKEN: ICD-10-PCS | Mod: CPTII,S$GLB,, | Performed by: ORTHOPAEDIC SURGERY

## 2021-09-21 PROCEDURE — 3046F HEMOGLOBIN A1C LEVEL >9.0%: CPT | Mod: CPTII,S$GLB,, | Performed by: ORTHOPAEDIC SURGERY

## 2021-09-21 PROCEDURE — 99214 OFFICE O/P EST MOD 30 MIN: CPT | Mod: 25,S$GLB,, | Performed by: ORTHOPAEDIC SURGERY

## 2021-09-21 PROCEDURE — 3046F PR MOST RECENT HEMOGLOBIN A1C LEVEL > 9.0%: ICD-10-PCS | Mod: CPTII,S$GLB,, | Performed by: ORTHOPAEDIC SURGERY

## 2021-09-21 PROCEDURE — 20610 LARGE JOINT ASPIRATION/INJECTION: L SUBACROMIAL BURSA: ICD-10-PCS | Mod: LT,S$GLB,, | Performed by: ORTHOPAEDIC SURGERY

## 2021-09-21 PROCEDURE — 4010F ACE/ARB THERAPY RXD/TAKEN: CPT | Mod: CPTII,S$GLB,, | Performed by: ORTHOPAEDIC SURGERY

## 2021-09-21 PROCEDURE — 99214 PR OFFICE/OUTPT VISIT, EST, LEVL IV, 30-39 MIN: ICD-10-PCS | Mod: 25,S$GLB,, | Performed by: ORTHOPAEDIC SURGERY

## 2021-09-21 PROCEDURE — 3061F PR NEG MICROALBUMINURIA RESULT DOCUMENTED/REVIEW: ICD-10-PCS | Mod: CPTII,S$GLB,, | Performed by: ORTHOPAEDIC SURGERY

## 2021-09-21 PROCEDURE — 3072F PR LOW RISK FOR RETINOPATHY: ICD-10-PCS | Mod: CPTII,S$GLB,, | Performed by: ORTHOPAEDIC SURGERY

## 2021-09-21 PROCEDURE — 3066F PR DOCUMENTATION OF TREATMENT FOR NEPHROPATHY: ICD-10-PCS | Mod: CPTII,S$GLB,, | Performed by: ORTHOPAEDIC SURGERY

## 2021-09-21 PROCEDURE — 3008F PR BODY MASS INDEX (BMI) DOCUMENTED: ICD-10-PCS | Mod: CPTII,S$GLB,, | Performed by: ORTHOPAEDIC SURGERY

## 2021-09-21 PROCEDURE — 3074F SYST BP LT 130 MM HG: CPT | Mod: CPTII,S$GLB,, | Performed by: ORTHOPAEDIC SURGERY

## 2021-09-21 PROCEDURE — 1159F PR MEDICATION LIST DOCUMENTED IN MEDICAL RECORD: ICD-10-PCS | Mod: CPTII,S$GLB,, | Performed by: ORTHOPAEDIC SURGERY

## 2021-09-21 PROCEDURE — 20610 DRAIN/INJ JOINT/BURSA W/O US: CPT | Mod: LT,S$GLB,, | Performed by: ORTHOPAEDIC SURGERY

## 2021-09-21 PROCEDURE — 1125F PR PAIN SEVERITY QUANTIFIED, PAIN PRESENT: ICD-10-PCS | Mod: CPTII,S$GLB,, | Performed by: ORTHOPAEDIC SURGERY

## 2021-09-21 PROCEDURE — 3061F NEG MICROALBUMINURIA REV: CPT | Mod: CPTII,S$GLB,, | Performed by: ORTHOPAEDIC SURGERY

## 2021-09-21 RX ORDER — TRIAMCINOLONE ACETONIDE 40 MG/ML
60 INJECTION, SUSPENSION INTRA-ARTICULAR; INTRAMUSCULAR
Status: DISCONTINUED | OUTPATIENT
Start: 2021-09-21 | End: 2021-09-21 | Stop reason: HOSPADM

## 2021-09-21 RX ADMIN — TRIAMCINOLONE ACETONIDE 60 MG: 40 INJECTION, SUSPENSION INTRA-ARTICULAR; INTRAMUSCULAR at 03:09

## 2021-09-27 ENCOUNTER — CLINICAL SUPPORT (OUTPATIENT)
Dept: REHABILITATION | Facility: HOSPITAL | Age: 66
End: 2021-09-27
Payer: MEDICARE

## 2021-09-27 DIAGNOSIS — M25.512 ACUTE PAIN OF LEFT SHOULDER: ICD-10-CM

## 2021-09-27 DIAGNOSIS — M25.619 LIMITED RANGE OF MOTION (ROM) OF SHOULDER: ICD-10-CM

## 2021-09-27 PROCEDURE — 97140 MANUAL THERAPY 1/> REGIONS: CPT | Performed by: PHYSICAL THERAPIST

## 2021-09-27 PROCEDURE — 97110 THERAPEUTIC EXERCISES: CPT | Performed by: PHYSICAL THERAPIST

## 2021-10-11 ENCOUNTER — CLINICAL SUPPORT (OUTPATIENT)
Dept: REHABILITATION | Facility: HOSPITAL | Age: 66
End: 2021-10-11
Payer: MEDICARE

## 2021-10-11 DIAGNOSIS — M25.512 ACUTE PAIN OF LEFT SHOULDER: ICD-10-CM

## 2021-10-11 DIAGNOSIS — M25.619 LIMITED RANGE OF MOTION (ROM) OF SHOULDER: ICD-10-CM

## 2021-10-11 PROCEDURE — 97140 MANUAL THERAPY 1/> REGIONS: CPT | Performed by: PHYSICAL THERAPIST

## 2021-10-12 ENCOUNTER — TELEPHONE (OUTPATIENT)
Dept: SPORTS MEDICINE | Facility: CLINIC | Age: 66
End: 2021-10-12

## 2021-10-12 DIAGNOSIS — F40.240 CLAUSTROPHOBIA: ICD-10-CM

## 2021-10-12 DIAGNOSIS — Z98.890 S/P ROTATOR CUFF REPAIR: Primary | ICD-10-CM

## 2021-10-12 RX ORDER — DIAZEPAM 5 MG/1
5 TABLET ORAL ONCE AS NEEDED
Qty: 2 TABLET | Refills: 0 | Status: SHIPPED | OUTPATIENT
Start: 2021-10-12 | End: 2022-01-26 | Stop reason: ALTCHOICE

## 2021-10-13 ENCOUNTER — PATIENT MESSAGE (OUTPATIENT)
Dept: REHABILITATION | Facility: HOSPITAL | Age: 66
End: 2021-10-13
Payer: MEDICARE

## 2021-10-14 ENCOUNTER — PES CALL (OUTPATIENT)
Dept: ADMINISTRATIVE | Facility: CLINIC | Age: 66
End: 2021-10-14

## 2021-10-22 ENCOUNTER — HOSPITAL ENCOUNTER (OUTPATIENT)
Dept: RADIOLOGY | Facility: HOSPITAL | Age: 66
Discharge: HOME | End: 2021-10-22
Attending: PHYSICIAN ASSISTANT
Payer: MEDICARE

## 2021-10-22 DIAGNOSIS — Z98.890 S/P ROTATOR CUFF REPAIR: ICD-10-CM

## 2021-10-22 PROCEDURE — 73221 MRI JOINT UPR EXTREM W/O DYE: CPT | Mod: 26,LT,, | Performed by: INTERNAL MEDICINE

## 2021-10-22 PROCEDURE — 73221 MRI JOINT UPR EXTREM W/O DYE: CPT | Mod: TC,LT

## 2021-10-22 PROCEDURE — 73221 MRI SHOULDER WITHOUT CONTRAST LEFT: ICD-10-PCS | Mod: 26,LT,, | Performed by: INTERNAL MEDICINE

## 2021-10-25 ENCOUNTER — PATIENT OUTREACH (OUTPATIENT)
Dept: ADMINISTRATIVE | Facility: OTHER | Age: 66
End: 2021-10-25
Payer: MEDICARE

## 2021-10-25 DIAGNOSIS — E11.9 TYPE 2 DIABETES MELLITUS WITHOUT COMPLICATION, UNSPECIFIED WHETHER LONG TERM INSULIN USE: Primary | ICD-10-CM

## 2021-10-26 ENCOUNTER — OFFICE VISIT (OUTPATIENT)
Dept: SPORTS MEDICINE | Facility: CLINIC | Age: 66
End: 2021-10-26
Payer: MEDICARE

## 2021-10-26 VITALS
WEIGHT: 217 LBS | HEIGHT: 68 IN | DIASTOLIC BLOOD PRESSURE: 71 MMHG | BODY MASS INDEX: 32.89 KG/M2 | SYSTOLIC BLOOD PRESSURE: 119 MMHG | HEART RATE: 57 BPM

## 2021-10-26 DIAGNOSIS — Z98.890 S/P ROTATOR CUFF REPAIR: Primary | ICD-10-CM

## 2021-10-26 PROCEDURE — 3078F PR MOST RECENT DIASTOLIC BLOOD PRESSURE < 80 MM HG: ICD-10-PCS | Mod: CPTII,S$GLB,, | Performed by: ORTHOPAEDIC SURGERY

## 2021-10-26 PROCEDURE — 1125F PR PAIN SEVERITY QUANTIFIED, PAIN PRESENT: ICD-10-PCS | Mod: CPTII,S$GLB,, | Performed by: ORTHOPAEDIC SURGERY

## 2021-10-26 PROCEDURE — 99999 PR PBB SHADOW E&M-EST. PATIENT-LVL III: CPT | Mod: PBBFAC,,, | Performed by: ORTHOPAEDIC SURGERY

## 2021-10-26 PROCEDURE — 3066F NEPHROPATHY DOC TX: CPT | Mod: CPTII,S$GLB,, | Performed by: ORTHOPAEDIC SURGERY

## 2021-10-26 PROCEDURE — 4010F PR ACE/ARB THEARPY RXD/TAKEN: ICD-10-PCS | Mod: CPTII,S$GLB,, | Performed by: ORTHOPAEDIC SURGERY

## 2021-10-26 PROCEDURE — 1101F PT FALLS ASSESS-DOCD LE1/YR: CPT | Mod: CPTII,S$GLB,, | Performed by: ORTHOPAEDIC SURGERY

## 2021-10-26 PROCEDURE — 3061F NEG MICROALBUMINURIA REV: CPT | Mod: CPTII,S$GLB,, | Performed by: ORTHOPAEDIC SURGERY

## 2021-10-26 PROCEDURE — 20610 DRAIN/INJ JOINT/BURSA W/O US: CPT | Mod: LT,S$GLB,, | Performed by: ORTHOPAEDIC SURGERY

## 2021-10-26 PROCEDURE — 3061F PR NEG MICROALBUMINURIA RESULT DOCUMENTED/REVIEW: ICD-10-PCS | Mod: CPTII,S$GLB,, | Performed by: ORTHOPAEDIC SURGERY

## 2021-10-26 PROCEDURE — 3288F FALL RISK ASSESSMENT DOCD: CPT | Mod: CPTII,S$GLB,, | Performed by: ORTHOPAEDIC SURGERY

## 2021-10-26 PROCEDURE — 99999 PR PBB SHADOW E&M-EST. PATIENT-LVL III: ICD-10-PCS | Mod: PBBFAC,,, | Performed by: ORTHOPAEDIC SURGERY

## 2021-10-26 PROCEDURE — 4010F ACE/ARB THERAPY RXD/TAKEN: CPT | Mod: CPTII,S$GLB,, | Performed by: ORTHOPAEDIC SURGERY

## 2021-10-26 PROCEDURE — 3072F LOW RISK FOR RETINOPATHY: CPT | Mod: CPTII,S$GLB,, | Performed by: ORTHOPAEDIC SURGERY

## 2021-10-26 PROCEDURE — 3288F PR FALLS RISK ASSESSMENT DOCUMENTED: ICD-10-PCS | Mod: CPTII,S$GLB,, | Performed by: ORTHOPAEDIC SURGERY

## 2021-10-26 PROCEDURE — 3078F DIAST BP <80 MM HG: CPT | Mod: CPTII,S$GLB,, | Performed by: ORTHOPAEDIC SURGERY

## 2021-10-26 PROCEDURE — 3046F HEMOGLOBIN A1C LEVEL >9.0%: CPT | Mod: CPTII,S$GLB,, | Performed by: ORTHOPAEDIC SURGERY

## 2021-10-26 PROCEDURE — 3072F PR LOW RISK FOR RETINOPATHY: ICD-10-PCS | Mod: CPTII,S$GLB,, | Performed by: ORTHOPAEDIC SURGERY

## 2021-10-26 PROCEDURE — 99214 PR OFFICE/OUTPT VISIT, EST, LEVL IV, 30-39 MIN: ICD-10-PCS | Mod: 25,S$GLB,, | Performed by: ORTHOPAEDIC SURGERY

## 2021-10-26 PROCEDURE — 1101F PR PT FALLS ASSESS DOC 0-1 FALLS W/OUT INJ PAST YR: ICD-10-PCS | Mod: CPTII,S$GLB,, | Performed by: ORTHOPAEDIC SURGERY

## 2021-10-26 PROCEDURE — 3074F PR MOST RECENT SYSTOLIC BLOOD PRESSURE < 130 MM HG: ICD-10-PCS | Mod: CPTII,S$GLB,, | Performed by: ORTHOPAEDIC SURGERY

## 2021-10-26 PROCEDURE — 3008F PR BODY MASS INDEX (BMI) DOCUMENTED: ICD-10-PCS | Mod: CPTII,S$GLB,, | Performed by: ORTHOPAEDIC SURGERY

## 2021-10-26 PROCEDURE — 3066F PR DOCUMENTATION OF TREATMENT FOR NEPHROPATHY: ICD-10-PCS | Mod: CPTII,S$GLB,, | Performed by: ORTHOPAEDIC SURGERY

## 2021-10-26 PROCEDURE — 20610 LARGE JOINT ASPIRATION/INJECTION: L SUBACROMIAL BURSA: ICD-10-PCS | Mod: LT,S$GLB,, | Performed by: ORTHOPAEDIC SURGERY

## 2021-10-26 PROCEDURE — 99214 OFFICE O/P EST MOD 30 MIN: CPT | Mod: 25,S$GLB,, | Performed by: ORTHOPAEDIC SURGERY

## 2021-10-26 PROCEDURE — 1125F AMNT PAIN NOTED PAIN PRSNT: CPT | Mod: CPTII,S$GLB,, | Performed by: ORTHOPAEDIC SURGERY

## 2021-10-26 PROCEDURE — 3074F SYST BP LT 130 MM HG: CPT | Mod: CPTII,S$GLB,, | Performed by: ORTHOPAEDIC SURGERY

## 2021-10-26 PROCEDURE — 3046F PR MOST RECENT HEMOGLOBIN A1C LEVEL > 9.0%: ICD-10-PCS | Mod: CPTII,S$GLB,, | Performed by: ORTHOPAEDIC SURGERY

## 2021-10-26 PROCEDURE — 3008F BODY MASS INDEX DOCD: CPT | Mod: CPTII,S$GLB,, | Performed by: ORTHOPAEDIC SURGERY

## 2021-10-26 RX ORDER — TRIAMCINOLONE ACETONIDE 40 MG/ML
60 INJECTION, SUSPENSION INTRA-ARTICULAR; INTRAMUSCULAR
Status: DISCONTINUED | OUTPATIENT
Start: 2021-10-26 | End: 2021-10-26 | Stop reason: HOSPADM

## 2021-10-26 RX ADMIN — TRIAMCINOLONE ACETONIDE 60 MG: 40 INJECTION, SUSPENSION INTRA-ARTICULAR; INTRAMUSCULAR at 01:10

## 2021-10-27 ENCOUNTER — TELEPHONE (OUTPATIENT)
Dept: SPORTS MEDICINE | Facility: CLINIC | Age: 66
End: 2021-10-27
Payer: MEDICARE

## 2021-10-27 DIAGNOSIS — M75.02 ADHESIVE CAPSULITIS OF LEFT SHOULDER: Primary | ICD-10-CM

## 2021-11-01 ENCOUNTER — CLINICAL SUPPORT (OUTPATIENT)
Dept: REHABILITATION | Facility: HOSPITAL | Age: 66
End: 2021-11-01
Payer: MEDICARE

## 2021-11-01 DIAGNOSIS — M25.619 LIMITED RANGE OF MOTION (ROM) OF SHOULDER: ICD-10-CM

## 2021-11-01 DIAGNOSIS — M25.512 ACUTE PAIN OF LEFT SHOULDER: ICD-10-CM

## 2021-11-01 PROCEDURE — 97110 THERAPEUTIC EXERCISES: CPT | Performed by: PHYSICAL THERAPIST

## 2021-11-01 PROCEDURE — 97140 MANUAL THERAPY 1/> REGIONS: CPT | Performed by: PHYSICAL THERAPIST

## 2021-11-04 ENCOUNTER — PATIENT MESSAGE (OUTPATIENT)
Dept: INTERNAL MEDICINE | Facility: CLINIC | Age: 66
End: 2021-11-04
Payer: MEDICARE

## 2021-11-08 ENCOUNTER — CLINICAL SUPPORT (OUTPATIENT)
Dept: REHABILITATION | Facility: HOSPITAL | Age: 66
End: 2021-11-08
Payer: MEDICARE

## 2021-11-08 DIAGNOSIS — M25.619 LIMITED RANGE OF MOTION (ROM) OF SHOULDER: ICD-10-CM

## 2021-11-08 DIAGNOSIS — M25.512 ACUTE PAIN OF LEFT SHOULDER: ICD-10-CM

## 2021-11-08 PROCEDURE — 97110 THERAPEUTIC EXERCISES: CPT | Performed by: PHYSICAL THERAPIST

## 2021-11-08 PROCEDURE — 97140 MANUAL THERAPY 1/> REGIONS: CPT | Performed by: PHYSICAL THERAPIST

## 2021-11-11 ENCOUNTER — PATIENT MESSAGE (OUTPATIENT)
Dept: INTERNAL MEDICINE | Facility: CLINIC | Age: 66
End: 2021-11-11
Payer: MEDICARE

## 2021-11-12 ENCOUNTER — LAB VISIT (OUTPATIENT)
Dept: LAB | Facility: HOSPITAL | Age: 66
End: 2021-11-12
Payer: MEDICARE

## 2021-11-12 DIAGNOSIS — E11.9 TYPE 2 DIABETES MELLITUS WITHOUT COMPLICATION, WITH LONG-TERM CURRENT USE OF INSULIN: ICD-10-CM

## 2021-11-12 DIAGNOSIS — E78.5 HYPERLIPIDEMIA, UNSPECIFIED HYPERLIPIDEMIA TYPE: ICD-10-CM

## 2021-11-12 DIAGNOSIS — Z79.4 TYPE 2 DIABETES MELLITUS WITHOUT COMPLICATION, WITH LONG-TERM CURRENT USE OF INSULIN: ICD-10-CM

## 2021-11-12 DIAGNOSIS — I10 ESSENTIAL HYPERTENSION: ICD-10-CM

## 2021-11-12 LAB
ALBUMIN SERPL BCP-MCNC: 3.9 G/DL (ref 3.5–5.2)
ALP SERPL-CCNC: 65 U/L (ref 55–135)
ALT SERPL W/O P-5'-P-CCNC: 29 U/L (ref 10–44)
ANION GAP SERPL CALC-SCNC: 6 MMOL/L (ref 8–16)
AST SERPL-CCNC: 16 U/L (ref 10–40)
BILIRUB SERPL-MCNC: 0.7 MG/DL (ref 0.1–1)
BUN SERPL-MCNC: 19 MG/DL (ref 8–23)
CALCIUM SERPL-MCNC: 9.8 MG/DL (ref 8.7–10.5)
CHLORIDE SERPL-SCNC: 104 MMOL/L (ref 95–110)
CHOLEST SERPL-MCNC: 136 MG/DL (ref 120–199)
CHOLEST/HDLC SERPL: 3.3 {RATIO} (ref 2–5)
CO2 SERPL-SCNC: 31 MMOL/L (ref 23–29)
CREAT SERPL-MCNC: 1 MG/DL (ref 0.5–1.4)
EST. GFR  (AFRICAN AMERICAN): >60 ML/MIN/1.73 M^2
EST. GFR  (NON AFRICAN AMERICAN): >60 ML/MIN/1.73 M^2
ESTIMATED AVG GLUCOSE: 214 MG/DL (ref 68–131)
GLUCOSE SERPL-MCNC: 158 MG/DL (ref 70–110)
HBA1C MFR BLD: 9.1 % (ref 4–5.6)
HDLC SERPL-MCNC: 41 MG/DL (ref 40–75)
HDLC SERPL: 30.1 % (ref 20–50)
LDLC SERPL CALC-MCNC: 76.8 MG/DL (ref 63–159)
NONHDLC SERPL-MCNC: 95 MG/DL
POTASSIUM SERPL-SCNC: 4.8 MMOL/L (ref 3.5–5.1)
PROT SERPL-MCNC: 6.8 G/DL (ref 6–8.4)
SODIUM SERPL-SCNC: 141 MMOL/L (ref 136–145)
TRIGL SERPL-MCNC: 91 MG/DL (ref 30–150)

## 2021-11-12 PROCEDURE — 80053 COMPREHEN METABOLIC PANEL: CPT | Performed by: INTERNAL MEDICINE

## 2021-11-12 PROCEDURE — 83036 HEMOGLOBIN GLYCOSYLATED A1C: CPT | Performed by: INTERNAL MEDICINE

## 2021-11-12 PROCEDURE — 36415 COLL VENOUS BLD VENIPUNCTURE: CPT | Performed by: INTERNAL MEDICINE

## 2021-11-12 PROCEDURE — 80061 LIPID PANEL: CPT | Performed by: INTERNAL MEDICINE

## 2021-11-15 ENCOUNTER — OFFICE VISIT (OUTPATIENT)
Dept: INTERNAL MEDICINE | Facility: CLINIC | Age: 66
End: 2021-11-15
Payer: MEDICARE

## 2021-11-15 ENCOUNTER — PATIENT MESSAGE (OUTPATIENT)
Dept: PREADMISSION TESTING | Facility: HOSPITAL | Age: 66
End: 2021-11-15
Payer: MEDICARE

## 2021-11-15 ENCOUNTER — CLINICAL SUPPORT (OUTPATIENT)
Dept: REHABILITATION | Facility: HOSPITAL | Age: 66
End: 2021-11-15
Payer: MEDICARE

## 2021-11-15 ENCOUNTER — IMMUNIZATION (OUTPATIENT)
Dept: INTERNAL MEDICINE | Facility: CLINIC | Age: 66
End: 2021-11-15
Payer: MEDICARE

## 2021-11-15 VITALS
DIASTOLIC BLOOD PRESSURE: 82 MMHG | HEART RATE: 67 BPM | SYSTOLIC BLOOD PRESSURE: 128 MMHG | HEIGHT: 68 IN | BODY MASS INDEX: 31.67 KG/M2 | WEIGHT: 209 LBS

## 2021-11-15 DIAGNOSIS — E11.9 TYPE 2 DIABETES MELLITUS WITHOUT COMPLICATION, WITH LONG-TERM CURRENT USE OF INSULIN: Primary | ICD-10-CM

## 2021-11-15 DIAGNOSIS — Z01.818 PREOP GENERAL PHYSICAL EXAM: ICD-10-CM

## 2021-11-15 DIAGNOSIS — Z12.5 ENCOUNTER FOR SCREENING FOR MALIGNANT NEOPLASM OF PROSTATE: ICD-10-CM

## 2021-11-15 DIAGNOSIS — I10 ESSENTIAL HYPERTENSION: ICD-10-CM

## 2021-11-15 DIAGNOSIS — M67.912 TENDINOPATHY OF ROTATOR CUFF, LEFT: ICD-10-CM

## 2021-11-15 DIAGNOSIS — G47.62 NOCTURNAL LEG CRAMPS: ICD-10-CM

## 2021-11-15 DIAGNOSIS — E78.5 HYPERLIPIDEMIA, UNSPECIFIED HYPERLIPIDEMIA TYPE: ICD-10-CM

## 2021-11-15 DIAGNOSIS — Z79.4 TYPE 2 DIABETES MELLITUS WITHOUT COMPLICATION, WITH LONG-TERM CURRENT USE OF INSULIN: Primary | ICD-10-CM

## 2021-11-15 DIAGNOSIS — M25.619 LIMITED RANGE OF MOTION (ROM) OF SHOULDER: ICD-10-CM

## 2021-11-15 DIAGNOSIS — M25.512 ACUTE PAIN OF LEFT SHOULDER: ICD-10-CM

## 2021-11-15 PROCEDURE — 99214 OFFICE O/P EST MOD 30 MIN: CPT | Mod: S$GLB,,, | Performed by: INTERNAL MEDICINE

## 2021-11-15 PROCEDURE — 3046F PR MOST RECENT HEMOGLOBIN A1C LEVEL > 9.0%: ICD-10-PCS | Mod: CPTII,S$GLB,, | Performed by: INTERNAL MEDICINE

## 2021-11-15 PROCEDURE — 97140 MANUAL THERAPY 1/> REGIONS: CPT | Performed by: PHYSICAL THERAPIST

## 2021-11-15 PROCEDURE — 3072F LOW RISK FOR RETINOPATHY: CPT | Mod: CPTII,S$GLB,, | Performed by: INTERNAL MEDICINE

## 2021-11-15 PROCEDURE — 3008F BODY MASS INDEX DOCD: CPT | Mod: CPTII,S$GLB,, | Performed by: INTERNAL MEDICINE

## 2021-11-15 PROCEDURE — 1125F PR PAIN SEVERITY QUANTIFIED, PAIN PRESENT: ICD-10-PCS | Mod: CPTII,S$GLB,, | Performed by: INTERNAL MEDICINE

## 2021-11-15 PROCEDURE — 90694 VACC AIIV4 NO PRSRV 0.5ML IM: CPT | Mod: S$GLB,,, | Performed by: INTERNAL MEDICINE

## 2021-11-15 PROCEDURE — 3066F NEPHROPATHY DOC TX: CPT | Mod: CPTII,S$GLB,, | Performed by: INTERNAL MEDICINE

## 2021-11-15 PROCEDURE — 3079F PR MOST RECENT DIASTOLIC BLOOD PRESSURE 80-89 MM HG: ICD-10-PCS | Mod: CPTII,S$GLB,, | Performed by: INTERNAL MEDICINE

## 2021-11-15 PROCEDURE — 90694 FLU VACCINE - QUADRIVALENT - ADJUVANTED: ICD-10-PCS | Mod: S$GLB,,, | Performed by: INTERNAL MEDICINE

## 2021-11-15 PROCEDURE — 3061F PR NEG MICROALBUMINURIA RESULT DOCUMENTED/REVIEW: ICD-10-PCS | Mod: CPTII,S$GLB,, | Performed by: INTERNAL MEDICINE

## 2021-11-15 PROCEDURE — 4010F ACE/ARB THERAPY RXD/TAKEN: CPT | Mod: CPTII,S$GLB,, | Performed by: INTERNAL MEDICINE

## 2021-11-15 PROCEDURE — 1159F PR MEDICATION LIST DOCUMENTED IN MEDICAL RECORD: ICD-10-PCS | Mod: CPTII,S$GLB,, | Performed by: INTERNAL MEDICINE

## 2021-11-15 PROCEDURE — 3074F PR MOST RECENT SYSTOLIC BLOOD PRESSURE < 130 MM HG: ICD-10-PCS | Mod: CPTII,S$GLB,, | Performed by: INTERNAL MEDICINE

## 2021-11-15 PROCEDURE — 1160F RVW MEDS BY RX/DR IN RCRD: CPT | Mod: CPTII,S$GLB,, | Performed by: INTERNAL MEDICINE

## 2021-11-15 PROCEDURE — 99999 PR PBB SHADOW E&M-EST. PATIENT-LVL III: ICD-10-PCS | Mod: PBBFAC,,, | Performed by: INTERNAL MEDICINE

## 2021-11-15 PROCEDURE — G0008 ADMIN INFLUENZA VIRUS VAC: HCPCS | Mod: S$GLB,,, | Performed by: INTERNAL MEDICINE

## 2021-11-15 PROCEDURE — 1160F PR REVIEW ALL MEDS BY PRESCRIBER/CLIN PHARMACIST DOCUMENTED: ICD-10-PCS | Mod: CPTII,S$GLB,, | Performed by: INTERNAL MEDICINE

## 2021-11-15 PROCEDURE — G0008 FLU VACCINE - QUADRIVALENT - ADJUVANTED: ICD-10-PCS | Mod: S$GLB,,, | Performed by: INTERNAL MEDICINE

## 2021-11-15 PROCEDURE — 3046F HEMOGLOBIN A1C LEVEL >9.0%: CPT | Mod: CPTII,S$GLB,, | Performed by: INTERNAL MEDICINE

## 2021-11-15 PROCEDURE — 1159F MED LIST DOCD IN RCRD: CPT | Mod: CPTII,S$GLB,, | Performed by: INTERNAL MEDICINE

## 2021-11-15 PROCEDURE — 4010F PR ACE/ARB THEARPY RXD/TAKEN: ICD-10-PCS | Mod: CPTII,S$GLB,, | Performed by: INTERNAL MEDICINE

## 2021-11-15 PROCEDURE — 3066F PR DOCUMENTATION OF TREATMENT FOR NEPHROPATHY: ICD-10-PCS | Mod: CPTII,S$GLB,, | Performed by: INTERNAL MEDICINE

## 2021-11-15 PROCEDURE — 99214 PR OFFICE/OUTPT VISIT, EST, LEVL IV, 30-39 MIN: ICD-10-PCS | Mod: S$GLB,,, | Performed by: INTERNAL MEDICINE

## 2021-11-15 PROCEDURE — 3008F PR BODY MASS INDEX (BMI) DOCUMENTED: ICD-10-PCS | Mod: CPTII,S$GLB,, | Performed by: INTERNAL MEDICINE

## 2021-11-15 PROCEDURE — 3061F NEG MICROALBUMINURIA REV: CPT | Mod: CPTII,S$GLB,, | Performed by: INTERNAL MEDICINE

## 2021-11-15 PROCEDURE — 3074F SYST BP LT 130 MM HG: CPT | Mod: CPTII,S$GLB,, | Performed by: INTERNAL MEDICINE

## 2021-11-15 PROCEDURE — 3079F DIAST BP 80-89 MM HG: CPT | Mod: CPTII,S$GLB,, | Performed by: INTERNAL MEDICINE

## 2021-11-15 PROCEDURE — 1125F AMNT PAIN NOTED PAIN PRSNT: CPT | Mod: CPTII,S$GLB,, | Performed by: INTERNAL MEDICINE

## 2021-11-15 PROCEDURE — 97110 THERAPEUTIC EXERCISES: CPT | Performed by: PHYSICAL THERAPIST

## 2021-11-15 PROCEDURE — 99999 PR PBB SHADOW E&M-EST. PATIENT-LVL III: CPT | Mod: PBBFAC,,, | Performed by: INTERNAL MEDICINE

## 2021-11-15 PROCEDURE — 3072F PR LOW RISK FOR RETINOPATHY: ICD-10-PCS | Mod: CPTII,S$GLB,, | Performed by: INTERNAL MEDICINE

## 2021-11-22 ENCOUNTER — ANESTHESIA EVENT (OUTPATIENT)
Dept: SURGERY | Facility: HOSPITAL | Age: 66
End: 2021-11-22
Payer: MEDICARE

## 2021-11-22 ENCOUNTER — CLINICAL SUPPORT (OUTPATIENT)
Dept: REHABILITATION | Facility: HOSPITAL | Age: 66
End: 2021-11-22
Payer: MEDICARE

## 2021-11-22 DIAGNOSIS — M25.619 LIMITED RANGE OF MOTION (ROM) OF SHOULDER: ICD-10-CM

## 2021-11-22 DIAGNOSIS — M25.512 ACUTE PAIN OF LEFT SHOULDER: ICD-10-CM

## 2021-11-22 PROCEDURE — 97110 THERAPEUTIC EXERCISES: CPT | Performed by: PHYSICAL THERAPIST

## 2021-11-23 ENCOUNTER — OFFICE VISIT (OUTPATIENT)
Dept: SPORTS MEDICINE | Facility: CLINIC | Age: 66
End: 2021-11-23
Payer: MEDICARE

## 2021-11-23 VITALS
HEIGHT: 68 IN | HEART RATE: 57 BPM | BODY MASS INDEX: 31.67 KG/M2 | DIASTOLIC BLOOD PRESSURE: 74 MMHG | SYSTOLIC BLOOD PRESSURE: 132 MMHG | WEIGHT: 209 LBS

## 2021-11-23 DIAGNOSIS — M75.02 ADHESIVE CAPSULITIS OF LEFT SHOULDER: Primary | ICD-10-CM

## 2021-11-23 PROCEDURE — 3061F PR NEG MICROALBUMINURIA RESULT DOCUMENTED/REVIEW: ICD-10-PCS | Mod: CPTII,S$GLB,, | Performed by: PHYSICIAN ASSISTANT

## 2021-11-23 PROCEDURE — 99999 PR PBB SHADOW E&M-EST. PATIENT-LVL V: ICD-10-PCS | Mod: PBBFAC,,, | Performed by: PHYSICIAN ASSISTANT

## 2021-11-23 PROCEDURE — 99999 PR PBB SHADOW E&M-EST. PATIENT-LVL V: CPT | Mod: PBBFAC,,, | Performed by: PHYSICIAN ASSISTANT

## 2021-11-23 PROCEDURE — 3061F NEG MICROALBUMINURIA REV: CPT | Mod: CPTII,S$GLB,, | Performed by: PHYSICIAN ASSISTANT

## 2021-11-23 PROCEDURE — 4010F PR ACE/ARB THEARPY RXD/TAKEN: ICD-10-PCS | Mod: CPTII,S$GLB,, | Performed by: PHYSICIAN ASSISTANT

## 2021-11-23 PROCEDURE — 99499 NO LOS: ICD-10-PCS | Mod: S$GLB,,, | Performed by: PHYSICIAN ASSISTANT

## 2021-11-23 PROCEDURE — 3072F PR LOW RISK FOR RETINOPATHY: ICD-10-PCS | Mod: CPTII,S$GLB,, | Performed by: PHYSICIAN ASSISTANT

## 2021-11-23 PROCEDURE — 3072F LOW RISK FOR RETINOPATHY: CPT | Mod: CPTII,S$GLB,, | Performed by: PHYSICIAN ASSISTANT

## 2021-11-23 PROCEDURE — 3066F NEPHROPATHY DOC TX: CPT | Mod: CPTII,S$GLB,, | Performed by: PHYSICIAN ASSISTANT

## 2021-11-23 PROCEDURE — 3066F PR DOCUMENTATION OF TREATMENT FOR NEPHROPATHY: ICD-10-PCS | Mod: CPTII,S$GLB,, | Performed by: PHYSICIAN ASSISTANT

## 2021-11-23 PROCEDURE — 4010F ACE/ARB THERAPY RXD/TAKEN: CPT | Mod: CPTII,S$GLB,, | Performed by: PHYSICIAN ASSISTANT

## 2021-11-23 PROCEDURE — 99499 UNLISTED E&M SERVICE: CPT | Mod: S$GLB,,, | Performed by: PHYSICIAN ASSISTANT

## 2021-11-23 RX ORDER — SODIUM CHLORIDE 9 MG/ML
INJECTION, SOLUTION INTRAVENOUS CONTINUOUS
Status: CANCELLED | OUTPATIENT
Start: 2021-11-23

## 2021-11-23 RX ORDER — OXYCODONE AND ACETAMINOPHEN 10; 325 MG/1; MG/1
1 TABLET ORAL EVERY 6 HOURS PRN
Qty: 28 TABLET | Refills: 0 | Status: SHIPPED | OUTPATIENT
Start: 2021-11-23 | End: 2022-01-26

## 2021-11-23 RX ORDER — ASPIRIN 325 MG
325 TABLET ORAL DAILY
Qty: 21 TABLET | Refills: 0 | Status: SHIPPED | OUTPATIENT
Start: 2021-11-23 | End: 2022-01-26

## 2021-11-23 RX ORDER — CEFAZOLIN SODIUM 2 G/50ML
2 SOLUTION INTRAVENOUS
Status: CANCELLED | OUTPATIENT
Start: 2021-11-23

## 2021-11-23 RX ORDER — PROMETHAZINE HYDROCHLORIDE 25 MG/1
25 TABLET ORAL EVERY 6 HOURS PRN
Qty: 20 TABLET | Refills: 0 | Status: SHIPPED | OUTPATIENT
Start: 2021-11-23 | End: 2022-01-26

## 2021-11-24 ENCOUNTER — HOSPITAL ENCOUNTER (OUTPATIENT)
Facility: HOSPITAL | Age: 66
Discharge: HOME | End: 2021-11-24
Attending: ORTHOPAEDIC SURGERY | Admitting: ORTHOPAEDIC SURGERY
Payer: MEDICARE

## 2021-11-24 ENCOUNTER — ANESTHESIA (OUTPATIENT)
Dept: SURGERY | Facility: HOSPITAL | Age: 66
End: 2021-11-24
Payer: MEDICARE

## 2021-11-24 VITALS
SYSTOLIC BLOOD PRESSURE: 118 MMHG | HEART RATE: 50 BPM | HEIGHT: 68 IN | OXYGEN SATURATION: 98 % | BODY MASS INDEX: 31.67 KG/M2 | DIASTOLIC BLOOD PRESSURE: 70 MMHG | RESPIRATION RATE: 15 BRPM | WEIGHT: 209 LBS | TEMPERATURE: 97 F

## 2021-11-24 DIAGNOSIS — M75.02 ADHESIVE CAPSULITIS OF LEFT SHOULDER: Primary | ICD-10-CM

## 2021-11-24 LAB — POCT GLUCOSE: 93 MG/DL (ref 70–110)

## 2021-11-24 PROCEDURE — 37000009 HC ANESTHESIA EA ADD 15 MINS: Performed by: ORTHOPAEDIC SURGERY

## 2021-11-24 PROCEDURE — 29825 PR SHLDR ARTHROSCOP,LYSE ADHESNS: ICD-10-PCS | Mod: LT,,, | Performed by: ORTHOPAEDIC SURGERY

## 2021-11-24 PROCEDURE — 25000003 PHARM REV CODE 250: Performed by: NURSE ANESTHETIST, CERTIFIED REGISTERED

## 2021-11-24 PROCEDURE — 99900035 HC TECH TIME PER 15 MIN (STAT)

## 2021-11-24 PROCEDURE — 63600175 PHARM REV CODE 636 W HCPCS: Performed by: ANESTHESIOLOGY

## 2021-11-24 PROCEDURE — 76942 PR U/S GUIDANCE FOR NEEDLE GUIDANCE: ICD-10-PCS | Mod: 26,,, | Performed by: ANESTHESIOLOGY

## 2021-11-24 PROCEDURE — 36000710: Performed by: ORTHOPAEDIC SURGERY

## 2021-11-24 PROCEDURE — 25000003 PHARM REV CODE 250: Performed by: SURGERY

## 2021-11-24 PROCEDURE — D9220A PRA ANESTHESIA: ICD-10-PCS | Mod: CRNA,,, | Performed by: NURSE ANESTHETIST, CERTIFIED REGISTERED

## 2021-11-24 PROCEDURE — D9220A PRA ANESTHESIA: ICD-10-PCS | Mod: ANES,,, | Performed by: ANESTHESIOLOGY

## 2021-11-24 PROCEDURE — 29825 SHO ARTHRS SRG LSS&RESCJ ADS: CPT | Mod: LT,,, | Performed by: ORTHOPAEDIC SURGERY

## 2021-11-24 PROCEDURE — 25000003 PHARM REV CODE 250: Performed by: PHYSICIAN ASSISTANT

## 2021-11-24 PROCEDURE — 64416 NJX AA&/STRD BRCH PL NFS IMG: CPT | Performed by: SURGERY

## 2021-11-24 PROCEDURE — 94761 N-INVAS EAR/PLS OXIMETRY MLT: CPT

## 2021-11-24 PROCEDURE — D9220A PRA ANESTHESIA: Mod: CRNA,,, | Performed by: NURSE ANESTHETIST, CERTIFIED REGISTERED

## 2021-11-24 PROCEDURE — 82962 GLUCOSE BLOOD TEST: CPT | Performed by: ORTHOPAEDIC SURGERY

## 2021-11-24 PROCEDURE — 64416 NJX AA&/STRD BRCH PL NFS IMG: CPT | Mod: 59,LT,, | Performed by: ANESTHESIOLOGY

## 2021-11-24 PROCEDURE — 37000008 HC ANESTHESIA 1ST 15 MINUTES: Performed by: ORTHOPAEDIC SURGERY

## 2021-11-24 PROCEDURE — 25000003 PHARM REV CODE 250: Performed by: ANESTHESIOLOGY

## 2021-11-24 PROCEDURE — 71000033 HC RECOVERY, INTIAL HOUR: Performed by: ORTHOPAEDIC SURGERY

## 2021-11-24 PROCEDURE — D9220A PRA ANESTHESIA: Mod: ANES,,, | Performed by: ANESTHESIOLOGY

## 2021-11-24 PROCEDURE — 63600175 PHARM REV CODE 636 W HCPCS: Performed by: ORTHOPAEDIC SURGERY

## 2021-11-24 PROCEDURE — 64416 PR NERVE BLOCK INJ, ANES/STEROID, BRACHIAL PLEXUS, CONT INFUSION, INCL IMAG GUIDANCE: ICD-10-PCS | Mod: 59,LT,, | Performed by: ANESTHESIOLOGY

## 2021-11-24 PROCEDURE — 76942 ECHO GUIDE FOR BIOPSY: CPT | Mod: 26,,, | Performed by: ANESTHESIOLOGY

## 2021-11-24 PROCEDURE — 63600175 PHARM REV CODE 636 W HCPCS: Performed by: NURSE ANESTHETIST, CERTIFIED REGISTERED

## 2021-11-24 PROCEDURE — 71000015 HC POSTOP RECOV 1ST HR: Performed by: ORTHOPAEDIC SURGERY

## 2021-11-24 PROCEDURE — 36000711: Performed by: ORTHOPAEDIC SURGERY

## 2021-11-24 PROCEDURE — 63600175 PHARM REV CODE 636 W HCPCS: Performed by: SURGERY

## 2021-11-24 PROCEDURE — 27201423 OPTIME MED/SURG SUP & DEVICES STERILE SUPPLY: Performed by: ORTHOPAEDIC SURGERY

## 2021-11-24 PROCEDURE — 76942 ECHO GUIDE FOR BIOPSY: CPT | Performed by: SURGERY

## 2021-11-24 RX ORDER — CARBOXYMETHYLCELLULOSE SODIUM 5 MG/ML
SOLUTION/ DROPS OPHTHALMIC
Status: DISCONTINUED | OUTPATIENT
Start: 2021-11-24 | End: 2021-11-24

## 2021-11-24 RX ORDER — DEXAMETHASONE SODIUM PHOSPHATE 4 MG/ML
INJECTION, SOLUTION INTRA-ARTICULAR; INTRALESIONAL; INTRAMUSCULAR; INTRAVENOUS; SOFT TISSUE
Status: DISCONTINUED | OUTPATIENT
Start: 2021-11-24 | End: 2021-11-24

## 2021-11-24 RX ORDER — ONDANSETRON 2 MG/ML
4 INJECTION INTRAMUSCULAR; INTRAVENOUS DAILY PRN
Status: DISCONTINUED | OUTPATIENT
Start: 2021-11-24 | End: 2021-11-24 | Stop reason: HOSPADM

## 2021-11-24 RX ORDER — KETAMINE HCL IN 0.9 % NACL 50 MG/5 ML
SYRINGE (ML) INTRAVENOUS
Status: DISCONTINUED | OUTPATIENT
Start: 2021-11-24 | End: 2021-11-24

## 2021-11-24 RX ORDER — ACETAMINOPHEN 500 MG
1000 TABLET ORAL
Status: COMPLETED | OUTPATIENT
Start: 2021-11-24 | End: 2021-11-24

## 2021-11-24 RX ORDER — FAMOTIDINE 10 MG/ML
INJECTION INTRAVENOUS
Status: DISCONTINUED | OUTPATIENT
Start: 2021-11-24 | End: 2021-11-24

## 2021-11-24 RX ORDER — ROPIVACAINE HYDROCHLORIDE 5 MG/ML
INJECTION, SOLUTION EPIDURAL; INFILTRATION; PERINEURAL
Status: COMPLETED | OUTPATIENT
Start: 2021-11-24 | End: 2021-11-24

## 2021-11-24 RX ORDER — CEFAZOLIN SODIUM 1 G/3ML
INJECTION, POWDER, FOR SOLUTION INTRAMUSCULAR; INTRAVENOUS
Status: DISCONTINUED | OUTPATIENT
Start: 2021-11-24 | End: 2021-11-24

## 2021-11-24 RX ORDER — PROPOFOL 10 MG/ML
VIAL (ML) INTRAVENOUS
Status: DISCONTINUED | OUTPATIENT
Start: 2021-11-24 | End: 2021-11-24

## 2021-11-24 RX ORDER — LIDOCAINE HYDROCHLORIDE 10 MG/ML
1 INJECTION, SOLUTION EPIDURAL; INFILTRATION; INTRACAUDAL; PERINEURAL ONCE
Status: ACTIVE | OUTPATIENT
Start: 2021-11-24

## 2021-11-24 RX ORDER — FENTANYL CITRATE 50 UG/ML
25-200 INJECTION, SOLUTION INTRAMUSCULAR; INTRAVENOUS
Status: DISPENSED | OUTPATIENT
Start: 2021-11-24

## 2021-11-24 RX ORDER — HALOPERIDOL 5 MG/ML
0.5 INJECTION INTRAMUSCULAR EVERY 10 MIN PRN
Status: DISCONTINUED | OUTPATIENT
Start: 2021-11-24 | End: 2021-11-24 | Stop reason: HOSPADM

## 2021-11-24 RX ORDER — CEFAZOLIN SODIUM 1 G/3ML
2 INJECTION, POWDER, FOR SOLUTION INTRAMUSCULAR; INTRAVENOUS
Status: DISCONTINUED | OUTPATIENT
Start: 2021-11-24 | End: 2021-11-24 | Stop reason: HOSPADM

## 2021-11-24 RX ORDER — CELECOXIB 200 MG/1
400 CAPSULE ORAL ONCE
Status: COMPLETED | OUTPATIENT
Start: 2021-11-24 | End: 2021-11-24

## 2021-11-24 RX ORDER — SODIUM CHLORIDE 0.9 % (FLUSH) 0.9 %
10 SYRINGE (ML) INJECTION
Status: DISCONTINUED | OUTPATIENT
Start: 2021-11-24 | End: 2021-11-24 | Stop reason: HOSPADM

## 2021-11-24 RX ORDER — ROCURONIUM BROMIDE 10 MG/ML
INJECTION, SOLUTION INTRAVENOUS
Status: DISCONTINUED | OUTPATIENT
Start: 2021-11-24 | End: 2021-11-24

## 2021-11-24 RX ORDER — EPINEPHRINE 1 MG/ML
INJECTION, SOLUTION INTRACARDIAC; INTRAMUSCULAR; INTRAVENOUS; SUBCUTANEOUS
Status: DISCONTINUED | OUTPATIENT
Start: 2021-11-24 | End: 2021-11-24 | Stop reason: HOSPADM

## 2021-11-24 RX ORDER — OXYCODONE HYDROCHLORIDE 5 MG/1
5 TABLET ORAL
Status: DISCONTINUED | OUTPATIENT
Start: 2021-11-24 | End: 2021-11-24 | Stop reason: HOSPADM

## 2021-11-24 RX ORDER — MIDAZOLAM HYDROCHLORIDE 1 MG/ML
.5-4 INJECTION INTRAMUSCULAR; INTRAVENOUS
Status: DISPENSED | OUTPATIENT
Start: 2021-11-24

## 2021-11-24 RX ORDER — FENTANYL CITRATE 50 UG/ML
25 INJECTION, SOLUTION INTRAMUSCULAR; INTRAVENOUS EVERY 5 MIN PRN
Status: DISCONTINUED | OUTPATIENT
Start: 2021-11-24 | End: 2021-11-24 | Stop reason: HOSPADM

## 2021-11-24 RX ORDER — MIDAZOLAM HYDROCHLORIDE 1 MG/ML
INJECTION, SOLUTION INTRAMUSCULAR; INTRAVENOUS
Status: DISCONTINUED | OUTPATIENT
Start: 2021-11-24 | End: 2021-11-24

## 2021-11-24 RX ORDER — ONDANSETRON 2 MG/ML
INJECTION INTRAMUSCULAR; INTRAVENOUS
Status: DISCONTINUED | OUTPATIENT
Start: 2021-11-24 | End: 2021-11-24

## 2021-11-24 RX ORDER — SODIUM CHLORIDE 9 MG/ML
INJECTION, SOLUTION INTRAVENOUS CONTINUOUS
Status: DISCONTINUED | OUTPATIENT
Start: 2021-11-24 | End: 2021-11-24 | Stop reason: HOSPADM

## 2021-11-24 RX ORDER — ROPIVACAINE HYDROCHLORIDE 2 MG/ML
INJECTION, SOLUTION EPIDURAL; INFILTRATION; PERINEURAL CONTINUOUS
Status: DISPENSED | OUTPATIENT
Start: 2021-11-24

## 2021-11-24 RX ORDER — LIDOCAINE HYDROCHLORIDE 20 MG/ML
INJECTION INTRAVENOUS
Status: DISCONTINUED | OUTPATIENT
Start: 2021-11-24 | End: 2021-11-24

## 2021-11-24 RX ADMIN — ACETAMINOPHEN 1000 MG: 500 TABLET ORAL at 10:11

## 2021-11-24 RX ADMIN — FENTANYL CITRATE 100 MCG: 50 INJECTION INTRAMUSCULAR; INTRAVENOUS at 11:11

## 2021-11-24 RX ADMIN — SODIUM CHLORIDE: 0.9 INJECTION, SOLUTION INTRAVENOUS at 10:11

## 2021-11-24 RX ADMIN — Medication 30 MG: at 01:11

## 2021-11-24 RX ADMIN — DEXAMETHASONE SODIUM PHOSPHATE 8 MG: 4 INJECTION INTRA-ARTICULAR; INTRALESIONAL; INTRAMUSCULAR; INTRAVENOUS; SOFT TISSUE at 01:11

## 2021-11-24 RX ADMIN — CELECOXIB 400 MG: 200 CAPSULE ORAL at 10:11

## 2021-11-24 RX ADMIN — ROPIVACAINE HYDROCHLORIDE 10 ML: 5 INJECTION, SOLUTION EPIDURAL; INFILTRATION; PERINEURAL at 12:11

## 2021-11-24 RX ADMIN — SODIUM CHLORIDE: 9 INJECTION, SOLUTION INTRAVENOUS at 10:11

## 2021-11-24 RX ADMIN — LIDOCAINE HYDROCHLORIDE 100 MG: 20 INJECTION, SOLUTION INTRAVENOUS at 01:11

## 2021-11-24 RX ADMIN — ONDANSETRON 4 MG: 2 INJECTION INTRAMUSCULAR; INTRAVENOUS at 02:11

## 2021-11-24 RX ADMIN — CARBOXYMETHYLCELLULOSE SODIUM 2 DROP: 5 SOLUTION/ DROPS OPHTHALMIC at 01:11

## 2021-11-24 RX ADMIN — MIDAZOLAM 2 MG: 1 INJECTION INTRAMUSCULAR; INTRAVENOUS at 11:11

## 2021-11-24 RX ADMIN — ROCURONIUM BROMIDE 50 MG: 10 INJECTION, SOLUTION INTRAVENOUS at 01:11

## 2021-11-24 RX ADMIN — MIDAZOLAM 2 MG: 1 INJECTION INTRAMUSCULAR; INTRAVENOUS at 01:11

## 2021-11-24 RX ADMIN — SUGAMMADEX 200 MG: 100 INJECTION, SOLUTION INTRAVENOUS at 02:11

## 2021-11-24 RX ADMIN — FAMOTIDINE 20 MG: 10 INJECTION INTRAVENOUS at 01:11

## 2021-11-24 RX ADMIN — OXYCODONE 5 MG: 5 TABLET ORAL at 03:11

## 2021-11-24 RX ADMIN — CEFAZOLIN 2 G: 330 INJECTION, POWDER, FOR SOLUTION INTRAMUSCULAR; INTRAVENOUS at 01:11

## 2021-11-24 RX ADMIN — PROPOFOL 150 MG: 10 INJECTION, EMULSION INTRAVENOUS at 01:11

## 2021-11-24 RX ADMIN — Medication: at 02:11

## 2021-11-29 ENCOUNTER — CLINICAL SUPPORT (OUTPATIENT)
Dept: REHABILITATION | Facility: HOSPITAL | Age: 66
End: 2021-11-29
Payer: MEDICARE

## 2021-11-29 DIAGNOSIS — M25.512 ACUTE PAIN OF LEFT SHOULDER: ICD-10-CM

## 2021-11-29 DIAGNOSIS — M75.02 ADHESIVE CAPSULITIS OF LEFT SHOULDER: ICD-10-CM

## 2021-11-29 DIAGNOSIS — M25.619 LIMITED RANGE OF MOTION (ROM) OF SHOULDER: ICD-10-CM

## 2021-11-29 PROCEDURE — 97110 THERAPEUTIC EXERCISES: CPT | Performed by: PHYSICAL THERAPIST

## 2021-11-29 PROCEDURE — 97164 PT RE-EVAL EST PLAN CARE: CPT | Performed by: PHYSICAL THERAPIST

## 2021-11-29 PROCEDURE — 97140 MANUAL THERAPY 1/> REGIONS: CPT | Performed by: PHYSICAL THERAPIST

## 2021-11-30 ENCOUNTER — PATIENT MESSAGE (OUTPATIENT)
Dept: REHABILITATION | Facility: HOSPITAL | Age: 66
End: 2021-11-30
Payer: MEDICARE

## 2021-12-01 ENCOUNTER — CLINICAL SUPPORT (OUTPATIENT)
Dept: REHABILITATION | Facility: HOSPITAL | Age: 66
End: 2021-12-01
Payer: MEDICARE

## 2021-12-01 DIAGNOSIS — M25.619 LIMITED RANGE OF MOTION (ROM) OF SHOULDER: ICD-10-CM

## 2021-12-01 DIAGNOSIS — M25.512 ACUTE PAIN OF LEFT SHOULDER: ICD-10-CM

## 2021-12-01 PROCEDURE — 97110 THERAPEUTIC EXERCISES: CPT | Mod: CQ

## 2021-12-01 PROCEDURE — 97140 MANUAL THERAPY 1/> REGIONS: CPT | Mod: CQ

## 2021-12-02 ENCOUNTER — CLINICAL SUPPORT (OUTPATIENT)
Dept: REHABILITATION | Facility: HOSPITAL | Age: 66
End: 2021-12-02
Payer: MEDICARE

## 2021-12-02 DIAGNOSIS — M25.619 LIMITED RANGE OF MOTION (ROM) OF SHOULDER: ICD-10-CM

## 2021-12-02 DIAGNOSIS — M25.512 ACUTE PAIN OF LEFT SHOULDER: ICD-10-CM

## 2021-12-02 PROCEDURE — 97110 THERAPEUTIC EXERCISES: CPT | Mod: KX | Performed by: PHYSICAL THERAPIST

## 2021-12-02 PROCEDURE — 97140 MANUAL THERAPY 1/> REGIONS: CPT | Mod: KX | Performed by: PHYSICAL THERAPIST

## 2021-12-03 ENCOUNTER — CLINICAL SUPPORT (OUTPATIENT)
Dept: REHABILITATION | Facility: HOSPITAL | Age: 66
End: 2021-12-03
Payer: MEDICARE

## 2021-12-03 DIAGNOSIS — M25.619 LIMITED RANGE OF MOTION (ROM) OF SHOULDER: ICD-10-CM

## 2021-12-03 DIAGNOSIS — M25.512 ACUTE PAIN OF LEFT SHOULDER: ICD-10-CM

## 2021-12-03 PROCEDURE — 97140 MANUAL THERAPY 1/> REGIONS: CPT | Mod: KX | Performed by: PHYSICAL THERAPIST

## 2021-12-03 PROCEDURE — 97110 THERAPEUTIC EXERCISES: CPT | Mod: KX | Performed by: PHYSICAL THERAPIST

## 2021-12-06 ENCOUNTER — CLINICAL SUPPORT (OUTPATIENT)
Dept: REHABILITATION | Facility: HOSPITAL | Age: 66
End: 2021-12-06
Payer: MEDICARE

## 2021-12-06 DIAGNOSIS — M25.619 LIMITED RANGE OF MOTION (ROM) OF SHOULDER: ICD-10-CM

## 2021-12-06 DIAGNOSIS — M25.512 ACUTE PAIN OF LEFT SHOULDER: ICD-10-CM

## 2021-12-06 PROCEDURE — 97140 MANUAL THERAPY 1/> REGIONS: CPT | Mod: KX | Performed by: PHYSICAL THERAPIST

## 2021-12-06 PROCEDURE — 97110 THERAPEUTIC EXERCISES: CPT | Mod: KX | Performed by: PHYSICAL THERAPIST

## 2021-12-07 ENCOUNTER — CLINICAL SUPPORT (OUTPATIENT)
Dept: REHABILITATION | Facility: HOSPITAL | Age: 66
End: 2021-12-07
Payer: MEDICARE

## 2021-12-07 DIAGNOSIS — M25.512 ACUTE PAIN OF LEFT SHOULDER: ICD-10-CM

## 2021-12-07 DIAGNOSIS — M25.619 LIMITED RANGE OF MOTION (ROM) OF SHOULDER: ICD-10-CM

## 2021-12-07 PROCEDURE — 97110 THERAPEUTIC EXERCISES: CPT | Mod: KX | Performed by: PHYSICAL THERAPIST

## 2021-12-07 PROCEDURE — 97140 MANUAL THERAPY 1/> REGIONS: CPT | Mod: KX | Performed by: PHYSICAL THERAPIST

## 2021-12-08 ENCOUNTER — CLINICAL SUPPORT (OUTPATIENT)
Dept: REHABILITATION | Facility: HOSPITAL | Age: 66
End: 2021-12-08
Payer: MEDICARE

## 2021-12-08 DIAGNOSIS — M25.619 LIMITED RANGE OF MOTION (ROM) OF SHOULDER: ICD-10-CM

## 2021-12-08 DIAGNOSIS — M25.512 ACUTE PAIN OF LEFT SHOULDER: ICD-10-CM

## 2021-12-08 PROCEDURE — 97140 MANUAL THERAPY 1/> REGIONS: CPT | Mod: CQ

## 2021-12-08 PROCEDURE — 97110 THERAPEUTIC EXERCISES: CPT | Mod: CQ

## 2021-12-09 ENCOUNTER — CLINICAL SUPPORT (OUTPATIENT)
Dept: REHABILITATION | Facility: HOSPITAL | Age: 66
End: 2021-12-09
Payer: MEDICARE

## 2021-12-09 ENCOUNTER — OFFICE VISIT (OUTPATIENT)
Dept: SPORTS MEDICINE | Facility: CLINIC | Age: 66
End: 2021-12-09
Payer: MEDICARE

## 2021-12-09 VITALS
HEIGHT: 68 IN | SYSTOLIC BLOOD PRESSURE: 88 MMHG | BODY MASS INDEX: 31.67 KG/M2 | HEART RATE: 67 BPM | WEIGHT: 209 LBS | DIASTOLIC BLOOD PRESSURE: 59 MMHG

## 2021-12-09 DIAGNOSIS — Z98.890 S/P ARTHROSCOPY OF LEFT SHOULDER: ICD-10-CM

## 2021-12-09 DIAGNOSIS — M25.512 ACUTE PAIN OF LEFT SHOULDER: ICD-10-CM

## 2021-12-09 DIAGNOSIS — G89.18 ACUTE POSTOPERATIVE PAIN OF LEFT SHOULDER: Primary | ICD-10-CM

## 2021-12-09 DIAGNOSIS — M25.619 LIMITED RANGE OF MOTION (ROM) OF SHOULDER: ICD-10-CM

## 2021-12-09 DIAGNOSIS — M25.512 ACUTE POSTOPERATIVE PAIN OF LEFT SHOULDER: Primary | ICD-10-CM

## 2021-12-09 PROCEDURE — 99024 POSTOP FOLLOW-UP VISIT: CPT | Mod: S$GLB,,, | Performed by: PHYSICIAN ASSISTANT

## 2021-12-09 PROCEDURE — 99024 PR POST-OP FOLLOW-UP VISIT: ICD-10-PCS | Mod: S$GLB,,, | Performed by: PHYSICIAN ASSISTANT

## 2021-12-09 PROCEDURE — 99999 PR PBB SHADOW E&M-EST. PATIENT-LVL IV: CPT | Mod: PBBFAC,,, | Performed by: PHYSICIAN ASSISTANT

## 2021-12-09 PROCEDURE — 3061F NEG MICROALBUMINURIA REV: CPT | Mod: CPTII,S$GLB,, | Performed by: PHYSICIAN ASSISTANT

## 2021-12-09 PROCEDURE — 3072F PR LOW RISK FOR RETINOPATHY: ICD-10-PCS | Mod: CPTII,S$GLB,, | Performed by: PHYSICIAN ASSISTANT

## 2021-12-09 PROCEDURE — 97110 THERAPEUTIC EXERCISES: CPT | Performed by: PHYSICAL THERAPIST

## 2021-12-09 PROCEDURE — 3072F LOW RISK FOR RETINOPATHY: CPT | Mod: CPTII,S$GLB,, | Performed by: PHYSICIAN ASSISTANT

## 2021-12-09 PROCEDURE — 3066F NEPHROPATHY DOC TX: CPT | Mod: CPTII,S$GLB,, | Performed by: PHYSICIAN ASSISTANT

## 2021-12-09 PROCEDURE — 3061F PR NEG MICROALBUMINURIA RESULT DOCUMENTED/REVIEW: ICD-10-PCS | Mod: CPTII,S$GLB,, | Performed by: PHYSICIAN ASSISTANT

## 2021-12-09 PROCEDURE — 97140 MANUAL THERAPY 1/> REGIONS: CPT | Performed by: PHYSICAL THERAPIST

## 2021-12-09 PROCEDURE — 3066F PR DOCUMENTATION OF TREATMENT FOR NEPHROPATHY: ICD-10-PCS | Mod: CPTII,S$GLB,, | Performed by: PHYSICIAN ASSISTANT

## 2021-12-09 PROCEDURE — 4010F PR ACE/ARB THEARPY RXD/TAKEN: ICD-10-PCS | Mod: CPTII,S$GLB,, | Performed by: PHYSICIAN ASSISTANT

## 2021-12-09 PROCEDURE — 99999 PR PBB SHADOW E&M-EST. PATIENT-LVL IV: ICD-10-PCS | Mod: PBBFAC,,, | Performed by: PHYSICIAN ASSISTANT

## 2021-12-09 PROCEDURE — 4010F ACE/ARB THERAPY RXD/TAKEN: CPT | Mod: CPTII,S$GLB,, | Performed by: PHYSICIAN ASSISTANT

## 2021-12-11 ENCOUNTER — IMMUNIZATION (OUTPATIENT)
Dept: INTERNAL MEDICINE | Facility: CLINIC | Age: 66
End: 2021-12-11
Payer: MEDICARE

## 2021-12-11 DIAGNOSIS — Z23 NEED FOR VACCINATION: Primary | ICD-10-CM

## 2021-12-11 PROCEDURE — 0004A COVID-19, MRNA, LNP-S, PF, 30 MCG/0.3 ML DOSE VACCINE: CPT | Mod: CV19,PBBFAC | Performed by: INTERNAL MEDICINE

## 2021-12-13 ENCOUNTER — CLINICAL SUPPORT (OUTPATIENT)
Dept: REHABILITATION | Facility: HOSPITAL | Age: 66
End: 2021-12-13
Payer: MEDICARE

## 2021-12-13 DIAGNOSIS — M25.512 ACUTE PAIN OF LEFT SHOULDER: ICD-10-CM

## 2021-12-13 DIAGNOSIS — M25.619 LIMITED RANGE OF MOTION (ROM) OF SHOULDER: ICD-10-CM

## 2021-12-13 PROCEDURE — 97110 THERAPEUTIC EXERCISES: CPT | Performed by: PHYSICAL THERAPIST

## 2021-12-13 PROCEDURE — 97140 MANUAL THERAPY 1/> REGIONS: CPT | Performed by: PHYSICAL THERAPIST

## 2021-12-15 ENCOUNTER — PATIENT MESSAGE (OUTPATIENT)
Dept: INTERNAL MEDICINE | Facility: CLINIC | Age: 66
End: 2021-12-15
Payer: MEDICARE

## 2021-12-15 RX ORDER — INSULIN GLARGINE 100 [IU]/ML
45 INJECTION, SOLUTION SUBCUTANEOUS NIGHTLY
Qty: 40.5 ML | Refills: 3 | Status: SHIPPED | OUTPATIENT
Start: 2021-12-15 | End: 2022-04-25 | Stop reason: SDUPTHER

## 2021-12-15 RX ORDER — INSULIN GLARGINE 100 [IU]/ML
INJECTION, SOLUTION SUBCUTANEOUS
Qty: 45 ML | OUTPATIENT
Start: 2021-12-15

## 2021-12-19 ENCOUNTER — PATIENT MESSAGE (OUTPATIENT)
Dept: REHABILITATION | Facility: HOSPITAL | Age: 66
End: 2021-12-19
Payer: MEDICARE

## 2021-12-27 ENCOUNTER — CLINICAL SUPPORT (OUTPATIENT)
Dept: REHABILITATION | Facility: HOSPITAL | Age: 66
End: 2021-12-27
Payer: MEDICARE

## 2021-12-27 DIAGNOSIS — M25.512 ACUTE PAIN OF LEFT SHOULDER: ICD-10-CM

## 2021-12-27 DIAGNOSIS — M25.619 LIMITED RANGE OF MOTION (ROM) OF SHOULDER: ICD-10-CM

## 2021-12-27 PROCEDURE — 97110 THERAPEUTIC EXERCISES: CPT | Performed by: PHYSICAL THERAPIST

## 2021-12-27 PROCEDURE — 97140 MANUAL THERAPY 1/> REGIONS: CPT | Performed by: PHYSICAL THERAPIST

## 2022-01-03 ENCOUNTER — CLINICAL SUPPORT (OUTPATIENT)
Dept: REHABILITATION | Facility: HOSPITAL | Age: 67
End: 2022-01-03
Payer: MEDICARE

## 2022-01-03 ENCOUNTER — PATIENT MESSAGE (OUTPATIENT)
Dept: CARDIOLOGY | Facility: CLINIC | Age: 67
End: 2022-01-03
Payer: MEDICARE

## 2022-01-03 DIAGNOSIS — I95.9 HYPOTENSION, UNSPECIFIED HYPOTENSION TYPE: Primary | ICD-10-CM

## 2022-01-03 DIAGNOSIS — M25.619 LIMITED RANGE OF MOTION (ROM) OF SHOULDER: ICD-10-CM

## 2022-01-03 DIAGNOSIS — R53.82 CHRONIC FATIGUE, UNSPECIFIED: ICD-10-CM

## 2022-01-03 DIAGNOSIS — M25.512 ACUTE PAIN OF LEFT SHOULDER: ICD-10-CM

## 2022-01-03 PROBLEM — I95.0 IDIOPATHIC HYPOTENSION: Status: ACTIVE | Noted: 2022-01-03

## 2022-01-03 PROCEDURE — 97110 THERAPEUTIC EXERCISES: CPT | Performed by: PHYSICAL THERAPIST

## 2022-01-03 PROCEDURE — 97140 MANUAL THERAPY 1/> REGIONS: CPT | Performed by: PHYSICAL THERAPIST

## 2022-01-03 NOTE — PROGRESS NOTES
"Subjective:   Patient ID:  Jm Deleon is a 66 y.o. male who presents for follow-up of CVD    HPI:The patient is here for CAD.Wife wrote a few days ago"Chip, something is going on with Jm. He's constantly stumbling when walking and sleeping all the time. I feel like there is very little quality of life left in him. Honestly, I think his meds need to be readjusted. Blood pressure seems to always run extremely low which could be the culprit for these symptoms. I am begging you to please see him soon.  Thank you, Narcisa Adrienne"        The patient has no chest pain, SOB, TIA, palpitations, syncope or pre-syncope.      Review of Systems   Constitutional: Positive for malaise/fatigue. Negative for chills, decreased appetite, diaphoresis, fever, night sweats, weight gain and weight loss.   HENT: Negative for congestion, hoarse voice, nosebleeds, sore throat and tinnitus.    Eyes: Negative for blurred vision, double vision, vision loss in left eye, vision loss in right eye, visual disturbance and visual halos.   Cardiovascular: Negative for chest pain, claudication, cyanosis, dyspnea on exertion, irregular heartbeat, leg swelling, near-syncope, orthopnea, palpitations, paroxysmal nocturnal dyspnea and syncope.   Respiratory: Negative for cough, hemoptysis, shortness of breath, sleep disturbances due to breathing, snoring, sputum production and wheezing.    Endocrine: Negative for cold intolerance, heat intolerance, polydipsia, polyphagia and polyuria.   Hematologic/Lymphatic: Negative for adenopathy and bleeding problem. Does not bruise/bleed easily.   Skin: Negative for color change, dry skin, flushing, itching, nail changes, poor wound healing, rash, skin cancer, suspicious lesions and unusual hair distribution.   Musculoskeletal: Positive for arthritis and joint pain. Negative for back pain, falls, gout, joint swelling, muscle cramps, muscle weakness, myalgias and stiffness.   Gastrointestinal: Negative for " "abdominal pain, anorexia, change in bowel habit, constipation, diarrhea, dysphagia, heartburn, hematemesis, hematochezia, melena and vomiting.   Genitourinary: Negative for decreased libido, dysuria, hematuria, hesitancy and urgency.   Neurological: Positive for light-headedness. Negative for excessive daytime sleepiness, dizziness, focal weakness, headaches, loss of balance, numbness, paresthesias, seizures, sensory change, tremors, vertigo and weakness.   Psychiatric/Behavioral: Negative for altered mental status, depression, hallucinations, memory loss, substance abuse and suicidal ideas. The patient does not have insomnia and is not nervous/anxious.    Allergic/Immunologic: Negative for environmental allergies and hives.       Objective: /60 (BP Location: Left arm, Patient Position: Sitting, BP Method: Medium (Automatic))   Pulse (!) 59   Ht 5' 8" (1.727 m)   Wt 95 kg (209 lb 7 oz)   SpO2 98%   BMI 31.84 kg/m²      Physical Exam  Constitutional:       General: He is not in acute distress.     Appearance: He is well-developed and well-nourished. He is not diaphoretic.   HENT:      Head: Normocephalic.   Eyes:      Extraocular Movements: EOM normal.      Pupils: Pupils are equal, round, and reactive to light.   Neck:      Thyroid: No thyromegaly.   Cardiovascular:      Rate and Rhythm: Normal rate and regular rhythm.      Pulses: Intact distal pulses.           Carotid pulses are 3+ on the right side and 3+ on the left side.       Radial pulses are 3+ on the right side and 3+ on the left side.        Femoral pulses are 3+ on the right side and 3+ on the left side.       Popliteal pulses are 3+ on the right side and 3+ on the left side.        Dorsalis pedis pulses are 3+ on the right side and 3+ on the left side.        Posterior tibial pulses are 3+ on the right side and 3+ on the left side.      Heart sounds: Normal heart sounds. No murmur heard.  No friction rub. No gallop.    Pulmonary:      Effort: " Pulmonary effort is normal. No respiratory distress.      Breath sounds: Normal breath sounds. No wheezing or rales.   Chest:      Chest wall: No tenderness.   Abdominal:      General: There is no distension.      Palpations: Abdomen is soft. There is no mass.      Tenderness: There is no abdominal tenderness.   Musculoskeletal:         General: Normal range of motion.      Cervical back: Normal range of motion.   Lymphadenopathy:      Cervical: No cervical adenopathy.   Skin:     General: Skin is warm.      Nails: There is no clubbing or cyanosis.   Neurological:      Mental Status: He is alert and oriented to person, place, and time.   Psychiatric:         Mood and Affect: Mood and affect normal.         Speech: Speech normal.         Behavior: Behavior normal.         Thought Content: Thought content normal.         Cognition and Memory: Cognition and memory normal.         Judgment: Judgment normal.         Assessment:     1. Coronary artery disease involving native coronary artery of native heart without angina pectoris    2. Idiopathic hypotension    3. Diabetes mellitus due to underlying condition with diabetic peripheral angiopathy without gangrene, without long-term current use of insulin    4. S/P CABG (coronary artery bypass graft)    5. Lack of physical exercise    6. Venous insufficiency    7. Abnormal cardiovascular stress test    8. Dyslipidemia    9. Hip disease    10. Long term (current) use of anticoagulants    11. Deep vein thrombosis prophylaxis    12. Abnormal nuclear stress test        Plan:   Discussed diet , achieving and maintaining ideal body weight, and exercise.   We reviewed meds in detail.  Reassured-Discussed goals, options, plan.  Omega-3 > 800 mg/d combined EPA/DHA.  Goal LDL < 70.  Goal BP< 130/80 ( but mostly > 100-110).  NTG should be refilled every 8-9 months.  DM has been badly controlled-I am writing Veronica  Cut valsartan to half and Furosimide to half  Increase the Ezetimibe  to whole until getting the injectable  Repatha 420 mg monthly or Praluent 150 mg every 2 weeks    Jm was seen today for dizziness, hypotension and fatigue.    Diagnoses and all orders for this visit:    Coronary artery disease involving native coronary artery of native heart without angina pectoris  -     Lipid Panel; Standing  -     Comprehensive Metabolic Panel; Standing  -     TSH; Standing  -     BNP; Standing  -     EKG 12-lead; Future; Expected date: 03/05/2023  -     evolocumab (REPATHA PUSHTRONEX) 420 mg/3.5 mL Injt; Inject 3.5 mLs (420 mg total) into the skin as needed (monthly). Or Praluent 150 mg every 2 weeks if insurance prefers    Idiopathic hypotension    Diabetes mellitus due to underlying condition with diabetic peripheral angiopathy without gangrene, without long-term current use of insulin  -     Comprehensive Metabolic Panel; Standing  -     TSH; Standing  -     Hemoglobin A1C; Standing    S/P CABG (coronary artery bypass graft)  -     BNP; Standing  -     EKG 12-lead; Future; Expected date: 03/05/2023    Lack of physical exercise    Venous insufficiency    Abnormal cardiovascular stress test    Dyslipidemia  -     Lipid Panel; Standing  -     Comprehensive Metabolic Panel; Standing  -     TSH; Standing  -     evolocumab (REPATHA PUSHTRONEX) 420 mg/3.5 mL Injt; Inject 3.5 mLs (420 mg total) into the skin as needed (monthly). Or Praluent 150 mg every 2 weeks if insurance prefers    Hip disease    Long term (current) use of anticoagulants    Deep vein thrombosis prophylaxis    Abnormal nuclear stress test            Follow up in about 15 months (around 4/5/2023) for with ECG and labs; labs 7 months.

## 2022-01-03 NOTE — PROGRESS NOTES
Physical Therapy Treatment Note     Name: Jm Deleon  Clinic Number: [id number]    Therapy Diagnosis:   Encounter Diagnoses   Name Primary?    Acute pain of left shoulder     Limited range of motion (ROM) of shoulder      Physician: Johnny Ventura MD    Visit Date: 1/3/2022    Physician Orders: PT Eval and Treat   Medical Diagnosis from Referral: M75.102 (ICD-10-CM) - Nontraumatic tear of left rotator cuff, unspecified tear extent  Evaluation Date: 5/19/2021  Authorization Period Expiration: 12/2/2021  New Plan of Care Expiration: 12/1/2021  Visit # / Visits authorized: 1/20  Total visits: 30    Time In: 1500  Time Out: 1605  Total Billable Time: 30 minutes    Precautions: Standard     Procedure: 5/12/21  1. Left shoulder arthroscopic rotator cuff repair.   2. Left shoulder open subpectoral biceps tenodesis  3. Left shoulder arthroscopic distal clavicle excision  4. Left shoulder arthroscopic subacromial decompression.   5. Left shoulder arthroscopic debridement labrum    Arthroscopic Findings:  1. Full thickness, crescent-shaped medium size supraspinatus / upper infraspinatus rotator cuff tear.   2. Small anterior acromial spur.   3. Arthritic distal clavicle  4. Intact glenohumeral cartilage surface  5. Biceps: high grade tendinopathy  6. Subscapularis: frayed  7. Labrum:  Degenerative SLAP1     Postop plan for the patient is to follow the medium size rotator cuff repair protocol.    11/24/21:  Postop plan for the patient is to follow the lysis of adhesions protocol.     Subjective     Pt reports: I really think using it at work has helped my shoulder. Reports being sore today but thinks its just from the cold     He was compliant with home exercise program.  Response to previous treatment: No adverse effects  Functional change: improved overhead reach    Pain: not verbalized/10  Location: left shoulder      Objective     1/3: Active flexion 140 deg, 50 deg ER  Strength: 4/5 flexion, 4/5 ER at his side,  "4-/5 overhead    Jm received therapeutic exercises to develop strength, endurance, ROM, flexibility and posture for 35 minutes including:    UBE lvl 5.0 x 5'/5' for UE strengthening/cardiovascular endurance  Sidelying ER 2# 2 x 20  OH OTB IR walkouts 2 x 15  Row with ER GTB 2 x 10 5"     NT  ER with GTB 2x to fatigue  Row with ER 3 x 15 3" hold  Scaptions 2# 3 x 10  Propped sitting ER 2# 2 x 10 - limited range to reduce anterir glide  Mod pushup shoulder taps 20x  Pulleys (flexion/scaption) x 4'    Jm received the following manual therapy techniques: Joint mobilizations and Soft tissue Mobilization were applied to the: L shoulder for 15 minutes, including:    Shoulder reassessment  Gr I-II oscillations for pain relief  Inferior, posterior GH mob  PROM in all planes to tolerance  Rhythmic stabilization all planes flexion/ext/IR/ER    Jm received cold pack for 10 minutes to L shoulder for decreased pain, circulation, and tissue healing      Home Exercises Provided and Patient Education Provided     Education provided:   - AAROM and continued compliance to HEP     Written Home Exercises Provided: Patient instructed to cont prior HEP.  Exercises were reviewed and Jm was able to demonstrate them prior to the end of the session.  Jm demonstrated good  understanding of the education provided.     See EMR under Patient Instructions for exercises provided prior visit.    Assessment     Patient noted relief with passive and active range of motion to the shoulder today. Improved cuff strength upon testing at the start of treatment. He reports being smart at work to not lift too much weight. Patient plan to continue PT 1x week this month with plan to discharge end of month    Jm is progressing well towards his goals.   Pt prognosis is Good.     Pt will continue to benefit from skilled outpatient physical therapy to address the deficits listed in the problem list box on initial evaluation, provide pt/family " education and to maximize pt's level of independence in the home and community environment.     Pt's spiritual, cultural and educational needs considered and pt agreeable to plan of care and goals.     Anticipated barriers to physical therapy: covid-19    GOALS: Short Term Goals:  6 weeks (progressing, not met)  1.Report decreased shoulder pain < / =  2/10  to increase tolerance for completing ADLs independent  2. Increase PROM 140 flexion and 75 ER to restore functional range in the shoulder    3. Increased strength by 1/3 MMT grade in L scap stabilizers to increase tolerance for ADL and work activities.  4. Pt to tolerate HEP to improve ROM and independence with ADL's     Long Term Goals: 18 weeks (Progressing, not met)  1.Report decreased shoulder pain  < / =  0 /10  to increase tolerance for return to work overhead  2.Increase AROM to full overhead reach to complete work tasks   3.Increase strength to >/= 4/5 in scap stabilizers  to increase tolerance for ADL and work activities.  4. Pt goal: return to work without shoulder pain   5. Pt will have improved gcode of CJ (20-40% limited) on FOTO shoulder in order to demonstrate true functional improvement.    Plan   Certification Period: 11/29/2021 to 12/31/2021  Recommended Treatment Plan: 5 times per week for 2 weeks, 4 times per week for 2 weeks, 3 times per week for 2 weeks, 2 times per week for 2 weeks, 1 time per week for 2 weeks.: Manual Therapy, Moist Heat/ Ice, Neuromuscular Re-ed, Patient Education, Self Care and Therapeutic Activites  Other Recommendations: modalities prn, ASTYM prn, Dry Needling prn    Richard Velasquez, PT

## 2022-01-05 ENCOUNTER — SPECIALTY PHARMACY (OUTPATIENT)
Dept: PHARMACY | Facility: CLINIC | Age: 67
End: 2022-01-05
Payer: MEDICARE

## 2022-01-05 ENCOUNTER — OFFICE VISIT (OUTPATIENT)
Dept: CARDIOLOGY | Facility: CLINIC | Age: 67
End: 2022-01-05
Payer: MEDICARE

## 2022-01-05 ENCOUNTER — HOSPITAL ENCOUNTER (OUTPATIENT)
Dept: CARDIOLOGY | Facility: CLINIC | Age: 67
Discharge: HOME | End: 2022-01-05
Payer: MEDICARE

## 2022-01-05 VITALS
HEIGHT: 68 IN | DIASTOLIC BLOOD PRESSURE: 60 MMHG | WEIGHT: 209.44 LBS | BODY MASS INDEX: 31.74 KG/M2 | OXYGEN SATURATION: 98 % | SYSTOLIC BLOOD PRESSURE: 103 MMHG | HEART RATE: 59 BPM

## 2022-01-05 DIAGNOSIS — E08.51 DIABETES MELLITUS DUE TO UNDERLYING CONDITION WITH DIABETIC PERIPHERAL ANGIOPATHY WITHOUT GANGRENE, WITHOUT LONG-TERM CURRENT USE OF INSULIN: ICD-10-CM

## 2022-01-05 DIAGNOSIS — I25.10 CORONARY ARTERY DISEASE INVOLVING NATIVE CORONARY ARTERY OF NATIVE HEART WITHOUT ANGINA PECTORIS: Primary | ICD-10-CM

## 2022-01-05 DIAGNOSIS — R94.39 ABNORMAL NUCLEAR STRESS TEST: ICD-10-CM

## 2022-01-05 DIAGNOSIS — I87.2 VENOUS INSUFFICIENCY: ICD-10-CM

## 2022-01-05 DIAGNOSIS — I95.9 HYPOTENSION, UNSPECIFIED HYPOTENSION TYPE: ICD-10-CM

## 2022-01-05 DIAGNOSIS — Z79.01 LONG TERM (CURRENT) USE OF ANTICOAGULANTS: ICD-10-CM

## 2022-01-05 DIAGNOSIS — M25.9 HIP DISEASE: ICD-10-CM

## 2022-01-05 DIAGNOSIS — R94.39 ABNORMAL CARDIOVASCULAR STRESS TEST: ICD-10-CM

## 2022-01-05 DIAGNOSIS — Z95.1 S/P CABG (CORONARY ARTERY BYPASS GRAFT): ICD-10-CM

## 2022-01-05 DIAGNOSIS — Z79.899 DEEP VEIN THROMBOSIS PROPHYLAXIS: ICD-10-CM

## 2022-01-05 DIAGNOSIS — Z72.3 LACK OF PHYSICAL EXERCISE: ICD-10-CM

## 2022-01-05 DIAGNOSIS — E78.5 DYSLIPIDEMIA: ICD-10-CM

## 2022-01-05 DIAGNOSIS — I95.0 IDIOPATHIC HYPOTENSION: ICD-10-CM

## 2022-01-05 PROCEDURE — 3074F SYST BP LT 130 MM HG: CPT | Mod: CPTII,S$GLB,, | Performed by: INTERNAL MEDICINE

## 2022-01-05 PROCEDURE — 1125F AMNT PAIN NOTED PAIN PRSNT: CPT | Mod: CPTII,S$GLB,, | Performed by: INTERNAL MEDICINE

## 2022-01-05 PROCEDURE — 3288F PR FALLS RISK ASSESSMENT DOCUMENTED: ICD-10-PCS | Mod: CPTII,S$GLB,, | Performed by: INTERNAL MEDICINE

## 2022-01-05 PROCEDURE — 99499 UNLISTED E&M SERVICE: CPT | Mod: S$GLB,,, | Performed by: INTERNAL MEDICINE

## 2022-01-05 PROCEDURE — 1159F PR MEDICATION LIST DOCUMENTED IN MEDICAL RECORD: ICD-10-PCS | Mod: CPTII,S$GLB,, | Performed by: INTERNAL MEDICINE

## 2022-01-05 PROCEDURE — 1159F MED LIST DOCD IN RCRD: CPT | Mod: CPTII,S$GLB,, | Performed by: INTERNAL MEDICINE

## 2022-01-05 PROCEDURE — 99215 OFFICE O/P EST HI 40 MIN: CPT | Mod: S$GLB,,, | Performed by: INTERNAL MEDICINE

## 2022-01-05 PROCEDURE — 3078F PR MOST RECENT DIASTOLIC BLOOD PRESSURE < 80 MM HG: ICD-10-PCS | Mod: CPTII,S$GLB,, | Performed by: INTERNAL MEDICINE

## 2022-01-05 PROCEDURE — 93000 EKG 12-LEAD: ICD-10-PCS | Mod: S$GLB,,, | Performed by: INTERNAL MEDICINE

## 2022-01-05 PROCEDURE — 3078F DIAST BP <80 MM HG: CPT | Mod: CPTII,S$GLB,, | Performed by: INTERNAL MEDICINE

## 2022-01-05 PROCEDURE — 93000 ELECTROCARDIOGRAM COMPLETE: CPT | Mod: S$GLB,,, | Performed by: INTERNAL MEDICINE

## 2022-01-05 PROCEDURE — 1101F PT FALLS ASSESS-DOCD LE1/YR: CPT | Mod: CPTII,S$GLB,, | Performed by: INTERNAL MEDICINE

## 2022-01-05 PROCEDURE — 99999 PR PBB SHADOW E&M-EST. PATIENT-LVL V: ICD-10-PCS | Mod: PBBFAC,,, | Performed by: INTERNAL MEDICINE

## 2022-01-05 PROCEDURE — 3052F HG A1C>EQUAL 8.0%<EQUAL 9.0%: CPT | Mod: CPTII,S$GLB,, | Performed by: INTERNAL MEDICINE

## 2022-01-05 PROCEDURE — 3008F BODY MASS INDEX DOCD: CPT | Mod: CPTII,S$GLB,, | Performed by: INTERNAL MEDICINE

## 2022-01-05 PROCEDURE — 3288F FALL RISK ASSESSMENT DOCD: CPT | Mod: CPTII,S$GLB,, | Performed by: INTERNAL MEDICINE

## 2022-01-05 PROCEDURE — 1101F PR PT FALLS ASSESS DOC 0-1 FALLS W/OUT INJ PAST YR: ICD-10-PCS | Mod: CPTII,S$GLB,, | Performed by: INTERNAL MEDICINE

## 2022-01-05 PROCEDURE — 99215 PR OFFICE/OUTPT VISIT, EST, LEVL V, 40-54 MIN: ICD-10-PCS | Mod: S$GLB,,, | Performed by: INTERNAL MEDICINE

## 2022-01-05 PROCEDURE — 3008F PR BODY MASS INDEX (BMI) DOCUMENTED: ICD-10-PCS | Mod: CPTII,S$GLB,, | Performed by: INTERNAL MEDICINE

## 2022-01-05 PROCEDURE — 3052F PR MOST RECENT HEMOGLOBIN A1C LEVEL 8.0 - < 9.0%: ICD-10-PCS | Mod: CPTII,S$GLB,, | Performed by: INTERNAL MEDICINE

## 2022-01-05 PROCEDURE — 1125F PR PAIN SEVERITY QUANTIFIED, PAIN PRESENT: ICD-10-PCS | Mod: CPTII,S$GLB,, | Performed by: INTERNAL MEDICINE

## 2022-01-05 PROCEDURE — 3074F PR MOST RECENT SYSTOLIC BLOOD PRESSURE < 130 MM HG: ICD-10-PCS | Mod: CPTII,S$GLB,, | Performed by: INTERNAL MEDICINE

## 2022-01-05 PROCEDURE — 99499 RISK ADDL DX/OHS AUDIT: ICD-10-PCS | Mod: S$GLB,,, | Performed by: INTERNAL MEDICINE

## 2022-01-05 PROCEDURE — 99999 PR PBB SHADOW E&M-EST. PATIENT-LVL V: CPT | Mod: PBBFAC,,, | Performed by: INTERNAL MEDICINE

## 2022-01-05 NOTE — TELEPHONE ENCOUNTER
Patient and his wife presented to [facility] for Repatha. Informed patient Repatha requires a prior auth. Briefly discussed potential assistance and the application process. Advised patient [facility] would follow up once an insurance determination has been made.     Submitted Repatha prior auth via [facility]. Key: [id number]. Repatha approved from 1/5/22 - 7/4/22. Copay $47.00 at 004. Medicare plan without LIS. Forwarding to FA.

## 2022-01-05 NOTE — PATIENT INSTRUCTIONS
Discussed diet , achieving and maintaining ideal body weight, and exercise.   We reviewed meds in detail.  Reassured-Discussed goals, options, plan.  Omega-3 > 800 mg/d combined EPA/DHA.  Goal LDL < 70.  Goal BP< 130/80 ( but mostly > 100-110).  NTG should be refilled every 8-9 months.  DM has been badly controlled-I am writing Veronica  Cut valsartan to half and Furosimide to half  Increase the Ezetimibe to whole until getting the injectable  Repatha 420 mg monthly or Praluent 150 mg every 2 weeks

## 2022-01-06 ENCOUNTER — OFFICE VISIT (OUTPATIENT)
Dept: SPORTS MEDICINE | Facility: CLINIC | Age: 67
End: 2022-01-06
Payer: MEDICARE

## 2022-01-06 VITALS
SYSTOLIC BLOOD PRESSURE: 98 MMHG | HEART RATE: 58 BPM | HEIGHT: 68 IN | BODY MASS INDEX: 31.67 KG/M2 | WEIGHT: 209 LBS | DIASTOLIC BLOOD PRESSURE: 61 MMHG

## 2022-01-06 DIAGNOSIS — Z98.890 S/P ARTHROSCOPY OF LEFT SHOULDER: Primary | ICD-10-CM

## 2022-01-06 PROCEDURE — 3052F HG A1C>EQUAL 8.0%<EQUAL 9.0%: CPT | Mod: CPTII,S$GLB,, | Performed by: ORTHOPAEDIC SURGERY

## 2022-01-06 PROCEDURE — 3074F SYST BP LT 130 MM HG: CPT | Mod: CPTII,S$GLB,, | Performed by: ORTHOPAEDIC SURGERY

## 2022-01-06 PROCEDURE — 3052F PR MOST RECENT HEMOGLOBIN A1C LEVEL 8.0 - < 9.0%: ICD-10-PCS | Mod: CPTII,S$GLB,, | Performed by: ORTHOPAEDIC SURGERY

## 2022-01-06 PROCEDURE — 3008F BODY MASS INDEX DOCD: CPT | Mod: CPTII,S$GLB,, | Performed by: ORTHOPAEDIC SURGERY

## 2022-01-06 PROCEDURE — 99024 POSTOP FOLLOW-UP VISIT: CPT | Mod: S$GLB,,, | Performed by: ORTHOPAEDIC SURGERY

## 2022-01-06 PROCEDURE — 1101F PR PT FALLS ASSESS DOC 0-1 FALLS W/OUT INJ PAST YR: ICD-10-PCS | Mod: CPTII,S$GLB,, | Performed by: ORTHOPAEDIC SURGERY

## 2022-01-06 PROCEDURE — 1160F RVW MEDS BY RX/DR IN RCRD: CPT | Mod: CPTII,S$GLB,, | Performed by: ORTHOPAEDIC SURGERY

## 2022-01-06 PROCEDURE — 99024 PR POST-OP FOLLOW-UP VISIT: ICD-10-PCS | Mod: S$GLB,,, | Performed by: ORTHOPAEDIC SURGERY

## 2022-01-06 PROCEDURE — 1126F AMNT PAIN NOTED NONE PRSNT: CPT | Mod: CPTII,S$GLB,, | Performed by: ORTHOPAEDIC SURGERY

## 2022-01-06 PROCEDURE — 1159F PR MEDICATION LIST DOCUMENTED IN MEDICAL RECORD: ICD-10-PCS | Mod: CPTII,S$GLB,, | Performed by: ORTHOPAEDIC SURGERY

## 2022-01-06 PROCEDURE — 99999 PR PBB SHADOW E&M-EST. PATIENT-LVL IV: CPT | Mod: PBBFAC,,, | Performed by: ORTHOPAEDIC SURGERY

## 2022-01-06 PROCEDURE — 1160F PR REVIEW ALL MEDS BY PRESCRIBER/CLIN PHARMACIST DOCUMENTED: ICD-10-PCS | Mod: CPTII,S$GLB,, | Performed by: ORTHOPAEDIC SURGERY

## 2022-01-06 PROCEDURE — 1101F PT FALLS ASSESS-DOCD LE1/YR: CPT | Mod: CPTII,S$GLB,, | Performed by: ORTHOPAEDIC SURGERY

## 2022-01-06 PROCEDURE — 3288F FALL RISK ASSESSMENT DOCD: CPT | Mod: CPTII,S$GLB,, | Performed by: ORTHOPAEDIC SURGERY

## 2022-01-06 PROCEDURE — 99999 PR PBB SHADOW E&M-EST. PATIENT-LVL IV: ICD-10-PCS | Mod: PBBFAC,,, | Performed by: ORTHOPAEDIC SURGERY

## 2022-01-06 PROCEDURE — 1126F PR PAIN SEVERITY QUANTIFIED, NO PAIN PRESENT: ICD-10-PCS | Mod: CPTII,S$GLB,, | Performed by: ORTHOPAEDIC SURGERY

## 2022-01-06 PROCEDURE — 1159F MED LIST DOCD IN RCRD: CPT | Mod: CPTII,S$GLB,, | Performed by: ORTHOPAEDIC SURGERY

## 2022-01-06 PROCEDURE — 3078F PR MOST RECENT DIASTOLIC BLOOD PRESSURE < 80 MM HG: ICD-10-PCS | Mod: CPTII,S$GLB,, | Performed by: ORTHOPAEDIC SURGERY

## 2022-01-06 PROCEDURE — 3288F PR FALLS RISK ASSESSMENT DOCUMENTED: ICD-10-PCS | Mod: CPTII,S$GLB,, | Performed by: ORTHOPAEDIC SURGERY

## 2022-01-06 PROCEDURE — 3078F DIAST BP <80 MM HG: CPT | Mod: CPTII,S$GLB,, | Performed by: ORTHOPAEDIC SURGERY

## 2022-01-06 PROCEDURE — 3074F PR MOST RECENT SYSTOLIC BLOOD PRESSURE < 130 MM HG: ICD-10-PCS | Mod: CPTII,S$GLB,, | Performed by: ORTHOPAEDIC SURGERY

## 2022-01-06 PROCEDURE — 3008F PR BODY MASS INDEX (BMI) DOCUMENTED: ICD-10-PCS | Mod: CPTII,S$GLB,, | Performed by: ORTHOPAEDIC SURGERY

## 2022-01-06 RX ORDER — CELECOXIB 200 MG/1
200 CAPSULE ORAL 2 TIMES DAILY WITH MEALS
Qty: 60 CAPSULE | Refills: 0 | Status: SHIPPED | OUTPATIENT
Start: 2022-01-06 | End: 2022-06-20 | Stop reason: SDUPTHER

## 2022-01-06 NOTE — PROGRESS NOTES
"CC: Left shoulder post op 6 weeks    Patient is here for his 6 week post op appointment s/p below and is doing well. Patient is doing PT at [facility] and is progressing as expected. Patient is no longer taking pain medication. he denies any chest pain, SOB, fevers, chills, nausea, vomiting, or drainage from incision sites.  He is very satisfied with improvement since surgery.  He notes that his range of motion and pain is significantly better than prior to surgery and have continued to improve with PT.  Has been doing his own HEP which he feels is helping.     DATE OF SURGERY:  11/24/2021      PROCEDURE:   1. Left shoulder arthroscopic lysis of adhesions / manipulation under anesthesia  2. Left shoulder arthroscopic debridement labrum   3. Left shoulder hardware removal     SURGEON: Johnny Ventura M.D.     Pain well tolerated on pain medication  Sling in place  No issues reported    Review of Systems   Constitution: Negative. Negative for chills, fever and night sweats.    Cardiovascular: Negative for chest pain and syncope.   Respiratory: Negative for cough and shortness of breath.    Gastrointestinal: Negative for abdominal pain and bowel incontinence.      PE:    BP 98/61   Pulse (!) 58   Ht 5' 8" (1.727 m)   Wt 94.8 kg (209 lb)   BMI 31.78 kg/m²      Left shoulder    Incisions healed  No sign of infection  Swelling resolved  Compartments soft  Neurovascular status intact in extremity    PROM  Forward elevation 160 degrees  External rotation 40 degrees    AROM  Forward elevation - 180  Abduction - 100  External at side -  50  External at 90 - 90  Internal at 90 - 40  Internal at 0 - sacrum    5/5 strength to cuff testing    Assessment:  6 weeks s/p arthroscopic lysis of adhesions/manipulation under anesthesia, labral debridement, hardware removal    Plan:  1. Continue HEP  2. Refill Celebrex to use PRN  3. Return to clinic in 4 weeks for 6 week post op follow up    All questions were answered. " Instructed patient to call with questions or concerns in the interim.

## 2022-01-18 ENCOUNTER — PATIENT MESSAGE (OUTPATIENT)
Dept: CARDIOLOGY | Facility: CLINIC | Age: 67
End: 2022-01-18
Payer: MEDICARE

## 2022-01-18 NOTE — TELEPHONE ENCOUNTER
Incoming call from pt regarding Repatha refill and FA. Pt provided HH size and income  Baudilio secured    Baudilio info:  BIN: [id number]  PCN: [id number]  Group: [id number]  ID: [id number]  Effective dates: 1/18/22 - 12/18/22    PTL

## 2022-01-26 ENCOUNTER — PATIENT MESSAGE (OUTPATIENT)
Dept: PHARMACY | Facility: CLINIC | Age: 67
End: 2022-01-26
Payer: MEDICARE

## 2022-01-26 ENCOUNTER — PATIENT MESSAGE (OUTPATIENT)
Dept: ADMINISTRATIVE | Facility: HOSPITAL | Age: 67
End: 2022-01-26
Payer: MEDICARE

## 2022-01-26 RX ORDER — ASPIRIN 81 MG/1
81 TABLET ORAL DAILY
COMMUNITY

## 2022-01-26 NOTE — TELEPHONE ENCOUNTER
Specialty Pharmacy - Medication/Referral Authorization  Specialty Pharmacy - Initial Clinical Assessment    Specialty Medication Orders Linked to Encounter    Flowsheet Row Most Recent Value   Medication #1 evolocumab (REPATHA PUSHTRONEX) 420 mg/3.5 mL Injt (Order#[id number], Rx#)        Subjective    Jm Deleon is a 66 y.o. male, who is followed by the specialty pharmacy service for management and education.    Recent Encounters     Date Type Provider Description    01/05/2022 Specialty Pharmacy Kevin Nixon PharmD Referral Authorization; Initial Clinical Assessment        Clinical call attempts since last clinical assessment   No call attempts found.     Today he received education before his first fill with [facility].    Current Outpatient Medications   Medication Sig    aspirin (ECOTRIN) 81 MG EC tablet Take 81 mg by mouth once daily.    ACCU-CHEK SMARTVIEW TEST STRIP Strp 1 strip by Misc.(Non-Drug; Combo Route) route 3 (three) times daily. Patient needs an appointment    ammonium lactate 12 % Crea Apply small amount to feet except for in between toes twice a day    atorvastatin (LIPITOR) 80 MG tablet TAKE 1 TABLET EVERY DAY    blood-glucose meter Misc Use to check sugar 3 times daily. Accu-chek Emily. Patient needs an appointment    carvediloL (COREG) 25 MG tablet TAKE 1 TABLET TWICE DAILY WITH MEALS  OR AS DIRECTED (Patient taking differently: Pt reports takes 1/2 in am and 1/2 in pm)    celecoxib (CELEBREX) 200 MG capsule Take 1 capsule (200 mg total) by mouth 2 (two) times daily with meals. (breakfast and dinner)    diphenhydrAMINE (BENADRYL) 25 mg capsule Take 50 mg by mouth nightly as needed for Itching.     evolocumab (REPATHA PUSHTRONEX) 420 mg/3.5 mL Injt Inject 3.5 mLs (420 mg total) into the skin every 30 days    ezetimibe (ZETIA) 10 mg tablet Take 1 tablet (10 mg total) by mouth once daily.    FOLIC ACID/MULTIVITS-MIN (MULTIVITAMIN FOR CHOLESTEROL ORAL) Take 1  "tablet by mouth nightly.     furosemide (LASIX) 40 MG tablet Take 1 tablet (40 mg total) by mouth once daily. (Patient taking differently: Take 40 mg by mouth once daily. Pt reports takes when he is able to as related to his work day)    icosapent ethyL (VASCEPA) 1 gram Cap Take 2 capsules (2,000 mg total) by mouth 2 (two) times daily.    insulin (LANTUS SOLOSTAR U-100 INSULIN) glargine 100 units/mL (3mL) SubQ pen Inject 45 Units into the skin every evening.    insulin aspart U-100 (NOVOLOG FLEXPEN U-100 INSULIN) 100 unit/mL (3 mL) InPn pen Inject 12 Units into the skin 3 (three) times daily with meals.    insulin needles, disposable, (BD INSULIN PEN NEEDLE UF ORIG) 29 x 1/2 " Ndle USE TWICE DAILY AS DIRECTED    JARDIANCE 25 mg tablet TAKE 1 TABLET EVERY DAY    lancets Misc 1 lancet by Misc.(Non-Drug; Combo Route) route 3 (three) times daily. accu-chek Fastclix. Patient needs an appointment    metFORMIN (GLUCOPHAGE) 1000 MG tablet Take 1 tablet (1,000 mg total) by mouth 2 (two) times daily.    metFORMIN (GLUCOPHAGE) 1000 MG tablet TAKE 1 TABLET TWICE DAILY    nitroGLYCERIN (NITROSTAT) 0.4 MG SL tablet PLACE 1 TABLET (0.4 MG TOTAL) UNDER THE TONGUE EVERY 5 (FIVE) MINUTES AS NEEDED FOR CHEST PAIN AS DIRECTED    pen needle, diabetic (BD INSULIN PEN NEEDLE UF MINI) 31 gauge x 3/16" Ndle 1 each by Misc.(Non-Drug; Combo Route) route 4 (four) times daily. Patient needs an appointment    valsartan (DIOVAN) 320 MG tablet Take 1 tablet (320 mg total) by mouth once daily.   Last reviewed on 1/6/2022 10:34 AM by Doug Mancilla DO    Review of patient's allergies indicates:   Allergen Reactions    Tramadol Hallucinations   Last reviewed on  1/26/2022 12:23 PM by Kevin Nixon    Drug Interactions    Drug interactions evaluated: yes  Clinically relevant drug interactions identified: no  Provided the patient with educational material regarding drug interactions: not applicable           Assessment Questions - " Documented Responses    Flowsheet Row Most Recent Value   Assessment    Medication Reconciliation completed for patient Yes   During the past 4 weeks, has patient missed any activities due to condition or medication? No   During the past 4 weeks, did patient have any of the following urgent care visits? None   Goals of Therapy Status Discussed (new start)   Welcome packet contents reviewed and discussed with patient? Yes   Assesment completed? Yes   Plan Therapy being initiated   Do you need to open a clinical intervention (i-vent)? No   Do you want to schedule first shipment? Yes   Medication #1 Assessment Info    Patient status New medication, New to [facility]   Is this medication appropriate for the patient? Yes   Is this medication effective? Not yet started        Refill Questions - Documented Responses    Flowsheet Row Most Recent Value   Patient Availability and HIPAA Verification    Does patient want to proceed with activity? Yes   HIPAA/medical authority confirmed? Yes   Relationship to patient of person spoken to? Self   Refill Screening Questions    When does the patient need to receive the medication? 01/28/22   Refill Delivery Questions    How will the patient receive the medication? Delivery Jackelyn   When does the patient need to receive the medication? 01/28/22   Shipping Address Home   Address in [facility] confirmed and updated if neccessary? Yes   Expected Copay ($) 0   Is the patient able to afford the medication copay? Yes   Payment Method zero copay   Days supply of Refill 28   Supplies needed? No supplies needed   Refill activity completed? 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    Tried/failed medications: Statins, Zetia, and Vascepa    BP Readings from Last 4 Encounters:   01/06/22 98/61   01/05/22 103/60   12/09/21 (!) 88/59   11/24/21 118/70     Ht Readings from Last 4 Encounters:   01/06/22 5' 8" (1.727 m)   01/05/22 5' 8" (1.727 m)   12/09/21 5' 8" (1.727 m)   11/24/21 5' 8" (1.727 m)     Wt Readings from Last 4 Encounters:   01/06/22 94.8 kg (209 lb)   01/05/22 95 kg (209 lb 7 oz)   12/09/21 94.8 kg (209 lb)   11/24/21 94.8 kg (209 lb)       The goals of prescribed drug therapy management include:  · Supporting patient to meet the prescriber's medical treatment objectives  · Improving or maintaining quality of life  · Maintaining optimal therapy adherence  · Minimizing and managing side effects      Goals of Therapy Status: Discussed (new start)    Assessment/Plan  Patient plans to start therapy on 01/28/22      Indication, dosage, appropriateness, effectiveness, safety and convenience of his specialty medication(s) were reviewed today.     Patient Counseling    Counseled the patient on the following: doses and administration discussed, safe handling, storage, and disposal discussed, possible adverse effects and management discussed, possible drug and prescription drug interactions discussed, possible drug and OTC drug and food interactions discussed, lab monitoring and follow-up discussed, therapeutic rationale discussed, cost of medications and cost implications discussed, adherence and missed doses discussed, pharmacy contact information discussed         Tasks added this encounter   No tasks added.   Tasks due within next 3 months   1/19/2022 - Clinical - Initial Assessment     Kevin Nixon, PharmD  [facility]  [address] [facility] [address]  Phone: [phone]  Fax: [phone]  "

## 2022-02-08 RX ORDER — METFORMIN HYDROCHLORIDE 1000 MG/1
TABLET ORAL
Qty: 180 TABLET | Refills: 1 | Status: SHIPPED | OUTPATIENT
Start: 2022-02-08 | End: 2022-07-06

## 2022-02-09 NOTE — TELEPHONE ENCOUNTER
Refill Authorization Note   Jm Deleon  is requesting a refill authorization.  Brief Assessment and Rationale for Refill:  Approve     Medication Therapy Plan:       Medication Reconciliation Completed: No   Comments:   --->Care Gap information included below if applicable.   Orders Placed This Encounter    metFORMIN (GLUCOPHAGE) 1000 MG tablet      Requested Prescriptions   Signed Prescriptions Disp Refills    metFORMIN (GLUCOPHAGE) 1000 MG tablet 180 tablet 1     Sig: TAKE 1 TABLET TWICE DAILY       Endocrinology:  Diabetes - Biguanides Passed - 2/8/2022  9:31 PM        Passed - Patient is at least 18 years old        Passed - Valid encounter within last 15 months     Recent Visits  Date Type Provider Dept   11/15/21 Office Visit Dallas Martin MD [facility]   04/30/21 Office Visit Dallas Martin MD [facility]   07/20/20 Office Visit Dallas Martin MD [facility]   Showing recent visits within past 720 days and meeting all other requirements  Future Appointments  No visits were found meeting these conditions.  Showing future appointments within next 150 days and meeting all other requirements      Future Appointments              In 1 month MD [facility] - Endo Diabetes 6th Fl, [facility]    In 1 month Dana Cardoza MD [facility] - Dermatology, [place]    In 3 months CHAD Garcia - [facility] 1st Fl, [facility]    In 5 months LAB, APPOINTMENT [facility] - Primary Care Bldg, [facility]                Passed - Cr is 1.39 or below and within 360 days     Lab Results   Component Value Date    CREATININE 0.9 01/05/2022    CREATININE 1.0 11/12/2021    CREATININE 0.9 07/16/2021    POCCRE 0.7 07/16/2021    POCCRE 1.0 01/27/2021    POCCRE 0.8 06/12/2020              Passed - HBA1C within 180 days     Lab Results   Component Value Date    HGBA1C 8.8 (H) 01/05/2022    HGBA1C 9.1 (H) 11/12/2021    HGBA1C 9.2 (H) 04/30/2021               Passed - eGFR is 45 or above and within 360 days     Lab Results   Component Value Date    EGFRNONAA >60.0 01/05/2022    EGFRNONAA >60.0 11/12/2021    EGFRNONAA >60.0 07/16/2021                    Appointments  past 12m or future 3m with PCP    Date Provider   Last Visit   11/15/2021 Dallas Martin MD   Next Visit   Visit date not found Dallas Martin MD   ED visits in past 90 days: 0     Note composed:9:32 PM 02/08/2022

## 2022-02-18 ENCOUNTER — SPECIALTY PHARMACY (OUTPATIENT)
Dept: PHARMACY | Facility: CLINIC | Age: 67
End: 2022-02-18
Payer: MEDICARE

## 2022-03-21 ENCOUNTER — OFFICE VISIT (OUTPATIENT)
Dept: ENDOCRINOLOGY | Facility: CLINIC | Age: 67
End: 2022-03-21
Payer: MEDICARE

## 2022-03-21 VITALS
SYSTOLIC BLOOD PRESSURE: 108 MMHG | BODY MASS INDEX: 32.81 KG/M2 | OXYGEN SATURATION: 98 % | HEART RATE: 62 BPM | HEIGHT: 68 IN | WEIGHT: 216.5 LBS | DIASTOLIC BLOOD PRESSURE: 60 MMHG

## 2022-03-21 DIAGNOSIS — I25.10 CORONARY ARTERY DISEASE INVOLVING NATIVE CORONARY ARTERY OF NATIVE HEART WITHOUT ANGINA PECTORIS: ICD-10-CM

## 2022-03-21 DIAGNOSIS — E78.5 DYSLIPIDEMIA: ICD-10-CM

## 2022-03-21 DIAGNOSIS — Z79.4 TYPE 2 DIABETES MELLITUS WITHOUT COMPLICATION, WITH LONG-TERM CURRENT USE OF INSULIN: Primary | ICD-10-CM

## 2022-03-21 DIAGNOSIS — E11.9 TYPE 2 DIABETES MELLITUS WITHOUT COMPLICATION, WITH LONG-TERM CURRENT USE OF INSULIN: Primary | ICD-10-CM

## 2022-03-21 DIAGNOSIS — I10 PRIMARY HYPERTENSION: ICD-10-CM

## 2022-03-21 DIAGNOSIS — E11.65 TYPE 2 DIABETES MELLITUS WITH HYPERGLYCEMIA, WITH LONG-TERM CURRENT USE OF INSULIN: ICD-10-CM

## 2022-03-21 DIAGNOSIS — Z79.4 TYPE 2 DIABETES MELLITUS WITH HYPERGLYCEMIA, WITH LONG-TERM CURRENT USE OF INSULIN: ICD-10-CM

## 2022-03-21 PROCEDURE — 99999 PR PBB SHADOW E&M-EST. PATIENT-LVL V: CPT | Mod: PBBFAC,,, | Performed by: INTERNAL MEDICINE

## 2022-03-21 PROCEDURE — 99499 RISK ADDL DX/OHS AUDIT: ICD-10-PCS | Mod: S$GLB,,, | Performed by: INTERNAL MEDICINE

## 2022-03-21 PROCEDURE — 1159F MED LIST DOCD IN RCRD: CPT | Mod: CPTII,S$GLB,, | Performed by: INTERNAL MEDICINE

## 2022-03-21 PROCEDURE — 3078F DIAST BP <80 MM HG: CPT | Mod: CPTII,S$GLB,, | Performed by: INTERNAL MEDICINE

## 2022-03-21 PROCEDURE — 99499 UNLISTED E&M SERVICE: CPT | Mod: S$GLB,,, | Performed by: INTERNAL MEDICINE

## 2022-03-21 PROCEDURE — 1160F PR REVIEW ALL MEDS BY PRESCRIBER/CLIN PHARMACIST DOCUMENTED: ICD-10-PCS | Mod: CPTII,S$GLB,, | Performed by: INTERNAL MEDICINE

## 2022-03-21 PROCEDURE — 3008F BODY MASS INDEX DOCD: CPT | Mod: CPTII,S$GLB,, | Performed by: INTERNAL MEDICINE

## 2022-03-21 PROCEDURE — 3078F PR MOST RECENT DIASTOLIC BLOOD PRESSURE < 80 MM HG: ICD-10-PCS | Mod: CPTII,S$GLB,, | Performed by: INTERNAL MEDICINE

## 2022-03-21 PROCEDURE — 99204 OFFICE O/P NEW MOD 45 MIN: CPT | Mod: S$GLB,,, | Performed by: INTERNAL MEDICINE

## 2022-03-21 PROCEDURE — 1101F PT FALLS ASSESS-DOCD LE1/YR: CPT | Mod: CPTII,S$GLB,, | Performed by: INTERNAL MEDICINE

## 2022-03-21 PROCEDURE — 1160F RVW MEDS BY RX/DR IN RCRD: CPT | Mod: CPTII,S$GLB,, | Performed by: INTERNAL MEDICINE

## 2022-03-21 PROCEDURE — 3074F SYST BP LT 130 MM HG: CPT | Mod: CPTII,S$GLB,, | Performed by: INTERNAL MEDICINE

## 2022-03-21 PROCEDURE — 99204 PR OFFICE/OUTPT VISIT, NEW, LEVL IV, 45-59 MIN: ICD-10-PCS | Mod: S$GLB,,, | Performed by: INTERNAL MEDICINE

## 2022-03-21 PROCEDURE — 3052F PR MOST RECENT HEMOGLOBIN A1C LEVEL 8.0 - < 9.0%: ICD-10-PCS | Mod: CPTII,S$GLB,, | Performed by: INTERNAL MEDICINE

## 2022-03-21 PROCEDURE — 1126F PR PAIN SEVERITY QUANTIFIED, NO PAIN PRESENT: ICD-10-PCS | Mod: CPTII,S$GLB,, | Performed by: INTERNAL MEDICINE

## 2022-03-21 PROCEDURE — 1101F PR PT FALLS ASSESS DOC 0-1 FALLS W/OUT INJ PAST YR: ICD-10-PCS | Mod: CPTII,S$GLB,, | Performed by: INTERNAL MEDICINE

## 2022-03-21 PROCEDURE — 99999 PR PBB SHADOW E&M-EST. PATIENT-LVL V: ICD-10-PCS | Mod: PBBFAC,,, | Performed by: INTERNAL MEDICINE

## 2022-03-21 PROCEDURE — 3052F HG A1C>EQUAL 8.0%<EQUAL 9.0%: CPT | Mod: CPTII,S$GLB,, | Performed by: INTERNAL MEDICINE

## 2022-03-21 PROCEDURE — 3288F PR FALLS RISK ASSESSMENT DOCUMENTED: ICD-10-PCS | Mod: CPTII,S$GLB,, | Performed by: INTERNAL MEDICINE

## 2022-03-21 PROCEDURE — 3008F PR BODY MASS INDEX (BMI) DOCUMENTED: ICD-10-PCS | Mod: CPTII,S$GLB,, | Performed by: INTERNAL MEDICINE

## 2022-03-21 PROCEDURE — 3288F FALL RISK ASSESSMENT DOCD: CPT | Mod: CPTII,S$GLB,, | Performed by: INTERNAL MEDICINE

## 2022-03-21 PROCEDURE — 1159F PR MEDICATION LIST DOCUMENTED IN MEDICAL RECORD: ICD-10-PCS | Mod: CPTII,S$GLB,, | Performed by: INTERNAL MEDICINE

## 2022-03-21 PROCEDURE — 1126F AMNT PAIN NOTED NONE PRSNT: CPT | Mod: CPTII,S$GLB,, | Performed by: INTERNAL MEDICINE

## 2022-03-21 PROCEDURE — 3074F PR MOST RECENT SYSTOLIC BLOOD PRESSURE < 130 MM HG: ICD-10-PCS | Mod: CPTII,S$GLB,, | Performed by: INTERNAL MEDICINE

## 2022-03-21 NOTE — ASSESSMENT & PLAN NOTE
No data to review today but A1c above goal  He is injecting insulin into forearm and top of thigh with scar tissue. Suspect not absorbing doses well  Will reduce to closer to weight-based and needs to use abdomen or back of arm. Discussed rotating sites    Start Dexcom- refer for teaching    Discuss GLP next visit when more data  Personal history of pancreatitis: denies  Family history of MTC/MEN/endocrine tumors: denies      Patient Instructions   Lantus 30 units once a day  Novolog 8 units with meals  Continue metformin and Jardiance     Send me a message in one week if you don't hear from the DME about Dexcom      Patient has diabetes mellitus and manages diabetes with intensive insulin regimen with three or more insulin injections daily or CSII.  Patient requires a therapeutic CGM and is willing to use therapeutic CGM for the necessary frequent adjustments of insulin therapy. Patient has been using SMBG for frequent glucose monitoring (4x/day). I have completed an in-person visit during the previous 6 months and will continue to have in-person visits every 6 months to assess adherence to their CGM regimen and diabetes treatment plan.  Due to COVID pandemic and need for remote monitoring this tool is medically necessary

## 2022-03-21 NOTE — PROGRESS NOTES
Jm Deleon is a 67 y.o. male with CAD, HLD referred by Dr. Dallas Martin for evaluation of T2DM    History of Present Illness  T2DM  Diagnosed in early 2000's  Known complications: denies    A1c above goal and comes in today to discuss regimen  Seen by Dr. Molina in the past    Injects insulin into forearm which is most convenient when working. Lantus into top of thigh, same two spots    Current Diabetes Regimen:  Lantus 45 units smith  novolog 12-14 units with meals  Metformin 1000 mg BID  Jardiance 25 mg daily    Omitted doses: rare    Prior mediations tried: none    DM education when first diagnosed    Recent Hgb A1C:  Lab Results   Component Value Date    HGBA1C 8.8 (H) 01/05/2022       Glucose Monitoring:  Checking 4 times daily  No log today    Hypoglycemic Episodes: rarely    Screening / DM Complications:    Nephropathy:  ACEi/ARB: Taking  Lab Results   Component Value Date    MICALBCREAT Unable to calculate 04/30/2021       Last Lipid Panel:  Statin: Taking repatha, zetia, atorvastatin  Lab Results   Component Value Date    LDLCALC 86.0 01/05/2022       Last foot exam : 11/18/2021  Last eye exam : 11/06/2020;  no laser surgery or DR    B12:  No results found for: [id number]    Aspirin: taking          Current Outpatient Medications:     ACCU-CHEK SMARTVIEW TEST STRIP Strp, 1 strip by Misc.(Non-Drug; Combo Route) route 3 (three) times daily. Patient needs an appointment, Disp: 300 strip, Rfl: 0    ammonium lactate 12 % Crea, Apply small amount to feet except for in between toes twice a day, Disp: 1 Tube, Rfl: 3    aspirin (ECOTRIN) 81 MG EC tablet, Take 81 mg by mouth once daily., Disp: , Rfl:     atorvastatin (LIPITOR) 80 MG tablet, TAKE 1 TABLET EVERY DAY, Disp: 90 tablet, Rfl: 3    blood-glucose meter Misc, Use to check sugar 3 times daily. Accu-chek Emily. Patient needs an appointment, Disp: 1 each, Rfl: 0    carvediloL (COREG) 25 MG tablet, TAKE 1 TABLET TWICE DAILY WITH MEALS  OR AS  "DIRECTED (Patient taking differently: Pt reports takes 1/2 in am and 1/2 in pm), Disp: 180 tablet, Rfl: 3    celecoxib (CELEBREX) 200 MG capsule, Take 1 capsule (200 mg total) by mouth 2 (two) times daily with meals. (breakfast and dinner), Disp: 60 capsule, Rfl: 0    diphenhydrAMINE (BENADRYL) 25 mg capsule, Take 50 mg by mouth nightly as needed for Itching. , Disp: , Rfl:     evolocumab (REPATHA PUSHTRONEX) 420 mg/3.5 mL Injt, Inject 3.5 mLs (420 mg total) into the skin every 30 days, Disp: 3 each, Rfl: 4    ezetimibe (ZETIA) 10 mg tablet, TAKE 1 TABLET EVERY DAY, Disp: 90 tablet, Rfl: 3    FOLIC ACID/MULTIVITS-MIN (MULTIVITAMIN FOR CHOLESTEROL ORAL), Take 1 tablet by mouth nightly. , Disp: , Rfl:     furosemide (LASIX) 40 MG tablet, TAKE 1 TABLET EVERY DAY, Disp: 90 tablet, Rfl: 3    icosapent ethyL (VASCEPA) 1 gram Cap, Take 2 capsules (2,000 mg total) by mouth 2 (two) times daily., Disp: 360 capsule, Rfl: 3    insulin (LANTUS SOLOSTAR U-100 INSULIN) glargine 100 units/mL (3mL) SubQ pen, Inject 45 Units into the skin every evening., Disp: 40.5 mL, Rfl: 3    insulin aspart U-100 (NOVOLOG FLEXPEN U-100 INSULIN) 100 unit/mL (3 mL) InPn pen, Inject 12 Units into the skin 3 (three) times daily with meals., Disp: 32.4 mL, Rfl: 1    insulin needles, disposable, (BD INSULIN PEN NEEDLE UF ORIG) 29 x 1/2 " Ndle, USE TWICE DAILY AS DIRECTED, Disp: 100 each, Rfl: 2    JARDIANCE 25 mg tablet, TAKE 1 TABLET EVERY DAY, Disp: 90 tablet, Rfl: 1    lancets Misc, 1 lancet by Misc.(Non-Drug; Combo Route) route 3 (three) times daily. accu-chek Fastclix. Patient needs an appointment, Disp: 300 each, Rfl: 0    metFORMIN (GLUCOPHAGE) 1000 MG tablet, TAKE 1 TABLET TWICE DAILY, Disp: 180 tablet, Rfl: 1    nitroGLYCERIN (NITROSTAT) 0.4 MG SL tablet, PLACE 1 TABLET (0.4 MG TOTAL) UNDER THE TONGUE EVERY 5 (FIVE) MINUTES AS NEEDED FOR CHEST PAIN AS DIRECTED, Disp: 25 tablet, Rfl: 4    pen needle, diabetic (BD INSULIN PEN " "NEEDLE UF MINI) 31 gauge x 3/16" Ndle, 1 each by Misc.(Non-Drug; Combo Route) route 4 (four) times daily. Patient needs an appointment, Disp: 400 each, Rfl: 0    valsartan (DIOVAN) 320 MG tablet, Take 1 tablet (320 mg total) by mouth once daily., Disp: 90 tablet, Rfl: 3  No current facility-administered medications for this visit.    Facility-Administered Medications Ordered in Other Visits:     fentaNYL 50 mcg/mL injection 25 mcg, 25 mcg, Intravenous, Q5 Min PRN, Travis Corbin MD, 50 mcg at 05/12/21 1242    fentaNYL 50 mcg/mL injection  mcg,  mcg, Intravenous, PRN, Markus Esparza MD, 100 mcg at 11/24/21 1151    lidocaine (PF) 10 mg/ml (1%) injection 10 mg, 1 mL, Intradermal, Once, Deanne Mosqueda MD    LIDOcaine (PF) 10 mg/ml (1%) injection 10 mg, 1 mL, Intradermal, Once, Markus Esparza MD    midazolam (VERSED) 1 mg/mL injection 0.5 mg, 0.5 mg, Intravenous, PRN, Travis Corbin MD, 2 mg at 05/12/21 1242    midazolam (VERSED) 1 mg/mL injection 0.5-4 mg, 0.5-4 mg, Intravenous, PRN, Markus Esparza MD, 2 mg at 11/24/21 1151    ropivacaine 0.2% Nimbus PainPRO Pump infusion 500 ML, , Perineural, Continuous, Travis Corbin MD, New Bag at 05/12/21 1629    ropivacaine 0.2% Nimbus PainPRO Pump infusion 500 ML, , Perineural, Continuous, Markus Esparza MD, New Bag at 11/24/21 1450    triamcinolone acetonide injection 40 mg, 40 mg, Intra-articular, , Patricio Multani MD, 40 mg at 10/26/16 0841    ROS as above    Objective:     Vitals:    03/21/22 1511   BP: 108/60   Pulse: 62     Wt Readings from Last 3 Encounters:   03/21/22 98.2 kg (216 lb 7.9 oz)   01/06/22 94.8 kg (209 lb)   01/05/22 95 kg (209 lb 7 oz)     Body mass index is 32.92 kg/m².  Physical Exam  Constitutional:       Appearance: He is well-developed.   HENT:      Head: Normocephalic.   Eyes:      Conjunctiva/sclera: Conjunctivae normal.   Pulmonary:      Effort: Pulmonary " effort is normal.   Musculoskeletal:         General: Normal range of motion.      Comments: lipohypertrophy on forearm where injecting insulin   Skin:     General: Skin is warm.      Findings: No rash.   Neurological:      Mental Status: He is alert and oriented to person, place, and time.         Labs    Chemistry        Component Value Date/Time     01/05/2022 0925    K 5.0 01/05/2022 0925     01/05/2022 0925    CO2 28 01/05/2022 0925    BUN 14 01/05/2022 0925    CREATININE 0.9 01/05/2022 0925     (H) 01/05/2022 0925        Component Value Date/Time    CALCIUM 9.6 01/05/2022 0925    ALKPHOS 77 01/05/2022 0925    AST 17 01/05/2022 0925    ALT 22 01/05/2022 0925    BILITOT 0.9 01/05/2022 0925    ESTGFRAFRICA >60.0 01/05/2022 0925    EGFRNONAA >60.0 01/05/2022 0925              Assessment and Plan     Type 2 diabetes mellitus  No data to review today but A1c above goal  He is injecting insulin into forearm and top of thigh with scar tissue. Suspect not absorbing doses well  Will reduce to closer to weight-based and needs to use abdomen or back of arm. Discussed rotating sites    Start Dexcom- refer for teaching    Discuss GLP next visit when more data  Personal history of pancreatitis: denies  Family history of MTC/MEN/endocrine tumors: denies      Patient Instructions   Lantus 30 units once a day  Novolog 8 units with meals  Continue metformin and Jardiance     Send me a message in one week if you don't hear from the DME about Dexcom      Patient has diabetes mellitus and manages diabetes with intensive insulin regimen with three or more insulin injections daily or CSII.  Patient requires a therapeutic CGM and is willing to use therapeutic CGM for the necessary frequent adjustments of insulin therapy. Patient has been using SMBG for frequent glucose monitoring (4x/day). I have completed an in-person visit during the previous 6 months and will continue to have in-person visits every 6 months to  assess adherence to their CGM regimen and diabetes treatment plan.  Due to COVID pandemic and need for remote monitoring this tool is medically necessary      Dyslipidemia  Managed by Dr. Umm Stallworth, statin, zetia    Hypertension  BP at goal    CAD (coronary artery disease)  On Jardiance  Plan to add GLP once more data        RTC 3 months        More Galloway MD

## 2022-03-21 NOTE — PATIENT INSTRUCTIONS
Lantus 30 units once a day  Novolog 8 units with meals  Continue metformin and Jardiance     Send me a message in one week if you don't hear from the DME about Dexcom

## 2022-03-22 ENCOUNTER — TELEPHONE (OUTPATIENT)
Dept: ADMINISTRATIVE | Facility: HOSPITAL | Age: 67
End: 2022-03-22
Payer: MEDICARE

## 2022-03-24 ENCOUNTER — SPECIALTY PHARMACY (OUTPATIENT)
Dept: PHARMACY | Facility: CLINIC | Age: 67
End: 2022-03-24
Payer: MEDICARE

## 2022-03-24 NOTE — TELEPHONE ENCOUNTER
Specialty Pharmacy - Refill Coordination    Specialty Medication Orders Linked to Encounter    Flowsheet Row Most Recent Value   Medication #1 evolocumab (REPATHA PUSHTRONEX) 420 mg/3.5 mL Injt (Order#[id number], Rx#[id number])          Refill Questions - Documented Responses    Flowsheet Row Most Recent Value   Patient Availability and HIPAA Verification    Does patient want to proceed with activity? Yes   HIPAA/medical authority confirmed? Yes   Relationship to patient of person spoken to? Self   Refill Screening Questions    Would patient like to speak to a pharmacist? No   When does the patient need to receive the medication? 03/30/22   Refill Delivery Questions    How will the patient receive the medication? Delivery Jackelyn   When does the patient need to receive the medication? 03/30/22   Shipping Address Home   Address in [facility] confirmed and updated if neccessary? Yes   Expected Copay ($) 0   Is the patient able to afford the medication copay? Yes   Payment Method zero copay   Days supply of Refill 30   Supplies needed? No supplies needed   Refill activity completed? Yes   Refill activity plan Refill scheduled   Shipment/Pickup Date: 03/29/22          Current Outpatient Medications   Medication Sig    ACCU-CHEK SMARTVIEW TEST STRIP Strp 1 strip by Misc.(Non-Drug; Combo Route) route 3 (three) times daily. Patient needs an appointment    ammonium lactate 12 % Crea Apply small amount to feet except for in between toes twice a day    aspirin (ECOTRIN) 81 MG EC tablet Take 81 mg by mouth once daily.    atorvastatin (LIPITOR) 80 MG tablet TAKE 1 TABLET EVERY DAY    blood-glucose meter Misc Use to check sugar 3 times daily. Accu-chek Emily. Patient needs an appointment    carvediloL (COREG) 25 MG tablet TAKE 1 TABLET TWICE DAILY WITH MEALS  OR AS DIRECTED (Patient taking differently: Pt reports takes 1/2 in am and 1/2 in pm)    celecoxib (CELEBREX) 200 MG capsule Take 1 capsule (200 mg total) by  "mouth 2 (two) times daily with meals. (breakfast and dinner)    diphenhydrAMINE (BENADRYL) 25 mg capsule Take 50 mg by mouth nightly as needed for Itching.     evolocumab (REPATHA PUSHTRONEX) 420 mg/3.5 mL Injt Inject 3.5 mLs (420 mg total) into the skin every 30 days    ezetimibe (ZETIA) 10 mg tablet TAKE 1 TABLET EVERY DAY    FOLIC ACID/MULTIVITS-MIN (MULTIVITAMIN FOR CHOLESTEROL ORAL) Take 1 tablet by mouth nightly.     furosemide (LASIX) 40 MG tablet TAKE 1 TABLET EVERY DAY    icosapent ethyL (VASCEPA) 1 gram Cap Take 2 capsules (2,000 mg total) by mouth 2 (two) times daily.    insulin (LANTUS SOLOSTAR U-100 INSULIN) glargine 100 units/mL (3mL) SubQ pen Inject 45 Units into the skin every evening.    insulin aspart U-100 (NOVOLOG FLEXPEN U-100 INSULIN) 100 unit/mL (3 mL) InPn pen Inject 12 Units into the skin 3 (three) times daily with meals.    insulin needles, disposable, (BD INSULIN PEN NEEDLE UF ORIG) 29 x 1/2 " Ndle USE TWICE DAILY AS DIRECTED    JARDIANCE 25 mg tablet TAKE 1 TABLET EVERY DAY    lancets Misc 1 lancet by Misc.(Non-Drug; Combo Route) route 3 (three) times daily. accu-chek Fastclix. Patient needs an appointment    metFORMIN (GLUCOPHAGE) 1000 MG tablet TAKE 1 TABLET TWICE DAILY    nitroGLYCERIN (NITROSTAT) 0.4 MG SL tablet PLACE 1 TABLET (0.4 MG TOTAL) UNDER THE TONGUE EVERY 5 (FIVE) MINUTES AS NEEDED FOR CHEST PAIN AS DIRECTED    pen needle, diabetic (BD INSULIN PEN NEEDLE UF MINI) 31 gauge x 3/16" Ndle 1 each by Misc.(Non-Drug; Combo Route) route 4 (four) times daily. Patient needs an appointment    valsartan (DIOVAN) 320 MG tablet Take 1 tablet (320 mg total) by mouth once daily.   Last reviewed on 3/21/2022  3:17 PM by More Galloway MD    Review of patient's allergies indicates:   Allergen Reactions    Tramadol Hallucinations    Last reviewed on  3/21/2022 3:17 PM by More Galloway      Tasks added this encounter   4/22/2022 - Refill Call (Auto Added)   Tasks due within " next 3 months   No tasks due.     [facility]  [address]  [facility] [address]  Phone: [phone]  Fax: [phone]

## 2022-03-27 ENCOUNTER — PATIENT OUTREACH (OUTPATIENT)
Dept: ADMINISTRATIVE | Facility: OTHER | Age: 67
End: 2022-03-27
Payer: MEDICARE

## 2022-03-28 ENCOUNTER — OFFICE VISIT (OUTPATIENT)
Dept: DERMATOLOGY | Facility: CLINIC | Age: 67
End: 2022-03-28
Payer: MEDICARE

## 2022-03-28 DIAGNOSIS — L82.1 SK (SEBORRHEIC KERATOSIS): ICD-10-CM

## 2022-03-28 DIAGNOSIS — L81.4 LENTIGO: ICD-10-CM

## 2022-03-28 DIAGNOSIS — Z86.018 HISTORY OF DYSPLASTIC NEVUS: ICD-10-CM

## 2022-03-28 DIAGNOSIS — D22.9 NEVUS: ICD-10-CM

## 2022-03-28 DIAGNOSIS — L91.8 SKIN TAG: ICD-10-CM

## 2022-03-28 DIAGNOSIS — D18.01 CHERRY ANGIOMA: ICD-10-CM

## 2022-03-28 DIAGNOSIS — L57.0 AK (ACTINIC KERATOSIS): Primary | ICD-10-CM

## 2022-03-28 DIAGNOSIS — D48.5 NEOPLASM OF UNCERTAIN BEHAVIOR OF SKIN: ICD-10-CM

## 2022-03-28 DIAGNOSIS — L90.5 SCAR: ICD-10-CM

## 2022-03-28 DIAGNOSIS — C43.59 MALIGNANT MELANOMA OF BACK: ICD-10-CM

## 2022-03-28 DIAGNOSIS — Z85.820 HISTORY OF MALIGNANT MELANOMA: ICD-10-CM

## 2022-03-28 PROCEDURE — 3288F FALL RISK ASSESSMENT DOCD: CPT | Mod: CPTII,S$GLB,, | Performed by: DERMATOLOGY

## 2022-03-28 PROCEDURE — 99213 OFFICE O/P EST LOW 20 MIN: CPT | Mod: 25,S$GLB,, | Performed by: DERMATOLOGY

## 2022-03-28 PROCEDURE — 1160F RVW MEDS BY RX/DR IN RCRD: CPT | Mod: CPTII,S$GLB,, | Performed by: DERMATOLOGY

## 2022-03-28 PROCEDURE — 17000 PR DESTRUCTION(LASER SURGERY,CRYOSURGERY,CHEMOSURGERY),PREMALIGNANT LESIONS,FIRST LESION: ICD-10-PCS | Mod: 59,S$GLB,, | Performed by: DERMATOLOGY

## 2022-03-28 PROCEDURE — 88305 TISSUE EXAM BY PATHOLOGIST: CPT | Mod: 26,,, | Performed by: PATHOLOGY

## 2022-03-28 PROCEDURE — 4010F PR ACE/ARB THEARPY RXD/TAKEN: ICD-10-PCS | Mod: CPTII,S$GLB,, | Performed by: DERMATOLOGY

## 2022-03-28 PROCEDURE — 88305 TISSUE EXAM BY PATHOLOGIST: ICD-10-PCS | Mod: 26,,, | Performed by: PATHOLOGY

## 2022-03-28 PROCEDURE — 88342 CHG IMMUNOCYTOCHEMISTRY: ICD-10-PCS | Mod: 26,,, | Performed by: PATHOLOGY

## 2022-03-28 PROCEDURE — 1159F MED LIST DOCD IN RCRD: CPT | Mod: CPTII,S$GLB,, | Performed by: DERMATOLOGY

## 2022-03-28 PROCEDURE — 3288F PR FALLS RISK ASSESSMENT DOCUMENTED: ICD-10-PCS | Mod: CPTII,S$GLB,, | Performed by: DERMATOLOGY

## 2022-03-28 PROCEDURE — 88342 IMHCHEM/IMCYTCHM 1ST ANTB: CPT | Performed by: PATHOLOGY

## 2022-03-28 PROCEDURE — 17000 DESTRUCT PREMALG LESION: CPT | Mod: 59,S$GLB,, | Performed by: DERMATOLOGY

## 2022-03-28 PROCEDURE — 17003 DESTRUCTION, PREMALIGNANT LESIONS; SECOND THROUGH 14 LESIONS: ICD-10-PCS | Mod: S$GLB,,, | Performed by: DERMATOLOGY

## 2022-03-28 PROCEDURE — 1160F PR REVIEW ALL MEDS BY PRESCRIBER/CLIN PHARMACIST DOCUMENTED: ICD-10-PCS | Mod: CPTII,S$GLB,, | Performed by: DERMATOLOGY

## 2022-03-28 PROCEDURE — 1101F PR PT FALLS ASSESS DOC 0-1 FALLS W/OUT INJ PAST YR: ICD-10-PCS | Mod: CPTII,S$GLB,, | Performed by: DERMATOLOGY

## 2022-03-28 PROCEDURE — 1126F AMNT PAIN NOTED NONE PRSNT: CPT | Mod: CPTII,S$GLB,, | Performed by: DERMATOLOGY

## 2022-03-28 PROCEDURE — 99999 PR PBB SHADOW E&M-EST. PATIENT-LVL IV: CPT | Mod: PBBFAC,,, | Performed by: DERMATOLOGY

## 2022-03-28 PROCEDURE — 1159F PR MEDICATION LIST DOCUMENTED IN MEDICAL RECORD: ICD-10-PCS | Mod: CPTII,S$GLB,, | Performed by: DERMATOLOGY

## 2022-03-28 PROCEDURE — 88342 IMHCHEM/IMCYTCHM 1ST ANTB: CPT | Mod: 26,,, | Performed by: PATHOLOGY

## 2022-03-28 PROCEDURE — 17003 DESTRUCT PREMALG LES 2-14: CPT | Mod: S$GLB,,, | Performed by: DERMATOLOGY

## 2022-03-28 PROCEDURE — 99999 PR PBB SHADOW E&M-EST. PATIENT-LVL IV: ICD-10-PCS | Mod: PBBFAC,,, | Performed by: DERMATOLOGY

## 2022-03-28 PROCEDURE — 3052F HG A1C>EQUAL 8.0%<EQUAL 9.0%: CPT | Mod: CPTII,S$GLB,, | Performed by: DERMATOLOGY

## 2022-03-28 PROCEDURE — 99213 PR OFFICE/OUTPT VISIT, EST, LEVL III, 20-29 MIN: ICD-10-PCS | Mod: 25,S$GLB,, | Performed by: DERMATOLOGY

## 2022-03-28 PROCEDURE — 11102 PR TANGENTIAL BIOPSY, SKIN, SINGLE LESION: ICD-10-PCS | Mod: S$GLB,,, | Performed by: DERMATOLOGY

## 2022-03-28 PROCEDURE — 88305 TISSUE EXAM BY PATHOLOGIST: CPT | Performed by: PATHOLOGY

## 2022-03-28 PROCEDURE — 4010F ACE/ARB THERAPY RXD/TAKEN: CPT | Mod: CPTII,S$GLB,, | Performed by: DERMATOLOGY

## 2022-03-28 PROCEDURE — 11102 TANGNTL BX SKIN SINGLE LES: CPT | Mod: S$GLB,,, | Performed by: DERMATOLOGY

## 2022-03-28 PROCEDURE — 3052F PR MOST RECENT HEMOGLOBIN A1C LEVEL 8.0 - < 9.0%: ICD-10-PCS | Mod: CPTII,S$GLB,, | Performed by: DERMATOLOGY

## 2022-03-28 PROCEDURE — 1101F PT FALLS ASSESS-DOCD LE1/YR: CPT | Mod: CPTII,S$GLB,, | Performed by: DERMATOLOGY

## 2022-03-28 PROCEDURE — 1126F PR PAIN SEVERITY QUANTIFIED, NO PAIN PRESENT: ICD-10-PCS | Mod: CPTII,S$GLB,, | Performed by: DERMATOLOGY

## 2022-03-28 NOTE — PROGRESS NOTES
Subjective:       Patient ID:  Jm Deleon is a 67 y.o. male who presents for   Chief Complaint   Patient presents with    Skin Check     TBSE         Date of biopsy: 09/20/2019  [place] test  no  Location: L Back  Depth: 2.7 mm  LN: yes L axilla x5- tested and negative   Date of excision: 10/18/2019    Pt has a history of  extensive sun exposure in the past.   Pt recalls several blistering sunburns in the past:  Yes teenage years  Pt has history of tanning bed use: no  Pt has had atypical moles removed in the past: yes  Pt has personal history of breast cancer: no  Pt has history of melanoma in first degree relatives:  no  Pt has family history of pancreatic cancer: no  Pt is currently immunosuppressed: no    [place] Type: 2    Last dental exam: 2015  Last eye exam: appt in May 2022    Pt c/o severe itching on his back.  Wife does not see anything concerning in this area.  Thinks it may be related to his surgery bc it is by his scar .               Review of Systems   Constitutional: Negative for fever, chills, weight loss, fatigue, night sweats and malaise.   HENT: Negative for headaches.    Respiratory: Negative for cough and shortness of breath.    Gastrointestinal: Negative for indigestion.   Skin: Positive for activity-related sunscreen use and wears hat. Negative for daily sunscreen use, tendency to form keloidal scars and recent sunburn.   Neurological: Negative for headaches.   Hematologic/Lymphatic: Negative for adenopathy. Bruises/bleeds easily (bleed).        Objective:    Physical Exam   Constitutional: He appears well-developed and well-nourished. No distress.   Lymphadenopathy: No supraclavicular adenopathy is present.     He has no cervical adenopathy.     He has no axillary adenopathy.   Neurological: He is alert and oriented to person, place, and time. He is not disoriented.   Psychiatric: He has a normal mood and affect.   Skin:   Areas Examined (abnormalities noted in diagram):   Scalp  / Hair Palpated and Inspected  Head / Face Inspection Performed  Neck Inspection Performed  Chest / Axilla Inspection Performed  Abdomen Inspection Performed  Genitals / Buttocks / Groin Inspection Performed  Back Inspection Performed  RUE Inspected  LUE Inspection Performed  RLE Inspected  LLE Inspection Performed  Nails and Digits Inspection Performed                               Diagram Legend     Erythematous scaling macule/papule c/w actinic keratosis       Vascular papule c/w angioma      Pigmented verrucoid papule/plaque c/w seborrheic keratosis      Yellow umbilicated papule c/w sebaceous hyperplasia      Irregularly shaped tan macule c/w lentigo     1-2 mm smooth white papules consistent with Milia      Movable subcutaneous cyst with punctum c/w epidermal inclusion cyst      Subcutaneous movable cyst c/w pilar cyst      Firm pink to brown papule c/w dermatofibroma      Pedunculated fleshy papule(s) c/w skin tag(s)      Evenly pigmented macule c/w junctional nevus     Mildly variegated pigmented, slightly irregular-bordered macule c/w mildly atypical nevus      Flesh colored to evenly pigmented papule c/w intradermal nevus       Pink pearly papule/plaque c/w basal cell carcinoma      Erythematous hyperkeratotic cursted plaque c/w SCC      Surgical scar with no sign of skin cancer recurrence      Open and closed comedones      Inflammatory papules and pustules      Verrucoid papule consistent consistent with wart     Erythematous eczematous patches and plaques     Dystrophic onycholytic nail with subungual debris c/w onychomycosis     Umbilicated papule    Erythematous-base heme-crusted tan verrucoid plaque consistent with inflamed seborrheic keratosis     Erythematous Silvery Scaling Plaque c/w Psoriasis     See annotation      Assessment / Plan:      Pathology Orders:     Normal Orders This Visit    Specimen to Pathology, Dermatology     Comments:    Number of  Specimens:->1  ------------------------->-------------------------  Spec 1 Procedure:->Biopsy  Spec 1 Clinical Impression:->r/o atypical nevus  Spec 1 Source:->right lower lateral back    Questions:    Procedure Type: Dermatology and skin neoplasms    Number of Specimens: 1    ------------------------: -------------------------    Spec 1 Procedure: Biopsy    Spec 1 Clinical Impression: r/o atypical nevus    Spec 1 Source: right lower lateral back    Release to patient:         AK (actinic keratosis)  Cryosurgery Procedure Note    Verbal consent from the patient is obtained including, but not limited to, risk of hypopigmentation/hyperpigmentation, scar, recurrence of lesion. The patient is aware of the precancerous quality and need for treatment of these lesions. Liquid nitrogen cryosurgery is applied to the 2 actinic keratoses, as detailed in the physical exam, to produce a freeze injury. The patient is aware that blisters may form and is instructed on wound care with gentle cleansing and use of vaseline ointment to keep moist until healed. The patient is supplied a handout on cryosurgery and is instructed to call if lesions do not completely resolve.    Neoplasm of uncertain behavior of skin  Shave biopsy procedure note:    Shave biopsy performed after verbal consent including risk of infection, scar, recurrence, need for additional treatment of site. Area prepped with alcohol, anesthetized with approximately 1.0cc of 1% lidocaine with epinephrine. Lesional tissue shaved with razor blade. Hemostasis achieved with application of aluminum chloride followed by hyfrecation. No complications. Dressing applied. Wound care explained.      -     Specimen to Pathology, Dermatology    Nevus  Discussed ABCDE's of nevi.  Monitor for new mole or moles that are becoming bigger, darker, irritated, or developing irregular borders. Brochure provided. Instructed patient to observe lesion(s) for changes and follow up in clinic if  changes are noted. Patient to monitor skin at home for new or changing lesions.     Skin tag  Reassurance given to patient. No treatment is necessary.   Treatment of benign, asymptomatic lesions may be considered cosmetic.    Lentigo  This is a benign hyperpigmented sun induced lesion. Recommend daily sun protection/avoidance and use of at least SPF 30, broad spectrum sunscreen (OTC drug) will reduce the number of new lesions. Treatment of these benign lesions are considered cosmetic.    The nature of sun-induced photo-aging and skin cancers is discussed.  Sun avoidance, protective clothing, and the use of 30-SPF sunscreens is advised. Observe closely for skin damage/changes, and call if such occurs.    Cherry angioma  These are benign vascular lesions that are inherited.  Treatment is not necessary.    SK (seborrheic keratosis)  These are benign inherited growths without a malignant potential. Reassurance given to patient. No treatment is necessary.     History of malignant melanoma- L back 2.7 mm 9/2019- WLE , neg SLNbx- stage IIA  History of dysplastic nevus  Scar    Area of previous melanoma examined. Site well healed with no signs of recurrence.    Total body skin examination performed today including at least 12 points as noted in physical examination. lesions suspicious y noted.    Recommend daily sun protection/avoidance, use of at least SPF 30, broad spectrum sunscreen (OTC drug), skin self examinations, and routine physician surveillance to optimize early detection             Follow up in about 6 months (around 9/28/2022) for TBSE.

## 2022-03-28 NOTE — PATIENT INSTRUCTIONS
Shave Biopsy Wound Care    Your doctor has performed a shave biopsy today.  A band aid and vaseline ointment has been placed over the site.  This should remain in place for NO LONGER THAN 48 hours.  It is fine to remove the bandaid after 24 hours, if the area is no longer bleeding. It is recommended that you keep the area dry (do not wet)) for the first 24 hours.  After 24 hours, wash the area with warm soap and water and apply Vaseline jelly.  Many patients prefer to use Neosporin or Bacitracin ointment.  This is acceptable; however, know that you can develop an allergy to this medication even if you have used it safely for years.  It is important to keep the area moist.  Letting it dry out and get air slows healing time, and will worsen the scar.        If you notice increasing redness, tenderness, pain, or yellow drainage at the biopsy site, please notify your doctor.  These are signs of an infection.    If your biopsy site is bleeding, apply firm pressure for 15 minutes straight.  Repeat for another 15 minutes, if it is still bleeding.   If the surgical site continues to bleed, then please contact your doctor.      For [facility] users:   You will receive your biopsy results in [facility] as soon as they are available. Please be assured that your physician/provider will review your results and will then determine what further treatment, evaluation, or planning is required. You should be contacted by your physician's/provider's office within 5 business days of receiving your results; If not, please reach out to directly. This is one more way [employer] is putting you first.     [facility] [place] [address]/ [phone] FAX/ [email or web address]

## 2022-03-28 NOTE — PROGRESS NOTES
Care Everywhere: updated  Immunization: updated  Health Maintenance: updated  Media Review:   Legacy Review:   DIS:  Order placed:   Upcoming appts:optometry 5.27  EFAX:  Task Tickets:  Referrals:

## 2022-03-31 ENCOUNTER — TELEPHONE (OUTPATIENT)
Dept: ADMINISTRATIVE | Facility: HOSPITAL | Age: 67
End: 2022-03-31
Payer: MEDICARE

## 2022-04-05 ENCOUNTER — PATIENT MESSAGE (OUTPATIENT)
Dept: DERMATOLOGY | Facility: CLINIC | Age: 67
End: 2022-04-05
Payer: MEDICARE

## 2022-04-06 LAB
FINAL PATHOLOGIC DIAGNOSIS: NORMAL
GROSS: NORMAL
Lab: NORMAL
MICROSCOPIC EXAM: NORMAL

## 2022-04-08 ENCOUNTER — PATIENT MESSAGE (OUTPATIENT)
Dept: ORTHOPEDICS | Facility: CLINIC | Age: 67
End: 2022-04-08
Payer: MEDICARE

## 2022-04-08 DIAGNOSIS — M25.551 RIGHT HIP PAIN: Primary | ICD-10-CM

## 2022-04-18 ENCOUNTER — PATIENT MESSAGE (OUTPATIENT)
Dept: ADMINISTRATIVE | Facility: OTHER | Age: 67
End: 2022-04-18
Payer: MEDICARE

## 2022-04-19 ENCOUNTER — PES CALL (OUTPATIENT)
Dept: ADMINISTRATIVE | Facility: CLINIC | Age: 67
End: 2022-04-19
Payer: MEDICARE

## 2022-04-22 ENCOUNTER — SPECIALTY PHARMACY (OUTPATIENT)
Dept: PHARMACY | Facility: CLINIC | Age: 67
End: 2022-04-22
Payer: MEDICARE

## 2022-04-22 NOTE — TELEPHONE ENCOUNTER
Specialty Pharmacy - Refill Coordination    Specialty Medication Orders Linked to Encounter    Flowsheet Row Most Recent Value   Medication #1 evolocumab (REPATHA PUSHTRONEX) 420 mg/3.5 mL Injt (Order#[id number], Rx#[id number])          Refill Questions - Documented Responses    Flowsheet Row Most Recent Value   Patient Availability and HIPAA Verification    Does patient want to proceed with activity? Yes   HIPAA/medical authority confirmed? Yes   Relationship to patient of person spoken to? Self   Refill Screening Questions    Would patient like to speak to a pharmacist? No   When does the patient need to receive the medication? 04/28/22   Refill Delivery Questions    How will the patient receive the medication? Delivery Jackelyn   When does the patient need to receive the medication? 04/28/22   Shipping Address Home   Address in [facility] confirmed and updated if neccessary? Yes   Expected Copay ($) 0   Is the patient able to afford the medication copay? Yes   Payment Method zero copay   Days supply of Refill 30   Refill activity completed? Yes   Refill activity plan Refill scheduled   Shipment/Pickup Date: 04/28/22          Current Outpatient Medications   Medication Sig    ACCU-CHEK SMARTVIEW TEST STRIP Strp 1 strip by Misc.(Non-Drug; Combo Route) route 3 (three) times daily. Patient needs an appointment    ammonium lactate 12 % Crea Apply small amount to feet except for in between toes twice a day    aspirin (ECOTRIN) 81 MG EC tablet Take 81 mg by mouth once daily.    atorvastatin (LIPITOR) 80 MG tablet TAKE 1 TABLET EVERY DAY    blood-glucose meter Misc Use to check sugar 3 times daily. Accu-chek Emily. Patient needs an appointment    carvediloL (COREG) 25 MG tablet TAKE 1 TABLET TWICE DAILY WITH MEALS  OR AS DIRECTED (Patient taking differently: Pt reports takes 1/2 in am and 1/2 in pm)    celecoxib (CELEBREX) 200 MG capsule Take 1 capsule (200 mg total) by mouth 2 (two) times daily with meals.  "(breakfast and dinner)    diphenhydrAMINE (BENADRYL) 25 mg capsule Take 50 mg by mouth nightly as needed for Itching.     evolocumab (REPATHA PUSHTRONEX) 420 mg/3.5 mL Injt Inject 3.5 mLs (420 mg total) into the skin every 30 days    ezetimibe (ZETIA) 10 mg tablet TAKE 1 TABLET EVERY DAY    FOLIC ACID/MULTIVITS-MIN (MULTIVITAMIN FOR CHOLESTEROL ORAL) Take 1 tablet by mouth nightly.     furosemide (LASIX) 40 MG tablet TAKE 1 TABLET EVERY DAY    icosapent ethyL (VASCEPA) 1 gram Cap Take 2 capsules (2,000 mg total) by mouth 2 (two) times daily.    insulin (LANTUS SOLOSTAR U-100 INSULIN) glargine 100 units/mL (3mL) SubQ pen Inject 45 Units into the skin every evening.    insulin aspart U-100 (NOVOLOG FLEXPEN U-100 INSULIN) 100 unit/mL (3 mL) InPn pen Inject 12 Units into the skin 3 (three) times daily with meals.    insulin needles, disposable, (BD INSULIN PEN NEEDLE UF ORIG) 29 x 1/2 " Ndle USE TWICE DAILY AS DIRECTED    JARDIANCE 25 mg tablet TAKE 1 TABLET EVERY DAY    lancets Misc 1 lancet by Misc.(Non-Drug; Combo Route) route 3 (three) times daily. accu-chek Fastclix. Patient needs an appointment    metFORMIN (GLUCOPHAGE) 1000 MG tablet TAKE 1 TABLET TWICE DAILY    nitroGLYCERIN (NITROSTAT) 0.4 MG SL tablet PLACE 1 TABLET (0.4 MG TOTAL) UNDER THE TONGUE EVERY 5 (FIVE) MINUTES AS NEEDED FOR CHEST PAIN AS DIRECTED    pen needle, diabetic (BD INSULIN PEN NEEDLE UF MINI) 31 gauge x 3/16" Ndle 1 each by Misc.(Non-Drug; Combo Route) route 4 (four) times daily. Patient needs an appointment    valsartan (DIOVAN) 320 MG tablet Take 1 tablet (320 mg total) by mouth once daily.   Last reviewed on 3/28/2022  9:30 AM by Dana Cardoza MD    Review of patient's allergies indicates:   Allergen Reactions    Tramadol Hallucinations    Last reviewed on  3/28/2022 9:30 AM by Dana Cardoza      Tasks added this encounter   5/21/2022 - Refill Call (Auto Added)   Tasks due within next 3 months   No tasks due.     [facility]  [address]  [facility]  [facility] [address]  Phone: [phone]  Fax: [phone]

## 2022-04-25 ENCOUNTER — PATIENT MESSAGE (OUTPATIENT)
Dept: ENDOCRINOLOGY | Facility: CLINIC | Age: 67
End: 2022-04-25
Payer: MEDICARE

## 2022-04-25 ENCOUNTER — TELEPHONE (OUTPATIENT)
Dept: ORTHOPEDICS | Facility: CLINIC | Age: 67
End: 2022-04-25
Payer: MEDICARE

## 2022-04-25 DIAGNOSIS — E11.9 DIABETES MELLITUS WITHOUT COMPLICATION: ICD-10-CM

## 2022-04-25 RX ORDER — INSULIN ASPART 100 [IU]/ML
8 INJECTION, SOLUTION INTRAVENOUS; SUBCUTANEOUS
Qty: 21.6 ML | Refills: 3 | Status: SHIPPED | OUTPATIENT
Start: 2022-04-25 | End: 2022-08-30

## 2022-04-25 RX ORDER — INSULIN GLARGINE 100 [IU]/ML
30 INJECTION, SOLUTION SUBCUTANEOUS NIGHTLY
Qty: 27 ML | Refills: 3 | Status: SHIPPED | OUTPATIENT
Start: 2022-04-25 | End: 2022-06-26 | Stop reason: SDUPTHER

## 2022-04-25 NOTE — TELEPHONE ENCOUNTER
I called the patient to confirm his xray appointment at  before seeing Dr. Sweeney. The patient did not answer. I left a voicemail with a callback number.

## 2022-04-26 ENCOUNTER — OFFICE VISIT (OUTPATIENT)
Dept: ORTHOPEDICS | Facility: CLINIC | Age: 67
End: 2022-04-26
Payer: MEDICARE

## 2022-04-26 ENCOUNTER — PATIENT MESSAGE (OUTPATIENT)
Dept: REHABILITATION | Facility: HOSPITAL | Age: 67
End: 2022-04-26
Payer: MEDICARE

## 2022-04-26 ENCOUNTER — HOSPITAL ENCOUNTER (OUTPATIENT)
Dept: RADIOLOGY | Facility: HOSPITAL | Age: 67
Discharge: HOME | End: 2022-04-26
Attending: ORTHOPAEDIC SURGERY
Payer: MEDICARE

## 2022-04-26 VITALS — HEIGHT: 68 IN | BODY MASS INDEX: 32.59 KG/M2 | WEIGHT: 215.06 LBS

## 2022-04-26 DIAGNOSIS — M25.551 RIGHT HIP PAIN: ICD-10-CM

## 2022-04-26 DIAGNOSIS — M70.61 GREATER TROCHANTERIC BURSITIS OF RIGHT HIP: Primary | ICD-10-CM

## 2022-04-26 PROCEDURE — 4010F ACE/ARB THERAPY RXD/TAKEN: CPT | Mod: CPTII,S$GLB,, | Performed by: ORTHOPAEDIC SURGERY

## 2022-04-26 PROCEDURE — 73502 X-RAY EXAM HIP UNI 2-3 VIEWS: CPT | Mod: TC,RT

## 2022-04-26 PROCEDURE — 4010F PR ACE/ARB THEARPY RXD/TAKEN: ICD-10-PCS | Mod: CPTII,S$GLB,, | Performed by: ORTHOPAEDIC SURGERY

## 2022-04-26 PROCEDURE — 1100F PR PT FALLS ASSESS DOC 2+ FALLS/FALL W/INJURY/YR: ICD-10-PCS | Mod: CPTII,S$GLB,, | Performed by: ORTHOPAEDIC SURGERY

## 2022-04-26 PROCEDURE — 1100F PTFALLS ASSESS-DOCD GE2>/YR: CPT | Mod: CPTII,S$GLB,, | Performed by: ORTHOPAEDIC SURGERY

## 2022-04-26 PROCEDURE — 3008F PR BODY MASS INDEX (BMI) DOCUMENTED: ICD-10-PCS | Mod: CPTII,S$GLB,, | Performed by: ORTHOPAEDIC SURGERY

## 2022-04-26 PROCEDURE — 1125F AMNT PAIN NOTED PAIN PRSNT: CPT | Mod: CPTII,S$GLB,, | Performed by: ORTHOPAEDIC SURGERY

## 2022-04-26 PROCEDURE — 3288F FALL RISK ASSESSMENT DOCD: CPT | Mod: CPTII,S$GLB,, | Performed by: ORTHOPAEDIC SURGERY

## 2022-04-26 PROCEDURE — 99999 PR PBB SHADOW E&M-EST. PATIENT-LVL IV: ICD-10-PCS | Mod: PBBFAC,,, | Performed by: ORTHOPAEDIC SURGERY

## 2022-04-26 PROCEDURE — 73502 X-RAY EXAM HIP UNI 2-3 VIEWS: CPT | Mod: 26,RT,, | Performed by: RADIOLOGY

## 2022-04-26 PROCEDURE — 73502 XR HIP WITH PELVIS WHEN PERFORMED, 2 OR 3  VIEWS RIGHT: ICD-10-PCS | Mod: 26,RT,, | Performed by: RADIOLOGY

## 2022-04-26 PROCEDURE — 3052F HG A1C>EQUAL 8.0%<EQUAL 9.0%: CPT | Mod: CPTII,S$GLB,, | Performed by: ORTHOPAEDIC SURGERY

## 2022-04-26 PROCEDURE — 3288F PR FALLS RISK ASSESSMENT DOCUMENTED: ICD-10-PCS | Mod: CPTII,S$GLB,, | Performed by: ORTHOPAEDIC SURGERY

## 2022-04-26 PROCEDURE — 3052F PR MOST RECENT HEMOGLOBIN A1C LEVEL 8.0 - < 9.0%: ICD-10-PCS | Mod: CPTII,S$GLB,, | Performed by: ORTHOPAEDIC SURGERY

## 2022-04-26 PROCEDURE — 99214 PR OFFICE/OUTPT VISIT, EST, LEVL IV, 30-39 MIN: ICD-10-PCS | Mod: S$GLB,,, | Performed by: ORTHOPAEDIC SURGERY

## 2022-04-26 PROCEDURE — 99999 PR PBB SHADOW E&M-EST. PATIENT-LVL IV: CPT | Mod: PBBFAC,,, | Performed by: ORTHOPAEDIC SURGERY

## 2022-04-26 PROCEDURE — 3008F BODY MASS INDEX DOCD: CPT | Mod: CPTII,S$GLB,, | Performed by: ORTHOPAEDIC SURGERY

## 2022-04-26 PROCEDURE — 1125F PR PAIN SEVERITY QUANTIFIED, PAIN PRESENT: ICD-10-PCS | Mod: CPTII,S$GLB,, | Performed by: ORTHOPAEDIC SURGERY

## 2022-04-26 PROCEDURE — 99214 OFFICE O/P EST MOD 30 MIN: CPT | Mod: S$GLB,,, | Performed by: ORTHOPAEDIC SURGERY

## 2022-04-26 RX ORDER — CELECOXIB 200 MG/1
200 CAPSULE ORAL DAILY
Qty: 30 CAPSULE | Refills: 0 | Status: SHIPPED | OUTPATIENT
Start: 2022-04-26 | End: 2022-06-10 | Stop reason: SDUPTHER

## 2022-04-26 NOTE — PROGRESS NOTES
Subjective:     HPI:   Jm Deleon is a 67 y.o. male who presents for eval R hip pain    He was previously seen July 2020 for left total hip evaluation which was performed by Dr. [facility].  We diagnosed with sciatica got an MRI went to the spine team had an epidural injection which helped.  His left hip is now doing well.  Of note he had lateral hip pain preoperatively    He had a left rotator cuff repair by Dr. Ventura May of 2021 with manipulation and lysis of adhesions November 2021    Today complains of posterior lateral right hip pain for about 6 months has been progressive in nature mostly at night.  No groin or anterior thigh discomfort    He was on Celebrex for her shoulder which helped but he is now off.  He is using icy Hot.  Main limitation is sleeping     Objective:   Body mass index is 33.19 kg/m².  Exam:  No limp nonantalgic gait negative Trendelenburg.  Can do a single leg stance without pain.  He is tender at the posterolateral greater trochanter.  No groin pain with active straight leg raise 0 90 hip flexion 30 abduction 20 abduction 30 external 20 internal rotation with some mild lateral discomfort but no groin or anterior hip discomfort.  About a centimeter leg length discrepancy right side short supine      Imaging:  He has low-grade degenerative changes of the right hip joint with preserved femoral acetabular joint space.  He has got a large enthesophyte at the greater trochanter consistent with chronic insertional abductor tendinopathy and/or a remote injury      Assessment:       ICD-10-CM ICD-9-CM   1. Greater trochanteric bursitis of right hip  M70.61 726.5      L YANICK L.O. doing well  L shoulder RCR/CHARISSA/MARYCARMEN Federico  LBP: Hx GUANAKITO  DM poorly controlled, 8.8     Plan:       At this point I feel the patient has a diagnosis of trochanteric bursitis.  We discussed all the treatment options including but not limited to, the use of non-steroidal anti-inflammatory drugs (NSAIDs), physical therapy  visits for stretching/strengthening and modalities treatment, as well as a consistent stretching program at home.  We further discussed the role of corticosteroid injections into the trochanteric bursa.  I explained that this is a short-term solution, however it does often allow for return to physical therapy and stretching and the ability to alleviate the acute pain.      x Tylenol   X Rx for 30 days of celebrex, further refills should come form PCP Aleve    Voltaren Gel    [facility] non-op arthritis info    Bursitis info    Total Joint Info    HEP: [facility] [employer] home exercise conditioning program    x PT    CSI: intra-articular steroid injection    CSI: greater trochanter bursitis    HA: hyaluronic acid injection    Brace:     Referral:      Recommend Dr Odonnell - Sports Medicine for ultrasound-guided injection for troch bursitis/abductor or short external rotator insertional tendinitis.  Given his elevated blood sugars PRP might be another good option for him.    Do not think he has symptomatic hip arthritis, at this time arthroplasty not indicated    Orders Placed This Encounter   Procedures    Ambulatory referral/consult to Physical/Occupational Therapy     Standing Status:   Future     Standing Expiration Date:   5/26/2023     Referral Priority:   Routine     Referral Type:   Physical Medicine     Referral Reason:   Specialty Services Required     Number of Visits Requested:   1    Ambulatory referral/consult to Sports Medicine     Standing Status:   Future     Number of Occurrences:   1     Standing Expiration Date:   5/26/2023     Referral Priority:   Routine     Referral Type:   Consultation     Referral Reason:   Specialty Services Required     Requested Specialty:   Sports Medicine     Number of Visits Requested:   1       Medications Ordered This Encounter   Medications    celecoxib (CELEBREX) 200 MG capsule     Sig: Take 1 capsule (200 mg total) by mouth once daily.     Dispense:  30 capsule      Refill:  0        Past Medical History:   Diagnosis Date    Allergy     seasonal    Arthritis     Coronary artery disease     Diabetes mellitus     Diabetes mellitus, type 2     Hyperlipidemia     Hypertension     Joint pain     Melanoma 10/2019    MM left back 2.7mm; neg LNs    Open angle with borderline findings and high glaucoma risk in both eyes 2018    Squamous cell carcinoma 2017    scalp - [facility]       Past Surgical History:   Procedure Laterality Date    ARTHROSCOPIC REPAIR OF ROTATOR CUFF OF SHOULDER Left 5/12/2021    Procedure: REPAIR, ROTATOR CUFF, ARTHROSCOPIC;  Surgeon: Johnny Ventura MD;  Location: [facility] OR;  Service: Orthopedics;  Laterality: Left;  Labral Debridement    ARTHROSCOPY OF SHOULDER WITH DECOMPRESSION OF SUBACROMIAL SPACE  5/12/2021    Procedure: ARTHROSCOPY, SHOULDER, WITH SUBACROMIAL SPACE DECOMPRESSION;  Surgeon: Johnny Ventura MD;  Location: [facility] OR;  Service: Orthopedics;;    BELPHAROPTOSIS REPAIR  10 years ago    both eyes    CARDIAC SURGERY      3 vessel bypass    COLONOSCOPY N/A 10/4/2019    Procedure: COLONOSCOPY;  Surgeon: Isaiah Booker MD;  Location: [facility] (4TH FLR);  Service: Endoscopy;  Laterality: N/A;  Cardilogy Clearance received rom Dr. QUEENIE Salomon, see telephone encounter 8/26/19-BB    CORONARY ARTERY BYPASS GRAFT  x4 age 45 2000    DISTAL CLAVICLE EXCISION Left 5/12/2021    Procedure: EXCISION, CLAVICLE, DISTAL;  Surgeon: Johnny Ventura MD;  Location: [facility] OR;  Service: Orthopedics;  Laterality: Left;    FIXATION OF TENDON Left 5/12/2021    Procedure: FIXATION, TENDON;  Surgeon: Johnny Ventura MD;  Location: [facility] OR;  Service: Orthopedics;  Laterality: Left;    HARDWARE REMOVAL Left 11/24/2021    Procedure: REMOVAL, HARDWARE;  Surgeon: Johnny Ventura MD;  Location: [facility] OR;  Service: Orthopedics;  Laterality: Left;    HERNIA REPAIR      HIP SURGERY  9-24-14    left YANICK    JOINT REPLACEMENT      left hip     MANIPULATION WITH ANESTHESIA Left 11/24/2021    Procedure: MANIPULATION, WITH ANESTHESIA;  Surgeon: Johnny Ventura MD;  Location: [facility] OR;  Service: Orthopedics;  Laterality: Left;    SENTINEL LYMPH NODE BIOPSY Left 10/18/2019    Procedure: BIOPSY, LYMPH NODE, SENTINEL;  Surgeon: Fabián Villeda MD;  Location: [address];  Service: General;  Laterality: Left;    SHOULDER ARTHROSCOPY      SHOULDER ARTHROSCOPY Left 11/24/2021    Procedure: ARTHROSCOPY, SHOULDER;  Surgeon: Johnny Ventura MD;  Location: [facility] OR;  Service: Orthopedics;  Laterality: Left;  regional w/catheter (interscalene)    SHOULDER SURGERY      TRANSFORAMINAL EPIDURAL INJECTION OF STEROID Left 8/7/2020    Procedure: LUMBAR TRANSFORAMINAL LEFT L3/4 AND L5/S1 DIRECT REFERRAL;  Surgeon: Ronny Rangel MD;  Location: [facility] PAIN MGT;  Service: Pain Management;  Laterality: Left;  NEEDS CONSENT, DIABETIC       Family History   Problem Relation Age of Onset    Coronary artery disease Mother     Cancer Father         lung cancer, smoker    Hypertension Brother     Cancer Brother         colon    Amblyopia Neg Hx     Blindness Neg Hx     Cataracts Neg Hx     Glaucoma Neg Hx     Macular degeneration Neg Hx     Retinal detachment Neg Hx     Strabismus Neg Hx     Melanoma Neg Hx        Social History     Socioeconomic History    Marital status:    Tobacco Use    Smoking status: Never Smoker    Smokeless tobacco: Never Used   Substance and Sexual Activity    Alcohol use: Yes     Alcohol/week: 3.0 standard drinks     Types: 3 Cans of beer per week     Comment: on weekend    Drug use: Never     Social Determinants of Health     Financial Resource Strain: Low Risk     Difficulty of Paying Living Expenses: Not hard at all   Food Insecurity: No Food Insecurity    Worried About Running Out of Food in the Last Year: Never true    Ran Out of Food in the Last Year: Never true   Transportation Needs: No Transportation  Needs    Lack of Transportation (Medical): No    Lack of Transportation (Non-Medical): No   Physical Activity: Insufficiently Active    Days of Exercise per Week: 2 days    Minutes of Exercise per Session: 30 min   Stress: Stress Concern Present    Feeling of Stress : To some extent   Social Connections: Unknown    Frequency of Communication with Friends and Family: More than three times a week    Frequency of Social Gatherings with Friends and Family: More than three times a week    Active Member of Clubs or Organizations: No    Attends Club or Organization Meetings: Never    Marital Status:    Housing Stability: Low Risk     Unable to Pay for Housing in the Last Year: No    Number of Places Lived in the Last Year: 1    Unstable Housing in the Last Year: No

## 2022-05-01 NOTE — TELEPHONE ENCOUNTER
Care Due:                  Date            Visit Type   Department     Provider  --------------------------------------------------------------------------------                                EP -                              PRIMARY      [facility] PRIMARY  Last Visit: 11-      CARE ([facility])   COLTEN Martin  Next Visit: None Scheduled  None         None Found                                                            Last  Test          Frequency    Reason                     Performed    Due Date  --------------------------------------------------------------------------------    HBA1C.......  6 months...  SAFIA metFORMIN.....  01- 07-    Powered by "[employer] by [employer]. Reference number: [id number].   5/01/2022 5:17:26 AM CDT

## 2022-05-02 NOTE — TELEPHONE ENCOUNTER
Refill Routing Note   Medication(s) are not appropriate for processing by [facility] for the following reason(s):      - Patient has been seen in the ED/Hospital since the last PCP visit    [facility] action(s):  Route Medication-related problems identified: Requires labs        Medication reconciliation completed: No     Appointments  past 12m or future 3m with PCP    Date Provider   Last Visit   11/15/2021 Dallas Martin MD   Next Visit   Visit date not found Dallas Martin MD   ED visits in past 90 days: 0        Note composed:10:54 AM 05/02/2022

## 2022-05-13 NOTE — PROGRESS NOTES
Jm Deleon, a 67 y.o. male, is here for evaluation of right hip.     HISTORY OF PRESENT ILLNESS   Location: proximal thigh   Onset: chronic, insidious  Palliative:    relative rest   oral analgesics, OTC    Provocative: prolonged ambulation  Prior: none  Progression: plateau discomfort   Quality: sharp  Radiation: none  Severity: per nursing documentation  Timing: intermittent with use  Trauma: none    Review of systems (ROS):  A 10+ review of systems was performed with pertinent positives and negatives noted above in the history of present illness. Other systems were negative unless otherwise specified.    PHYSICAL EXAMINATION  General:  The patient is alert and oriented x 3.  Mood is pleasant.  Observation of ears, eyes and nose reveal no gross abnormalities.  HEENT: NCAT, sclera nonicteric  Lungs: Respirations are equal and unlabored.   Gait is coordinated. Patient can toe walk and heel walk without difficulty.    HIP/PELVIS EXAMINATION    Observation/Inspection  Gait:   Nonantalgic   Alignment:  Neutral   Scars:   None   Muscle atrophy: None   Effusion:  None   Warmth:  None   Discoloration:   None   Leg lengths:   Equal   Pelvis:    Level     Tenderness/Crepitus (T/C):      T / C  Trochanteric bursa   - / -  Piriformis    - / -  SI joint    - / -  Psoas tendon   - / -  Rectus insertion  - / -  Adductor insertion  - / -  Pubic symphysis  - / -    ROM: (* = pain)    Flexion:      120 degrees  External rotation:   40 degrees  Internal rotation with axial load:  30 degrees  Internal rotation without axial load:  40 degrees  Abduction:    45 degrees  Adduction:     20 degrees    Special Tests:  Pain w/ forced internal rotation (FADIR):  -   Pain w/ forced external rotation (KEYUR):  -   Circumduction test:     -  Stinchfield test:     -   Log roll:       -   Snapping hip (internal):    -   Sit-up pain:      -   Resisted sit-up pain:     -   Resisted sit-up with adductor contraction pain:  -   Step-down  test:     +  Trendelenburg test:     -  Bridge test      +     Extremity Neuro-vascular Examination:   Sensation:  Grossly intact to light touch all dermatomal regions.   Motor Function:  Fully intact motor function at hip, knee, foot and ankle    DTRs;  quadriceps and  achilles 2+.  No clonus and downgoing Babinski.    Vascular status:  DP and PT pulses 2+, brisk capillary refill, symmetric.    Skin:  intact, compartments soft.    Other Findings:    ASSESSMENT & PLAN  Assessment  #1 Tonnis Grade II osteoarthritis of hip, right   W/ tendinosis of lateral gluteal tendons  #2 s/p YANICK, left   [facility]    No evidence of neurologic pathology  No evidence of vascular pathology    Imaging studies reviewed:  X-ray pelvis and hip, right 22.04    Plan  We discussed the importance of appropriate diet, weight, and regular exercise    We discussed options including    Watchful waiting / relative rest    Physical therapy    Injection therapy CSI lat glut tends r   Consultation    The patient chooses As above   x = prescribed  CSI = corticosteroid injection  VSI = viscosupplement injection  PRPI = platelet rich plasma injection  ia = intra articular  R = right  L = left  B = bilateral   nfSx = surgical consultation was recommended, but patient is not interested in consultation at this time    Physical Therapy        Formal (fPT), @ [facility] facility    Formal (fPT), @ [facility] facility        Homegoing (hgPT), per concurrent fPT recommendations    Homegoing (hgPT), per prior fPT recommendations    Homegoing (hgPT), handout provided        w/  (atPT)    [blank] = not prescribed  x = prescribed  b = prescribed, and begin as indicated  t = continue as indicated  r = prescribed, and restart as indicated  p = completed prior as indicated  hs = prescribed, and with high school   col = prescribed, and with college or university   nfPT = physical therapy was recommended, but patient is not  interested in PT at this time    Activity (e.g. sports, work) restrictions    [blank] = as tolerated  pt = per physical therapist  at = per   NWB = non weight bearing on affected lower extremity, with crutches assistance for ambulation    Bracing    [blank] = not prescribed  r = recommended, but not fit with at todays visit  f = prescribed and fit with at todays visit  t = continue as indicated  d = d/c  p = as needed  rare = use on rare, as-needed basis; advised against chronic use    Pain management heat   [blank] = No prescription necessary. A handout detailing dosing of appropriate   over-the-counter musculoskeletal analgesics was made available to the patient.   m = meloxicam x 14 days  mp = 14 day course of meloxicam prescribed prior    Follow up 10d  [facility]   [blank] = as needed  [number] = in [number] weeks  CSI = for corticosteroid injection  VSI = for viscosupplement injection or injection series  PRP = for platelet rich plasma injection or injection series  MRI = after MRI imaging  ns = should surgical options be deferred (no surgery)  o = appointment offered, deferred by patient    Should symptoms worsen or fail to resolve, consider    Revisiting the above options and / or CSI iaHip + fPT     Vocation:   owns a construction company, very active   son is an [employer] grad ([employer]) in [place]

## 2022-05-16 ENCOUNTER — OFFICE VISIT (OUTPATIENT)
Dept: SPORTS MEDICINE | Facility: CLINIC | Age: 67
End: 2022-05-16
Payer: MEDICARE

## 2022-05-16 VITALS — WEIGHT: 215 LBS | HEIGHT: 68 IN | BODY MASS INDEX: 32.58 KG/M2

## 2022-05-16 DIAGNOSIS — M16.11 PRIMARY OSTEOARTHRITIS OF RIGHT HIP: ICD-10-CM

## 2022-05-16 DIAGNOSIS — M67.80 TENDINOSIS: ICD-10-CM

## 2022-05-16 DIAGNOSIS — S76.011S TEAR OF RIGHT GLUTEUS MINIMUS TENDON, SEQUELA: ICD-10-CM

## 2022-05-16 DIAGNOSIS — S76.011S TEAR OF RIGHT GLUTEUS MEDIUS TENDON, SEQUELA: ICD-10-CM

## 2022-05-16 DIAGNOSIS — G89.29 CHRONIC PAIN OF RIGHT HIP: Primary | ICD-10-CM

## 2022-05-16 DIAGNOSIS — M70.61 GREATER TROCHANTERIC BURSITIS OF RIGHT HIP: ICD-10-CM

## 2022-05-16 DIAGNOSIS — M25.551 CHRONIC PAIN OF RIGHT HIP: Primary | ICD-10-CM

## 2022-05-16 PROCEDURE — 4010F PR ACE/ARB THEARPY RXD/TAKEN: ICD-10-PCS | Mod: CPTII,S$GLB,, | Performed by: FAMILY MEDICINE

## 2022-05-16 PROCEDURE — 99214 OFFICE O/P EST MOD 30 MIN: CPT | Mod: 25,S$GLB,, | Performed by: FAMILY MEDICINE

## 2022-05-16 PROCEDURE — 1126F PR PAIN SEVERITY QUANTIFIED, NO PAIN PRESENT: ICD-10-PCS | Mod: CPTII,S$GLB,, | Performed by: FAMILY MEDICINE

## 2022-05-16 PROCEDURE — 1101F PT FALLS ASSESS-DOCD LE1/YR: CPT | Mod: CPTII,S$GLB,, | Performed by: FAMILY MEDICINE

## 2022-05-16 PROCEDURE — 4010F ACE/ARB THERAPY RXD/TAKEN: CPT | Mod: CPTII,S$GLB,, | Performed by: FAMILY MEDICINE

## 2022-05-16 PROCEDURE — 3008F PR BODY MASS INDEX (BMI) DOCUMENTED: ICD-10-PCS | Mod: CPTII,S$GLB,, | Performed by: FAMILY MEDICINE

## 2022-05-16 PROCEDURE — 99999 PR PBB SHADOW E&M-EST. PATIENT-LVL IV: ICD-10-PCS | Mod: PBBFAC,,, | Performed by: FAMILY MEDICINE

## 2022-05-16 PROCEDURE — 76942 ECHO GUIDE FOR BIOPSY: CPT | Mod: 26,S$GLB,, | Performed by: FAMILY MEDICINE

## 2022-05-16 PROCEDURE — 3288F FALL RISK ASSESSMENT DOCD: CPT | Mod: CPTII,S$GLB,, | Performed by: FAMILY MEDICINE

## 2022-05-16 PROCEDURE — 99999 PR PBB SHADOW E&M-EST. PATIENT-LVL IV: CPT | Mod: PBBFAC,,, | Performed by: FAMILY MEDICINE

## 2022-05-16 PROCEDURE — 1160F RVW MEDS BY RX/DR IN RCRD: CPT | Mod: CPTII,S$GLB,, | Performed by: FAMILY MEDICINE

## 2022-05-16 PROCEDURE — 1159F MED LIST DOCD IN RCRD: CPT | Mod: CPTII,S$GLB,, | Performed by: FAMILY MEDICINE

## 2022-05-16 PROCEDURE — 20551 NJX 1 TENDON ORIGIN/INSJ: CPT | Mod: RT,S$GLB,, | Performed by: FAMILY MEDICINE

## 2022-05-16 PROCEDURE — 20551 TENDON ORIGIN: R HIP JOINT: ICD-10-PCS | Mod: RT,S$GLB,, | Performed by: FAMILY MEDICINE

## 2022-05-16 PROCEDURE — 3008F BODY MASS INDEX DOCD: CPT | Mod: CPTII,S$GLB,, | Performed by: FAMILY MEDICINE

## 2022-05-16 PROCEDURE — 3288F PR FALLS RISK ASSESSMENT DOCUMENTED: ICD-10-PCS | Mod: CPTII,S$GLB,, | Performed by: FAMILY MEDICINE

## 2022-05-16 PROCEDURE — 76942 TENDON ORIGIN: R HIP JOINT: ICD-10-PCS | Mod: 26,S$GLB,, | Performed by: FAMILY MEDICINE

## 2022-05-16 PROCEDURE — 1160F PR REVIEW ALL MEDS BY PRESCRIBER/CLIN PHARMACIST DOCUMENTED: ICD-10-PCS | Mod: CPTII,S$GLB,, | Performed by: FAMILY MEDICINE

## 2022-05-16 PROCEDURE — 3052F HG A1C>EQUAL 8.0%<EQUAL 9.0%: CPT | Mod: CPTII,S$GLB,, | Performed by: FAMILY MEDICINE

## 2022-05-16 PROCEDURE — 1126F AMNT PAIN NOTED NONE PRSNT: CPT | Mod: CPTII,S$GLB,, | Performed by: FAMILY MEDICINE

## 2022-05-16 PROCEDURE — 1159F PR MEDICATION LIST DOCUMENTED IN MEDICAL RECORD: ICD-10-PCS | Mod: CPTII,S$GLB,, | Performed by: FAMILY MEDICINE

## 2022-05-16 PROCEDURE — 99214 PR OFFICE/OUTPT VISIT, EST, LEVL IV, 30-39 MIN: ICD-10-PCS | Mod: 25,S$GLB,, | Performed by: FAMILY MEDICINE

## 2022-05-16 PROCEDURE — 1101F PR PT FALLS ASSESS DOC 0-1 FALLS W/OUT INJ PAST YR: ICD-10-PCS | Mod: CPTII,S$GLB,, | Performed by: FAMILY MEDICINE

## 2022-05-16 PROCEDURE — 3052F PR MOST RECENT HEMOGLOBIN A1C LEVEL 8.0 - < 9.0%: ICD-10-PCS | Mod: CPTII,S$GLB,, | Performed by: FAMILY MEDICINE

## 2022-05-16 RX ORDER — TRIAMCINOLONE ACETONIDE 40 MG/ML
40 INJECTION, SUSPENSION INTRA-ARTICULAR; INTRAMUSCULAR
Status: DISCONTINUED | OUTPATIENT
Start: 2022-05-16 | End: 2022-05-16 | Stop reason: HOSPADM

## 2022-05-16 RX ADMIN — TRIAMCINOLONE ACETONIDE 40 MG: 40 INJECTION, SUSPENSION INTRA-ARTICULAR; INTRAMUSCULAR at 02:05

## 2022-05-16 NOTE — PROCEDURES
"Tendon Origin: R hip joint    Date/Time: 5/16/2022 2:30 PM  Performed by: Patricio Multani MD  Authorized by: Patricio Multani MD     Consent Done?:  Yes (Verbal)  Timeout: prior to procedure the correct patient, procedure, and site was verified    Indications:  Pain  Site marked: the procedure site was marked    Timeout: prior to procedure the correct patient, procedure, and site was verified    Location:  Hip  Site:  R hip joint  Prep: patient was prepped and draped in usual sterile fashion    Ultrasonic Guidance for Needle Placement?: Yes    Needle size:  20 G  Approach:  Posterolateral  Medications:  40 mg triamcinolone acetonide 40 mg/mL  Patient tolerance:  Patient tolerated the procedure well with no immediate complications   Gluteus medius and gluteus minimus muscles, tendon sheaths, and tendon insertions injection    Description of ultrasound utilization for needle guidance:   Ultrasound guidance used for needle localization. Images saved and stored for documentation. The gluteus medius and minimus muscles and tendons were visualized at their insertions on the greater trochanter. Dynamic visualization of the 20g x 3.5" needle was continuous throughout the procedure.      "

## 2022-05-23 ENCOUNTER — SPECIALTY PHARMACY (OUTPATIENT)
Dept: PHARMACY | Facility: CLINIC | Age: 67
End: 2022-05-23
Payer: MEDICARE

## 2022-05-23 ENCOUNTER — PROCEDURE VISIT (OUTPATIENT)
Dept: DERMATOLOGY | Facility: CLINIC | Age: 67
End: 2022-05-23
Payer: MEDICARE

## 2022-05-23 DIAGNOSIS — D23.5 DYSPLASTIC NEVUS OF TRUNK: Primary | ICD-10-CM

## 2022-05-23 PROBLEM — Z86.018 HISTORY OF DYSPLASTIC NEVUS: Status: ACTIVE | Noted: 2022-05-23

## 2022-05-23 PROCEDURE — 11403 PR EXC SKIN BENIG 2.1-3 CM TRUNK,ARM,LEG: ICD-10-PCS | Mod: S$GLB,,, | Performed by: DERMATOLOGY

## 2022-05-23 PROCEDURE — 11403 EXC TR-EXT B9+MARG 2.1-3CM: CPT | Mod: S$GLB,,, | Performed by: DERMATOLOGY

## 2022-05-23 PROCEDURE — 12032 INTMD RPR S/A/T/EXT 2.6-7.5: CPT | Mod: 51,S$GLB,, | Performed by: DERMATOLOGY

## 2022-05-23 PROCEDURE — 88305 TISSUE EXAM BY PATHOLOGIST: ICD-10-PCS | Mod: 26,,, | Performed by: DERMATOLOGY

## 2022-05-23 PROCEDURE — 88305 TISSUE EXAM BY PATHOLOGIST: CPT | Mod: 26,,, | Performed by: DERMATOLOGY

## 2022-05-23 PROCEDURE — 12032 PR LAYR CLOS WND TRUNK,ARM,LEG 2.6-7.5 CM: ICD-10-PCS | Mod: 51,S$GLB,, | Performed by: DERMATOLOGY

## 2022-05-23 PROCEDURE — 88305 TISSUE EXAM BY PATHOLOGIST: CPT | Performed by: DERMATOLOGY

## 2022-05-23 PROCEDURE — 99499 NO LOS: ICD-10-PCS | Mod: S$GLB,,, | Performed by: DERMATOLOGY

## 2022-05-23 PROCEDURE — 99499 UNLISTED E&M SERVICE: CPT | Mod: S$GLB,,, | Performed by: DERMATOLOGY

## 2022-05-23 RX ORDER — MUPIROCIN 20 MG/G
OINTMENT TOPICAL
Qty: 30 G | Refills: 3 | Status: SHIPPED | OUTPATIENT
Start: 2022-05-23

## 2022-05-23 NOTE — PROGRESS NOTES
PROCEDURE: Elliptical excision with intermediate layered repair in order to decrease tension.    ANESTHETIC: 6.0 cc 1% Xylocaine with Epinephrine 1:100,000, buffered    SURGEON: Dana Cardoza M.D.    ASSISTANTS: Lakeshia Arriaga LPN    PREOPERATIVE DIAGNOSIS:  Moderately Atypical Nevus    POSTOPERATIVE DIAGNOSIS:  Same as preoperative diagnosis    PATHOLOGIC DIAGNOSIS: Pending    LOCATION: right lower lateral back     INITIAL LESION SIZE: 1.6 cm    EXCISED DIAMETER: 2.2 cm    PREPARATION: The diagnosis, procedure, alternatives, benefits and risks, including but not limited to: infection, bleeding/bruising, drug reactions, pain, scar or cosmetic defect, local sensation disturbances, wound dehiscence (separation of wound edges after sutures removed) and/or recurrence of present condition were explained to the patient. The patient elected to proceed.  Patient's identity was verified using 2 patient identifiers and the side and site was verified.  Time out period with surgeon, assistant and patient in surgical suite was taken.    PROCEDURE: The location noted above was prepped, draped, and anesthetized in the usual sterile fashion per Dr. Dana Cardoza. Lesional tissue was carefully marked with at least 3 mm margins of clinically normal skin in all directions. A fusiform elliptical excision was done with #15 blade carried down completely through the dermis into the deep subcutaneous tissues to the level of the non-muscle fascia, and dissection was carried out in that plane.  Electrocoagulation was used to obtain hemostasis. Blood loss was minimal. The wound was then approximated in a layered fashion with subcutaneous and intradermal sutures of 4.0 Monocryl, approximately 3 in number, and the wound was then superficially closed with simple interrupted sutures of 4.0 Prolene.    The patient tolerated the procedure well.    The area was cleaned and dressed appropriately and the patient was given wound care instructions, as  well as an appointment for follow-up evaluation.    LENGTH OF REPAIR: 4.0 cm

## 2022-05-23 NOTE — PATIENT INSTRUCTIONS
Post- Operative Wound Care    Your doctor has performed local skin surgery today.  Vaseline ointment and a pressure bandage were placed after the surgery.  It is very important that you keep this bandage in place for 24 hours.  It is also recommended that you do not exercise or do any other activities that will increase your heart rate. This will decrease the risk of post - operative infection and bleeding.  After 24 hours, you may remove the band aid and wash the area with warm soap and water and apply Vaseline ointment.  Many patients prefer to use Neosporin or Bacitracin ointment.  This is acceptable; however know that you can develop an allergy to this medication even if you have used it safely for years.  It is important to keep the area moist.  Letting it dry out and get air slows healing time, will worsen the scar, and make it more difficult to remove the stitches.  Band aid is optional after first 24 hours.    Post skin surgery discomfort is normal, but significant pain is not.  It is highly recommended that if you are having discomfort take 1000 mg tylenol/acetaminophen alternating every 3 hours with 400 mg of Advil/Motrin/Ibuprofen. This has been proven to provide significant pain control. Please only use these medications so long as you have no other contraindications to either of them.     We have someone on call 24 Hours daily should you need to reach us- please call [phone] and ask the  to page Dermatology On Call resident.          If you notice increasing redness, tenderness, pain, or yellow drainage at the biopsy or surgical site, please notify your doctor.  These are signs of an infection.    If your biopsy/surgical site is bleeding, apply firm pressure for 15 minutes straight.  Repeat for another 15 minutes, if it is still bleeding.   If the surgical site continues to bleed, then please contact your doctor.      For [facility] users:   You will receive your pathology results in  [facility] as soon as they are available. Please be assured that your physician/provider will review your results and contact you should additional treatment be required. This is one more way [facility] is putting you first.       [facility] [facility] [place] [address]/ [phone] FAX/ [email or web address]

## 2022-05-23 NOTE — TELEPHONE ENCOUNTER
Specialty Pharmacy - Refill Coordination    Specialty Medication Orders Linked to Encounter    Flowsheet Row Most Recent Value   Medication #1 evolocumab (REPATHA PUSHTRONEX) 420 mg/3.5 mL Injt (Order#[id number], Rx#[id number])          Refill Questions - Documented Responses    Flowsheet Row Most Recent Value   Patient Availability and HIPAA Verification    Does patient want to proceed with activity? Yes   HIPAA/medical authority confirmed? Yes   Relationship to patient of person spoken to? Self   Refill Screening Questions    Would patient like to speak to a pharmacist? No   When does the patient need to receive the medication? 05/30/22   Refill Delivery Questions    How will the patient receive the medication? Delivery Jackelyn   When does the patient need to receive the medication? 05/30/22   Shipping Address Home   Address in [facility] confirmed and updated if neccessary? Yes   Expected Copay ($) 0   Is the patient able to afford the medication copay? Yes   Payment Method zero copay   Days supply of Refill 30   Refill activity completed? Yes   Refill activity plan Refill scheduled   Shipment/Pickup Date: 05/27/22          Current Outpatient Medications   Medication Sig    ACCU-CHEK SMARTVIEW TEST STRIP Strp 1 strip by Misc.(Non-Drug; Combo Route) route 3 (three) times daily. Patient needs an appointment    ammonium lactate 12 % Crea Apply small amount to feet except for in between toes twice a day    aspirin (ECOTRIN) 81 MG EC tablet Take 81 mg by mouth once daily.    atorvastatin (LIPITOR) 80 MG tablet TAKE 1 TABLET EVERY DAY    blood-glucose meter Misc Use to check sugar 3 times daily. Accu-chek Emily. Patient needs an appointment    carvediloL (COREG) 25 MG tablet TAKE 1 TABLET TWICE DAILY WITH MEALS  OR AS DIRECTED (Patient taking differently: Pt reports takes 1/2 in am and 1/2 in pm)    celecoxib (CELEBREX) 200 MG capsule Take 1 capsule (200 mg total) by mouth 2 (two) times daily with meals.  "(breakfast and dinner)    celecoxib (CELEBREX) 200 MG capsule Take 1 capsule (200 mg total) by mouth once daily.    diphenhydrAMINE (BENADRYL) 25 mg capsule Take 50 mg by mouth nightly as needed for Itching.     evolocumab (REPATHA PUSHTRONEX) 420 mg/3.5 mL Injt Inject 3.5 mLs (420 mg total) into the skin every 30 days    ezetimibe (ZETIA) 10 mg tablet TAKE 1 TABLET EVERY DAY    FOLIC ACID/MULTIVITS-MIN (MULTIVITAMIN FOR CHOLESTEROL ORAL) Take 1 tablet by mouth nightly.     furosemide (LASIX) 40 MG tablet TAKE 1 TABLET EVERY DAY    icosapent ethyL (VASCEPA) 1 gram Cap Take 2 capsules (2,000 mg total) by mouth 2 (two) times daily.    insulin (LANTUS SOLOSTAR U-100 INSULIN) glargine 100 units/mL (3mL) SubQ pen Inject 30 Units into the skin every evening.    insulin needles, disposable, (BD INSULIN PEN NEEDLE UF ORIG) 29 x 1/2 " Ndle USE TWICE DAILY AS DIRECTED    JARDIANCE 25 mg tablet TAKE 1 TABLET EVERY DAY    lancets Misc 1 lancet by Misc.(Non-Drug; Combo Route) route 3 (three) times daily. accu-chek Fastclix. Patient needs an appointment    metFORMIN (GLUCOPHAGE) 1000 MG tablet TAKE 1 TABLET TWICE DAILY    nitroGLYCERIN (NITROSTAT) 0.4 MG SL tablet PLACE 1 TABLET (0.4 MG TOTAL) UNDER THE TONGUE EVERY 5 (FIVE) MINUTES AS NEEDED FOR CHEST PAIN AS DIRECTED    NOVOLOG FLEXPEN U-100 INSULIN 100 unit/mL (3 mL) InPn pen Inject 8 Units into the skin 3 (three) times daily with meals.    pen needle, diabetic (BD INSULIN PEN NEEDLE UF MINI) 31 gauge x 3/16" Ndle 1 each by Misc.(Non-Drug; Combo Route) route 4 (four) times daily. Patient needs an appointment    valsartan (DIOVAN) 320 MG tablet Take 1 tablet (320 mg total) by mouth once daily.   Last reviewed on 5/16/2022  3:06 PM by Patricio Multani MD    Review of patient's allergies indicates:   Allergen Reactions    Tramadol Hallucinations    Last reviewed on  5/16/2022 3:06 PM by Patricio Carbajal added this encounter   6/22/2022 - Refill " Call (Auto Added)   Tasks due within next 3 months   No tasks due.     [facility] - Specialty Pharmacy  [address]  [facility] [address]  Phone: [phone]  Fax: [phone]

## 2022-05-25 ENCOUNTER — PATIENT MESSAGE (OUTPATIENT)
Dept: SPORTS MEDICINE | Facility: CLINIC | Age: 67
End: 2022-05-25
Payer: MEDICARE

## 2022-05-27 ENCOUNTER — OFFICE VISIT (OUTPATIENT)
Dept: OPTOMETRY | Facility: CLINIC | Age: 67
End: 2022-05-27
Payer: COMMERCIAL

## 2022-05-27 DIAGNOSIS — E11.9 TYPE 2 DIABETES MELLITUS WITHOUT OPHTHALMIC MANIFESTATIONS: ICD-10-CM

## 2022-05-27 DIAGNOSIS — H25.13 NS (NUCLEAR SCLEROSIS), BILATERAL: ICD-10-CM

## 2022-05-27 DIAGNOSIS — H52.223 REGULAR ASTIGMATISM OF BOTH EYES: Primary | ICD-10-CM

## 2022-05-27 DIAGNOSIS — H52.4 PRESBYOPIA: ICD-10-CM

## 2022-05-27 DIAGNOSIS — H40.023 OPEN ANGLE WITH BORDERLINE FINDINGS AND HIGH GLAUCOMA RISK IN BOTH EYES: ICD-10-CM

## 2022-05-27 PROCEDURE — 92014 PR EYE EXAM, EST PATIENT,COMPREHESV: ICD-10-PCS | Mod: S$GLB,,, | Performed by: OPTOMETRIST

## 2022-05-27 PROCEDURE — 99999 PR PBB SHADOW E&M-EST. PATIENT-LVL III: CPT | Mod: PBBFAC,,, | Performed by: OPTOMETRIST

## 2022-05-27 PROCEDURE — 92015 DETERMINE REFRACTIVE STATE: CPT | Mod: S$GLB,,, | Performed by: OPTOMETRIST

## 2022-05-27 PROCEDURE — 92014 COMPRE OPH EXAM EST PT 1/>: CPT | Mod: S$GLB,,, | Performed by: OPTOMETRIST

## 2022-05-27 PROCEDURE — 92015 PR REFRACTION: ICD-10-PCS | Mod: S$GLB,,, | Performed by: OPTOMETRIST

## 2022-05-27 PROCEDURE — 99999 PR PBB SHADOW E&M-EST. PATIENT-LVL III: ICD-10-PCS | Mod: PBBFAC,,, | Performed by: OPTOMETRIST

## 2022-05-27 NOTE — PROGRESS NOTES
HPI     Pt here for annual visit   Floaters come and go  Patient denies diplopia, headaches, flashes, pain, and   itching/burning/tearing.    Pt does not use any eye drops.    Hemoglobin A1C       Date                     Value               Ref Range             Status                09/25/2020               9.1 (H)             4.0 - 5.6 %           Final                     07/20/2020               8.9 (H)             4.0 - 5.6 %           Final                     01/16/2020               9.2 (H)             4.0 - 5.6 %           Final                        01/05/2022               8.8 (H)             4.0 - 5.6 %           Final                      Last edited by Flo García MA on 5/27/2022 12:49 PM. (History)            Assessment /Plan     For exam results, see Encounter Report.    Regular astigmatism of both eyes  Presbyopia   Rx specs    Open angle with borderline findings and high glaucoma risk in both eyes  Glaucoma suspect, bilateral  -          HVF 2012: WNL, 2017 scattered defects/ stable WNL  OCT: sectoral thinning inf nasal OD, sup temp OS  PACHY: 541/537   IOP: normal     Return next week for HVF 24-2 and OCT optic nerve  Call with resutls     NS (nuclear sclerosis), bilateral              Mild, monitor     Type II or unspecified type diabetes mellitus without mention of complication, uncontrolled  Type 2 diabetes mellitus without ophthalmic manifestations  Essential hypertension              No retinopathy, monitor       Dry eye OU  Soothe XP daily/ PRN          RTC 1 year annual

## 2022-05-31 ENCOUNTER — TELEPHONE (OUTPATIENT)
Dept: OPTOMETRY | Facility: CLINIC | Age: 67
End: 2022-05-31
Payer: MEDICARE

## 2022-05-31 DIAGNOSIS — H40.023 OPEN ANGLE WITH BORDERLINE FINDINGS AND HIGH GLAUCOMA RISK IN BOTH EYES: Primary | ICD-10-CM

## 2022-05-31 LAB
FINAL PATHOLOGIC DIAGNOSIS: NORMAL
GROSS: NORMAL
Lab: NORMAL
MICROSCOPIC EXAM: NORMAL

## 2022-05-31 NOTE — TELEPHONE ENCOUNTER
----- Message from Pily Hicks sent at 5/31/2022  4:24 PM CDT -----  Regarding: orders  Good afternoon, This patient has an OCT/HVF on weds. Can orders be placed for testing. Thanks, Pily

## 2022-06-01 ENCOUNTER — PATIENT MESSAGE (OUTPATIENT)
Dept: OPTOMETRY | Facility: CLINIC | Age: 67
End: 2022-06-01
Payer: MEDICARE

## 2022-06-02 ENCOUNTER — CLINICAL SUPPORT (OUTPATIENT)
Dept: REHABILITATION | Facility: HOSPITAL | Age: 67
End: 2022-06-02
Attending: ORTHOPAEDIC SURGERY
Payer: MEDICARE

## 2022-06-02 DIAGNOSIS — M25.551 RIGHT HIP PAIN: ICD-10-CM

## 2022-06-02 DIAGNOSIS — M70.61 GREATER TROCHANTERIC BURSITIS OF RIGHT HIP: ICD-10-CM

## 2022-06-02 PROCEDURE — 97162 PT EVAL MOD COMPLEX 30 MIN: CPT | Performed by: PHYSICAL THERAPIST

## 2022-06-02 NOTE — PLAN OF CARE
[facility]  Physical Therapy Initial Evaluation    Date: 6/2/2022   Name: Jm Deleon  Clinic Number: [id number]    Therapy Diagnosis:   Encounter Diagnoses   Name Primary?    Greater trochanteric bursitis of right hip     Right hip pain      Physician: Brett Sweeney III, *    Physician Orders: PT Eval and Treat   Medical Diagnosis from Referral: M70.61 (ICD-10-CM) - Greater trochanteric bursitis of right hip  Evaluation Date: 6/2/2022  Authorization Period Expiration: 4/26/2022  Plan of Care Expiration: 7/3/2022  Visit # / Visits authorized: 1/ 1    Time In: 1600  Time Out: 1650  Total Appointment Time (timed & untimed codes): 50 minutes    Precautions: Standard    Subjective   Date of onset: 3 months  History of current condition - Jm reports: Patient went to [place] to visit his son and did a lot of walking. He notes not being able to keep up with his family and having hip pain. Patient returned to see MD thinking he needed a R YANICK, having already underwent L YANICK. Patient imaging revealed he did not need a new hip and he was referred to Dr. Multani for an injection to the glut. Patient reports the injection has not improved his symptoms. Patient symptoms are the worst when he sits for too long, lays on his L side and has his leg cross his body, and with prolonged walking.      Medical History:   Past Medical History:   Diagnosis Date    Allergy     seasonal    Arthritis     Coronary artery disease     Diabetes mellitus     Diabetes mellitus, type 2     Dysplastic nevus of trunk 03/2022    moderately atypical     Hyperlipidemia     Hypertension     Joint pain     Melanoma 10/2019    MM left back 2.7mm; neg LNs    Open angle with borderline findings and high glaucoma risk in both eyes 2018    Squamous cell carcinoma 2017    scalp - SSW       Surgical History:   Jm Deleon  has a past surgical history that includes Blepharoptosis repair (10 years ago); Hernia  repair; Coronary artery bypass graft (x4 age 45 2000); Hip surgery (9-24-14); Cardiac surgery; Joint replacement; Shoulder arthroscopy; Shoulder surgery; Colonoscopy (N/A, 10/4/2019); [place] lymph node biopsy (Left, 10/18/2019); Transforaminal epidural injection of steroid (Left, 8/7/2020); Arthroscopic repair of rotator cuff of shoulder (Left, 5/12/2021); Fixation of tendon (Left, 5/12/2021); Arthroscopy of shoulder with decompression of subacromial space (5/12/2021); Distal clavicle excision (Left, 5/12/2021); Shoulder arthroscopy (Left, 11/24/2021); Manipulation with anesthesia (Left, 11/24/2021); and Hardware Removal (Left, 11/24/2021).    Medications:   Jm has a current medication list which includes the following prescription(s): accu-chek smartview test strip, ammonium lactate, aspirin, atorvastatin, blood-glucose meter, carvedilol, celecoxib, celecoxib, diphenhydramine, evolocumab, ezetimibe, multivit-minerals/folic acid, furosemide, icosapent ethyl, lantus solostar u-100 insulin, pen needle, diabetic, jardiance, lancets, metformin, mupirocin, nitroglycerin, novolog flexpen u-100 insulin, pen needle, diabetic, and valsartan, and the following Facility-Administered Medications: fentanyl, fentanyl, lidocaine (pf) 10 mg/ml (1%), lidocaine (pf) 10 mg/ml (1%), midazolam, midazolam, ropivacaine (pf) 2 mg/ml (0.2%), ropivacaine (pf) 2 mg/ml (0.2%), and triamcinolone acetonide.    Allergies:   Review of patient's allergies indicates:   Allergen Reactions    Tramadol Hallucinations        Imaging, xray:     FINDINGS:  Mild degenerative changes in bony spurring can be seen in the right hip in around the right greater trochanter.  Total hip arthroplasty is in place on the left.    Prior Therapy: RCR Bilaterally  Social History: He lives with their family  Occupation: Construction - owner [employer]   Prior Level of Function: Independent  Current Level of Function: Ind pain walking, sleeping, ADL    Pain:  Current  7/10, worst 9/10, best 5/10   Location: { right hip(s)  Description: Aching and Deep  Aggravating Factors: Sitting, Standing and Night Time  Easing Factors: rest    Pts goals: return to work and ADL pain free    Objective     Observation: Patient is a pleasant 67 y.o. male presenting alert and oriented    Hip Range of Motion:   Right active Right Passive Left active  Left Passive   Flexion 85 90 95 100   Abduction 30 35 35 40   Extension 5 10 8 12   Ext. Rotation 25 33 28 37   Int. Rotation 10 18 15 20       Lower Extremity Strength  Right LE  Left LE    Quadriceps: 5/5 Quadriceps: 5/5   Hamstrings: 4+/5 Hamstrings: 5/5   Iliopsoas: 4+/5 Iliopsoas: 4+/5   Hip extension:  4-/5 Hip extension: 4/5   PGM: 2/5 PGM: 4-/5   Hip ER: 4-/5 Hip ER: 4/5   Hip IR: 4+/5 Hip IR: 4+/5   Glut Max 4/5    Glut Max 4+/5        Glut med 2/5 - unable hold test position against gravity or activate    Special Tests:   FABERs:  +   JOSE:-  Hip Scour: -  Slump: -    +Glut med tendinopathy FADIR with activation      Joint Mobility: Limited hip inferior and lateral glide, relief passive distraction    Palpation: Tender glut med    Edema: None present      Limitation/Restriction for FOTO Hip Survey    Therapist reviewed FOTO scores for Jm Deleon on 6/2/2022.   FOTO documents entered into [employer] - see Media section.    Limitation Score: see media           Home Exercises and Patient Education Provided    Education provided:   - Glut med tendon tear/tendinopathy timeline  - Cane to offload the tendon    Written Home Exercises Provided: yes.  Exercises were reviewed and patient was able to demonstrate them prior to the end of the session.  Patient demonstrated good  understanding of the education provided.     See EMR under Patient Instructions for exercisesprovided 6/2/2022.    Assessment   Jm is a 67 y.o. male referred to outpatient Physical Therapy with a medical diagnosis of greater trochanteric bursitis. Pt presents with limited hip  mobility, strength deficits, trouble completing ADL, difficulty sleeping at night, and pain with prolonged walks or sitting too long    Pt prognosis is Good.   Pt will benefit from skilled outpatient Physical Therapy to address the deficits stated above and in the chart below, provide pt/family education, and to maximize pt's level of independence.     Plan of care discussed with patient: Yes  Pt's spiritual, cultural and educational needs considered and patient is agreeable to the plan of care and goals as stated below:     Anticipated Barriers for therapy: healing timeline    Medical Necessity is demonstrated by the following  History  Co-morbidities and personal factors that may impact the plan of care Co-morbidities:   difficulty sleeping, high BMI, HTN, level of undertstanding of current condition and prior hip surgery    Personal Factors:   no deficits     moderate   Examination  Body Structures and Functions, activity limitations and participation restrictions that may impact the plan of care Body Regions:   lower extremities    Body Systems:    ROM  strength  balance  gait  transfers  transitions  motor control  motor learning    Participation Restrictions:   covid-19    Activity limitations:   Learning and applying knowledge  no deficits    General Tasks and Commands  no deficits    Communication  no deficits    Mobility  walking    Self care  no deficits    Domestic Life  no deficits    Interactions/Relationships  no deficits    Life Areas  no deficits    Community and Social Life  no deficits         moderate   Clinical Presentation evolving clinical presentation with changing clinical characteristics moderate   Decision Making/ Complexity Score: moderate     GOALS: Short Term Goals:  4 weeks  1.Report decreased hip pain  < / =  3/10  to increase tolerance for ambulating in community  2. Increase ROM by 5 degrees where limited in order to perform ADLs without difficulty.  3. Increase strength by 1/3 MMT  grade in glut med  to increase tolerance for ADL and work activities.  4. Pt to tolerate HEP to improve ROM and independence with ADL's    Long Term Goals: 12 weeks  1.Report decreased glut pain < / = 1/10  to increase tolerance for return to work without pains  2.Patient goal: return to ADL and work without R hip pain  3.Increase strength to 4+/5 in  Glut med  to increase tolerance for ADL and work activities.  4. Pt will report at CJ level (20-40% impaired) on LEFS  to demonstrate increase in LE function with every day tasks.     Plan   Plan of care Certification: 6/2/2022 to 7/3/2022.    Outpatient Physical Therapy 2 times weekly for 10 weeks to include the following interventions: Gait Training, Manual Therapy, Moist Heat/ Ice, Neuromuscular Re-ed, Patient Education, Self Care, Therapeutic Activities and Therapeutic Exercise.     Richard Velasquez, PT

## 2022-06-06 ENCOUNTER — PATIENT MESSAGE (OUTPATIENT)
Dept: DERMATOLOGY | Facility: CLINIC | Age: 67
End: 2022-06-06
Payer: MEDICARE

## 2022-06-06 NOTE — PROGRESS NOTES
Skin, right lower lateral back, excision:   -SCAR (POST-SURGICAL)   -NO RESIDUAL ATYPICAL NEVUS IDENTIFIED     F/u 6mo

## 2022-06-07 ENCOUNTER — PATIENT MESSAGE (OUTPATIENT)
Dept: OPTOMETRY | Facility: CLINIC | Age: 67
End: 2022-06-07
Payer: MEDICARE

## 2022-06-09 ENCOUNTER — PATIENT MESSAGE (OUTPATIENT)
Dept: INTERNAL MEDICINE | Facility: CLINIC | Age: 67
End: 2022-06-09
Payer: MEDICARE

## 2022-06-10 ENCOUNTER — HOSPITAL ENCOUNTER (OUTPATIENT)
Dept: RADIOLOGY | Facility: HOSPITAL | Age: 67
Discharge: HOME | End: 2022-06-10
Attending: INTERNAL MEDICINE
Payer: MEDICARE

## 2022-06-10 ENCOUNTER — OFFICE VISIT (OUTPATIENT)
Dept: INTERNAL MEDICINE | Facility: CLINIC | Age: 67
End: 2022-06-10
Payer: MEDICARE

## 2022-06-10 VITALS
BODY MASS INDEX: 33.43 KG/M2 | HEIGHT: 67 IN | OXYGEN SATURATION: 98 % | DIASTOLIC BLOOD PRESSURE: 62 MMHG | HEART RATE: 64 BPM | WEIGHT: 213 LBS | SYSTOLIC BLOOD PRESSURE: 104 MMHG

## 2022-06-10 DIAGNOSIS — R29.898 WEAKNESS OF LOWER EXTREMITY, UNSPECIFIED LATERALITY: ICD-10-CM

## 2022-06-10 DIAGNOSIS — E11.9 TYPE 2 DIABETES MELLITUS WITHOUT COMPLICATION, WITH LONG-TERM CURRENT USE OF INSULIN: ICD-10-CM

## 2022-06-10 DIAGNOSIS — M70.61 GREATER TROCHANTERIC BURSITIS OF RIGHT HIP: ICD-10-CM

## 2022-06-10 DIAGNOSIS — Z79.4 TYPE 2 DIABETES MELLITUS WITHOUT COMPLICATION, WITH LONG-TERM CURRENT USE OF INSULIN: ICD-10-CM

## 2022-06-10 DIAGNOSIS — Z79.899 OTHER LONG TERM (CURRENT) DRUG THERAPY: ICD-10-CM

## 2022-06-10 DIAGNOSIS — M53.3 TRAUMATIC COCCYDYNIA: ICD-10-CM

## 2022-06-10 DIAGNOSIS — M54.50 LUMBAR PAIN: ICD-10-CM

## 2022-06-10 DIAGNOSIS — R42 VERTIGO: ICD-10-CM

## 2022-06-10 DIAGNOSIS — M53.3 TRAUMATIC COCCYDYNIA: Primary | ICD-10-CM

## 2022-06-10 PROCEDURE — 99999 PR PBB SHADOW E&M-EST. PATIENT-LVL V: ICD-10-PCS | Mod: PBBFAC,,, | Performed by: INTERNAL MEDICINE

## 2022-06-10 PROCEDURE — 1101F PR PT FALLS ASSESS DOC 0-1 FALLS W/OUT INJ PAST YR: ICD-10-PCS | Mod: CPTII,S$GLB,, | Performed by: INTERNAL MEDICINE

## 2022-06-10 PROCEDURE — 3074F PR MOST RECENT SYSTOLIC BLOOD PRESSURE < 130 MM HG: ICD-10-PCS | Mod: CPTII,S$GLB,, | Performed by: INTERNAL MEDICINE

## 2022-06-10 PROCEDURE — 4010F PR ACE/ARB THEARPY RXD/TAKEN: ICD-10-PCS | Mod: CPTII,S$GLB,, | Performed by: INTERNAL MEDICINE

## 2022-06-10 PROCEDURE — 4010F ACE/ARB THERAPY RXD/TAKEN: CPT | Mod: CPTII,S$GLB,, | Performed by: INTERNAL MEDICINE

## 2022-06-10 PROCEDURE — 72220 X-RAY EXAM SACRUM TAILBONE: CPT | Mod: TC

## 2022-06-10 PROCEDURE — 1160F RVW MEDS BY RX/DR IN RCRD: CPT | Mod: CPTII,S$GLB,, | Performed by: INTERNAL MEDICINE

## 2022-06-10 PROCEDURE — 72100 XR LUMBAR SPINE AP AND LATERAL: ICD-10-PCS | Mod: 26,,, | Performed by: RADIOLOGY

## 2022-06-10 PROCEDURE — 1101F PT FALLS ASSESS-DOCD LE1/YR: CPT | Mod: CPTII,S$GLB,, | Performed by: INTERNAL MEDICINE

## 2022-06-10 PROCEDURE — 1159F MED LIST DOCD IN RCRD: CPT | Mod: CPTII,S$GLB,, | Performed by: INTERNAL MEDICINE

## 2022-06-10 PROCEDURE — 72220 X-RAY EXAM SACRUM TAILBONE: CPT | Mod: 26,,, | Performed by: RADIOLOGY

## 2022-06-10 PROCEDURE — 99214 PR OFFICE/OUTPT VISIT, EST, LEVL IV, 30-39 MIN: ICD-10-PCS | Mod: S$GLB,,, | Performed by: INTERNAL MEDICINE

## 2022-06-10 PROCEDURE — 1159F PR MEDICATION LIST DOCUMENTED IN MEDICAL RECORD: ICD-10-PCS | Mod: CPTII,S$GLB,, | Performed by: INTERNAL MEDICINE

## 2022-06-10 PROCEDURE — 3078F PR MOST RECENT DIASTOLIC BLOOD PRESSURE < 80 MM HG: ICD-10-PCS | Mod: CPTII,S$GLB,, | Performed by: INTERNAL MEDICINE

## 2022-06-10 PROCEDURE — 1160F PR REVIEW ALL MEDS BY PRESCRIBER/CLIN PHARMACIST DOCUMENTED: ICD-10-PCS | Mod: CPTII,S$GLB,, | Performed by: INTERNAL MEDICINE

## 2022-06-10 PROCEDURE — 72100 X-RAY EXAM L-S SPINE 2/3 VWS: CPT | Mod: TC

## 2022-06-10 PROCEDURE — 99214 OFFICE O/P EST MOD 30 MIN: CPT | Mod: S$GLB,,, | Performed by: INTERNAL MEDICINE

## 2022-06-10 PROCEDURE — 3008F PR BODY MASS INDEX (BMI) DOCUMENTED: ICD-10-PCS | Mod: CPTII,S$GLB,, | Performed by: INTERNAL MEDICINE

## 2022-06-10 PROCEDURE — 3008F BODY MASS INDEX DOCD: CPT | Mod: CPTII,S$GLB,, | Performed by: INTERNAL MEDICINE

## 2022-06-10 PROCEDURE — 1126F PR PAIN SEVERITY QUANTIFIED, NO PAIN PRESENT: ICD-10-PCS | Mod: CPTII,S$GLB,, | Performed by: INTERNAL MEDICINE

## 2022-06-10 PROCEDURE — 3052F PR MOST RECENT HEMOGLOBIN A1C LEVEL 8.0 - < 9.0%: ICD-10-PCS | Mod: CPTII,S$GLB,, | Performed by: INTERNAL MEDICINE

## 2022-06-10 PROCEDURE — 72220 XR SACRUM AND COCCYX: ICD-10-PCS | Mod: 26,,, | Performed by: RADIOLOGY

## 2022-06-10 PROCEDURE — 3288F PR FALLS RISK ASSESSMENT DOCUMENTED: ICD-10-PCS | Mod: CPTII,S$GLB,, | Performed by: INTERNAL MEDICINE

## 2022-06-10 PROCEDURE — 3052F HG A1C>EQUAL 8.0%<EQUAL 9.0%: CPT | Mod: CPTII,S$GLB,, | Performed by: INTERNAL MEDICINE

## 2022-06-10 PROCEDURE — 99999 PR PBB SHADOW E&M-EST. PATIENT-LVL V: CPT | Mod: PBBFAC,,, | Performed by: INTERNAL MEDICINE

## 2022-06-10 PROCEDURE — 1126F AMNT PAIN NOTED NONE PRSNT: CPT | Mod: CPTII,S$GLB,, | Performed by: INTERNAL MEDICINE

## 2022-06-10 PROCEDURE — 72100 X-RAY EXAM L-S SPINE 2/3 VWS: CPT | Mod: 26,,, | Performed by: RADIOLOGY

## 2022-06-10 PROCEDURE — 3078F DIAST BP <80 MM HG: CPT | Mod: CPTII,S$GLB,, | Performed by: INTERNAL MEDICINE

## 2022-06-10 PROCEDURE — 3288F FALL RISK ASSESSMENT DOCD: CPT | Mod: CPTII,S$GLB,, | Performed by: INTERNAL MEDICINE

## 2022-06-10 PROCEDURE — 3074F SYST BP LT 130 MM HG: CPT | Mod: CPTII,S$GLB,, | Performed by: INTERNAL MEDICINE

## 2022-06-10 RX ORDER — EMPAGLIFLOZIN 25 MG/1
TABLET, FILM COATED ORAL
Qty: 90 TABLET | Refills: 1 | Status: SHIPPED | OUTPATIENT
Start: 2022-06-10 | End: 2023-09-12

## 2022-06-10 RX ORDER — MECLIZINE HYDROCHLORIDE 25 MG/1
25 TABLET ORAL 3 TIMES DAILY PRN
Qty: 21 TABLET | Refills: 0 | Status: SHIPPED | OUTPATIENT
Start: 2022-06-10 | End: 2023-09-29

## 2022-06-10 RX ORDER — CELECOXIB 200 MG/1
200 CAPSULE ORAL 2 TIMES DAILY
Qty: 60 CAPSULE | Refills: 1 | Status: SHIPPED | OUTPATIENT
Start: 2022-06-10 | End: 2022-08-31 | Stop reason: SDUPTHER

## 2022-06-10 NOTE — PROGRESS NOTES
MEDICAL HISTORY:   Coronary artery disease with coronary bypass surgery in year 2000.  Hypertension.  Hyperlipidemia.  Type 2 diabetes  Osteoarthritis of the hip with left total hip replacement.  Right inguinal hernia repair.  Left foot surgery.  Fistulotomy.  Melanoma, thoracic area, wide local excision  Right and left rotator cuff repair, subacromial decompression, labrum debridement     SOCIAL HISTORY:  Tobacco use, none.  Alcohol use, limited.        MEDICATIONS:  Atorvastatin 80 mg.  Aspirin 81 mg.  Carvedilol 25 mg  tablet twice a day.  Half a tablet  Zetia 10 mg  Multivitamin.  Lasix 40 mg.  Lantus 45 units in the evening.  Humalog 12 units 3 times a day  Jardiance 25 mg  Metformin 1000 mg twice a day.  Vascepa 1 g 2 capsules b.i.d..  Valsartan 320 mg half a tablet  Repatha        67-year-old male    Had a fall off the ladder 10 days ago.  He was coming down, missed a step, landed on his left foot, fell backwards, hit his tailbone, then hit the back of his head.  He did not lose consciousness but was days for while.  Since then he has been having persistent tailbone pain.  Also he has been experiencing dizziness is if he has been associated with nausea that will come and go.  There was no cut abrasion hematoma.  He had posterior head sensitivity but this is improved.    It is also noted that even prior to this why he walks he is dragging his feet.  He is being evaluated by Physical therapy for persistent right hip pain which appears to be due to tendinopathy and bursitis.  He has had a left total hip replacement in the past which is doing well.  He will complain of back pain but is localize    Examination  Weight 213 lb  Pulse 60  Chest clear breath sounds  Heart regular rate rhythm  2+ carotid pulses no bruits  Tympanic membranes normal  Back occipital area looks fine  Slight nystagmus  Absent knee and ankle jerk reflexes  1+ pedal pulses  He is able to flex and abduct both legs at the hip joint but  appears to be worse on the right  Positive Romberg testing    Impression  Posttraumatic coccydynia  Vertigo  Lumbar pain  Leg weakness  Type 2 diabetes    Plan  Basic metabolic profile, hemoglobin A1c, B12, folate,   Lumbar spine and coccyx film  Refill Celebrex 200 mg daily  Meclizine 25 mg 3 times a day for  1 week if needed  May require MRI the brain

## 2022-06-13 ENCOUNTER — PATIENT MESSAGE (OUTPATIENT)
Dept: INTERNAL MEDICINE | Facility: CLINIC | Age: 67
End: 2022-06-13
Payer: MEDICARE

## 2022-06-13 ENCOUNTER — CLINICAL SUPPORT (OUTPATIENT)
Dept: DERMATOLOGY | Facility: CLINIC | Age: 67
End: 2022-06-13
Payer: MEDICARE

## 2022-06-13 DIAGNOSIS — Z48.02 VISIT FOR SUTURE REMOVAL: Primary | ICD-10-CM

## 2022-06-13 DIAGNOSIS — R42 DIZZINESS AND GIDDINESS: ICD-10-CM

## 2022-06-13 NOTE — PROGRESS NOTES
Suture Removal note:  CC: 67 y.o. male patient is here for suture removal.         HPI: Patient is s/p excision of atypical nevus check margins from the Right Lower Back on 5/23/2022.  Patient reports no problems.    WOUND PE:  Sutures intact.  Wound healing well.  Good approximation of skin edges.  No signs or symptoms of infection.    IMPRESSION:  -SCAR (POST-SURGICAL)   -NO RESIDUAL ATYPICAL NEVUS IDENTIFIED  - margins clear.    PLAN:  Sutures removed today.  Continue wound care.    RTC: In 6 months.

## 2022-06-16 ENCOUNTER — TELEPHONE (OUTPATIENT)
Dept: INTERNAL MEDICINE | Facility: CLINIC | Age: 67
End: 2022-06-16
Payer: MEDICARE

## 2022-06-16 ENCOUNTER — CLINICAL SUPPORT (OUTPATIENT)
Dept: REHABILITATION | Facility: HOSPITAL | Age: 67
End: 2022-06-16
Payer: MEDICARE

## 2022-06-16 ENCOUNTER — PATIENT MESSAGE (OUTPATIENT)
Dept: INTERNAL MEDICINE | Facility: CLINIC | Age: 67
End: 2022-06-16
Payer: MEDICARE

## 2022-06-16 DIAGNOSIS — M25.551 RIGHT HIP PAIN: Primary | ICD-10-CM

## 2022-06-16 PROCEDURE — 97110 THERAPEUTIC EXERCISES: CPT | Performed by: PHYSICAL THERAPIST

## 2022-06-16 PROCEDURE — 97140 MANUAL THERAPY 1/> REGIONS: CPT | Performed by: PHYSICAL THERAPIST

## 2022-06-16 RX ORDER — DIAZEPAM 5 MG/1
5 TABLET ORAL DAILY
Qty: 3 TABLET | Refills: 0 | Status: SHIPPED | OUTPATIENT
Start: 2022-06-16 | End: 2023-04-05

## 2022-06-16 NOTE — PROGRESS NOTES
"  Physical Therapy Daily Treatment Note     Name: Jm ONEAL Deleon  Clinic Number: [id number]    Therapy Diagnosis:   Encounter Diagnosis   Name Primary?    Right hip pain Yes     Physician: Brett Sweeney III, *    Visit Date: 6/16/2022       Physician Orders: PT Eval and Treat   Medical Diagnosis from Referral: M70.61 (ICD-10-CM) - Greater trochanteric bursitis of right hip  Evaluation Date: 6/2/2022  Authorization Period Expiration: 4/26/2022  Plan of Care Expiration: 7/3/2022  Visit # / Visits authorized: 1/ 1    Time In: 1600  Time Out: 1645  Total Billable Time: 25 minutes    Precautions: Standard    Subjective     Pt reports: I am not having any hip problems past couple weeks, just still dizzy .  He was compliant with home exercise program.  Response to previous treatment: less pain  Functional change: walking without pain     Pain: 0/10  Location: right hip      Objective     Jm received therapeutic exercises to develop strength, endurance and ROM for 30 minutes including:    Sidelying clams GTB 2 x 15 B   Sidelying hip abd isometrics off wedge 2 x 10 B   Supine clam isometrics with glut set 20x 5" hold  Patient education    Jm received the following manual therapy techniques: Joint mobilizations were applied to the: R hip for 15 minutes, including:    Gr I-II oscillations R hip  Long axis R hip  Inferior mob at 45/60/80 deg of flexion Gr III  Lateral glide Gr III R hip       Home Exercises Provided and Patient Education Provided     Education provided:   - Glut conc/ecc now with continued isometric for pain relief    Written Home Exercises Provided: Patient instructed to cont prior HEP.  Exercises were reviewed and Jm was able to demonstrate them prior to the end of the session.  Jm demonstrated good  understanding of the education provided.     See EMR under Patient Instructions for exercises provided prior visit.    Assessment     Jm presented with less pain in the right hip. He was able to " perform concentric/eccentric to the right hip today. Noted he is ambulating without trendelenburg gait today.Will reach out to MD about concussion evaluation     Jm Is progressing well towards his goals.   Pt prognosis is Good.     Pt will continue to benefit from skilled outpatient physical therapy to address the deficits listed in the problem list box on initial evaluation, provide pt/family education and to maximize pt's level of independence in the home and community environment.     Pt's spiritual, cultural and educational needs considered and pt agreeable to plan of care and goals.    Anticipated barriers to physical therapy: none     GOALS: Short Term Goals:  4 weeks  1.Report decreased hip pain  < / =  3/10  to increase tolerance for ambulating in community(Progressing, not met)  2. Increase ROM by 5 degrees where limited in order to perform ADLs without difficulty.(Progressing, not met)  3. Increase strength by 1/3 MMT grade in glut med  to increase tolerance for ADL and work activities.(Progressing, not met)  4. Pt to tolerate HEP to improve ROM and independence with ADL's (met)     Long Term Goals: 12 weeks  1.Report decreased glut pain < / = 1/10  to increase tolerance for return to work without pains(Progressing, not met)  2.Patient goal: return to ADL and work without R hip pain(Progressing, not met)  3.Increase strength to 4+/5 in  Glut med  to increase tolerance for ADL and work activities.(Progressing, not met)  4. Pt will report at CJ level (20-40% impaired) on LEFS  to demonstrate increase in LE function with every day tasks.(Progressing, not met)     Plan     Plan of care Certification: 6/2/2022 to 7/3/2022.    Improve glut med strength     Richard Velasquez, PT

## 2022-06-17 ENCOUNTER — PATIENT MESSAGE (OUTPATIENT)
Dept: INTERNAL MEDICINE | Facility: CLINIC | Age: 67
End: 2022-06-17
Payer: MEDICARE

## 2022-06-18 ENCOUNTER — HOSPITAL ENCOUNTER (OUTPATIENT)
Dept: RADIOLOGY | Facility: HOSPITAL | Age: 67
Discharge: HOME | End: 2022-06-18
Attending: INTERNAL MEDICINE
Payer: MEDICARE

## 2022-06-18 DIAGNOSIS — R42 DIZZINESS AND GIDDINESS: ICD-10-CM

## 2022-06-18 PROCEDURE — 70551 MRI BRAIN WITHOUT CONTRAST: ICD-10-PCS | Mod: 26,,, | Performed by: RADIOLOGY

## 2022-06-18 PROCEDURE — 70551 MRI BRAIN STEM W/O DYE: CPT | Mod: 26,,, | Performed by: RADIOLOGY

## 2022-06-18 PROCEDURE — 70551 MRI BRAIN STEM W/O DYE: CPT | Mod: TC

## 2022-06-20 ENCOUNTER — PATIENT MESSAGE (OUTPATIENT)
Dept: ENDOCRINOLOGY | Facility: CLINIC | Age: 67
End: 2022-06-20

## 2022-06-20 ENCOUNTER — CLINICAL SUPPORT (OUTPATIENT)
Dept: OPHTHALMOLOGY | Facility: CLINIC | Age: 67
End: 2022-06-20
Payer: MEDICARE

## 2022-06-20 ENCOUNTER — OFFICE VISIT (OUTPATIENT)
Dept: ENDOCRINOLOGY | Facility: CLINIC | Age: 67
End: 2022-06-20
Payer: MEDICARE

## 2022-06-20 VITALS
SYSTOLIC BLOOD PRESSURE: 112 MMHG | DIASTOLIC BLOOD PRESSURE: 64 MMHG | BODY MASS INDEX: 33.58 KG/M2 | HEART RATE: 62 BPM | WEIGHT: 213.94 LBS | OXYGEN SATURATION: 98 % | HEIGHT: 67 IN

## 2022-06-20 DIAGNOSIS — E11.65 TYPE 2 DIABETES MELLITUS WITH HYPERGLYCEMIA, WITH LONG-TERM CURRENT USE OF INSULIN: ICD-10-CM

## 2022-06-20 DIAGNOSIS — E78.5 DYSLIPIDEMIA: ICD-10-CM

## 2022-06-20 DIAGNOSIS — I10 PRIMARY HYPERTENSION: ICD-10-CM

## 2022-06-20 DIAGNOSIS — I25.10 CORONARY ARTERY DISEASE INVOLVING NATIVE CORONARY ARTERY OF NATIVE HEART WITHOUT ANGINA PECTORIS: ICD-10-CM

## 2022-06-20 DIAGNOSIS — Z79.4 TYPE 2 DIABETES MELLITUS WITH HYPERGLYCEMIA, WITH LONG-TERM CURRENT USE OF INSULIN: ICD-10-CM

## 2022-06-20 DIAGNOSIS — H40.023 OPEN ANGLE WITH BORDERLINE FINDINGS AND HIGH GLAUCOMA RISK IN BOTH EYES: ICD-10-CM

## 2022-06-20 DIAGNOSIS — E11.9 DIABETES MELLITUS WITHOUT COMPLICATION: Primary | ICD-10-CM

## 2022-06-20 PROCEDURE — 3052F PR MOST RECENT HEMOGLOBIN A1C LEVEL 8.0 - < 9.0%: ICD-10-PCS | Mod: CPTII,S$GLB,, | Performed by: INTERNAL MEDICINE

## 2022-06-20 PROCEDURE — 99999 PR PBB SHADOW E&M-EST. PATIENT-LVL V: CPT | Mod: PBBFAC,,, | Performed by: INTERNAL MEDICINE

## 2022-06-20 PROCEDURE — 3052F HG A1C>EQUAL 8.0%<EQUAL 9.0%: CPT | Mod: CPTII,S$GLB,, | Performed by: INTERNAL MEDICINE

## 2022-06-20 PROCEDURE — 3074F PR MOST RECENT SYSTOLIC BLOOD PRESSURE < 130 MM HG: ICD-10-PCS | Mod: CPTII,S$GLB,, | Performed by: INTERNAL MEDICINE

## 2022-06-20 PROCEDURE — 4010F ACE/ARB THERAPY RXD/TAKEN: CPT | Mod: CPTII,S$GLB,, | Performed by: INTERNAL MEDICINE

## 2022-06-20 PROCEDURE — 1159F PR MEDICATION LIST DOCUMENTED IN MEDICAL RECORD: ICD-10-PCS | Mod: CPTII,S$GLB,, | Performed by: INTERNAL MEDICINE

## 2022-06-20 PROCEDURE — 99214 PR OFFICE/OUTPT VISIT, EST, LEVL IV, 30-39 MIN: ICD-10-PCS | Mod: S$GLB,,, | Performed by: INTERNAL MEDICINE

## 2022-06-20 PROCEDURE — 1101F PR PT FALLS ASSESS DOC 0-1 FALLS W/OUT INJ PAST YR: ICD-10-PCS | Mod: CPTII,S$GLB,, | Performed by: INTERNAL MEDICINE

## 2022-06-20 PROCEDURE — 3074F SYST BP LT 130 MM HG: CPT | Mod: CPTII,S$GLB,, | Performed by: INTERNAL MEDICINE

## 2022-06-20 PROCEDURE — 3288F FALL RISK ASSESSMENT DOCD: CPT | Mod: CPTII,S$GLB,, | Performed by: INTERNAL MEDICINE

## 2022-06-20 PROCEDURE — 1160F RVW MEDS BY RX/DR IN RCRD: CPT | Mod: CPTII,S$GLB,, | Performed by: INTERNAL MEDICINE

## 2022-06-20 PROCEDURE — 1126F AMNT PAIN NOTED NONE PRSNT: CPT | Mod: CPTII,S$GLB,, | Performed by: INTERNAL MEDICINE

## 2022-06-20 PROCEDURE — 3008F PR BODY MASS INDEX (BMI) DOCUMENTED: ICD-10-PCS | Mod: CPTII,S$GLB,, | Performed by: INTERNAL MEDICINE

## 2022-06-20 PROCEDURE — 3078F DIAST BP <80 MM HG: CPT | Mod: CPTII,S$GLB,, | Performed by: INTERNAL MEDICINE

## 2022-06-20 PROCEDURE — 3288F PR FALLS RISK ASSESSMENT DOCUMENTED: ICD-10-PCS | Mod: CPTII,S$GLB,, | Performed by: INTERNAL MEDICINE

## 2022-06-20 PROCEDURE — 1160F PR REVIEW ALL MEDS BY PRESCRIBER/CLIN PHARMACIST DOCUMENTED: ICD-10-PCS | Mod: CPTII,S$GLB,, | Performed by: INTERNAL MEDICINE

## 2022-06-20 PROCEDURE — 1101F PT FALLS ASSESS-DOCD LE1/YR: CPT | Mod: CPTII,S$GLB,, | Performed by: INTERNAL MEDICINE

## 2022-06-20 PROCEDURE — 3078F PR MOST RECENT DIASTOLIC BLOOD PRESSURE < 80 MM HG: ICD-10-PCS | Mod: CPTII,S$GLB,, | Performed by: INTERNAL MEDICINE

## 2022-06-20 PROCEDURE — 1126F PR PAIN SEVERITY QUANTIFIED, NO PAIN PRESENT: ICD-10-PCS | Mod: CPTII,S$GLB,, | Performed by: INTERNAL MEDICINE

## 2022-06-20 PROCEDURE — 1159F MED LIST DOCD IN RCRD: CPT | Mod: CPTII,S$GLB,, | Performed by: INTERNAL MEDICINE

## 2022-06-20 PROCEDURE — 99999 PR PBB SHADOW E&M-EST. PATIENT-LVL V: ICD-10-PCS | Mod: PBBFAC,,, | Performed by: INTERNAL MEDICINE

## 2022-06-20 PROCEDURE — 3008F BODY MASS INDEX DOCD: CPT | Mod: CPTII,S$GLB,, | Performed by: INTERNAL MEDICINE

## 2022-06-20 PROCEDURE — 99214 OFFICE O/P EST MOD 30 MIN: CPT | Mod: S$GLB,,, | Performed by: INTERNAL MEDICINE

## 2022-06-20 PROCEDURE — 4010F PR ACE/ARB THEARPY RXD/TAKEN: ICD-10-PCS | Mod: CPTII,S$GLB,, | Performed by: INTERNAL MEDICINE

## 2022-06-20 RX ORDER — SEMAGLUTIDE 1.34 MG/ML
0.5 INJECTION, SOLUTION SUBCUTANEOUS
Qty: 1 PEN | Refills: 11 | Status: SHIPPED | OUTPATIENT
Start: 2022-06-20 | End: 2022-12-30 | Stop reason: SDUPTHER

## 2022-06-20 NOTE — PROGRESS NOTES
Visual field test done.  Patient stated no latex allergies used coverlet      Glasses Prescription     Sphere Cylinder Axis Dist VA Add   Right +0.50 +1.00 167 20/25 +2.75   Left +0.50 +1.00 167 20/25 +2.75   Expiration Date: 5/27/2023

## 2022-06-20 NOTE — ASSESSMENT & PLAN NOTE
Limited Dexcom data but will generate share code at home and send to me for review  Anticipate needs more prandial insulin    Also discussed treatment with Ozempic although cost may be limiting factor. Would favor use of this over Jardiance if need to chose one more expensive drug but currently has large jardiance supply  Will send prescription    Personal history of pancreatitis: denies  Family history of MTC/MEN/endocrine tumors: denies      Patient Instructions   To generate a Dexcom Share code:    1. Download the Dexcom Clarity noe from your noe store  2. Choose Profile >  Authorize Sharing > and then select Generate Code.   3. Please generate a code for 12 months and then send to me in a message. This will allow me to see your blood sugars    Please note that I do not routinely monitor these numbers but can look at them at the time of a visit or if we need to make changes between visits      Let's see price of Ozempic. Please let me know what they say    Ozempic start  Start taking Ozempic 0.25 mg once weekly for 4 weeks then increase to 0.5 mg once weekly for at least 4 weeks    Depending on your glucose we will consider the need to increase to 1 mg once weekly.     Potential Side Effects of Ozempic:   One of the ways this medication works is by decreasing how fast your stomach empties, which makes you feel full faster after you eat and should help you lose weight. The main side-effects are related to GI upset, such as nausea, bloating and abdominal cramps. You can usually avoid this by eating slowly (take half of your normal portion and eat it over 30 minutes). If you do experience nausea, it does tend to get better after the first few weeks. The most severe reaction is acute pancreatitis which can cause severe abdominal pain radiating to your back. If you experience this, stop the medication and go to the ER. Fortunately this is very rare.          Patient has diabetes mellitus and manages diabetes with  intensive insulin regimen with three or more insulin injections daily or CSII.  Patient requires a therapeutic CGM and is willing to use therapeutic CGM for the necessary frequent adjustments of insulin therapy. Patient has been using SMBG for frequent glucose monitoring (4x/day). I have completed an in-person visit during the previous 6 months and will continue to have in-person visits every 6 months to assess adherence to their CGM regimen and diabetes treatment plan.  Due to COVID pandemic and need for remote monitoring this tool is medically necessary

## 2022-06-20 NOTE — PATIENT INSTRUCTIONS
To generate a Dexcom Share code:    Download the Dexcom Clarity noe from your noe store  Choose Profile >  Authorize Sharing > and then select Generate Code.   Please generate a code for 12 months and then send to me in a message. This will allow me to see your blood sugars    Please note that I do not routinely monitor these numbers but can look at them at the time of a visit or if we need to make changes between visits      Let's see price of Ozempic. Please let me know what they say    Ozempic start  Start taking Ozempic 0.25 mg once weekly for 4 weeks then increase to 0.5 mg once weekly for at least 4 weeks    Depending on your glucose we will consider the need to increase to 1 mg once weekly.     Potential Side Effects of Ozempic:   One of the ways this medication works is by decreasing how fast your stomach empties, which makes you feel full faster after you eat and should help you lose weight. The main side-effects are related to GI upset, such as nausea, bloating and abdominal cramps. You can usually avoid this by eating slowly (take half of your normal portion and eat it over 30 minutes). If you do experience nausea, it does tend to get better after the first few weeks. The most severe reaction is acute pancreatitis which can cause severe abdominal pain radiating to your back. If you experience this, stop the medication and go to the ER. Fortunately this is very rare.

## 2022-06-20 NOTE — PROGRESS NOTES
Jm Deleon is a 67 y.o. male with CAD, HLD presenting for follow-up of T2DM    History of Present Illness  T2DM  Diagnosed in early 2000's  Known complications: denies    Currently in donut hole and jardiance cost very expensive but has large supply at home  As started Dexcom but unable to access clarity noe currently    Current Diabetes Regimen:  Lantus 45 units smith  novolog 12-14 units with meals  Metformin 1000 mg BID  Jardiance 25 mg daily    Omitted doses: rare    Prior mediations tried: none    DM education when first diagnosed    Recent Hgb A1C:  Lab Results   Component Value Date    HGBA1C 8.5 (H) 06/10/2022       Glucose Monitoring:  Dexcom- no data today as he does not know password  Last 24 hr fairly stable around 150    Hypoglycemic Episodes: rarely    Screening / DM Complications:    Nephropathy:  ACEi/ARB: Taking  Lab Results   Component Value Date    MICALBCREAT Unable to calculate 04/30/2021       Last Lipid Panel:  Statin: Taking repatha, zetia, atorvastatin  Lab Results   Component Value Date    LDLCALC 86.0 01/05/2022       Last foot exam : 11/18/2021  Last eye exam : 05/27/2022;  no laser surgery or DR    B12:  Lab Results   Component Value Date    [id number] 412 06/10/2022       Aspirin: taking          Current Outpatient Medications:     ACCU-CHEK SMARTVIEW TEST STRIP Strp, 1 strip by Misc.(Non-Drug; Combo Route) route 3 (three) times daily. Patient needs an appointment, Disp: 300 strip, Rfl: 0    ammonium lactate 12 % Crea, Apply small amount to feet except for in between toes twice a day, Disp: 1 Tube, Rfl: 3    aspirin (ECOTRIN) 81 MG EC tablet, Take 81 mg by mouth once daily., Disp: , Rfl:     atorvastatin (LIPITOR) 80 MG tablet, TAKE 1 TABLET EVERY DAY, Disp: 90 tablet, Rfl: 3    blood-glucose meter Misc, Use to check sugar 3 times daily. Accu-chek Emily. Patient needs an appointment, Disp: 1 each, Rfl: 0    carvediloL (COREG) 25 MG tablet, TAKE 1 TABLET TWICE DAILY WITH MEALS   "OR AS DIRECTED (Patient taking differently: Pt reports takes 1/2 in am and 1/2 in pm), Disp: 180 tablet, Rfl: 3    celecoxib (CELEBREX) 200 MG capsule, Take 1 capsule (200 mg total) by mouth 2 (two) times daily., Disp: 60 capsule, Rfl: 1    diphenhydrAMINE (BENADRYL) 25 mg capsule, Take 50 mg by mouth nightly as needed for Itching. , Disp: , Rfl:     evolocumab (REPATHA PUSHTRONEX) 420 mg/3.5 mL Injt, Inject 3.5 mLs (420 mg total) into the skin every 30 days, Disp: 3 each, Rfl: 4    ezetimibe (ZETIA) 10 mg tablet, TAKE 1 TABLET EVERY DAY, Disp: 90 tablet, Rfl: 3    FOLIC ACID/MULTIVITS-MIN (MULTIVITAMIN FOR CHOLESTEROL ORAL), Take 1 tablet by mouth nightly. , Disp: , Rfl:     furosemide (LASIX) 40 MG tablet, TAKE 1 TABLET EVERY DAY, Disp: 90 tablet, Rfl: 3    icosapent ethyL (VASCEPA) 1 gram Cap, Take 2 capsules (2,000 mg total) by mouth 2 (two) times daily., Disp: 360 capsule, Rfl: 3    insulin (LANTUS SOLOSTAR U-100 INSULIN) glargine 100 units/mL (3mL) SubQ pen, Inject 30 Units into the skin every evening. (Patient taking differently: Inject 45 Units into the skin every evening.), Disp: 27 mL, Rfl: 3    insulin needles, disposable, (BD INSULIN PEN NEEDLE UF ORIG) 29 x 1/2 " Ndle, USE TWICE DAILY AS DIRECTED, Disp: 100 each, Rfl: 2    JARDIANCE 25 mg tablet, TAKE 1 TABLET EVERY DAY, Disp: 90 tablet, Rfl: 1    lancets Misc, 1 lancet by Misc.(Non-Drug; Combo Route) route 3 (three) times daily. accu-chek Fastclix. Patient needs an appointment, Disp: 300 each, Rfl: 0    metFORMIN (GLUCOPHAGE) 1000 MG tablet, TAKE 1 TABLET TWICE DAILY, Disp: 180 tablet, Rfl: 1    mupirocin (BACTROBAN) 2 % ointment, AAA qd- bid, Disp: 30 g, Rfl: 3    nitroGLYCERIN (NITROSTAT) 0.4 MG SL tablet, PLACE 1 TABLET (0.4 MG TOTAL) UNDER THE TONGUE EVERY 5 (FIVE) MINUTES AS NEEDED FOR CHEST PAIN AS DIRECTED, Disp: 25 tablet, Rfl: 4    NOVOLOG FLEXPEN U-100 INSULIN 100 unit/mL (3 mL) InPn pen, Inject 8 Units into the skin 3 (three) " "times daily with meals. (Patient taking differently: Inject 12 Units into the skin 3 (three) times daily with meals.), Disp: 21.6 mL, Rfl: 3    pen needle, diabetic (BD INSULIN PEN NEEDLE UF MINI) 31 gauge x 3/16" Ndle, 1 each by Misc.(Non-Drug; Combo Route) route 4 (four) times daily. Patient needs an appointment, Disp: 400 each, Rfl: 0    valsartan (DIOVAN) 320 MG tablet, Take 1 tablet (320 mg total) by mouth once daily., Disp: 90 tablet, Rfl: 3    diazePAM (VALIUM) 5 MG tablet, Take 1 tablet (5 mg total) by mouth once daily at 6am. for 3 days, Disp: 3 tablet, Rfl: 0    meclizine (ANTIVERT) 25 mg tablet, Take 1 tablet (25 mg total) by mouth 3 (three) times daily as needed for Dizziness or Nausea., Disp: 21 tablet, Rfl: 0    semaglutide (OZEMPIC) 0.25 mg or 0.5 mg(2 mg/1.5 mL) pen injector, Inject 0.5 mg into the skin every 7 days. Start with 0.25 mg weekly for 4 weeks and then increase to 0.5 mg if you are tolerating this dose, Disp: 1 pen, Rfl: 11  No current facility-administered medications for this visit.    Facility-Administered Medications Ordered in Other Visits:     fentaNYL 50 mcg/mL injection 25 mcg, 25 mcg, Intravenous, Q5 Min PRN, Travis Corbin MD, 50 mcg at 05/12/21 1242    fentaNYL 50 mcg/mL injection  mcg,  mcg, Intravenous, PRN, Markus Esparza MD, 100 mcg at 11/24/21 1151    lidocaine (PF) 10 mg/ml (1%) injection 10 mg, 1 mL, Intradermal, Once, Deanne Mosqueda MD    LIDOcaine (PF) 10 mg/ml (1%) injection 10 mg, 1 mL, Intradermal, Once, Markus Esparza MD    midazolam (VERSED) 1 mg/mL injection 0.5 mg, 0.5 mg, Intravenous, PRN, Travis Corbin MD, 2 mg at 05/12/21 1242    midazolam (VERSED) 1 mg/mL injection 0.5-4 mg, 0.5-4 mg, Intravenous, PRN, Markus Esparza MD, 2 mg at 11/24/21 1151    ropivacaine 0.2% [facility] PainPRO Pump infusion 500 ML, , Perineural, Continuous, Travis Corbin MD, New Bag at 05/12/21 1629    " ropivacaine 0.2% [facility] PainPRO Pump infusion 500 ML, , Perineural, Continuous, Markus Esparza MD, New Bag at 11/24/21 1450    triamcinolone acetonide injection 40 mg, 40 mg, Intra-articular, , Patricio Multani MD, 40 mg at 10/26/16 0841    ROS as above    Objective:     Vitals:    06/20/22 1124   BP: 112/64   Pulse: 62     Wt Readings from Last 3 Encounters:   06/20/22 97 kg (213 lb 15.3 oz)   06/10/22 96.6 kg (213 lb)   05/16/22 97.5 kg (215 lb)     Body mass index is 33.51 kg/m².  Physical Exam  Constitutional:       Appearance: He is well-developed.   HENT:      Head: Normocephalic.   Eyes:      Conjunctiva/sclera: Conjunctivae normal.   Pulmonary:      Effort: Pulmonary effort is normal.   Musculoskeletal:         General: Normal range of motion.   Skin:     General: Skin is warm.      Findings: No rash.   Neurological:      Mental Status: He is alert and oriented to person, place, and time.         Labs    Chemistry        Component Value Date/Time     06/10/2022 0921    K 5.1 06/10/2022 0921     06/10/2022 0921    CO2 27 06/10/2022 0921    BUN 17 06/10/2022 0921    CREATININE 1.0 06/10/2022 0921     (H) 06/10/2022 0921        Component Value Date/Time    CALCIUM 9.8 06/10/2022 0921    ALKPHOS 77 01/05/2022 0925    AST 17 01/05/2022 0925    ALT 22 01/05/2022 0925    BILITOT 0.9 01/05/2022 0925    ESTGFRAFRICA >60.0 06/10/2022 0921    EGFRNONAA >60.0 06/10/2022 0921              Assessment and Plan     Type 2 diabetes mellitus  Limited Dexcom data but will generate share code at home and send to me for review  Anticipate needs more prandial insulin    Also discussed treatment with Ozempic although cost may be limiting factor. Would favor use of this over Jardiance if need to chose one more expensive drug but currently has large jardiance supply  Will send prescription    Personal history of pancreatitis: denies  Family history of MTC/MEN/endocrine tumors: denies      Patient  Instructions   To generate a Dexcom Share code:    1. Download the Dexcom Clarity noe from your neo store  2. Choose Profile >  Authorize Sharing > and then select Generate Code.   3. Please generate a code for 12 months and then send to me in a message. This will allow me to see your blood sugars    Please note that I do not routinely monitor these numbers but can look at them at the time of a visit or if we need to make changes between visits      Let's see price of Ozempic. Please let me know what they say    Ozempic start  Start taking Ozempic 0.25 mg once weekly for 4 weeks then increase to 0.5 mg once weekly for at least 4 weeks    Depending on your glucose we will consider the need to increase to 1 mg once weekly.     Potential Side Effects of Ozempic:   One of the ways this medication works is by decreasing how fast your stomach empties, which makes you feel full faster after you eat and should help you lose weight. The main side-effects are related to GI upset, such as nausea, bloating and abdominal cramps. You can usually avoid this by eating slowly (take half of your normal portion and eat it over 30 minutes). If you do experience nausea, it does tend to get better after the first few weeks. The most severe reaction is acute pancreatitis which can cause severe abdominal pain radiating to your back. If you experience this, stop the medication and go to the ER. Fortunately this is very rare.          Patient has diabetes mellitus and manages diabetes with intensive insulin regimen with three or more insulin injections daily or CSII.  Patient requires a therapeutic CGM and is willing to use therapeutic CGM for the necessary frequent adjustments of insulin therapy. Patient has been using SMBG for frequent glucose monitoring (4x/day). I have completed an in-person visit during the previous 6 months and will continue to have in-person visits every 6 months to assess adherence to their CGM regimen and diabetes  treatment plan.  Due to COVID pandemic and need for remote monitoring this tool is medically necessary      Dyslipidemia  Managed by Dr. Umm Stallworth, statin, zetia    Hypertension  BP at goal    CAD (coronary artery disease)  On Jardiance, will try to add Ozempic as above        RTC 4 months with labs        More Galloway MD

## 2022-06-22 ENCOUNTER — PATIENT MESSAGE (OUTPATIENT)
Dept: OPTOMETRY | Facility: CLINIC | Age: 67
End: 2022-06-22
Payer: MEDICARE

## 2022-06-22 ENCOUNTER — SPECIALTY PHARMACY (OUTPATIENT)
Dept: PHARMACY | Facility: CLINIC | Age: 67
End: 2022-06-22
Payer: MEDICARE

## 2022-06-22 NOTE — TELEPHONE ENCOUNTER
Specialty Pharmacy - Refill Coordination    Specialty Medication Orders Linked to Encounter    Flowsheet Row Most Recent Value   Medication #1 evolocumab (REPATHA PUSHTRONEX) 420 mg/3.5 mL Injt (Order#[id number], Rx#[id number])          Refill Questions - Documented Responses    Flowsheet Row Most Recent Value   Patient Availability and HIPAA Verification    Does patient want to proceed with activity? Yes   HIPAA/medical authority confirmed? Yes   Relationship to patient of person spoken to? Self   Refill Screening Questions    Would patient like to speak to a pharmacist? No   When does the patient need to receive the medication? 06/30/22   Refill Delivery Questions    How will the patient receive the medication? Delivery Jackelyn   When does the patient need to receive the medication? 06/30/22   Shipping Address Home   Address in [facility] confirmed and updated if neccessary? Yes   Expected Copay ($) 0   Is the patient able to afford the medication copay? Yes   Payment Method zero copay   Days supply of Refill 30   Refill activity completed? Yes   Refill activity plan Refill scheduled   Shipment/Pickup Date: 06/29/22          Current Outpatient Medications   Medication Sig    ACCU-CHEK SMARTVIEW TEST STRIP Strp 1 strip by Misc.(Non-Drug; Combo Route) route 3 (three) times daily. Patient needs an appointment    ammonium lactate 12 % Crea Apply small amount to feet except for in between toes twice a day    aspirin (ECOTRIN) 81 MG EC tablet Take 81 mg by mouth once daily.    atorvastatin (LIPITOR) 80 MG tablet TAKE 1 TABLET EVERY DAY    blood-glucose meter Misc Use to check sugar 3 times daily. Accu-chek Emily. Patient needs an appointment    carvediloL (COREG) 25 MG tablet TAKE 1 TABLET TWICE DAILY WITH MEALS  OR AS DIRECTED (Patient taking differently: Pt reports takes 1/2 in am and 1/2 in pm)    celecoxib (CELEBREX) 200 MG capsule Take 1 capsule (200 mg total) by mouth 2 (two) times daily.    diazePAM  "(VALIUM) 5 MG tablet Take 1 tablet (5 mg total) by mouth once daily at 6am. for 3 days    diphenhydrAMINE (BENADRYL) 25 mg capsule Take 50 mg by mouth nightly as needed for Itching.     evolocumab (REPATHA PUSHTRONEX) 420 mg/3.5 mL Injt Inject 3.5 mLs (420 mg total) into the skin every 30 days    ezetimibe (ZETIA) 10 mg tablet TAKE 1 TABLET EVERY DAY    FOLIC ACID/MULTIVITS-MIN (MULTIVITAMIN FOR CHOLESTEROL ORAL) Take 1 tablet by mouth nightly.     furosemide (LASIX) 40 MG tablet TAKE 1 TABLET EVERY DAY    icosapent ethyL (VASCEPA) 1 gram Cap Take 2 capsules (2,000 mg total) by mouth 2 (two) times daily.    insulin (LANTUS SOLOSTAR U-100 INSULIN) glargine 100 units/mL (3mL) SubQ pen Inject 30 Units into the skin every evening. (Patient taking differently: Inject 45 Units into the skin every evening.)    insulin needles, disposable, (BD INSULIN PEN NEEDLE UF ORIG) 29 x 1/2 " Ndle USE TWICE DAILY AS DIRECTED    JARDIANCE 25 mg tablet TAKE 1 TABLET EVERY DAY    lancets Misc 1 lancet by Misc.(Non-Drug; Combo Route) route 3 (three) times daily. accu-chek Fastclix. Patient needs an appointment    meclizine (ANTIVERT) 25 mg tablet Take 1 tablet (25 mg total) by mouth 3 (three) times daily as needed for Dizziness or Nausea.    metFORMIN (GLUCOPHAGE) 1000 MG tablet TAKE 1 TABLET TWICE DAILY    mupirocin (BACTROBAN) 2 % ointment AAA qd- bid    nitroGLYCERIN (NITROSTAT) 0.4 MG SL tablet PLACE 1 TABLET (0.4 MG TOTAL) UNDER THE TONGUE EVERY 5 (FIVE) MINUTES AS NEEDED FOR CHEST PAIN AS DIRECTED    NOVOLOG FLEXPEN U-100 INSULIN 100 unit/mL (3 mL) InPn pen Inject 8 Units into the skin 3 (three) times daily with meals. (Patient taking differently: Inject 12 Units into the skin 3 (three) times daily with meals.)    pen needle, diabetic (BD INSULIN PEN NEEDLE UF MINI) 31 gauge x 3/16" Ndle 1 each by Misc.(Non-Drug; Combo Route) route 4 (four) times daily. Patient needs an appointment    semaglutide (OZEMPIC) 0.25 mg " or 0.5 mg(2 mg/1.5 mL) pen injector Inject 0.5 mg into the skin every 7 days. Start with 0.25 mg weekly for 4 weeks and then increase to 0.5 mg if you are tolerating this dose    valsartan (DIOVAN) 320 MG tablet Take 1 tablet (320 mg total) by mouth once daily.   Last reviewed on 6/20/2022 11:56 AM by More Galloway MD    Review of patient's allergies indicates:   Allergen Reactions    Tramadol Hallucinations    Last reviewed on  6/20/2022 11:56 AM by More Galloway      Tasks added this encounter   7/23/2022 - Refill Call (Auto Added)   Tasks due within next 3 months   No tasks due.     Cullen Moya [facility]  [address] [address]  Phone: [phone]  Fax: [phone]

## 2022-06-23 ENCOUNTER — PATIENT MESSAGE (OUTPATIENT)
Dept: REHABILITATION | Facility: HOSPITAL | Age: 67
End: 2022-06-23
Payer: MEDICARE

## 2022-06-23 ENCOUNTER — PATIENT MESSAGE (OUTPATIENT)
Dept: NEUROLOGY | Facility: CLINIC | Age: 67
End: 2022-06-23

## 2022-06-23 ENCOUNTER — OFFICE VISIT (OUTPATIENT)
Dept: NEUROLOGY | Facility: CLINIC | Age: 67
End: 2022-06-23
Payer: MEDICARE

## 2022-06-23 VITALS
WEIGHT: 213.88 LBS | BODY MASS INDEX: 32.41 KG/M2 | HEART RATE: 57 BPM | SYSTOLIC BLOOD PRESSURE: 129 MMHG | DIASTOLIC BLOOD PRESSURE: 78 MMHG | HEIGHT: 68 IN

## 2022-06-23 DIAGNOSIS — E78.5 DYSLIPIDEMIA: ICD-10-CM

## 2022-06-23 DIAGNOSIS — G31.89 COGNITIVE AND NEUROBEHAVIORAL DYSFUNCTION FOLLOWING BRAIN INJURY: ICD-10-CM

## 2022-06-23 DIAGNOSIS — E08.51 DIABETES MELLITUS DUE TO UNDERLYING CONDITION WITH DIABETIC PERIPHERAL ANGIOPATHY WITHOUT GANGRENE, WITH LONG-TERM CURRENT USE OF INSULIN: ICD-10-CM

## 2022-06-23 DIAGNOSIS — H51.9 CONVERGENCE INSUFFICIENCY OR PALSY IN BINOCULAR EYE MOVEMENT: ICD-10-CM

## 2022-06-23 DIAGNOSIS — S06.9XAS COGNITIVE AND NEUROBEHAVIORAL DYSFUNCTION FOLLOWING BRAIN INJURY: ICD-10-CM

## 2022-06-23 DIAGNOSIS — I10 PRIMARY HYPERTENSION: ICD-10-CM

## 2022-06-23 DIAGNOSIS — R41.9 ALTERATION OF AWARENESS: ICD-10-CM

## 2022-06-23 DIAGNOSIS — Z79.4 DIABETES MELLITUS DUE TO UNDERLYING CONDITION WITH DIABETIC PERIPHERAL ANGIOPATHY WITHOUT GANGRENE, WITH LONG-TERM CURRENT USE OF INSULIN: ICD-10-CM

## 2022-06-23 DIAGNOSIS — S06.0X0A CONCUSSION WITHOUT LOSS OF CONSCIOUSNESS, INITIAL ENCOUNTER: Primary | ICD-10-CM

## 2022-06-23 DIAGNOSIS — H81.90 VESTIBULOPATHY, UNSPECIFIED LATERALITY: ICD-10-CM

## 2022-06-23 DIAGNOSIS — G44.319 ACUTE POST-TRAUMATIC HEADACHE, NOT INTRACTABLE: ICD-10-CM

## 2022-06-23 DIAGNOSIS — F09 COGNITIVE AND NEUROBEHAVIORAL DYSFUNCTION FOLLOWING BRAIN INJURY: ICD-10-CM

## 2022-06-23 DIAGNOSIS — G44.86 CERVICOGENIC HEADACHE: ICD-10-CM

## 2022-06-23 DIAGNOSIS — S13.4XXA WHIPLASH, INITIAL ENCOUNTER: ICD-10-CM

## 2022-06-23 PROCEDURE — 99999 PR PBB SHADOW E&M-EST. PATIENT-LVL V: ICD-10-PCS | Mod: PBBFAC,,, | Performed by: PSYCHIATRY & NEUROLOGY

## 2022-06-23 PROCEDURE — 3078F PR MOST RECENT DIASTOLIC BLOOD PRESSURE < 80 MM HG: ICD-10-PCS | Mod: CPTII,S$GLB,, | Performed by: PSYCHIATRY & NEUROLOGY

## 2022-06-23 PROCEDURE — 3288F PR FALLS RISK ASSESSMENT DOCUMENTED: ICD-10-PCS | Mod: CPTII,S$GLB,, | Performed by: PSYCHIATRY & NEUROLOGY

## 2022-06-23 PROCEDURE — 3074F PR MOST RECENT SYSTOLIC BLOOD PRESSURE < 130 MM HG: ICD-10-PCS | Mod: CPTII,S$GLB,, | Performed by: PSYCHIATRY & NEUROLOGY

## 2022-06-23 PROCEDURE — 3074F SYST BP LT 130 MM HG: CPT | Mod: CPTII,S$GLB,, | Performed by: PSYCHIATRY & NEUROLOGY

## 2022-06-23 PROCEDURE — 99205 OFFICE O/P NEW HI 60 MIN: CPT | Mod: S$GLB,,, | Performed by: PSYCHIATRY & NEUROLOGY

## 2022-06-23 PROCEDURE — 1126F PR PAIN SEVERITY QUANTIFIED, NO PAIN PRESENT: ICD-10-PCS | Mod: CPTII,S$GLB,, | Performed by: PSYCHIATRY & NEUROLOGY

## 2022-06-23 PROCEDURE — 4010F ACE/ARB THERAPY RXD/TAKEN: CPT | Mod: CPTII,S$GLB,, | Performed by: PSYCHIATRY & NEUROLOGY

## 2022-06-23 PROCEDURE — 99999 PR PBB SHADOW E&M-EST. PATIENT-LVL V: CPT | Mod: PBBFAC,,, | Performed by: PSYCHIATRY & NEUROLOGY

## 2022-06-23 PROCEDURE — 1160F PR REVIEW ALL MEDS BY PRESCRIBER/CLIN PHARMACIST DOCUMENTED: ICD-10-PCS | Mod: CPTII,S$GLB,, | Performed by: PSYCHIATRY & NEUROLOGY

## 2022-06-23 PROCEDURE — 1126F AMNT PAIN NOTED NONE PRSNT: CPT | Mod: CPTII,S$GLB,, | Performed by: PSYCHIATRY & NEUROLOGY

## 2022-06-23 PROCEDURE — 3078F DIAST BP <80 MM HG: CPT | Mod: CPTII,S$GLB,, | Performed by: PSYCHIATRY & NEUROLOGY

## 2022-06-23 PROCEDURE — 1100F PTFALLS ASSESS-DOCD GE2>/YR: CPT | Mod: CPTII,S$GLB,, | Performed by: PSYCHIATRY & NEUROLOGY

## 2022-06-23 PROCEDURE — 3008F PR BODY MASS INDEX (BMI) DOCUMENTED: ICD-10-PCS | Mod: CPTII,S$GLB,, | Performed by: PSYCHIATRY & NEUROLOGY

## 2022-06-23 PROCEDURE — 3288F FALL RISK ASSESSMENT DOCD: CPT | Mod: CPTII,S$GLB,, | Performed by: PSYCHIATRY & NEUROLOGY

## 2022-06-23 PROCEDURE — 3052F PR MOST RECENT HEMOGLOBIN A1C LEVEL 8.0 - < 9.0%: ICD-10-PCS | Mod: CPTII,S$GLB,, | Performed by: PSYCHIATRY & NEUROLOGY

## 2022-06-23 PROCEDURE — 4010F PR ACE/ARB THEARPY RXD/TAKEN: ICD-10-PCS | Mod: CPTII,S$GLB,, | Performed by: PSYCHIATRY & NEUROLOGY

## 2022-06-23 PROCEDURE — 3008F BODY MASS INDEX DOCD: CPT | Mod: CPTII,S$GLB,, | Performed by: PSYCHIATRY & NEUROLOGY

## 2022-06-23 PROCEDURE — 3052F HG A1C>EQUAL 8.0%<EQUAL 9.0%: CPT | Mod: CPTII,S$GLB,, | Performed by: PSYCHIATRY & NEUROLOGY

## 2022-06-23 PROCEDURE — 1100F PR PT FALLS ASSESS DOC 2+ FALLS/FALL W/INJURY/YR: ICD-10-PCS | Mod: CPTII,S$GLB,, | Performed by: PSYCHIATRY & NEUROLOGY

## 2022-06-23 PROCEDURE — 1159F PR MEDICATION LIST DOCUMENTED IN MEDICAL RECORD: ICD-10-PCS | Mod: CPTII,S$GLB,, | Performed by: PSYCHIATRY & NEUROLOGY

## 2022-06-23 PROCEDURE — 1159F MED LIST DOCD IN RCRD: CPT | Mod: CPTII,S$GLB,, | Performed by: PSYCHIATRY & NEUROLOGY

## 2022-06-23 PROCEDURE — 1160F RVW MEDS BY RX/DR IN RCRD: CPT | Mod: CPTII,S$GLB,, | Performed by: PSYCHIATRY & NEUROLOGY

## 2022-06-23 PROCEDURE — 99205 PR OFFICE/OUTPT VISIT, NEW, LEVL V, 60-74 MIN: ICD-10-PCS | Mod: S$GLB,,, | Performed by: PSYCHIATRY & NEUROLOGY

## 2022-06-23 RX ORDER — DICLOFENAC SODIUM 10 MG/G
2 GEL TOPICAL 4 TIMES DAILY PRN
Qty: 100 G | Refills: 3 | Status: SHIPPED | OUTPATIENT
Start: 2022-06-23

## 2022-06-24 NOTE — PROGRESS NOTES
Subjective:       Patient ID: Jm Deleon is a 67 y.o. male.    Chief Complaint:  Concussion      Consultation Requested by:   Pj Rose Do  [address] [place] [address]    History of Present Illness  67-year-old male presents for concussion sustained on 06/07/2022.  The patient fell backwards off of a ladder that was about 6 ft tall.  He notes that he was on the 3rd step and took a misstep, complete backwards.  He struck his tailbone 1st and then fell backwards hitting the back of his head on the ground.  He notes that he did not lose consciousness but when he was able to stand up he was immediately dazed and confused, having lost awareness.  He notes his main complaint is dizziness and loss of balance.  He had an MRI on 06/18/2022.  We have reviewed it on today's visit.  He is accompanied by his wife.  I have given the patient and his wife my impression of the MRI of the brain with what is likely artifactual change due to asymmetric cerebral venous drainage.  We have also discussed the multiple areas of chronic microvascular ischemic change.  I have discussed stroke risk factors with the patient including hypertension, dyslipidemia and diabetes.  The patient is on antihypertensive agents, anticholesterol agents, metformin and different types of insulin.  He notes his main complaint today is balance and dizziness.  He notes his next complaint is headache which she describes as being bilateral frontal.  He has no personal history or family history of headaches.  He notes his 3rd complaint is tailbone pain.  He has had x-rays that showed no fracture but he notes that even prior to his injury he was having problems of having torn ligaments on his right hip.  He does have a prior history of a left acetabular fracture with hip surgery and replacement more than 5 years ago.  He notes his next complaint is concentration and memory.  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  He notes his 5th complaint is visual disturbance.  He gives an example of when he was getting out of the pool with his grandchild the other day, stepping out of the pool turning his head and looking down for a tile and he became very unbalanced.    He has filled out postconcussion symptom questionnaire and scored the following:  Four, severe problem for feelings of dizziness  Three, moderate problem for headaches and fatigue  Two, a mild problem for sleep disturbances, forgetfulness, poor concentration, taking longer to think, light sensitivity, restlessness  One, no more problem for noise sensitivity, being irritable  0, not experience at all, nausea, feeling depressed, feeling frustrated, blurred vision, double vision    Past Medical History:   Diagnosis Date    Allergy     seasonal    Arthritis     Coronary artery disease     Diabetes mellitus     Diabetes mellitus, type 2     Dysplastic nevus of trunk 03/2022    moderately atypical     Hyperlipidemia     Hypertension     Joint pain     Melanoma 10/2019    MM left back 2.7mm; neg LNs    Open angle with borderline findings and high glaucoma risk in both eyes 2018    Squamous cell carcinoma 2017    scalp - SSW       Past Surgical History:   Procedure Laterality Date    ARTHROSCOPIC REPAIR OF ROTATOR CUFF OF SHOULDER Left 5/12/2021    Procedure: REPAIR, ROTATOR CUFF, ARTHROSCOPIC;  Surgeon: Johnny Ventura MD;  Location: [facility] OR;  Service: Orthopedics;  Laterality: Left;  Labral Debridement    ARTHROSCOPY OF SHOULDER WITH DECOMPRESSION OF SUBACROMIAL SPACE  5/12/2021    Procedure: ARTHROSCOPY, SHOULDER, WITH SUBACROMIAL SPACE DECOMPRESSION;  Surgeon: Johnny Ventura MD;  Location: [facility] OR;  Service: Orthopedics;;    BELPHAROPTOSIS REPAIR  10 years ago    both eyes    CARDIAC SURGERY      3 vessel bypass    COLONOSCOPY N/A 10/4/2019    Procedure: COLONOSCOPY;  Surgeon: Isaiah Booker,  MD;  Location: [facility] ENDO (4TH FLR);  Service: Endoscopy;  Laterality: N/A;  Cardilogy Clearance received rom Dr. QUEENIE Salomon, see telephone encounter 8/26/19-BB    CORONARY ARTERY BYPASS GRAFT  x4 age 45 2000    DISTAL CLAVICLE EXCISION Left 5/12/2021    Procedure: EXCISION, CLAVICLE, DISTAL;  Surgeon: Johnny Ventura MD;  Location: [facility] OR;  Service: Orthopedics;  Laterality: Left;    FIXATION OF TENDON Left 5/12/2021    Procedure: FIXATION, TENDON;  Surgeon: Johnny Ventura MD;  Location: [facility] OR;  Service: Orthopedics;  Laterality: Left;    HARDWARE REMOVAL Left 11/24/2021    Procedure: REMOVAL, HARDWARE;  Surgeon: Johnny Ventura MD;  Location: [facility] OR;  Service: Orthopedics;  Laterality: Left;    HERNIA REPAIR      HIP SURGERY  9-24-14    left YANICK    JOINT REPLACEMENT      left hip    MANIPULATION WITH ANESTHESIA Left 11/24/2021    Procedure: MANIPULATION, WITH ANESTHESIA;  Surgeon: Johnny Ventura MD;  Location: [facility] OR;  Service: Orthopedics;  Laterality: Left;    SENTINEL LYMPH NODE BIOPSY Left 10/18/2019    Procedure: BIOPSY, LYMPH NODE, SENTINEL;  Surgeon: Fabián Villeda MD;  Location: [facility] OR 2ND FLR;  Service: General;  Laterality: Left;    SHOULDER ARTHROSCOPY      SHOULDER ARTHROSCOPY Left 11/24/2021    Procedure: ARTHROSCOPY, SHOULDER;  Surgeon: Johnny Ventura MD;  Location: [facility] OR;  Service: Orthopedics;  Laterality: Left;  regional w/catheter (interscalene)    SHOULDER SURGERY      TRANSFORAMINAL EPIDURAL INJECTION OF STEROID Left 8/7/2020    Procedure: LUMBAR TRANSFORAMINAL LEFT L3/4 AND L5/S1 DIRECT REFERRAL;  Surgeon: Ronny Rangel MD;  Location: [facility] PAIN MGT;  Service: Pain Management;  Laterality: Left;  NEEDS CONSENT, DIABETIC       Family History   Problem Relation Age of Onset    Coronary artery disease Mother     Cancer Father         lung cancer, smoker    Hypertension Brother     Cancer Brother         colon    Amblyopia Neg Hx      Blindness Neg Hx     Cataracts Neg Hx     Glaucoma Neg Hx     Macular degeneration Neg Hx     Retinal detachment Neg Hx     Strabismus Neg Hx     Melanoma Neg Hx        Social History     Socioeconomic History    Marital status:    Tobacco Use    Smoking status: Never Smoker    Smokeless tobacco: Never Used   Substance and Sexual Activity    Alcohol use: Yes     Alcohol/week: 3.0 standard drinks     Types: 3 Cans of beer per week     Comment: on weekend    Drug use: Never     Social Determinants of Health     Financial Resource Strain: Low Risk     Difficulty of Paying Living Expenses: Not hard at all   Food Insecurity: No Food Insecurity    Worried About Running Out of Food in the Last Year: Never true    Ran Out of Food in the Last Year: Never true   Transportation Needs: No Transportation Needs    Lack of Transportation (Medical): No    Lack of Transportation (Non-Medical): No   Physical Activity: Insufficiently Active    Days of Exercise per Week: 4 days    Minutes of Exercise per Session: 30 min   Stress: No Stress Concern Present    Feeling of Stress : Not at all   Social Connections: Unknown    Frequency of Communication with Friends and Family: Three times a week    Frequency of Social Gatherings with Friends and Family: Three times a week    Active Member of Clubs or Organizations: No    Attends Club or Organization Meetings: Never    Marital Status:    Housing Stability: Low Risk     Unable to Pay for Housing in the Last Year: No    Number of Places Lived in the Last Year: 1    Unstable Housing in the Last Year: No       Review of Systems  Review of Systems   Constitutional: Positive for activity change and fatigue.   Eyes: Positive for visual disturbance. Negative for photophobia.   Gastrointestinal: Negative for nausea and vomiting.   Musculoskeletal: Positive for arthralgias, back pain, neck pain and neck stiffness.   Neurological: Positive for dizziness.  Negative for speech difficulty and light-headedness.   Psychiatric/Behavioral: Positive for confusion, decreased concentration and sleep disturbance.   All other systems reviewed and are negative.      Objective:     Vitals:    06/23/22 1012   BP: 129/78   Pulse: (!) 57      Physical Exam  Constitutional:       Appearance: He is obese.   HENT:      Head: Normocephalic and atraumatic.   Eyes:      Pupils: Pupils are equal, round, and reactive to light.   Neck:     Pulmonary:      Effort: Pulmonary effort is normal.   Neurological:      Mental Status: He is alert and oriented to person, place, and time.      Motor: Motor strength is normal.      Coordination: Finger-Nose-Finger Test normal.      Gait: Gait is intact.      Deep Tendon Reflexes:      Reflex Scores:       Tricep reflexes are 1+ on the right side and 1+ on the left side.       Bicep reflexes are 1+ on the right side and 1+ on the left side.       Brachioradialis reflexes are 1+ on the right side and 1+ on the left side.       Patellar reflexes are 1+ on the right side and 1+ on the left side.       Achilles reflexes are 0 on the right side and 0 on the left side.  Psychiatric:         Speech: Speech normal.           NEUROLOGICAL EXAMINATION:     MENTAL STATUS   Oriented to person, place, and time.   Registration: recalls 3 of 3 objects. Recall at 5 minutes: recalls 3 of 3 objects. Follows 2 step commands.   Attention: decreased. Concentration: decreased.   Speech: speech is normal     CRANIAL NERVES     CN II   Visual fields full to confrontation.     CN III, IV, VI   Pupils are equal, round, and reactive to light.    CN V   Facial sensation intact.     CN VII   Facial expression full, symmetric.     CN VIII   CN VIII normal.     CN IX, X   CN IX normal.   CN X normal.     CN XI   CN XI normal.     CN XII   CN XII normal.        Point of convergence is abnormal, greater than 20 cm    Mary is abnormal 7/10/10, indicating vestibulopathy     MOTOR EXAM    Muscle bulk: normal    Strength   Strength 5/5 throughout.     REFLEXES     Reflexes   Right brachioradialis: 1+  Left brachioradialis: 1+  Right biceps: 1+  Left biceps: 1+  Right triceps: 1+  Left triceps: 1+  Right patellar: 1+  Left patellar: 1+  Right achilles: 0  Left achilles: 0    SENSORY EXAM   Right arm light touch: decreased from fingers  Left arm light touch: decreased from fingers  Right leg light touch: decreased from ankle  Left leg light touch: decreased from ankle    GAIT AND COORDINATION     Gait  Gait: normal     Coordination   Finger to nose coordination: normal    Assessment/Plan:     Problem List Items Addressed This Visit        Cardiac/Vascular    Hypertension    Dyslipidemia       Endocrine    Diabetes mellitus due to underlying condition with circulatory complication      Other Visit Diagnoses     Concussion without loss of consciousness, initial encounter    -  Primary    Relevant Orders    Ambulatory referral/consult to Physical/Occupational Therapy    Ambulatory referral/consult to Physical/Occupational Therapy    Ambulatory referral/consult to Speech Therapy    Vestibulopathy, unspecified laterality        Relevant Orders    Ambulatory referral/consult to Physical/Occupational Therapy    Whiplash, initial encounter        Relevant Orders    Ambulatory referral/consult to Physical/Occupational Therapy    Convergence insufficiency or palsy in binocular eye movement        Relevant Orders    Ambulatory referral/consult to Physical/Occupational Therapy    Acute post-traumatic headache, not intractable        Relevant Medications    diclofenac sodium (VOLTAREN) 1 % Gel    Alteration of awareness        Cervicogenic headache        Relevant Medications    diclofenac sodium (VOLTAREN) 1 % Gel    Cognitive and neurobehavioral dysfunction following brain injury        Relevant Orders    Ambulatory referral/consult to Speech Therapy        67-year-old male presents for evaluation of concussion  sustained on 06/07/2022 we have reviewed his MRI of the brain which shows chronic microvascular ischemic change.  I have explained in depth to the patient wife that the patient does indeed have significant stroke risk factors and we have discussed at length, medicating strategies including working with his primary care physician to continue control his blood pressure, cholesterol and diabetes.  I have encouraged light exercise and further progressive exercise with rehabilitation.  I will send him to physical therapy and expand the scope of her work to not only work on his right hip but also to work on his cervical and lumbar spine well as his vestibular issues.  I will send him to occupational therapy for his convergence insufficiency and will send him to speech therapy for his cognitive.  We have discussed that because he is diabetic cannot give him steroid pack without negatively impacting his blood glucose, so we have discussed a topical nonsteroidal anti-inflammatory to be used sparingly at the base of his neck to try to alleviate some his headaches, he may also uses at the base of his skull in the occipital region.  Discussed with him that he has a significant cervicogenic component causing his headaches.  He does complain of some maxillary pain, so I have told him I do recommend he see a dentist to see if there are any dental issues or perhaps a component of TMJ may be contributing to some of the headache that he is dealing with.  We have discussed the pathophysiology of concussion at length.  We have discussed American heart and American Stroke Association recommendations with regards to primary prevention of stroke.  I have answered questions for the patient and his wife regarding both concussion and strokes at length today.  I will see the patient back in about 3 months.    I have informed the patient had to the emergency room if he has any symptoms or signs of stroke such as facial droop, speech arrest,  limb weakness, other associated symptoms or signs of stroke that we have discussed at on today's visit.      The patient verbalizes understanding and agreement with the treatment plan. Questions were sought and answered to his stated verbal satisfaction.        Tiffani Guillermo MD    This note is dictated on M*Modal [employer]ency speech recognition program. There are word recognition mistakes that are occasionally missed on review.    Based on our encounter today, my overall Medical Decision Making is a Level 5 because of High = 1 or more chronic illnesses with severe exacerbation, progression, or side effects of treatment; 1 acute or chronic illness or injury that poses a threat to life or bodily function and Extensive = Review of prior external note(s) from each unique source; Review of the result(s) of each unique test; Ordering of each unique test; and Assessment requiring an independent historian(s) AND Independent interpretation of a test performed by another physician/other qualified health care professional (not separately reported) AND/OR Discussion of management or test interpretation with external physician/other qualified health care professional\appropriate source (not separately reported)  based on Number of Problems or Complexity of Problems and Amount and/or Complexity of Data to be Reviewed and Analyzed

## 2022-06-26 ENCOUNTER — PATIENT MESSAGE (OUTPATIENT)
Dept: ENDOCRINOLOGY | Facility: CLINIC | Age: 67
End: 2022-06-26
Payer: MEDICARE

## 2022-06-26 RX ORDER — INSULIN GLARGINE 100 [IU]/ML
45 INJECTION, SOLUTION SUBCUTANEOUS NIGHTLY
Qty: 42 ML | Refills: 3 | Status: SHIPPED | OUTPATIENT
Start: 2022-06-26 | End: 2022-06-26

## 2022-06-26 RX ORDER — INSULIN GLARGINE 300 U/ML
45 INJECTION, SOLUTION SUBCUTANEOUS DAILY
Qty: 9 PEN | Refills: 3 | Status: SHIPPED | OUTPATIENT
Start: 2022-06-26 | End: 2023-03-20 | Stop reason: SDUPTHER

## 2022-06-29 ENCOUNTER — SPECIALTY PHARMACY (OUTPATIENT)
Dept: PHARMACY | Facility: CLINIC | Age: 67
End: 2022-06-29
Payer: MEDICARE

## 2022-06-29 NOTE — TELEPHONE ENCOUNTER
Incoming call from patient. He has Covid and was prescribed Paxlovid and wanted to know if he can still take his Repatha since the doctor told him to stop taking his statin. Informed patient he can continue with his Repatha dose but to hold off on the statin like the provider said.

## 2022-07-03 ENCOUNTER — PATIENT MESSAGE (OUTPATIENT)
Dept: REHABILITATION | Facility: HOSPITAL | Age: 67
End: 2022-07-03
Payer: MEDICARE

## 2022-07-07 ENCOUNTER — CLINICAL SUPPORT (OUTPATIENT)
Dept: REHABILITATION | Facility: HOSPITAL | Age: 67
End: 2022-07-07
Payer: MEDICARE

## 2022-07-07 DIAGNOSIS — H51.9 CONVERGENCE INSUFFICIENCY OR PALSY IN BINOCULAR EYE MOVEMENT: ICD-10-CM

## 2022-07-07 DIAGNOSIS — R42 DIZZINESS: ICD-10-CM

## 2022-07-07 DIAGNOSIS — S06.0X0A CONCUSSION WITHOUT LOSS OF CONSCIOUSNESS, INITIAL ENCOUNTER: ICD-10-CM

## 2022-07-07 PROCEDURE — 97110 THERAPEUTIC EXERCISES: CPT | Mod: PN

## 2022-07-07 PROCEDURE — 97165 OT EVAL LOW COMPLEX 30 MIN: CPT | Mod: PN

## 2022-07-07 NOTE — PROGRESS NOTES
[facility]  Initial Neurological Evaluation Post Concussion     Date: 7/7/2022  Patient: Jm Deleon  Chart Number: [id number]    Therapy Diagnosis:   Encounter Diagnoses   Name Primary?    Concussion without loss of consciousness, initial encounter     Convergence insufficiency or palsy in binocular eye movement     Dizziness      Physician: Charles Guillermo III, MD    Physician Orders: OT eval and treat  Medical Diagnosis: S06.0X0A (ICD-10-CM) - Concussion without loss of consciousness, initial encounter H51.9 (ICD-10-CM) - Convergence insufficiency or palsy in binocular eye movement   Evaluation Date: 7/7/2022  Plan of Care Expiration Period: 7/7/2022  Insurance Authorization period Expiration: 12/31/2022  Visit # / Visits Authorized: 1 / 1    Time In:5:00  Time Out: 5:45  Total Billable (one on one) Time: 45 minutes    Precautions: Standard    Subjective     History of Current Condition: Jm Deleon is a 67 y.o. male who presents to [facility] for evaluation and treatment secondary to post concussion syndrome. Pt's concussion occurred in June of 2022 after falling off a ladder at work. Pt reported initial symptoms included dizziness, headaches, and loss of balance. Currently, pt is complaining of occasional dizziness and balance deficits. PMHx: (--) prior concussions; (--) personal history of headaches/migraines; (--) familial history of headaches/migraines; (--) depression/anxiety; (--) ADD/ADHD; (--) learning disability; (--) neck/shoulder/back pain prior to injury.     Dominant Side: Right  Date of Onset: 6/7/2022  Surgical Procedure: none recently   Imaging: MRI studies Impression: 6/16/2022     Few punctate foci of T2 FLAIR signal abnormality supratentorial white matter while nonspecific may be sequela of mild chronic ischemic change.     Heterogeneous T2 flair signal hyperintensity left transverse and sigmoid  dural venous sinus likely artifactual from turbulent flow with dominant right dural venous sinus system however dural venous sinus thrombosis cannot be entirely excluded although felt less likely as detailed above.  Clinical correlation and further evaluation with post-contrast MRI brain and or MRV as warranted.     Otherwise unremarkable noncontrast MRI brain as detailed above specifically without evidence for acute infarction.    Previous Therapy: Jm reports current PT admission at [facility] for R hip pain     Past Medical History/Physical Systems Review:     Past Medical History:  Jm Deleon  has a past medical history of Allergy, Arthritis, Coronary artery disease, Diabetes mellitus, Diabetes mellitus, type 2, Dysplastic nevus of trunk, Hyperlipidemia, Hypertension, Joint pain, Melanoma, Open angle with borderline findings and high glaucoma risk in both eyes, and Squamous cell carcinoma.    Past Surgical History:  Jm Deleon  has a past surgical history that includes Blepharoptosis repair (10 years ago); Hernia repair; Coronary artery bypass graft (x4 age 45 2000); Hip surgery (9-24-14); Cardiac surgery; Joint replacement; Shoulder arthroscopy; Shoulder surgery; Colonoscopy (N/A, 10/4/2019); [place] lymph node biopsy (Left, 10/18/2019); Transforaminal epidural injection of steroid (Left, 8/7/2020); Arthroscopic repair of rotator cuff of shoulder (Left, 5/12/2021); Fixation of tendon (Left, 5/12/2021); Arthroscopy of shoulder with decompression of subacromial space (5/12/2021); Distal clavicle excision (Left, 5/12/2021); Shoulder arthroscopy (Left, 11/24/2021); Manipulation with anesthesia (Left, 11/24/2021); and Hardware Removal (Left, 11/24/2021).    Current Medications:  Jm has a current medication list which includes the following prescription(s): accu-chek smartview test strip, ammonium lactate, aspirin, atorvastatin, blood-glucose meter, carvedilol, celecoxib, diazepam, diclofenac sodium,  diphenhydramine, evolocumab, ezetimibe, multivit-minerals/folic acid, furosemide, icosapent ethyl, toujeo solostar u-300 insulin, pen needle, diabetic, jardiance, lancets, meclizine, metformin, mupirocin, nitroglycerin, novolog flexpen u-100 insulin, pen needle, diabetic, ozempic, and valsartan, and the following Facility-Administered Medications: fentanyl, fentanyl, lidocaine (pf) 10 mg/ml (1%), lidocaine (pf) 10 mg/ml (1%), midazolam, midazolam, ropivacaine (pf) 2 mg/ml (0.2%), ropivacaine (pf) 2 mg/ml (0.2%), and triamcinolone acetonide.    Allergies:  Review of patient's allergies indicates:   Allergen Reactions    Tramadol Hallucinations      Patient's Goals for Therapy: to address balance deficits     Pain:  Pain Related Behaviors Observed: livier Oneal reports no pain/mild headaches immediately after but seems to have resolved.     Occupation:  [employer]   Working presently: self-employed  Duties: working construction sites     Functional Limitations/Social History:    Prior Level of Function: independent  Current Level of Function:independent   ADLs/IADLs:   Feeding: Independent    Bathing: Independent     Dressing/Grooming:  Independent    Cooking/Homecare: Independent    Computer/phone use: Independent    Reading: Yes, current   Functional Mobility:   Bed mobility: I   Roll to left: I   Roll to right: I   Supine to sit: I   Sit to supine: I   Transfers to bed: I   Transfers to toilet: I   Car transfers: I   Wheelchair mobility: I    Home/Living environment : lives with their spouse  Home Access: [facility], 1 EFRAIN  DME: none     Leisure: hangout with family, relax, reading, swimming     Driving: currently driving     Adult Vision Questionnaire:   Directions: please check the answer that best describes your situation. If you wear glasses or contact lenses, answer the questions assuming that you are wearing them.   Never = never  Occasionally = less than 1 time/week  Frequently = at least 1  time/week  Always = everyday     1. Do you have headaches or facial pain? Never  2. Do you have pain in your eyes with eye movement? Never  3. Do you experience neck or shoulder discomfort? Never  4. Do you have dizziness, light headed or nausea while performing close up work? (computer work; reading; writing) Never  5. Do you have dizziness, light headed or nausea while performing far distance activities? (driving, tv, movies) Never  6. Do you experience dizziness when bending down and standing back up, or when getting up quickly? Frequently  7. Do you feel unsteady with walking, or drift to one side? Occasionally ; due to L hip replacement and R hip pain  8. Do you feel overwhelmed or anxious while walking in a large department store or walking in a crowd? Never  9. Do you feel dizzy or off balance when walking down a long hallway or on bold patterned carpeting? Never  10. Does riding in a car make you feel dizzy or uncomfortable? Never  11. Do you find yourself with your head tilted to one side? Never  12. Does your posture tend to be leaning more forward or backward than your used to? Never  13. Do you experience poor depth perception or have difficulty estimating distances accurately? Never  14. Do you experience double/overlapping/shadowed vision at far distances? Never  15. Do you experience double/overlapping/shadowed vision at near distances? Never  16. Do you experience glare or have sensitivity to bright lights? Never  17. Do you close or cover one eye with near or far tasks? Never  18. Do you skip lines or lose your place while reading? Occasionally ; prior to concussion  19. Do you use your finger to keep your place on the page? Never  20. Do you tire easily with close-up tasks? Never  21. Do you experience blurred vision with far distance tasks? (driving, television, movies, chalkboard at school) Never  22. Do you experience blurred vision with close up tasks? (computer, reading) Never  23. Do you  "experience words running together or appearing to move on the page? Never    History:  Have you ever been diagnosed with:  Traumatic brain injury (TBI) or concussion? no  Reading disability? no  Lazy eye? no  Have you ever had an eye operation? yes - pt reports upper eyelid reconstruction     Objective     Cognitive Exam  Oriented: Person, Place, Time and Situation  Behaviors: normal, cooperative, friendly and cooperative  Follows Commands/attention: Follows multistep  commands  Communication: clear/fluent  Memory: No Deficits noted  Comments: See speech therapy evaluation for formal cognitive assessments    Physical Exam   see PT eval for formal assessments    Oculomotor Exam  Vestibular/Ocular-Motor Screening (VOMS) for Concussion  Vestibular/Ocular Motor Test: Not Tested Headache   0-10 Dizziness  0-10 Nausea   0-10 Fogginess   0-10 Comments   Baseline N/A 0 0 0 0    Smooth Pursuit  0 0 0 0    Saccades - Horizontal  0 0 0 0 90 bpm    Saccades - Vertical   0 0 0 0 90 bpm    Convergence (Near Point)  0 0 0 0 (Near Point in cm):  Measure 1: 12 cm   Measure 2: 6.5 cm  Measure 3: 7 cm   VOR - Horizontal  0 0 0 0    VOR - Vertical  0 0 0 0 - slowed pace, no nystagmus present    Visual Motion Sensitivity Test  0 0 0 0      Oculomotor ROM: WNL  Eye Alignment: WNL  Visual Field: NT  Acuity: corrected by glasses    Spontaneous Nystagmus: none present   Gaze Holding Nystagmus: none   Gaze Holding (No Fixation): NT  Smooth Pursuits: WNL   Horizontal: WNL   Vertical: WNL  Saccades: WFL, occasional difficulty with 90 bpm   Horizontal: WFL   Vertical: WFL  Near Point Convergence (cm): 8.5 cm    Accommodation (gaze shift between near target (16") and far target (10ft)): WNL   Fixation (5 second sustained visual attention): WFL, no reports of headaches or dizziness    Dynamic Visual Acuity (DVA) (using ETDRS chart): 0 line difference (9, 9)     CMS Impairment/Limitation/Restriction for FOTO Survey    Therapist reviewed FOTO " scores for Jm Deleon on 7/7/2022.   FOTO documents entered into [employer] - see Media section.    Limitation Score: 19%     Treatment     Home Exercises and Patient Education Provided    Education provided:   - Role of OT, goals for OT, scheduling/cancellations, insurance limitations with patient.  - Additional Education provided: smooth pursuits, saccades, VORs    Written Home Exercises Provided: yes.  Exercises were reviewed and Jm was able to demonstrate them prior to the end of the session.    Jm demonstrated good  understanding of the education provided.     See EMR under Patient Instructions for exercises provided 7/7/2022.    Assessment     Jm Deleon is a 67 y.o. male referred to outpatient occupational therapy and presents with a medical diagnosis of post concussion syndrome, resulting in occasional dizziness and balance deficits and demonstrates limitations as described in the chart below. Jm states that he has noticed the symptoms he was experiencing seem to be resolving with time. During evaluation, he demonstrated oculomotor function WFL, NPC = 8.5 cm (normal range = 8-10 cm) and no report of  headache/nausea/dizziness throughout assessments. Jm stated his main concern at this time following his concussion is mild dizziness when changing positions and balance impairments (see PT eval for balance assessments). Due to high level of function, occupational therapy is not warranted at this time and Jm is agreeable to an eval only. Therapist provided HEP to include smooth pursuits, saccades and VORs with Jm verbalizing understanding of all movements. Thank you for your referral.     Plan of care discussed with patient: Yes  Patient's spiritual, cultural and educational needs considered and patient is agreeable to the plan of care and goals as stated below:     Anticipated Barriers for therapy: none     Medical Necessity is demonstrated by the following  Profile and History Assessment  of Occupational Performance Level of Clinical Decision Making Complexity Score   Occupational Profile:   Jm Deleon is a 67 y.o. male who lives with their spouse and is currently self-employed as . Jm Deleon no difficulty performing I/ADLs at this time and states his goals are to address balance deficits.     Comorbidities:     See  PMH above for details     Medical and Therapy History Review:   Brief               Performance Deficits    Physical:  No Deficits    Cognitive:  No Deficits    Psychosocial:    No Deficits     Clinical Decision Making:  low    Assessment Process:  Problem-Focused Assessments    Modification/Need for Assistance:  Not Necessary    Intervention Selection:  Limited Treatment Options       low  Based on PMHX, co morbidities , data from assessments and functional level of assistance required with task and clinical presentation directly impacting function.         Plan     Certification Period/Plan of care expiration: 7/7/2022 to 7/7/2022.    Eval only/one time treatment: Pt would not benefit from skilled occupational therapy services at this time. Pt demonstrates good understanding of all education topics and HEP to be performed independently at home.     Corinne Rapier, OT      I certify the need for these services furnished under this plan of treatment and while under my care.  ____________________________________ Physician/Referring Practitioner   Date of Signature

## 2022-07-08 ENCOUNTER — TELEPHONE (OUTPATIENT)
Dept: NEUROLOGY | Facility: CLINIC | Age: 67
End: 2022-07-08
Payer: MEDICARE

## 2022-07-08 PROBLEM — R42 DIZZINESS: Status: ACTIVE | Noted: 2022-07-08

## 2022-07-08 NOTE — TELEPHONE ENCOUNTER
Needs to be scheduled for 3month follow up.    Attempted to call the patient, LVM to return the call

## 2022-07-11 ENCOUNTER — CLINICAL SUPPORT (OUTPATIENT)
Dept: REHABILITATION | Facility: HOSPITAL | Age: 67
End: 2022-07-11
Payer: MEDICARE

## 2022-07-11 DIAGNOSIS — S06.0X0A CONCUSSION WITHOUT LOSS OF CONSCIOUSNESS, INITIAL ENCOUNTER: ICD-10-CM

## 2022-07-11 DIAGNOSIS — S06.9XAS COGNITIVE AND NEUROBEHAVIORAL DYSFUNCTION FOLLOWING BRAIN INJURY: Primary | ICD-10-CM

## 2022-07-11 DIAGNOSIS — G31.89 COGNITIVE AND NEUROBEHAVIORAL DYSFUNCTION FOLLOWING BRAIN INJURY: Primary | ICD-10-CM

## 2022-07-11 DIAGNOSIS — F09 COGNITIVE AND NEUROBEHAVIORAL DYSFUNCTION FOLLOWING BRAIN INJURY: Primary | ICD-10-CM

## 2022-07-11 PROCEDURE — 96125 COGNITIVE TEST BY HC PRO: CPT | Mod: PN

## 2022-07-11 RX ORDER — ATORVASTATIN CALCIUM 80 MG/1
80 TABLET, FILM COATED ORAL DAILY
Qty: 90 TABLET | Refills: 3 | Status: SHIPPED | OUTPATIENT
Start: 2022-07-11 | End: 2023-04-20

## 2022-07-11 NOTE — PROGRESS NOTES
See initial evaluation in Plan of Care.     Rebeca Henriquez M.A. CCC-SLP, CBIS  Speech Language Pathologist  Certified Brain Injury Specialist  7/13/2022

## 2022-07-11 NOTE — PLAN OF CARE
[facility]  Initial Neurological Evaluation Post Concussion     Date: 7/7/2022  Patient: Jm Deleon  Chart Number: [id number]    Therapy Diagnosis:   Encounter Diagnoses   Name Primary?    Concussion without loss of consciousness, initial encounter     Convergence insufficiency or palsy in binocular eye movement     Dizziness      Physician: Charles Guillermo III, MD    Physician Orders: OT eval and treat  Medical Diagnosis: S06.0X0A (ICD-10-CM) - Concussion without loss of consciousness, initial encounter H51.9 (ICD-10-CM) - Convergence insufficiency or palsy in binocular eye movement   Evaluation Date: 7/7/2022  Plan of Care Expiration Period: 7/7/2022  Insurance Authorization period Expiration: 12/31/2022  Visit # / Visits Authorized: 1 / 1    Time In:5:00  Time Out: 5:45  Total Billable (one on one) Time: 45 minutes    Precautions: Standard    Subjective     History of Current Condition: Jm Deleon is a 67 y.o. male who presents to [facility] for evaluation and treatment secondary to post concussion syndrome. Pt's concussion occurred in June of 2022 after falling off a ladder at work. Pt reported initial symptoms included dizziness, headaches, and loss of balance. Currently, pt is complaining of occasional dizziness and balance deficits. PMHx: (--) prior concussions; (--) personal history of headaches/migraines; (--) familial history of headaches/migraines; (--) depression/anxiety; (--) ADD/ADHD; (--) learning disability; (--) neck/shoulder/back pain prior to injury.     Dominant Side: Right  Date of Onset: 6/7/2022  Surgical Procedure: none recently   Imaging: MRI studies Impression: 6/16/2022     Few punctate foci of T2 FLAIR signal abnormality supratentorial white matter while nonspecific may be sequela of mild chronic ischemic change.     Heterogeneous T2 flair signal hyperintensity left transverse and sigmoid  dural venous sinus likely artifactual from turbulent flow with dominant right dural venous sinus system however dural venous sinus thrombosis cannot be entirely excluded although felt less likely as detailed above.  Clinical correlation and further evaluation with post-contrast MRI brain and or MRV as warranted.     Otherwise unremarkable noncontrast MRI brain as detailed above specifically without evidence for acute infarction.    Previous Therapy: Jm reports current PT admission at [facility] for R hip pain     Past Medical History/Physical Systems Review:     Past Medical History:  Jm Deleon  has a past medical history of Allergy, Arthritis, Coronary artery disease, Diabetes mellitus, Diabetes mellitus, type 2, Dysplastic nevus of trunk, Hyperlipidemia, Hypertension, Joint pain, Melanoma, Open angle with borderline findings and high glaucoma risk in both eyes, and Squamous cell carcinoma.    Past Surgical History:  Jm Deleon  has a past surgical history that includes Blepharoptosis repair (10 years ago); Hernia repair; Coronary artery bypass graft (x4 age 45 2000); Hip surgery (9-24-14); Cardiac surgery; Joint replacement; Shoulder arthroscopy; Shoulder surgery; Colonoscopy (N/A, 10/4/2019); [place] lymph node biopsy (Left, 10/18/2019); Transforaminal epidural injection of steroid (Left, 8/7/2020); Arthroscopic repair of rotator cuff of shoulder (Left, 5/12/2021); Fixation of tendon (Left, 5/12/2021); Arthroscopy of shoulder with decompression of subacromial space (5/12/2021); Distal clavicle excision (Left, 5/12/2021); Shoulder arthroscopy (Left, 11/24/2021); Manipulation with anesthesia (Left, 11/24/2021); and Hardware Removal (Left, 11/24/2021).    Current Medications:  Jm has a current medication list which includes the following prescription(s): accu-chek smartview test strip, ammonium lactate, aspirin, atorvastatin, blood-glucose meter, carvedilol, celecoxib, diazepam, diclofenac sodium,  diphenhydramine, evolocumab, ezetimibe, multivit-minerals/folic acid, furosemide, icosapent ethyl, toujeo solostar u-300 insulin, pen needle, diabetic, jardiance, lancets, meclizine, metformin, mupirocin, nitroglycerin, novolog flexpen u-100 insulin, pen needle, diabetic, ozempic, and valsartan, and the following Facility-Administered Medications: fentanyl, fentanyl, lidocaine (pf) 10 mg/ml (1%), lidocaine (pf) 10 mg/ml (1%), midazolam, midazolam, ropivacaine (pf) 2 mg/ml (0.2%), ropivacaine (pf) 2 mg/ml (0.2%), and triamcinolone acetonide.    Allergies:  Review of patient's allergies indicates:   Allergen Reactions    Tramadol Hallucinations      Patient's Goals for Therapy: to address balance deficits     Pain:  Pain Related Behaviors Observed: livier Oneal reports no pain/mild headaches immediately after but seems to have resolved.     Occupation:  [employer]   Working presently: self-employed  Duties: working construction sites     Functional Limitations/Social History:    Prior Level of Function: independent  Current Level of Function:independent   ADLs/IADLs:   Feeding: Independent    Bathing: Independent     Dressing/Grooming:  Independent    Cooking/Homecare: Independent    Computer/phone use: Independent    Reading: Yes, current   Functional Mobility:   Bed mobility: I   Roll to left: I   Roll to right: I   Supine to sit: I   Sit to supine: I   Transfers to bed: I   Transfers to toilet: I   Car transfers: I   Wheelchair mobility: I    Home/Living environment : lives with their spouse  Home Access: [facility], 1 EFRAIN  DME: none     Leisure: hangout with family, relax, reading, swimming     Driving: currently driving     Adult Vision Questionnaire:   Directions: please check the answer that best describes your situation. If you wear glasses or contact lenses, answer the questions assuming that you are wearing them.   Never = never  Occasionally = less than 1 time/week  Frequently = at least 1  time/week  Always = everyday     1. Do you have headaches or facial pain? Never  2. Do you have pain in your eyes with eye movement? Never  3. Do you experience neck or shoulder discomfort? Never  4. Do you have dizziness, light headed or nausea while performing close up work? (computer work; reading; writing) Never  5. Do you have dizziness, light headed or nausea while performing far distance activities? (driving, tv, movies) Never  6. Do you experience dizziness when bending down and standing back up, or when getting up quickly? Frequently  7. Do you feel unsteady with walking, or drift to one side? Occasionally ; due to L hip replacement and R hip pain  8. Do you feel overwhelmed or anxious while walking in a large department store or walking in a crowd? Never  9. Do you feel dizzy or off balance when walking down a long hallway or on bold patterned carpeting? Never  10. Does riding in a car make you feel dizzy or uncomfortable? Never  11. Do you find yourself with your head tilted to one side? Never  12. Does your posture tend to be leaning more forward or backward than your used to? Never  13. Do you experience poor depth perception or have difficulty estimating distances accurately? Never  14. Do you experience double/overlapping/shadowed vision at far distances? Never  15. Do you experience double/overlapping/shadowed vision at near distances? Never  16. Do you experience glare or have sensitivity to bright lights? Never  17. Do you close or cover one eye with near or far tasks? Never  18. Do you skip lines or lose your place while reading? Occasionally ; prior to concussion  19. Do you use your finger to keep your place on the page? Never  20. Do you tire easily with close-up tasks? Never  21. Do you experience blurred vision with far distance tasks? (driving, television, movies, chalkboard at school) Never  22. Do you experience blurred vision with close up tasks? (computer, reading) Never  23. Do you  "experience words running together or appearing to move on the page? Never    History:  Have you ever been diagnosed with:  Traumatic brain injury (TBI) or concussion? no  Reading disability? no  Lazy eye? no  Have you ever had an eye operation? yes - pt reports upper eyelid reconstruction     Objective     Cognitive Exam  Oriented: Person, Place, Time and Situation  Behaviors: normal, cooperative, friendly and cooperative  Follows Commands/attention: Follows multistep  commands  Communication: clear/fluent  Memory: No Deficits noted  Comments: See speech therapy evaluation for formal cognitive assessments    Physical Exam   see PT eval for formal assessments    Oculomotor Exam  Vestibular/Ocular-Motor Screening (VOMS) for Concussion  Vestibular/Ocular Motor Test: Not Tested Headache   0-10 Dizziness  0-10 Nausea   0-10 Fogginess   0-10 Comments   Baseline N/A 0 0 0 0    Smooth Pursuit  0 0 0 0    Saccades - Horizontal  0 0 0 0 90 bpm    Saccades - Vertical   0 0 0 0 90 bpm    Convergence (Near Point)  0 0 0 0 (Near Point in cm):  Measure 1: 12 cm   Measure 2: 6.5 cm  Measure 3: 7 cm   VOR - Horizontal  0 0 0 0    VOR - Vertical  0 0 0 0 - slowed pace, no nystagmus present    Visual Motion Sensitivity Test  0 0 0 0      Oculomotor ROM: WNL  Eye Alignment: WNL  Visual Field: NT  Acuity: corrected by glasses    Spontaneous Nystagmus: none present   Gaze Holding Nystagmus: none   Gaze Holding (No Fixation): NT  Smooth Pursuits: WNL   Horizontal: WNL   Vertical: WNL  Saccades: WFL, occasional difficulty with 90 bpm   Horizontal: WFL   Vertical: WFL  Near Point Convergence (cm): 8.5 cm    Accommodation (gaze shift between near target (16") and far target (10ft)): WNL   Fixation (5 second sustained visual attention): WFL, no reports of headaches or dizziness    Dynamic Visual Acuity (DVA) (using ETDRS chart): 0 line difference (9, 9)     CMS Impairment/Limitation/Restriction for FOTO Survey    Therapist reviewed FOTO " scores for Jm Deleon on 7/7/2022.   FOTO documents entered into [employer] - see Media section.    Limitation Score: 19%     Treatment     Home Exercises and Patient Education Provided    Education provided:   - Role of OT, goals for OT, scheduling/cancellations, insurance limitations with patient.  - Additional Education provided: smooth pursuits, saccades, VORs    Written Home Exercises Provided: yes.  Exercises were reviewed and Jm was able to demonstrate them prior to the end of the session.    Jm demonstrated good  understanding of the education provided.     See EMR under Patient Instructions for exercises provided 7/7/2022.    Assessment     Jm Deleon is a 67 y.o. male referred to outpatient occupational therapy and presents with a medical diagnosis of post concussion syndrome, resulting in occasional dizziness and balance deficits and demonstrates limitations as described in the chart below. Jm states that he has noticed the symptoms he was experiencing seem to be resolving with time. During evaluation, he demonstrated oculomotor function WFL, NPC = 8.5 cm (normal range = 8-10 cm) and no report of  headache/nausea/dizziness throughout assessments. Jm stated his main concern at this time following his concussion is mild dizziness when changing positions and balance impairments (see PT eval for balance assessments). Due to high level of function, occupational therapy is not warranted at this time and Jm is agreeable to an eval only. Therapist provided HEP to include smooth pursuits, saccades and VORs with Jm verbalizing understanding of all movements. Thank you for your referral.     Plan of care discussed with patient: Yes  Patient's spiritual, cultural and educational needs considered and patient is agreeable to the plan of care and goals as stated below:     Anticipated Barriers for therapy: none     Medical Necessity is demonstrated by the following  Profile and History Assessment  of Occupational Performance Level of Clinical Decision Making Complexity Score   Occupational Profile:   Jm Deleon is a 67 y.o. male who lives with their spouse and is currently self-employed as . Jm Deleon no difficulty performing I/ADLs at this time and states his goals are to address balance deficits.     Comorbidities:     See  PMH above for details     Medical and Therapy History Review:   Brief               Performance Deficits    Physical:  No Deficits    Cognitive:  No Deficits    Psychosocial:    No Deficits     Clinical Decision Making:  low    Assessment Process:  Problem-Focused Assessments    Modification/Need for Assistance:  Not Necessary    Intervention Selection:  Limited Treatment Options       low  Based on PMHX, co morbidities , data from assessments and functional level of assistance required with task and clinical presentation directly impacting function.         Plan     Certification Period/Plan of care expiration: 7/7/2022 to 7/7/2022.    Eval only/one time treatment: Pt would not benefit from skilled occupational therapy services at this time. Pt demonstrates good understanding of all education topics and HEP to be performed independently at home.     Corinne Rapier, OT      I certify the need for these services furnished under this plan of treatment and while under my care.  ____________________________________ Physician/Referring Practitioner   Date of Signature

## 2022-07-13 NOTE — PLAN OF CARE
"Outpatient Neurological Rehabilitation  Speech and Language Therapy Evaluation    Date: 7/11/2022     Name: Jm Deleon   MRN: [id number]    Therapy Diagnosis:   Encounter Diagnoses   Name Primary?    Concussion without loss of consciousness, initial encounter     Cognitive and neurobehavioral dysfunction following brain injury Yes      Physician: Charles Guillermo III, MD  Physician Orders: [id number] - AMB REFERRAL/CONSULT TO SPEECH THERAPY  Medical Diagnosis from Referral:   Diagnosis   S06.0X0A (ICD-10-CM) - Concussion without loss of consciousness, initial encounter   G31.89,F09,S06.9X9S (ICD-10-CM) - Cognitive and neurobehavioral dysfunction following brain injury     Visit #/Visits authorized: 1/ 1  Date of Evaluation:  7/11/2022   Insurance Authorization Period: 06/23/22-12/31/22  Plan of Care Expiration: 7/11/2022      Time In: 3:35  Time Out: 4:20  Total Minutes: 45     Procedure Min.   Standardized cognitive performance testing with scoring and interpretation  95     Precautions:Standard and Diabetes  Subjective   Date of Onset: 3 weeks ago   History of Current Condition:   Pt reports his "neurologist said [he] had to come."  Patient had a fall three weeks ago, he fell off a ladder and hit his head. Patient denies LOC, but endorses loss of awareness. Patient stated the doctor ordered an MRI which showed prior strokes. Patient endorsed "dizzy spells," but stated they have almost resolved. Patient stated he does have difficulty with his memory.   Past Medical History: Jm Deleon  has a past medical history of Allergy, Arthritis, Coronary artery disease, Diabetes mellitus, Diabetes mellitus, type 2, Dysplastic nevus of trunk (03/2022), Hyperlipidemia, Hypertension, Joint pain, Melanoma (10/2019), Open angle with borderline findings and high glaucoma risk in both eyes (2018), and Squamous cell carcinoma (2017).  Jm Deleon  has a past surgical history that includes Blepharoptosis repair (10 years ago); " Hernia repair; Coronary artery bypass graft (x4 age 45 2000); Hip surgery (9-24-14); Cardiac surgery; Joint replacement; Shoulder arthroscopy; Shoulder surgery; Colonoscopy (N/A, 10/4/2019); [place] lymph node biopsy (Left, 10/18/2019); Transforaminal epidural injection of steroid (Left, 8/7/2020); Arthroscopic repair of rotator cuff of shoulder (Left, 5/12/2021); Fixation of tendon (Left, 5/12/2021); Arthroscopy of shoulder with decompression of subacromial space (5/12/2021); Distal clavicle excision (Left, 5/12/2021); Shoulder arthroscopy (Left, 11/24/2021); Manipulation with anesthesia (Left, 11/24/2021); and Hardware Removal (Left, 11/24/2021).  Medical Hx and Allergies:  Jm has a current medication list which includes the following prescription(s): accu-chek smartview test strip, ammonium lactate, aspirin, atorvastatin, blood-glucose meter, carvedilol, celecoxib, diazepam, diclofenac sodium, diphenhydramine, evolocumab, ezetimibe, multivit-minerals/folic acid, furosemide, icosapent ethyl, toujeo solostar u-300 insulin, pen needle, diabetic, jardiance, lancets, meclizine, metformin, mupirocin, nitroglycerin, novolog flexpen u-100 insulin, pen needle, diabetic, ozempic, and valsartan, and the following Facility-Administered Medications: fentanyl, fentanyl, lidocaine (pf) 10 mg/ml (1%), lidocaine (pf) 10 mg/ml (1%), midazolam, midazolam, ropivacaine (pf) 2 mg/ml (0.2%), ropivacaine (pf) 2 mg/ml (0.2%), and triamcinolone acetonide.   Review of patient's allergies indicates:   Allergen Reactions    Tramadol Hallucinations     Prior Therapy:  Patient has never had speech therapy before.   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 Patient endorses a  normal diet at home (IDDSI levels 0 and 7)  Patient's Therapy Goals:  None   Objective   Formal Assessment:  Cognition:     The Repeatable Battery for the Assessment of Neuropsychological Status (RBANS) Version B was administered to measure the patient's attention, language, visuospatial/constructional abilities, and immediate and delayed memory. The results are outlined below:    Domain Subtest Total Score Index Score   Immediate Memory List Learning 22   90    Story Memory 17    Visuospatial/  Constructional Figure Copy 20   112    Line Orientation 17    Language Picture Naming 10   96    Semantic Fluency 18    Attention Digit Span 12   94    Coding 33      Delayed Memory List Recall 4     102    List Recognition 19     Story Recall 8     Figure Recall 18        Total Scale   98     Percentile   45     Descriptor   Average   Interpretation:  Immediate Memory Score: Recalling information following immediate presentation is assessed through the List Learning and Story Memory subtests. In the List Learning subtest, the patient is given 10 words to remember. This list is presented four times overall. In this subtest, the patient did demonstrate learning over the 4 trials. Patient recalled 2/10 words on trial 1, 6/10 words on trial 2, 8/10 words on trial 3, and 6/10 words on trial 4. In the Story Memory subtest, the patient recalled 7 details on the first presentation and 10 details on the second presentation, which indicates average recall skills.    Visuospatial score: Perceiving spatial relations and constructing a spatially accurate copy of a drawing is assessed through the Figure Copy and Line Orientation subtests. The Figure Copy subtest asks the patient to copy a complex line drawing. The patient was able to completely and correctly copy the figure with 10/10 details. The Line Orientation subtest the presents the patient with 12 line displays and asks the patient to match two given lines at the bottom to the  display at the top. The patient scored 17/20 on the line orientation task.      Language score: Naming common items and retrieving learned material is assessed through the Picture Naming and Semantic Fluency subtests. The Picture Naming subtest asks the patient to name 10 line drawings. The patient was able to accurately name 10/10 items. The Semantic Fluency subtest asks the patient to name as many animals as he can in 60 seconds. The patient was able to name 14 animals. These results indicate the patient has functional language skills as demonstrated through this assessment.     Attention score: Attending to, holding and manipulating information presented visually and orally in working memory is assessed with use of the Digit Span and Coding subtests. The Digit Span subtest asks the patient to repeat progressively lengthening strings of numbers. The Coding subtest asks the patient to alternate attention between a key the given work and then to decode symbols to numbers. The patients results on these subtest indicate within functional limits alternating and auditory sustained attention skills.     Delayed Memory score: Anterograde memory capacity is assessed through the List Recall, List Recognition, Story Recall, and Figure Recall subtests. The List Recall subtest asks the patient to recall items from the list presented at the beginning of the test. The patient was able to recall 4/10 items. The List Recognition subtest has the patient recall whether a word was or was not in the original list. The patient accurately identified whether a word was or was not on the list in 19 of 20 items. On the Story Recall subtest, the patient was able to recall 8 details. Finally, on the Figure Recall, the patient recalled 9 details.     Overall, according to the RBANS research, total Scale index is a good indicator of general cognitive functioning. The patient presents with average cognitive communication skills.       Treatment  "  Treatment Time In: n/a  Treatment Time Out: n/a  Total Treatment Time: n/a  no treatment performed 2/2 time to complete evaluation.    Education: Plan of Care and role of SLP in care were discussed with pt. Patient expressed understanding.     Home Program: N/A   Assessment     Jm presents to [facility] s/p medical diagnosis of concussion without loss of consciousness and cognitive and neurobehavioral dysfunction following brain injury. He presents with functional cognitive communication skills. The need for skilled speech therapy services is not indicated.    Patient's spiritual, cultural, and educational needs have been considered in this Plan of Care.     Plan     Plan of Care Certification Period: 7/11/2022  to 7/11/2022    Recommended Treatment Plan: The need for skilled speech therapy services are not indicated at this time.     Other Recommendations: Follow up if there are any changes to cognition, speech, or swallowing.     DEBBIE Burgos.   Clinician  Speech-Language Pathology      "I certify that I was present in the room directing the student and service delivery and guiding them using my skilled judgement. As the co-signing therapist, I have reviewed the student's documentation, and am responsible for the treatment, assessment and plan."      Rebeca Henriquez M.A. CCC-SLP, CBIS  Speech Language Pathologist  Certified Brain Injury Specialist  7/13/2022     "

## 2022-07-20 ENCOUNTER — CLINICAL SUPPORT (OUTPATIENT)
Dept: REHABILITATION | Facility: HOSPITAL | Age: 67
End: 2022-07-20
Payer: MEDICARE

## 2022-07-20 ENCOUNTER — DOCUMENTATION ONLY (OUTPATIENT)
Dept: REHABILITATION | Facility: HOSPITAL | Age: 67
End: 2022-07-20
Payer: MEDICARE

## 2022-07-20 DIAGNOSIS — S06.0X0A CONCUSSION WITHOUT LOSS OF CONSCIOUSNESS, INITIAL ENCOUNTER: ICD-10-CM

## 2022-07-20 DIAGNOSIS — H81.90 VESTIBULOPATHY, UNSPECIFIED LATERALITY: ICD-10-CM

## 2022-07-20 DIAGNOSIS — S13.4XXA WHIPLASH, INITIAL ENCOUNTER: ICD-10-CM

## 2022-07-20 NOTE — PROGRESS NOTES
Pt arrived to clinic for scheduled PT eval for post concussion symptoms.  Pt was previously evaluated by OT at this facility for the same diagnosis and was not picked up for therapy.  Pt said he is feeling 100% back to normal and does not feel that he needs to come for therapy and would prefer to return to [facility] to address hip pain.  Evaluation removed as patient request.  Confirmed upcoming PT visit scheduled at [facility] on 8/15/2022 at 3:00.    Ruchi Whitaker, MPT

## 2022-07-28 ENCOUNTER — SPECIALTY PHARMACY (OUTPATIENT)
Dept: PHARMACY | Facility: CLINIC | Age: 67
End: 2022-07-28
Payer: MEDICARE

## 2022-07-28 NOTE — TELEPHONE ENCOUNTER
Outgoing call: Spoke with patient about Repatha refill, he last injected on 7/2, doesn't have any on hand. His next injection is 8/1. I informed him that a PA was required and once we get the approval [facility] will follow up to set up fill. Routing to [facility]

## 2022-07-29 NOTE — TELEPHONE ENCOUNTER
Specialty Pharmacy - Refill Coordination  Specialty Pharmacy - Medication/Referral Authorization    Specialty Medication Orders Linked to Encounter    Flowsheet Row Most Recent Value   Medication #1 evolocumab (REPATHA PUSHTRONEX) 420 mg/3.5 mL Injt (Order#[id number], Rx#[id number])          Refill Questions - Documented Responses    Flowsheet Row Most Recent Value   Patient Availability and HIPAA Verification    Does patient want to proceed with activity? Yes   HIPAA/medical authority confirmed? Yes   Relationship to patient of person spoken to? Self   Refill Screening Questions    Would patient like to speak to a pharmacist? No   When does the patient need to receive the medication? 08/01/22   Refill Delivery Questions    How will the patient receive the medication? Delivery Jackelyn   When does the patient need to receive the medication? 08/01/22   Shipping Address Home   Address in [facility] confirmed and updated if neccessary? Yes   Expected Copay ($) 0   Is the patient able to afford the medication copay? Yes   Payment Method zero copay   Days supply of Refill 30   Supplies needed? Alcohol Swabs   Refill activity completed? Yes   Refill activity plan Refill scheduled   Shipment/Pickup Date: 07/29/22          Current Outpatient Medications   Medication Sig    ACCU-CHEK SMARTVIEW TEST STRIP Strp 1 strip by Misc.(Non-Drug; Combo Route) route 3 (three) times daily. Patient needs an appointment    ammonium lactate 12 % Crea Apply small amount to feet except for in between toes twice a day    aspirin (ECOTRIN) 81 MG EC tablet Take 81 mg by mouth once daily.    atorvastatin (LIPITOR) 80 MG tablet Take 1 tablet (80 mg total) by mouth once daily.    blood-glucose meter Misc Use to check sugar 3 times daily. Accu-chek Emily. Patient needs an appointment    carvediloL (COREG) 25 MG tablet TAKE 1 TABLET TWICE DAILY WITH MEALS  OR AS DIRECTED (Patient taking differently: Pt reports takes 1/2 in am and 1/2 in pm)  "   celecoxib (CELEBREX) 200 MG capsule Take 1 capsule (200 mg total) by mouth 2 (two) times daily.    diazePAM (VALIUM) 5 MG tablet Take 1 tablet (5 mg total) by mouth once daily at 6am. for 3 days    diclofenac sodium (VOLTAREN) 1 % Gel Apply 2 g topically 4 (four) times daily as needed (neck muscle spasm).    diphenhydrAMINE (BENADRYL) 25 mg capsule Take 50 mg by mouth nightly as needed for Itching.     evolocumab (REPATHA PUSHTRONEX) 420 mg/3.5 mL Injt Inject 3.5 mLs (420 mg total) into the skin every 30 days    ezetimibe (ZETIA) 10 mg tablet TAKE 1 TABLET EVERY DAY    FOLIC ACID/MULTIVITS-MIN (MULTIVITAMIN FOR CHOLESTEROL ORAL) Take 1 tablet by mouth nightly.     furosemide (LASIX) 40 MG tablet TAKE 1 TABLET EVERY DAY    insulin glargine, TOUJEO, (TOUJEO SOLOSTAR U-300 INSULIN) 300 unit/mL (1.5 mL) InPn pen Inject 45 Units into the skin once daily.    insulin needles, disposable, (BD INSULIN PEN NEEDLE UF ORIG) 29 x 1/2 " Ndle USE TWICE DAILY AS DIRECTED    JARDIANCE 25 mg tablet TAKE 1 TABLET EVERY DAY    lancets Misc 1 lancet by Misc.(Non-Drug; Combo Route) route 3 (three) times daily. accu-chek Fastclix. Patient needs an appointment    meclizine (ANTIVERT) 25 mg tablet Take 1 tablet (25 mg total) by mouth 3 (three) times daily as needed for Dizziness or Nausea.    metFORMIN (GLUCOPHAGE) 1000 MG tablet Take 1 tablet (1,000 mg total) by mouth 2 (two) times daily.    mupirocin (BACTROBAN) 2 % ointment AAA qd- bid    nitroGLYCERIN (NITROSTAT) 0.4 MG SL tablet PLACE 1 TABLET (0.4 MG TOTAL) UNDER THE TONGUE EVERY 5 (FIVE) MINUTES AS NEEDED FOR CHEST PAIN AS DIRECTED    NOVOLOG FLEXPEN U-100 INSULIN 100 unit/mL (3 mL) InPn pen Inject 8 Units into the skin 3 (three) times daily with meals. (Patient taking differently: Inject 12 Units into the skin 3 (three) times daily with meals.)    pen needle, diabetic (BD INSULIN PEN NEEDLE UF MINI) 31 gauge x 3/16" Ndle 1 each by Misc.(Non-Drug; Combo Route) " route 4 (four) times daily. Patient needs an appointment    semaglutide (OZEMPIC) 0.25 mg or 0.5 mg(2 mg/1.5 mL) pen injector Inject 0.5 mg into the skin every 7 days. Start with 0.25 mg weekly for 4 weeks and then increase to 0.5 mg if you are tolerating this dose    valsartan (DIOVAN) 320 MG tablet Take 1 tablet (320 mg total) by mouth once daily.    VASCEPA 1 gram Cap TAKE 2 CAPSULES TWICE DAILY   Last reviewed on 6/23/2022 11:09 AM by Charles Guillermo III, MD    Review of patient's allergies indicates:   Allergen Reactions    Tramadol Hallucinations    Last reviewed on  6/27/2022 7:01 PM by Dallas Martin      Tasks added this encounter   8/24/2022 - Refill Call (Auto Added)   Tasks due within next 3 months   No tasks due.     Guillermo Lacy, PharmD  [facility]  [address] [place] [id number]  Phone: [phone]  Fax: [phone]

## 2022-07-29 NOTE — TELEPHONE ENCOUNTER
Repatha re-auth submitted via [facility] (Key: [id number]). Approved through 2022. PA Case: [id number].

## 2022-08-05 ENCOUNTER — LAB VISIT (OUTPATIENT)
Dept: LAB | Facility: HOSPITAL | Age: 67
End: 2022-08-05
Attending: INTERNAL MEDICINE
Payer: MEDICARE

## 2022-08-05 DIAGNOSIS — Z95.1 S/P CABG (CORONARY ARTERY BYPASS GRAFT): ICD-10-CM

## 2022-08-05 DIAGNOSIS — E08.51 DIABETES MELLITUS DUE TO UNDERLYING CONDITION WITH DIABETIC PERIPHERAL ANGIOPATHY WITHOUT GANGRENE, WITHOUT LONG-TERM CURRENT USE OF INSULIN: ICD-10-CM

## 2022-08-05 DIAGNOSIS — E78.5 DYSLIPIDEMIA: ICD-10-CM

## 2022-08-05 DIAGNOSIS — I25.10 CORONARY ARTERY DISEASE INVOLVING NATIVE CORONARY ARTERY OF NATIVE HEART WITHOUT ANGINA PECTORIS: ICD-10-CM

## 2022-08-05 LAB
ALBUMIN SERPL BCP-MCNC: 4.1 G/DL (ref 3.5–5.2)
ALP SERPL-CCNC: 69 U/L (ref 55–135)
ALT SERPL W/O P-5'-P-CCNC: 30 U/L (ref 10–44)
ANION GAP SERPL CALC-SCNC: 8 MMOL/L (ref 8–16)
AST SERPL-CCNC: 21 U/L (ref 10–40)
BILIRUB SERPL-MCNC: 0.9 MG/DL (ref 0.1–1)
BNP SERPL-MCNC: 41 PG/ML (ref 0–99)
BUN SERPL-MCNC: 16 MG/DL (ref 8–23)
CALCIUM SERPL-MCNC: 9.9 MG/DL (ref 8.7–10.5)
CHLORIDE SERPL-SCNC: 103 MMOL/L (ref 95–110)
CHOLEST SERPL-MCNC: 86 MG/DL (ref 120–199)
CHOLEST/HDLC SERPL: 2.4 {RATIO} (ref 2–5)
CO2 SERPL-SCNC: 27 MMOL/L (ref 23–29)
CREAT SERPL-MCNC: 0.9 MG/DL (ref 0.5–1.4)
EST. GFR  (NO RACE VARIABLE): >60 ML/MIN/1.73 M^2
ESTIMATED AVG GLUCOSE: 171 MG/DL (ref 68–131)
GLUCOSE SERPL-MCNC: 161 MG/DL (ref 70–110)
HBA1C MFR BLD: 7.6 % (ref 4–5.6)
HDLC SERPL-MCNC: 36 MG/DL (ref 40–75)
HDLC SERPL: 41.9 % (ref 20–50)
LDLC SERPL CALC-MCNC: 10.2 MG/DL (ref 63–159)
NONHDLC SERPL-MCNC: 50 MG/DL
POTASSIUM SERPL-SCNC: 5.3 MMOL/L (ref 3.5–5.1)
PROT SERPL-MCNC: 6.4 G/DL (ref 6–8.4)
SODIUM SERPL-SCNC: 138 MMOL/L (ref 136–145)
TRIGL SERPL-MCNC: 199 MG/DL (ref 30–150)
TSH SERPL DL<=0.005 MIU/L-ACNC: 1.14 UIU/ML (ref 0.4–4)

## 2022-08-05 PROCEDURE — 83880 ASSAY OF NATRIURETIC PEPTIDE: CPT | Performed by: INTERNAL MEDICINE

## 2022-08-05 PROCEDURE — 80061 LIPID PANEL: CPT | Performed by: INTERNAL MEDICINE

## 2022-08-05 PROCEDURE — 83036 HEMOGLOBIN GLYCOSYLATED A1C: CPT | Performed by: INTERNAL MEDICINE

## 2022-08-05 PROCEDURE — 84443 ASSAY THYROID STIM HORMONE: CPT | Performed by: INTERNAL MEDICINE

## 2022-08-05 PROCEDURE — 80053 COMPREHEN METABOLIC PANEL: CPT | Performed by: INTERNAL MEDICINE

## 2022-08-05 PROCEDURE — 36415 COLL VENOUS BLD VENIPUNCTURE: CPT | Performed by: INTERNAL MEDICINE

## 2022-08-15 ENCOUNTER — CLINICAL SUPPORT (OUTPATIENT)
Dept: REHABILITATION | Facility: HOSPITAL | Age: 67
End: 2022-08-15
Attending: PSYCHIATRY & NEUROLOGY
Payer: MEDICARE

## 2022-08-15 DIAGNOSIS — M25.551 RIGHT HIP PAIN: Primary | ICD-10-CM

## 2022-08-15 PROCEDURE — 97110 THERAPEUTIC EXERCISES: CPT | Performed by: PHYSICAL THERAPIST

## 2022-08-15 PROCEDURE — 97140 MANUAL THERAPY 1/> REGIONS: CPT | Performed by: PHYSICAL THERAPIST

## 2022-08-15 NOTE — PLAN OF CARE
Physical Therapy Daily Treatment Note     Name: Jm Deleon  Clinic Number: [id number]    Therapy Diagnosis:   Encounter Diagnosis   Name Primary?    Right hip pain Yes     Physician: Charles Guillermo III, MD    Visit Date: 8/15/2022       Physician Orders: PT Eval and Treat   Medical Diagnosis from Referral: M70.61 (ICD-10-CM) - Greater trochanteric bursitis of right hip  Evaluation Date: 6/2/2022  Authorization Period Expiration: 4/26/2022  Plan of Care Expiration: 7/3/2022  Visit # / Visits authorized: 2/ 1    Time In: 1510  Time Out: 1535  Total Billable Time: 25 minutes    Precautions: Standard    Subjective     Pt reports: Concussion symptoms have resolved. I am walking again without pain, full day work without discomfort. Planning to return to [place]/walking and will do a walking program prior      He was compliant with home exercise program.  Response to previous treatment: less pain  Functional change: walking without pain     Pain: 0/10  Location: right hip      Objective     Equal hip ROM on R hip compared to L    R hip abd long lever: 25.6lb, 23.6lb, 22.1lb - average 23.76lb  L hip abd long lever: 26.0lb, 25.8lb, 25.4 lb - average 25.73lb  92.3% strength     Jm received therapeutic exercises to develop strength, endurance and ROM for 10 minutes including:    Sidelying hip abd isometrics off wedge 2 x 10 B   Patient education    Jm received the following manual therapy techniques: Joint mobilizations were applied to the: R hip for 15 minutes, including:    Hip reassessment  Gr I-II oscillations R hip  Long axis R hip  Inferior mob at 45/60/80 deg of flexion Gr III  Lateral glide Gr III R hip       Home Exercises Provided and Patient Education Provided     Education provided:   - Glut conc/ecc now with continued isometric for pain relief    Written Home Exercises Provided: Patient instructed to cont prior HEP.  Exercises were reviewed and Jm was able to demonstrate them prior to the end of the  session.  Jm demonstrated good  understanding of the education provided.     See EMR under Patient Instructions for exercises provided prior visit.    Assessment     Jm was reassessed today on the R hip. ROM to the R hip is equal to the L. Strength with just an 8% deficit. Patient is prepared for discharge with his goals met and Ind HEP. He is sleeping with pillow between legs which is really helping his discomfort, reports being pain free past few weeks.     Jm Is progressing well towards his goals.   Pt prognosis is Good.     Pt will continue to benefit from skilled outpatient physical therapy to address the deficits listed in the problem list box on initial evaluation, provide pt/family education and to maximize pt's level of independence in the home and community environment.     Pt's spiritual, cultural and educational needs considered and pt agreeable to plan of care and goals.    Anticipated barriers to physical therapy: none     GOALS: Short Term Goals:  4 weeks  1.Report decreased hip pain  < / =  3/10  to increase tolerance for ambulating in community(met)  2. Increase ROM by 5 degrees where limited in order to perform ADLs without difficulty.(met)  3. Increase strength by 1/3 MMT grade in glut med  to increase tolerance for ADL and work activities.(met)  4. Pt to tolerate HEP to improve ROM and independence with ADL's (met)     Long Term Goals: 12 weeks  1.Report decreased glut pain < / = 1/10  to increase tolerance for return to work without pains(met)  2.Patient goal: return to ADL and work without R hip pain(met)  3.Increase strength to 4+/5 in  Glut med  to increase tolerance for ADL and work activities.(met)  4. Pt will report at CJ level (20-40% impaired) on LEFS  to demonstrate increase in LE function with every day tasks.(met)     Plan       Discharge with goals met and Ind HEP    Richard Velasquez, PT

## 2022-08-15 NOTE — PROGRESS NOTES
Physical Therapy Daily Treatment Note     Name: Jm Deleon  Clinic Number: [id number]    Therapy Diagnosis:   Encounter Diagnosis   Name Primary?    Right hip pain Yes     Physician: Charles Guillermo III, MD    Visit Date: 8/15/2022       Physician Orders: PT Eval and Treat   Medical Diagnosis from Referral: M70.61 (ICD-10-CM) - Greater trochanteric bursitis of right hip  Evaluation Date: 6/2/2022  Authorization Period Expiration: 4/26/2022  Plan of Care Expiration: 7/3/2022  Visit # / Visits authorized: 2/ 1    Time In: 1510  Time Out: 1535  Total Billable Time: 25 minutes    Precautions: Standard    Subjective     Pt reports: Concussion symptoms have resolved. I am walking again without pain, full day work without discomfort. Planning to return to [employer]/walking and will do a walking program prior      He was compliant with home exercise program.  Response to previous treatment: less pain  Functional change: walking without pain     Pain: 0/10  Location: right hip      Objective     Equal hip ROM on R hip compared to L    R hip abd long lever: 25.6lb, 23.6lb, 22.1lb - average 23.76lb  L hip abd long lever: 26.0lb, 25.8lb, 25.4 lb - average 25.73lb  92.3% strength     Jm received therapeutic exercises to develop strength, endurance and ROM for 10 minutes including:    Sidelying hip abd isometrics off wedge 2 x 10 B   Patient education    Jm received the following manual therapy techniques: Joint mobilizations were applied to the: R hip for 15 minutes, including:    Hip reassessment  Gr I-II oscillations R hip  Long axis R hip  Inferior mob at 45/60/80 deg of flexion Gr III  Lateral glide Gr III R hip       Home Exercises Provided and Patient Education Provided     Education provided:   - Glut conc/ecc now with continued isometric for pain relief    Written Home Exercises Provided: Patient instructed to cont prior HEP.  Exercises were reviewed and Jm was able to demonstrate them prior to the end of the  session.  Jm demonstrated good  understanding of the education provided.     See EMR under Patient Instructions for exercises provided prior visit.    Assessment     Jm was reassessed today on the R hip. ROM to the R hip is equal to the L. Strength with just an 8% deficit. Patient is prepared for discharge with his goals met and Ind HEP. He is sleeping with pillow between legs which is really helping his discomfort, reports being pain free past few weeks.     Jm Is progressing well towards his goals.   Pt prognosis is Good.     Pt will continue to benefit from skilled outpatient physical therapy to address the deficits listed in the problem list box on initial evaluation, provide pt/family education and to maximize pt's level of independence in the home and community environment.     Pt's spiritual, cultural and educational needs considered and pt agreeable to plan of care and goals.    Anticipated barriers to physical therapy: none     GOALS: Short Term Goals:  4 weeks  1.Report decreased hip pain  < / =  3/10  to increase tolerance for ambulating in community(met)  2. Increase ROM by 5 degrees where limited in order to perform ADLs without difficulty.(met)  3. Increase strength by 1/3 MMT grade in glut med  to increase tolerance for ADL and work activities.(met)  4. Pt to tolerate HEP to improve ROM and independence with ADL's (met)     Long Term Goals: 12 weeks  1.Report decreased glut pain < / = 1/10  to increase tolerance for return to work without pains(met)  2.Patient goal: return to ADL and work without R hip pain(met)  3.Increase strength to 4+/5 in  Glut med  to increase tolerance for ADL and work activities.(met)  4. Pt will report at CJ level (20-40% impaired) on LEFS  to demonstrate increase in LE function with every day tasks.(met)     Plan       Discharge with goals met and Ind HEP    Richard Velasquez, PT

## 2022-08-17 ENCOUNTER — PATIENT MESSAGE (OUTPATIENT)
Dept: REHABILITATION | Facility: HOSPITAL | Age: 67
End: 2022-08-17
Payer: MEDICARE

## 2022-08-18 ENCOUNTER — PATIENT MESSAGE (OUTPATIENT)
Dept: CARDIOLOGY | Facility: CLINIC | Age: 67
End: 2022-08-18
Payer: MEDICARE

## 2022-08-24 ENCOUNTER — PATIENT MESSAGE (OUTPATIENT)
Dept: PHARMACY | Facility: CLINIC | Age: 67
End: 2022-08-24
Payer: MEDICARE

## 2022-08-29 ENCOUNTER — SPECIALTY PHARMACY (OUTPATIENT)
Dept: PHARMACY | Facility: CLINIC | Age: 67
End: 2022-08-29
Payer: MEDICARE

## 2022-08-29 NOTE — TELEPHONE ENCOUNTER
Specialty Pharmacy - Refill Coordination    Specialty Medication Orders Linked to Encounter      Flowsheet Row Most Recent Value   Medication #1 evolocumab (REPATHA PUSHTRONEX) 420 mg/3.5 mL Injt (Order#[id number], Rx#[id number])            Refill Questions - Documented Responses      Flowsheet Row Most Recent Value   Patient Availability and HIPAA Verification    Does patient want to proceed with activity? Yes   HIPAA/medical authority confirmed? Yes   Relationship to patient of person spoken to? Self   Refill Screening Questions    Would patient like to speak to a pharmacist? No   When does the patient need to receive the medication? 08/29/22   Refill Delivery Questions    How will the patient receive the medication? Pickup   When does the patient need to receive the medication? 08/29/22   Shipping Address Home   Address in [facility] confirmed and updated if neccessary? Yes   Expected Copay ($) 0   Is the patient able to afford the medication copay? Yes   Payment Method zero copay   Days supply of Refill 30   Refill activity completed? Yes   Refill activity plan Refill scheduled   Shipment/Pickup Date: 08/29/22            Current Outpatient Medications   Medication Sig    ACCU-CHEK SMARTVIEW TEST STRIP Strp 1 strip by Misc.(Non-Drug; Combo Route) route 3 (three) times daily. Patient needs an appointment    ammonium lactate 12 % Crea Apply small amount to feet except for in between toes twice a day    aspirin (ECOTRIN) 81 MG EC tablet Take 81 mg by mouth once daily.    atorvastatin (LIPITOR) 80 MG tablet Take 1 tablet (80 mg total) by mouth once daily.    blood-glucose meter Misc Use to check sugar 3 times daily. Accu-chek Emily. Patient needs an appointment    carvediloL (COREG) 25 MG tablet TAKE 1 TABLET TWICE DAILY WITH MEALS  OR AS DIRECTED (Patient taking differently: Pt reports takes 1/2 in am and 1/2 in pm)    celecoxib (CELEBREX) 200 MG capsule Take 1 capsule (200 mg total) by mouth 2 (two) times  "daily.    diazePAM (VALIUM) 5 MG tablet Take 1 tablet (5 mg total) by mouth once daily at 6am. for 3 days    diclofenac sodium (VOLTAREN) 1 % Gel Apply 2 g topically 4 (four) times daily as needed (neck muscle spasm).    diphenhydrAMINE (BENADRYL) 25 mg capsule Take 50 mg by mouth nightly as needed for Itching.     evolocumab (REPATHA PUSHTRONEX) 420 mg/3.5 mL Injt Inject 3.5 mLs (420 mg total) into the skin every 30 days    ezetimibe (ZETIA) 10 mg tablet TAKE 1 TABLET EVERY DAY    FOLIC ACID/MULTIVITS-MIN (MULTIVITAMIN FOR CHOLESTEROL ORAL) Take 1 tablet by mouth nightly.     furosemide (LASIX) 40 MG tablet TAKE 1 TABLET EVERY DAY    insulin glargine, TOUJEO, (TOUJEO SOLOSTAR U-300 INSULIN) 300 unit/mL (1.5 mL) InPn pen Inject 45 Units into the skin once daily.    insulin needles, disposable, (BD INSULIN PEN NEEDLE UF ORIG) 29 x 1/2 " Ndle USE TWICE DAILY AS DIRECTED    JARDIANCE 25 mg tablet TAKE 1 TABLET EVERY DAY    lancets Misc 1 lancet by Misc.(Non-Drug; Combo Route) route 3 (three) times daily. accu-chek Fastclix. Patient needs an appointment    meclizine (ANTIVERT) 25 mg tablet Take 1 tablet (25 mg total) by mouth 3 (three) times daily as needed for Dizziness or Nausea.    metFORMIN (GLUCOPHAGE) 1000 MG tablet Take 1 tablet (1,000 mg total) by mouth 2 (two) times daily.    mupirocin (BACTROBAN) 2 % ointment AAA qd- bid    nitroGLYCERIN (NITROSTAT) 0.4 MG SL tablet PLACE 1 TABLET (0.4 MG TOTAL) UNDER THE TONGUE EVERY 5 (FIVE) MINUTES AS NEEDED FOR CHEST PAIN AS DIRECTED    NOVOLOG FLEXPEN U-100 INSULIN 100 unit/mL (3 mL) InPn pen Inject 8 Units into the skin 3 (three) times daily with meals. (Patient taking differently: Inject 12 Units into the skin 3 (three) times daily with meals.)    pen needle, diabetic (BD INSULIN PEN NEEDLE UF MINI) 31 gauge x 3/16" Ndle 1 each by Misc.(Non-Drug; Combo Route) route 4 (four) times daily. Patient needs an appointment    semaglutide (OZEMPIC) 0.25 mg or 0.5 mg(2 mg/1.5 mL) " pen injector Inject 0.5 mg into the skin every 7 days. Start with 0.25 mg weekly for 4 weeks and then increase to 0.5 mg if you are tolerating this dose    valsartan (DIOVAN) 320 MG tablet Take 1 tablet (320 mg total) by mouth once daily.    VASCEPA 1 gram Cap TAKE 2 CAPSULES TWICE DAILY   Last reviewed on 6/23/2022 11:09 AM by Charles Guillermo III, MD    Review of patient's allergies indicates:   Allergen Reactions    Tramadol Hallucinations    Last reviewed on  6/27/2022 7:01 PM by Dallas Martin      Tasks added this encounter   9/21/2022 - Refill Call (Auto Added)  8/30/2022 - Pickup Reminder   Tasks due within next 3 months   No tasks due.     Mayte Rodríguez, PharmD  [facility]  [address] [place] [id number]  Phone: [phone]  Fax: [phone]

## 2022-08-31 DIAGNOSIS — M70.61 GREATER TROCHANTERIC BURSITIS OF RIGHT HIP: ICD-10-CM

## 2022-08-31 RX ORDER — CELECOXIB 200 MG/1
200 CAPSULE ORAL 2 TIMES DAILY
Qty: 180 CAPSULE | Refills: 0 | Status: SHIPPED | OUTPATIENT
Start: 2022-08-31

## 2022-08-31 NOTE — TELEPHONE ENCOUNTER
----- Message from Francaraheem Dixon sent at 8/31/2022 12:14 PM CDT -----  Contact: Ms. Hinson Pharmacy Tech @ [facility] # [phone]  Ms. Joya maciel Pharmacy Tech at [facility] would like a call back in regards to her wanting to know what is the status on the order for the patients celecoxib (CELEBREX) 200 MG capsule that she faxed over to you on 08/25/2022 please?

## 2022-09-21 ENCOUNTER — PATIENT MESSAGE (OUTPATIENT)
Dept: PHARMACY | Facility: CLINIC | Age: 67
End: 2022-09-21
Payer: MEDICARE

## 2022-09-22 ENCOUNTER — SPECIALTY PHARMACY (OUTPATIENT)
Dept: PHARMACY | Facility: CLINIC | Age: 67
End: 2022-09-22
Payer: MEDICARE

## 2022-10-12 ENCOUNTER — OFFICE VISIT (OUTPATIENT)
Dept: DERMATOLOGY | Facility: CLINIC | Age: 67
End: 2022-10-12
Payer: MEDICARE

## 2022-10-12 DIAGNOSIS — Z85.828 PERSONAL HISTORY OF SKIN CANCER: ICD-10-CM

## 2022-10-12 DIAGNOSIS — D48.5 NEOPLASM OF UNCERTAIN BEHAVIOR OF SKIN: Primary | ICD-10-CM

## 2022-10-12 PROCEDURE — 1159F PR MEDICATION LIST DOCUMENTED IN MEDICAL RECORD: ICD-10-PCS | Mod: CPTII,S$GLB,, | Performed by: DERMATOLOGY

## 2022-10-12 PROCEDURE — 1160F RVW MEDS BY RX/DR IN RCRD: CPT | Mod: CPTII,S$GLB,, | Performed by: DERMATOLOGY

## 2022-10-12 PROCEDURE — 4010F ACE/ARB THERAPY RXD/TAKEN: CPT | Mod: CPTII,S$GLB,, | Performed by: DERMATOLOGY

## 2022-10-12 PROCEDURE — 3051F HG A1C>EQUAL 7.0%<8.0%: CPT | Mod: CPTII,S$GLB,, | Performed by: DERMATOLOGY

## 2022-10-12 PROCEDURE — 4010F PR ACE/ARB THEARPY RXD/TAKEN: ICD-10-PCS | Mod: CPTII,S$GLB,, | Performed by: DERMATOLOGY

## 2022-10-12 PROCEDURE — 3051F PR MOST RECENT HEMOGLOBIN A1C LEVEL 7.0 - < 8.0%: ICD-10-PCS | Mod: CPTII,S$GLB,, | Performed by: DERMATOLOGY

## 2022-10-12 PROCEDURE — 99999 PR PBB SHADOW E&M-EST. PATIENT-LVL III: CPT | Mod: PBBFAC,,, | Performed by: DERMATOLOGY

## 2022-10-12 PROCEDURE — 1160F PR REVIEW ALL MEDS BY PRESCRIBER/CLIN PHARMACIST DOCUMENTED: ICD-10-PCS | Mod: CPTII,S$GLB,, | Performed by: DERMATOLOGY

## 2022-10-12 PROCEDURE — 99499 UNLISTED E&M SERVICE: CPT | Mod: S$GLB,,, | Performed by: DERMATOLOGY

## 2022-10-12 PROCEDURE — 99999 PR PBB SHADOW E&M-EST. PATIENT-LVL III: ICD-10-PCS | Mod: PBBFAC,,, | Performed by: DERMATOLOGY

## 2022-10-12 PROCEDURE — 1159F MED LIST DOCD IN RCRD: CPT | Mod: CPTII,S$GLB,, | Performed by: DERMATOLOGY

## 2022-10-12 PROCEDURE — 99499 NO LOS: ICD-10-PCS | Mod: S$GLB,,, | Performed by: DERMATOLOGY

## 2022-10-12 NOTE — PATIENT INSTRUCTIONS
Performed SHAWNA gene assay on nevi as depicted above  Discussed with patient that the Pigmented Lesion Assay is a non invasive genomic patch test that detects  mutations that can be present in melanoma. The results of the test may indicate whether a biopsy is needed or not. Clinically suspicious lesions should still be monitored for changes even if test is negative.

## 2022-10-12 NOTE — PROGRESS NOTES
Subjective:       Patient ID:  Jm Deleon is a 67 y.o. male who presents for No chief complaint on file.    Pt here for derm tech test to 2 moles on scalp and left upper arm   Reps here for new procedure details demo        Review of Systems     Objective:    Physical Exam   Skin:   Areas Examined (abnormalities noted in diagram):   Scalp / Hair Palpated and Inspected  Head / Face Inspection Performed  LUE Inspection Performed               L upper arm    R mid scalp        R mid scalp    Diagram Legend     Erythematous scaling macule/papule c/w actinic keratosis       Vascular papule c/w angioma      Pigmented verrucoid papule/plaque c/w seborrheic keratosis      Yellow umbilicated papule c/w sebaceous hyperplasia      Irregularly shaped tan macule c/w lentigo     1-2 mm smooth white papules consistent with Milia      Movable subcutaneous cyst with punctum c/w epidermal inclusion cyst      Subcutaneous movable cyst c/w pilar cyst      Firm pink to brown papule c/w dermatofibroma      Pedunculated fleshy papule(s) c/w skin tag(s)      Evenly pigmented macule c/w junctional nevus     Mildly variegated pigmented, slightly irregular-bordered macule c/w mildly atypical nevus      Flesh colored to evenly pigmented papule c/w intradermal nevus       Pink pearly papule/plaque c/w basal cell carcinoma      Erythematous hyperkeratotic cursted plaque c/w SCC      Surgical scar with no sign of skin cancer recurrence      Open and closed comedones      Inflammatory papules and pustules      Verrucoid papule consistent consistent with wart     Erythematous eczematous patches and plaques     Dystrophic onycholytic nail with subungual debris c/w onychomycosis     Umbilicated papule    Erythematous-base heme-crusted tan verrucoid plaque consistent with inflamed seborrheic keratosis     Erythematous Silvery Scaling Plaque c/w Psoriasis     See annotation      Assessment / Plan:        Neoplasm of uncertain behavior of skin x  2   R mid scalp - SHAWNA A  Left upper arm- SHAWNA B  Performed SHAWNA gene assay on nevi as depicted above  Discussed with patient that the Pigmented Lesion Assay is a non invasive genomic patch test that detects  mutations that can be present in melanoma. The results of the test may indicate whether a biopsy is needed or not. Clinically suspicious lesions should still be monitored for changes even if test is negative.           Follow up in 3 months (on 1/12/2023) for prn SHAWNA test; TBSE.     Initiate Treatment: Applying Zeosorb AF Powder once a day to affected areas as needed for flare ups Detail Level: Detailed Otc Regimen: Zeosorb AF Powder Plan: Patient is to contact the office if any problems occur for further evaluation and management

## 2022-10-20 ENCOUNTER — PATIENT MESSAGE (OUTPATIENT)
Dept: DERMATOLOGY | Facility: CLINIC | Age: 67
End: 2022-10-20
Payer: MEDICARE

## 2022-10-24 ENCOUNTER — SPECIALTY PHARMACY (OUTPATIENT)
Dept: PHARMACY | Facility: CLINIC | Age: 67
End: 2022-10-24
Payer: MEDICARE

## 2022-10-24 NOTE — TELEPHONE ENCOUNTER
Specialty Pharmacy - Refill Coordination    Specialty Medication Orders Linked to Encounter      Flowsheet Row Most Recent Value   Medication #1 evolocumab (REPATHA PUSHTRONEX) 420 mg/3.5 mL Injt (Order#[id number], Rx#[id number])            Refill Questions - Documented Responses      Flowsheet Row Most Recent Value   Patient Availability and HIPAA Verification    Does patient want to proceed with activity? Yes   HIPAA/medical authority confirmed? Yes   Relationship to patient of person spoken to? Self   Refill Screening Questions    Would patient like to speak to a pharmacist? No   When does the patient need to receive the medication? 10/28/22   Refill Delivery Questions    How will the patient receive the medication? MEDRx   When does the patient need to receive the medication? 10/28/22   Shipping Address Home   Address in [facility] confirmed and updated if neccessary? Yes   Expected Copay ($) 0   Is the patient able to afford the medication copay? Yes   Payment Method zero copay   Days supply of Refill 28   Supplies needed? No supplies needed   Refill activity completed? Yes   Refill activity plan Refill scheduled   Shipment/Pickup Date: 10/25/22            Current Outpatient Medications   Medication Sig    ACCU-CHEK SMARTVIEW TEST STRIP Strp 1 strip by Misc.(Non-Drug; Combo Route) route 3 (three) times daily. Patient needs an appointment    ammonium lactate 12 % Crea Apply small amount to feet except for in between toes twice a day    aspirin (ECOTRIN) 81 MG EC tablet Take 81 mg by mouth once daily.    atorvastatin (LIPITOR) 80 MG tablet Take 1 tablet (80 mg total) by mouth once daily.    blood-glucose meter Misc Use to check sugar 3 times daily. Accu-chek Emily. Patient needs an appointment    carvediloL (COREG) 25 MG tablet Take 1 tablet (25 mg total) by mouth 2 (two) times daily.    celecoxib (CELEBREX) 200 MG capsule Take 1 capsule (200 mg total) by mouth 2 (two) times daily.    diazePAM (VALIUM) 5 MG  "tablet Take 1 tablet (5 mg total) by mouth once daily at 6am. for 3 days    diclofenac sodium (VOLTAREN) 1 % Gel Apply 2 g topically 4 (four) times daily as needed (neck muscle spasm).    diphenhydrAMINE (BENADRYL) 25 mg capsule Take 50 mg by mouth nightly as needed for Itching.     evolocumab (REPATHA PUSHTRONEX) 420 mg/3.5 mL Injt Inject 3.5 mLs (420 mg total) into the skin every 30 days    ezetimibe (ZETIA) 10 mg tablet TAKE 1 TABLET EVERY DAY    FOLIC ACID/MULTIVITS-MIN (MULTIVITAMIN FOR CHOLESTEROL ORAL) Take 1 tablet by mouth nightly.     furosemide (LASIX) 40 MG tablet TAKE 1 TABLET EVERY DAY    insulin glargine, TOUJEO, (TOUJEO SOLOSTAR U-300 INSULIN) 300 unit/mL (1.5 mL) InPn pen Inject 45 Units into the skin once daily.    insulin needles, disposable, (BD INSULIN PEN NEEDLE UF ORIG) 29 x 1/2 " Ndle USE TWICE DAILY AS DIRECTED    JARDIANCE 25 mg tablet TAKE 1 TABLET EVERY DAY    lancets Misc 1 lancet by Misc.(Non-Drug; Combo Route) route 3 (three) times daily. accu-chek Fastclix. Patient needs an appointment    meclizine (ANTIVERT) 25 mg tablet Take 1 tablet (25 mg total) by mouth 3 (three) times daily as needed for Dizziness or Nausea.    metFORMIN (GLUCOPHAGE) 1000 MG tablet Take 1 tablet (1,000 mg total) by mouth 2 (two) times daily.    mupirocin (BACTROBAN) 2 % ointment AAA qd- bid    nitroGLYCERIN (NITROSTAT) 0.4 MG SL tablet PLACE 1 TABLET (0.4 MG TOTAL) UNDER THE TONGUE EVERY 5 (FIVE) MINUTES AS NEEDED FOR CHEST PAIN AS DIRECTED    NOVOLOG FLEXPEN U-100 INSULIN 100 unit/mL (3 mL) InPn pen Inject 12 Units into the skin 3 (three) times daily with meals.    pen needle, diabetic (BD INSULIN PEN NEEDLE UF MINI) 31 gauge x 3/16" Ndle 1 each by Misc.(Non-Drug; Combo Route) route 4 (four) times daily. Patient needs an appointment    semaglutide (OZEMPIC) 0.25 mg or 0.5 mg(2 mg/1.5 mL) pen injector Inject 0.5 mg into the skin every 7 days. Start with 0.25 mg weekly for 4 weeks and then increase to 0.5 mg if " you are tolerating this dose    valsartan (DIOVAN) 320 MG tablet TAKE 1 TABLET ONE TIME DAILY    VASCEPA 1 gram Cap TAKE 2 CAPSULES TWICE DAILY   Last reviewed on 10/12/2022  1:53 PM by Dana Cardoza MD    Review of patient's allergies indicates:   Allergen Reactions    Tramadol Hallucinations    Last reviewed on  10/12/2022 1:53 PM by Dana Cardoza      Tasks added this encounter   11/18/2022 - Refill Call (Auto Added)   Tasks due within next 3 months   No tasks due.     Kevin Nixon, PharmD  [facility]  [address] [facility] [address] [address]  Phone: [phone]  Fax: [phone]

## 2022-11-02 ENCOUNTER — PATIENT MESSAGE (OUTPATIENT)
Dept: INTERNAL MEDICINE | Facility: CLINIC | Age: 67
End: 2022-11-02
Payer: MEDICARE

## 2022-11-03 ENCOUNTER — OFFICE VISIT (OUTPATIENT)
Dept: DERMATOLOGY | Facility: CLINIC | Age: 67
End: 2022-11-03
Payer: MEDICARE

## 2022-11-03 DIAGNOSIS — D48.5 NEOPLASM OF UNCERTAIN BEHAVIOR OF SKIN: Primary | ICD-10-CM

## 2022-11-03 PROCEDURE — 99999 PR PBB SHADOW E&M-EST. PATIENT-LVL IV: CPT | Mod: PBBFAC,,, | Performed by: DERMATOLOGY

## 2022-11-03 PROCEDURE — 3051F PR MOST RECENT HEMOGLOBIN A1C LEVEL 7.0 - < 8.0%: ICD-10-PCS | Mod: CPTII,S$GLB,, | Performed by: DERMATOLOGY

## 2022-11-03 PROCEDURE — 1160F PR REVIEW ALL MEDS BY PRESCRIBER/CLIN PHARMACIST DOCUMENTED: ICD-10-PCS | Mod: CPTII,S$GLB,, | Performed by: DERMATOLOGY

## 2022-11-03 PROCEDURE — 1160F RVW MEDS BY RX/DR IN RCRD: CPT | Mod: CPTII,S$GLB,, | Performed by: DERMATOLOGY

## 2022-11-03 PROCEDURE — 11102 TANGNTL BX SKIN SINGLE LES: CPT | Mod: S$GLB,,, | Performed by: DERMATOLOGY

## 2022-11-03 PROCEDURE — 99499 UNLISTED E&M SERVICE: CPT | Mod: S$GLB,,, | Performed by: DERMATOLOGY

## 2022-11-03 PROCEDURE — 1159F PR MEDICATION LIST DOCUMENTED IN MEDICAL RECORD: ICD-10-PCS | Mod: CPTII,S$GLB,, | Performed by: DERMATOLOGY

## 2022-11-03 PROCEDURE — 88305 TISSUE EXAM BY PATHOLOGIST: ICD-10-PCS | Mod: 26,,, | Performed by: PATHOLOGY

## 2022-11-03 PROCEDURE — 99499 NO LOS: ICD-10-PCS | Mod: S$GLB,,, | Performed by: DERMATOLOGY

## 2022-11-03 PROCEDURE — 4010F PR ACE/ARB THEARPY RXD/TAKEN: ICD-10-PCS | Mod: CPTII,S$GLB,, | Performed by: DERMATOLOGY

## 2022-11-03 PROCEDURE — 11102 PR TANGENTIAL BIOPSY, SKIN, SINGLE LESION: ICD-10-PCS | Mod: S$GLB,,, | Performed by: DERMATOLOGY

## 2022-11-03 PROCEDURE — 99999 PR PBB SHADOW E&M-EST. PATIENT-LVL IV: ICD-10-PCS | Mod: PBBFAC,,, | Performed by: DERMATOLOGY

## 2022-11-03 PROCEDURE — 1159F MED LIST DOCD IN RCRD: CPT | Mod: CPTII,S$GLB,, | Performed by: DERMATOLOGY

## 2022-11-03 PROCEDURE — 4010F ACE/ARB THERAPY RXD/TAKEN: CPT | Mod: CPTII,S$GLB,, | Performed by: DERMATOLOGY

## 2022-11-03 PROCEDURE — 88305 TISSUE EXAM BY PATHOLOGIST: CPT | Mod: 26,,, | Performed by: PATHOLOGY

## 2022-11-03 PROCEDURE — 3051F HG A1C>EQUAL 7.0%<8.0%: CPT | Mod: CPTII,S$GLB,, | Performed by: DERMATOLOGY

## 2022-11-03 PROCEDURE — 88305 TISSUE EXAM BY PATHOLOGIST: CPT | Performed by: PATHOLOGY

## 2022-11-03 NOTE — PATIENT INSTRUCTIONS
Shave Biopsy Wound Care    Your doctor has performed a shave biopsy today.  A band aid and vaseline ointment has been placed over the site.  This should remain in place for NO LONGER THAN 48 hours.  It is fine to remove the bandaid after 24 hours, if the area is no longer bleeding. It is recommended that you keep the area dry (do not wet)) for the first 24 hours.  After 24 hours, wash the area with warm soap and water and apply Vaseline jelly.  Many patients prefer to use Neosporin or Bacitracin ointment.  This is acceptable; however, know that you can develop an allergy to this medication even if you have used it safely for years.  It is important to keep the area moist.  Letting it dry out and get air slows healing time, and will worsen the scar.        If you notice increasing redness, tenderness, pain, or yellow drainage at the biopsy site, please notify your doctor.  These are signs of an infection.    If your biopsy site is bleeding, apply firm pressure for 15 minutes straight.  Repeat for another 15 minutes, if it is still bleeding.   If the surgical site continues to bleed, then please contact your doctor.      For [facility] users:   You will receive your biopsy results in [facility] as soon as they are available. Please be assured that your physician/provider will review your results and will then determine what further treatment, evaluation, or planning is required. You should be contacted by your physician's/provider's office within 5 business days of receiving your results; If not, please reach out to directly. This is one more way [employer] is putting you first.     [facility] [place] [address]/ [phone] FAX/ [email or web address]      Shave Biopsy Wound Care    Your doctor has performed a shave biopsy today.  A band aid and vaseline ointment has been placed over the site.  This should remain in place for NO LONGER THAN 48 hours.  It is fine to remove the bandaid after 24  hours, if the area is no longer bleeding. It is recommended that you keep the area dry (do not wet)) for the first 24 hours.  After 24 hours, wash the area with warm soap and water and apply Vaseline jelly.  Many patients prefer to use Neosporin or Bacitracin ointment.  This is acceptable; however, know that you can develop an allergy to this medication even if you have used it safely for years.  It is important to keep the area moist.  Letting it dry out and get air slows healing time, and will worsen the scar.        If you notice increasing redness, tenderness, pain, or yellow drainage at the biopsy site, please notify your doctor.  These are signs of an infection.    If your biopsy site is bleeding, apply firm pressure for 15 minutes straight.  Repeat for another 15 minutes, if it is still bleeding.   If the surgical site continues to bleed, then please contact your doctor.      For [email or web address] users:   You will receive your biopsy results in [facility] as soon as they are available. Please be assured that your physician/provider will review your results and will then determine what further treatment, evaluation, or planning is required. You should be contacted by your physician's/provider's office within 5 business days of receiving your results; If not, please reach out to directly. This is one more way [facility] is putting you first.     [facility] [place] [address]/ [phone] FAX/ [email or web address]

## 2022-11-03 NOTE — PROGRESS NOTES
Subjective:       Patient ID:  Jm Deleon is a 67 y.o. male who presents for   Chief Complaint   Patient presents with    Biopsy     Pt here today for a biopsy on mid scalp. SHAWNA test showed +Tert, negative LINC/PRAME  Pt with hx of melanoma     Review of Systems   Skin:  Negative for tendency to form keloidal scars.   Hematologic/Lymphatic: Does not bruise/bleed easily.      Objective:    Physical Exam   Constitutional: He appears well-developed and well-nourished. No distress.   Neurological: He is alert and oriented to person, place, and time. He is not disoriented.   Psychiatric: He has a normal mood and affect.   Skin:   Areas Examined (abnormalities noted in diagram):   Scalp / Hair Palpated and Inspected                  Diagram Legend     Erythematous scaling macule/papule c/w actinic keratosis       Vascular papule c/w angioma      Pigmented verrucoid papule/plaque c/w seborrheic keratosis      Yellow umbilicated papule c/w sebaceous hyperplasia      Irregularly shaped tan macule c/w lentigo     1-2 mm smooth white papules consistent with Milia      Movable subcutaneous cyst with punctum c/w epidermal inclusion cyst      Subcutaneous movable cyst c/w pilar cyst      Firm pink to brown papule c/w dermatofibroma      Pedunculated fleshy papule(s) c/w skin tag(s)      Evenly pigmented macule c/w junctional nevus     Mildly variegated pigmented, slightly irregular-bordered macule c/w mildly atypical nevus      Flesh colored to evenly pigmented papule c/w intradermal nevus       Pink pearly papule/plaque c/w basal cell carcinoma      Erythematous hyperkeratotic cursted plaque c/w SCC      Surgical scar with no sign of skin cancer recurrence      Open and closed comedones      Inflammatory papules and pustules      Verrucoid papule consistent consistent with wart     Erythematous eczematous patches and plaques     Dystrophic onycholytic nail with subungual debris c/w onychomycosis     Umbilicated papule     Erythematous-base heme-crusted tan verrucoid plaque consistent with inflamed seborrheic keratosis     Erythematous Silvery Scaling Plaque c/w Psoriasis     See annotation      Assessment / Plan:      Pathology Orders:       Normal Orders This Visit    Specimen to Pathology, Dermatology     Questions:    Procedure Type: Dermatology and skin neoplasms    Number of Specimens: 1    ------------------------: -------------------------    Spec 1 Procedure: Biopsy    Spec 1 Clinical Impression: r/o atypical nevus v macular sk    Spec 1 Source: right mid scalp    Release to patient:           Neoplasm of uncertain behavior of skin  Shave biopsy procedure note:    Shave biopsy performed after verbal consent including risk of infection, scar, recurrence, need for additional treatment of site. Area prepped with alcohol, anesthetized with approximately 1.0cc of 1% lidocaine with epinephrine. Lesional tissue shaved with razor blade. Hemostasis achieved with application of aluminum chloride followed by hyfrecation. No complications. Dressing applied. Wound care explained.    Pt with pos TERT, neg linc/prame  -     Specimen to Pathology, Dermatology           Follow up for prn bx report.

## 2022-11-04 ENCOUNTER — LAB VISIT (OUTPATIENT)
Dept: LAB | Facility: HOSPITAL | Age: 67
End: 2022-11-04
Attending: INTERNAL MEDICINE
Payer: MEDICARE

## 2022-11-04 DIAGNOSIS — E11.9 DIABETES MELLITUS WITHOUT COMPLICATION: ICD-10-CM

## 2022-11-04 LAB
ALBUMIN SERPL BCP-MCNC: 3.9 G/DL (ref 3.5–5.2)
ALP SERPL-CCNC: 63 U/L (ref 55–135)
ALT SERPL W/O P-5'-P-CCNC: 26 U/L (ref 10–44)
ANION GAP SERPL CALC-SCNC: 8 MMOL/L (ref 8–16)
AST SERPL-CCNC: 20 U/L (ref 10–40)
BILIRUB SERPL-MCNC: 0.9 MG/DL (ref 0.1–1)
BUN SERPL-MCNC: 12 MG/DL (ref 8–23)
CALCIUM SERPL-MCNC: 9.6 MG/DL (ref 8.7–10.5)
CHLORIDE SERPL-SCNC: 106 MMOL/L (ref 95–110)
CHOLEST SERPL-MCNC: 75 MG/DL (ref 120–199)
CHOLEST/HDLC SERPL: 1.9 {RATIO} (ref 2–5)
CO2 SERPL-SCNC: 28 MMOL/L (ref 23–29)
CREAT SERPL-MCNC: 0.9 MG/DL (ref 0.5–1.4)
EST. GFR  (NO RACE VARIABLE): >60 ML/MIN/1.73 M^2
ESTIMATED AVG GLUCOSE: 194 MG/DL (ref 68–131)
GLUCOSE SERPL-MCNC: 170 MG/DL (ref 70–110)
HBA1C MFR BLD: 8.4 % (ref 4–5.6)
HDLC SERPL-MCNC: 40 MG/DL (ref 40–75)
HDLC SERPL: 53.3 % (ref 20–50)
LDLC SERPL CALC-MCNC: 8.8 MG/DL (ref 63–159)
NONHDLC SERPL-MCNC: 35 MG/DL
POTASSIUM SERPL-SCNC: 4.9 MMOL/L (ref 3.5–5.1)
PROT SERPL-MCNC: 6.5 G/DL (ref 6–8.4)
SODIUM SERPL-SCNC: 142 MMOL/L (ref 136–145)
TRIGL SERPL-MCNC: 131 MG/DL (ref 30–150)

## 2022-11-04 PROCEDURE — 36415 COLL VENOUS BLD VENIPUNCTURE: CPT | Performed by: INTERNAL MEDICINE

## 2022-11-04 PROCEDURE — 83036 HEMOGLOBIN GLYCOSYLATED A1C: CPT | Performed by: INTERNAL MEDICINE

## 2022-11-04 PROCEDURE — 80053 COMPREHEN METABOLIC PANEL: CPT | Performed by: INTERNAL MEDICINE

## 2022-11-04 PROCEDURE — 80061 LIPID PANEL: CPT | Performed by: INTERNAL MEDICINE

## 2022-11-07 ENCOUNTER — PATIENT MESSAGE (OUTPATIENT)
Dept: ENDOCRINOLOGY | Facility: CLINIC | Age: 67
End: 2022-11-07
Payer: MEDICARE

## 2022-11-08 ENCOUNTER — PATIENT MESSAGE (OUTPATIENT)
Dept: ENDOCRINOLOGY | Facility: CLINIC | Age: 67
End: 2022-11-08
Payer: MEDICARE

## 2022-11-09 LAB
FINAL PATHOLOGIC DIAGNOSIS: NORMAL
GROSS: NORMAL
Lab: NORMAL
MICROSCOPIC EXAM: NORMAL

## 2022-11-10 ENCOUNTER — PATIENT MESSAGE (OUTPATIENT)
Dept: CARDIOLOGY | Facility: CLINIC | Age: 67
End: 2022-11-10
Payer: MEDICARE

## 2022-11-10 NOTE — PROGRESS NOTES
Mr. Deleon, your pathology report indicates a benign, non cancerous lesion. No further treatment is needed at this time. Please schedule a follow up appointment in 2-3 months. Thank you for allowing me to care for you and take care, Dr. Cardoza    1. Skin, right mid scalp, shave biopsy:   - SOLAR LENTIGO.   This lesion is benign

## 2022-11-18 ENCOUNTER — SPECIALTY PHARMACY (OUTPATIENT)
Dept: PHARMACY | Facility: CLINIC | Age: 67
End: 2022-11-18
Payer: MEDICARE

## 2022-11-18 NOTE — TELEPHONE ENCOUNTER
Specialty Pharmacy - Refill Coordination    Specialty Medication Orders Linked to Encounter      Flowsheet Row Most Recent Value   Medication #1 evolocumab (REPATHA PUSHTRONEX) 420 mg/3.5 mL Injt (Order#[id number], Rx#[id number])            Refill Questions - Documented Responses      Flowsheet Row Most Recent Value   Patient Availability and HIPAA Verification    Does patient want to proceed with activity? Yes   HIPAA/medical authority confirmed? Yes   Relationship to patient of person spoken to? Self            Current Outpatient Medications   Medication Sig    ACCU-CHEK SMARTVIEW TEST STRIP Strp 1 strip by Misc.(Non-Drug; Combo Route) route 3 (three) times daily. Patient needs an appointment    ammonium lactate 12 % Crea Apply small amount to feet except for in between toes twice a day    aspirin (ECOTRIN) 81 MG EC tablet Take 81 mg by mouth once daily.    atorvastatin (LIPITOR) 80 MG tablet Take 1 tablet (80 mg total) by mouth once daily.    blood-glucose meter Misc Use to check sugar 3 times daily. Accu-chek Emily. Patient needs an appointment    carvediloL (COREG) 25 MG tablet Take 1 tablet (25 mg total) by mouth 2 (two) times daily.    celecoxib (CELEBREX) 200 MG capsule Take 1 capsule (200 mg total) by mouth 2 (two) times daily.    diazePAM (VALIUM) 5 MG tablet Take 1 tablet (5 mg total) by mouth once daily at 6am. for 3 days    diclofenac sodium (VOLTAREN) 1 % Gel Apply 2 g topically 4 (four) times daily as needed (neck muscle spasm).    diphenhydrAMINE (BENADRYL) 25 mg capsule Take 50 mg by mouth nightly as needed for Itching.     evolocumab (REPATHA PUSHTRONEX) 420 mg/3.5 mL Injt Inject 3.5 mLs (420 mg total) into the skin every 30 days    ezetimibe (ZETIA) 10 mg tablet TAKE 1 TABLET EVERY DAY    FOLIC ACID/MULTIVITS-MIN (MULTIVITAMIN FOR CHOLESTEROL ORAL) Take 1 tablet by mouth nightly.     furosemide (LASIX) 40 MG tablet TAKE 1 TABLET EVERY DAY    insulin glargine, TOUJEO, (TOUJEO SOLOSTAR U-300 INSULIN)  "300 unit/mL (1.5 mL) InPn pen Inject 45 Units into the skin once daily.    insulin needles, disposable, (BD INSULIN PEN NEEDLE UF ORIG) 29 x 1/2 " Ndle USE TWICE DAILY AS DIRECTED    JARDIANCE 25 mg tablet TAKE 1 TABLET EVERY DAY    lancets Misc 1 lancet by Misc.(Non-Drug; Combo Route) route 3 (three) times daily. accu-chek Fastclix. Patient needs an appointment    meclizine (ANTIVERT) 25 mg tablet Take 1 tablet (25 mg total) by mouth 3 (three) times daily as needed for Dizziness or Nausea.    metFORMIN (GLUCOPHAGE) 1000 MG tablet Take 1 tablet (1,000 mg total) by mouth 2 (two) times daily.    mupirocin (BACTROBAN) 2 % ointment AAA qd- bid    nitroGLYCERIN (NITROSTAT) 0.4 MG SL tablet PLACE 1 TABLET (0.4 MG TOTAL) UNDER THE TONGUE EVERY 5 (FIVE) MINUTES AS NEEDED FOR CHEST PAIN AS DIRECTED    NOVOLOG FLEXPEN U-100 INSULIN 100 unit/mL (3 mL) InPn pen Inject 12 Units into the skin 3 (three) times daily with meals.    pen needle, diabetic (BD INSULIN PEN NEEDLE UF MINI) 31 gauge x 3/16" Ndle 1 each by Misc.(Non-Drug; Combo Route) route 4 (four) times daily. Patient needs an appointment    semaglutide (OZEMPIC) 0.25 mg or 0.5 mg(2 mg/1.5 mL) pen injector Inject 0.5 mg into the skin every 7 days. Start with 0.25 mg weekly for 4 weeks and then increase to 0.5 mg if you are tolerating this dose    valsartan (DIOVAN) 320 MG tablet TAKE 1 TABLET ONE TIME DAILY    VASCEPA 1 gram Cap TAKE 2 CAPSULES TWICE DAILY   Last reviewed on 11/3/2022  3:48 PM by Dana Cardoza MD    Review of patient's allergies indicates:   Allergen Reactions    Tramadol Hallucinations    Last reviewed on  11/3/2022 3:48 PM by Dana Cardoza      Tasks added this encounter   11/23/2022 - Refill Call (Auto Added)   Tasks due within next 3 months   No tasks due.     Alessia Varghese, PharmD  [facility] - Specialty Pharm[facility]  [address] [address]  Phone: [phone]  Fax: [phone] "

## 2022-11-28 ENCOUNTER — SPECIALTY PHARMACY (OUTPATIENT)
Dept: PHARMACY | Facility: CLINIC | Age: 67
End: 2022-11-28
Payer: MEDICARE

## 2022-11-28 NOTE — TELEPHONE ENCOUNTER
Specialty Pharmacy - Refill Coordination    Specialty Medication Orders Linked to Encounter      Flowsheet Row Most Recent Value   Medication #1 evolocumab (REPATHA PUSHTRONEX) 420 mg/3.5 mL Injt (Order#[id number], Rx#[id number])            Refill Questions - Documented Responses      Flowsheet Row Most Recent Value   Patient Availability and HIPAA Verification    Does patient want to proceed with activity? Yes   HIPAA/medical authority confirmed? Yes   Relationship to patient of person spoken to? Self   Refill Screening Questions    Would patient like to speak to a pharmacist? No   When does the patient need to receive the medication? 12/01/22   Refill Delivery Questions    How will the patient receive the medication? MEDRx   When does the patient need to receive the medication? 12/01/22   Shipping Address Home   Address in [facility] confirmed and updated if neccessary? Yes   Expected Copay ($) 0   Is the patient able to afford the medication copay? Yes   Payment Method zero copay   Days supply of Refill 28   Supplies needed? No supplies needed   Refill activity completed? Yes   Refill activity plan Refill scheduled   Shipment/Pickup Date: 11/28/22            Current Outpatient Medications   Medication Sig    ACCU-CHEK SMARTVIEW TEST STRIP Strp 1 strip by Misc.(Non-Drug; Combo Route) route 3 (three) times daily. Patient needs an appointment    ammonium lactate 12 % Crea Apply small amount to feet except for in between toes twice a day    aspirin (ECOTRIN) 81 MG EC tablet Take 81 mg by mouth once daily.    atorvastatin (LIPITOR) 80 MG tablet Take 1 tablet (80 mg total) by mouth once daily.    blood-glucose meter Misc Use to check sugar 3 times daily. Accu-chek Emily. Patient needs an appointment    carvediloL (COREG) 25 MG tablet Take 1 tablet (25 mg total) by mouth 2 (two) times daily.    celecoxib (CELEBREX) 200 MG capsule Take 1 capsule (200 mg total) by mouth 2 (two) times daily.    diazePAM (VALIUM) 5 MG  "tablet Take 1 tablet (5 mg total) by mouth once daily at 6am. for 3 days    diclofenac sodium (VOLTAREN) 1 % Gel Apply 2 g topically 4 (four) times daily as needed (neck muscle spasm).    diphenhydrAMINE (BENADRYL) 25 mg capsule Take 50 mg by mouth nightly as needed for Itching.     evolocumab (REPATHA PUSHTRONEX) 420 mg/3.5 mL Injt Inject 3.5 mLs (420 mg total) into the skin every 30 days    ezetimibe (ZETIA) 10 mg tablet TAKE 1 TABLET EVERY DAY    FOLIC ACID/MULTIVITS-MIN (MULTIVITAMIN FOR CHOLESTEROL ORAL) Take 1 tablet by mouth nightly.     furosemide (LASIX) 40 MG tablet TAKE 1 TABLET EVERY DAY    insulin glargine, TOUJEO, (TOUJEO SOLOSTAR U-300 INSULIN) 300 unit/mL (1.5 mL) InPn pen Inject 45 Units into the skin once daily.    insulin needles, disposable, (BD INSULIN PEN NEEDLE UF ORIG) 29 x 1/2 " Ndle USE TWICE DAILY AS DIRECTED    JARDIANCE 25 mg tablet TAKE 1 TABLET EVERY DAY    lancets Misc 1 lancet by Misc.(Non-Drug; Combo Route) route 3 (three) times daily. accu-chek Fastclix. Patient needs an appointment    meclizine (ANTIVERT) 25 mg tablet Take 1 tablet (25 mg total) by mouth 3 (three) times daily as needed for Dizziness or Nausea.    metFORMIN (GLUCOPHAGE) 1000 MG tablet Take 1 tablet (1,000 mg total) by mouth 2 (two) times daily.    mupirocin (BACTROBAN) 2 % ointment AAA qd- bid    nitroGLYCERIN (NITROSTAT) 0.4 MG SL tablet PLACE 1 TABLET (0.4 MG TOTAL) UNDER THE TONGUE EVERY 5 (FIVE) MINUTES AS NEEDED FOR CHEST PAIN AS DIRECTED    NOVOLOG FLEXPEN U-100 INSULIN 100 unit/mL (3 mL) InPn pen Inject 12 Units into the skin 3 (three) times daily with meals.    pen needle, diabetic (BD INSULIN PEN NEEDLE UF MINI) 31 gauge x 3/16" Ndle 1 each by Misc.(Non-Drug; Combo Route) route 4 (four) times daily. Patient needs an appointment    semaglutide (OZEMPIC) 0.25 mg or 0.5 mg(2 mg/1.5 mL) pen injector Inject 0.5 mg into the skin every 7 days. Start with 0.25 mg weekly for 4 weeks and then increase to 0.5 mg if " you are tolerating this dose    valsartan (DIOVAN) 320 MG tablet TAKE 1 TABLET ONE TIME DAILY    VASCEPA 1 gram Cap TAKE 2 CAPSULES TWICE DAILY   Last reviewed on 11/3/2022  3:48 PM by Dana Cardoza MD    Review of patient's allergies indicates:   Allergen Reactions    Tramadol Hallucinations    Last reviewed on  11/3/2022 3:48 PM by Dana Cardoza      Tasks added this encounter   12/29/2022 - Refill Call (Auto Added)   Tasks due within next 3 months   No tasks due.     Jesus Garza  [facility] - Specialty Pharmacy  [address] [address]  Phone: [phone]  Fax: [phone]

## 2022-11-29 ENCOUNTER — PATIENT MESSAGE (OUTPATIENT)
Dept: ENDOCRINOLOGY | Facility: CLINIC | Age: 67
End: 2022-11-29
Payer: MEDICARE

## 2022-12-02 ENCOUNTER — IMMUNIZATION (OUTPATIENT)
Dept: INTERNAL MEDICINE | Facility: CLINIC | Age: 67
End: 2022-12-02
Payer: MEDICARE

## 2022-12-02 DIAGNOSIS — Z23 NEED FOR VACCINATION: Primary | ICD-10-CM

## 2022-12-02 PROCEDURE — 91312 COVID-19, MRNA, LNP-S, BIVALENT BOOSTER, PF, 30 MCG/0.3 ML DOSE: ICD-10-PCS | Mod: S$GLB,,, | Performed by: INTERNAL MEDICINE

## 2022-12-02 PROCEDURE — 91312 COVID-19, MRNA, LNP-S, BIVALENT BOOSTER, PF, 30 MCG/0.3 ML DOSE: CPT | Mod: S$GLB,,, | Performed by: INTERNAL MEDICINE

## 2022-12-02 PROCEDURE — 0124A COVID-19, MRNA, LNP-S, BIVALENT BOOSTER, PF, 30 MCG/0.3 ML DOSE: CPT | Mod: CV19,PBBFAC | Performed by: INTERNAL MEDICINE

## 2023-01-04 ENCOUNTER — SPECIALTY PHARMACY (OUTPATIENT)
Dept: PHARMACY | Facility: CLINIC | Age: 68
End: 2023-01-04
Payer: MEDICARE

## 2023-01-04 NOTE — TELEPHONE ENCOUNTER
Federica Baudilio information   CARD #: [id number]  BIN: [id number]  PCN: [id number]  GROUP: [id number]  Effective 12/19/22 - 12/18/23

## 2023-01-04 NOTE — TELEPHONE ENCOUNTER
Specialty Pharmacy - Refill Coordination    Specialty Medication Orders Linked to Encounter      Flowsheet Row Most Recent Value   Medication #1 evolocumab (REPATHA PUSHTRONEX) 420 mg/3.5 mL Injt (Order#[id number], Rx#[id number])            Refill Questions - Documented Responses      Flowsheet Row Most Recent Value   Patient Availability and HIPAA Verification    Does patient want to proceed with activity? Yes   HIPAA/medical authority confirmed? Yes   Relationship to patient of person spoken to? Self   Refill Screening Questions    Would patient like to speak to a pharmacist? No   When does the patient need to receive the medication? 01/02/23   Refill Delivery Questions    How will the patient receive the medication? MEDRx   When does the patient need to receive the medication? 01/02/23   Shipping Address Home   Address in [facility] confirmed and updated if neccessary? Yes   Expected Copay ($) 0   Is the patient able to afford the medication copay? Yes   Payment Method zero copay   Days supply of Refill 28   Supplies needed? No supplies needed   Refill activity completed? Yes   Refill activity plan Refill scheduled   Shipment/Pickup Date: 01/05/23            Current Outpatient Medications   Medication Sig    ACCU-CHEK SMARTVIEW TEST STRIP Strp 1 strip by Misc.(Non-Drug; Combo Route) route 3 (three) times daily. Patient needs an appointment    ammonium lactate 12 % Crea Apply small amount to feet except for in between toes twice a day    aspirin (ECOTRIN) 81 MG EC tablet Take 81 mg by mouth once daily.    atorvastatin (LIPITOR) 80 MG tablet Take 1 tablet (80 mg total) by mouth once daily.    blood-glucose meter Misc Use to check sugar 3 times daily. Accu-chek Emily. Patient needs an appointment    carvediloL (COREG) 25 MG tablet Take 1 tablet (25 mg total) by mouth 2 (two) times daily.    celecoxib (CELEBREX) 200 MG capsule Take 1 capsule (200 mg total) by mouth 2 (two) times daily.    diazePAM (VALIUM) 5 MG  "tablet Take 1 tablet (5 mg total) by mouth once daily at 6am. for 3 days    diclofenac sodium (VOLTAREN) 1 % Gel Apply 2 g topically 4 (four) times daily as needed (neck muscle spasm).    diphenhydrAMINE (BENADRYL) 25 mg capsule Take 50 mg by mouth nightly as needed for Itching.     evolocumab (REPATHA PUSHTRONEX) 420 mg/3.5 mL Injt Inject 3.5 mLs (420 mg total) into the skin every 30 days    ezetimibe (ZETIA) 10 mg tablet TAKE 1 TABLET EVERY DAY    FOLIC ACID/MULTIVITS-MIN (MULTIVITAMIN FOR CHOLESTEROL ORAL) Take 1 tablet by mouth nightly.     furosemide (LASIX) 40 MG tablet TAKE 1 TABLET EVERY DAY    insulin glargine, TOUJEO, (TOUJEO SOLOSTAR U-300 INSULIN) 300 unit/mL (1.5 mL) InPn pen Inject 45 Units into the skin once daily.    insulin needles, disposable, (BD INSULIN PEN NEEDLE UF ORIG) 29 x 1/2 " Ndle USE TWICE DAILY AS DIRECTED    JARDIANCE 25 mg tablet TAKE 1 TABLET EVERY DAY    lancets Misc 1 lancet by Misc.(Non-Drug; Combo Route) route 3 (three) times daily. accu-chek Fastclix. Patient needs an appointment    meclizine (ANTIVERT) 25 mg tablet Take 1 tablet (25 mg total) by mouth 3 (three) times daily as needed for Dizziness or Nausea.    metFORMIN (GLUCOPHAGE) 1000 MG tablet Take 1 tablet (1,000 mg total) by mouth 2 (two) times daily.    mupirocin (BACTROBAN) 2 % ointment AAA qd- bid    nitroGLYCERIN (NITROSTAT) 0.4 MG SL tablet PLACE 1 TABLET (0.4 MG TOTAL) UNDER THE TONGUE EVERY 5 (FIVE) MINUTES AS NEEDED FOR CHEST PAIN AS DIRECTED    NOVOLOG FLEXPEN U-100 INSULIN 100 unit/mL (3 mL) InPn pen Inject 12 Units into the skin 3 (three) times daily with meals.    pen needle, diabetic (BD INSULIN PEN NEEDLE UF MINI) 31 gauge x 3/16" Ndle 1 each by Misc.(Non-Drug; Combo Route) route 4 (four) times daily. Patient needs an appointment    semaglutide (OZEMPIC) 0.25 mg or 0.5 mg(2 mg/1.5 mL) pen injector Inject 0.5 mg into the skin every 7 days.    valsartan (DIOVAN) 320 MG tablet TAKE 1 TABLET ONE TIME DAILY    " VASCEPA 1 gram Cap TAKE 2 CAPSULES TWICE DAILY   Last reviewed on 11/3/2022  3:48 PM by Dana Cardoza MD    Review of patient's allergies indicates:   Allergen Reactions    Tramadol Hallucinations    Last reviewed on  12/30/2022 3:44 PM by Dee Degroot      Tasks added this encounter   No tasks added.   Tasks due within next 3 months   12/29/2022 - Refill Call (Auto Added)     Cory Antonio, PharmD  [facility]  [address] [place] [id number]  Phone: [phone]  Fax: [phone]

## 2023-01-04 NOTE — TELEPHONE ENCOUNTER
Spoke with patient for Repatha refill. Baudilio . Patient overdue for injection as [place]. Routing to HLD team. Patient aware [facility] will follow up.

## 2023-01-09 ENCOUNTER — PATIENT MESSAGE (OUTPATIENT)
Dept: ENDOCRINOLOGY | Facility: CLINIC | Age: 68
End: 2023-01-09
Payer: MEDICARE

## 2023-01-09 ENCOUNTER — SPECIALTY PHARMACY (OUTPATIENT)
Dept: PHARMACY | Facility: CLINIC | Age: 68
End: 2023-01-09
Payer: MEDICARE

## 2023-01-09 ENCOUNTER — OFFICE VISIT (OUTPATIENT)
Dept: DERMATOLOGY | Facility: CLINIC | Age: 68
End: 2023-01-09
Payer: MEDICARE

## 2023-01-09 DIAGNOSIS — Z85.828 PERSONAL HISTORY OF SKIN CANCER: ICD-10-CM

## 2023-01-09 DIAGNOSIS — I25.10 CORONARY ARTERY DISEASE INVOLVING NATIVE CORONARY ARTERY OF NATIVE HEART WITHOUT ANGINA PECTORIS: ICD-10-CM

## 2023-01-09 DIAGNOSIS — L57.0 AK (ACTINIC KERATOSIS): Primary | ICD-10-CM

## 2023-01-09 DIAGNOSIS — Z86.018 HISTORY OF DYSPLASTIC NEVUS: ICD-10-CM

## 2023-01-09 DIAGNOSIS — L81.4 LENTIGO: ICD-10-CM

## 2023-01-09 DIAGNOSIS — L90.5 SCAR: ICD-10-CM

## 2023-01-09 DIAGNOSIS — E11.9 DIABETES MELLITUS WITHOUT COMPLICATION: ICD-10-CM

## 2023-01-09 DIAGNOSIS — Z85.820 HISTORY OF MALIGNANT MELANOMA: ICD-10-CM

## 2023-01-09 DIAGNOSIS — D18.01 CHERRY ANGIOMA: ICD-10-CM

## 2023-01-09 DIAGNOSIS — D22.9 NEVUS: ICD-10-CM

## 2023-01-09 DIAGNOSIS — E78.5 DYSLIPIDEMIA: ICD-10-CM

## 2023-01-09 DIAGNOSIS — L82.1 SK (SEBORRHEIC KERATOSIS): ICD-10-CM

## 2023-01-09 PROCEDURE — 3288F PR FALLS RISK ASSESSMENT DOCUMENTED: ICD-10-PCS | Mod: CPTII,S$GLB,, | Performed by: DERMATOLOGY

## 2023-01-09 PROCEDURE — 1100F PTFALLS ASSESS-DOCD GE2>/YR: CPT | Mod: CPTII,S$GLB,, | Performed by: DERMATOLOGY

## 2023-01-09 PROCEDURE — 99213 OFFICE O/P EST LOW 20 MIN: CPT | Mod: 25,S$GLB,, | Performed by: DERMATOLOGY

## 2023-01-09 PROCEDURE — 1159F PR MEDICATION LIST DOCUMENTED IN MEDICAL RECORD: ICD-10-PCS | Mod: CPTII,S$GLB,, | Performed by: DERMATOLOGY

## 2023-01-09 PROCEDURE — 3072F LOW RISK FOR RETINOPATHY: CPT | Mod: CPTII,S$GLB,, | Performed by: DERMATOLOGY

## 2023-01-09 PROCEDURE — 99999 PR PBB SHADOW E&M-EST. PATIENT-LVL IV: ICD-10-PCS | Mod: PBBFAC,,, | Performed by: DERMATOLOGY

## 2023-01-09 PROCEDURE — 17000 PR DESTRUCTION(LASER SURGERY,CRYOSURGERY,CHEMOSURGERY),PREMALIGNANT LESIONS,FIRST LESION: ICD-10-PCS | Mod: S$GLB,,, | Performed by: DERMATOLOGY

## 2023-01-09 PROCEDURE — 99999 PR PBB SHADOW E&M-EST. PATIENT-LVL IV: CPT | Mod: PBBFAC,,, | Performed by: DERMATOLOGY

## 2023-01-09 PROCEDURE — 1126F PR PAIN SEVERITY QUANTIFIED, NO PAIN PRESENT: ICD-10-PCS | Mod: CPTII,S$GLB,, | Performed by: DERMATOLOGY

## 2023-01-09 PROCEDURE — 99213 PR OFFICE/OUTPT VISIT, EST, LEVL III, 20-29 MIN: ICD-10-PCS | Mod: 25,S$GLB,, | Performed by: DERMATOLOGY

## 2023-01-09 PROCEDURE — 1126F AMNT PAIN NOTED NONE PRSNT: CPT | Mod: CPTII,S$GLB,, | Performed by: DERMATOLOGY

## 2023-01-09 PROCEDURE — 1160F PR REVIEW ALL MEDS BY PRESCRIBER/CLIN PHARMACIST DOCUMENTED: ICD-10-PCS | Mod: CPTII,S$GLB,, | Performed by: DERMATOLOGY

## 2023-01-09 PROCEDURE — 1160F RVW MEDS BY RX/DR IN RCRD: CPT | Mod: CPTII,S$GLB,, | Performed by: DERMATOLOGY

## 2023-01-09 PROCEDURE — 1159F MED LIST DOCD IN RCRD: CPT | Mod: CPTII,S$GLB,, | Performed by: DERMATOLOGY

## 2023-01-09 PROCEDURE — 3072F PR LOW RISK FOR RETINOPATHY: ICD-10-PCS | Mod: CPTII,S$GLB,, | Performed by: DERMATOLOGY

## 2023-01-09 PROCEDURE — 3288F FALL RISK ASSESSMENT DOCD: CPT | Mod: CPTII,S$GLB,, | Performed by: DERMATOLOGY

## 2023-01-09 PROCEDURE — 17000 DESTRUCT PREMALG LESION: CPT | Mod: S$GLB,,, | Performed by: DERMATOLOGY

## 2023-01-09 PROCEDURE — 1100F PR PT FALLS ASSESS DOC 2+ FALLS/FALL W/INJURY/YR: ICD-10-PCS | Mod: CPTII,S$GLB,, | Performed by: DERMATOLOGY

## 2023-01-09 RX ORDER — SEMAGLUTIDE 1.34 MG/ML
0.5 INJECTION, SOLUTION SUBCUTANEOUS
Qty: 1 PEN | Refills: 5 | Status: SHIPPED | OUTPATIENT
Start: 2023-01-09 | End: 2023-02-08

## 2023-01-09 RX ORDER — EVOLOCUMAB 420 MG/3.5
420 KIT SUBCUTANEOUS
Qty: 3 EACH | Refills: 4 | Status: SHIPPED | OUTPATIENT
Start: 2023-01-09 | End: 2024-01-23 | Stop reason: SDUPTHER

## 2023-01-09 NOTE — TELEPHONE ENCOUNTER
Patient contacted [facility] stating he had a malfunctioning pushtronex device and was unsuccessful administering his last dose. Patient was provided the manufacture number, [id number]. Will follow up 1/11 to ensure patient is able to get a replacement device for his dose that was due 1/8.

## 2023-01-09 NOTE — PROGRESS NOTES
Subjective:       Patient ID:  Jm Deleon is a 67 y.o. male who presents for   Chief Complaint   Patient presents with    Skin Check     TBSE     Patient is here today for a TBSE.     Pt does not have any other concerns.     Pt has a h/o mm and SCC.       Date of biopsy: 09/20/2019  [place] test  no  Location: L Back  Depth: 2.7 mm  LN: yes L axilla x5- tested and negative   Date of excision: 10/18/2019     Pt has a history of  extensive sun exposure in the past.   Pt recalls several blistering sunburns in the past:  Yes teenage years  Pt has history of tanning bed use: no  Pt has had atypical moles removed in the past: yes  Pt has personal history of breast cancer: no  Pt has history of melanoma in first degree relatives:  no  Pt has family history of pancreatic cancer: no  Pt is currently immunosuppressed: no     Wal[place] Type: 2     Last dental exam: 2015  Last eye exam: appt in May 2022    Pt was last seen on 11/03/2022 for bx on right mid scalp - diagnosed as solar lentigo which has healed well.     Review of Systems   Skin:  Positive for activity-related sunscreen use and wears hat. Negative for daily sunscreen use, tendency to form keloidal scars and recent sunburn.   Hematologic/Lymphatic: Bruises/bleeds easily (baby aspirin daily).      Objective:    Physical Exam   Constitutional: He appears well-developed and well-nourished. No distress.   Lymphadenopathy: No supraclavicular adenopathy is present.     He has no cervical adenopathy.     He has no axillary adenopathy.   Neurological: He is alert and oriented to person, place, and time. He is not disoriented.   Psychiatric: He has a normal mood and affect.   Skin:   Areas Examined (abnormalities noted in diagram):   Scalp / Hair Palpated and Inspected  Head / Face Inspection Performed  Neck Inspection Performed  Chest / Axilla Inspection Performed  Abdomen Inspection Performed  Genitals / Buttocks / Groin Inspection Performed  Back Inspection  Performed  RUE Inspected  LUE Inspection Performed  RLE Inspected  LLE Inspection Performed  Nails and Digits Inspection Performed                     Diagram Legend     Erythematous scaling macule/papule c/w actinic keratosis       Vascular papule c/w angioma      Pigmented verrucoid papule/plaque c/w seborrheic keratosis      Yellow umbilicated papule c/w sebaceous hyperplasia      Irregularly shaped tan macule c/w lentigo     1-2 mm smooth white papules consistent with Milia      Movable subcutaneous cyst with punctum c/w epidermal inclusion cyst      Subcutaneous movable cyst c/w pilar cyst      Firm pink to brown papule c/w dermatofibroma      Pedunculated fleshy papule(s) c/w skin tag(s)      Evenly pigmented macule c/w junctional nevus     Mildly variegated pigmented, slightly irregular-bordered macule c/w mildly atypical nevus      Flesh colored to evenly pigmented papule c/w intradermal nevus       Pink pearly papule/plaque c/w basal cell carcinoma      Erythematous hyperkeratotic cursted plaque c/w SCC      Surgical scar with no sign of skin cancer recurrence      Open and closed comedones      Inflammatory papules and pustules      Verrucoid papule consistent consistent with wart     Erythematous eczematous patches and plaques     Dystrophic onycholytic nail with subungual debris c/w onychomycosis     Umbilicated papule    Erythematous-base heme-crusted tan verrucoid plaque consistent with inflamed seborrheic keratosis     Erythematous Silvery Scaling Plaque c/w Psoriasis     See annotation      Assessment / Plan:        AK (actinic keratosis)  Cryosurgery Procedure Note    Verbal consent from the patient is obtained including, but not limited to, risk of hypopigmentation/hyperpigmentation, scar, recurrence of lesion. The patient is aware of the precancerous quality and need for treatment of these lesions. Liquid nitrogen cryosurgery is applied to the 1 actinic keratoses, as detailed in the physical exam,  to produce a freeze injury. The patient is aware that blisters may form and is instructed on wound care with gentle cleansing and use of vaseline ointment to keep moist until healed. The patient is supplied a handout on cryosurgery and is instructed to call if lesions do not completely resolve.    Lentigo  This is a benign hyperpigmented sun induced lesion. Recommend daily sun protection/avoidance and use of at least SPF 30, broad spectrum sunscreen (OTC drug) will reduce the number of new lesions. Treatment of these benign lesions are considered cosmetic.  The nature of sun-induced photo-aging and skin cancers is discussed.  Sun avoidance, protective clothing, and the use of 30-SPF sunscreens is advised. Observe closely for skin damage/changes, and call if such occurs.    Nevus  Discussed ABCDE's of nevi.  Monitor for new mole or moles that are becoming bigger, darker, irritated, or developing irregular borders. Brochure provided. Instructed patient to observe lesion(s) for changes and follow up in clinic if changes are noted. Patient to monitor skin at home for new or changing lesions.     SK (seborrheic keratosis)  These are benign inherited growths without a malignant potential. Reassurance given to patient. No treatment is necessary.     Cherry angioma  These are benign vascular lesions that are inherited.  Treatment is not necessary.    History of malignant melanoma- 2019 2.7 mm mid back , neg SLN bx   Personal history of skin cancer  History of dysplastic nevus  Scar  Area of previous melanoma  and NMSC and atypical nevi examined. Site well healed with no signs of recurrence.    Total body skin examination performed today including at least 12 points as noted in physical examination. No lesions suspicious for malignancy noted.    Recommend daily sun protection/avoidance, use of at least SPF 30, broad spectrum sunscreen (OTC drug), skin self examinations, and routine physician surveillance to optimize early  detection             Follow up in about 6 months (around 7/9/2023) for TBSE.

## 2023-01-10 ENCOUNTER — PATIENT MESSAGE (OUTPATIENT)
Dept: CARDIOLOGY | Facility: CLINIC | Age: 68
End: 2023-01-10
Payer: MEDICARE

## 2023-01-10 NOTE — TELEPHONE ENCOUNTER
[employer] will reach out regarding patient information. Will continue to monitor. Case number for patient provided by manufacture representative is: [id number]

## 2023-01-11 NOTE — TELEPHONE ENCOUNTER
Called patient to verify manufacture replacement was sent and administered. No answer, LVM. Will follow up

## 2023-01-17 NOTE — TELEPHONE ENCOUNTER
Patient received replacement dose from the manufacture. Administered on 1/16/23. Next dose will be 2/16/23. Closing I vent. Rescheduling refill call for 2/6/23.

## 2023-02-06 ENCOUNTER — SPECIALTY PHARMACY (OUTPATIENT)
Dept: PHARMACY | Facility: CLINIC | Age: 68
End: 2023-02-06
Payer: MEDICARE

## 2023-02-06 ENCOUNTER — PATIENT MESSAGE (OUTPATIENT)
Dept: PHARMACY | Facility: CLINIC | Age: 68
End: 2023-02-06
Payer: MEDICARE

## 2023-02-06 DIAGNOSIS — E11.9 TYPE 2 DIABETES MELLITUS WITHOUT COMPLICATION, WITH LONG-TERM CURRENT USE OF INSULIN: Primary | ICD-10-CM

## 2023-02-06 DIAGNOSIS — Z79.4 TYPE 2 DIABETES MELLITUS WITHOUT COMPLICATION, WITH LONG-TERM CURRENT USE OF INSULIN: Primary | ICD-10-CM

## 2023-02-06 NOTE — TELEPHONE ENCOUNTER
Care Due:                  Date            Visit Type   Department     Provider  --------------------------------------------------------------------------------                                SAME DAY -                              ESTABLISHED   [facility] PRIMARY  Last Visit: 06-      PATIENT      CARE           Dallas Martin  Next Visit: None Scheduled  None         None Found                                                            Last  Test          Frequency    Reason                     Performed    Due Date  --------------------------------------------------------------------------------    HBA1C.......  6 months...  Contreras BAIG.....  11- 05-    [facility] Embedded Care Gaps. Reference number: [id number]. 2/06/2023   2:47:14 AM CST

## 2023-02-06 NOTE — TELEPHONE ENCOUNTER
Refill Routing Note   Medication(s) are not appropriate for processing by [facility] for the following reason(s):       Drug-disease interaction    [facility] action(s):  Defer  Care gaps identified: Yes           Medication Therapy Plan: metFORMIN and Dehydration    Appointments  past 12m or future 3m with PCP    Date Provider   Last Visit   6/10/2022 Dallas Martin MD   Next Visit   Visit date not found Dallas Martin MD   ED visits in past 90 days: 0        Note composed:12:44 PM 02/06/2023

## 2023-02-08 ENCOUNTER — OFFICE VISIT (OUTPATIENT)
Dept: ENDOCRINOLOGY | Facility: CLINIC | Age: 68
End: 2023-02-08
Payer: MEDICARE

## 2023-02-08 ENCOUNTER — PATIENT MESSAGE (OUTPATIENT)
Dept: INTERNAL MEDICINE | Facility: CLINIC | Age: 68
End: 2023-02-08
Payer: MEDICARE

## 2023-02-08 DIAGNOSIS — E08.51 DIABETES MELLITUS DUE TO UNDERLYING CONDITION WITH DIABETIC PERIPHERAL ANGIOPATHY WITHOUT GANGRENE, WITH LONG-TERM CURRENT USE OF INSULIN: ICD-10-CM

## 2023-02-08 DIAGNOSIS — E11.65 TYPE 2 DIABETES MELLITUS WITH HYPERGLYCEMIA, WITH LONG-TERM CURRENT USE OF INSULIN: ICD-10-CM

## 2023-02-08 DIAGNOSIS — Z79.4 DIABETES MELLITUS DUE TO UNDERLYING CONDITION WITH DIABETIC PERIPHERAL ANGIOPATHY WITHOUT GANGRENE, WITH LONG-TERM CURRENT USE OF INSULIN: ICD-10-CM

## 2023-02-08 DIAGNOSIS — E78.5 DYSLIPIDEMIA: ICD-10-CM

## 2023-02-08 DIAGNOSIS — I25.10 CORONARY ARTERY DISEASE INVOLVING NATIVE CORONARY ARTERY OF NATIVE HEART WITHOUT ANGINA PECTORIS: ICD-10-CM

## 2023-02-08 DIAGNOSIS — Z79.4 TYPE 2 DIABETES MELLITUS WITH HYPERGLYCEMIA, WITH LONG-TERM CURRENT USE OF INSULIN: ICD-10-CM

## 2023-02-08 DIAGNOSIS — E11.9 DIABETES MELLITUS WITHOUT COMPLICATION: Primary | ICD-10-CM

## 2023-02-08 PROCEDURE — 3072F PR LOW RISK FOR RETINOPATHY: ICD-10-PCS | Mod: CPTII,95,, | Performed by: INTERNAL MEDICINE

## 2023-02-08 PROCEDURE — 99214 PR OFFICE/OUTPT VISIT, EST, LEVL IV, 30-39 MIN: ICD-10-PCS | Mod: 95,,, | Performed by: INTERNAL MEDICINE

## 2023-02-08 PROCEDURE — 1160F PR REVIEW ALL MEDS BY PRESCRIBER/CLIN PHARMACIST DOCUMENTED: ICD-10-PCS | Mod: CPTII,95,, | Performed by: INTERNAL MEDICINE

## 2023-02-08 PROCEDURE — 1159F PR MEDICATION LIST DOCUMENTED IN MEDICAL RECORD: ICD-10-PCS | Mod: CPTII,95,, | Performed by: INTERNAL MEDICINE

## 2023-02-08 PROCEDURE — 4010F PR ACE/ARB THEARPY RXD/TAKEN: ICD-10-PCS | Mod: CPTII,95,, | Performed by: INTERNAL MEDICINE

## 2023-02-08 PROCEDURE — 1160F RVW MEDS BY RX/DR IN RCRD: CPT | Mod: CPTII,95,, | Performed by: INTERNAL MEDICINE

## 2023-02-08 PROCEDURE — 3072F LOW RISK FOR RETINOPATHY: CPT | Mod: CPTII,95,, | Performed by: INTERNAL MEDICINE

## 2023-02-08 PROCEDURE — 4010F ACE/ARB THERAPY RXD/TAKEN: CPT | Mod: CPTII,95,, | Performed by: INTERNAL MEDICINE

## 2023-02-08 PROCEDURE — 99214 OFFICE O/P EST MOD 30 MIN: CPT | Mod: 95,,, | Performed by: INTERNAL MEDICINE

## 2023-02-08 PROCEDURE — 1159F MED LIST DOCD IN RCRD: CPT | Mod: CPTII,95,, | Performed by: INTERNAL MEDICINE

## 2023-02-08 RX ORDER — SEMAGLUTIDE 1.34 MG/ML
1 INJECTION, SOLUTION SUBCUTANEOUS
Qty: 1 PEN | Refills: 11 | Status: SHIPPED | OUTPATIENT
Start: 2023-02-08 | End: 2023-03-20

## 2023-02-08 NOTE — ASSESSMENT & PLAN NOTE
No data to review today but last Dexcom data from December as well as what he reports are above goal    Will do the following:  Patient Instructions   Cont toujeo 50 units  Increase novolog to 16 units with meals plus correction scale  Correction Scale  You can also take extra novolog insulin if your blood sugar is higher than 150. This can be taken either in addition to the mealtime dose or in place of mealtime dose if not eating  If your blood sugar is:  150-199             give extra 2 units of insulin to yourself.  200-249                             4 units  250-299         6 units  300-349                             8 units  350+                                  10 units    Novolog should not be taken more often than every 4 hours    Increase ozempic to 1 mg weekly

## 2023-02-08 NOTE — PATIENT INSTRUCTIONS
Cont toujeo 50 units  Increase novolog to 16 units with meals plus correction scale  Correction Scale  You can also take extra novolog insulin if your blood sugar is higher than 150. This can be taken either in addition to the mealtime dose or in place of mealtime dose if not eating  If your blood sugar is:  150-199             give extra 2 units of insulin to yourself.  200-249                             4 units  250-299         6 units  300-349                             8 units  350+                                  10 units    Novolog should not be taken more often than every 4 hours    Increase ozempic to 1 mg weekly

## 2023-02-08 NOTE — PROGRESS NOTES
Jm Deleon is a 67 y.o. male with CAD, HLD presenting for follow-up of T2DM    The patient location is: [place]  The chief complaint leading to consultation is:   Chief Complaint   Patient presents with    Diabetes       Visit type: audiovisual    Face to Face time with patient: 20 minutes  30 minutes of total time spent on the encounter, which includes face to face time and non-face to face time preparing to see the patient (eg, review of tests), Obtaining and/or reviewing separately obtained history, Documenting clinical information in the electronic or other health record, Independently interpreting results (not separately reported) and communicating results to the patient/family/caregiver, or Care coordination (not separately reported).    Each patient to whom he or she provides medical services by telemedicine is:  (1) informed of the relationship between the physician and patient and the respective role of any other health care provider with respect to management of the patient; and (2) notified that he or she may decline to receive medical services by telemedicine and may withdraw from such care at any time.      History of Present Illness  T2DM  Diagnosed in early 2000's  Known complications: denies    Last visit 6/2022. In [place] in December and Dexcom stopped working in middle of trip and now is no longer working with his phone  I do not have download at time of visit as using     Has infection in index finger following split in nail. Seeing Dr. Martin tomorrow    Some weight loss with ozempic    Current Diabetes Regimen:  Toujeo 50 units smith  novolog 14 units with meals  Metformin 1000 mg BID  Jardiance 25 mg daily  Ozempic 0.5 mg weekly    Omitted doses: rare    Prior mediations tried: none    DM education when first diagnosed    Recent Hgb A1C:  Lab Results   Component Value Date    HGBA1C 8.4 (H) 11/04/2022       Glucose Monitoring:  Dexcom- in media      Hypoglycemic Episodes: none as  above    Screening / DM Complications:    Nephropathy:  ACEi/ARB: Taking  Lab Results   Component Value Date    MICALBCREAT 12.1 11/04/2022       Last Lipid Panel:  Statin: Taking repatha, zetia, atorvastatin  Lab Results   Component Value Date    LDLCALC 8.8 (L) 11/04/2022       Last foot exam : 11/18/2021  Last eye exam : 05/27/2022;  no laser surgery or DR    B12:  Lab Results   Component Value Date    OGMMUJXQ23 412 06/10/2022       Aspirin: taking          Current Outpatient Medications:     ACCU-CHEK SMARTVIEW TEST STRIP Strp, 1 strip by Misc.(Non-Drug; Combo Route) route 3 (three) times daily. Patient needs an appointment, Disp: 300 strip, Rfl: 0    ammonium lactate 12 % Crea, Apply small amount to feet except for in between toes twice a day, Disp: 1 Tube, Rfl: 3    aspirin (ECOTRIN) 81 MG EC tablet, Take 81 mg by mouth once daily., Disp: , Rfl:     atorvastatin (LIPITOR) 80 MG tablet, Take 1 tablet (80 mg total) by mouth once daily., Disp: 90 tablet, Rfl: 3    blood-glucose meter Misc, Use to check sugar 3 times daily. Accu-chek Emily. Patient needs an appointment, Disp: 1 each, Rfl: 0    carvediloL (COREG) 25 MG tablet, Take 1 tablet (25 mg total) by mouth 2 (two) times daily., Disp: 180 tablet, Rfl: 3    celecoxib (CELEBREX) 200 MG capsule, Take 1 capsule (200 mg total) by mouth 2 (two) times daily., Disp: 180 capsule, Rfl: 0    diazePAM (VALIUM) 5 MG tablet, Take 1 tablet (5 mg total) by mouth once daily at 6am. for 3 days, Disp: 3 tablet, Rfl: 0    diclofenac sodium (VOLTAREN) 1 % Gel, Apply 2 g topically 4 (four) times daily as needed (neck muscle spasm)., Disp: 100 g, Rfl: 3    diphenhydrAMINE (BENADRYL) 25 mg capsule, Take 50 mg by mouth nightly as needed for Itching. , Disp: , Rfl:     evolocumab (REPATHA PUSHTRONEX) 420 mg/3.5 mL Injt, Inject 3.5 mLs (420 mg total) into the skin every 30 days, Disp: 3 each, Rfl: 4    ezetimibe (ZETIA) 10 mg tablet, TAKE 1 TABLET EVERY DAY, Disp: 90 tablet, Rfl: 3     "FOLIC ACID/MULTIVITS-MIN (MULTIVITAMIN FOR CHOLESTEROL ORAL), Take 1 tablet by mouth nightly. , Disp: , Rfl:     furosemide (LASIX) 40 MG tablet, TAKE 1 TABLET EVERY DAY, Disp: 90 tablet, Rfl: 3    insulin glargine, TOUJEO, (TOUJEO SOLOSTAR U-300 INSULIN) 300 unit/mL (1.5 mL) InPn pen, Inject 45 Units into the skin once daily., Disp: 9 pen, Rfl: 3    insulin needles, disposable, (BD INSULIN PEN NEEDLE UF ORIG) 29 x 1/2 " Ndle, USE TWICE DAILY AS DIRECTED, Disp: 100 each, Rfl: 2    JARDIANCE 25 mg tablet, TAKE 1 TABLET EVERY DAY, Disp: 90 tablet, Rfl: 1    lancets Misc, 1 lancet by Misc.(Non-Drug; Combo Route) route 3 (three) times daily. accu-chek Fastclix. Patient needs an appointment, Disp: 300 each, Rfl: 0    meclizine (ANTIVERT) 25 mg tablet, Take 1 tablet (25 mg total) by mouth 3 (three) times daily as needed for Dizziness or Nausea., Disp: 21 tablet, Rfl: 0    metFORMIN (GLUCOPHAGE) 1000 MG tablet, Take 1 tablet (1,000 mg total) by mouth 2 (two) times daily., Disp: 180 tablet, Rfl: 1    mupirocin (BACTROBAN) 2 % ointment, AAA qd- bid, Disp: 30 g, Rfl: 3    nitroGLYCERIN (NITROSTAT) 0.4 MG SL tablet, PLACE 1 TABLET (0.4 MG TOTAL) UNDER THE TONGUE EVERY 5 (FIVE) MINUTES AS NEEDED FOR CHEST PAIN AS DIRECTED, Disp: 25 tablet, Rfl: 4    NOVOLOG FLEXPEN U-100 INSULIN 100 unit/mL (3 mL) InPn pen, Inject 12 Units into the skin 3 (three) times daily with meals., Disp: 32.4 mL, Rfl: 3    pen needle, diabetic (BD INSULIN PEN NEEDLE UF MINI) 31 gauge x 3/16" Ndle, 1 each by Misc.(Non-Drug; Combo Route) route 4 (four) times daily. Patient needs an appointment, Disp: 400 each, Rfl: 0    semaglutide (OZEMPIC) 1 mg/dose (4 mg/3 mL), Inject 1 mg into the skin every 7 days., Disp: 1 pen, Rfl: 11    valsartan (DIOVAN) 320 MG tablet, TAKE 1 TABLET ONE TIME DAILY, Disp: 90 tablet, Rfl: 3    VASCEPA 1 gram Cap, TAKE 2 CAPSULES TWICE DAILY, Disp: 360 capsule, Rfl: 3  No current facility-administered medications for this " visit.    Facility-Administered Medications Ordered in Other Visits:     fentaNYL 50 mcg/mL injection 25 mcg, 25 mcg, Intravenous, Q5 Min PRN, Travis Corbin MD, 50 mcg at 05/12/21 1242    fentaNYL 50 mcg/mL injection  mcg,  mcg, Intravenous, PRN, Markus Esparza MD, 100 mcg at 11/24/21 1151    lidocaine (PF) 10 mg/ml (1%) injection 10 mg, 1 mL, Intradermal, Once, Deanne Mosqueda MD    LIDOcaine (PF) 10 mg/ml (1%) injection 10 mg, 1 mL, Intradermal, Once, Markus Esparza MD    midazolam (VERSED) 1 mg/mL injection 0.5 mg, 0.5 mg, Intravenous, PRN, Travis Corbin MD, 2 mg at 05/12/21 1242    midazolam (VERSED) 1 mg/mL injection 0.5-4 mg, 0.5-4 mg, Intravenous, PRN, Markus Esparza MD, 2 mg at 11/24/21 1151    ropivacaine 0.2% [facility] PainPRO Pump infusion 500 ML, , Perineural, Continuous, Travis Corbin MD, New Bag at 05/12/21 1629    ropivacaine 0.2% [facility] PainPRO Pump infusion 500 ML, , Perineural, Continuous, Markus Esparza MD, New Bag at 11/24/21 1450    triamcinolone acetonide injection 40 mg, 40 mg, Intra-articular, , Patricio Multani MD, 40 mg at 10/26/16 0841    ROS as above    Objective:     There were no vitals filed for this visit.    Wt Readings from Last 3 Encounters:   06/23/22 97 kg (213 lb 13.5 oz)   06/20/22 97 kg (213 lb 15.3 oz)   06/10/22 96.6 kg (213 lb)     There is no height or weight on file to calculate BMI.      Labs    Chemistry        Component Value Date/Time     11/04/2022 0730    K 4.9 11/04/2022 0730     11/04/2022 0730    CO2 28 11/04/2022 0730    BUN 12 11/04/2022 0730    CREATININE 0.9 11/04/2022 0730     (H) 11/04/2022 0730        Component Value Date/Time    CALCIUM 9.6 11/04/2022 0730    ALKPHOS 63 11/04/2022 0730    AST 20 11/04/2022 0730    ALT 26 11/04/2022 0730    BILITOT 0.9 11/04/2022 0730    ESTGFRAFRICA >60.0 06/10/2022 0921    EGFRNONAA >60.0 06/10/2022 0921               Assessment and Plan     Type 2 diabetes mellitus  No data to review today but last Dexcom data from December as well as what he reports are above goal    Will do the following:  Patient Instructions   Cont toujeo 50 units  Increase novolog to 16 units with meals plus correction scale  Correction Scale  You can also take extra novolog insulin if your blood sugar is higher than 150. This can be taken either in addition to the mealtime dose or in place of mealtime dose if not eating  If your blood sugar is:  150-199             give extra 2 units of insulin to yourself.  200-249                             4 units  250-299         6 units  300-349                             8 units  350+                                  10 units    Novolog should not be taken more often than every 4 hours    Increase ozempic to 1 mg weekly        Dyslipidemia  LDL excellent  Cont current therapy    CAD (coronary artery disease)  On Jardiance and ozempic    Diabetes mellitus due to underlying condition with circulatory complication  Glycemic control as above        RTC 4 months with A1c        More Galloway MD

## 2023-02-09 ENCOUNTER — OFFICE VISIT (OUTPATIENT)
Dept: INTERNAL MEDICINE | Facility: CLINIC | Age: 68
End: 2023-02-09
Payer: MEDICARE

## 2023-02-09 VITALS
DIASTOLIC BLOOD PRESSURE: 62 MMHG | OXYGEN SATURATION: 98 % | SYSTOLIC BLOOD PRESSURE: 102 MMHG | WEIGHT: 211.63 LBS | HEIGHT: 68 IN | HEART RATE: 68 BPM | BODY MASS INDEX: 32.07 KG/M2

## 2023-02-09 DIAGNOSIS — Z00.00 ENCOUNTER FOR MEDICARE ANNUAL WELLNESS EXAM: ICD-10-CM

## 2023-02-09 DIAGNOSIS — L03.012 PARONYCHIA OF FINGER OF LEFT HAND: Primary | ICD-10-CM

## 2023-02-09 PROBLEM — C43.59 MALIGNANT MELANOMA OF BACK: Status: RESOLVED | Noted: 2019-10-12 | Resolved: 2023-02-09

## 2023-02-09 PROCEDURE — 1159F PR MEDICATION LIST DOCUMENTED IN MEDICAL RECORD: ICD-10-PCS | Mod: CPTII,S$GLB,, | Performed by: INTERNAL MEDICINE

## 2023-02-09 PROCEDURE — 1125F PR PAIN SEVERITY QUANTIFIED, PAIN PRESENT: ICD-10-PCS | Mod: CPTII,S$GLB,, | Performed by: INTERNAL MEDICINE

## 2023-02-09 PROCEDURE — 4010F ACE/ARB THERAPY RXD/TAKEN: CPT | Mod: CPTII,S$GLB,, | Performed by: INTERNAL MEDICINE

## 2023-02-09 PROCEDURE — 1101F PR PT FALLS ASSESS DOC 0-1 FALLS W/OUT INJ PAST YR: ICD-10-PCS | Mod: CPTII,S$GLB,, | Performed by: INTERNAL MEDICINE

## 2023-02-09 PROCEDURE — 3072F LOW RISK FOR RETINOPATHY: CPT | Mod: CPTII,S$GLB,, | Performed by: INTERNAL MEDICINE

## 2023-02-09 PROCEDURE — 3074F SYST BP LT 130 MM HG: CPT | Mod: CPTII,S$GLB,, | Performed by: INTERNAL MEDICINE

## 2023-02-09 PROCEDURE — 3078F PR MOST RECENT DIASTOLIC BLOOD PRESSURE < 80 MM HG: ICD-10-PCS | Mod: CPTII,S$GLB,, | Performed by: INTERNAL MEDICINE

## 2023-02-09 PROCEDURE — 1159F MED LIST DOCD IN RCRD: CPT | Mod: CPTII,S$GLB,, | Performed by: INTERNAL MEDICINE

## 2023-02-09 PROCEDURE — 3288F FALL RISK ASSESSMENT DOCD: CPT | Mod: CPTII,S$GLB,, | Performed by: INTERNAL MEDICINE

## 2023-02-09 PROCEDURE — 1101F PT FALLS ASSESS-DOCD LE1/YR: CPT | Mod: CPTII,S$GLB,, | Performed by: INTERNAL MEDICINE

## 2023-02-09 PROCEDURE — 99999 PR PBB SHADOW E&M-EST. PATIENT-LVL IV: ICD-10-PCS | Mod: PBBFAC,,, | Performed by: INTERNAL MEDICINE

## 2023-02-09 PROCEDURE — 3078F DIAST BP <80 MM HG: CPT | Mod: CPTII,S$GLB,, | Performed by: INTERNAL MEDICINE

## 2023-02-09 PROCEDURE — 1125F AMNT PAIN NOTED PAIN PRSNT: CPT | Mod: CPTII,S$GLB,, | Performed by: INTERNAL MEDICINE

## 2023-02-09 PROCEDURE — 4010F PR ACE/ARB THEARPY RXD/TAKEN: ICD-10-PCS | Mod: CPTII,S$GLB,, | Performed by: INTERNAL MEDICINE

## 2023-02-09 PROCEDURE — 99213 PR OFFICE/OUTPT VISIT, EST, LEVL III, 20-29 MIN: ICD-10-PCS | Mod: S$GLB,,, | Performed by: INTERNAL MEDICINE

## 2023-02-09 PROCEDURE — 3008F PR BODY MASS INDEX (BMI) DOCUMENTED: ICD-10-PCS | Mod: CPTII,S$GLB,, | Performed by: INTERNAL MEDICINE

## 2023-02-09 PROCEDURE — 99999 PR PBB SHADOW E&M-EST. PATIENT-LVL IV: CPT | Mod: PBBFAC,,, | Performed by: INTERNAL MEDICINE

## 2023-02-09 PROCEDURE — 99213 OFFICE O/P EST LOW 20 MIN: CPT | Mod: S$GLB,,, | Performed by: INTERNAL MEDICINE

## 2023-02-09 PROCEDURE — 3072F PR LOW RISK FOR RETINOPATHY: ICD-10-PCS | Mod: CPTII,S$GLB,, | Performed by: INTERNAL MEDICINE

## 2023-02-09 PROCEDURE — 3008F BODY MASS INDEX DOCD: CPT | Mod: CPTII,S$GLB,, | Performed by: INTERNAL MEDICINE

## 2023-02-09 PROCEDURE — 3074F PR MOST RECENT SYSTOLIC BLOOD PRESSURE < 130 MM HG: ICD-10-PCS | Mod: CPTII,S$GLB,, | Performed by: INTERNAL MEDICINE

## 2023-02-09 PROCEDURE — 3288F PR FALLS RISK ASSESSMENT DOCUMENTED: ICD-10-PCS | Mod: CPTII,S$GLB,, | Performed by: INTERNAL MEDICINE

## 2023-02-09 RX ORDER — PNEUMOCOCCAL 20-VALENT CONJUGATE VACCINE 2.2; 2.2; 2.2; 2.2; 2.2; 2.2; 2.2; 2.2; 2.2; 2.2; 2.2; 2.2; 2.2; 2.2; 2.2; 2.2; 4.4; 2.2; 2.2; 2.2 UG/.5ML; UG/.5ML; UG/.5ML; UG/.5ML; UG/.5ML; UG/.5ML; UG/.5ML; UG/.5ML; UG/.5ML; UG/.5ML; UG/.5ML; UG/.5ML; UG/.5ML; UG/.5ML; UG/.5ML; UG/.5ML; UG/.5ML; UG/.5ML; UG/.5ML; UG/.5ML
INJECTION, SUSPENSION INTRAMUSCULAR
COMMUNITY
Start: 2022-10-19 | End: 2023-09-29

## 2023-02-09 RX ORDER — INFLUENZA A VIRUS A/VICTORIA/2570/2019 IVR-215 (H1N1) ANTIGEN (FORMALDEHYDE INACTIVATED), INFLUENZA A VIRUS A/DARWIN/9/2021 SAN-010 (H3N2) ANTIGEN (FORMALDEHYDE INACTIVATED), INFLUENZA B VIRUS B/PHUKET/3073/2013 ANTIGEN (FORMALDEHYDE INACTIVATED), AND INFLUENZA B VIRUS B/MICHIGAN/01/2021 ANTIGEN (FORMALDEHYDE INACTIVATED) 60; 60; 60; 60 UG/.7ML; UG/.7ML; UG/.7ML; UG/.7ML
INJECTION, SUSPENSION INTRAMUSCULAR
COMMUNITY
Start: 2022-10-19 | End: 2023-09-29 | Stop reason: ALTCHOICE

## 2023-02-09 RX ORDER — SULFAMETHOXAZOLE AND TRIMETHOPRIM 800; 160 MG/1; MG/1
1 TABLET ORAL 2 TIMES DAILY
Qty: 20 TABLET | Refills: 0 | Status: SHIPPED | OUTPATIENT
Start: 2023-02-09 | End: 2023-02-19

## 2023-02-09 NOTE — PROGRESS NOTES
MEDICAL HISTORY:   Coronary artery disease with coronary bypass surgery in year 2000.  Hypertension.  Hyperlipidemia.  Type 2 diabetes  Osteoarthritis of the hip with left total hip replacement.  Right inguinal hernia repair.  Left foot surgery.  Fistulotomy.  Melanoma, thoracic area, wide local excision  Right and left rotator cuff repair, subacromial decompression, labrum debridement     SOCIAL HISTORY:  Tobacco use, none.  Alcohol use, limited.        MEDICATIONS:  Atorvastatin 80 mg.  Aspirin 81 mg.  Carvedilol 25 mg  tablet twice a day.   Multivitamin.  Lasix 40 mg.  Toujeo 50 milligrams units in the evening.  Humalog 16 units 3 times a day with sliding scale  Jardiance 25 mg  Metformin 1000 mg twice a day.  Vascepa 1 g 2 capsules b.i.d..  Valsartan 320 mg half a tablet  Repatha  Ozempic 1 milligram    67-year-old male  For the past 5 days he has been having redness and swelling at the base of nail, 2nd finger left hand.  There was no trauma.  He does have a split in the nail itself on the radial side.    Medical history is outlined above.  Yesterday met with endocrinology.  Ozempic was up to 1 milligram.  NovoLog increase to 16 units    Exam weight 211 pounds   Pulse 68   Blood pressure 102/62 there is indurated erythematous skin at the base of the nail and on the side.  It does not appear fluctuant nothing was able to be expressed.  Swollen and warm    Impression   Paronychia    Plan   Bactrim ds twice a day for 10 days  If the induration worsens or gets fluctuant referral to surgery for incision and drainage

## 2023-02-10 ENCOUNTER — PATIENT MESSAGE (OUTPATIENT)
Dept: ENDOCRINOLOGY | Facility: CLINIC | Age: 68
End: 2023-02-10
Payer: MEDICARE

## 2023-02-14 ENCOUNTER — PATIENT MESSAGE (OUTPATIENT)
Dept: CARDIOLOGY | Facility: CLINIC | Age: 68
End: 2023-02-14
Payer: MEDICARE

## 2023-02-24 ENCOUNTER — HOSPITAL ENCOUNTER (OUTPATIENT)
Dept: RADIOLOGY | Facility: HOSPITAL | Age: 68
Discharge: HOME | End: 2023-02-24
Attending: ORTHOPAEDIC SURGERY
Payer: MEDICARE

## 2023-02-24 ENCOUNTER — OFFICE VISIT (OUTPATIENT)
Dept: ORTHOPEDICS | Facility: CLINIC | Age: 68
End: 2023-02-24
Payer: MEDICARE

## 2023-02-24 VITALS — WEIGHT: 211.63 LBS | HEIGHT: 68 IN | BODY MASS INDEX: 32.07 KG/M2

## 2023-02-24 DIAGNOSIS — M53.3 SACROILIAC DYSFUNCTION: ICD-10-CM

## 2023-02-24 DIAGNOSIS — M25.551 RIGHT HIP PAIN: Primary | ICD-10-CM

## 2023-02-24 DIAGNOSIS — M25.551 RIGHT HIP PAIN: ICD-10-CM

## 2023-02-24 PROCEDURE — 1101F PT FALLS ASSESS-DOCD LE1/YR: CPT | Mod: CPTII,S$GLB,, | Performed by: ORTHOPAEDIC SURGERY

## 2023-02-24 PROCEDURE — 1125F PR PAIN SEVERITY QUANTIFIED, PAIN PRESENT: ICD-10-PCS | Mod: CPTII,S$GLB,, | Performed by: ORTHOPAEDIC SURGERY

## 2023-02-24 PROCEDURE — 3288F FALL RISK ASSESSMENT DOCD: CPT | Mod: CPTII,S$GLB,, | Performed by: ORTHOPAEDIC SURGERY

## 2023-02-24 PROCEDURE — 4010F ACE/ARB THERAPY RXD/TAKEN: CPT | Mod: CPTII,S$GLB,, | Performed by: ORTHOPAEDIC SURGERY

## 2023-02-24 PROCEDURE — 1159F MED LIST DOCD IN RCRD: CPT | Mod: CPTII,S$GLB,, | Performed by: ORTHOPAEDIC SURGERY

## 2023-02-24 PROCEDURE — 3072F PR LOW RISK FOR RETINOPATHY: ICD-10-PCS | Mod: CPTII,S$GLB,, | Performed by: ORTHOPAEDIC SURGERY

## 2023-02-24 PROCEDURE — 3008F BODY MASS INDEX DOCD: CPT | Mod: CPTII,S$GLB,, | Performed by: ORTHOPAEDIC SURGERY

## 2023-02-24 PROCEDURE — 3008F PR BODY MASS INDEX (BMI) DOCUMENTED: ICD-10-PCS | Mod: CPTII,S$GLB,, | Performed by: ORTHOPAEDIC SURGERY

## 2023-02-24 PROCEDURE — 99999 PR PBB SHADOW E&M-EST. PATIENT-LVL III: CPT | Mod: PBBFAC,,, | Performed by: ORTHOPAEDIC SURGERY

## 2023-02-24 PROCEDURE — 1160F PR REVIEW ALL MEDS BY PRESCRIBER/CLIN PHARMACIST DOCUMENTED: ICD-10-PCS | Mod: CPTII,S$GLB,, | Performed by: ORTHOPAEDIC SURGERY

## 2023-02-24 PROCEDURE — 73502 XR HIP WITH PELVIS WHEN PERFORMED, 2 OR 3  VIEWS RIGHT: ICD-10-PCS | Mod: 26,RT,, | Performed by: RADIOLOGY

## 2023-02-24 PROCEDURE — 99999 PR PBB SHADOW E&M-EST. PATIENT-LVL III: ICD-10-PCS | Mod: PBBFAC,,, | Performed by: ORTHOPAEDIC SURGERY

## 2023-02-24 PROCEDURE — 99213 OFFICE O/P EST LOW 20 MIN: CPT | Mod: S$GLB,,, | Performed by: ORTHOPAEDIC SURGERY

## 2023-02-24 PROCEDURE — 73502 X-RAY EXAM HIP UNI 2-3 VIEWS: CPT | Mod: TC,RT

## 2023-02-24 PROCEDURE — 1159F PR MEDICATION LIST DOCUMENTED IN MEDICAL RECORD: ICD-10-PCS | Mod: CPTII,S$GLB,, | Performed by: ORTHOPAEDIC SURGERY

## 2023-02-24 PROCEDURE — 3072F LOW RISK FOR RETINOPATHY: CPT | Mod: CPTII,S$GLB,, | Performed by: ORTHOPAEDIC SURGERY

## 2023-02-24 PROCEDURE — 99213 PR OFFICE/OUTPT VISIT, EST, LEVL III, 20-29 MIN: ICD-10-PCS | Mod: S$GLB,,, | Performed by: ORTHOPAEDIC SURGERY

## 2023-02-24 PROCEDURE — 3288F PR FALLS RISK ASSESSMENT DOCUMENTED: ICD-10-PCS | Mod: CPTII,S$GLB,, | Performed by: ORTHOPAEDIC SURGERY

## 2023-02-24 PROCEDURE — 1101F PR PT FALLS ASSESS DOC 0-1 FALLS W/OUT INJ PAST YR: ICD-10-PCS | Mod: CPTII,S$GLB,, | Performed by: ORTHOPAEDIC SURGERY

## 2023-02-24 PROCEDURE — 4010F PR ACE/ARB THEARPY RXD/TAKEN: ICD-10-PCS | Mod: CPTII,S$GLB,, | Performed by: ORTHOPAEDIC SURGERY

## 2023-02-24 PROCEDURE — 1125F AMNT PAIN NOTED PAIN PRSNT: CPT | Mod: CPTII,S$GLB,, | Performed by: ORTHOPAEDIC SURGERY

## 2023-02-24 PROCEDURE — 1160F RVW MEDS BY RX/DR IN RCRD: CPT | Mod: CPTII,S$GLB,, | Performed by: ORTHOPAEDIC SURGERY

## 2023-02-24 PROCEDURE — 73502 X-RAY EXAM HIP UNI 2-3 VIEWS: CPT | Mod: 26,RT,, | Performed by: RADIOLOGY

## 2023-02-26 NOTE — PROGRESS NOTES
Subjective:   Jm Deleon is a 67 y.o. male with pmhx of TIIDM with Ha1c of 8.4 on 11/4/22, HLD, HTN, and left YANICK replaced by Dr. [facility] in 2018 who presents for evaluation of right hip pain that has been present for 2 years and has progressively worsened to a 9/10 today affecting his ability to sleep. He describes the pain as achey, dull pain that stays in the posterior buttock. He denies radiating pain to the anterior groin or numbness/tingling that radiates down the leg.     Medications:     Current Outpatient Medications:     ACCU-CHEK SMARTVIEW TEST STRIP Strp, 1 strip by Misc.(Non-Drug; Combo Route) route 3 (three) times daily. Patient needs an appointment, Disp: 300 strip, Rfl: 0    ammonium lactate 12 % Crea, Apply small amount to feet except for in between toes twice a day, Disp: 1 Tube, Rfl: 3    aspirin (ECOTRIN) 81 MG EC tablet, Take 81 mg by mouth once daily., Disp: , Rfl:     atorvastatin (LIPITOR) 80 MG tablet, Take 1 tablet (80 mg total) by mouth once daily., Disp: 90 tablet, Rfl: 3    blood-glucose meter Misc, Use to check sugar 3 times daily. Accu-chek Emily. Patient needs an appointment, Disp: 1 each, Rfl: 0    carvediloL (COREG) 25 MG tablet, Take 1 tablet (25 mg total) by mouth 2 (two) times daily., Disp: 180 tablet, Rfl: 3    celecoxib (CELEBREX) 200 MG capsule, Take 1 capsule (200 mg total) by mouth 2 (two) times daily., Disp: 180 capsule, Rfl: 0    diclofenac sodium (VOLTAREN) 1 % Gel, Apply 2 g topically 4 (four) times daily as needed (neck muscle spasm)., Disp: 100 g, Rfl: 3    diphenhydrAMINE (BENADRYL) 25 mg capsule, Take 50 mg by mouth nightly as needed for Itching. , Disp: , Rfl:     evolocumab (REPATHA PUSHTRONEX) 420 mg/3.5 mL Injt, Inject 3.5 mLs (420 mg total) into the skin every 30 days, Disp: 3 each, Rfl: 4    FLUZONE HIGHDOSE QUAD 22-23  mcg/0.7 mL Syrg, , Disp: , Rfl:     FOLIC ACID/MULTIVITS-MIN (MULTIVITAMIN FOR CHOLESTEROL ORAL), Take 1 tablet by mouth nightly. ,  "Disp: , Rfl:     furosemide (LASIX) 40 MG tablet, TAKE 1 TABLET EVERY DAY, Disp: 90 tablet, Rfl: 3    insulin glargine, TOUJEO, (TOUJEO SOLOSTAR U-300 INSULIN) 300 unit/mL (1.5 mL) InPn pen, Inject 45 Units into the skin once daily., Disp: 9 pen, Rfl: 3    insulin needles, disposable, (BD INSULIN PEN NEEDLE UF ORIG) 29 x 1/2 " Ndle, USE TWICE DAILY AS DIRECTED, Disp: 100 each, Rfl: 2    JARDIANCE 25 mg tablet, TAKE 1 TABLET EVERY DAY, Disp: 90 tablet, Rfl: 1    lancets Misc, 1 lancet by Misc.(Non-Drug; Combo Route) route 3 (three) times daily. accu-chek Fastclix. Patient needs an appointment, Disp: 300 each, Rfl: 0    metFORMIN (GLUCOPHAGE) 1000 MG tablet, Take 1 tablet (1,000 mg total) by mouth 2 (two) times daily., Disp: 180 tablet, Rfl: 1    mupirocin (BACTROBAN) 2 % ointment, AAA qd- bid, Disp: 30 g, Rfl: 3    nitroGLYCERIN (NITROSTAT) 0.4 MG SL tablet, PLACE 1 TABLET (0.4 MG TOTAL) UNDER THE TONGUE EVERY 5 (FIVE) MINUTES AS NEEDED FOR CHEST PAIN AS DIRECTED, Disp: 25 tablet, Rfl: 4    NOVOLOG FLEXPEN U-100 INSULIN 100 unit/mL (3 mL) InPn pen, Inject 12 Units into the skin 3 (three) times daily with meals., Disp: 32.4 mL, Rfl: 3    pen needle, diabetic (BD INSULIN PEN NEEDLE UF MINI) 31 gauge x 3/16" Ndle, 1 each by Misc.(Non-Drug; Combo Route) route 4 (four) times daily. Patient needs an appointment, Disp: 400 each, Rfl: 0    PREVNAR 20, PF, 0.5 mL Syrg injection, , Disp: , Rfl:     semaglutide (OZEMPIC) 1 mg/dose (4 mg/3 mL), Inject 1 mg into the skin every 7 days., Disp: 1 pen, Rfl: 11    valsartan (DIOVAN) 320 MG tablet, TAKE 1 TABLET ONE TIME DAILY, Disp: 90 tablet, Rfl: 3    VASCEPA 1 gram Cap, TAKE 2 CAPSULES TWICE DAILY, Disp: 360 capsule, Rfl: 3    diazePAM (VALIUM) 5 MG tablet, Take 1 tablet (5 mg total) by mouth once daily at 6am. for 3 days, Disp: 3 tablet, Rfl: 0      Injections: Hx of CS injections into left hip prior to YANICK with relief     Physical Therapy: None    Bracing: None    Assistive " Devices: None    Walking:   < 2 blocks    Limitations: Sleeping    Occupation: Retired    Social support: Wife            Past Medical History:   Diagnosis Date    Allergy     seasonal    Arthritis     Coronary artery disease     Diabetes mellitus     Diabetes mellitus, type 2     Dysplastic nevus of trunk 03/2022    moderately atypical     Hyperlipidemia     Hypertension     Joint pain     Melanoma 10/2019    MM left back 2.7mm; neg LNs    Open angle with borderline findings and high glaucoma risk in both eyes 2018    Squamous cell carcinoma 2017    scalp - SSW       Past Surgical History:   Procedure Laterality Date    ARTHROSCOPIC REPAIR OF ROTATOR CUFF OF SHOULDER Left 5/12/2021    Procedure: REPAIR, ROTATOR CUFF, ARTHROSCOPIC;  Surgeon: Johnny Ventura MD;  Location: [facility] OR;  Service: Orthopedics;  Laterality: Left;  Labral Debridement    ARTHROSCOPY OF SHOULDER WITH DECOMPRESSION OF SUBACROMIAL SPACE  5/12/2021    Procedure: ARTHROSCOPY, SHOULDER, WITH SUBACROMIAL SPACE DECOMPRESSION;  Surgeon: Johnny Ventura MD;  Location: [facility] OR;  Service: Orthopedics;;    BELPHAROPTOSIS REPAIR  10 years ago    both eyes    CARDIAC SURGERY      3 vessel bypass    COLONOSCOPY N/A 10/4/2019    Procedure: COLONOSCOPY;  Surgeon: Isaiah Booker MD;  Location: [facility] ([facility]);  Service: Endoscopy;  Laterality: N/A;  Cardilogy Clearance received rom Dr. QUEENIE Salomon, see telephone encounter 8/26/19-BB    CORONARY ARTERY BYPASS GRAFT  x4 age 45 2000    DISTAL CLAVICLE EXCISION Left 5/12/2021    Procedure: EXCISION, CLAVICLE, DISTAL;  Surgeon: Johnny Ventura MD;  Location: [facility] OR;  Service: Orthopedics;  Laterality: Left;    FIXATION OF TENDON Left 5/12/2021    Procedure: FIXATION, TENDON;  Surgeon: Johnny Ventura MD;  Location: [facility] OR;  Service: Orthopedics;  Laterality: Left;    HARDWARE REMOVAL Left 11/24/2021    Procedure: REMOVAL, HARDWARE;  Surgeon: Johnny Ventura MD;  Location: [facility] OR;   Service: Orthopedics;  Laterality: Left;    HERNIA REPAIR      HIP SURGERY  9-24-14    left YANICK    JOINT REPLACEMENT      left hip    MANIPULATION WITH ANESTHESIA Left 11/24/2021    Procedure: MANIPULATION, WITH ANESTHESIA;  Surgeon: Johnny Ventura MD;  Location: [facility] OR;  Service: Orthopedics;  Laterality: Left;    SENTINEL LYMPH NODE BIOPSY Left 10/18/2019    Procedure: BIOPSY, LYMPH NODE, SENTINEL;  Surgeon: Fabián Villeda MD;  Location: [address];  Service: General;  Laterality: Left;    SHOULDER ARTHROSCOPY      SHOULDER ARTHROSCOPY Left 11/24/2021    Procedure: ARTHROSCOPY, SHOULDER;  Surgeon: Johnny Ventura MD;  Location: [facility] OR;  Service: Orthopedics;  Laterality: Left;  regional w/catheter (interscalene)    SHOULDER SURGERY      TRANSFORAMINAL EPIDURAL INJECTION OF STEROID Left 8/7/2020    Procedure: LUMBAR TRANSFORAMINAL LEFT L3/4 AND L5/S1 DIRECT REFERRAL;  Surgeon: Ronny Rangel MD;  Location: [facility]IN [facility];  Service: Pain Management;  Laterality: Left;  NEEDS CONSENT, DIABETIC       Family History   Problem Relation Age of Onset    Coronary artery disease Mother     Cancer Father         lung cancer, smoker    Hypertension Brother     Cancer Brother         colon    Amblyopia Neg Hx     Blindness Neg Hx     Cataracts Neg Hx     Glaucoma Neg Hx     Macular degeneration Neg Hx     Retinal detachment Neg Hx     Strabismus Neg Hx     Melanoma Neg Hx        Social History     Socioeconomic History    Marital status:    Tobacco Use    Smoking status: Never    Smokeless tobacco: Never   Substance and Sexual Activity    Alcohol use: Yes     Alcohol/week: 3.0 standard drinks     Types: 3 Cans of beer per week     Comment: on weekend    Drug use: Never     Social Determinants of Health     Financial Resource Strain: Low Risk     Difficulty of Paying Living Expenses: Not hard at all   Food Insecurity: No Food Insecurity    Worried About Running Out of Food in the Last Year: Never  "true    Ran Out of Food in the Last Year: Never true   Transportation Needs: No Transportation Needs    Lack of Transportation (Medical): No    Lack of Transportation (Non-Medical): No   Physical Activity: Unknown    Days of Exercise per Week: Patient refused    Minutes of Exercise per Session: 30 min   Stress: No Stress Concern Present    Feeling of Stress : Not at all   Social Connections: Unknown    Frequency of Communication with Friends and Family: Three times a week    Frequency of Social Gatherings with Friends and Family: Once a week    Active Member of Clubs or Organizations: No    Attends Club or Organization Meetings: Never    Marital Status:    Housing Stability: Low Risk     Unable to Pay for Housing in the Last Year: No    Number of Places Lived in the Last Year: 1    Unstable Housing in the Last Year: No       Objective:      Vitals:    02/24/23 1434   Weight: 96 kg (211 lb 10.3 oz)   Height: 5' 8" (1.727 m)     Physical Exam    MSK:  R Lower Extremity  Inspection  - Skin intact throughout, no open wounds  - No swelling  - No ecchymosis, erythema, or signs of cellulitis  Palpation  - TTP over right SI joint  Range of motion  - AROM and PROM of the knee ankle, and foot intact without pain  - Pain with external rotation of hip  - ROM at 0/0/120  Stability  - No evidence of joint dislocation or abnormal laxity  - Negative Anterior/Posterior Drawer tests  - Negative Lachman's test  Neurovascular  - TA/EHL/Gastroc/FHL assessed in isolation without deficit  - SILT throughout  - Compartments soft  - DP palpated   - Capillary Refill <3s  - Negative Log roll  - Negative Stinchfield  - Positive Holley Test  - Muscle tone normal      Imaging Review: I independently reviewed and interpreted the imaging and my findings are as follows:   AP pelvis with bilateral Hip XR: No signs of OA of the right hip, left YANICK components in place with appropriate alignment. No acute processes present.     Assessment: "     Right sacroiliac arthritis pain     Plan:   Patient findings consistent with right SI joint pain likely due to arthritis.     - Referral to pain medicine for SI joint injection.      The patient's pathophysiology was explained in detail with reference to x-rays, models, other visual aids as appropriate.  Treatment options were discussed in detail.  Questions were invited and answered to the patient's satisfaction.

## 2023-03-13 ENCOUNTER — SPECIALTY PHARMACY (OUTPATIENT)
Dept: PHARMACY | Facility: CLINIC | Age: 68
End: 2023-03-13
Payer: MEDICARE

## 2023-03-13 NOTE — TELEPHONE ENCOUNTER
Specialty Pharmacy - Refill Coordination    Specialty Medication Orders Linked to Encounter      Flowsheet Row Most Recent Value   Medication #1 evolocumab (REPATHA PUSHTRONEX) 420 mg/3.5 mL Injt (Order#[id number], Rx#[id number])            Refill Questions - Documented Responses      Flowsheet Row Most Recent Value   Patient Availability and HIPAA Verification    Does patient want to proceed with activity? Yes   HIPAA/medical authority confirmed? Yes   Relationship to patient of person spoken to? Self   Refill Screening Questions    Would patient like to speak to a pharmacist? No   When does the patient need to receive the medication? 03/15/23   Refill Delivery Questions    How will the patient receive the medication? [facility]   When does the patient need to receive the medication? 03/15/23   Shipping Address Home   Address in [facility] confirmed and updated if neccessary? Yes   Expected Copay ($) 0   Is the patient able to afford the medication copay? Yes   Payment Method zero copay   Days supply of Refill 30   Supplies needed? No supplies needed   Refill activity completed? Yes   Refill activity plan Refill scheduled   Shipment/Pickup Date: 03/14/23            Current Outpatient Medications   Medication Sig    ACCU-CHEK SMARTVIEW TEST STRIP Strp 1 strip by Misc.(Non-Drug; Combo Route) route 3 (three) times daily. Patient needs an appointment    ammonium lactate 12 % Crea Apply small amount to feet except for in between toes twice a day    aspirin (ECOTRIN) 81 MG EC tablet Take 81 mg by mouth once daily.    atorvastatin (LIPITOR) 80 MG tablet Take 1 tablet (80 mg total) by mouth once daily.    blood-glucose meter Misc Use to check sugar 3 times daily. Accu-chek Emily. Patient needs an appointment    carvediloL (COREG) 25 MG tablet Take 1 tablet (25 mg total) by mouth 2 (two) times daily.    celecoxib (CELEBREX) 200 MG capsule Take 1 capsule (200 mg total) by mouth 2 (two) times daily.    diazePAM (VALIUM) 5 MG  "tablet Take 1 tablet (5 mg total) by mouth once daily at 6am. for 3 days    diclofenac sodium (VOLTAREN) 1 % Gel Apply 2 g topically 4 (four) times daily as needed (neck muscle spasm).    diphenhydrAMINE (BENADRYL) 25 mg capsule Take 50 mg by mouth nightly as needed for Itching.     evolocumab (REPATHA PUSHTRONEX) 420 mg/3.5 mL Injt Inject 3.5 mLs (420 mg total) into the skin every 30 days    FLUZONE HIGHDOSE QUAD 22-23  mcg/0.7 mL Syrg     FOLIC ACID/MULTIVITS-MIN (MULTIVITAMIN FOR CHOLESTEROL ORAL) Take 1 tablet by mouth nightly.     furosemide (LASIX) 40 MG tablet TAKE 1 TABLET EVERY DAY    insulin glargine, TOUJEO, (TOUJEO SOLOSTAR U-300 INSULIN) 300 unit/mL (1.5 mL) InPn pen Inject 45 Units into the skin once daily.    insulin needles, disposable, (BD INSULIN PEN NEEDLE UF ORIG) 29 x 1/2 " Ndle USE TWICE DAILY AS DIRECTED    JARDIANCE 25 mg tablet TAKE 1 TABLET EVERY DAY    lancets Misc 1 lancet by Misc.(Non-Drug; Combo Route) route 3 (three) times daily. accu-chek Fastclix. Patient needs an appointment    meclizine (ANTIVERT) 25 mg tablet Take 1 tablet (25 mg total) by mouth 3 (three) times daily as needed for Dizziness or Nausea.    metFORMIN (GLUCOPHAGE) 1000 MG tablet Take 1 tablet (1,000 mg total) by mouth 2 (two) times daily.    mupirocin (BACTROBAN) 2 % ointment AAA qd- bid    nitroGLYCERIN (NITROSTAT) 0.4 MG SL tablet PLACE 1 TABLET (0.4 MG TOTAL) UNDER THE TONGUE EVERY 5 (FIVE) MINUTES AS NEEDED FOR CHEST PAIN AS DIRECTED    NOVOLOG FLEXPEN U-100 INSULIN 100 unit/mL (3 mL) InPn pen Inject 12 Units into the skin 3 (three) times daily with meals.    pen needle, diabetic (BD INSULIN PEN NEEDLE UF MINI) 31 gauge x 3/16" Ndle 1 each by Misc.(Non-Drug; Combo Route) route 4 (four) times daily. Patient needs an appointment    PREVNAR 20, PF, 0.5 mL Syrg injection     semaglutide (OZEMPIC) 1 mg/dose (4 mg/3 mL) Inject 1 mg into the skin every 7 days.    valsartan (DIOVAN) 320 MG tablet TAKE 1 TABLET ONE " TIME DAILY    VASCEPA 1 gram Cap TAKE 2 CAPSULES TWICE DAILY   Last reviewed on 2/27/2023  2:42 PM by Markus Bautista MD    Review of patient's allergies indicates:   Allergen Reactions    Tramadol Hallucinations    Last reviewed on  2/27/2023 2:42 PM by Markus Bautista      Tasks added this encounter   4/7/2023 - Refill Call (Auto Added)   Tasks due within next 3 months   No tasks due.     Tamara Pabon, PharmD  [facility]  [address] [place] [id number]  Phone: [phone]  Fax: [phone]

## 2023-03-20 ENCOUNTER — PATIENT MESSAGE (OUTPATIENT)
Dept: ENDOCRINOLOGY | Facility: CLINIC | Age: 68
End: 2023-03-20
Payer: MEDICARE

## 2023-03-20 RX ORDER — SEMAGLUTIDE 1.34 MG/ML
0.5 INJECTION, SOLUTION SUBCUTANEOUS
Qty: 4.5 ML | Refills: 3 | Status: SHIPPED | OUTPATIENT
Start: 2023-03-20 | End: 2024-01-26

## 2023-03-23 ENCOUNTER — PES CALL (OUTPATIENT)
Dept: ADMINISTRATIVE | Facility: CLINIC | Age: 68
End: 2023-03-23
Payer: MEDICARE

## 2023-03-30 ENCOUNTER — TELEPHONE (OUTPATIENT)
Dept: PAIN MEDICINE | Facility: CLINIC | Age: 68
End: 2023-03-30
Payer: MEDICARE

## 2023-03-30 NOTE — TELEPHONE ENCOUNTER
Staff reached out to pt in regards to pt appt that's scheduled for 3/31/23 with Dr. Lopez at 8:00 staff informed pt that his appt has to be rescheduled due to provider not being in clinic pt verbalized understanding and thanked staff for reaching out to him

## 2023-03-30 NOTE — TELEPHONE ENCOUNTER
----- Message from Moshe Campbell MA sent at 3/30/2023 11:10 AM CDT -----  Regarding: Sooner Appointment  This is a new pt with Dr. Lopez  that had to be rescheduled , can you get him in sooner its only showing availability in May for me

## 2023-04-04 ENCOUNTER — TELEPHONE (OUTPATIENT)
Dept: PAIN MEDICINE | Facility: CLINIC | Age: 68
End: 2023-04-04
Payer: MEDICARE

## 2023-04-04 NOTE — TELEPHONE ENCOUNTER
Staff contacted and spoke with patient in regards to confirming his schedule 1pm on tomorrow with Pain Provider Dr. Rosana Lopez.      Pt verbalized understanding and has confirmed his appt

## 2023-04-05 ENCOUNTER — OFFICE VISIT (OUTPATIENT)
Dept: PAIN MEDICINE | Facility: CLINIC | Age: 68
End: 2023-04-05
Payer: MEDICARE

## 2023-04-05 VITALS
SYSTOLIC BLOOD PRESSURE: 101 MMHG | HEART RATE: 67 BPM | DIASTOLIC BLOOD PRESSURE: 65 MMHG | WEIGHT: 206.38 LBS | HEIGHT: 68 IN | BODY MASS INDEX: 31.28 KG/M2 | RESPIRATION RATE: 19 BRPM | TEMPERATURE: 98 F

## 2023-04-05 DIAGNOSIS — M48.07 SPINAL STENOSIS, LUMBOSACRAL REGION: ICD-10-CM

## 2023-04-05 DIAGNOSIS — M79.10 MYALGIA: ICD-10-CM

## 2023-04-05 DIAGNOSIS — M48.061 SPINAL STENOSIS OF LUMBAR REGION WITHOUT NEUROGENIC CLAUDICATION: Primary | ICD-10-CM

## 2023-04-05 PROCEDURE — 1160F RVW MEDS BY RX/DR IN RCRD: CPT | Mod: CPTII,S$GLB,, | Performed by: ANESTHESIOLOGY

## 2023-04-05 PROCEDURE — 1100F PTFALLS ASSESS-DOCD GE2>/YR: CPT | Mod: CPTII,S$GLB,, | Performed by: ANESTHESIOLOGY

## 2023-04-05 PROCEDURE — 3288F FALL RISK ASSESSMENT DOCD: CPT | Mod: CPTII,S$GLB,, | Performed by: ANESTHESIOLOGY

## 2023-04-05 PROCEDURE — 4010F PR ACE/ARB THEARPY RXD/TAKEN: ICD-10-PCS | Mod: CPTII,S$GLB,, | Performed by: ANESTHESIOLOGY

## 2023-04-05 PROCEDURE — 3288F PR FALLS RISK ASSESSMENT DOCUMENTED: ICD-10-PCS | Mod: CPTII,S$GLB,, | Performed by: ANESTHESIOLOGY

## 2023-04-05 PROCEDURE — 3072F PR LOW RISK FOR RETINOPATHY: ICD-10-PCS | Mod: CPTII,S$GLB,, | Performed by: ANESTHESIOLOGY

## 2023-04-05 PROCEDURE — 99999 PR PBB SHADOW E&M-EST. PATIENT-LVL III: CPT | Mod: PBBFAC,,, | Performed by: ANESTHESIOLOGY

## 2023-04-05 PROCEDURE — 4010F ACE/ARB THERAPY RXD/TAKEN: CPT | Mod: CPTII,S$GLB,, | Performed by: ANESTHESIOLOGY

## 2023-04-05 PROCEDURE — 1100F PR PT FALLS ASSESS DOC 2+ FALLS/FALL W/INJURY/YR: ICD-10-PCS | Mod: CPTII,S$GLB,, | Performed by: ANESTHESIOLOGY

## 2023-04-05 PROCEDURE — 3078F DIAST BP <80 MM HG: CPT | Mod: CPTII,S$GLB,, | Performed by: ANESTHESIOLOGY

## 2023-04-05 PROCEDURE — 3008F PR BODY MASS INDEX (BMI) DOCUMENTED: ICD-10-PCS | Mod: CPTII,S$GLB,, | Performed by: ANESTHESIOLOGY

## 2023-04-05 PROCEDURE — 1125F AMNT PAIN NOTED PAIN PRSNT: CPT | Mod: CPTII,S$GLB,, | Performed by: ANESTHESIOLOGY

## 2023-04-05 PROCEDURE — 1125F PR PAIN SEVERITY QUANTIFIED, PAIN PRESENT: ICD-10-PCS | Mod: CPTII,S$GLB,, | Performed by: ANESTHESIOLOGY

## 2023-04-05 PROCEDURE — 3072F LOW RISK FOR RETINOPATHY: CPT | Mod: CPTII,S$GLB,, | Performed by: ANESTHESIOLOGY

## 2023-04-05 PROCEDURE — 99204 PR OFFICE/OUTPT VISIT, NEW, LEVL IV, 45-59 MIN: ICD-10-PCS | Mod: S$GLB,,, | Performed by: ANESTHESIOLOGY

## 2023-04-05 PROCEDURE — 99999 PR PBB SHADOW E&M-EST. PATIENT-LVL III: ICD-10-PCS | Mod: PBBFAC,,, | Performed by: ANESTHESIOLOGY

## 2023-04-05 PROCEDURE — 3008F BODY MASS INDEX DOCD: CPT | Mod: CPTII,S$GLB,, | Performed by: ANESTHESIOLOGY

## 2023-04-05 PROCEDURE — 1160F PR REVIEW ALL MEDS BY PRESCRIBER/CLIN PHARMACIST DOCUMENTED: ICD-10-PCS | Mod: CPTII,S$GLB,, | Performed by: ANESTHESIOLOGY

## 2023-04-05 PROCEDURE — 99204 OFFICE O/P NEW MOD 45 MIN: CPT | Mod: S$GLB,,, | Performed by: ANESTHESIOLOGY

## 2023-04-05 PROCEDURE — 3074F SYST BP LT 130 MM HG: CPT | Mod: CPTII,S$GLB,, | Performed by: ANESTHESIOLOGY

## 2023-04-05 PROCEDURE — 1159F MED LIST DOCD IN RCRD: CPT | Mod: CPTII,S$GLB,, | Performed by: ANESTHESIOLOGY

## 2023-04-05 PROCEDURE — 3078F PR MOST RECENT DIASTOLIC BLOOD PRESSURE < 80 MM HG: ICD-10-PCS | Mod: CPTII,S$GLB,, | Performed by: ANESTHESIOLOGY

## 2023-04-05 PROCEDURE — 3074F PR MOST RECENT SYSTOLIC BLOOD PRESSURE < 130 MM HG: ICD-10-PCS | Mod: CPTII,S$GLB,, | Performed by: ANESTHESIOLOGY

## 2023-04-05 PROCEDURE — 1159F PR MEDICATION LIST DOCUMENTED IN MEDICAL RECORD: ICD-10-PCS | Mod: CPTII,S$GLB,, | Performed by: ANESTHESIOLOGY

## 2023-04-05 RX ORDER — DIAZEPAM 5 MG/1
5 TABLET ORAL
Qty: 2 TABLET | Refills: 0 | Status: SHIPPED | OUTPATIENT
Start: 2023-04-05 | End: 2023-09-29

## 2023-04-05 NOTE — PROGRESS NOTES
PCP: Dallas Martin MD    REFERRING PHYSICIAN: Markus Bautista MD    CHIEF COMPLAINT: Right Low Back pain    Original HISTORY OF PRESENT ILLNESS: Jm Deleon presents to the clinic for the evaluation of the above pain. The pain started December, 2022 after two falls. Images show no lumbar/pelvic fractures.     Original Pain Description:  The pain is located in the right low back and does not radiate. The pain is described as aching. Exacerbating factors: Laying, Lifting. Mitigating factors reclining. Symptoms interfere with daily activity and sleeping. The patient feels like symptoms have been worsening. Patient denies night fever/night sweats, urinary incontinence, bowel incontinence, significant weight loss, and loss of sensations. He does note stumbling.     Original PAIN SCORES:  Best: Pain is 1  Worst: Pain is 10  Current: Pain is 3    INTERVAL HISTORY:     6 weeks of Conservative therapy:  PT: 7-8,2022  Chiro: No  HEP: No      Treatments / Medications: (Ice/Heat/NSAIDS/APAP/etc):  Ice  Heat  Ibuprofen  Jacuzzi    Interventional Pain Procedures: (Previous injections)  8/7/20: Left L3/4 TFESI 100% relief 9 months    Past Medical History:   Diagnosis Date    Allergy     seasonal    Arthritis     Coronary artery disease     Diabetes mellitus     Diabetes mellitus, type 2     Dysplastic nevus of trunk 03/2022    moderately atypical     Hyperlipidemia     Hypertension     Joint pain     Melanoma 10/2019    MM left back 2.7mm; neg LNs    Open angle with borderline findings and high glaucoma risk in both eyes 2018    Squamous cell carcinoma 2017    scalp - SSW     Past Surgical History:   Procedure Laterality Date    ARTHROSCOPIC REPAIR OF ROTATOR CUFF OF SHOULDER Left 5/12/2021    Procedure: REPAIR, ROTATOR CUFF, ARTHROSCOPIC;  Surgeon: Johnny Ventura MD;  Location: [facility];  Service: Orthopedics;  Laterality: Left;  Labral Debridement    ARTHROSCOPY OF SHOULDER WITH DECOMPRESSION OF SUBACROMIAL SPACE   5/12/2021    Procedure: ARTHROSCOPY, SHOULDER, WITH SUBACROMIAL SPACE DECOMPRESSION;  Surgeon: Johnny Ventura MD;  Location: [facility] OR;  Service: Orthopedics;;    BELPHAROPTOSIS REPAIR  10 years ago    both eyes    CARDIAC SURGERY      3 vessel bypass    COLONOSCOPY N/A 10/4/2019    Procedure: COLONOSCOPY;  Surgeon: Isaiah Booker MD;  Location: [facility] (4TH FLR);  Service: Endoscopy;  Laterality: N/A;  Cardilogy Clearance received rom Dr. QUEENIE Salomon, see telephone encounter 8/26/19-BB    CORONARY ARTERY BYPASS GRAFT  x4 age 45 2000    DISTAL CLAVICLE EXCISION Left 5/12/2021    Procedure: EXCISION, CLAVICLE, DISTAL;  Surgeon: Johnny Ventura MD;  Location: [facility] OR;  Service: Orthopedics;  Laterality: Left;    FIXATION OF TENDON Left 5/12/2021    Procedure: FIXATION, TENDON;  Surgeon: Johnny Ventura MD;  Location: [facility] OR;  Service: Orthopedics;  Laterality: Left;    HARDWARE REMOVAL Left 11/24/2021    Procedure: REMOVAL, HARDWARE;  Surgeon: Johnny Ventura MD;  Location: [facility] OR;  Service: Orthopedics;  Laterality: Left;    HERNIA REPAIR      HIP SURGERY  9-24-14    left YANICK    JOINT REPLACEMENT      left hip    MANIPULATION WITH ANESTHESIA Left 11/24/2021    Procedure: MANIPULATION, WITH ANESTHESIA;  Surgeon: Johnny Ventura MD;  Location: [facility] OR;  Service: Orthopedics;  Laterality: Left;    SENTINEL LYMPH NODE BIOPSY Left 10/18/2019    Procedure: BIOPSY, LYMPH NODE, SENTINEL;  Surgeon: Fabián Villeda MD;  Location: [facility] OR 2ND FLR;  Service: General;  Laterality: Left;    SHOULDER ARTHROSCOPY      SHOULDER ARTHROSCOPY Left 11/24/2021    Procedure: ARTHROSCOPY, SHOULDER;  Surgeon: Johnny Ventura MD;  Location: [facility] OR;  Service: Orthopedics;  Laterality: Left;  regional w/catheter (interscalene)    SHOULDER SURGERY      TRANSFORAMINAL EPIDURAL INJECTION OF STEROID Left 8/7/2020    Procedure: LUMBAR TRANSFORAMINAL LEFT L3/4 AND L5/S1 DIRECT REFERRAL;  Surgeon: Ronny  MD Juan Carlos;  Location: [facility];  Service: Pain Management;  Laterality: Left;  NEEDS CONSENT, DIABETIC     Social History     Socioeconomic History    Marital status:    Tobacco Use    Smoking status: Never    Smokeless tobacco: Never   Substance and Sexual Activity    Alcohol use: Yes     Alcohol/week: 3.0 standard drinks     Types: 3 Cans of beer per week     Comment: on weekend    Drug use: Never     Social Determinants of Health     Financial Resource Strain: Low Risk     Difficulty of Paying Living Expenses: Not hard at all   Food Insecurity: No Food Insecurity    Worried About Running Out of Food in the Last Year: Never true    Ran Out of Food in the Last Year: Never true   Transportation Needs: No Transportation Needs    Lack of Transportation (Medical): No    Lack of Transportation (Non-Medical): No   Physical Activity: Unknown    Days of Exercise per Week: Patient refused    Minutes of Exercise per Session: 30 min   Stress: No Stress Concern Present    Feeling of Stress : Not at all   Social Connections: Unknown    Frequency of Communication with Friends and Family: Three times a week    Frequency of Social Gatherings with Friends and Family: Once a week    Active Member of Clubs or Organizations: No    Attends Club or Organization Meetings: Never    Marital Status:    Housing Stability: Low Risk     Unable to Pay for Housing in the Last Year: No    Number of Places Lived in the Last Year: 1    Unstable Housing in the Last Year: No     Family History   Problem Relation Age of Onset    Coronary artery disease Mother     Cancer Father         lung cancer, smoker    Hypertension Brother     Cancer Brother         colon    Amblyopia Neg Hx     Blindness Neg Hx     Cataracts Neg Hx     Glaucoma Neg Hx     Macular degeneration Neg Hx     Retinal detachment Neg Hx     Strabismus Neg Hx     Melanoma Neg Hx        Review of patient's allergies indicates:   Allergen Reactions    Tramadol  "Hallucinations       Current Outpatient Medications   Medication Sig    ACCU-CHEK SMARTVIEW TEST STRIP Strp 1 strip by Misc.(Non-Drug; Combo Route) route 3 (three) times daily. Patient needs an appointment    ammonium lactate 12 % Crea Apply small amount to feet except for in between toes twice a day    aspirin (ECOTRIN) 81 MG EC tablet Take 81 mg by mouth once daily.    atorvastatin (LIPITOR) 80 MG tablet Take 1 tablet (80 mg total) by mouth once daily.    blood-glucose meter Misc Use to check sugar 3 times daily. Accu-chek Emily. Patient needs an appointment    carvediloL (COREG) 25 MG tablet Take 1 tablet (25 mg total) by mouth 2 (two) times daily.    celecoxib (CELEBREX) 200 MG capsule Take 1 capsule (200 mg total) by mouth 2 (two) times daily.    diclofenac sodium (VOLTAREN) 1 % Gel Apply 2 g topically 4 (four) times daily as needed (neck muscle spasm).    diphenhydrAMINE (BENADRYL) 25 mg capsule Take 50 mg by mouth nightly as needed for Itching.     evolocumab (REPATHA PUSHTRONEX) 420 mg/3.5 mL Injt Inject 3.5 mLs (420 mg total) into the skin every 30 days    FLUZONE HIGHDOSE QUAD 22-23  mcg/0.7 mL Syrg     FOLIC ACID/MULTIVITS-MIN (MULTIVITAMIN FOR CHOLESTEROL ORAL) Take 1 tablet by mouth nightly.     furosemide (LASIX) 40 MG tablet TAKE 1 TABLET EVERY DAY    insulin glargine, TOUJEO, (TOUJEO SOLOSTAR U-300 INSULIN) 300 unit/mL (1.5 mL) InPn pen Inject 45 Units into the skin once daily.    insulin needles, disposable, (BD INSULIN PEN NEEDLE UF ORIG) 29 x 1/2 " Ndle USE TWICE DAILY AS DIRECTED    JARDIANCE 25 mg tablet TAKE 1 TABLET EVERY DAY    lancets Misc 1 lancet by Misc.(Non-Drug; Combo Route) route 3 (three) times daily. accu-chek Fastclix. Patient needs an appointment    metFORMIN (GLUCOPHAGE) 1000 MG tablet Take 1 tablet (1,000 mg total) by mouth 2 (two) times daily.    mupirocin (BACTROBAN) 2 % ointment AAA qd- bid    nitroGLYCERIN (NITROSTAT) 0.4 MG SL tablet PLACE 1 TABLET (0.4 MG TOTAL) UNDER " "THE TONGUE EVERY 5 (FIVE) MINUTES AS NEEDED FOR CHEST PAIN AS DIRECTED    NOVOLOG FLEXPEN U-100 INSULIN 100 unit/mL (3 mL) InPn pen Inject 12 Units into the skin 3 (three) times daily with meals.    pen needle, diabetic (BD INSULIN PEN NEEDLE UF MINI) 31 gauge x 3/16" Ndle 1 each by Misc.(Non-Drug; Combo Route) route 4 (four) times daily. Patient needs an appointment    PREVNAR 20, PF, 0.5 mL Syrg injection     semaglutide (OZEMPIC) 0.25 mg or 0.5 mg(2 mg/1.5 mL) pen injector Inject 0.5 mg into the skin every 7 days.    valsartan (DIOVAN) 320 MG tablet TAKE 1 TABLET ONE TIME DAILY    VASCEPA 1 gram Cap TAKE 2 CAPSULES TWICE DAILY    diazePAM (VALIUM) 5 MG tablet Take 1 tablet (5 mg total) by mouth once daily at 6am. for 3 days    meclizine (ANTIVERT) 25 mg tablet Take 1 tablet (25 mg total) by mouth 3 (three) times daily as needed for Dizziness or Nausea.     No current facility-administered medications for this visit.     Facility-Administered Medications Ordered in Other Visits   Medication    fentaNYL 50 mcg/mL injection 25 mcg    fentaNYL 50 mcg/mL injection  mcg    lidocaine (PF) 10 mg/ml (1%) injection 10 mg    LIDOcaine (PF) 10 mg/ml (1%) injection 10 mg    midazolam (VERSED) 1 mg/mL injection 0.5 mg    midazolam (VERSED) 1 mg/mL injection 0.5-4 mg    ropivacaine 0.2% Nimbus PainPRO Pump infusion 500 ML    ropivacaine 0.2% Nimbus PainPRO Pump infusion 500 ML    triamcinolone acetonide injection 40 mg       ROS:  GENERAL: No fever. No chills. No fatigue. Denies weight loss. Denies weight gain.  HEENT: Denies headaches. Denies vision change. Denies eye pain. Denies double vision. Denies ear pain.   CV: Denies chest pain.   PULM: Denies of shortness of breath.  GI: Denies constipation. No diarrhea. No abdominal pain. Denies nausea. Denies vomiting. No blood in stool.  HEME: Denies bleeding problems.  : Denies urgency. No painful urination. No blood in urine.  MS: Denies joint stiffness. Denies joint " "swelling.  + back pain.  SKIN: Denies rash.   NEURO: Denies seizures. No weakness.  PSYCH:  Denies difficulty sleeping. No anxiety. Denies depression. No suicidal thoughts.       VITALS:   Vitals:    04/05/23 1311   BP: 101/65   Pulse: 67   Resp: 19   Temp: 97.6 °F (36.4 °C)   TempSrc: Oral   Weight: 93.6 kg (206 lb 5.6 oz)   Height: 5' 8" (1.727 m)   PainSc:   5   PainLoc: Hip         PHYSICAL EXAM:   GENERAL: Well appearing, in no acute distress, alert and oriented x3.  PSYCH:  Mood and affect appropriate.  SKIN: Skin color, texture, turgor normal, no rashes or lesions.  HEENT:  Normocephalic, atraumatic. Cranial nerves grossly intact.  NECK: No pain to palpation over the cervical paraspinous muscles. No pain to palpation over facets. No pain with neck flexion, extension, or lateral flexion.   PULM: No evidence of respiratory difficulty, symmetric chest rise.  GI:  Non-distended  BACK: Normal range of motion. No pain to palpation over the spinous processes. No pain to palpation over facet joints. There is no pain with palpation over the sacroiliac joints bilaterally. Tender to palpation over the right piriformis muscle.   EXTREMITIES: No deformities, edema, or skin discoloration.   MUSCULOSKELETAL: Shoulder, hip, and knee provocative maneuvers are negative. No atrophy is noted.  NEURO: Sensation is equal and appropriate bilaterally. Bilateral upper and lower extremity strength is normal and symmetric. Bilateral upper and lower extremity coordination and muscle stretch reflexes are physiologic and symmetric. Plantar response are downgoing. Straight leg raising in the supine position is negative to radicular pain.   GAIT: normal.      LABS:      IMAGING:    XR LUMBAR SPINE AP AND LATERAL     CLINICAL HISTORY:  Sacrococcygeal disorders, not elsewhere classified     TECHNIQUE:  AP, lateral and spot images were performed of the lumbar spine.     COMPARISON:  July 2020     FINDINGS:  Bones are slightly demineralized.  " Hypertrophic degenerative change anterior aspect of the lumbar spine.  Mild disc space narrowing at the lumbosacral level.  Facet arthropathy noted.  Vascular calcifications in the aorta.  Postop left hip replacement.     Impression:     Degenerative change.  No compression fracture.        Electronically signed by: Casimiro Gayle MD  Date:                                            06/10/2022  Time:                                           10:08    MRI LUMBAR SPINE WITHOUT CONTRAST     CLINICAL HISTORY:  Back pain or radiculopathy, > 6 wks;Back pain or radiculopathy, cancer or infection suspected;MRI L spine non-contrast: left low back pain with radicular pain, subjective weakness and balance issues, history of melanoma resection from back; Dorsalgia, unspecified     TECHNIQUE:  Multiplanar, multisequence MR images were acquired from the thoracolumbar junction to the sacrum without contrast.     COMPARISON:  Lumbar radiograph 07/31/2020     FINDINGS:  2 mm of retrolisthesis of L2 on L3.  Otherwise satisfactory alignment.     Vertebral body heights are maintained.  No acute fracture.  Normal marrow signal.     Mild disc space height loss L5-S1.  Disc desiccation L2-L3, L3-L4, and L5-S1.     Conus ends at T12-L1.     Degenerative findings:     T12-L1: No disc herniation, spinal canal stenosis, or neural foraminal narrowing.     L1-L2: Circumferential disc bulge without significant spinal canal stenosis or neural foraminal narrowing.     L2-L3: Circumferential disc bulge and facet arthropathy without significant spinal canal stenosis.  Mild bilateral neural foraminal narrowing.     L3-L4: Circumferential disc bulge, prominent facet arthropathy, and ligamentum flavum hypertrophy resulting in moderate spinal canal stenosis and moderate left and mild right-sided neural foraminal narrowing..     L4-L5: Circumferential disc bulge, facet arthropathy, and ligamentum flavum hypertrophy resulting in mild spinal canal stenosis.   No significant neural foraminal narrowing.     L5-S1: Circumferential disc bulge with small right paracentral disc protrusion and facet arthropathy resulting in mild spinal canal stenosis and mild bilateral neural foraminal narrowing.     Paraspinal muscles and soft tissues are unremarkable.     Impression:     Lumbar spondylosis, most prominent at L3-4, resulting in moderate spinal canal stenosis and left-sided neural foraminal narrowing, as above.     Electronically signed by resident: Horace Baum  Date:                                            08/01/2020  Time:                                           13:46    ASSESSMENT: 68 y.o. year old male with pain, consistent with:    Encounter Diagnoses   Name Primary?    Spinal stenosis of lumbar region without neurogenic claudication Yes    Myalgia        DISCUSSION: Mr. Deleon comes to us from Dr. Bautista with right low back pain. His MRI from 2020 showed moderate canal stenosis and he did very well after a left L3/4 TFESI. He has good walking tolerance but he notes that he is stumbling with his left leg. On exam, he doesn't have pain in the SIJ today but does have some pain in the right piriformis.       PLAN:  LMRI ordered  Valium for MRI prescribed  Follow up in 2 weeks to discuss options and consider piriformis vs epidural      I would like to thank Markus Buatista MD for the opportunity to assist in the care of this patient. We had a very nice visit and I look forward to continuing his care. Please let me know if I can be of further assistance.     Rosana Lopez  04/05/2023

## 2023-04-10 ENCOUNTER — SPECIALTY PHARMACY (OUTPATIENT)
Dept: PHARMACY | Facility: CLINIC | Age: 68
End: 2023-04-10
Payer: MEDICARE

## 2023-04-10 NOTE — TELEPHONE ENCOUNTER
Specialty Pharmacy - Refill Coordination    Specialty Medication Orders Linked to Encounter      Flowsheet Row Most Recent Value   Medication #1 evolocumab (REPATHA PUSHTRONEX) 420 mg/3.5 mL Injt (Order#[id number], Rx#[id number])            Refill Questions - Documented Responses      Flowsheet Row Most Recent Value   Patient Availability and HIPAA Verification    Does patient want to proceed with activity? Yes   HIPAA/medical authority confirmed? Yes   Relationship to patient of person spoken to? Self   Refill Screening Questions    Would patient like to speak to a pharmacist? No   When does the patient need to receive the medication? 04/14/23   Refill Delivery Questions    How will the patient receive the medication? [facility]   When does the patient need to receive the medication? 04/14/23   Shipping Address Home   Address in [facility] confirmed and updated if neccessary? Yes   Expected Copay ($) 0   Is the patient able to afford the medication copay? Yes   Payment Method zero copay   Days supply of Refill 30   Supplies needed? No supplies needed   Refill activity completed? Yes   Refill activity plan Refill scheduled   Shipment/Pickup Date: 04/11/23            Current Outpatient Medications   Medication Sig    ACCU-CHEK SMARTVIEW TEST STRIP Strp 1 strip by Misc.(Non-Drug; Combo Route) route 3 (three) times daily. Patient needs an appointment    ammonium lactate 12 % Crea Apply small amount to feet except for in between toes twice a day    aspirin (ECOTRIN) 81 MG EC tablet Take 81 mg by mouth once daily.    atorvastatin (LIPITOR) 80 MG tablet Take 1 tablet (80 mg total) by mouth once daily.    blood-glucose meter Misc Use to check sugar 3 times daily. Accu-chek Emily. Patient needs an appointment    carvediloL (COREG) 25 MG tablet Take 1 tablet (25 mg total) by mouth 2 (two) times daily.    celecoxib (CELEBREX) 200 MG capsule Take 1 capsule (200 mg total) by mouth 2 (two) times daily.    diazePAM (VALIUM) 5 MG  "tablet Take 1 tablet (5 mg total) by mouth On call Procedure for Anxiety.    diclofenac sodium (VOLTAREN) 1 % Gel Apply 2 g topically 4 (four) times daily as needed (neck muscle spasm).    diphenhydrAMINE (BENADRYL) 25 mg capsule Take 50 mg by mouth nightly as needed for Itching.     evolocumab (REPATHA PUSHTRONEX) 420 mg/3.5 mL Injt Inject 3.5 mLs (420 mg total) into the skin every 30 days    FLUZONE HIGHDOSE QUAD 22-23  mcg/0.7 mL Syrg     FOLIC ACID/MULTIVITS-MIN (MULTIVITAMIN FOR CHOLESTEROL ORAL) Take 1 tablet by mouth nightly.     furosemide (LASIX) 40 MG tablet TAKE 1 TABLET EVERY DAY    insulin glargine, TOUJEO, (TOUJEO SOLOSTAR U-300 INSULIN) 300 unit/mL (1.5 mL) InPn pen Inject 45 Units into the skin once daily.    insulin needles, disposable, (BD INSULIN PEN NEEDLE UF ORIG) 29 x 1/2 " Ndle USE TWICE DAILY AS DIRECTED    JARDIANCE 25 mg tablet TAKE 1 TABLET EVERY DAY    lancets Misc 1 lancet by Misc.(Non-Drug; Combo Route) route 3 (three) times daily. accu-chek Fastclix. Patient needs an appointment    meclizine (ANTIVERT) 25 mg tablet Take 1 tablet (25 mg total) by mouth 3 (three) times daily as needed for Dizziness or Nausea.    metFORMIN (GLUCOPHAGE) 1000 MG tablet Take 1 tablet (1,000 mg total) by mouth 2 (two) times daily.    mupirocin (BACTROBAN) 2 % ointment AAA qd- bid    nitroGLYCERIN (NITROSTAT) 0.4 MG SL tablet PLACE 1 TABLET (0.4 MG TOTAL) UNDER THE TONGUE EVERY 5 (FIVE) MINUTES AS NEEDED FOR CHEST PAIN AS DIRECTED    NOVOLOG FLEXPEN U-100 INSULIN 100 unit/mL (3 mL) InPn pen Inject 12 Units into the skin 3 (three) times daily with meals.    pen needle, diabetic (BD INSULIN PEN NEEDLE UF MINI) 31 gauge x 3/16" Ndle 1 each by Misc.(Non-Drug; Combo Route) route 4 (four) times daily. Patient needs an appointment    PREVNAR 20, PF, 0.5 mL Syrg injection     semaglutide (OZEMPIC) 0.25 mg or 0.5 mg(2 mg/1.5 mL) pen injector Inject 0.5 mg into the skin every 7 days.    valsartan (DIOVAN) 320 MG " tablet TAKE 1 TABLET ONE TIME DAILY    VASCEPA 1 gram Cap TAKE 2 CAPSULES TWICE DAILY   Last reviewed on 4/5/2023  1:20 PM by Rosana Lopez MD    Review of patient's allergies indicates:   Allergen Reactions    Tramadol Hallucinations    Last reviewed on  4/5/2023 1:20 PM by Rosana Lopez      Tasks added this encounter   5/7/2023 - Refill Call (Auto Added)   Tasks due within next 3 months   No tasks due.     Leatha Pollard, Patient Care Assistant  [facility]  [address] [place] [id number]  Phone: [phone]  Fax: [phone]

## 2023-04-24 ENCOUNTER — PATIENT MESSAGE (OUTPATIENT)
Dept: ENDOCRINOLOGY | Facility: CLINIC | Age: 68
End: 2023-04-24
Payer: MEDICARE

## 2023-04-28 ENCOUNTER — HOSPITAL ENCOUNTER (OUTPATIENT)
Dept: RADIOLOGY | Facility: HOSPITAL | Age: 68
Discharge: HOME | End: 2023-04-28
Attending: ANESTHESIOLOGY
Payer: MEDICARE

## 2023-04-28 DIAGNOSIS — M48.07 SPINAL STENOSIS, LUMBOSACRAL REGION: ICD-10-CM

## 2023-04-28 PROCEDURE — 72148 MRI LUMBAR SPINE W/O DYE: CPT | Mod: 26,,, | Performed by: RADIOLOGY

## 2023-04-28 PROCEDURE — 72148 MRI LUMBAR SPINE WITHOUT CONTRAST: ICD-10-PCS | Mod: 26,,, | Performed by: RADIOLOGY

## 2023-04-28 PROCEDURE — 72148 MRI LUMBAR SPINE W/O DYE: CPT | Mod: TC

## 2023-05-02 ENCOUNTER — PATIENT MESSAGE (OUTPATIENT)
Dept: PAIN MEDICINE | Facility: CLINIC | Age: 68
End: 2023-05-02
Payer: MEDICARE

## 2023-05-08 ENCOUNTER — OFFICE VISIT (OUTPATIENT)
Dept: PAIN MEDICINE | Facility: CLINIC | Age: 68
End: 2023-05-08
Payer: MEDICARE

## 2023-05-08 ENCOUNTER — PATIENT MESSAGE (OUTPATIENT)
Dept: PAIN MEDICINE | Facility: OTHER | Age: 68
End: 2023-05-08
Payer: MEDICARE

## 2023-05-08 VITALS — HEIGHT: 68 IN | SYSTOLIC BLOOD PRESSURE: 133 MMHG | DIASTOLIC BLOOD PRESSURE: 76 MMHG | BODY MASS INDEX: 31.38 KG/M2

## 2023-05-08 DIAGNOSIS — M46.1 SACROILIITIS: Primary | ICD-10-CM

## 2023-05-08 PROCEDURE — 3072F LOW RISK FOR RETINOPATHY: CPT | Mod: CPTII,S$GLB,, | Performed by: ANESTHESIOLOGY

## 2023-05-08 PROCEDURE — 3078F PR MOST RECENT DIASTOLIC BLOOD PRESSURE < 80 MM HG: ICD-10-PCS | Mod: CPTII,S$GLB,, | Performed by: ANESTHESIOLOGY

## 2023-05-08 PROCEDURE — 1101F PR PT FALLS ASSESS DOC 0-1 FALLS W/OUT INJ PAST YR: ICD-10-PCS | Mod: CPTII,S$GLB,, | Performed by: ANESTHESIOLOGY

## 2023-05-08 PROCEDURE — 4010F ACE/ARB THERAPY RXD/TAKEN: CPT | Mod: CPTII,S$GLB,, | Performed by: ANESTHESIOLOGY

## 2023-05-08 PROCEDURE — 4010F PR ACE/ARB THEARPY RXD/TAKEN: ICD-10-PCS | Mod: CPTII,S$GLB,, | Performed by: ANESTHESIOLOGY

## 2023-05-08 PROCEDURE — 1125F AMNT PAIN NOTED PAIN PRSNT: CPT | Mod: CPTII,S$GLB,, | Performed by: ANESTHESIOLOGY

## 2023-05-08 PROCEDURE — 1159F PR MEDICATION LIST DOCUMENTED IN MEDICAL RECORD: ICD-10-PCS | Mod: CPTII,S$GLB,, | Performed by: ANESTHESIOLOGY

## 2023-05-08 PROCEDURE — 3072F PR LOW RISK FOR RETINOPATHY: ICD-10-PCS | Mod: CPTII,S$GLB,, | Performed by: ANESTHESIOLOGY

## 2023-05-08 PROCEDURE — 1101F PT FALLS ASSESS-DOCD LE1/YR: CPT | Mod: CPTII,S$GLB,, | Performed by: ANESTHESIOLOGY

## 2023-05-08 PROCEDURE — 99214 OFFICE O/P EST MOD 30 MIN: CPT | Mod: S$GLB,,, | Performed by: ANESTHESIOLOGY

## 2023-05-08 PROCEDURE — 1159F MED LIST DOCD IN RCRD: CPT | Mod: CPTII,S$GLB,, | Performed by: ANESTHESIOLOGY

## 2023-05-08 PROCEDURE — 3075F PR MOST RECENT SYSTOLIC BLOOD PRESS GE 130-139MM HG: ICD-10-PCS | Mod: CPTII,S$GLB,, | Performed by: ANESTHESIOLOGY

## 2023-05-08 PROCEDURE — 1160F RVW MEDS BY RX/DR IN RCRD: CPT | Mod: CPTII,S$GLB,, | Performed by: ANESTHESIOLOGY

## 2023-05-08 PROCEDURE — 3008F PR BODY MASS INDEX (BMI) DOCUMENTED: ICD-10-PCS | Mod: CPTII,S$GLB,, | Performed by: ANESTHESIOLOGY

## 2023-05-08 PROCEDURE — 1160F PR REVIEW ALL MEDS BY PRESCRIBER/CLIN PHARMACIST DOCUMENTED: ICD-10-PCS | Mod: CPTII,S$GLB,, | Performed by: ANESTHESIOLOGY

## 2023-05-08 PROCEDURE — 99214 PR OFFICE/OUTPT VISIT, EST, LEVL IV, 30-39 MIN: ICD-10-PCS | Mod: S$GLB,,, | Performed by: ANESTHESIOLOGY

## 2023-05-08 PROCEDURE — 3288F PR FALLS RISK ASSESSMENT DOCUMENTED: ICD-10-PCS | Mod: CPTII,S$GLB,, | Performed by: ANESTHESIOLOGY

## 2023-05-08 PROCEDURE — 3008F BODY MASS INDEX DOCD: CPT | Mod: CPTII,S$GLB,, | Performed by: ANESTHESIOLOGY

## 2023-05-08 PROCEDURE — 99999 PR PBB SHADOW E&M-EST. PATIENT-LVL II: CPT | Mod: PBBFAC,,, | Performed by: ANESTHESIOLOGY

## 2023-05-08 PROCEDURE — 3078F DIAST BP <80 MM HG: CPT | Mod: CPTII,S$GLB,, | Performed by: ANESTHESIOLOGY

## 2023-05-08 PROCEDURE — 1125F PR PAIN SEVERITY QUANTIFIED, PAIN PRESENT: ICD-10-PCS | Mod: CPTII,S$GLB,, | Performed by: ANESTHESIOLOGY

## 2023-05-08 PROCEDURE — 99999 PR PBB SHADOW E&M-EST. PATIENT-LVL II: ICD-10-PCS | Mod: PBBFAC,,, | Performed by: ANESTHESIOLOGY

## 2023-05-08 PROCEDURE — 3288F FALL RISK ASSESSMENT DOCD: CPT | Mod: CPTII,S$GLB,, | Performed by: ANESTHESIOLOGY

## 2023-05-08 PROCEDURE — 3075F SYST BP GE 130 - 139MM HG: CPT | Mod: CPTII,S$GLB,, | Performed by: ANESTHESIOLOGY

## 2023-05-08 NOTE — PROGRESS NOTES
PCP: Dallas Martin MD    REFERRING PHYSICIAN: No ref. provider found    CHIEF COMPLAINT: Right Low Back pain      Interval History 05/05/23:  Patient presents for follow up after his Lumbar MRI. He reports no changes in his symptoms. He was more active this weekend. Pain worsened with laying flat in bed. Patient reports that this pain feels similar to pain he had on the left side which responded to TFESI.     Interval History 04/05/23:  Original HISTORY OF PRESENT ILLNESS: Jm Deleon presents to the clinic for the evaluation of the above pain. The pain started December, 2022 after two falls. Images show no lumbar/pelvic fractures.     Original Pain Description:  The pain is located in the right low back and does not radiate. The pain is described as aching. Exacerbating factors: Laying, Lifting. Mitigating factors reclining. Symptoms interfere with daily activity and sleeping. The patient feels like symptoms have been worsening. Patient denies night fever/night sweats, urinary incontinence, bowel incontinence, significant weight loss, and loss of sensations. He does note stumbling.     Original PAIN SCORES:  Best: Pain is 1  Worst: Pain is 10  Current: Pain is 3    INTERVAL HISTORY:     6 weeks of Conservative therapy:  PT: 7-8,2022  Chiro: No  HEP: Continues HEP as prescribed at PT above on a weekly basis      Treatments / Medications: (Ice/Heat/NSAIDS/APAP/etc):  Ice  Heat  Valerie      Interventional Pain Procedures: (Previous injections)  8/7/20: Left L3/4 TFESI 100% relief 9 months    Past Medical History:   Diagnosis Date    Allergy     seasonal    Arthritis     Coronary artery disease     Diabetes mellitus     Diabetes mellitus, type 2     Dysplastic nevus of trunk 03/2022    moderately atypical     Hyperlipidemia     Hypertension     Joint pain     Melanoma 10/2019    MM left back 2.7mm; neg LNs    Open angle with borderline findings and high glaucoma risk in both eyes 2018    Squamous cell carcinoma  2017    [facility] - hospitals     Past Surgical History:   Procedure Laterality Date    ARTHROSCOPIC REPAIR OF ROTATOR CUFF OF SHOULDER Left 5/12/2021    Procedure: REPAIR, ROTATOR CUFF, ARTHROSCOPIC;  Surgeon: Johnny Ventura MD;  Location: [facility] OR;  Service: Orthopedics;  Laterality: Left;  Labral Debridement    ARTHROSCOPY OF SHOULDER WITH DECOMPRESSION OF SUBACROMIAL SPACE  5/12/2021    Procedure: ARTHROSCOPY, SHOULDER, WITH SUBACROMIAL SPACE DECOMPRESSION;  Surgeon: Johnny Ventura MD;  Location: [facility] OR;  Service: Orthopedics;;    BELPHAROPTOSIS REPAIR  10 years ago    both eyes    CARDIAC SURGERY      3 vessel bypass    COLONOSCOPY N/A 10/4/2019    Procedure: COLONOSCOPY;  Surgeon: Isaiah Booker MD;  Location: [facility] (4TH FLR);  Service: Endoscopy;  Laterality: N/A;  Cardilogy Clearance received rom Dr. QUEENIE Salomon, see telephone encounter 8/26/19-BB    CORONARY ARTERY BYPASS GRAFT  x4 age 45 2000    DISTAL CLAVICLE EXCISION Left 5/12/2021    Procedure: EXCISION, CLAVICLE, DISTAL;  Surgeon: Johnny Ventura MD;  Location: [facility] OR;  Service: Orthopedics;  Laterality: Left;    FIXATION OF TENDON Left 5/12/2021    Procedure: FIXATION, TENDON;  Surgeon: Johnny Ventura MD;  Location: [facility] OR;  Service: Orthopedics;  Laterality: Left;    HARDWARE REMOVAL Left 11/24/2021    Procedure: REMOVAL, HARDWARE;  Surgeon: Johnny Ventura MD;  Location: [facility] OR;  Service: Orthopedics;  Laterality: Left;    HERNIA REPAIR      HIP SURGERY  9-24-14    left YANICK    JOINT REPLACEMENT      left hip    MANIPULATION WITH ANESTHESIA Left 11/24/2021    Procedure: MANIPULATION, WITH ANESTHESIA;  Surgeon: Johnny Ventura MD;  Location: [facility] OR;  Service: Orthopedics;  Laterality: Left;    SENTINEL LYMPH NODE BIOPSY Left 10/18/2019    Procedure: BIOPSY, LYMPH NODE, SENTINEL;  Surgeon: Fabián Villeda MD;  Location: [facility] OR 2ND FLR;  Service: General;  Laterality: Left;    SHOULDER ARTHROSCOPY      SHOULDER  ARTHROSCOPY Left 11/24/2021    Procedure: ARTHROSCOPY, SHOULDER;  Surgeon: Johnny Ventura MD;  Location: [facility] OR;  Service: Orthopedics;  Laterality: Left;  regional w/catheter (interscalene)    SHOULDER SURGERY      TRANSFORAMINAL EPIDURAL INJECTION OF STEROID Left 8/7/2020    Procedure: LUMBAR TRANSFORAMINAL LEFT L3/4 AND L5/S1 DIRECT REFERRAL;  Surgeon: Ronny Rangel MD;  [facility];  Service: Pain Management;  Laterality: Left;  NEEDS CONSENT, DIABETIC     Social History     Socioeconomic History    Marital status:    Tobacco Use    Smoking status: Never    Smokeless tobacco: Never   Substance and Sexual Activity    Alcohol use: Yes     Alcohol/week: 3.0 standard drinks     Types: 3 Cans of beer per week     Comment: on weekend    Drug use: Never     Social Determinants of Health     Financial Resource Strain: Low Risk     Difficulty of Paying Living Expenses: Not hard at all   Food Insecurity: No Food Insecurity    Worried About Running Out of Food in the Last Year: Never true    Ran Out of Food in the Last Year: Never true   Transportation Needs: No Transportation Needs    Lack of Transportation (Medical): No    Lack of Transportation (Non-Medical): No   Physical Activity: Unknown    Days of Exercise per Week: Patient refused    Minutes of Exercise per Session: 30 min   Stress: No Stress Concern Present    Feeling of Stress : Not at all   Social Connections: Unknown    Frequency of Communication with Friends and Family: Three times a week    Frequency of Social Gatherings with Friends and Family: Once a week    Active Member of Clubs or Organizations: No    Attends Club or Organization Meetings: Never    Marital Status:    Housing Stability: Low Risk     Unable to Pay for Housing in the Last Year: No    Number of Places Lived in the Last Year: 1    Unstable Housing in the Last Year: No     Family History   Problem Relation Age of Onset    Coronary artery disease Mother      "Cancer Father         lung cancer, smoker    Hypertension Brother     Cancer Brother         colon    Amblyopia Neg Hx     Blindness Neg Hx     Cataracts Neg Hx     Glaucoma Neg Hx     Macular degeneration Neg Hx     Retinal detachment Neg Hx     Strabismus Neg Hx     Melanoma Neg Hx        Review of patient's allergies indicates:   Allergen Reactions    Tramadol Hallucinations       Current Outpatient Medications   Medication Sig    ACCU-CHEK SMARTVIEW TEST STRIP Strp 1 strip by Misc.(Non-Drug; Combo Route) route 3 (three) times daily. Patient needs an appointment    ammonium lactate 12 % Crea Apply small amount to feet except for in between toes twice a day    aspirin (ECOTRIN) 81 MG EC tablet Take 81 mg by mouth once daily.    atorvastatin (LIPITOR) 80 MG tablet TAKE 1 TABLET ONE TIME DAILY    blood-glucose meter Misc Use to check sugar 3 times daily. Accu-chek Emily. Patient needs an appointment    carvediloL (COREG) 25 MG tablet Take 1 tablet (25 mg total) by mouth 2 (two) times daily.    celecoxib (CELEBREX) 200 MG capsule Take 1 capsule (200 mg total) by mouth 2 (two) times daily.    diclofenac sodium (VOLTAREN) 1 % Gel Apply 2 g topically 4 (four) times daily as needed (neck muscle spasm).    diphenhydrAMINE (BENADRYL) 25 mg capsule Take 50 mg by mouth nightly as needed for Itching.     evolocumab (REPATHA PUSHTRONEX) 420 mg/3.5 mL Injt Inject 3.5 mLs (420 mg total) into the skin every 30 days    FLUZONE HIGHDOSE QUAD 22-23  mcg/0.7 mL Syrg     FOLIC ACID/MULTIVITS-MIN (MULTIVITAMIN FOR CHOLESTEROL ORAL) Take 1 tablet by mouth nightly.     furosemide (LASIX) 40 MG tablet TAKE 1 TABLET EVERY DAY    insulin glargine, TOUJEO, (TOUJEO SOLOSTAR U-300 INSULIN) 300 unit/mL (1.5 mL) InPn pen Inject 45 Units into the skin once daily.    insulin needles, disposable, (BD INSULIN PEN NEEDLE UF ORIG) 29 x 1/2 " Ndle USE TWICE DAILY AS DIRECTED    JARDIANCE 25 mg tablet TAKE 1 TABLET EVERY DAY    lancets Misc 1 " "lancet by Misc.(Non-Drug; Combo Route) route 3 (three) times daily. accu-chek Fastclix. Patient needs an appointment    metFORMIN (GLUCOPHAGE) 1000 MG tablet Take 1 tablet (1,000 mg total) by mouth 2 (two) times daily.    mupirocin (BACTROBAN) 2 % ointment AAA qd- bid    nitroGLYCERIN (NITROSTAT) 0.4 MG SL tablet PLACE 1 TABLET (0.4 MG TOTAL) UNDER THE TONGUE EVERY 5 (FIVE) MINUTES AS NEEDED FOR CHEST PAIN AS DIRECTED    NOVOLOG FLEXPEN U-100 INSULIN 100 unit/mL (3 mL) InPn pen Inject 12 Units into the skin 3 (three) times daily with meals.    pen needle, diabetic (BD INSULIN PEN NEEDLE UF MINI) 31 gauge x 3/16" Ndle 1 each by Misc.(Non-Drug; Combo Route) route 4 (four) times daily. Patient needs an appointment    PREVNAR 20, PF, 0.5 mL Syrg injection     semaglutide (OZEMPIC) 0.25 mg or 0.5 mg(2 mg/1.5 mL) pen injector Inject 0.5 mg into the skin every 7 days.    valsartan (DIOVAN) 320 MG tablet TAKE 1 TABLET ONE TIME DAILY    VASCEPA 1 gram Cap TAKE 2 CAPSULES TWICE DAILY    diazePAM (VALIUM) 5 MG tablet Take 1 tablet (5 mg total) by mouth On call Procedure for Anxiety.    meclizine (ANTIVERT) 25 mg tablet Take 1 tablet (25 mg total) by mouth 3 (three) times daily as needed for Dizziness or Nausea.     No current facility-administered medications for this visit.     Facility-Administered Medications Ordered in Other Visits   Medication    fentaNYL 50 mcg/mL injection 25 mcg    fentaNYL 50 mcg/mL injection  mcg    lidocaine (PF) 10 mg/ml (1%) injection 10 mg    LIDOcaine (PF) 10 mg/ml (1%) injection 10 mg    midazolam (VERSED) 1 mg/mL injection 0.5 mg    midazolam (VERSED) 1 mg/mL injection 0.5-4 mg    ropivacaine 0.2% Nimbus PainPRO Pump infusion 500 ML    ropivacaine 0.2% Nimbus PainPRO Pump infusion 500 ML    triamcinolone acetonide injection 40 mg       ROS:  GENERAL: No fever. No chills. No fatigue. Denies weight loss. Denies weight gain.  HEENT: Denies headaches. Denies vision change. Denies eye pain. " "Denies double vision. Denies ear pain.   CV: Denies chest pain.   PULM: Denies of shortness of breath.  GI: Denies constipation. No diarrhea. No abdominal pain. Denies nausea. Denies vomiting. No blood in stool.  HEME: Denies bleeding problems.  : Denies urgency. No painful urination. No blood in urine.  MS: Denies joint stiffness. Denies joint swelling.  + back pain.  SKIN: Denies rash.   NEURO: Denies seizures. No weakness.  PSYCH:  Denies difficulty sleeping. No anxiety. Denies depression. No suicidal thoughts.       VITALS:   Vitals:    05/08/23 1310   BP: 133/76   Height: 5' 8" (1.727 m)   PainSc:   5         PHYSICAL EXAM:   GENERAL: Well appearing, in no acute distress, alert and oriented x3.  PSYCH:  Mood and affect appropriate.  SKIN: Skin color, texture, turgor normal, no rashes or lesions.  HEENT:  Normocephalic, atraumatic. Cranial nerves grossly intact.  NECK: No pain to palpation over the cervical paraspinous muscles. No pain to palpation over facets. No pain with neck flexion, extension, or lateral flexion.   PULM: No evidence of respiratory difficulty, symmetric chest rise.  GI:  Non-distended  BACK: Normal range of motion. No pain to palpation over the spinous processes. No pain to palpation over facet joints. There is no pain with palpation over the sacroiliac joints bilaterally. Tender to palpation over the right piriformis muscle. Slight Tender to palpation at right GTB. Tender to palpation at right SIJ. Positive Holley's, thigh thrust, and Ganselin. EXTREMITIES: No deformities, edema, or skin discoloration.   MUSCULOSKELETAL: Shoulder, hip, and knee provocative maneuvers are negative. No atrophy is noted.  NEURO: Sensation is equal and appropriate bilaterally. Bilateral upper and lower extremity strength is normal and symmetric. Bilateral upper and lower extremity coordination and muscle stretch reflexes are physiologic and symmetric. Plantar response are downgoing. Straight leg raising in the " supine position is negative to radicular pain.   GAIT: normal.      LABS:      IMAGING:    XR LUMBAR SPINE AP AND LATERAL     CLINICAL HISTORY:  Sacrococcygeal disorders, not elsewhere classified     TECHNIQUE:  AP, lateral and spot images were performed of the lumbar spine.     COMPARISON:  July 2020     FINDINGS:  Bones are slightly demineralized.  Hypertrophic degenerative change anterior aspect of the lumbar spine.  Mild disc space narrowing at the lumbosacral level.  Facet arthropathy noted.  Vascular calcifications in the aorta.  Postop left hip replacement.     Impression:     Degenerative change.  No compression fracture.        Electronically signed by: Casimiro Gayle MD  Date:                                            06/10/2022  Time:                                           10:08    EXAMINATION:  MRI LUMBAR SPINE WITHOUT CONTRAST     CLINICAL HISTORY:  Spinal stenosis, lumbar; Spinal stenosis, lumbosacral region     TECHNIQUE:  Multiplanar, multisequence MR images were acquired from the thoracolumbar junction to the sacrum without the administration of contrast.     COMPARISON:  08/01/2020     FINDINGS:  The alignment of the lumbar spine demonstrates a subtle retrolisthesis of L2 relative to L3.  The vertebral body heights are well maintained.  Moderate disc space narrowing L5-S1 and mild disc space narrowing T12-L1 through L3-L4, unchanged.  No evidence of malignant bone marrow replacement process or infection.  The conus medullaris terminates in good position.     T12-L1: Unremarkable     L1-L2: No disc abnormality, no canal stenosis or foraminal narrowing.  There is mild left facet joint osseous hypertrophy.     L2-L3: Mild diffuse disc bulge, mild facet joint osseous hypertrophy, no canal stenosis.  No greater than mild bilateral foraminal narrowing.     L3-L4: Diffuse disc bulge, ligamentum flavum hypertrophy and facet joint osseous hypertrophy result in moderate spinal canal stenosis.  There is  mild bilateral foraminal narrowing.  There is edema associated with the facet joints     L4-L5: Mild disc bulge, mild ligamentum flavum hypertrophy and facet joint osseous hypertrophy result in mild canal stenosis.  The foramina are patent.     L5-S1: Diffuse disc bulge with superimposed posterior central/right paracentral disc protrusion.  There is mild canal stenosis.     The upper sacrum and upper sacroiliac joints appear normal.     Impression:     Spondylosis of the lumbar spine, similar to 08/01/2020 exam.     There is moderate canal stenosis at L3-4 due to a diffuse disc bulge and ligamentum flavum hypertrophy.  There is edema signal associated with the bilateral L3-L4 facet joints consistent with active degenerative change.     L5-S1 posterior central/right paracentral disc protrusion could abut the descending right S1 nerve root, to correlate clinically, it is unchanged from the prior study.        Electronically signed by: Amber Dumont MD  Date:                                            04/28/2023  Time:                                           10:54    ASSESSMENT: 68 y.o. year old male with pain, consistent with:    Encounter Diagnosis   Name Primary?    Sacroiliitis Yes         DISCUSSION: Mr. Deleon comes to us from Dr. Bautista with right low back pain. His MRI from 2020 showed moderate canal stenosis and he did very well after a left L3/4 TFESI. He has good walking tolerance but he notes that he is stumbling with his left leg. MRI was updated and was largely unchanged. He continues to have moderate stenosis at L3/4 and facet edema at L3/4 as well. Exam continues to indicate the right SIJ.       PLAN:  MRI reviewed with patient  Continue HEP  Will schedule for right SIJ injection  Follow up in 2 weeks after procedure    GRACIE MCKOY  05/08/2023

## 2023-05-08 NOTE — H&P (VIEW-ONLY)
PCP: Dallas Martin MD    REFERRING PHYSICIAN: No ref. provider found    CHIEF COMPLAINT: Right Low Back pain      Interval History 05/05/23:  Patient presents for follow up after his Lumbar MRI. He reports no changes in his symptoms. He was more active this weekend. Pain worsened with laying flat in bed. Patient reports that this pain feels similar to pain he had on the left side which responded to TFESI.     Interval History 04/05/23:  Original HISTORY OF PRESENT ILLNESS: Jm Deleon presents to the clinic for the evaluation of the above pain. The pain started December, 2022 after two falls. Images show no lumbar/pelvic fractures.     Original Pain Description:  The pain is located in the right low back and does not radiate. The pain is described as aching. Exacerbating factors: Laying, Lifting. Mitigating factors reclining. Symptoms interfere with daily activity and sleeping. The patient feels like symptoms have been worsening. Patient denies night fever/night sweats, urinary incontinence, bowel incontinence, significant weight loss, and loss of sensations. He does note stumbling.     Original PAIN SCORES:  Best: Pain is 1  Worst: Pain is 10  Current: Pain is 3    INTERVAL HISTORY:     6 weeks of Conservative therapy:  PT: 7-8,2022  Chiro: No  HEP: Continues HEP as prescribed at PT above on a weekly basis      Treatments / Medications: (Ice/Heat/NSAIDS/APAP/etc):  Ice  Heat  Valerie      Interventional Pain Procedures: (Previous injections)  8/7/20: Left L3/4 TFESI 100% relief 9 months    Past Medical History:   Diagnosis Date    Allergy     seasonal    Arthritis     Coronary artery disease     Diabetes mellitus     Diabetes mellitus, type 2     Dysplastic nevus of trunk 03/2022    moderately atypical     Hyperlipidemia     Hypertension     Joint pain     Melanoma 10/2019    MM left back 2.7mm; neg LNs    Open angle with borderline findings and high glaucoma risk in both eyes 2018    Squamous cell carcinoma  2017    [facility]     Past Surgical History:   Procedure Laterality Date    ARTHROSCOPIC REPAIR OF ROTATOR CUFF OF SHOULDER Left 5/12/2021    Procedure: REPAIR, ROTATOR CUFF, ARTHROSCOPIC;  Surgeon: Johnny Ventura MD;  Location: [facility] OR;  Service: Orthopedics;  Laterality: Left;  Labral Debridement    ARTHROSCOPY OF SHOULDER WITH DECOMPRESSION OF SUBACROMIAL SPACE  5/12/2021    Procedure: ARTHROSCOPY, SHOULDER, WITH SUBACROMIAL SPACE DECOMPRESSION;  Surgeon: Johnny Ventura MD;  Location: [facility] OR;  Service: Orthopedics;;    BELPHAROPTOSIS REPAIR  10 years ago    both eyes    CARDIAC SURGERY      3 vessel bypass    COLONOSCOPY N/A 10/4/2019    Procedure: COLONOSCOPY;  Surgeon: Isaiah Booker MD;  Location: [facility] (4TH FLR);  Service: Endoscopy;  Laterality: N/A;  Cardilogy Clearance received rom Dr. QUEENIE Salomon, see telephone encounter 8/26/19-BB    CORONARY ARTERY BYPASS GRAFT  x4 age 45 2000    DISTAL CLAVICLE EXCISION Left 5/12/2021    Procedure: EXCISION, CLAVICLE, DISTAL;  Surgeon: Johnny Ventura MD;  Location: [facility] OR;  Service: Orthopedics;  Laterality: Left;    FIXATION OF TENDON Left 5/12/2021    Procedure: FIXATION, TENDON;  Surgeon: Johnny Ventura MD;  Location: [facility] OR;  Service: Orthopedics;  Laterality: Left;    HARDWARE REMOVAL Left 11/24/2021    Procedure: REMOVAL, HARDWARE;  Surgeon: Johnny Ventura MD;  Location: [facility] OR;  Service: Orthopedics;  Laterality: Left;    HERNIA REPAIR      HIP SURGERY  9-24-14    left YANICK    JOINT REPLACEMENT      left hip    MANIPULATION WITH ANESTHESIA Left 11/24/2021    Procedure: MANIPULATION, WITH ANESTHESIA;  Surgeon: Johnny Ventura MD;  Location: [facility] OR;  Service: Orthopedics;  Laterality: Left;    SENTINEL LYMPH NODE BIOPSY Left 10/18/2019    Procedure: BIOPSY, LYMPH NODE, SENTINEL;  Surgeon: Fabián Villeda MD;  Location: [facility] OR 2ND FLR;  Service: General;  Laterality: Left;    SHOULDER ARTHROSCOPY      SHOULDER  ARTHROSCOPY Left 11/24/2021    Procedure: ARTHROSCOPY, SHOULDER;  Surgeon: Johnny Ventura MD;  Location: [facility] OR;  Service: Orthopedics;  Laterality: Left;  regional w/catheter (interscalene)    SHOULDER SURGERY      TRANSFORAMINAL EPIDURAL INJECTION OF STEROID Left 8/7/2020    Procedure: LUMBAR TRANSFORAMINAL LEFT L3/4 AND L5/S1 DIRECT REFERRAL;  Surgeon: Ronny Rangel MD;  [facility];  Service: Pain Management;  Laterality: Left;  NEEDS CONSENT, DIABETIC     Social History     Socioeconomic History    Marital status:    Tobacco Use    Smoking status: Never    Smokeless tobacco: Never   Substance and Sexual Activity    Alcohol use: Yes     Alcohol/week: 3.0 standard drinks     Types: 3 Cans of beer per week     Comment: on weekend    Drug use: Never     Social Determinants of Health     Financial Resource Strain: Low Risk     Difficulty of Paying Living Expenses: Not hard at all   Food Insecurity: No Food Insecurity    Worried About Running Out of Food in the Last Year: Never true    Ran Out of Food in the Last Year: Never true   Transportation Needs: No Transportation Needs    Lack of Transportation (Medical): No    Lack of Transportation (Non-Medical): No   Physical Activity: Unknown    Days of Exercise per Week: Patient refused    Minutes of Exercise per Session: 30 min   Stress: No Stress Concern Present    Feeling of Stress : Not at all   Social Connections: Unknown    Frequency of Communication with Friends and Family: Three times a week    Frequency of Social Gatherings with Friends and Family: Once a week    Active Member of Clubs or Organizations: No    Attends Club or Organization Meetings: Never    Marital Status:    Housing Stability: Low Risk     Unable to Pay for Housing in the Last Year: No    Number of Places Lived in the Last Year: 1    Unstable Housing in the Last Year: No     Family History   Problem Relation Age of Onset    Coronary artery disease Mother      "Cancer Father         lung cancer, smoker    Hypertension Brother     Cancer Brother         colon    Amblyopia Neg Hx     Blindness Neg Hx     Cataracts Neg Hx     Glaucoma Neg Hx     Macular degeneration Neg Hx     Retinal detachment Neg Hx     Strabismus Neg Hx     Melanoma Neg Hx        Review of patient's allergies indicates:   Allergen Reactions    Tramadol Hallucinations       Current Outpatient Medications   Medication Sig    ACCU-CHEK SMARTVIEW TEST STRIP Strp 1 strip by Misc.(Non-Drug; Combo Route) route 3 (three) times daily. Patient needs an appointment    ammonium lactate 12 % Crea Apply small amount to feet except for in between toes twice a day    aspirin (ECOTRIN) 81 MG EC tablet Take 81 mg by mouth once daily.    atorvastatin (LIPITOR) 80 MG tablet TAKE 1 TABLET ONE TIME DAILY    blood-glucose meter Misc Use to check sugar 3 times daily. Accu-chek Emily. Patient needs an appointment    carvediloL (COREG) 25 MG tablet Take 1 tablet (25 mg total) by mouth 2 (two) times daily.    celecoxib (CELEBREX) 200 MG capsule Take 1 capsule (200 mg total) by mouth 2 (two) times daily.    diclofenac sodium (VOLTAREN) 1 % Gel Apply 2 g topically 4 (four) times daily as needed (neck muscle spasm).    diphenhydrAMINE (BENADRYL) 25 mg capsule Take 50 mg by mouth nightly as needed for Itching.     evolocumab (REPATHA PUSHTRONEX) 420 mg/3.5 mL Injt Inject 3.5 mLs (420 mg total) into the skin every 30 days    FLUZONE HIGHDOSE QUAD 22-23  mcg/0.7 mL Syrg     FOLIC ACID/MULTIVITS-MIN (MULTIVITAMIN FOR CHOLESTEROL ORAL) Take 1 tablet by mouth nightly.     furosemide (LASIX) 40 MG tablet TAKE 1 TABLET EVERY DAY    insulin glargine, TOUJEO, (TOUJEO SOLOSTAR U-300 INSULIN) 300 unit/mL (1.5 mL) InPn pen Inject 45 Units into the skin once daily.    insulin needles, disposable, (BD INSULIN PEN NEEDLE UF ORIG) 29 x 1/2 " Ndle USE TWICE DAILY AS DIRECTED    JARDIANCE 25 mg tablet TAKE 1 TABLET EVERY DAY    lancets Misc 1 " "lancet by Misc.(Non-Drug; Combo Route) route 3 (three) times daily. accu-chek Fastclix. Patient needs an appointment    metFORMIN (GLUCOPHAGE) 1000 MG tablet Take 1 tablet (1,000 mg total) by mouth 2 (two) times daily.    mupirocin (BACTROBAN) 2 % ointment AAA qd- bid    nitroGLYCERIN (NITROSTAT) 0.4 MG SL tablet PLACE 1 TABLET (0.4 MG TOTAL) UNDER THE TONGUE EVERY 5 (FIVE) MINUTES AS NEEDED FOR CHEST PAIN AS DIRECTED    NOVOLOG FLEXPEN U-100 INSULIN 100 unit/mL (3 mL) InPn pen Inject 12 Units into the skin 3 (three) times daily with meals.    pen needle, diabetic (BD INSULIN PEN NEEDLE UF MINI) 31 gauge x 3/16" Ndle 1 each by Misc.(Non-Drug; Combo Route) route 4 (four) times daily. Patient needs an appointment    PREVNAR 20, PF, 0.5 mL Syrg injection     semaglutide (OZEMPIC) 0.25 mg or 0.5 mg(2 mg/1.5 mL) pen injector Inject 0.5 mg into the skin every 7 days.    valsartan (DIOVAN) 320 MG tablet TAKE 1 TABLET ONE TIME DAILY    VASCEPA 1 gram Cap TAKE 2 CAPSULES TWICE DAILY    diazePAM (VALIUM) 5 MG tablet Take 1 tablet (5 mg total) by mouth On call Procedure for Anxiety.    meclizine (ANTIVERT) 25 mg tablet Take 1 tablet (25 mg total) by mouth 3 (three) times daily as needed for Dizziness or Nausea.     No current facility-administered medications for this visit.     Facility-Administered Medications Ordered in Other Visits   Medication    fentaNYL 50 mcg/mL injection 25 mcg    fentaNYL 50 mcg/mL injection  mcg    lidocaine (PF) 10 mg/ml (1%) injection 10 mg    LIDOcaine (PF) 10 mg/ml (1%) injection 10 mg    midazolam (VERSED) 1 mg/mL injection 0.5 mg    midazolam (VERSED) 1 mg/mL injection 0.5-4 mg    ropivacaine 0.2% Nimbus PainPRO Pump infusion 500 ML    ropivacaine 0.2% Nimbus PainPRO Pump infusion 500 ML    triamcinolone acetonide injection 40 mg       ROS:  GENERAL: No fever. No chills. No fatigue. Denies weight loss. Denies weight gain.  HEENT: Denies headaches. Denies vision change. Denies eye pain. " "Denies double vision. Denies ear pain.   CV: Denies chest pain.   PULM: Denies of shortness of breath.  GI: Denies constipation. No diarrhea. No abdominal pain. Denies nausea. Denies vomiting. No blood in stool.  HEME: Denies bleeding problems.  : Denies urgency. No painful urination. No blood in urine.  MS: Denies joint stiffness. Denies joint swelling.  + back pain.  SKIN: Denies rash.   NEURO: Denies seizures. No weakness.  PSYCH:  Denies difficulty sleeping. No anxiety. Denies depression. No suicidal thoughts.       VITALS:   Vitals:    05/08/23 1310   BP: 133/76   Height: 5' 8" (1.727 m)   PainSc:   5         PHYSICAL EXAM:   GENERAL: Well appearing, in no acute distress, alert and oriented x3.  PSYCH:  Mood and affect appropriate.  SKIN: Skin color, texture, turgor normal, no rashes or lesions.  HEENT:  Normocephalic, atraumatic. Cranial nerves grossly intact.  NECK: No pain to palpation over the cervical paraspinous muscles. No pain to palpation over facets. No pain with neck flexion, extension, or lateral flexion.   PULM: No evidence of respiratory difficulty, symmetric chest rise.  GI:  Non-distended  BACK: Normal range of motion. No pain to palpation over the spinous processes. No pain to palpation over facet joints. There is no pain with palpation over the sacroiliac joints bilaterally. Tender to palpation over the right piriformis muscle. Slight Tender to palpation at right GTB. Tender to palpation at right SIJ. Positive Holley's, thigh thrust, and Ganselin. EXTREMITIES: No deformities, edema, or skin discoloration.   MUSCULOSKELETAL: Shoulder, hip, and knee provocative maneuvers are negative. No atrophy is noted.  NEURO: Sensation is equal and appropriate bilaterally. Bilateral upper and lower extremity strength is normal and symmetric. Bilateral upper and lower extremity coordination and muscle stretch reflexes are physiologic and symmetric. Plantar response are downgoing. Straight leg raising in the " supine position is negative to radicular pain.   GAIT: normal.      LABS:      IMAGING:    XR LUMBAR SPINE AP AND LATERAL     CLINICAL HISTORY:  Sacrococcygeal disorders, not elsewhere classified     TECHNIQUE:  AP, lateral and spot images were performed of the lumbar spine.     COMPARISON:  July 2020     FINDINGS:  Bones are slightly demineralized.  Hypertrophic degenerative change anterior aspect of the lumbar spine.  Mild disc space narrowing at the lumbosacral level.  Facet arthropathy noted.  Vascular calcifications in the aorta.  Postop left hip replacement.     Impression:     Degenerative change.  No compression fracture.        Electronically signed by: Casimiro Gayle MD  Date:                                            06/10/2022  Time:                                           10:08    EXAMINATION:  MRI LUMBAR SPINE WITHOUT CONTRAST     CLINICAL HISTORY:  Spinal stenosis, lumbar; Spinal stenosis, lumbosacral region     TECHNIQUE:  Multiplanar, multisequence MR images were acquired from the thoracolumbar junction to the sacrum without the administration of contrast.     COMPARISON:  08/01/2020     FINDINGS:  The alignment of the lumbar spine demonstrates a subtle retrolisthesis of L2 relative to L3.  The vertebral body heights are well maintained.  Moderate disc space narrowing L5-S1 and mild disc space narrowing T12-L1 through L3-L4, unchanged.  No evidence of malignant bone marrow replacement process or infection.  The conus medullaris terminates in good position.     T12-L1: Unremarkable     L1-L2: No disc abnormality, no canal stenosis or foraminal narrowing.  There is mild left facet joint osseous hypertrophy.     L2-L3: Mild diffuse disc bulge, mild facet joint osseous hypertrophy, no canal stenosis.  No greater than mild bilateral foraminal narrowing.     L3-L4: Diffuse disc bulge, ligamentum flavum hypertrophy and facet joint osseous hypertrophy result in moderate spinal canal stenosis.  There is  mild bilateral foraminal narrowing.  There is edema associated with the facet joints     L4-L5: Mild disc bulge, mild ligamentum flavum hypertrophy and facet joint osseous hypertrophy result in mild canal stenosis.  The foramina are patent.     L5-S1: Diffuse disc bulge with superimposed posterior central/right paracentral disc protrusion.  There is mild canal stenosis.     The upper sacrum and upper sacroiliac joints appear normal.     Impression:     Spondylosis of the lumbar spine, similar to 08/01/2020 exam.     There is moderate canal stenosis at L3-4 due to a diffuse disc bulge and ligamentum flavum hypertrophy.  There is edema signal associated with the bilateral L3-L4 facet joints consistent with active degenerative change.     L5-S1 posterior central/right paracentral disc protrusion could abut the descending right S1 nerve root, to correlate clinically, it is unchanged from the prior study.        Electronically signed by: Amber Dumont MD  Date:                                            04/28/2023  Time:                                           10:54    ASSESSMENT: 68 y.o. year old male with pain, consistent with:    Encounter Diagnosis   Name Primary?    Sacroiliitis Yes         DISCUSSION: Mr. Deleon comes to us from Dr. Bautista with right low back pain. His MRI from 2020 showed moderate canal stenosis and he did very well after a left L3/4 TFESI. He has good walking tolerance but he notes that he is stumbling with his left leg. MRI was updated and was largely unchanged. He continues to have moderate stenosis at L3/4 and facet edema at L3/4 as well. Exam continues to indicate the right SIJ.       PLAN:  MRI reviewed with patient  Continue HEP  Will schedule for right SIJ injection  Follow up in 2 weeks after procedure    GRACIE MCKOY  05/08/2023

## 2023-05-09 ENCOUNTER — SPECIALTY PHARMACY (OUTPATIENT)
Dept: PHARMACY | Facility: CLINIC | Age: 68
End: 2023-05-09
Payer: MEDICARE

## 2023-05-09 NOTE — TELEPHONE ENCOUNTER
Specialty Pharmacy - Refill Coordination    Specialty Medication Orders Linked to Encounter      Flowsheet Row Most Recent Value   Medication #1 evolocumab (REPATHA PUSHTRONEX) 420 mg/3.5 mL Injt (Order#[id number], Rx#[id number])            Refill Questions - Documented Responses      Flowsheet Row Most Recent Value   Patient Availability and HIPAA Verification    Does patient want to proceed with activity? Yes   HIPAA/medical authority confirmed? Yes   Relationship to patient of person spoken to? Self   Refill Screening Questions    Would patient like to speak to a pharmacist? No   When does the patient need to receive the medication? 05/15/23   Refill Delivery Questions    How will the patient receive the medication? [facility]   When does the patient need to receive the medication? 05/15/23   Shipping Address Home   Address in [facility] confirmed and updated if neccessary? Yes   Expected Copay ($) 0   Is the patient able to afford the medication copay? Yes   Payment Method zero copay   Days supply of Refill 30   Supplies needed? No supplies needed   Refill activity completed? Yes   Refill activity plan Refill scheduled   Shipment/Pickup Date: 05/11/23            Current Outpatient Medications   Medication Sig    ACCU-CHEK SMARTVIEW TEST STRIP Strp 1 strip by Misc.(Non-Drug; Combo Route) route 3 (three) times daily. Patient needs an appointment    ammonium lactate 12 % Crea Apply small amount to feet except for in between toes twice a day    aspirin (ECOTRIN) 81 MG EC tablet Take 81 mg by mouth once daily.    atorvastatin (LIPITOR) 80 MG tablet TAKE 1 TABLET ONE TIME DAILY    blood-glucose meter Misc Use to check sugar 3 times daily. Accu-chek Emily. Patient needs an appointment    carvediloL (COREG) 25 MG tablet Take 1 tablet (25 mg total) by mouth 2 (two) times daily.    celecoxib (CELEBREX) 200 MG capsule Take 1 capsule (200 mg total) by mouth 2 (two) times daily.    diazePAM (VALIUM) 5 MG tablet Take 1  "tablet (5 mg total) by mouth On call Procedure for Anxiety.    diclofenac sodium (VOLTAREN) 1 % Gel Apply 2 g topically 4 (four) times daily as needed (neck muscle spasm).    diphenhydrAMINE (BENADRYL) 25 mg capsule Take 50 mg by mouth nightly as needed for Itching.     evolocumab (REPATHA PUSHTRONEX) 420 mg/3.5 mL Injt Inject 3.5 mLs (420 mg total) into the skin every 30 days    FLUZONE HIGHDOSE QUAD 22-23  mcg/0.7 mL Syrg     FOLIC ACID/MULTIVITS-MIN (MULTIVITAMIN FOR CHOLESTEROL ORAL) Take 1 tablet by mouth nightly.     furosemide (LASIX) 40 MG tablet TAKE 1 TABLET EVERY DAY    insulin glargine, TOUJEO, (TOUJEO SOLOSTAR U-300 INSULIN) 300 unit/mL (1.5 mL) InPn pen Inject 45 Units into the skin once daily.    insulin needles, disposable, (BD INSULIN PEN NEEDLE UF ORIG) 29 x 1/2 " Ndle USE TWICE DAILY AS DIRECTED    JARDIANCE 25 mg tablet TAKE 1 TABLET EVERY DAY    lancets Misc 1 lancet by Misc.(Non-Drug; Combo Route) route 3 (three) times daily. accu-chek Fastclix. Patient needs an appointment    meclizine (ANTIVERT) 25 mg tablet Take 1 tablet (25 mg total) by mouth 3 (three) times daily as needed for Dizziness or Nausea.    metFORMIN (GLUCOPHAGE) 1000 MG tablet Take 1 tablet (1,000 mg total) by mouth 2 (two) times daily.    mupirocin (BACTROBAN) 2 % ointment AAA qd- bid    nitroGLYCERIN (NITROSTAT) 0.4 MG SL tablet PLACE 1 TABLET (0.4 MG TOTAL) UNDER THE TONGUE EVERY 5 (FIVE) MINUTES AS NEEDED FOR CHEST PAIN AS DIRECTED    NOVOLOG FLEXPEN U-100 INSULIN 100 unit/mL (3 mL) InPn pen Inject 12 Units into the skin 3 (three) times daily with meals.    pen needle, diabetic (BD INSULIN PEN NEEDLE UF MINI) 31 gauge x 3/16" Ndle 1 each by Misc.(Non-Drug; Combo Route) route 4 (four) times daily. Patient needs an appointment    PREVNAR 20, PF, 0.5 mL Syrg injection     semaglutide (OZEMPIC) 0.25 mg or 0.5 mg(2 mg/1.5 mL) pen injector Inject 0.5 mg into the skin every 7 days.    valsartan (DIOVAN) 320 MG tablet TAKE 1 " TABLET ONE TIME DAILY    VASCEPA 1 gram Cap TAKE 2 CAPSULES TWICE DAILY   Last reviewed on 5/8/2023  1:46 PM by Rosana Lopez MD    Review of patient's allergies indicates:   Allergen Reactions    Tramadol Hallucinations    Last reviewed on  5/8/2023 1:46 PM by Rosana Lopez      Tasks added this encounter   No tasks added.   Tasks due within next 3 months   5/7/2023 - Refill Coordination Outreach (1 time occurrence)     [facility]  [address] [place] [id number]  Phone: [phone]  Fax: [phone]

## 2023-05-11 ENCOUNTER — PATIENT MESSAGE (OUTPATIENT)
Dept: CARDIOLOGY | Facility: CLINIC | Age: 68
End: 2023-05-11
Payer: MEDICARE

## 2023-05-15 ENCOUNTER — PATIENT MESSAGE (OUTPATIENT)
Dept: ENDOCRINOLOGY | Facility: CLINIC | Age: 68
End: 2023-05-15
Payer: MEDICARE

## 2023-06-06 ENCOUNTER — HOSPITAL ENCOUNTER (OUTPATIENT)
Facility: OTHER | Age: 68
Discharge: HOME | End: 2023-06-06
Attending: ANESTHESIOLOGY | Admitting: ANESTHESIOLOGY
Payer: MEDICARE

## 2023-06-06 VITALS
HEART RATE: 63 BPM | OXYGEN SATURATION: 99 % | RESPIRATION RATE: 14 BRPM | HEIGHT: 68 IN | SYSTOLIC BLOOD PRESSURE: 120 MMHG | DIASTOLIC BLOOD PRESSURE: 61 MMHG | WEIGHT: 205 LBS | TEMPERATURE: 98 F | BODY MASS INDEX: 31.07 KG/M2

## 2023-06-06 DIAGNOSIS — M46.1 SACROILIITIS: Primary | ICD-10-CM

## 2023-06-06 DIAGNOSIS — G89.29 CHRONIC PAIN: ICD-10-CM

## 2023-06-06 LAB — POCT GLUCOSE: 92 MG/DL (ref 70–110)

## 2023-06-06 PROCEDURE — 27096 INJECT SACROILIAC JOINT: CPT | Mod: RT,,, | Performed by: ANESTHESIOLOGY

## 2023-06-06 PROCEDURE — 27096 INJECT SACROILIAC JOINT: CPT | Mod: RT | Performed by: ANESTHESIOLOGY

## 2023-06-06 PROCEDURE — 63600175 PHARM REV CODE 636 W HCPCS: Performed by: ANESTHESIOLOGY

## 2023-06-06 PROCEDURE — 27096 PR INJECTION,SACROILIAC JOINT: ICD-10-PCS | Mod: RT,,, | Performed by: ANESTHESIOLOGY

## 2023-06-06 RX ORDER — MIDAZOLAM HYDROCHLORIDE 1 MG/ML
INJECTION INTRAMUSCULAR; INTRAVENOUS
Status: DISCONTINUED | OUTPATIENT
Start: 2023-06-06 | End: 2023-06-06 | Stop reason: HOSPADM

## 2023-06-06 RX ORDER — SODIUM CHLORIDE 9 MG/ML
INJECTION, SOLUTION INTRAVENOUS CONTINUOUS
Status: DISCONTINUED | OUTPATIENT
Start: 2023-06-06 | End: 2023-06-06 | Stop reason: HOSPADM

## 2023-06-06 NOTE — DISCHARGE INSTRUCTIONS

## 2023-06-06 NOTE — DISCHARGE SUMMARY
Discharge Note  Short Stay      SUMMARY     Admit Date: 6/6/2023    Attending Physician: Rosana Lopez      Discharge Physician: Rosana Lopez      Discharge Date: 6/6/2023 2:01 PM    Procedure(s) (LRB):  INJECTION,SACROILIAC JOINT RIGHT (Right)    Final Diagnosis: Sacroiliitis [M46.1]    Disposition: Home or self care    Patient Instructions:   Current Discharge Medication List        CONTINUE these medications which have NOT CHANGED    Details   aspirin (ECOTRIN) 81 MG EC tablet Take 81 mg by mouth once daily.      ACCU-CHEK SMARTVIEW TEST STRIP Strp 1 strip by Misc.(Non-Drug; Combo Route) route 3 (three) times daily. Patient needs an appointment  Qty: 300 strip, Refills: 0      ammonium lactate 12 % Crea Apply small amount to feet except for in between toes twice a day  Qty: 1 Tube, Refills: 3    Associated Diagnoses: Dry skin      atorvastatin (LIPITOR) 80 MG tablet TAKE 1 TABLET ONE TIME DAILY  Qty: 90 tablet, Refills: 3      blood-glucose meter Misc Use to check sugar 3 times daily. Accu-chek Emily. Patient needs an appointment  Qty: 1 each, Refills: 0      carvediloL (COREG) 25 MG tablet Take 1 tablet (25 mg total) by mouth 2 (two) times daily.  Qty: 180 tablet, Refills: 3    Comments: .      celecoxib (CELEBREX) 200 MG capsule Take 1 capsule (200 mg total) by mouth 2 (two) times daily.  Qty: 180 capsule, Refills: 0    Associated Diagnoses: Greater trochanteric bursitis of right hip      diazePAM (VALIUM) 5 MG tablet Take 1 tablet (5 mg total) by mouth On call Procedure for Anxiety.  Qty: 2 tablet, Refills: 0      diclofenac sodium (VOLTAREN) 1 % Gel Apply 2 g topically 4 (four) times daily as needed (neck muscle spasm).  Qty: 100 g, Refills: 3    Associated Diagnoses: Acute post-traumatic headache, not intractable; Cervicogenic headache      diphenhydrAMINE (BENADRYL) 25 mg capsule Take 50 mg by mouth nightly as needed for Itching.       evolocumab (REPATHA PUSHTRONEX) 420 mg/3.5 mL Injt Inject 3.5  "mLs (420 mg total) into the skin every 30 days  Qty: 3 each, Refills: 4    Associated Diagnoses: Coronary artery disease involving native coronary artery of native heart without angina pectoris; Dyslipidemia      FLUZONE HIGHDOSE QUAD 22-23  mcg/0.7 mL Syrg       FOLIC ACID/MULTIVITS-MIN (MULTIVITAMIN FOR CHOLESTEROL ORAL) Take 1 tablet by mouth nightly.       furosemide (LASIX) 40 MG tablet TAKE 1 TABLET EVERY DAY  Qty: 90 tablet, Refills: 3      insulin glargine, TOUJEO, (TOUJEO SOLOSTAR U-300 INSULIN) 300 unit/mL (1.5 mL) InPn pen Inject 45 Units into the skin once daily.  Qty: 13.5 mL, Refills: 3    Associated Diagnoses: Type 2 diabetes mellitus with hyperglycemia, with long-term current use of insulin      insulin needles, disposable, (BD INSULIN PEN NEEDLE UF ORIG) 29 x 1/2 " Ndle USE TWICE DAILY AS DIRECTED  Qty: 100 each, Refills: 2      JARDIANCE 25 mg tablet TAKE 1 TABLET EVERY DAY  Qty: 90 tablet, Refills: 1      lancets Misc 1 lancet by Misc.(Non-Drug; Combo Route) route 3 (three) times daily. accu-chek Fastclix. Patient needs an appointment  Qty: 300 each, Refills: 0      meclizine (ANTIVERT) 25 mg tablet Take 1 tablet (25 mg total) by mouth 3 (three) times daily as needed for Dizziness or Nausea.  Qty: 21 tablet, Refills: 0      metFORMIN (GLUCOPHAGE) 1000 MG tablet Take 1 tablet (1,000 mg total) by mouth 2 (two) times daily.  Qty: 180 tablet, Refills: 1      mupirocin (BACTROBAN) 2 % ointment AAA qd- bid  Qty: 30 g, Refills: 3    Associated Diagnoses: Dysplastic nevus of trunk      nitroGLYCERIN (NITROSTAT) 0.4 MG SL tablet PLACE 1 TABLET (0.4 MG TOTAL) UNDER THE TONGUE EVERY 5 (FIVE) MINUTES AS NEEDED FOR CHEST PAIN AS DIRECTED  Qty: 25 tablet, Refills: 4      NOVOLOG FLEXPEN U-100 INSULIN 100 unit/mL (3 mL) InPn pen Inject 12 Units into the skin 3 (three) times daily with meals.  Qty: 32.4 mL, Refills: 3    Associated Diagnoses: Diabetes mellitus without complication      pen needle, diabetic " "(BD INSULIN PEN NEEDLE UF MINI) 31 gauge x 3/16" Ndle 1 each by Misc.(Non-Drug; Combo Route) route 4 (four) times daily. Patient needs an appointment  Qty: 400 each, Refills: 0      PREVNAR 20, PF, 0.5 mL Syrg injection       semaglutide (OZEMPIC) 0.25 mg or 0.5 mg(2 mg/1.5 mL) pen injector Inject 0.5 mg into the skin every 7 days.  Qty: 4.5 mL, Refills: 3      valsartan (DIOVAN) 320 MG tablet TAKE 1 TABLET ONE TIME DAILY  Qty: 90 tablet, Refills: 3    Associated Diagnoses: Coronary artery disease involving native coronary artery of native heart without angina pectoris; Essential hypertension      VASCEPA 1 gram Cap TAKE 2 CAPSULES TWICE DAILY  Qty: 360 capsule, Refills: 3    Associated Diagnoses: Coronary artery disease involving native coronary artery of native heart without angina pectoris; Dyslipidemia; Type 2 diabetes, uncontrolled, with peripheral circulatory disorder                 Discharge Diagnosis: Sacroiliitis [M46.1]  Condition on Discharge: Stable with no complications to procedure   Diet on Discharge: Same as before.  Activity: as per instruction sheet.  Discharge to: Home with a responsible adult.  Follow up: 2-4 weeks       Please call my office or pager at [phone] if experienced any weakness or loss of sensation, fever > 101.5, pain uncontrolled with oral medications, persistent nausea/vomiting/or diarrhea, redness or drainage from the incisions, or any other worrisome concerns. If physician on call was not reached or could not communicate with our office for any reason please go to the nearest emergency department      Rosana Lopez  06/06/2023  "

## 2023-06-06 NOTE — OP NOTE
Sacroiliac Joint Injection under Fluoroscopic Guidance    The procedure, risks, benefits, and options were discussed with the patient. There are no contraindications to the procedure. The patent expressed understanding and agreed to the procedure. Informed written consent was obtained prior to the start of the procedure and can be found in the patient's chart.    PATIENT NAME: Mr. Jm Deleon   MRN: [id number]     DATE OF PROCEDURE: 06/06/2023    PROCEDURE: Right Sacroiliac Joint Injection under Fluoroscopic Guidance    PRE-OP DIAGNOSIS: Sacroiliitis [M46.1]    POST-OP DIAGNOSIS: Same    PHYSICIAN: Rosana Lopez MD    ASSISTANTS: Dr. Delacruz     MEDICATIONS INJECTED: Preservative-free Kenalog 40mg with 3cc of Bupivacine 0.25%     LOCAL ANESTHETIC INJECTED: Xylocaine 2%     SEDATION: Versed 1mg and Fentanyl 0mcg                                                                                                                                                                                     Conscious sedation ordered by M.D. Patient re-evaluation prior to administration of conscious sedation. No changes noted in patient's status from initial evaluation. The patient's vital signs were monitored by RN and patient remained hemodynamically stable throughout the procedure.    Event Time In   Sedation Start 1355   Sedation End 1401       ESTIMATED BLOOD LOSS: None    COMPLICATIONS: None    TECHNIQUE: Time-out was performed to identify the patient and procedure to be performed. With the patient laying in a prone position, the surgical area was prepped and draped in the usual sterile fashion using ChloraPrep and a fenestrated drape. The sacroiliac joint was determined under fluoroscopy guidance. Skin anesthesia was achieved by injecting Lidocaine 2% over the injection site. The sacroiliac joint was  then approached with a 22 gauge, 3.5 inch spinal quinke needle that was introduced under fluoroscopic guidance in the AP and  Lateral views. Once the needle tip was in the area of the joint, and there was no blood, contrast dye Omnipaque (300mg/mL) was injected to confirm placement and there was no vascular runoff. Fluoroscopic imaging in the AP and lateral views revealed a clear outline of the joint space. 4 mL of the medication mixture listed above was injected slowly intraarticular and jo ann-articular. Displacement of the radio opaque contrast after injection of the medication confirmed that the medication went into the area of the joint. The needles were removed and bleeding was nil.  A sterile dressing was applied. No specimens collected. The patient tolerated the procedure well.       The patient was monitored after the procedure in the recovery area. They were given post-procedure and discharge instructions to follow at home. The patient was discharged in a stable condition.        Rosana Lopez MD

## 2023-06-07 ENCOUNTER — TELEPHONE (OUTPATIENT)
Dept: OPHTHALMOLOGY | Facility: CLINIC | Age: 68
End: 2023-06-07
Payer: MEDICARE

## 2023-06-07 ENCOUNTER — SPECIALTY PHARMACY (OUTPATIENT)
Dept: PHARMACY | Facility: CLINIC | Age: 68
End: 2023-06-07
Payer: MEDICARE

## 2023-06-07 ENCOUNTER — OFFICE VISIT (OUTPATIENT)
Dept: OPTOMETRY | Facility: CLINIC | Age: 68
End: 2023-06-07
Payer: MEDICARE

## 2023-06-07 DIAGNOSIS — H40.023 OPEN ANGLE WITH BORDERLINE FINDINGS AND HIGH GLAUCOMA RISK IN BOTH EYES: ICD-10-CM

## 2023-06-07 DIAGNOSIS — H25.13 NS (NUCLEAR SCLEROSIS), BILATERAL: ICD-10-CM

## 2023-06-07 DIAGNOSIS — E11.9 TYPE 2 DIABETES MELLITUS WITHOUT OPHTHALMIC MANIFESTATIONS: ICD-10-CM

## 2023-06-07 DIAGNOSIS — H43.393 VITREOUS SYNERESIS OF BOTH EYES: ICD-10-CM

## 2023-06-07 DIAGNOSIS — H52.4 PRESBYOPIA: Primary | ICD-10-CM

## 2023-06-07 PROCEDURE — 99999 PR PBB SHADOW E&M-EST. PATIENT-LVL II: CPT | Mod: PBBFAC,,, | Performed by: OPTOMETRIST

## 2023-06-07 PROCEDURE — 99999 PR PBB SHADOW E&M-EST. PATIENT-LVL II: ICD-10-PCS | Mod: PBBFAC,,, | Performed by: OPTOMETRIST

## 2023-06-07 PROCEDURE — 92014 COMPRE OPH EXAM EST PT 1/>: CPT | Mod: S$GLB,,, | Performed by: OPTOMETRIST

## 2023-06-07 PROCEDURE — 92015 PR REFRACTION: ICD-10-PCS | Mod: S$GLB,,, | Performed by: OPTOMETRIST

## 2023-06-07 PROCEDURE — 92015 DETERMINE REFRACTIVE STATE: CPT | Mod: S$GLB,,, | Performed by: OPTOMETRIST

## 2023-06-07 PROCEDURE — 92014 PR EYE EXAM, EST PATIENT,COMPREHESV: ICD-10-PCS | Mod: S$GLB,,, | Performed by: OPTOMETRIST

## 2023-06-07 NOTE — TELEPHONE ENCOUNTER
Specialty Pharmacy - Refill Coordination    Specialty Medication Orders Linked to Encounter      Flowsheet Row Most Recent Value   Medication #1 evolocumab (REPATHA PUSHTRONEX) 420 mg/3.5 mL Injt (Order#[id number], Rx#[id number])            Refill Questions - Documented Responses      Flowsheet Row Most Recent Value   Refill Screening Questions    Would patient like to speak to a pharmacist? No   When does the patient need to receive the medication? 06/15/23   Refill Delivery Questions    How will the patient receive the medication? [facility]   When does the patient need to receive the medication? 06/15/23   Shipping Address Home   Address in [facility] confirmed and updated if neccessary? Yes   Expected Copay ($) 0   Is the patient able to afford the medication copay? Yes   Payment Method zero copay   Days supply of Refill 28   Supplies needed? No supplies needed   Refill activity completed? Yes   Refill activity plan Refill scheduled   Shipment/Pickup Date: 06/09/23            Current Outpatient Medications   Medication Sig    ACCU-CHEK SMARTVIEW TEST STRIP Strp 1 strip by Misc.(Non-Drug; Combo Route) route 3 (three) times daily. Patient needs an appointment    ammonium lactate 12 % Crea Apply small amount to feet except for in between toes twice a day    aspirin (ECOTRIN) 81 MG EC tablet Take 81 mg by mouth once daily.    atorvastatin (LIPITOR) 80 MG tablet TAKE 1 TABLET ONE TIME DAILY    blood-glucose meter Misc Use to check sugar 3 times daily. Accu-chek Emily. Patient needs an appointment    carvediloL (COREG) 25 MG tablet Take 1 tablet (25 mg total) by mouth 2 (two) times daily.    celecoxib (CELEBREX) 200 MG capsule Take 1 capsule (200 mg total) by mouth 2 (two) times daily.    diazePAM (VALIUM) 5 MG tablet Take 1 tablet (5 mg total) by mouth On call Procedure for Anxiety.    diclofenac sodium (VOLTAREN) 1 % Gel Apply 2 g topically 4 (four) times daily as needed (neck muscle spasm).    diphenhydrAMINE  "(BENADRYL) 25 mg capsule Take 50 mg by mouth nightly as needed for Itching.     evolocumab (REPATHA PUSHTRONEX) 420 mg/3.5 mL Injt Inject 3.5 mLs (420 mg total) into the skin every 30 days    FLUZONE HIGHDOSE QUAD 22-23  mcg/0.7 mL Syrg     FOLIC ACID/MULTIVITS-MIN (MULTIVITAMIN FOR CHOLESTEROL ORAL) Take 1 tablet by mouth nightly.     furosemide (LASIX) 40 MG tablet TAKE 1 TABLET EVERY DAY    insulin glargine, TOUJEO, (TOUJEO SOLOSTAR U-300 INSULIN) 300 unit/mL (1.5 mL) InPn pen Inject 45 Units into the skin once daily.    insulin needles, disposable, (BD INSULIN PEN NEEDLE UF ORIG) 29 x 1/2 " Ndle USE TWICE DAILY AS DIRECTED    JARDIANCE 25 mg tablet TAKE 1 TABLET EVERY DAY    lancets Misc 1 lancet by Misc.(Non-Drug; Combo Route) route 3 (three) times daily. accu-chek Fastclix. Patient needs an appointment    meclizine (ANTIVERT) 25 mg tablet Take 1 tablet (25 mg total) by mouth 3 (three) times daily as needed for Dizziness or Nausea.    metFORMIN (GLUCOPHAGE) 1000 MG tablet Take 1 tablet (1,000 mg total) by mouth 2 (two) times daily.    mupirocin (BACTROBAN) 2 % ointment AAA qd- bid    nitroGLYCERIN (NITROSTAT) 0.4 MG SL tablet PLACE 1 TABLET (0.4 MG TOTAL) UNDER THE TONGUE EVERY 5 (FIVE) MINUTES AS NEEDED FOR CHEST PAIN AS DIRECTED    NOVOLOG FLEXPEN U-100 INSULIN 100 unit/mL (3 mL) InPn pen Inject 12 Units into the skin 3 (three) times daily with meals.    pen needle, diabetic (BD INSULIN PEN NEEDLE UF MINI) 31 gauge x 3/16" Ndle 1 each by Misc.(Non-Drug; Combo Route) route 4 (four) times daily. Patient needs an appointment    PREVNAR 20, PF, 0.5 mL Syrg injection     semaglutide (OZEMPIC) 0.25 mg or 0.5 mg(2 mg/1.5 mL) pen injector Inject 0.5 mg into the skin every 7 days.    valsartan (DIOVAN) 320 MG tablet TAKE 1 TABLET ONE TIME DAILY    VASCEPA 1 gram Cap TAKE 2 CAPSULES TWICE DAILY   Last reviewed on 6/6/2023  1:31 PM by Vanda Kennedy RN    Review of patient's allergies indicates:   Allergen " Reactions    Tramadol Hallucinations    Last reviewed on  6/6/2023 1:30 PM by Vanda Kennedy      Tasks added this encounter   No tasks added.   Tasks due within next 3 months   No tasks due.     [facility]  [address] [address]  Phone: [phone]  Fax: [phone]

## 2023-06-07 NOTE — TELEPHONE ENCOUNTER
----- Message from Ivone Long, OD sent at 6/7/2023  3:35 PM CDT -----  Please call pt cell [phone] to schedule cat eval with D[facility]

## 2023-06-07 NOTE — PROGRESS NOTES
HPI    Annual Exam   Pt states Blurred Vision c Spec MRX   Problems with glare    Floater more frequent   Pt reports over the past year   Pt denies Flashes     FLAKITA   Gtt: No   Pt denies warm compresses   Last edited by Ivone Long, OD on 6/7/2023  3:27 PM.            Assessment /Plan     For exam results, see Encounter Report.    Presbyopia    Open angle with borderline findings and high glaucoma risk in both eyes    Vitreous syneresis of both eyes    Type 2 diabetes mellitus without ophthalmic manifestations    NS (nuclear sclerosis), bilateral      Regular astigmatism of both eyes  Presbyopia              Rx specs     Open angle with borderline findings and high glaucoma risk in both eyes  Glaucoma suspect, bilateral  -          HVF 2012: WNL, 2017 scattered defects/ stable WNL  OCT: sectoral thinning inf nasal OD, sup temp OS, stable vs ?Mild progression OU  PACHY: 541/537   IOP: normal/ low     Repeat HVF 24-2 and OCT optic nerve next year        Type II or unspecified type diabetes mellitus without mention of complication, uncontrolled  Type 2 diabetes mellitus without ophthalmic manifestations  Essential hypertension              No retinopathy, monitor      NS (nuclear sclerosis), bilateral             Visually significant, pt bothered by glare      Consult for cat eval      RTC 1 year annual  HVF 24-2 and OCT

## 2023-06-09 ENCOUNTER — LAB VISIT (OUTPATIENT)
Dept: LAB | Facility: HOSPITAL | Age: 68
End: 2023-06-09
Attending: INTERNAL MEDICINE
Payer: MEDICARE

## 2023-06-09 DIAGNOSIS — Z79.4 TYPE 2 DIABETES MELLITUS WITH HYPERGLYCEMIA, WITH LONG-TERM CURRENT USE OF INSULIN: ICD-10-CM

## 2023-06-09 DIAGNOSIS — E11.65 TYPE 2 DIABETES MELLITUS WITH HYPERGLYCEMIA, WITH LONG-TERM CURRENT USE OF INSULIN: ICD-10-CM

## 2023-06-09 LAB
ESTIMATED AVG GLUCOSE: 146 MG/DL (ref 68–131)
HBA1C MFR BLD: 6.7 % (ref 4–5.6)

## 2023-06-09 PROCEDURE — 83036 HEMOGLOBIN GLYCOSYLATED A1C: CPT | Performed by: INTERNAL MEDICINE

## 2023-06-09 PROCEDURE — 36415 COLL VENOUS BLD VENIPUNCTURE: CPT | Performed by: INTERNAL MEDICINE

## 2023-06-29 ENCOUNTER — PATIENT MESSAGE (OUTPATIENT)
Dept: OPTOMETRY | Facility: CLINIC | Age: 68
End: 2023-06-29
Payer: MEDICARE

## 2023-06-29 ENCOUNTER — TELEPHONE (OUTPATIENT)
Dept: OTOLARYNGOLOGY | Facility: CLINIC | Age: 68
End: 2023-06-29
Payer: MEDICARE

## 2023-06-29 NOTE — TELEPHONE ENCOUNTER
----- Message from Margie Deal sent at 6/29/2023  8:51 AM CDT -----  Good morning,     Jm was visiting [place] and fell forward. He believes he may have fractured his nose. Are you able to schedule him in clinic ASAP? Please advise.     I can call the pt with the appointment details.     Thank you,   Margie Dangelo of [facility]

## 2023-06-30 ENCOUNTER — OFFICE VISIT (OUTPATIENT)
Dept: ENDOCRINOLOGY | Facility: CLINIC | Age: 68
End: 2023-06-30
Payer: MEDICARE

## 2023-06-30 DIAGNOSIS — E11.65 TYPE 2 DIABETES MELLITUS WITH HYPERGLYCEMIA, WITH LONG-TERM CURRENT USE OF INSULIN: ICD-10-CM

## 2023-06-30 DIAGNOSIS — I25.10 CORONARY ARTERY DISEASE INVOLVING NATIVE CORONARY ARTERY OF NATIVE HEART WITHOUT ANGINA PECTORIS: ICD-10-CM

## 2023-06-30 DIAGNOSIS — Z79.4 TYPE 2 DIABETES MELLITUS WITH HYPERGLYCEMIA, WITH LONG-TERM CURRENT USE OF INSULIN: ICD-10-CM

## 2023-06-30 DIAGNOSIS — E78.5 DYSLIPIDEMIA: ICD-10-CM

## 2023-06-30 PROCEDURE — 3044F HG A1C LEVEL LT 7.0%: CPT | Mod: CPTII,95,, | Performed by: INTERNAL MEDICINE

## 2023-06-30 PROCEDURE — 1160F RVW MEDS BY RX/DR IN RCRD: CPT | Mod: CPTII,95,, | Performed by: INTERNAL MEDICINE

## 2023-06-30 PROCEDURE — 3072F LOW RISK FOR RETINOPATHY: CPT | Mod: CPTII,95,, | Performed by: INTERNAL MEDICINE

## 2023-06-30 PROCEDURE — 4010F PR ACE/ARB THEARPY RXD/TAKEN: ICD-10-PCS | Mod: CPTII,95,, | Performed by: INTERNAL MEDICINE

## 2023-06-30 PROCEDURE — 1160F PR REVIEW ALL MEDS BY PRESCRIBER/CLIN PHARMACIST DOCUMENTED: ICD-10-PCS | Mod: CPTII,95,, | Performed by: INTERNAL MEDICINE

## 2023-06-30 PROCEDURE — 1159F PR MEDICATION LIST DOCUMENTED IN MEDICAL RECORD: ICD-10-PCS | Mod: CPTII,95,, | Performed by: INTERNAL MEDICINE

## 2023-06-30 PROCEDURE — 99214 OFFICE O/P EST MOD 30 MIN: CPT | Mod: 25,95,, | Performed by: INTERNAL MEDICINE

## 2023-06-30 PROCEDURE — 4010F ACE/ARB THERAPY RXD/TAKEN: CPT | Mod: CPTII,95,, | Performed by: INTERNAL MEDICINE

## 2023-06-30 PROCEDURE — 99214 PR OFFICE/OUTPT VISIT, EST, LEVL IV, 30-39 MIN: ICD-10-PCS | Mod: 25,95,, | Performed by: INTERNAL MEDICINE

## 2023-06-30 PROCEDURE — 3044F PR MOST RECENT HEMOGLOBIN A1C LEVEL <7.0%: ICD-10-PCS | Mod: CPTII,95,, | Performed by: INTERNAL MEDICINE

## 2023-06-30 PROCEDURE — 1159F MED LIST DOCD IN RCRD: CPT | Mod: CPTII,95,, | Performed by: INTERNAL MEDICINE

## 2023-06-30 PROCEDURE — 3072F PR LOW RISK FOR RETINOPATHY: ICD-10-PCS | Mod: CPTII,95,, | Performed by: INTERNAL MEDICINE

## 2023-06-30 NOTE — PROGRESS NOTES
Jm Deleon is a 68 y.o. male with CAD, HLD presenting for follow-up of T2DM    The patient location is: [place]  The chief complaint leading to consultation is:   Chief Complaint   Patient presents with    Diabetes       Visit type: audiovisual    Face to Face time with patient: 15 minutes  20 minutes of total time spent on the encounter, which includes face to face time and non-face to face time preparing to see the patient (eg, review of tests), Obtaining and/or reviewing separately obtained history, Documenting clinical information in the electronic or other health record, Independently interpreting results (not separately reported) and communicating results to the patient/family/caregiver, or Care coordination (not separately reported).    Each patient to whom he or she provides medical services by telemedicine is:  (1) informed of the relationship between the physician and patient and the respective role of any other health care provider with respect to management of the patient; and (2) notified that he or she may decline to receive medical services by telemedicine and may withdraw from such care at any time.      History of Present Illness  T2DM  Diagnosed in early 2000's  Known complications: denies    Great improvement in glycemic control with ozempic  Weight loss about 20 pounds    In [place] recently and had fall with significant bruising to eye  Noticed foot drop    Current Diabetes Regimen:  Toujeo 50 units smith  novolog 16 units with meals plus scale  Metformin 1000 mg BID  Jardiance 25 mg daily  Ozempic 0.5 mg weekly- did not tolerate higher    Omitted doses: rare    Prior mediations tried: none    DM education when first diagnosed    Recent Hgb A1C:  Lab Results   Component Value Date    HGBA1C 6.7 (H) 06/09/2023       Glucose Monitoring:  Dexcom- in media        Hypoglycemic Episodes: none as above    Screening / DM Complications:    Nephropathy:  ACEi/ARB: Taking  Lab Results   Component Value  Date    MICALBCREAT 12.1 11/04/2022       Last Lipid Panel:  Statin: Taking repatha, zetia, atorvastatin  Lab Results   Component Value Date    LDLCALC 8.8 (L) 11/04/2022       Last foot exam : 11/18/2021  Last eye exam : 06/07/2023;  no laser surgery or DR    B12:  Lab Results   Component Value Date    [id number] 412 06/10/2022       Aspirin: taking          Current Outpatient Medications:     ACCU-CHEK SMARTVIEW TEST STRIP Strp, 1 strip by Misc.(Non-Drug; Combo Route) route 3 (three) times daily. Patient needs an appointment, Disp: 300 strip, Rfl: 0    ammonium lactate 12 % Crea, Apply small amount to feet except for in between toes twice a day, Disp: 1 Tube, Rfl: 3    aspirin (ECOTRIN) 81 MG EC tablet, Take 81 mg by mouth once daily., Disp: , Rfl:     atorvastatin (LIPITOR) 80 MG tablet, TAKE 1 TABLET ONE TIME DAILY, Disp: 90 tablet, Rfl: 3    blood-glucose meter Misc, Use to check sugar 3 times daily. Accu-chek Emily. Patient needs an appointment, Disp: 1 each, Rfl: 0    carvediloL (COREG) 25 MG tablet, Take 1 tablet (25 mg total) by mouth 2 (two) times daily., Disp: 180 tablet, Rfl: 3    celecoxib (CELEBREX) 200 MG capsule, Take 1 capsule (200 mg total) by mouth 2 (two) times daily., Disp: 180 capsule, Rfl: 0    diazePAM (VALIUM) 5 MG tablet, Take 1 tablet (5 mg total) by mouth On call Procedure for Anxiety., Disp: 2 tablet, Rfl: 0    diclofenac sodium (VOLTAREN) 1 % Gel, Apply 2 g topically 4 (four) times daily as needed (neck muscle spasm)., Disp: 100 g, Rfl: 3    diphenhydrAMINE (BENADRYL) 25 mg capsule, Take 50 mg by mouth nightly as needed for Itching. , Disp: , Rfl:     evolocumab (REPATHA PUSHTRONEX) 420 mg/3.5 mL Injt, Inject 3.5 mLs (420 mg total) into the skin every 30 days, Disp: 3 each, Rfl: 4    FLUZONE HIGHDOSE QUAD 22-23  mcg/0.7 mL Syrg, , Disp: , Rfl:     FOLIC ACID/MULTIVITS-MIN (MULTIVITAMIN FOR CHOLESTEROL ORAL), Take 1 tablet by mouth nightly. , Disp: , Rfl:     furosemide (LASIX) 40  "MG tablet, TAKE 1 TABLET EVERY DAY, Disp: 90 tablet, Rfl: 3    insulin glargine, TOUJEO, (TOUJEO SOLOSTAR U-300 INSULIN) 300 unit/mL (1.5 mL) InPn pen, Inject 45 Units into the skin once daily., Disp: 13.5 mL, Rfl: 3    insulin needles, disposable, (BD INSULIN PEN NEEDLE UF ORIG) 29 x 1/2 " Ndle, USE TWICE DAILY AS DIRECTED, Disp: 100 each, Rfl: 2    JARDIANCE 25 mg tablet, TAKE 1 TABLET EVERY DAY, Disp: 90 tablet, Rfl: 1    lancets Misc, 1 lancet by Misc.(Non-Drug; Combo Route) route 3 (three) times daily. accu-chek Fastclix. Patient needs an appointment, Disp: 300 each, Rfl: 0    meclizine (ANTIVERT) 25 mg tablet, Take 1 tablet (25 mg total) by mouth 3 (three) times daily as needed for Dizziness or Nausea., Disp: 21 tablet, Rfl: 0    metFORMIN (GLUCOPHAGE) 1000 MG tablet, Take 1 tablet (1,000 mg total) by mouth 2 (two) times daily., Disp: 180 tablet, Rfl: 1    mupirocin (BACTROBAN) 2 % ointment, AAA qd- bid, Disp: 30 g, Rfl: 3    nitroGLYCERIN (NITROSTAT) 0.4 MG SL tablet, PLACE 1 TABLET (0.4 MG TOTAL) UNDER THE TONGUE EVERY 5 (FIVE) MINUTES AS NEEDED FOR CHEST PAIN AS DIRECTED, Disp: 25 tablet, Rfl: 4    NOVOLOG FLEXPEN U-100 INSULIN 100 unit/mL (3 mL) InPn pen, Inject 12 Units into the skin 3 (three) times daily with meals., Disp: 32.4 mL, Rfl: 3    pen needle, diabetic (BD INSULIN PEN NEEDLE UF MINI) 31 gauge x 3/16" Ndle, 1 each by Misc.(Non-Drug; Combo Route) route 4 (four) times daily. Patient needs an appointment, Disp: 400 each, Rfl: 0    PREVNAR 20, PF, 0.5 mL Syrg injection, , Disp: , Rfl:     semaglutide (OZEMPIC) 0.25 mg or 0.5 mg(2 mg/1.5 mL) pen injector, Inject 0.5 mg into the skin every 7 days., Disp: 4.5 mL, Rfl: 3    valsartan (DIOVAN) 320 MG tablet, TAKE 1 TABLET ONE TIME DAILY, Disp: 90 tablet, Rfl: 3    VASCEPA 1 gram Cap, TAKE 2 CAPSULES TWICE DAILY, Disp: 360 capsule, Rfl: 3  No current facility-administered medications for this visit.    Facility-Administered Medications Ordered in Other " Visits:     fentaNYL 50 mcg/mL injection 25 mcg, 25 mcg, Intravenous, Q5 Min PRN, Travis Corbin MD, 50 mcg at 05/12/21 1242    fentaNYL 50 mcg/mL injection  mcg,  mcg, Intravenous, PRN, Markus Esparza MD, 100 mcg at 11/24/21 1151    lidocaine (PF) 10 mg/ml (1%) injection 10 mg, 1 mL, Intradermal, Once, Deanne Mosqueda MD    LIDOcaine (PF) 10 mg/ml (1%) injection 10 mg, 1 mL, Intradermal, Once, Markus Esparza MD    midazolam (VERSED) 1 mg/mL injection 0.5 mg, 0.5 mg, Intravenous, PRN, Travis Corbin MD, 2 mg at 05/12/21 1242    midazolam (VERSED) 1 mg/mL injection 0.5-4 mg, 0.5-4 mg, Intravenous, PRN, Markus Esparza MD, 2 mg at 11/24/21 1151    ropivacaine 0.2% Nimbus PainPRO Pump infusion 500 ML, , Perineural, Continuous, Travis Corbin MD, New Bag at 05/12/21 1629    ropivacaine 0.2% Nimbus PainPRO Pump infusion 500 ML, , Perineural, Continuous, Markus Esparza MD, New Bag at 11/24/21 1450    triamcinolone acetonide injection 40 mg, 40 mg, Intra-articular, , Patricio Multani MD, 40 mg at 10/26/16 0841    ROS as above    Objective:     There were no vitals filed for this visit.    Wt Readings from Last 3 Encounters:   06/06/23 93 kg (205 lb)   04/05/23 93.6 kg (206 lb 5.6 oz)   02/24/23 96 kg (211 lb 10.3 oz)     There is no height or weight on file to calculate BMI.      Labs    Chemistry        Component Value Date/Time     11/04/2022 0730    K 4.9 11/04/2022 0730     11/04/2022 0730    CO2 28 11/04/2022 0730    BUN 12 11/04/2022 0730    CREATININE 0.9 11/04/2022 0730     (H) 11/04/2022 0730        Component Value Date/Time    CALCIUM 9.6 11/04/2022 0730    ALKPHOS 63 11/04/2022 0730    AST 20 11/04/2022 0730    ALT 26 11/04/2022 0730    BILITOT 0.9 11/04/2022 0730    ESTGFRAFRICA >60.0 06/10/2022 0921    EGFRNONAA >60.0 06/10/2022 0921              Assessment and Plan     Type 2 diabetes mellitus  A1c at goal, TIR  84% and feeling well on current regimen  Will continue current regimen  Change to Dexcom G7      Dyslipidemia  LDL excellent  Cont current therapy    CAD (coronary artery disease)  On Jardiance and ozempic          RTC 4 months with A1c        More Galloway MD

## 2023-06-30 NOTE — ASSESSMENT & PLAN NOTE
A1c at goal, TIR 84% and feeling well on current regimen  Will continue current regimen  Change to Dexcom G7

## 2023-07-02 ENCOUNTER — PATIENT MESSAGE (OUTPATIENT)
Dept: INTERNAL MEDICINE | Facility: CLINIC | Age: 68
End: 2023-07-02
Payer: MEDICARE

## 2023-07-05 ENCOUNTER — PATIENT OUTREACH (OUTPATIENT)
Dept: ADMINISTRATIVE | Facility: HOSPITAL | Age: 68
End: 2023-07-05
Payer: MEDICARE

## 2023-07-05 NOTE — PROGRESS NOTES
Health Maintenance Due   Topic Date Due    Shingles Vaccine (1 of 2) Never done    Foot Exam  11/18/2022    COVID-19 Vaccine (5 - Pfizer series) 04/02/2023     Chart review done. HM updated. Immunizations reviewed & updated. Care Everywhere updated.

## 2023-07-06 ENCOUNTER — PATIENT MESSAGE (OUTPATIENT)
Dept: OPTOMETRY | Facility: CLINIC | Age: 68
End: 2023-07-06
Payer: MEDICARE

## 2023-07-06 ENCOUNTER — SPECIALTY PHARMACY (OUTPATIENT)
Dept: PHARMACY | Facility: CLINIC | Age: 68
End: 2023-07-06
Payer: MEDICARE

## 2023-07-06 NOTE — TELEPHONE ENCOUNTER
Specialty Pharmacy - Refill Coordination    Specialty Medication Orders Linked to Encounter      Flowsheet Row Most Recent Value   Medication #1 evolocumab (REPATHA PUSHTRONEX) 420 mg/3.5 mL Injt (Order#[id number], Rx#[id number])            Refill Questions - Documented Responses      Flowsheet Row Most Recent Value   Patient Availability and HIPAA Verification    Does patient want to proceed with activity? Yes   HIPAA/medical authority confirmed? Yes   Relationship to patient of person spoken to? Self   Refill Screening Questions    Would patient like to speak to a pharmacist? No   When does the patient need to receive the medication? 07/15/23   Refill Delivery Questions    How will the patient receive the medication? [facility]   When does the patient need to receive the medication? 07/15/23   Shipping Address Home   Address in [facility] confirmed and updated if neccessary? Yes   Expected Copay ($) 0   Is the patient able to afford the medication copay? Yes   Payment Method zero copay   Days supply of Refill 30   Supplies needed? No supplies needed   Refill activity completed? Yes   Refill activity plan Refill scheduled   Shipment/Pickup Date: 07/06/23            Current Outpatient Medications   Medication Sig    ACCU-CHEK SMARTVIEW TEST STRIP Strp 1 strip by Misc.(Non-Drug; Combo Route) route 3 (three) times daily. Patient needs an appointment    ammonium lactate 12 % Crea Apply small amount to feet except for in between toes twice a day    aspirin (ECOTRIN) 81 MG EC tablet Take 81 mg by mouth once daily.    atorvastatin (LIPITOR) 80 MG tablet TAKE 1 TABLET ONE TIME DAILY    blood-glucose meter Misc Use to check sugar 3 times daily. Accu-chek Emily. Patient needs an appointment    carvediloL (COREG) 25 MG tablet Take 1 tablet (25 mg total) by mouth 2 (two) times daily.    celecoxib (CELEBREX) 200 MG capsule Take 1 capsule (200 mg total) by mouth 2 (two) times daily.    diazePAM (VALIUM) 5 MG tablet Take 1  "tablet (5 mg total) by mouth On call Procedure for Anxiety.    diclofenac sodium (VOLTAREN) 1 % Gel Apply 2 g topically 4 (four) times daily as needed (neck muscle spasm).    diphenhydrAMINE (BENADRYL) 25 mg capsule Take 50 mg by mouth nightly as needed for Itching.     evolocumab (REPATHA PUSHTRONEX) 420 mg/3.5 mL Injt Inject 3.5 mLs (420 mg total) into the skin every 30 days    FLUZONE HIGHDOSE QUAD 22-23  mcg/0.7 mL Syrg     FOLIC ACID/MULTIVITS-MIN (MULTIVITAMIN FOR CHOLESTEROL ORAL) Take 1 tablet by mouth nightly.     furosemide (LASIX) 40 MG tablet TAKE 1 TABLET EVERY DAY    insulin glargine, TOUJEO, (TOUJEO SOLOSTAR U-300 INSULIN) 300 unit/mL (1.5 mL) InPn pen Inject 45 Units into the skin once daily.    insulin needles, disposable, (BD INSULIN PEN NEEDLE UF ORIG) 29 x 1/2 " Ndle USE TWICE DAILY AS DIRECTED    JARDIANCE 25 mg tablet TAKE 1 TABLET EVERY DAY    lancets Misc 1 lancet by Misc.(Non-Drug; Combo Route) route 3 (three) times daily. accu-chek Fastclix. Patient needs an appointment    meclizine (ANTIVERT) 25 mg tablet Take 1 tablet (25 mg total) by mouth 3 (three) times daily as needed for Dizziness or Nausea.    metFORMIN (GLUCOPHAGE) 1000 MG tablet Take 1 tablet (1,000 mg total) by mouth 2 (two) times daily.    mupirocin (BACTROBAN) 2 % ointment AAA qd- bid    nitroGLYCERIN (NITROSTAT) 0.4 MG SL tablet PLACE 1 TABLET (0.4 MG TOTAL) UNDER THE TONGUE EVERY 5 (FIVE) MINUTES AS NEEDED FOR CHEST PAIN AS DIRECTED    NOVOLOG FLEXPEN U-100 INSULIN 100 unit/mL (3 mL) InPn pen Inject 12 Units into the skin 3 (three) times daily with meals.    pen needle, diabetic (BD INSULIN PEN NEEDLE UF MINI) 31 gauge x 3/16" Ndle 1 each by Misc.(Non-Drug; Combo Route) route 4 (four) times daily. Patient needs an appointment    PREVNAR 20, PF, 0.5 mL Syrg injection     semaglutide (OZEMPIC) 0.25 mg or 0.5 mg(2 mg/1.5 mL) pen injector Inject 0.5 mg into the skin every 7 days.    valsartan (DIOVAN) 320 MG tablet TAKE 1 " TABLET ONE TIME DAILY    VASCEPA 1 gram Cap TAKE 2 CAPSULES TWICE DAILY   Last reviewed on 6/30/2023 10:52 AM by More Galloway MD    Review of patient's allergies indicates:   Allergen Reactions    Tramadol Hallucinations    Last reviewed on  6/30/2023 10:52 AM by More Galloway      Tasks added this encounter   No tasks added.   Tasks due within next 3 months   7/6/2023 - Refill Coordination Outreach (1 time occurrence)     Hanane Moya [facility]  [address] [place] [id number]  Phone: [phone]  Fax: [phone]

## 2023-07-07 ENCOUNTER — OFFICE VISIT (OUTPATIENT)
Dept: OTOLARYNGOLOGY | Facility: CLINIC | Age: 68
End: 2023-07-07
Payer: MEDICARE

## 2023-07-07 VITALS — BODY MASS INDEX: 30.74 KG/M2 | WEIGHT: 202.19 LBS

## 2023-07-07 DIAGNOSIS — M95.0 NASAL DEFORMITY, ACQUIRED: ICD-10-CM

## 2023-07-07 DIAGNOSIS — J34.2 DEVIATED SEPTUM: ICD-10-CM

## 2023-07-07 DIAGNOSIS — J34.2 DEVIATED NASAL SEPTUM: ICD-10-CM

## 2023-07-07 DIAGNOSIS — H53.8 BLURRY VISION, LEFT EYE: ICD-10-CM

## 2023-07-07 DIAGNOSIS — R29.6 FREQUENT FALLS: ICD-10-CM

## 2023-07-07 DIAGNOSIS — R49.0 HOARSENESS OF VOICE: ICD-10-CM

## 2023-07-07 DIAGNOSIS — S02.2XXA CLOSED FRACTURE OF NASAL BONE, INITIAL ENCOUNTER: Primary | ICD-10-CM

## 2023-07-07 PROCEDURE — 1100F PR PT FALLS ASSESS DOC 2+ FALLS/FALL W/INJURY/YR: ICD-10-PCS | Mod: CPTII,S$GLB,, | Performed by: PHYSICIAN ASSISTANT

## 2023-07-07 PROCEDURE — 1159F MED LIST DOCD IN RCRD: CPT | Mod: CPTII,S$GLB,, | Performed by: PHYSICIAN ASSISTANT

## 2023-07-07 PROCEDURE — 3288F PR FALLS RISK ASSESSMENT DOCUMENTED: ICD-10-PCS | Mod: CPTII,S$GLB,, | Performed by: PHYSICIAN ASSISTANT

## 2023-07-07 PROCEDURE — 4010F ACE/ARB THERAPY RXD/TAKEN: CPT | Mod: CPTII,S$GLB,, | Performed by: PHYSICIAN ASSISTANT

## 2023-07-07 PROCEDURE — 1100F PTFALLS ASSESS-DOCD GE2>/YR: CPT | Mod: CPTII,S$GLB,, | Performed by: PHYSICIAN ASSISTANT

## 2023-07-07 PROCEDURE — 3044F HG A1C LEVEL LT 7.0%: CPT | Mod: CPTII,S$GLB,, | Performed by: PHYSICIAN ASSISTANT

## 2023-07-07 PROCEDURE — 3288F FALL RISK ASSESSMENT DOCD: CPT | Mod: CPTII,S$GLB,, | Performed by: PHYSICIAN ASSISTANT

## 2023-07-07 PROCEDURE — 99205 OFFICE O/P NEW HI 60 MIN: CPT | Mod: S$GLB,,, | Performed by: PHYSICIAN ASSISTANT

## 2023-07-07 PROCEDURE — 3072F PR LOW RISK FOR RETINOPATHY: ICD-10-PCS | Mod: CPTII,S$GLB,, | Performed by: PHYSICIAN ASSISTANT

## 2023-07-07 PROCEDURE — 99999 PR PBB SHADOW E&M-EST. PATIENT-LVL II: CPT | Mod: PBBFAC,,, | Performed by: PHYSICIAN ASSISTANT

## 2023-07-07 PROCEDURE — 99999 PR PBB SHADOW E&M-EST. PATIENT-LVL II: ICD-10-PCS | Mod: PBBFAC,,, | Performed by: PHYSICIAN ASSISTANT

## 2023-07-07 PROCEDURE — 3044F PR MOST RECENT HEMOGLOBIN A1C LEVEL <7.0%: ICD-10-PCS | Mod: CPTII,S$GLB,, | Performed by: PHYSICIAN ASSISTANT

## 2023-07-07 PROCEDURE — 3072F LOW RISK FOR RETINOPATHY: CPT | Mod: CPTII,S$GLB,, | Performed by: PHYSICIAN ASSISTANT

## 2023-07-07 PROCEDURE — 3008F PR BODY MASS INDEX (BMI) DOCUMENTED: ICD-10-PCS | Mod: CPTII,S$GLB,, | Performed by: PHYSICIAN ASSISTANT

## 2023-07-07 PROCEDURE — 1159F PR MEDICATION LIST DOCUMENTED IN MEDICAL RECORD: ICD-10-PCS | Mod: CPTII,S$GLB,, | Performed by: PHYSICIAN ASSISTANT

## 2023-07-07 PROCEDURE — 3008F BODY MASS INDEX DOCD: CPT | Mod: CPTII,S$GLB,, | Performed by: PHYSICIAN ASSISTANT

## 2023-07-07 PROCEDURE — 4010F PR ACE/ARB THEARPY RXD/TAKEN: ICD-10-PCS | Mod: CPTII,S$GLB,, | Performed by: PHYSICIAN ASSISTANT

## 2023-07-07 PROCEDURE — 99205 PR OFFICE/OUTPT VISIT, NEW, LEVL V, 60-74 MIN: ICD-10-PCS | Mod: S$GLB,,, | Performed by: PHYSICIAN ASSISTANT

## 2023-07-07 NOTE — PROGRESS NOTES
"Subjective:     HPI: Jm Deleon is a 68 y.o. male who was referred to me by Dr. Giselle Kuhn-* in consultation for facial fracture.     Patient is here for evaluation of nasal fracture after a fall that occurred one week ago (6/26). Patient states he tripped and fell face down on concrete while on his trip in [place]. After the fall, he states he had nasal pain, epistaxis, teeth pain, and left eye pain and headache over his left eyebrow.  He reports the nosebleeds resolved the day following the incident. Today, He continues to have changes in vision/diplopia, nasal obstruction, discomfort in the affected area, changes to appearance, and headache. Patient describes his headache as dull and constant. Patient feels severely congested and reports severe pain to bilateral nose when attempting to blow his nose. He reports L blurry vision and "floaters" with bright lights in his periphery. He also reports hoarseness of his voice that started prior to visit in [place] but worsened after the fall.   Patient reports frequent falls and changes in gait that he feels may be related to the fall.  He denies any facial numbness, tingling sensation in his face or lower extremities, jaw pain.   He has previously had sinonasal surgery with at least a septoplasty     He recalls a prior history of nasal trauma consisting of multiple nasal fracture episodes.    Past Medical/Past Surgical History  Past Medical History:   Diagnosis Date    Allergy     seasonal    Arthritis     Coronary artery disease     Diabetes mellitus     Diabetes mellitus, type 2     Dysplastic nevus of trunk 03/2022    moderately atypical     Hyperlipidemia     Hypertension     Joint pain     Melanoma 10/2019    MM left back 2.7mm; neg LNs    Open angle with borderline findings and high glaucoma risk in both eyes 2018    Squamous cell carcinoma 2017    scalp - SSW     He has a past surgical history that includes Blepharoptosis repair (10 years ago); Hernia " repair; Coronary artery bypass graft (x4 age 45 2000); Hip surgery (9-24-14); Cardiac surgery; Joint replacement; Shoulder arthroscopy; Shoulder surgery; Colonoscopy (N/A, 10/4/2019); [employer] lymph node biopsy (Left, 10/18/2019); Transforaminal epidural injection of steroid (Left, 8/7/2020); Arthroscopic repair of rotator cuff of shoulder (Left, 5/12/2021); Fixation of tendon (Left, 5/12/2021); Arthroscopy of shoulder with decompression of subacromial space (5/12/2021); Distal clavicle excision (Left, 5/12/2021); Shoulder arthroscopy (Left, 11/24/2021); Manipulation with anesthesia (Left, 11/24/2021); Hardware Removal (Left, 11/24/2021); and injection, sacroiliac joint (Right, 6/6/2023).    Family History/Social History  His family history includes Cancer in his brother and father; Coronary artery disease in his mother; Hypertension in his brother.  He reports that he has never smoked. He has never used smokeless tobacco. He reports current alcohol use of about 3.0 standard drinks per week. He reports that he does not use drugs.    Allergies/Immunizations  He is allergic to tramadol.  Immunization History   Administered Date(s) Administered    COVID-19, MRNA, LN-S, PF (Pfizer) (Purple Cap) 02/10/2021, 03/03/2021, 12/11/2021    COVID-19, mRNA, LNP-S, bivalent booster, PF (PFIZER OMICRON) 12/02/2022    Influenza (FLUAD) - Quadrivalent - Adjuvanted - PF *Preferred* (65+) 11/15/2021    Influenza - High Dose - PF (65 years and older) 10/26/2018, 11/01/2019    Influenza - Quadrivalent 10/29/2014, 10/29/2014    Influenza - Quadrivalent - High Dose - PF (65 years and older) 10/19/2022    Influenza - Quadrivalent - PF *Preferred* (6 months and older) 11/05/2008, 09/28/2010, 12/05/2011, 10/04/2017    Influenza Split 11/05/2008, 09/28/2010, 12/05/2011    Pneumococcal Conjugate - 13 Valent 10/26/2018    Pneumococcal Conjugate - 20 Valent 10/19/2022    Pneumococcal Polysaccharide - 23 Valent 05/20/2016, 11/01/2019    Tdap  05/20/2016        Medications   ACCU-CHEK SMARTVIEW TEST STRIP Strp  ammonium lactate Crea  aspirin  atorvastatin  blood-glucose meter Misc  carvediloL  celecoxib  diclofenac sodium Gel  diphenhydrAMINE  fentaNYL  FLUZONE HIGHDOSE QUAD 22-23 PF Syrg  furosemide  JARDIANCE Tab  lancets Misc  LIDOcaine (PF) 10 mg/ml (1%)  metFORMIN  midazolam  MULTIVITAMIN FOR CHOLESTEROL ORAL  mupirocin  nitroGLYCERIN  NovoLOG FlexPen U-100 Insulin Inpn  OZEMPIC Pnij  pen needle, diabetic Ndle  PREVNAR 20 (PF) Syrg  REPATHA PUSHTRONEX Injt  ropivacaine (PF) 2 mg/ml (0.2%)  TOUJEO SOLOSTAR U-300 INSULIN Inpn  triamcinolone acetonide  valsartan  VASCEPA Cap     Review of Systems   HENT: Positive for runny nose, sinus pressure, stuffy nose and voice change.  Negative for facial swelling, nosebleeds and dental problems.      Eyes:  Positive for change in eyesight.     Respiratory:  Positive for snoring. Negative for cough and shortness of breath.      Cardiovascular:  Negative for chest pain.     Allergy: Positive for seasonal allergies.     Neurological: Positive for headaches. Negative for dizziness, light-headedness and tremors.      Hematologic: Positive for bruises/bleeds easily.     Psychiatric: Negative for nervous/anxious.          Objective:     There were no vitals taken for this visit.       Constitutional:   He is oriented to person, place, and time. Vital signs are normal. He appears well-developed and well-nourished. He appears alert. Normal speech.      Head:  Normocephalic. Head is without raccoon's eyes and without TMJ tenderness (no crepitus or tenderness). Facial strength is normal.  Bilaterally (No gross paresthesias on palpation).    Ecchymosis inferior to left eye     Ears:  Hearing normal to normal and whispered voice; external ear normal without scars, lesions, or masses; ear canal, tympanic membrane, and middle ear normal..   Right Ear: No lacerations. No tenderness. No mastoid tenderness. No decreased hearing is  noted.   Left Ear: No lacerations. No tenderness. No mastoid tenderness. No decreased hearing is noted.     Nose:  Nose normal including turbinates, nasal mucosa, sinuses and nasal septum. Rhinorrhea, sinus tenderness and septal deviation (L nasal cavity) present. No mucosal edema, nose lacerations, nasal septal hematoma (No evidence of septal hematoma) or polyps. No epistaxis. Turbinates normal, no turbinate masses and no turbinate hypertrophy.  Right sinus exhibits no maxillary sinus tenderness and no frontal sinus tenderness. Left sinus exhibits maxillary sinus tenderness. Left sinus exhibits no frontal sinus tenderness.   External nose with deviation to right of 2nd and third portion of nasal dorsum    Mouth/Throat  Oropharynx clear and moist without lesions or asymmetry, normal uvula midline and lips, teeth, and gums normal. No trismus.     Neck:  Neck normal without thyromegaly masses, asymmetry, normal tracheal structure, crepitus, and tenderness, trachea normal and phonation normal.     Pulmonary/Chest:   Effort normal.     Psychiatric:   His speech is normal and behavior is normal.     Neurological:   He is alert and oriented to person, place, and time.     Procedure    None    Data Reviewed  I personally reviewed the chart, including any outside records, and pertinent data below:    WBC (K/uL)   Date Value   01/05/2022 6.28     Eosinophil % (%)   Date Value   01/05/2022 1.0     Eos # (K/uL)   Date Value   01/05/2022 0.1     Platelets (K/uL)   Date Value   01/05/2022 173     Glucose (mg/dL)   Date Value   11/04/2022 170 (H)     No results found for: IGE    I independently reviewed the images of the MRI brain dated 6/18/22. Pertinent findings include mild, ptachy ethmoid inflammation (R>L) but overall patent sinuses and slight Rightward angulation of anterior septum.    Assessment & Plan:     1. Closed fracture of nasal bone, initial encounter  2. Nasal deformity, acquired  3. Deviated septum  - changes in  vision concerning and suspected nasal fracture so will have patient undergo CT sinus  - per last MRI patient with rightward nasal dorsum angulation but worse after fall per wife.  - Discussed bone healing; should avoid any risk for trauma, like contact sports, for 6 weeks.  - advised follow up with our plastics ENT surgeon for further management in about 3-4 weeks    4. Blurry vision, left eye  -     Ambulatory referral/consult to Ophthalmology; Future; Expected date: 07/14/2023    5. Frequent falls   - advised to follow up with PCP; may need neurology evaluation    6. Hoarseness of voice   - prior to fall without associated sinus symptoms   - will defer laryngoscopy today d/t pain/bone healing     I had a discussion with the patient and his wife Narcisa  regarding his condition and the further workup and management options.    All questions were answered, and the patient is in agreement with the above.

## 2023-07-21 ENCOUNTER — HOSPITAL ENCOUNTER (OUTPATIENT)
Dept: RADIOLOGY | Facility: HOSPITAL | Age: 68
Discharge: HOME | End: 2023-07-21
Attending: PHYSICIAN ASSISTANT
Payer: MEDICARE

## 2023-07-21 DIAGNOSIS — J34.2 DEVIATED NASAL SEPTUM: ICD-10-CM

## 2023-07-21 PROCEDURE — 70486 CT SINUSES WITHOUT CONTRAST: ICD-10-PCS | Mod: 26,,, | Performed by: RADIOLOGY

## 2023-07-21 PROCEDURE — 70486 CT MAXILLOFACIAL W/O DYE: CPT | Mod: 26,,, | Performed by: RADIOLOGY

## 2023-07-21 PROCEDURE — 70486 CT MAXILLOFACIAL W/O DYE: CPT | Mod: TC

## 2023-07-23 DIAGNOSIS — E78.5 DYSLIPIDEMIA: ICD-10-CM

## 2023-07-23 DIAGNOSIS — I25.10 CORONARY ARTERY DISEASE INVOLVING NATIVE CORONARY ARTERY OF NATIVE HEART WITHOUT ANGINA PECTORIS: ICD-10-CM

## 2023-07-24 RX ORDER — ICOSAPENT ETHYL 1000 MG/1
CAPSULE ORAL
Qty: 360 CAPSULE | Refills: 3 | Status: SHIPPED | OUTPATIENT
Start: 2023-07-24

## 2023-07-25 ENCOUNTER — OFFICE VISIT (OUTPATIENT)
Dept: OPHTHALMOLOGY | Facility: CLINIC | Age: 68
End: 2023-07-25
Payer: MEDICARE

## 2023-07-25 DIAGNOSIS — H25.13 NUCLEAR SCLEROTIC CATARACT, BILATERAL: Primary | ICD-10-CM

## 2023-07-25 PROCEDURE — 3044F PR MOST RECENT HEMOGLOBIN A1C LEVEL <7.0%: ICD-10-PCS | Mod: CPTII,S$GLB,, | Performed by: OPHTHALMOLOGY

## 2023-07-25 PROCEDURE — 1126F PR PAIN SEVERITY QUANTIFIED, NO PAIN PRESENT: ICD-10-PCS | Mod: CPTII,S$GLB,, | Performed by: OPHTHALMOLOGY

## 2023-07-25 PROCEDURE — 1101F PT FALLS ASSESS-DOCD LE1/YR: CPT | Mod: CPTII,S$GLB,, | Performed by: OPHTHALMOLOGY

## 2023-07-25 PROCEDURE — 4010F ACE/ARB THERAPY RXD/TAKEN: CPT | Mod: CPTII,S$GLB,, | Performed by: OPHTHALMOLOGY

## 2023-07-25 PROCEDURE — 1160F RVW MEDS BY RX/DR IN RCRD: CPT | Mod: CPTII,S$GLB,, | Performed by: OPHTHALMOLOGY

## 2023-07-25 PROCEDURE — 99999 PR PBB SHADOW E&M-EST. PATIENT-LVL II: ICD-10-PCS | Mod: PBBFAC,,, | Performed by: OPHTHALMOLOGY

## 2023-07-25 PROCEDURE — 92004 PR EYE EXAM, NEW PATIENT,COMPREHESV: ICD-10-PCS | Mod: S$GLB,,, | Performed by: OPHTHALMOLOGY

## 2023-07-25 PROCEDURE — 3288F FALL RISK ASSESSMENT DOCD: CPT | Mod: CPTII,S$GLB,, | Performed by: OPHTHALMOLOGY

## 2023-07-25 PROCEDURE — 3288F PR FALLS RISK ASSESSMENT DOCUMENTED: ICD-10-PCS | Mod: CPTII,S$GLB,, | Performed by: OPHTHALMOLOGY

## 2023-07-25 PROCEDURE — 1159F PR MEDICATION LIST DOCUMENTED IN MEDICAL RECORD: ICD-10-PCS | Mod: CPTII,S$GLB,, | Performed by: OPHTHALMOLOGY

## 2023-07-25 PROCEDURE — 1101F PR PT FALLS ASSESS DOC 0-1 FALLS W/OUT INJ PAST YR: ICD-10-PCS | Mod: CPTII,S$GLB,, | Performed by: OPHTHALMOLOGY

## 2023-07-25 PROCEDURE — 92004 COMPRE OPH EXAM NEW PT 1/>: CPT | Mod: S$GLB,,, | Performed by: OPHTHALMOLOGY

## 2023-07-25 PROCEDURE — 1159F MED LIST DOCD IN RCRD: CPT | Mod: CPTII,S$GLB,, | Performed by: OPHTHALMOLOGY

## 2023-07-25 PROCEDURE — 1160F PR REVIEW ALL MEDS BY PRESCRIBER/CLIN PHARMACIST DOCUMENTED: ICD-10-PCS | Mod: CPTII,S$GLB,, | Performed by: OPHTHALMOLOGY

## 2023-07-25 PROCEDURE — 99999 PR PBB SHADOW E&M-EST. PATIENT-LVL II: CPT | Mod: PBBFAC,,, | Performed by: OPHTHALMOLOGY

## 2023-07-25 PROCEDURE — 3044F HG A1C LEVEL LT 7.0%: CPT | Mod: CPTII,S$GLB,, | Performed by: OPHTHALMOLOGY

## 2023-07-25 PROCEDURE — 1126F AMNT PAIN NOTED NONE PRSNT: CPT | Mod: CPTII,S$GLB,, | Performed by: OPHTHALMOLOGY

## 2023-07-25 PROCEDURE — 4010F PR ACE/ARB THEARPY RXD/TAKEN: ICD-10-PCS | Mod: CPTII,S$GLB,, | Performed by: OPHTHALMOLOGY

## 2023-07-25 NOTE — PROGRESS NOTES
HPI    Patient presents today for a Cataract Evaluation. Patient notes vision as   blurry. Patient notes difficulty driving at night and has issues with   glare. Patient notes no eye pain. Pt did have a fall and has an orbital   fracture OS. Pt will see  tomorrow to see if he needs surgery.  Last edited by Hal Molina on 7/25/2023  1:19 PM.            Assessment /Plan     For exam results, see Encounter Report.    Nuclear sclerotic cataract, bilateral      Visually Significant Cataract: Patient reports decreased vision consistent with the clinical amount of lenticular opacity, which reaches the level of visual significance and affects activities of daily living.     Specifically, this patient describes difficulty with:  - driving safely at night  - reading road signs  - reading small print  - deciphering medicine bottles  - reading the newspaper  - using the phone  - reading texts     Risks, benefits, and alternatives to cataract surgery were discussed and the consent reviewed. IOL options were discussed, including ATIOLs and the associated side effects and additional patient cost associated with them.   IOL Selections:   Right eye  IOL: diboo 21.5     Left eye  IOL: MQQ322 21.0     Pt wishes to have NEITHER eye done first.  RECHECK 6-8 MOS

## 2023-07-26 ENCOUNTER — OFFICE VISIT (OUTPATIENT)
Dept: OTOLARYNGOLOGY | Facility: CLINIC | Age: 68
End: 2023-07-26
Payer: MEDICARE

## 2023-07-26 VITALS
WEIGHT: 204.13 LBS | DIASTOLIC BLOOD PRESSURE: 73 MMHG | SYSTOLIC BLOOD PRESSURE: 120 MMHG | HEART RATE: 72 BPM | BODY MASS INDEX: 31.04 KG/M2

## 2023-07-26 DIAGNOSIS — J34.2 NASAL SEPTAL DEVIATION: ICD-10-CM

## 2023-07-26 DIAGNOSIS — M95.0 NASAL DEFORMITY, ACQUIRED: Primary | ICD-10-CM

## 2023-07-26 DIAGNOSIS — J34.89 NASAL OBSTRUCTION: ICD-10-CM

## 2023-07-26 PROCEDURE — 3008F PR BODY MASS INDEX (BMI) DOCUMENTED: ICD-10-PCS | Mod: CPTII,S$GLB,, | Performed by: OTOLARYNGOLOGY

## 2023-07-26 PROCEDURE — 4010F PR ACE/ARB THEARPY RXD/TAKEN: ICD-10-PCS | Mod: CPTII,S$GLB,, | Performed by: OTOLARYNGOLOGY

## 2023-07-26 PROCEDURE — 99214 OFFICE O/P EST MOD 30 MIN: CPT | Mod: S$GLB,,, | Performed by: OTOLARYNGOLOGY

## 2023-07-26 PROCEDURE — 3078F PR MOST RECENT DIASTOLIC BLOOD PRESSURE < 80 MM HG: ICD-10-PCS | Mod: CPTII,S$GLB,, | Performed by: OTOLARYNGOLOGY

## 2023-07-26 PROCEDURE — 1126F AMNT PAIN NOTED NONE PRSNT: CPT | Mod: CPTII,S$GLB,, | Performed by: OTOLARYNGOLOGY

## 2023-07-26 PROCEDURE — 1159F PR MEDICATION LIST DOCUMENTED IN MEDICAL RECORD: ICD-10-PCS | Mod: CPTII,S$GLB,, | Performed by: OTOLARYNGOLOGY

## 2023-07-26 PROCEDURE — 3078F DIAST BP <80 MM HG: CPT | Mod: CPTII,S$GLB,, | Performed by: OTOLARYNGOLOGY

## 2023-07-26 PROCEDURE — 1160F PR REVIEW ALL MEDS BY PRESCRIBER/CLIN PHARMACIST DOCUMENTED: ICD-10-PCS | Mod: CPTII,S$GLB,, | Performed by: OTOLARYNGOLOGY

## 2023-07-26 PROCEDURE — 3044F PR MOST RECENT HEMOGLOBIN A1C LEVEL <7.0%: ICD-10-PCS | Mod: CPTII,S$GLB,, | Performed by: OTOLARYNGOLOGY

## 2023-07-26 PROCEDURE — 99999 PR PBB SHADOW E&M-EST. PATIENT-LVL III: ICD-10-PCS | Mod: PBBFAC,,, | Performed by: OTOLARYNGOLOGY

## 2023-07-26 PROCEDURE — 3074F PR MOST RECENT SYSTOLIC BLOOD PRESSURE < 130 MM HG: ICD-10-PCS | Mod: CPTII,S$GLB,, | Performed by: OTOLARYNGOLOGY

## 2023-07-26 PROCEDURE — 3008F BODY MASS INDEX DOCD: CPT | Mod: CPTII,S$GLB,, | Performed by: OTOLARYNGOLOGY

## 2023-07-26 PROCEDURE — 99999 PR PBB SHADOW E&M-EST. PATIENT-LVL III: CPT | Mod: PBBFAC,,, | Performed by: OTOLARYNGOLOGY

## 2023-07-26 PROCEDURE — 1159F MED LIST DOCD IN RCRD: CPT | Mod: CPTII,S$GLB,, | Performed by: OTOLARYNGOLOGY

## 2023-07-26 PROCEDURE — 4010F ACE/ARB THERAPY RXD/TAKEN: CPT | Mod: CPTII,S$GLB,, | Performed by: OTOLARYNGOLOGY

## 2023-07-26 PROCEDURE — 1126F PR PAIN SEVERITY QUANTIFIED, NO PAIN PRESENT: ICD-10-PCS | Mod: CPTII,S$GLB,, | Performed by: OTOLARYNGOLOGY

## 2023-07-26 PROCEDURE — 3072F LOW RISK FOR RETINOPATHY: CPT | Mod: CPTII,S$GLB,, | Performed by: OTOLARYNGOLOGY

## 2023-07-26 PROCEDURE — 99214 PR OFFICE/OUTPT VISIT, EST, LEVL IV, 30-39 MIN: ICD-10-PCS | Mod: S$GLB,,, | Performed by: OTOLARYNGOLOGY

## 2023-07-26 PROCEDURE — 3044F HG A1C LEVEL LT 7.0%: CPT | Mod: CPTII,S$GLB,, | Performed by: OTOLARYNGOLOGY

## 2023-07-26 PROCEDURE — 3072F PR LOW RISK FOR RETINOPATHY: ICD-10-PCS | Mod: CPTII,S$GLB,, | Performed by: OTOLARYNGOLOGY

## 2023-07-26 PROCEDURE — 1160F RVW MEDS BY RX/DR IN RCRD: CPT | Mod: CPTII,S$GLB,, | Performed by: OTOLARYNGOLOGY

## 2023-07-26 PROCEDURE — 3074F SYST BP LT 130 MM HG: CPT | Mod: CPTII,S$GLB,, | Performed by: OTOLARYNGOLOGY

## 2023-07-26 NOTE — PROGRESS NOTES
Mr. Deleon     Vitals:    23 1101   BP: 120/73   Pulse: 72       Chief Complaint:  Fracture (C/o facial fractures)       HPI:   is a 68-year-old white male who presents referred by FINESSE Castañeda for nasal trauma.  He states that he sustained a severe fall striking his nose twice while visiting his son in [place].  In both of these instances he did have nasal bleeding.  He also states that recently he has had a number of falls when walking when he catches his left toe while stepping.  He does have a history of diabetes and is concerned that this may be from a developing neuropathy.  He does complain of nasal obstruction especially on the left side.  He also states that he has had  significant clear rhinorrhea episodes since that time.  He has not noted a salty taste in this though he is not sure.    SNOT22- 39 NOSE- 45    Review of Systems:  Constitutional:   weight loss or weight gain: Negative  Allergy/Immunologic:   Negative  Nasal Congestion/Obstruction:   Positive  Nosebleeds:   Negative  Sinus infections:   Negative  Headache/Facial Pain:   Negative  Snoring/MARISOL:   Negative  Throat: Infections/Pain:   Negative  Hoarseness/Speech Disturbance:   Negative  Trauma Hx:  Positive as above    Cardiovascular:  M/I Angina: Negative, positive for coronary artery disease status post coronary artery bypass graft.  Hypertension:  Positive  Endocrine:    DM/Steroids:  Positive for diabetes  GI:   Dysphagia/Reflux: Negative  :   GYN Pregnancy: Negative  Renal:   Dialysis: Negative  Lymphatic:   Neck Mass/Lymphadenopathy: Negative  Muscoloskeletal:   Negative  Hematologic:   Bleeding Disorders/Anemia: Negative  Neurologic:    Cranial/Neuralgia: Negative  Pulmonary:   Asthma/SOB/Cough: Negative  Skin Disorders: Negative    Past Medical/Surgical/Family/Social History:    ENT Surgery:  Status post tonsillectomy as well as nasal reconstruction years ago by Dr. Rankin.  Occupational Exposure: Negative    Problems: Negative  Cancer: Negative    Past Family History:   Family history of Cancer: Negative    Past Social History:   Tobacco: Nonsmoker   Alcohol:  2-3 drinks per day Drinker      Allergies and medications: Reviewed per med card.    Physical Examination:  Ears:   External auditory canals:  Clear   Hearing: Grossly intact   Tympanic Membranes: Clear  Nose:   External:  Significant dorsal deviation to the right   Intranasal:  High septal deviation to the left with 2+ turbinates.  All of these deformities together creating 75% obstruction.  No rhinorrhea noted at this time.  Mouth:   Intraorally: Lips, teeth, and gums: Normal   Oropharynx: Normal   Mucosa: Normal   Tongue: Normal  Throat:      Palate: Normal palate with elevation   Tonsils:  Absent   Posterior Pharynx: Normal  Fiberoptic exam: Not performed  Head/Face:     Inspection: Normal and atraumatic   Palpation/Percussion: Non tender   Facial strength: Normal and symmetric   Salivary glands: Normal  Neck: Supple  Thyroid: No masses  Lymphatics: No nodes  Respiratory:   Effort: Normal  Eyes:   Ocular Mobility: Normal   Vision: Grossly intact  Neuro/Psych:   Cranial Nerves: Grossly Intact   Orientation: Normal   Mood/Affect: Normal      Assessment/Plan:  I have discussed my findings with him in detail as well as my recommendations for treatment.  I have given him a sterile cup and asked him to collect any clear rhinorrhea when this drains and to get this back to us for testing.  I have advised him against blowing his nose aggressively.  I have also strongly urged him to contact his PCP regarding his falling issue and arrange for neurologic evaluation.  He will contact us here for further follow-up.  Surgically we discussed that he could benefit from nasal reconstruction with osteotomies, septoplasty, submucous resection of turbinates though this is not urgent.

## 2023-07-27 ENCOUNTER — TELEPHONE (OUTPATIENT)
Dept: OPTOMETRY | Facility: CLINIC | Age: 68
End: 2023-07-27
Payer: MEDICARE

## 2023-08-03 ENCOUNTER — OFFICE VISIT (OUTPATIENT)
Dept: OPTOMETRY | Facility: CLINIC | Age: 68
End: 2023-08-03
Payer: COMMERCIAL

## 2023-08-03 DIAGNOSIS — H52.203 HYPEROPIA OF BOTH EYES WITH ASTIGMATISM AND PRESBYOPIA: ICD-10-CM

## 2023-08-03 DIAGNOSIS — E11.9 DIABETIC EYE EXAM: Primary | ICD-10-CM

## 2023-08-03 DIAGNOSIS — E11.9 TYPE 2 DIABETES MELLITUS WITHOUT RETINOPATHY: ICD-10-CM

## 2023-08-03 DIAGNOSIS — H52.4 HYPEROPIA OF BOTH EYES WITH ASTIGMATISM AND PRESBYOPIA: ICD-10-CM

## 2023-08-03 DIAGNOSIS — Z01.00 DIABETIC EYE EXAM: Primary | ICD-10-CM

## 2023-08-03 DIAGNOSIS — H52.03 HYPEROPIA OF BOTH EYES WITH ASTIGMATISM AND PRESBYOPIA: ICD-10-CM

## 2023-08-03 DIAGNOSIS — H26.9 CORTICAL CATARACT OF BOTH EYES: ICD-10-CM

## 2023-08-03 DIAGNOSIS — H25.13 NS (NUCLEAR SCLEROSIS), BILATERAL: ICD-10-CM

## 2023-08-03 PROCEDURE — 92015 DETERMINE REFRACTIVE STATE: CPT | Mod: S$GLB,,, | Performed by: OPTOMETRIST

## 2023-08-03 PROCEDURE — 92014 COMPRE OPH EXAM EST PT 1/>: CPT | Mod: S$GLB,,, | Performed by: OPTOMETRIST

## 2023-08-03 PROCEDURE — 92015 PR REFRACTION: ICD-10-PCS | Mod: S$GLB,,, | Performed by: OPTOMETRIST

## 2023-08-03 PROCEDURE — 99999 PR PBB SHADOW E&M-EST. PATIENT-LVL II: ICD-10-PCS | Mod: PBBFAC,,, | Performed by: OPTOMETRIST

## 2023-08-03 PROCEDURE — 92014 PR EYE EXAM, EST PATIENT,COMPREHESV: ICD-10-PCS | Mod: S$GLB,,, | Performed by: OPTOMETRIST

## 2023-08-03 PROCEDURE — 99999 PR PBB SHADOW E&M-EST. PATIENT-LVL II: CPT | Mod: PBBFAC,,, | Performed by: OPTOMETRIST

## 2023-08-03 NOTE — PATIENT INSTRUCTIONS
Recent fall, with fractuce of the orbital wall on the left side.  No secondary problems noted.   No evidence of extraocular muscle entrapment.  Reassured Mr. Deleon.    Type 2 diabetes mellitus, without evidence of diabetic retinopathy in either eye.    Nuclear sclerosis of lens of both eyes, consistent with age.  Bilateral cortical cataract.  No need for cataract surgery in either eye.    Hyperopia with astigmatism in each eye, with satisfactory best-corrected VA in each eye  Presbyopia consistent with age.  New spectacle lens Rx issued for full-time wear.    Recheck in one year - or prior, if any problems noted in the interim.

## 2023-08-03 NOTE — PROGRESS NOTES
"HPI     eye problems             Comments: Recently had fall while in [place], and had X-ray done at   [facility].  Was told he had orbital fracture on left side, but was   not advised that he needed surgery.  Notes "steaks of bight light  temporally in each eye" but that has   resolved.  Diabetes.  IDDM.  Diabetic for 17 years.  Insulin x approx 14 years          Comments    Patient's age: 68 y.o. WM  Occupation: retired - had construction company  Wears glasses? Yes, but lenses got scratched in recent fall. Needs to   replace lenses     If yes, wears  Full-time or part-time?  Full-time  Present glasses are: Bifocal, SV Distance, SV Reading?  Progessive add   lenses  Approximate age of present glasses:  at least eight months ago -   prescribed by Dr. Long   Got new glasses following last exam, or subsequently?: yes   Any problem with VA with glasses?  None prior to recent fall. But does   feel that vision at night is somewhat decreased  Wears CLs?:  no           If  Headaches?  no  Eye pain/discomfort?  no                                                                                     Flashes?  Following fall in both eyes, but have resolved  Floaters?  Yes OU  Diplopia/Double vision?  no  Patient's Ocular History:         Any eye surgeries? None - referred by Dr. Long to Dr. Jimenez, but   he stated he is not yet ready for surgery         Any eye injury?  Recent orbital fracture on left side         Any treatment for eye disease?  no  Family history of eye disease?  no  Significant patient medical history:         1. Diabetes?  Yes x 17 years       If yes, IDDM or NIDDM Insulin dependant x 14 years    2. HBP?   Yes - on meds. Well controlled              3. Other (describe):  s/p cardiac bypass surgery at age 45,   melanoma on back (excised x 3 years ago)   ! OTC eyedrops currently using:  no    ! Prescription eye meds currently using:  no   ! Any history of allergy/adverse reaction to any eye meds used " "  previously?  no    ! Any history of allergy/adverse reaction to eyedrops used during prior   eye exam(s)? no    ! Any history of allergy/adverse reaction to Novacaine or similar meds?   no   ! Any history of allergy/adverse reaction to Epinephrine or similar meds?   no    ! Patient okay with use of anesthetic eyedrops to check eye pressure?  no           ! Patient okay with use of eyedrops to dilate pupils today?  no   !  Allergies/Medications/Medical History/Family History reviewed today?    no      PD =   62/58  Desired reading distance =  15.5"                                                                         Last edited by Dameon Horne OD on 8/3/2023 10:24 AM.            Assessment /Plan     For exam results, see Encounter Report.    1. Diabetic eye exam        2. Type 2 diabetes mellitus without retinopathy        3. NS (nuclear sclerosis), bilateral        4. Cortical cataract of both eyes        5. Hyperopia of both eyes with astigmatism and presbyopia                         Recent fall, with fractuce of the orbital wall on the left side.  No secondary problems noted.   No evidence of extraocular muscle entrapment.  Reassured Mr. Deleon.    Type 2 diabetes mellitus, without evidence of diabetic retinopathy in either eye.    Nuclear sclerosis of lens of both eyes, consistent with age.  Bilateral cortical cataract.  No need for cataract surgery in either eye.    Hyperopia with astigmatism in each eye, with satisfactory best-corrected VA in each eye  Presbyopia consistent with age.  New spectacle lens Rx issued for full-time wear.    Recheck in one year - or prior, if any problems noted in the interim.     "

## 2023-08-04 ENCOUNTER — TELEPHONE (OUTPATIENT)
Dept: OPTOMETRY | Facility: CLINIC | Age: 68
End: 2023-08-04
Payer: MEDICARE

## 2023-08-04 NOTE — TELEPHONE ENCOUNTER
Spoke to pt to help with appt.     ----- Message from Florinda Fisher sent at 7/31/2023  5:29 PM CDT -----  Regarding: FW: Scheduling Request    ----- Message -----  From: Kaylynn Melendez  Sent: 7/31/2023  12:57 PM CDT  To: Ethan VALENZUELA Staff  Subject: Scheduling Request                                   Name Of Caller:  Self      Contact Preference:   [phone]      Nature of call:   Pt wants to schedule his Annual f/u and Hvf. I was unable to schedule both appts in Epic.

## 2023-08-08 ENCOUNTER — SPECIALTY PHARMACY (OUTPATIENT)
Dept: PHARMACY | Facility: CLINIC | Age: 68
End: 2023-08-08
Payer: MEDICARE

## 2023-08-08 NOTE — TELEPHONE ENCOUNTER
Specialty Pharmacy - Refill Coordination    Specialty Medication Orders Linked to Encounter      Flowsheet Row Most Recent Value   Medication #1 evolocumab (REPATHA PUSHTRONEX) 420 mg/3.5 mL Injt (Order#[id number], Rx#[id number])            Refill Questions - Documented Responses      Flowsheet Row Most Recent Value   Patient Availability and HIPAA Verification    Does patient want to proceed with activity? Yes   HIPAA/medical authority confirmed? Yes   Relationship to patient of person spoken to? Self   Refill Screening Questions    Would patient like to speak to a pharmacist? No   When does the patient need to receive the medication? 08/15/23   Refill Delivery Questions    How will the patient receive the medication? MEDRx   When does the patient need to receive the medication? 08/15/23   Shipping Address Home   Address in [facility] confirmed and updated if neccessary? Yes   Expected Copay ($) 0   Is the patient able to afford the medication copay? Yes   Payment Method zero copay   Days supply of Refill 30   Supplies needed? No supplies needed   Refill activity completed? Yes   Refill activity plan Refill scheduled   Shipment/Pickup Date: 08/10/23            Current Outpatient Medications   Medication Sig    ACCU-CHEK SMARTVIEW TEST STRIP Strp 1 strip by Misc.(Non-Drug; Combo Route) route 3 (three) times daily. Patient needs an appointment    ammonium lactate 12 % Crea Apply small amount to feet except for in between toes twice a day    aspirin (ECOTRIN) 81 MG EC tablet Take 81 mg by mouth once daily.    atorvastatin (LIPITOR) 80 MG tablet TAKE 1 TABLET ONE TIME DAILY    blood-glucose meter Misc Use to check sugar 3 times daily. Accu-chek Emily. Patient needs an appointment    carvediloL (COREG) 25 MG tablet Take 1 tablet (25 mg total) by mouth 2 (two) times daily.    celecoxib (CELEBREX) 200 MG capsule Take 1 capsule (200 mg total) by mouth 2 (two) times daily.    diazePAM (VALIUM) 5 MG tablet Take 1  "tablet (5 mg total) by mouth On call Procedure for Anxiety.    diclofenac sodium (VOLTAREN) 1 % Gel Apply 2 g topically 4 (four) times daily as needed (neck muscle spasm).    diphenhydrAMINE (BENADRYL) 25 mg capsule Take 50 mg by mouth nightly as needed for Itching.     evolocumab (REPATHA PUSHTRONEX) 420 mg/3.5 mL Injt Inject 3.5 mLs (420 mg total) into the skin every 30 days    FLUZONE HIGHDOSE QUAD 22-23  mcg/0.7 mL Syrg     FOLIC ACID/MULTIVITS-MIN (MULTIVITAMIN FOR CHOLESTEROL ORAL) Take 1 tablet by mouth nightly.     furosemide (LASIX) 40 MG tablet TAKE 1 TABLET EVERY DAY    insulin glargine, TOUJEO, (TOUJEO SOLOSTAR U-300 INSULIN) 300 unit/mL (1.5 mL) InPn pen Inject 45 Units into the skin once daily.    insulin needles, disposable, (BD INSULIN PEN NEEDLE UF ORIG) 29 x 1/2 " Ndle USE TWICE DAILY AS DIRECTED    JARDIANCE 25 mg tablet TAKE 1 TABLET EVERY DAY    lancets Misc 1 lancet by Misc.(Non-Drug; Combo Route) route 3 (three) times daily. accu-chek Fastclix. Patient needs an appointment    meclizine (ANTIVERT) 25 mg tablet Take 1 tablet (25 mg total) by mouth 3 (three) times daily as needed for Dizziness or Nausea.    metFORMIN (GLUCOPHAGE) 1000 MG tablet Take 1 tablet (1,000 mg total) by mouth 2 (two) times daily.    mupirocin (BACTROBAN) 2 % ointment AAA qd- bid    nitroGLYCERIN (NITROSTAT) 0.4 MG SL tablet PLACE 1 TABLET (0.4 MG TOTAL) UNDER THE TONGUE EVERY 5 (FIVE) MINUTES AS NEEDED FOR CHEST PAIN AS DIRECTED    NOVOLOG FLEXPEN U-100 INSULIN 100 unit/mL (3 mL) InPn pen Inject 12 Units into the skin 3 (three) times daily with meals.    pen needle, diabetic (BD INSULIN PEN NEEDLE UF MINI) 31 gauge x 3/16" Ndle 1 each by Misc.(Non-Drug; Combo Route) route 4 (four) times daily. Patient needs an appointment    PREVNAR 20, PF, 0.5 mL Syrg injection     semaglutide (OZEMPIC) 0.25 mg or 0.5 mg(2 mg/1.5 mL) pen injector Inject 0.5 mg into the skin every 7 days.    valsartan (DIOVAN) 320 MG tablet TAKE 1 " TABLET ONE TIME DAILY    VASCEPA 1 gram Cap TAKE 2 CAPSULES TWICE DAILY   Last reviewed on 7/26/2023 11:25 AM by Sonny Hernandez III, MD    Review of patient's allergies indicates:   Allergen Reactions    Tramadol Hallucinations    Last reviewed on  7/26/2023 11:25 AM by Sonny Hernandez      Tasks added this encounter   No tasks added.   Tasks due within next 3 months   8/11/2023 - Refill Coordination Outreach (1 time occurrence)     [facility] - Specialty Pharmacy  [address] [place] [id number]  Phone: [phone]  Fax: [phone]

## 2023-08-25 ENCOUNTER — PATIENT MESSAGE (OUTPATIENT)
Dept: CARDIOLOGY | Facility: CLINIC | Age: 68
End: 2023-08-25
Payer: MEDICARE

## 2023-08-25 ENCOUNTER — PATIENT MESSAGE (OUTPATIENT)
Dept: INTERNAL MEDICINE | Facility: CLINIC | Age: 68
End: 2023-08-25
Payer: MEDICARE

## 2023-08-25 ENCOUNTER — PATIENT MESSAGE (OUTPATIENT)
Dept: DERMATOLOGY | Facility: CLINIC | Age: 68
End: 2023-08-25
Payer: MEDICARE

## 2023-08-25 ENCOUNTER — PATIENT MESSAGE (OUTPATIENT)
Dept: HEMATOLOGY/ONCOLOGY | Facility: CLINIC | Age: 68
End: 2023-08-25
Payer: MEDICARE

## 2023-08-25 DIAGNOSIS — C43.59 MALIGNANT MELANOMA OF BACK: Primary | ICD-10-CM

## 2023-08-28 ENCOUNTER — OFFICE VISIT (OUTPATIENT)
Dept: INTERNAL MEDICINE | Facility: CLINIC | Age: 68
End: 2023-08-28
Payer: MEDICARE

## 2023-08-28 VITALS — HEART RATE: 62 BPM | HEIGHT: 68 IN | OXYGEN SATURATION: 99 % | BODY MASS INDEX: 30.58 KG/M2 | WEIGHT: 201.75 LBS

## 2023-08-28 DIAGNOSIS — M47.816 LUMBAR SPONDYLOSIS: ICD-10-CM

## 2023-08-28 DIAGNOSIS — Z00.00 ANNUAL PHYSICAL EXAM: Primary | ICD-10-CM

## 2023-08-28 DIAGNOSIS — Z12.5 ENCOUNTER FOR SCREENING FOR MALIGNANT NEOPLASM OF PROSTATE: ICD-10-CM

## 2023-08-28 DIAGNOSIS — R26.89 BALANCE DISORDER: ICD-10-CM

## 2023-08-28 DIAGNOSIS — Z79.4 TYPE 2 DIABETES MELLITUS WITHOUT COMPLICATION, WITH LONG-TERM CURRENT USE OF INSULIN: ICD-10-CM

## 2023-08-28 DIAGNOSIS — E78.5 HYPERLIPIDEMIA, UNSPECIFIED HYPERLIPIDEMIA TYPE: ICD-10-CM

## 2023-08-28 DIAGNOSIS — N40.1 BENIGN PROSTATIC HYPERPLASIA WITH WEAK URINARY STREAM: ICD-10-CM

## 2023-08-28 DIAGNOSIS — M46.1 SACROILIITIS: ICD-10-CM

## 2023-08-28 DIAGNOSIS — I10 ESSENTIAL HYPERTENSION: ICD-10-CM

## 2023-08-28 DIAGNOSIS — R39.12 BENIGN PROSTATIC HYPERPLASIA WITH WEAK URINARY STREAM: ICD-10-CM

## 2023-08-28 DIAGNOSIS — E11.9 TYPE 2 DIABETES MELLITUS WITHOUT COMPLICATION, WITH LONG-TERM CURRENT USE OF INSULIN: ICD-10-CM

## 2023-08-28 PROCEDURE — 99999 PR PBB SHADOW E&M-EST. PATIENT-LVL IV: ICD-10-PCS | Mod: PBBFAC,,, | Performed by: INTERNAL MEDICINE

## 2023-08-28 PROCEDURE — 99397 PER PM REEVAL EST PAT 65+ YR: CPT | Mod: S$GLB,,, | Performed by: INTERNAL MEDICINE

## 2023-08-28 PROCEDURE — 3044F HG A1C LEVEL LT 7.0%: CPT | Mod: CPTII,S$GLB,, | Performed by: INTERNAL MEDICINE

## 2023-08-28 PROCEDURE — 3288F FALL RISK ASSESSMENT DOCD: CPT | Mod: CPTII,S$GLB,, | Performed by: INTERNAL MEDICINE

## 2023-08-28 PROCEDURE — 1160F RVW MEDS BY RX/DR IN RCRD: CPT | Mod: CPTII,S$GLB,, | Performed by: INTERNAL MEDICINE

## 2023-08-28 PROCEDURE — 3288F PR FALLS RISK ASSESSMENT DOCUMENTED: ICD-10-PCS | Mod: CPTII,S$GLB,, | Performed by: INTERNAL MEDICINE

## 2023-08-28 PROCEDURE — 1101F PT FALLS ASSESS-DOCD LE1/YR: CPT | Mod: CPTII,S$GLB,, | Performed by: INTERNAL MEDICINE

## 2023-08-28 PROCEDURE — 3044F PR MOST RECENT HEMOGLOBIN A1C LEVEL <7.0%: ICD-10-PCS | Mod: CPTII,S$GLB,, | Performed by: INTERNAL MEDICINE

## 2023-08-28 PROCEDURE — 1126F PR PAIN SEVERITY QUANTIFIED, NO PAIN PRESENT: ICD-10-PCS | Mod: CPTII,S$GLB,, | Performed by: INTERNAL MEDICINE

## 2023-08-28 PROCEDURE — 3008F PR BODY MASS INDEX (BMI) DOCUMENTED: ICD-10-PCS | Mod: CPTII,S$GLB,, | Performed by: INTERNAL MEDICINE

## 2023-08-28 PROCEDURE — 4010F PR ACE/ARB THEARPY RXD/TAKEN: ICD-10-PCS | Mod: CPTII,S$GLB,, | Performed by: INTERNAL MEDICINE

## 2023-08-28 PROCEDURE — 1159F MED LIST DOCD IN RCRD: CPT | Mod: CPTII,S$GLB,, | Performed by: INTERNAL MEDICINE

## 2023-08-28 PROCEDURE — 99397 PR PREVENTIVE VISIT,EST,65 & OVER: ICD-10-PCS | Mod: S$GLB,,, | Performed by: INTERNAL MEDICINE

## 2023-08-28 PROCEDURE — 99999 PR PBB SHADOW E&M-EST. PATIENT-LVL IV: CPT | Mod: PBBFAC,,, | Performed by: INTERNAL MEDICINE

## 2023-08-28 PROCEDURE — 3008F BODY MASS INDEX DOCD: CPT | Mod: CPTII,S$GLB,, | Performed by: INTERNAL MEDICINE

## 2023-08-28 PROCEDURE — 4010F ACE/ARB THERAPY RXD/TAKEN: CPT | Mod: CPTII,S$GLB,, | Performed by: INTERNAL MEDICINE

## 2023-08-28 PROCEDURE — 1159F PR MEDICATION LIST DOCUMENTED IN MEDICAL RECORD: ICD-10-PCS | Mod: CPTII,S$GLB,, | Performed by: INTERNAL MEDICINE

## 2023-08-28 PROCEDURE — 1160F PR REVIEW ALL MEDS BY PRESCRIBER/CLIN PHARMACIST DOCUMENTED: ICD-10-PCS | Mod: CPTII,S$GLB,, | Performed by: INTERNAL MEDICINE

## 2023-08-28 PROCEDURE — 1101F PR PT FALLS ASSESS DOC 0-1 FALLS W/OUT INJ PAST YR: ICD-10-PCS | Mod: CPTII,S$GLB,, | Performed by: INTERNAL MEDICINE

## 2023-08-28 PROCEDURE — 1126F AMNT PAIN NOTED NONE PRSNT: CPT | Mod: CPTII,S$GLB,, | Performed by: INTERNAL MEDICINE

## 2023-08-28 RX ORDER — METFORMIN HYDROCHLORIDE 1000 MG/1
TABLET ORAL
Qty: 180 TABLET | Refills: 1 | OUTPATIENT
Start: 2023-08-28

## 2023-08-28 NOTE — PROGRESS NOTES
MEDICAL HISTORY:   Coronary artery disease with coronary bypass surgery in year 2000.  Hypertension.  Hyperlipidemia.  Type 2 diabetes  Osteoarthritis of the hip with left total hip replacement.  Right inguinal hernia repair.  Left foot surgery.  Fistulotomy.  Melanoma, thoracic area, wide local excision  Right and left rotator cuff repair, subacromial decompression, labrum debridement     SOCIAL HISTORY:  Tobacco use, none.  Alcohol use, limited.        MEDICATIONS:  Atorvastatin 80 mg.  Aspirin 81 mg.  Carvedilol 25 mg  tablet twice a day.   Multivitamin.  Lasix 40 mg.  Toujeo 45 milligrams units in the evening.  Humalog 14 units 3 times a day with sliding scale  Jardiance 25 mg  Metformin 1000 mg twice a day.  Vascepa 1 g 2 capsules b.i.d..  Valsartan 320 mg half a tablet  Repatha 420 mg q.month  Ozempic 0.5 mg     5-year-old male  Presents for an annual routine visit     In late June he was in [facility].  He tripped and had a fall on his face and nose.  He was evaluated suspected that he had a nasal fracture.  He followed up with ENT with CT confirm this.  There was discussion about surgery but he had elected not to do so.  Presently has no difficulty with breathing through his nose.    The in the early part of this year he has been having right hip pain the pain is actually in the right lower buttocks region.  He was evaluated by [facility] in [facility].  He underwent a right sacroiliac joint injection which was effective.  At times he still has the same pain but mainly at night but is much better.  When he was evaluated MRI the lumbar spine did show lumbar spinal stenosis and possible impingement S1 right nerve root      He finds that he is unsure himself with walking.  Particularly with his balance he finds that he trips for the lot with walking.  I he he feels that he has problems with use of his left foot where his to toe goes for any trips.      Presently no chest pain palpitations shortness breath  abdominal pain.  Regular bowel  Function.  Urination is urgent at times he has nocturia times 3-4.  No heartburn indigestion      With medications he has not been using the Lasix.      Examination   Weight 201 lb  Pulse 64   Blood pressure 104/62   HEENT exam no abnormal findings   Neck no thyromegaly no masses   Chest clear breath sounds good effort   Heart regular rate rhythm no murmurs  Abdominal exam nontender soft no hepatosplenomegaly abdominal masses  Pulses 2+ carotid pulses no bruits trace dorsalis pedis pulses  Extremities no edema    Neurologic, he is able to dorsiflex and plantar flex both feet at the ankle.  His able to walk on toes and heels.    1+ right knee reflex, 0 right knee reflex, but the 0 right ankle jerk reflex and 1+ left ankle jerk reflex  Negative Romberg testing    Impression   General exam   Hypertension  Hyperlipidemia  Type 2 diabetes  Coronary artery disease   Lumbar spondylosis   Sacroiliitis  Balance disorder  probable BPH with weak urinary stream, nocturia    Plan   Routine labs including B12 folate   MRI the brain  Physical therapy referral  Urology referral

## 2023-08-28 NOTE — TELEPHONE ENCOUNTER
Care Due:                  Date            Visit Type   Department     Provider  --------------------------------------------------------------------------------                                SAME DAY -                              ESTABLISHED   [facility] PRIMARY  Last Visit: 02-      PATIENT      CARE           Dallas Martin  Next Visit: None Scheduled  None         None Found                                                            Last  Test          Frequency    Reason                     Performed    Due Date  --------------------------------------------------------------------------------    Cr..........  12 months..  Contreras BAIG.....  11-   10-    [facility] Embedded Care Due Messages. Reference number: [id number].   8/28/2023 7:20:14 AM CDT

## 2023-08-29 ENCOUNTER — HOSPITAL ENCOUNTER (OUTPATIENT)
Dept: RADIOLOGY | Facility: HOSPITAL | Age: 68
Discharge: HOME | End: 2023-08-29
Attending: INTERNAL MEDICINE
Payer: MEDICARE

## 2023-08-29 DIAGNOSIS — C43.59 MALIGNANT MELANOMA OF BACK: ICD-10-CM

## 2023-08-29 LAB
CREAT SERPL-MCNC: 0.9 MG/DL (ref 0.5–1.4)
SAMPLE: NORMAL

## 2023-08-29 PROCEDURE — 74177 CT ABD & PELVIS W/CONTRAST: CPT | Mod: TC

## 2023-08-29 PROCEDURE — 71260 CT THORAX DX C+: CPT | Mod: 26,,, | Performed by: INTERNAL MEDICINE

## 2023-08-29 PROCEDURE — 74177 CT ABD & PELVIS W/CONTRAST: CPT | Mod: 26,,, | Performed by: INTERNAL MEDICINE

## 2023-08-29 PROCEDURE — 71260 CT THORAX DX C+: CPT | Mod: TC

## 2023-08-29 PROCEDURE — 25500020 PHARM REV CODE 255: Performed by: INTERNAL MEDICINE

## 2023-08-29 PROCEDURE — 74177 CT CHEST ABDOMEN PELVIS WITH CONTRAST (XPD): ICD-10-PCS | Mod: 26,,, | Performed by: INTERNAL MEDICINE

## 2023-08-29 PROCEDURE — 71260 CT CHEST ABDOMEN PELVIS WITH CONTRAST (XPD): ICD-10-PCS | Mod: 26,,, | Performed by: INTERNAL MEDICINE

## 2023-08-29 RX ADMIN — IOHEXOL 100 ML: 350 INJECTION, SOLUTION INTRAVENOUS at 10:08

## 2023-08-31 ENCOUNTER — OFFICE VISIT (OUTPATIENT)
Dept: HEMATOLOGY/ONCOLOGY | Facility: CLINIC | Age: 68
End: 2023-08-31
Payer: MEDICARE

## 2023-08-31 VITALS
RESPIRATION RATE: 18 BRPM | OXYGEN SATURATION: 99 % | HEART RATE: 64 BPM | DIASTOLIC BLOOD PRESSURE: 61 MMHG | TEMPERATURE: 98 F | SYSTOLIC BLOOD PRESSURE: 94 MMHG | WEIGHT: 205.94 LBS | BODY MASS INDEX: 31.21 KG/M2 | HEIGHT: 68 IN

## 2023-08-31 DIAGNOSIS — C43.59 MALIGNANT MELANOMA OF BACK: Primary | ICD-10-CM

## 2023-08-31 DIAGNOSIS — E11.65 TYPE 2 DIABETES MELLITUS WITH HYPERGLYCEMIA, WITH LONG-TERM CURRENT USE OF INSULIN: ICD-10-CM

## 2023-08-31 DIAGNOSIS — Z79.4 TYPE 2 DIABETES MELLITUS WITH HYPERGLYCEMIA, WITH LONG-TERM CURRENT USE OF INSULIN: ICD-10-CM

## 2023-08-31 DIAGNOSIS — R26.89 IMBALANCE: ICD-10-CM

## 2023-08-31 PROCEDURE — 3044F PR MOST RECENT HEMOGLOBIN A1C LEVEL <7.0%: ICD-10-PCS | Mod: CPTII,S$GLB,, | Performed by: INTERNAL MEDICINE

## 2023-08-31 PROCEDURE — 1100F PR PT FALLS ASSESS DOC 2+ FALLS/FALL W/INJURY/YR: ICD-10-PCS | Mod: CPTII,S$GLB,, | Performed by: INTERNAL MEDICINE

## 2023-08-31 PROCEDURE — 99999 PR PBB SHADOW E&M-EST. PATIENT-LVL V: CPT | Mod: PBBFAC,,, | Performed by: INTERNAL MEDICINE

## 2023-08-31 PROCEDURE — 3078F DIAST BP <80 MM HG: CPT | Mod: CPTII,S$GLB,, | Performed by: INTERNAL MEDICINE

## 2023-08-31 PROCEDURE — 3008F BODY MASS INDEX DOCD: CPT | Mod: CPTII,S$GLB,, | Performed by: INTERNAL MEDICINE

## 2023-08-31 PROCEDURE — 1126F AMNT PAIN NOTED NONE PRSNT: CPT | Mod: CPTII,S$GLB,, | Performed by: INTERNAL MEDICINE

## 2023-08-31 PROCEDURE — 1160F RVW MEDS BY RX/DR IN RCRD: CPT | Mod: CPTII,S$GLB,, | Performed by: INTERNAL MEDICINE

## 2023-08-31 PROCEDURE — 1159F PR MEDICATION LIST DOCUMENTED IN MEDICAL RECORD: ICD-10-PCS | Mod: CPTII,S$GLB,, | Performed by: INTERNAL MEDICINE

## 2023-08-31 PROCEDURE — 99999 PR PBB SHADOW E&M-EST. PATIENT-LVL V: ICD-10-PCS | Mod: PBBFAC,,, | Performed by: INTERNAL MEDICINE

## 2023-08-31 PROCEDURE — 99214 PR OFFICE/OUTPT VISIT, EST, LEVL IV, 30-39 MIN: ICD-10-PCS | Mod: S$GLB,,, | Performed by: INTERNAL MEDICINE

## 2023-08-31 PROCEDURE — 3044F HG A1C LEVEL LT 7.0%: CPT | Mod: CPTII,S$GLB,, | Performed by: INTERNAL MEDICINE

## 2023-08-31 PROCEDURE — 4010F PR ACE/ARB THEARPY RXD/TAKEN: ICD-10-PCS | Mod: CPTII,S$GLB,, | Performed by: INTERNAL MEDICINE

## 2023-08-31 PROCEDURE — 3074F SYST BP LT 130 MM HG: CPT | Mod: CPTII,S$GLB,, | Performed by: INTERNAL MEDICINE

## 2023-08-31 PROCEDURE — 1100F PTFALLS ASSESS-DOCD GE2>/YR: CPT | Mod: CPTII,S$GLB,, | Performed by: INTERNAL MEDICINE

## 2023-08-31 PROCEDURE — 3078F PR MOST RECENT DIASTOLIC BLOOD PRESSURE < 80 MM HG: ICD-10-PCS | Mod: CPTII,S$GLB,, | Performed by: INTERNAL MEDICINE

## 2023-08-31 PROCEDURE — 1126F PR PAIN SEVERITY QUANTIFIED, NO PAIN PRESENT: ICD-10-PCS | Mod: CPTII,S$GLB,, | Performed by: INTERNAL MEDICINE

## 2023-08-31 PROCEDURE — 99214 OFFICE O/P EST MOD 30 MIN: CPT | Mod: S$GLB,,, | Performed by: INTERNAL MEDICINE

## 2023-08-31 PROCEDURE — 1160F PR REVIEW ALL MEDS BY PRESCRIBER/CLIN PHARMACIST DOCUMENTED: ICD-10-PCS | Mod: CPTII,S$GLB,, | Performed by: INTERNAL MEDICINE

## 2023-08-31 PROCEDURE — 3288F FALL RISK ASSESSMENT DOCD: CPT | Mod: CPTII,S$GLB,, | Performed by: INTERNAL MEDICINE

## 2023-08-31 PROCEDURE — 4010F ACE/ARB THERAPY RXD/TAKEN: CPT | Mod: CPTII,S$GLB,, | Performed by: INTERNAL MEDICINE

## 2023-08-31 PROCEDURE — 3074F PR MOST RECENT SYSTOLIC BLOOD PRESSURE < 130 MM HG: ICD-10-PCS | Mod: CPTII,S$GLB,, | Performed by: INTERNAL MEDICINE

## 2023-08-31 PROCEDURE — 1159F MED LIST DOCD IN RCRD: CPT | Mod: CPTII,S$GLB,, | Performed by: INTERNAL MEDICINE

## 2023-08-31 PROCEDURE — 3008F PR BODY MASS INDEX (BMI) DOCUMENTED: ICD-10-PCS | Mod: CPTII,S$GLB,, | Performed by: INTERNAL MEDICINE

## 2023-08-31 PROCEDURE — 3288F PR FALLS RISK ASSESSMENT DOCUMENTED: ICD-10-PCS | Mod: CPTII,S$GLB,, | Performed by: INTERNAL MEDICINE

## 2023-08-31 NOTE — PROGRESS NOTES
ONCOLOGY FOLLOW UP VISIT.     Reason for visit: surveillance for stage IIA melanoma    Cancer/Stage/TNM:   Cancer Staging  Malignant melanoma of back  Staging form: Melanoma of the Skin, AJCC 8th Edition  - Pathologic: Stage IIA (pT3a, pN0, cM0) - Unsigned     Interval History:   Patient returns to clinic to review imaging results.  Has not been seen in 2 years. Reports he has been traveling in [place]. Reports falling in June and suffered facial injuries. Going to PT to work on imbalance.     Review of Systems   Constitutional:  Negative for chills, fever and weight loss.   HENT:  Negative for hearing loss, nosebleeds and sore throat.    Eyes:  Negative for blurred vision and double vision.   Respiratory:  Negative for cough, hemoptysis and shortness of breath.    Cardiovascular:  Negative for chest pain and leg swelling.   Gastrointestinal:  Negative for abdominal pain, blood in stool, diarrhea, nausea and vomiting.   Genitourinary:  Negative for dysuria, frequency and hematuria.   Musculoskeletal:  Negative for joint pain and myalgias.        Shoulder pain   Skin:  Negative for itching and rash.   Neurological:  Negative for dizziness, tingling, sensory change, speech change and headaches.   Endo/Heme/Allergies:  Does not bruise/bleed easily.         A complete 12-point review of systems was reviewed and is negative except as mentioned above.     Past Medical History:   Past Medical History:   Diagnosis Date    Allergy     seasonal    Arthritis     Coronary artery disease     Diabetes mellitus     Diabetes mellitus, type 2     Dysplastic nevus of trunk 03/2022    moderately atypical     Hyperlipidemia     Hypertension     Joint pain     Melanoma 10/2019    MM left back 2.7mm; neg LNs    Open angle with borderline findings and high glaucoma risk in both eyes 2018    Squamous cell carcinoma 2017    scalp - SSW        Allergies:   Review of patient's allergies indicates:   Allergen Reactions    Tramadol  "Hallucinations        Medications:   Current Outpatient Medications   Medication Sig Dispense Refill    ACCU-CHEK SMARTVIEW TEST STRIP Strp 1 strip by Misc.(Non-Drug; Combo Route) route 3 (three) times daily. Patient needs an appointment 300 strip 0    ammonium lactate 12 % Crea Apply small amount to feet except for in between toes twice a day 1 Tube 3    aspirin (ECOTRIN) 81 MG EC tablet Take 81 mg by mouth once daily.      atorvastatin (LIPITOR) 80 MG tablet TAKE 1 TABLET ONE TIME DAILY 90 tablet 3    blood-glucose meter Misc Use to check sugar 3 times daily. Accu-chek Emily. Patient needs an appointment 1 each 0    carvediloL (COREG) 25 MG tablet Take 1 tablet (25 mg total) by mouth 2 (two) times daily. 180 tablet 3    celecoxib (CELEBREX) 200 MG capsule Take 1 capsule (200 mg total) by mouth 2 (two) times daily. (Patient not taking: Reported on 8/28/2023) 180 capsule 0    diazePAM (VALIUM) 5 MG tablet Take 1 tablet (5 mg total) by mouth On call Procedure for Anxiety. 2 tablet 0    diclofenac sodium (VOLTAREN) 1 % Gel Apply 2 g topically 4 (four) times daily as needed (neck muscle spasm). 100 g 3    diphenhydrAMINE (BENADRYL) 25 mg capsule Take 50 mg by mouth nightly as needed for Itching.       evolocumab (REPATHA PUSHTRONEX) 420 mg/3.5 mL Injt Inject 3.5 mLs (420 mg total) into the skin every 30 days 3 each 4    FLUZONE HIGHDOSE QUAD 22-23  mcg/0.7 mL Syrg       FOLIC ACID/MULTIVITS-MIN (MULTIVITAMIN FOR CHOLESTEROL ORAL) Take 1 tablet by mouth nightly.       furosemide (LASIX) 40 MG tablet TAKE 1 TABLET EVERY DAY (Patient not taking: Reported on 8/28/2023) 90 tablet 3    insulin glargine, TOUJEO, (TOUJEO SOLOSTAR U-300 INSULIN) 300 unit/mL (1.5 mL) InPn pen Inject 45 Units into the skin once daily. 13.5 mL 3    insulin needles, disposable, (BD INSULIN PEN NEEDLE UF ORIG) 29 x 1/2 " Ndle USE TWICE DAILY AS DIRECTED 100 each 2    JARDIANCE 25 mg tablet TAKE 1 TABLET EVERY DAY 90 tablet 1    lancets Misc 1 " "lancet by Misc.(Non-Drug; Combo Route) route 3 (three) times daily. accu-chek Fastclix. Patient needs an appointment 300 each 0    meclizine (ANTIVERT) 25 mg tablet Take 1 tablet (25 mg total) by mouth 3 (three) times daily as needed for Dizziness or Nausea. 21 tablet 0    metFORMIN (GLUCOPHAGE) 1000 MG tablet Take 1 tablet (1,000 mg total) by mouth 2 (two) times daily. 180 tablet 1    mupirocin (BACTROBAN) 2 % ointment AAA qd- bid 30 g 3    nitroGLYCERIN (NITROSTAT) 0.4 MG SL tablet PLACE 1 TABLET (0.4 MG TOTAL) UNDER THE TONGUE EVERY 5 (FIVE) MINUTES AS NEEDED FOR CHEST PAIN AS DIRECTED 25 tablet 4    NOVOLOG FLEXPEN U-100 INSULIN 100 unit/mL (3 mL) InPn pen Inject 12 Units into the skin 3 (three) times daily with meals. 32.4 mL 3    pen needle, diabetic (BD INSULIN PEN NEEDLE UF MINI) 31 gauge x 3/16" Ndle 1 each by Misc.(Non-Drug; Combo Route) route 4 (four) times daily. Patient needs an appointment 400 each 0    PREVNAR 20, PF, 0.5 mL Syrg injection       semaglutide (OZEMPIC) 0.25 mg or 0.5 mg(2 mg/1.5 mL) pen injector Inject 0.5 mg into the skin every 7 days. 4.5 mL 3    valsartan (DIOVAN) 320 MG tablet TAKE 1 TABLET ONE TIME DAILY 90 tablet 3    VASCEPA 1 gram Cap TAKE 2 CAPSULES TWICE DAILY 360 capsule 3     No current facility-administered medications for this visit.     Facility-Administered Medications Ordered in Other Visits   Medication Dose Route Frequency Provider Last Rate Last Admin    fentaNYL 50 mcg/mL injection 25 mcg  25 mcg Intravenous Q5 Min PRN Travis Corbin MD   50 mcg at 05/12/21 1242    fentaNYL 50 mcg/mL injection  mcg   mcg Intravenous PRN Markus Esparza MD   100 mcg at 11/24/21 1151    lidocaine (PF) 10 mg/ml (1%) injection 10 mg  1 mL Intradermal Once Deanne Mosqueda MD        LIDOcaine (PF) 10 mg/ml (1%) injection 10 mg  1 mL Intradermal Once Markus Esparza MD        midazolam (VERSED) 1 mg/mL injection 0.5 mg  0.5 mg Intravenous PRN " "Travis Corbin MD   2 mg at 05/12/21 1242    midazolam (VERSED) 1 mg/mL injection 0.5-4 mg  0.5-4 mg Intravenous PRN Markus Esparza MD   2 mg at 11/24/21 1151    ropivacaine 0.2% Nimbus PainPRO Pump infusion 500 ML   Perineural Continuous Travis Corbin MD   New Bag at 05/12/21 1629    ropivacaine 0.2% Nimbus PainPRO Pump infusion 500 ML   Perineural Continuous Markus Esparza MD   New Bag at 11/24/21 1450    triamcinolone acetonide injection 40 mg  40 mg Intra-articular  Patricio Multani MD   40 mg at 10/26/16 0841        Physical Exam:   BP 94/61   Pulse 64   Temp 98 °F (36.7 °C) (Oral)   Resp 18   Ht 5' 8" (1.727 m)   Wt 93.4 kg (205 lb 14.6 oz)   SpO2 99%   BMI 31.31 kg/m²      ECOG Performance Status: (foot note - ECOG PS provided by Eastern Cooperative Oncology Group) 0 - Asymptomatic    Physical Exam  Constitutional:       General: He is not in acute distress.  HENT:      Head: Normocephalic and atraumatic.   Eyes:      Conjunctiva/sclera: Conjunctivae normal.   Pulmonary:      Effort: Pulmonary effort is normal. No respiratory distress.   Abdominal:      General: There is no distension.      Palpations: Abdomen is soft.      Tenderness: There is no abdominal tenderness.   Musculoskeletal:         General: Normal range of motion.      Cervical back: Normal range of motion and neck supple.      Comments: Patient in left sling.   Skin:     General: Skin is warm and dry.      Findings: No rash.   Neurological:      Mental Status: He is alert and oriented to person, place, and time.   Psychiatric:         Mood and Affect: Affect normal.         Judgment: Judgment normal.                 Labs:   No results found for this or any previous visit (from the past 48 hour(s)).         Imaging: I have personally reviewed patient's CT scnas imaging showing no evidence of recurrence.  CT Chest Abdomen Pelvis With Contrast (xpd)  Narrative: EXAMINATION:  CT CHEST ABDOMEN " PELVIS WITH CONTRAST (XPD)    CLINICAL HISTORY:  malignannt melanoma; Malignant melanoma of other part of trunk    TECHNIQUE:  Low dose axial images, sagittal and coronal reformations were obtained from the thoracic inlet to the pubic symphysis following the IV administration of 100 mL of Omnipaque 350 and no oral contrast    COMPARISON:  July 2021    FINDINGS:  The lungs demonstrate a few small stable nodules with no worrisome detrimental change.  There are stable appearing surgical changes along the back.  No significant mediastinal or hilar lymphadenopathy is present.  The aorta contains calcification along its wall without aneurysm.  Coronary artery calcifications are present.  Surgical changes of sternotomy.  No significant pericardial effusion or pleural effusion.    There are bilateral small fat containing inguinal hernias.  The liver demonstrates no focal abnormality.  The density of the liver is suggestive of steatosis.  The spleen is not enlarged.    The stomach, pancreas, gallbladder and biliary system are within normal limits.    There is no adrenal mass.  The kidneys concentrate contrast satisfactorily.  There is no hydronephrosis.  Small hypodensities in the kidneys are too small to characterize but probable cyst.    The aorta tapers normally.  There is calcification along the wall of the aorta and branches.  No aneurysm or periaortic lymphadenopathy.    Bladder is unremarkable.    The bowel demonstrates no significant abnormality.    The osseous structures show degenerative change.  Impression: In this patient with a history of melanoma there is no finding to indicate metastatic disease.    Additional incidental findings as above    Electronically signed by: Dana Summers MD  Date:    08/29/2023  Time:    11:14            Diagnoses:       1. Malignant melanoma of back    2. Type 2 diabetes mellitus with hyperglycemia, with long-term current use of insulin    3. Imbalance            Assessment and  Plan:         1. Stage IIA Cutaneous Melanoma:   -s/p WLE and SNLBx 10/18/2019 with 0/5 negative lymph nodes.  No evidence for adjuvant immunotherapy or targeted for stage IIA melanoma.  Will initiate active surveillance with CT scans every 3 months.  Will broaden to every 6 months after 2 years.  - needs to continue skin exams every 3-6 months with dermatology, stressed importance.  Patient will schedule an appointment with Dana Cardoza next available  - CT CAP every 6-12 months thereafter until 5 years. Will broaden to every 9 months.  - CT CAP (7/16/21) shows no evidence of metastatic disease in the chest, abdomen, or pelvis in this patient with history of back melanoma. There are no measurable lesions per RECIST criteria.  - Lost to f/u for 2 years, reviewed imaging today and shows no evidence of recurrence. Will get imaging again in 1 year and will complete imaging surveillance at that time. Continue to f/w Dermatology closely.      2. DMII  Follows with PCP    3. Following with PT. Brain MRI from a year ago showing chronic ischemic changes. Continue to f/u with PCP.            I have provided the patient with an opportunity to ask questions and have all questions answered to his satisfaction.     he will return to clinic in 12 months, but knows to call in the interim if symptoms change or should a problem arise.    Patient was also seen and examined by Dr. Colon. Patient is in agreement with the proposed treatment plan. All questions were answered to the patient's satisfaction. Pt knows to call clinic if anything is needed before the next clinic visit.    Sherry Sanchez, MSN, APRN, FNP-C  Hematology and Medical Oncology  Nurse Practitioner to Dr. Casimiro Colon  Nurse Practitioner, [facility]    I have reviewed the notes, assessments, and/or procedures performed by Sherry Sanchez APRN, as above.  I have personally interviewed and examined the patient at the beside, and rounded with  Sherry. I concur with her assessment and plan and the documentation of Jm Deleon.    MDM includes:    - Acute or chronic illness or injury that poses a threat to life or bodily function  - Independent review and explanation of 2 results from unique tests  - Discussion of management and ordering 2 unique tests  - Extensive discussion of treatment and management      Casimiro Colon M.D.  Hematology/Oncology Attending  Director [facility] Program  [facility]          Route Chart for Scheduling    Med Onc Chart Routing      Follow up with physician 1 year. review imaging   Follow up with DESTINEY    Infusion scheduling note    Injection scheduling note    Labs CBC, CMP and LDH   Scheduling:  Preferred lab:  Lab interval:     Imaging CT chest abdomen pelvis   in 1 year   Pharmacy appointment    Other referrals

## 2023-09-12 RX ORDER — EMPAGLIFLOZIN 25 MG/1
TABLET, FILM COATED ORAL
Qty: 90 TABLET | Refills: 1 | Status: SHIPPED | OUTPATIENT
Start: 2023-09-12

## 2023-09-12 NOTE — TELEPHONE ENCOUNTER
No care due was identified.  [facility] Embedded Care Due Messages. Reference number: [id number].   9/12/2023 11:55:33 AM CDT

## 2023-09-12 NOTE — TELEPHONE ENCOUNTER
Refill Routing Note   Medication(s) are not appropriate for processing by [facility] for the following reason(s):      Drug-disease interaction    [facility] action(s):  Defer Care Due:  None identified     Medication Therapy Plan: JARDIANCE and Dehydration    Pharmacist review requested: Yes     Appointments  past 12m or future 3m with PCP    Date Provider   Last Visit   8/28/2023 Dallas Martin MD   Next Visit   Visit date not found Dallas Martin MD   ED visits in past 90 days: 0        Note composed:5:40 PM 09/12/2023

## 2023-09-12 NOTE — TELEPHONE ENCOUNTER
Refill Decision Note   Jm Deleon  is requesting a refill authorization.  Brief Assessment and Rationale for Refill:  Approve     Medication Therapy Plan:         Pharmacist review requested: Yes   Extended chart review required: Yes   Comments:     No Care Gaps recommended.     Note composed:6:32 PM 09/12/2023

## 2023-09-13 ENCOUNTER — PATIENT MESSAGE (OUTPATIENT)
Dept: INTERNAL MEDICINE | Facility: CLINIC | Age: 68
End: 2023-09-13
Payer: MEDICARE

## 2023-09-13 RX ORDER — METHOCARBAMOL 500 MG/1
500 TABLET, FILM COATED ORAL 3 TIMES DAILY
Qty: 30 TABLET | Refills: 0 | Status: SHIPPED | OUTPATIENT
Start: 2023-09-13 | End: 2023-09-23

## 2023-09-15 ENCOUNTER — OFFICE VISIT (OUTPATIENT)
Dept: UROLOGY | Facility: CLINIC | Age: 68
End: 2023-09-15
Payer: MEDICARE

## 2023-09-15 VITALS
WEIGHT: 203.06 LBS | SYSTOLIC BLOOD PRESSURE: 105 MMHG | BODY MASS INDEX: 30.87 KG/M2 | DIASTOLIC BLOOD PRESSURE: 67 MMHG | HEART RATE: 63 BPM

## 2023-09-15 DIAGNOSIS — R35.81 NOCTURNAL POLYURIA: ICD-10-CM

## 2023-09-15 DIAGNOSIS — N52.9 ED (ERECTILE DYSFUNCTION) OF ORGANIC ORIGIN: ICD-10-CM

## 2023-09-15 DIAGNOSIS — R35.1 NOCTURIA: Primary | ICD-10-CM

## 2023-09-15 DIAGNOSIS — R39.12 BENIGN PROSTATIC HYPERPLASIA WITH WEAK URINARY STREAM: ICD-10-CM

## 2023-09-15 DIAGNOSIS — N40.1 BENIGN PROSTATIC HYPERPLASIA WITH WEAK URINARY STREAM: ICD-10-CM

## 2023-09-15 PROCEDURE — 99999 PR PBB SHADOW E&M-EST. PATIENT-LVL III: ICD-10-PCS | Mod: PBBFAC,,, | Performed by: UROLOGY

## 2023-09-15 PROCEDURE — 3288F FALL RISK ASSESSMENT DOCD: CPT | Mod: CPTII,S$GLB,, | Performed by: UROLOGY

## 2023-09-15 PROCEDURE — 3288F PR FALLS RISK ASSESSMENT DOCUMENTED: ICD-10-PCS | Mod: CPTII,S$GLB,, | Performed by: UROLOGY

## 2023-09-15 PROCEDURE — 3008F BODY MASS INDEX DOCD: CPT | Mod: CPTII,S$GLB,, | Performed by: UROLOGY

## 2023-09-15 PROCEDURE — 3044F HG A1C LEVEL LT 7.0%: CPT | Mod: CPTII,S$GLB,, | Performed by: UROLOGY

## 2023-09-15 PROCEDURE — 3074F PR MOST RECENT SYSTOLIC BLOOD PRESSURE < 130 MM HG: ICD-10-PCS | Mod: CPTII,S$GLB,, | Performed by: UROLOGY

## 2023-09-15 PROCEDURE — 1125F AMNT PAIN NOTED PAIN PRSNT: CPT | Mod: CPTII,S$GLB,, | Performed by: UROLOGY

## 2023-09-15 PROCEDURE — 1101F PT FALLS ASSESS-DOCD LE1/YR: CPT | Mod: CPTII,S$GLB,, | Performed by: UROLOGY

## 2023-09-15 PROCEDURE — 51798 US URINE CAPACITY MEASURE: CPT | Mod: S$GLB,,, | Performed by: UROLOGY

## 2023-09-15 PROCEDURE — 1101F PR PT FALLS ASSESS DOC 0-1 FALLS W/OUT INJ PAST YR: ICD-10-PCS | Mod: CPTII,S$GLB,, | Performed by: UROLOGY

## 2023-09-15 PROCEDURE — 3078F PR MOST RECENT DIASTOLIC BLOOD PRESSURE < 80 MM HG: ICD-10-PCS | Mod: CPTII,S$GLB,, | Performed by: UROLOGY

## 2023-09-15 PROCEDURE — 3044F PR MOST RECENT HEMOGLOBIN A1C LEVEL <7.0%: ICD-10-PCS | Mod: CPTII,S$GLB,, | Performed by: UROLOGY

## 2023-09-15 PROCEDURE — 4010F ACE/ARB THERAPY RXD/TAKEN: CPT | Mod: CPTII,S$GLB,, | Performed by: UROLOGY

## 2023-09-15 PROCEDURE — 1159F MED LIST DOCD IN RCRD: CPT | Mod: CPTII,S$GLB,, | Performed by: UROLOGY

## 2023-09-15 PROCEDURE — 99999 PR PBB SHADOW E&M-EST. PATIENT-LVL III: CPT | Mod: PBBFAC,,, | Performed by: UROLOGY

## 2023-09-15 PROCEDURE — 3078F DIAST BP <80 MM HG: CPT | Mod: CPTII,S$GLB,, | Performed by: UROLOGY

## 2023-09-15 PROCEDURE — 4010F PR ACE/ARB THEARPY RXD/TAKEN: ICD-10-PCS | Mod: CPTII,S$GLB,, | Performed by: UROLOGY

## 2023-09-15 PROCEDURE — 51798 PR MEAS,POST-VOID RES,US,NON-IMAGING: ICD-10-PCS | Mod: S$GLB,,, | Performed by: UROLOGY

## 2023-09-15 PROCEDURE — 1125F PR PAIN SEVERITY QUANTIFIED, PAIN PRESENT: ICD-10-PCS | Mod: CPTII,S$GLB,, | Performed by: UROLOGY

## 2023-09-15 PROCEDURE — 1159F PR MEDICATION LIST DOCUMENTED IN MEDICAL RECORD: ICD-10-PCS | Mod: CPTII,S$GLB,, | Performed by: UROLOGY

## 2023-09-15 PROCEDURE — 99204 OFFICE O/P NEW MOD 45 MIN: CPT | Mod: 25,S$GLB,, | Performed by: UROLOGY

## 2023-09-15 PROCEDURE — 3008F PR BODY MASS INDEX (BMI) DOCUMENTED: ICD-10-PCS | Mod: CPTII,S$GLB,, | Performed by: UROLOGY

## 2023-09-15 PROCEDURE — 3074F SYST BP LT 130 MM HG: CPT | Mod: CPTII,S$GLB,, | Performed by: UROLOGY

## 2023-09-15 PROCEDURE — 99204 PR OFFICE/OUTPT VISIT, NEW, LEVL IV, 45-59 MIN: ICD-10-PCS | Mod: 25,S$GLB,, | Performed by: UROLOGY

## 2023-09-15 RX ORDER — TAMSULOSIN HYDROCHLORIDE 0.4 MG/1
0.4 CAPSULE ORAL NIGHTLY
Qty: 30 CAPSULE | Refills: 11 | Status: SHIPPED | OUTPATIENT
Start: 2023-09-15 | End: 2024-01-11 | Stop reason: SDUPTHER

## 2023-09-15 NOTE — PROGRESS NOTES
CC: nocturia 4 to 5 x, urinary incontinence, ED    Jm Deleon is a 68 y.o. man who is here for the evaluation of Benign Prostatic Hypertrophy (Per patient he is having nocturia and leaking.)    A new pt referred by his PCP, Dallas Martin MD   C/o increased nocturia 4 to 5 x,  no urine leakage but drips noted after finishing urination.  Thus he uses a one pad a day.  C/o weak urine flow  C/o ED.  Tried viagra and cialis but did not respond well at all.  Denies flank pain, dysuria, hematuria.      S/p CABG at age 45 and has been on Lasix.   Because of bothersome frequent urination , he stopped taking lasix.    His son graduated from [employer] and is stationed at [employer] now.  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  Speaks Swedish fluently.    Past Medical History:   Diagnosis Date    Allergy     seasonal    Arthritis     Coronary artery disease     Diabetes mellitus     Diabetes mellitus, type 2     Dysplastic nevus of trunk 03/2022    moderately atypical     Hyperlipidemia     Hypertension     Joint pain     Melanoma 10/2019    MM left back 2.7mm; neg LNs    Open angle with borderline findings and high glaucoma risk in both eyes 2018    Squamous cell carcinoma 2017    scalp - SSW     Past Surgical History:   Procedure Laterality Date    ARTHROSCOPIC REPAIR OF ROTATOR CUFF OF SHOULDER Left 5/12/2021    Procedure: REPAIR, ROTATOR CUFF, ARTHROSCOPIC;  Surgeon: Johnny Ventura MD;  Location: [facility];  Service: Orthopedics;  Laterality: Left;  Labral Debridement    ARTHROSCOPY OF SHOULDER WITH DECOMPRESSION OF SUBACROMIAL SPACE  5/12/2021    Procedure: ARTHROSCOPY, SHOULDER, WITH SUBACROMIAL SPACE DECOMPRESSION;  Surgeon: Johnny Ventura MD;  Location: [facility] OR;  Service: Orthopedics;;    BELPHAROPTOSIS REPAIR  10 years ago    both eyes    CARDIAC SURGERY      3 vessel bypass    COLONOSCOPY N/A 10/4/2019    Procedure: COLONOSCOPY;  Surgeon: Isaiah Booker MD;  Location: [facility] (4TH FLR);   Service: Endoscopy;  Laterality: N/A;  Cardilogy Clearance received rom Dr. QUEENIE Salomon, see telephone encounter 8/26/19-BB    CORONARY ARTERY BYPASS GRAFT  x4 age 45 2000    DISTAL CLAVICLE EXCISION Left 5/12/2021    Procedure: EXCISION, CLAVICLE, DISTAL;  Surgeon: Johnny Ventura MD;  Location: [facility] OR;  Service: Orthopedics;  Laterality: Left;    FIXATION OF TENDON Left 5/12/2021    Procedure: FIXATION, TENDON;  Surgeon: Johnny Ventura MD;  Location: [facility] OR;  Service: Orthopedics;  Laterality: Left;    HARDWARE REMOVAL Left 11/24/2021    Procedure: REMOVAL, HARDWARE;  Surgeon: Johnny Ventura MD;  Location: [facility] OR;  Service: Orthopedics;  Laterality: Left;    HERNIA REPAIR      HIP SURGERY  9-24-14    left YANICK    INJECTION, SACROILIAC JOINT Right 6/6/2023    Procedure: INJECTION,SACROILIAC JOINT RIGHT;  Surgeon: Rosana Lopez MD;  Location: [facility];  Service: Pain Management;  Laterality: Right;    JOINT REPLACEMENT      left hip    MANIPULATION WITH ANESTHESIA Left 11/24/2021    Procedure: MANIPULATION, WITH ANESTHESIA;  Surgeon: Johnny Ventura MD;  Location: [facility] OR;  Service: Orthopedics;  Laterality: Left;    SENTINEL LYMPH NODE BIOPSY Left 10/18/2019    Procedure: BIOPSY, LYMPH NODE, SENTINEL;  Surgeon: Fabián Villeda MD;  Location: [address];  Service: General;  Laterality: Left;    SHOULDER ARTHROSCOPY      SHOULDER ARTHROSCOPY Left 11/24/2021    Procedure: ARTHROSCOPY, SHOULDER;  Surgeon: Johnny Ventura MD;  Location: [facility];  Service: Orthopedics;  Laterality: Left;  regional w/catheter (interscalene)    SHOULDER SURGERY      TRANSFORAMINAL EPIDURAL INJECTION OF STEROID Left 8/7/2020    Procedure: LUMBAR TRANSFORAMINAL LEFT L3/4 AND L5/S1 DIRECT REFERRAL;  Surgeon: Ronny Rangel MD;  Location: [facility];  Service: Pain Management;  Laterality: Left;  NEEDS CONSENT, DIABETIC     Social History     Tobacco Use    Smoking status: Never    Smokeless  tobacco: Never   Substance Use Topics    Alcohol use: Yes     Alcohol/week: 3.0 standard drinks of alcohol     Types: 3 Cans of beer per week     Comment: on weekend    Drug use: Never     Family History   Problem Relation Age of Onset    Coronary artery disease Mother     Cancer Father         lung cancer, smoker    Hypertension Brother     Cancer Brother         colon    Amblyopia Neg Hx     Blindness Neg Hx     Cataracts Neg Hx     Glaucoma Neg Hx     Macular degeneration Neg Hx     Retinal detachment Neg Hx     Strabismus Neg Hx     Melanoma Neg Hx      Allergy:  Review of patient's allergies indicates:   Allergen Reactions    Tramadol Hallucinations     Outpatient Encounter Medications as of 9/15/2023   Medication Sig Dispense Refill    ACCU-CHEK SMARTVIEW TEST STRIP Strp 1 strip by Misc.(Non-Drug; Combo Route) route 3 (three) times daily. Patient needs an appointment 300 strip 0    ammonium lactate 12 % Crea Apply small amount to feet except for in between toes twice a day 1 Tube 3    aspirin (ECOTRIN) 81 MG EC tablet Take 81 mg by mouth once daily.      atorvastatin (LIPITOR) 80 MG tablet TAKE 1 TABLET ONE TIME DAILY 90 tablet 3    blood-glucose meter Misc Use to check sugar 3 times daily. Accu-chek Emily. Patient needs an appointment 1 each 0    carvediloL (COREG) 25 MG tablet Take 1 tablet (25 mg total) by mouth 2 (two) times daily. 180 tablet 3    celecoxib (CELEBREX) 200 MG capsule Take 1 capsule (200 mg total) by mouth 2 (two) times daily. 180 capsule 0    diclofenac sodium (VOLTAREN) 1 % Gel Apply 2 g topically 4 (four) times daily as needed (neck muscle spasm). 100 g 3    diphenhydrAMINE (BENADRYL) 25 mg capsule Take 50 mg by mouth nightly as needed for Itching.       evolocumab (REPATHA PUSHTRONEX) 420 mg/3.5 mL Injt Inject 3.5 mLs (420 mg total) into the skin every 30 days 3 each 4    FLUZONE HIGHDOSE QUAD 22-23  mcg/0.7 mL Syrg       FOLIC ACID/MULTIVITS-MIN (MULTIVITAMIN FOR CHOLESTEROL ORAL)  "Take 1 tablet by mouth nightly.       furosemide (LASIX) 40 MG tablet TAKE 1 TABLET EVERY DAY 90 tablet 3    insulin glargine, TOUJEO, (TOUJEO SOLOSTAR U-300 INSULIN) 300 unit/mL (1.5 mL) InPn pen Inject 45 Units into the skin once daily. 13.5 mL 3    insulin needles, disposable, (BD INSULIN PEN NEEDLE UF ORIG) 29 x 1/2 " Ndle USE TWICE DAILY AS DIRECTED 100 each 2    JARDIANCE 25 mg tablet TAKE 1 TABLET EVERY DAY 90 tablet 1    lancets Misc 1 lancet by Misc.(Non-Drug; Combo Route) route 3 (three) times daily. accu-chek Fastclix. Patient needs an appointment 300 each 0    metFORMIN (GLUCOPHAGE) 1000 MG tablet Take 1 tablet (1,000 mg total) by mouth 2 (two) times daily. 180 tablet 1    methocarbamoL (ROBAXIN) 500 MG Tab Take 1 tablet (500 mg total) by mouth 3 (three) times daily. for 10 days 30 tablet 0    mupirocin (BACTROBAN) 2 % ointment AAA qd- bid 30 g 3    nitroGLYCERIN (NITROSTAT) 0.4 MG SL tablet PLACE 1 TABLET (0.4 MG TOTAL) UNDER THE TONGUE EVERY 5 (FIVE) MINUTES AS NEEDED FOR CHEST PAIN AS DIRECTED 25 tablet 4    pen needle, diabetic (BD INSULIN PEN NEEDLE UF MINI) 31 gauge x 3/16" Ndle 1 each by Misc.(Non-Drug; Combo Route) route 4 (four) times daily. Patient needs an appointment 400 each 0    PREVNAR 20, PF, 0.5 mL Syrg injection       semaglutide (OZEMPIC) 0.25 mg or 0.5 mg(2 mg/1.5 mL) pen injector Inject 0.5 mg into the skin every 7 days. 4.5 mL 3    valsartan (DIOVAN) 320 MG tablet TAKE 1 TABLET ONE TIME DAILY 90 tablet 3    VASCEPA 1 gram Cap TAKE 2 CAPSULES TWICE DAILY 360 capsule 3    diazePAM (VALIUM) 5 MG tablet Take 1 tablet (5 mg total) by mouth On call Procedure for Anxiety. 2 tablet 0    meclizine (ANTIVERT) 25 mg tablet Take 1 tablet (25 mg total) by mouth 3 (three) times daily as needed for Dizziness or Nausea. 21 tablet 0    NOVOLOG FLEXPEN U-100 INSULIN 100 unit/mL (3 mL) InPn pen Inject 12 Units into the skin 3 (three) times daily with meals. (Patient taking differently: Inject 14 Units " into the skin 3 (three) times daily with meals.) 32.4 mL 3    pep injection Inject 0.25 ml as directed.   May increase by 0.05 ml such as 0.30, 0.35 ml etc. Up to 1.00 ml Until adequate result is achieved.    For compounding pharmacy use:   Add PAPAVERINE 30 mcg  Add PHENTOLAMINE 10 mg  Add ALPROSTADIL 100 mcg 1 vial 2    tamsulosin (FLOMAX) 0.4 mg Cap Take 1 capsule (0.4 mg total) by mouth every evening. 30 capsule 11    [DISCONTINUED] JARDIANCE 25 mg tablet TAKE 1 TABLET EVERY DAY 90 tablet 1     Facility-Administered Encounter Medications as of 9/15/2023   Medication Dose Route Frequency Provider Last Rate Last Admin    fentaNYL 50 mcg/mL injection 25 mcg  25 mcg Intravenous Q5 Min PRN Travis Corbin MD   50 mcg at 05/12/21 1242    fentaNYL 50 mcg/mL injection  mcg   mcg Intravenous PRN Markus Esparza MD   100 mcg at 11/24/21 1151    lidocaine (PF) 10 mg/ml (1%) injection 10 mg  1 mL Intradermal Once Deanne Mosqueda MD        LIDOcaine (PF) 10 mg/ml (1%) injection 10 mg  1 mL Intradermal Once Markus Esparza MD        midazolam (VERSED) 1 mg/mL injection 0.5 mg  0.5 mg Intravenous PRN Travis Corbin MD   2 mg at 05/12/21 1242    midazolam (VERSED) 1 mg/mL injection 0.5-4 mg  0.5-4 mg Intravenous PRN Markus Esparza MD   2 mg at 11/24/21 1151    ropivacaine 0.2% Nimbus PainPRO Pump infusion 500 ML   Perineural Continuous Travis Corbin MD   New Bag at 05/12/21 1629    ropivacaine 0.2% Nimbus PainPRO Pump infusion 500 ML   Perineural Continuous Markus Esparza MD   New Bag at 11/24/21 1450    triamcinolone acetonide injection 40 mg  40 mg Intra-articular  Patricio Multani MD   40 mg at 10/26/16 0841     Review of Systems   ROS  Physical Exam     Vitals:    09/15/23 0953   BP: 105/67   Pulse: 63     Physical Exam  Constitutional:       General: He is not in acute distress.     Appearance: He is well-developed. He is not diaphoretic.    HENT:      Head: Normocephalic and atraumatic.      Right Ear: External ear normal.      Left Ear: External ear normal.      Nose: Nose normal.   Eyes:      Conjunctiva/sclera: Conjunctivae normal.      Pupils: Pupils are equal, round, and reactive to light.   Neck:      Thyroid: No thyromegaly.      Vascular: No JVD.      Trachea: No tracheal deviation.   Cardiovascular:      Rate and Rhythm: Normal rate and regular rhythm.      Heart sounds: Normal heart sounds. No murmur heard.     No friction rub. No gallop.   Pulmonary:      Effort: Pulmonary effort is normal. No respiratory distress.      Breath sounds: Normal breath sounds. No wheezing.   Chest:      Chest wall: No tenderness.   Abdominal:      General: Bowel sounds are normal. There is no distension.      Palpations: Abdomen is soft. There is no mass.      Tenderness: There is no abdominal tenderness. There is no guarding or rebound.   Genitourinary:     Penis: Normal. No tenderness.       Rectum: Normal.      Comments: Prostate 30 grams with negative nodule or negative tenderness  No tenderness noted on his levator ani.  Able to contract and relax without any pain.    Musculoskeletal:         General: No tenderness or deformity. Normal range of motion.      Cervical back: Normal range of motion and neck supple.   Lymphadenopathy:      Cervical: No cervical adenopathy.   Skin:     General: Skin is warm and dry.   Neurological:      Mental Status: He is alert and oriented to person, place, and time.   Psychiatric:         Behavior: Behavior normal.         Thought Content: Thought content normal.         LABS:  Lab Results   Component Value Date    PSA 1.2 08/29/2023    PSA 2.0 04/30/2021    PSA 0.89 07/20/2020    PSA 0.97 10/26/2018    PSA 0.76 09/08/2017    PSA 0.88 05/23/2016    PSA 0.68 01/30/2015    PSA 0.96 07/11/2014    PSA 0.60 11/28/2011    PSA 0.63 12/03/2009     No results found. However, due to the size of the patient record, not all encounters  "were searched. Please check Results Review for a complete set of results.  Lab Results   Component Value Date    CREATININE 0.9 08/29/2023    CREATININE 0.9 08/29/2023    CREATININE 0.9 11/04/2022     Results for orders placed or performed in visit on 10/26/18   Testosterone   Result Value Ref Range    Testosterone, Total 339 304 - 1227 ng/dL     *Note: Due to a large number of results and/or encounters for the requested time period, some results have not been displayed. A complete set of results can be found in Results Review.     No results found for: "LABURIN"  Hemoglobin A1C   Date Value Ref Range Status   08/29/2023 6.3 (H) 4.0 - 5.6 % Final     Comment:     ADA Screening Guidelines:  5.7-6.4%  Consistent with prediabetes  >or=6.5%  Consistent with diabetes    High levels of fetal hemoglobin interfere with the HbA1C  assay. Heterozygous hemoglobin variants (HbS, HgC, etc)do  not significantly interfere with this assay.   However, presence of multiple variants may affect accuracy.     06/09/2023 6.7 (H) 4.0 - 5.6 % Final     Comment:     ADA Screening Guidelines:  5.7-6.4%  Consistent with prediabetes  >or=6.5%  Consistent with diabetes    High levels of fetal hemoglobin interfere with the HbA1C  assay. Heterozygous hemoglobin variants (HbS, HgC, etc)do  not significantly interfere with this assay.   However, presence of multiple variants may affect accuracy.         UA clear 100 + glucose  PVR per bladder scan: 6 ml    Assessment and Plan:  Jm was seen today for benign prostatic hypertrophy.    Diagnoses and all orders for this visit:    Nocturia  -     tamsulosin (FLOMAX) 0.4 mg Cap; Take 1 capsule (0.4 mg total) by mouth every evening.    Benign prostatic hyperplasia with weak urinary stream  -     Ambulatory referral/consult to Urology  -     tamsulosin (FLOMAX) 0.4 mg Cap; Take 1 capsule (0.4 mg total) by mouth every evening.    Nocturnal polyuria  -     tamsulosin (FLOMAX) 0.4 mg Cap; Take 1 capsule " (0.4 mg total) by mouth every evening.    ED (erectile dysfunction) of organic origin  -     pep injection; Inject 0.25 ml as directed.   May increase by 0.05 ml such as 0.30, 0.35 ml etc. Up to 1.00 ml Until adequate result is achieved.    For compounding pharmacy use:   Add PAPAVERINE 30 mcg  Add PHENTOLAMINE 10 mg  Add ALPROSTADIL 100 mcg    Explained his nocturia in depth.  Think it is related to nocturnal polyuria.  He can do voiding diary night vs day.  Avoid fluid intake in the evening time.  I think that he should resume Lasix in order to get rid of excess fluid that his body is retaining.  Recommend to take lasix in the afternoon rather than in the morning to get rid of excess fluid that his body is retaining.  Strongly recommend that he should be in touch with Dr. Salomon in cardiology regarding his cardiac concerns.    Start Flomax and see how he does.    For his ED, he failed to respond to pills.  Alternative therapy such as ALISSON and ICI therapy discussed.  He is interested in penile injection therapy.  Will refer him to Zoila Etienne.  Tri-mix sent to [facility].    Follow-up:  Follow up in about 16 weeks (around 1/5/2024), or voiding diary day vs night.

## 2023-09-21 ENCOUNTER — CLINICAL SUPPORT (OUTPATIENT)
Dept: REHABILITATION | Facility: HOSPITAL | Age: 68
End: 2023-09-21
Attending: INTERNAL MEDICINE
Payer: MEDICARE

## 2023-09-21 DIAGNOSIS — Z74.09 IMPAIRED FUNCTIONAL MOBILITY, BALANCE, GAIT, AND ENDURANCE: ICD-10-CM

## 2023-09-21 DIAGNOSIS — R26.89 BALANCE DISORDER: ICD-10-CM

## 2023-09-21 PROCEDURE — 97161 PT EVAL LOW COMPLEX 20 MIN: CPT | Mod: PO

## 2023-09-21 NOTE — PLAN OF CARE
[facility]  Physical Therapy Neurological Rehabilitation Initial Evaluation    Name: Jm Deleon  Clinic Number: [id number]    Therapy Diagnosis:   Encounter Diagnoses   Name Primary?    Balance disorder     Impaired functional mobility, balance, gait, and endurance      Physician: Dallas Martin MD    Physician Orders: PT Eval and Treat   Medical Diagnosis from Referral: R26.89 (ICD-10-CM) - Balance disorder  Evaluation Date: 9/21/2023  Authorization Period Expiration: 08/27/2024  Plan of Care Expiration: 11/16/2023  Visit # / Visits authorized: 01/ 01    Time In: 1330  Time Out: 1415  Total Billable Time: 45 minutes    Precautions: Standard    Subjective   Date of onset: progressive over years   History of current condition - Jm reports: that he was on a trip out of the country and he was walking and had a very bad fall. He reports he and his family have recently noticed that his left foot drags. He reports his his left foot drags more when he is fatigued. He reports the last time he fell he fell right onto his face and was unable to catch himself. The patient reports he has had two falls in the past year. The patient reports he fatigues more easily than he used to.      Medical History:   Past Medical History:   Diagnosis Date    Allergy     seasonal    Arthritis     Coronary artery disease     Diabetes mellitus     Diabetes mellitus, type 2     Dysplastic nevus of trunk 03/2022    moderately atypical     Hyperlipidemia     Hypertension     Joint pain     Melanoma 10/2019    MM left back 2.7mm; neg LNs    Open angle with borderline findings and high glaucoma risk in both eyes 2018    Squamous cell carcinoma 2017    scalp - SSW       Surgical History:   Jm Deleon  has a past surgical history that includes Blepharoptosis repair (10 years ago); Hernia repair; Coronary artery bypass graft (x4 age 45 2000); Hip surgery (9-24-14); Cardiac surgery; Joint replacement; Shoulder  arthroscopy; Shoulder surgery; Colonoscopy (N/A, 10/4/2019); [place] lymph node biopsy (Left, 10/18/2019); Transforaminal epidural injection of steroid (Left, 8/7/2020); Arthroscopic repair of rotator cuff of shoulder (Left, 5/12/2021); Fixation of tendon (Left, 5/12/2021); Arthroscopy of shoulder with decompression of subacromial space (5/12/2021); Distal clavicle excision (Left, 5/12/2021); Shoulder arthroscopy (Left, 11/24/2021); Manipulation with anesthesia (Left, 11/24/2021); Hardware Removal (Left, 11/24/2021); and injection, sacroiliac joint (Right, 6/6/2023).    Medications:   Jm has a current medication list which includes the following prescription(s): accu-chek smartview test strip, ammonium lactate, aspirin, atorvastatin, blood-glucose meter, carvedilol, celecoxib, diazepam, diclofenac sodium, diphenhydramine, repatha pushtronex, fluzone highdose quad 22-23 pf, multivit-minerals/folic acid, furosemide, toujeo solostar u-300 insulin, pen needle, diabetic, jardiance, lancets, meclizine, metformin, methocarbamol, mupirocin, nitroglycerin, novolog flexpen u-100 insulin, pen needle, diabetic, alprostadil, prevnar 20 (pf), ozempic, tamsulosin, valsartan, and vascepa, and the following Facility-Administered Medications: fentanyl, fentanyl, lidocaine (pf) 10 mg/ml (1%), lidocaine (pf) 10 mg/ml (1%), midazolam, midazolam, ropivacaine (pf) 2 mg/ml (0.2%), ropivacaine (pf) 2 mg/ml (0.2%), and triamcinolone acetonide.    Allergies:   Review of patient's allergies indicates:   Allergen Reactions    Tramadol Hallucinations        Imaging,: all imaging reviewed in [facility] prior to evaluation     Prior Therapy: prior PT for ortho conditions   Social History: wife    Falls: three in the last 1.5 years    DME:   owns a cane but does not use it   Home Environment: no [facility], [facility]   Exercise Routine / History: cuts grass weekly    Family Present at time of Eval: none   Occupation: retired, worked in construction   Prior Level  of Function: independent with all functional mobility, ADLs, and driving   Current Level of Function: independent with all functional mobility, ADLs, and driving; requires increased time and the patient is more cautious with activity      Pain:  Current 2/10, worst 8/10, best 2/10   Location: bilateral hips but pain is worse in the right hip    Description: dull aching   Aggravating Factors: no specific things   Easing Factors: none    Patient's goals: improve strength and improve stability with ambulation.     Objective     Mental status: alert, oriented to person, place, and time  Appearance: Casually dressed  Behavior:  calm and cooperative  Attention Span and Concentration:  Normal    Dominant hand:  right     Posture Alignment in sitting/ standing :   Head: forward head   Scapulae: slouched posture   Trunk: WNL   Pelvis: WNL   Legs: WNL     Sensation: Light Touch: Intact, no reports of numbness or tingling          Visual/Auditory: denies changes in vision or hearing       Coordination:   - fine motor: WNL  - UE coordination: WNL    - LE coordination:  WNL    ROM:   UPPER EXTREMITY--AROM/PROM  (R) UE: WNLs  (L) UE: WNLs           RANGE OF MOTION--LOWER EXTREMITIES  (R) LE Hip: normal   Knee: normal   Ankle: normal    (L) LE: Hip: normal   Knee: normal   Ankle: normal    Strength: manual muscle test grades below   Lower Extremity Strength  Right LE  Left LE    Hip Flexion: 4+/5 Hip Flexion: 4/5   Hip Extension:  5/5 Hip Extension: 4+/5   Hip Abduction: 5/5 Hip Abduction: 4+/5   Hip Adduction: 5/5 Hip Adduction 5/5   Knee Extension: 5/5 Knee Extension: 5/5   Knee Flexion: 4+/5 Knee Flexion: 4+/5   Ankle Dorsiflexion: 5/5 Ankle Dorsiflexion: 4+/5   Ankle Plantarflexion: 5/5 Ankle Plantarflexion: 5/5     Abdominal Strength: 5/5    Flexibility: WNL     Evaluation   Single Limb Stance R LE 2 seconds   (<10 sec = HIGH FALL RISK)   Single Limb Stance L LE 5 seconds   (<10 sec = HIGH FALL RISK)      Evaluation   30  second Chair Rise  (adults > 61 y/o) 7.5 completed with no arms but patient reports significant fatigue following test    5 times sit-stand  (adults 18-65 y/o) 18 seconds with no arms   >12 sec= fall risk for general elderly  >16 sec= fall risk for Parkinson's disease  >10 sec= balance/vestibular dysfunction (<61 y/o)  >14.2 sec= balance/vestibular dysfunction (>61 y/o)  >12 sec= fall risk for CVA     Postural control:  MCTSIB:  1. Eyes Open/feet together/Firm: 30  seconds  2. Eyes Closed/feet together/Firm: 30 seconds  3. Eyes Open/feet together/Foam: 30 seconds  4. Eyes Closed/feet together/Foam: 28 seconds max sway     Gait Assessment:   - AD used: none  - Assistance: independent   - Distance: ambulatory throughout session     GAIT DEVIATIONS:  Jm displays the following deviations with ambulation: decreased charo, velocity and step length. Decreased BLE foot clearance.     Impairments contributing to deviations: impaired motor control, impaired BLE strength, pain, impaired endurance     Endurance Deficit: moderate       Evaluation   Self Selected Walking Speed 0.85 m/sec (6m/7s)   Fast Walking Speed 1.2 m/sec (6m/5s)       Functional Gait Assessment:   1. Gait on level surface =  3  2. Change in Gait Speed = 3  3. Gait with horizontal head turns  = 2  4. Gait with vertical head turns = 3  5. Gait with pivot turns = 3  6. Step over obstacle = 3  7. Gait with Narrow HELENA = 1  8. Gait with eyes closed = 2  9. Ambulating Backwards = 3  10. Steps = 2     Score 26/30   Score:   <22/30 fall risk   <20/30 fall risk in older adults   <18/30 fall risk in Parkinsons         CMS Impairment/Limitation/Restriction for FOTO Balance  Survey    Therapist reviewed FOTO scores for Jm Deleon on 9/21/2023.   FOTO documents entered into [employer] - see Media section.    Limitation Score: 49%         TREATMENT   Treatment not performed due to evaluation lasting duration of session.     Home Exercises and Patient Education  Provided    Education provided:   - POC, purpose of PT, discharge planning     Written Home Exercises Provided: home exercise program to be given at follow up session     Assessment   Jm is a 68 y.o. male referred to outpatient Physical Therapy with a medical diagnosis of  Balance disorder. Patient presents with primary complaints of falls and impaired stability with ambulation.  Based on the patient's MMT scores, the patient presents with impaired BLE strength with more significant strength deficits noted on the LLE. LL hip flexion is the most limited. Based on the patient's SSWS, the patient falls into the community ambulator with increased time needs  category. T. The patient's 5 time sit to stand score also places the patient in the elevated fall risk category and indicates impaired LE strength and endurance and the patient reports significant fatigue following this test. Decreased eccentric control when sitting down noted. Based on the patient's performance on the GST, the patient has the most difficulty with vision eliminated conditions. The patient's SLS time places the patient in the elevated fall risk category. The patient will benefit from skilled physical therapy intervention to address the deficits listed above. Plan to focus on endurance, balance and LE strength.       Patient prognosis is Good.   Patient will benefit from skilled outpatient Physical Therapy to address the deficits stated above and in the chart below, provide patient/family education, and to maximize patient's level of independence.     Plan of care discussed with patient: Yes  Patient's spiritual, cultural and educational needs considered and patient is agreeable to the plan of care and goals as stated below:     Anticipated Barriers for therapy: co-morbidities     Medical Necessity is demonstrated by the following  History  Co-morbidities and personal factors that may impact the plan of care Co-morbidities:   chronic pain, CAD,  HTN, CABG, hyslipidemia, T2DM, obese, dizziness     Personal Factors:   no deficits     high   Examination  Body Structures and Functions, activity limitations and participation restrictions that may impact the plan of care Body Regions:   lower extremities  upper extremities  trunk    Body Systems:    gross symmetry  ROM  strength  gross coordinated movement  balance  gait  transfers  transitions  motor control  motor learning    Participation Restrictions:   none    Activity limitations:   Learning and applying knowledge  no deficits    General Tasks and Commands  no deficits    Communication  no deficits    Mobility  no deficits    Self care  no deficits    Domestic Life  no deficits    Interactions/Relationships  no deficits    Life Areas  no deficits    Community and Social Life  community life  recreation and leisure         moderate   Clinical Presentation stable and uncomplicated low   Decision Making/ Complexity Score: low     Goals:  Short Term Goals: 4 weeks   1. Patient to be (I) with established home exercise program.  2. Patient to improve left hip flexion by 1/3 MMT to improve balance and functional mobility.   3. Patient to improve left knee flexion strength by 1/3 MMT to improve balance and functional mobility.   4. Patient to improve BLE SLS time to 10 seconds with BLE for improved static balance and decrease risk for falls.   5. Patient to improve 5 time sit to stand time to 14 seconds for improved functional mobility and strength.   6. Patient to improve SSWS to 1.0  m/sec for improved safety with ambulation.   7. Patient to improve GST condition 4 to 30 seconds with minimal sway for improved static balance.      Long Term Goals: 8 weeks   1. Patient to be (I) with advanced home exercise program.  2. Patient to improve left hip flexion by 2/3 MMT to improve balance and functional mobility.   3. Patient to improve BLE SLS time to 15 seconds with BLE for improved static balance and decrease risk  for falls.   4. Patient to improve 5 time sit to stand time to 12 seconds for improved functional mobility and strength.   5. Patient to improve SSWS to 1.2  m/sec for improved safety with ambulation.   6. Patient to improve GST condition 4 to 30 seconds with slight sway for improved static balance.          Plan   Plan of care Certification: 9/21/2023 to 11/16/2023.    Outpatient Physical Therapy 2 times weekly for 8 weeks to include the following interventions: Gait Training, Manual Therapy, Neuromuscular Re-ed, Orthotic Management and Training, Patient Education, Self Care, Therapeutic Activities, and Therapeutic Exercise.     Dorcas Hankins, PT

## 2023-09-22 ENCOUNTER — OFFICE VISIT (OUTPATIENT)
Dept: UROLOGY | Facility: CLINIC | Age: 68
End: 2023-09-22
Payer: MEDICARE

## 2023-09-22 DIAGNOSIS — N52.9 ED (ERECTILE DYSFUNCTION) OF ORGANIC ORIGIN: ICD-10-CM

## 2023-09-22 DIAGNOSIS — E11.59 TYPE 2 DIABETES MELLITUS WITH CIRCULATORY DISORDER CAUSING ERECTILE DYSFUNCTION: Primary | ICD-10-CM

## 2023-09-22 DIAGNOSIS — N52.1 TYPE 2 DIABETES MELLITUS WITH CIRCULATORY DISORDER CAUSING ERECTILE DYSFUNCTION: Primary | ICD-10-CM

## 2023-09-22 PROCEDURE — 1159F PR MEDICATION LIST DOCUMENTED IN MEDICAL RECORD: ICD-10-PCS | Mod: CPTII,S$GLB,, | Performed by: NURSE PRACTITIONER

## 2023-09-22 PROCEDURE — 3044F PR MOST RECENT HEMOGLOBIN A1C LEVEL <7.0%: ICD-10-PCS | Mod: CPTII,S$GLB,, | Performed by: NURSE PRACTITIONER

## 2023-09-22 PROCEDURE — 1160F RVW MEDS BY RX/DR IN RCRD: CPT | Mod: CPTII,S$GLB,, | Performed by: NURSE PRACTITIONER

## 2023-09-22 PROCEDURE — 99999 PR PBB SHADOW E&M-EST. PATIENT-LVL IV: CPT | Mod: PBBFAC,,, | Performed by: NURSE PRACTITIONER

## 2023-09-22 PROCEDURE — 99215 PR OFFICE/OUTPT VISIT, EST, LEVL V, 40-54 MIN: ICD-10-PCS | Mod: S$GLB,,, | Performed by: NURSE PRACTITIONER

## 2023-09-22 PROCEDURE — 99215 OFFICE O/P EST HI 40 MIN: CPT | Mod: S$GLB,,, | Performed by: NURSE PRACTITIONER

## 2023-09-22 PROCEDURE — 1159F MED LIST DOCD IN RCRD: CPT | Mod: CPTII,S$GLB,, | Performed by: NURSE PRACTITIONER

## 2023-09-22 PROCEDURE — 3044F HG A1C LEVEL LT 7.0%: CPT | Mod: CPTII,S$GLB,, | Performed by: NURSE PRACTITIONER

## 2023-09-22 PROCEDURE — 99999 PR PBB SHADOW E&M-EST. PATIENT-LVL IV: ICD-10-PCS | Mod: PBBFAC,,, | Performed by: NURSE PRACTITIONER

## 2023-09-22 PROCEDURE — 1160F PR REVIEW ALL MEDS BY PRESCRIBER/CLIN PHARMACIST DOCUMENTED: ICD-10-PCS | Mod: CPTII,S$GLB,, | Performed by: NURSE PRACTITIONER

## 2023-09-22 PROCEDURE — 4010F ACE/ARB THERAPY RXD/TAKEN: CPT | Mod: CPTII,S$GLB,, | Performed by: NURSE PRACTITIONER

## 2023-09-22 PROCEDURE — 4010F PR ACE/ARB THEARPY RXD/TAKEN: ICD-10-PCS | Mod: CPTII,S$GLB,, | Performed by: NURSE PRACTITIONER

## 2023-09-22 RX ORDER — PAPAVERINE HYDROCHLORIDE 30 MG/ML
INJECTION INTRAMUSCULAR; INTRAVENOUS
Qty: 10 ML | Refills: 12 | Status: SHIPPED | OUTPATIENT
Start: 2023-09-22 | End: 2024-09-18

## 2023-09-22 NOTE — PATIENT INSTRUCTIONS
1. Wipe off top of medication vial with an alcohol prep.   2. With the vial held firmly on a flat surface, insert the syringe into the vial.  3. Now carefully  the vial with the needle in place and turn upside down.  4. You can then push in syringe (like you are giving a shot) to get all the air out of the syringe.  5. You can then pull the plunger of the syringe back down while keeping the needle inserted in the vial to get your desired results.   6. If having difficulty seeing the medication, rapidly pull back the syringe and then you will see the medication fill up.    You may never get all the tiny air bubbles out. It is ok.       He was instructed to keep the dosage at 30/35 units. If noticing a decrease in reaction he can increase the dosage by 5 units. He may also refill the Rx.   Refills were sent to [facility].

## 2023-09-22 NOTE — PROGRESS NOTES
CHIEF COMPLAINT:    Jm Deleon is a 68 y.o. male presents today for ED.    HISTORY OF PRESENTING ILLINESS:    Jm Deleon is a 68 y.o. Type 2 DIABETIC male who is an established patient in our clinic. He was initially seen 09/15/2023 with Dr. Jasso for BPH with LUTS and ED > 1 year. He was started on Flomax. Had failed the oral agents for ED.     He is here today for ICI Education and 1st injection.   He brought in his own medication from [facility].        REVIEW OF SYSTEMS:  Review of Systems   Constitutional: Negative.  Negative for chills and fever.   Eyes:  Negative for double vision.   Respiratory:  Negative for cough and shortness of breath.    Cardiovascular:  Negative for chest pain.   Gastrointestinal:  Negative for abdominal pain, constipation, diarrhea, nausea and vomiting.   Genitourinary:  Positive for frequency. Negative for dysuria, flank pain and hematuria.   Neurological:  Negative for dizziness and seizures.   Endo/Heme/Allergies:  Negative for polydipsia.         PATIENT HISTORY:    Past Medical History:   Diagnosis Date    Allergy     seasonal    Arthritis     Coronary artery disease     Diabetes mellitus     Diabetes mellitus, type 2     Dysplastic nevus of trunk 03/2022    moderately atypical     Hyperlipidemia     Hypertension     Joint pain     Melanoma 10/2019    MM left back 2.7mm; neg LNs    Open angle with borderline findings and high glaucoma risk in both eyes 2018    Squamous cell carcinoma 2017    scalp - SSW       Past Surgical History:   Procedure Laterality Date    ARTHROSCOPIC REPAIR OF ROTATOR CUFF OF SHOULDER Left 5/12/2021    Procedure: REPAIR, ROTATOR CUFF, ARTHROSCOPIC;  Surgeon: Johnny Ventura MD;  Location: [facility];  Service: Orthopedics;  Laterality: Left;  Labral Debridement    ARTHROSCOPY OF SHOULDER WITH DECOMPRESSION OF SUBACROMIAL SPACE  5/12/2021    Procedure: ARTHROSCOPY, SHOULDER, WITH SUBACROMIAL SPACE DECOMPRESSION;  Surgeon: Johnny Ventura  MD;  Location: [facility] OR;  Service: Orthopedics;;    BELPHAROPTOSIS REPAIR  10 years ago    both eyes    CARDIAC SURGERY      3 vessel bypass    COLONOSCOPY N/A 10/4/2019    Procedure: COLONOSCOPY;  Surgeon: Isaiah Booker MD;  Location: [facility] (4TH FLR);  Service: Endoscopy;  Laterality: N/A;  Cardilogy Clearance received rom Dr. QUEENIE Salomon, see telephone encounter 8/26/19-BB    CORONARY ARTERY BYPASS GRAFT  x4 age 45 2000    DISTAL CLAVICLE EXCISION Left 5/12/2021    Procedure: EXCISION, CLAVICLE, DISTAL;  Surgeon: Johnny Ventura MD;  Location: [facility] OR;  Service: Orthopedics;  Laterality: Left;    FIXATION OF TENDON Left 5/12/2021    Procedure: FIXATION, TENDON;  Surgeon: Johnny Ventura MD;  Location: [facility] OR;  Service: Orthopedics;  Laterality: Left;    HARDWARE REMOVAL Left 11/24/2021    Procedure: REMOVAL, HARDWARE;  Surgeon: Johnny Ventura MD;  Location: [facility] OR;  Service: Orthopedics;  Laterality: Left;    HERNIA REPAIR      HIP SURGERY  9-24-14    left YANICK    INJECTION, SACROILIAC JOINT Right 6/6/2023    Procedure: INJECTION,SACROILIAC JOINT RIGHT;  Surgeon: Rosana Lopez MD;  Location: [facility] PAIN T;  Service: Pain Management;  Laterality: Right;    JOINT REPLACEMENT      left hip    MANIPULATION WITH ANESTHESIA Left 11/24/2021    Procedure: MANIPULATION, WITH ANESTHESIA;  Surgeon: Johnny Ventura MD;  Location: [facility] OR;  Service: Orthopedics;  Laterality: Left;    SENTINEL LYMPH NODE BIOPSY Left 10/18/2019    Procedure: BIOPSY, LYMPH NODE, SENTINEL;  Surgeon: Fabián Villeda MD;  Location: [facility] OR 2ND FLR;  Service: General;  Laterality: Left;    SHOULDER ARTHROSCOPY      SHOULDER ARTHROSCOPY Left 11/24/2021    Procedure: ARTHROSCOPY, SHOULDER;  Surgeon: Johnny Ventura MD;  Location: [facility];  Service: Orthopedics;  Laterality: Left;  regional w/catheter (interscalene)    SHOULDER SURGERY      TRANSFORAMINAL EPIDURAL INJECTION OF STEROID Left 8/7/2020    Procedure:  LUMBAR TRANSFORAMINAL LEFT L3/4 AND L5/S1 DIRECT REFERRAL;  Surgeon: Ronny Rangel MD;  Location: [facility] PAIN MGT;  Service: Pain Management;  Laterality: Left;  NEEDS CONSENT, DIABETIC       Family History   Problem Relation Age of Onset    Coronary artery disease Mother     Cancer Father         lung cancer, smoker    Hypertension Brother     Cancer Brother         colon    Amblyopia Neg Hx     Blindness Neg Hx     Cataracts Neg Hx     Glaucoma Neg Hx     Macular degeneration Neg Hx     Retinal detachment Neg Hx     Strabismus Neg Hx     Melanoma Neg Hx        Social History     Socioeconomic History    Marital status:    Tobacco Use    Smoking status: Never    Smokeless tobacco: Never   Substance and Sexual Activity    Alcohol use: Yes     Alcohol/week: 3.0 standard drinks of alcohol     Types: 3 Cans of beer per week     Comment: on weekend    Drug use: Never     Social Determinants of Health     Financial Resource Strain: Low Risk  (9/19/2023)    Overall Financial Resource Strain (CARDIA)     Difficulty of Paying Living Expenses: Not hard at all   Food Insecurity: No Food Insecurity (9/19/2023)    Hunger Vital Sign     Worried About Running Out of Food in the Last Year: Never true     Ran Out of Food in the Last Year: Never true   Transportation Needs: No Transportation Needs (9/19/2023)    PRAPARE - Transportation     Lack of Transportation (Medical): No     Lack of Transportation (Non-Medical): No   Physical Activity: Insufficiently Active (9/19/2023)    Exercise Vital Sign     Days of Exercise per Week: 1 day     Minutes of Exercise per Session: 50 min   Stress: No Stress Concern Present (9/19/2023)    [employer] [place] [employer] - Occupational Stress Questionnaire     Feeling of Stress : Not at all   Social Connections: Unknown (9/19/2023)    Social Connection and Isolation Panel [NHANES]     Frequency of Communication with Friends and Family: Three times a week     Frequency of Social  Gatherings with Friends and Family: Patient refused     Active Member of Clubs or Organizations: No     Attends Club or Organization Meetings: Never     Marital Status:    Housing Stability: Low Risk  (9/19/2023)    Housing Stability Vital Sign     Unable to Pay for Housing in the Last Year: No     Number of Places Lived in the Last Year: 1     Unstable Housing in the Last Year: No       Allergies:  Tramadol    Medications:    Current Outpatient Medications:     ACCU-CHEK SMARTVIEW TEST STRIP Strp, 1 strip by Misc.(Non-Drug; Combo Route) route 3 (three) times daily. Patient needs an appointment, Disp: 300 strip, Rfl: 0    ammonium lactate 12 % Crea, Apply small amount to feet except for in between toes twice a day, Disp: 1 Tube, Rfl: 3    aspirin (ECOTRIN) 81 MG EC tablet, Take 81 mg by mouth once daily., Disp: , Rfl:     atorvastatin (LIPITOR) 80 MG tablet, TAKE 1 TABLET ONE TIME DAILY, Disp: 90 tablet, Rfl: 3    blood-glucose meter Misc, Use to check sugar 3 times daily. Accu-chek Emily. Patient needs an appointment, Disp: 1 each, Rfl: 0    carvediloL (COREG) 25 MG tablet, Take 1 tablet (25 mg total) by mouth 2 (two) times daily., Disp: 180 tablet, Rfl: 3    celecoxib (CELEBREX) 200 MG capsule, Take 1 capsule (200 mg total) by mouth 2 (two) times daily., Disp: 180 capsule, Rfl: 0    diazePAM (VALIUM) 5 MG tablet, Take 1 tablet (5 mg total) by mouth On call Procedure for Anxiety., Disp: 2 tablet, Rfl: 0    diclofenac sodium (VOLTAREN) 1 % Gel, Apply 2 g topically 4 (four) times daily as needed (neck muscle spasm)., Disp: 100 g, Rfl: 3    diphenhydrAMINE (BENADRYL) 25 mg capsule, Take 50 mg by mouth nightly as needed for Itching. , Disp: , Rfl:     evolocumab (REPATHA PUSHTRONEX) 420 mg/3.5 mL Injt, Inject 3.5 mLs (420 mg total) into the skin every 30 days, Disp: 3 each, Rfl: 4    FLUZONE HIGHDOSE QUAD 22-23  mcg/0.7 mL Syrg, , Disp: , Rfl:     FOLIC ACID/MULTIVITS-MIN (MULTIVITAMIN FOR CHOLESTEROL  "ORAL), Take 1 tablet by mouth nightly. , Disp: , Rfl:     furosemide (LASIX) 40 MG tablet, TAKE 1 TABLET EVERY DAY, Disp: 90 tablet, Rfl: 3    insulin glargine, TOUJEO, (TOUJEO SOLOSTAR U-300 INSULIN) 300 unit/mL (1.5 mL) InPn pen, Inject 45 Units into the skin once daily., Disp: 13.5 mL, Rfl: 3    insulin needles, disposable, (BD INSULIN PEN NEEDLE UF ORIG) 29 x 1/2 " Ndle, USE TWICE DAILY AS DIRECTED, Disp: 100 each, Rfl: 2    JARDIANCE 25 mg tablet, TAKE 1 TABLET EVERY DAY, Disp: 90 tablet, Rfl: 1    lancets Misc, 1 lancet by Misc.(Non-Drug; Combo Route) route 3 (three) times daily. accu-chek Fastclix. Patient needs an appointment, Disp: 300 each, Rfl: 0    meclizine (ANTIVERT) 25 mg tablet, Take 1 tablet (25 mg total) by mouth 3 (three) times daily as needed for Dizziness or Nausea., Disp: 21 tablet, Rfl: 0    metFORMIN (GLUCOPHAGE) 1000 MG tablet, Take 1 tablet (1,000 mg total) by mouth 2 (two) times daily., Disp: 180 tablet, Rfl: 1    methocarbamoL (ROBAXIN) 500 MG Tab, Take 1 tablet (500 mg total) by mouth 3 (three) times daily. for 10 days, Disp: 30 tablet, Rfl: 0    mupirocin (BACTROBAN) 2 % ointment, AAA qd- bid, Disp: 30 g, Rfl: 3    nitroGLYCERIN (NITROSTAT) 0.4 MG SL tablet, PLACE 1 TABLET (0.4 MG TOTAL) UNDER THE TONGUE EVERY 5 (FIVE) MINUTES AS NEEDED FOR CHEST PAIN AS DIRECTED, Disp: 25 tablet, Rfl: 4    NOVOLOG FLEXPEN U-100 INSULIN 100 unit/mL (3 mL) InPn pen, Inject 12 Units into the skin 3 (three) times daily with meals. (Patient taking differently: Inject 14 Units into the skin 3 (three) times daily with meals.), Disp: 32.4 mL, Rfl: 3    pen needle, diabetic (BD INSULIN PEN NEEDLE UF MINI) 31 gauge x 3/16" Ndle, 1 each by Misc.(Non-Drug; Combo Route) route 4 (four) times daily. Patient needs an appointment, Disp: 400 each, Rfl: 0    PREVNAR 20, PF, 0.5 mL Syrg injection, , Disp: , Rfl:     semaglutide (OZEMPIC) 0.25 mg or 0.5 mg(2 mg/1.5 mL) pen injector, Inject 0.5 mg into the skin every 7 " days., Disp: 4.5 mL, Rfl: 3    tamsulosin (FLOMAX) 0.4 mg Cap, Take 1 capsule (0.4 mg total) by mouth every evening., Disp: 30 capsule, Rfl: 11    valsartan (DIOVAN) 320 MG tablet, TAKE 1 TABLET ONE TIME DAILY, Disp: 90 tablet, Rfl: 3    VASCEPA 1 gram Cap, TAKE 2 CAPSULES TWICE DAILY, Disp: 360 capsule, Rfl: 3  No current facility-administered medications for this visit.    Facility-Administered Medications Ordered in Other Visits:     fentaNYL 50 mcg/mL injection 25 mcg, 25 mcg, Intravenous, Q5 Min PRN, Travis Corbin MD, 50 mcg at 05/12/21 1242    fentaNYL 50 mcg/mL injection  mcg,  mcg, Intravenous, PRN, Markus Esparza MD, 100 mcg at 11/24/21 1151    lidocaine (PF) 10 mg/ml (1%) injection 10 mg, 1 mL, Intradermal, Once, Deanne Mosqueda MD    LIDOcaine (PF) 10 mg/ml (1%) injection 10 mg, 1 mL, Intradermal, Once, Markus Esparza MD    midazolam (VERSED) 1 mg/mL injection 0.5 mg, 0.5 mg, Intravenous, PRN, Travis Corbin MD, 2 mg at 05/12/21 1242    midazolam (VERSED) 1 mg/mL injection 0.5-4 mg, 0.5-4 mg, Intravenous, PRN, Markus Esparza MD, 2 mg at 11/24/21 1151    ropivacaine 0.2% Nimbus PainPRO Pump infusion 500 ML, , Perineural, Continuous, Travis Corbin MD, New Bag at 05/12/21 1629    ropivacaine 0.2% Nimbus PainPRO Pump infusion 500 ML, , Perineural, Continuous, Markus Esparza MD, New Bag at 11/24/21 1450    triamcinolone acetonide injection 40 mg, 40 mg, Intra-articular, , Patricio Multani MD, 40 mg at 10/26/16 0841    PHYSICAL EXAMINATION:  Physical Exam  Vitals and nursing note reviewed.   Constitutional:       General: He is awake.      Appearance: Normal appearance.   HENT:      Head: Normocephalic.      Right Ear: External ear normal.      Left Ear: External ear normal.      Nose: Nose normal.   Cardiovascular:      Rate and Rhythm: Normal rate.   Pulmonary:      Effort: Pulmonary effort is normal. No respiratory  distress.   Abdominal:      Tenderness: There is no abdominal tenderness. There is no right CVA tenderness or left CVA tenderness.   Genitourinary:     Penis: Normal and circumcised. No hypospadias or tenderness.       Testes:         Right: Swelling not present.         Left: Swelling not present.   Musculoskeletal:         General: Normal range of motion.      Cervical back: Normal range of motion.   Skin:     General: Skin is warm and dry.   Neurological:      General: No focal deficit present.      Mental Status: He is alert and oriented to person, place, and time.   Psychiatric:         Mood and Affect: Mood normal.         Behavior: Behavior is cooperative.           LABS:        Lab Results   Component Value Date    PSA 1.2 08/29/2023    PSA 2.0 04/30/2021    PSA 0.89 07/20/2020       Lab Results   Component Value Date    CREATININE 0.9 08/29/2023    CREATININE 0.9 08/29/2023    EGFRNORACEVR >60.0 08/29/2023    EGFRNORACEVR >60.0 08/29/2023             IMPRESSION:    Encounter Diagnoses   Name Primary?    Type 2 diabetes mellitus with circulatory disorder causing erectile dysfunction Yes    ED (erectile dysfunction) of organic origin          Assessment:       1. Type 2 diabetes mellitus with circulatory disorder causing erectile dysfunction    2. ED (erectile dysfunction) of organic origin        Plan:       I spent 60 minutes with the patient. Over 50% of the visit was spent in counseling.      We discussed ED and the contributing factors. We reviewed his personal factors that contribute to ED. Patient was educated on ED treatments. We discussed PDE-5 inhibitors, ALISSON, Muse, and IPP.    He is here today for the Intracorporal injection/ICI education and first injection.  Educational materials were supplied.    ICI is an injectable medication that consists of three different medications to produce an erection. It is known as a Trimix Compound or PEP. The medication is a compound medication and is only mixed up  at certain pharmacies known as a compound pharmacy. The medication has to be stored in the refrigerator. Improper storage decreases the effectiveness of the medication.     He was educated that it is an injection. With any injection there is risk for possible pain at the injection site as well as risk for an infection. Clean technique must be followed to help prevent infection. Proper needle disposable is necessary. He voices understanding.    The injection is best given when standing up and the penis is positioned perpendicular to the body. The urethral opening should be facing away from the body. Injection is always given on the lateral or side of the penis. Never give the injection to the top of the penis to avoid possible trauma to arteries and veins. Never give the injection to the bottom of the penis to avoid trauma to the urethra. He should alternate the injection sites to avoid the build up of scar tissue due to multiple injections.    This medication can increase the risk of erections lasting longer than 4 hours. This is known as a priapism and is a medical emergency. This requires immediate medical attention. This is main reason we give the first dose in the office today. We start at low dose and see how well the patients reacts. We can increase the medication if needed depending on the reaction the patient receives today. We discussed the fine line between enough medication for penetration and too much leading to a priapism. He voices understanding.    Correctly amina up the initial dose (25 units) and he injected himself in the right lateral aspect of the penis. Within 15 minutes achieved a good filling/semi erection but not firm enough for penetration. 40 minutes after injection, did not get any firmer.     He was instructed to keep the dosage at 30 units. If noticing a decrease in reaction he can increase the dosage by 5 units. He may also refill the Rx.   Refills were sent to [facility].    If have  any questions, please give me a call. Educational materials were given to patient and all questions were answered. He voiced understanding and left the office without a priapism. Follow up 3 months.

## 2023-09-27 ENCOUNTER — PATIENT MESSAGE (OUTPATIENT)
Dept: ADMINISTRATIVE | Facility: CLINIC | Age: 68
End: 2023-09-27
Payer: MEDICARE

## 2023-09-27 ENCOUNTER — TELEPHONE (OUTPATIENT)
Dept: ADMINISTRATIVE | Facility: CLINIC | Age: 68
End: 2023-09-27
Payer: MEDICARE

## 2023-09-27 ENCOUNTER — CLINICAL SUPPORT (OUTPATIENT)
Dept: REHABILITATION | Facility: HOSPITAL | Age: 68
End: 2023-09-27
Payer: MEDICARE

## 2023-09-27 DIAGNOSIS — Z74.09 IMPAIRED FUNCTIONAL MOBILITY, BALANCE, GAIT, AND ENDURANCE: Primary | ICD-10-CM

## 2023-09-27 PROCEDURE — 97110 THERAPEUTIC EXERCISES: CPT | Mod: PO

## 2023-09-27 NOTE — PROGRESS NOTES
[facility]   Physical Therapy Treatment Note     Name: Jm Deleon  Clinic Number: [id number]    Therapy Diagnosis:   Encounter Diagnosis   Name Primary?    Impaired functional mobility, balance, gait, and endurance Yes     Physician: Dallas Martin MD    Visit Date: 9/27/2023     Therapy Diagnosis:        Encounter Diagnoses   Name Primary?    Balance disorder      Impaired functional mobility, balance, gait, and endurance        Physician: Dallas Martin MD     Physician Orders: PT Eval and Treat   Medical Diagnosis from Referral: R26.89 (ICD-10-CM) - Balance disorder  Evaluation Date: 9/21/2023  Authorization Period Expiration: 12/31/2023  Plan of Care Expiration: 11/16/2023  Visit # / Visits authorized: 01/ 10     Time In: 1330  Time Out: 1415  Total Billable Time: 45 minutes     Precautions: Standard      SUBJECTIVE     Pt reports: that he is feeling good today and has no new complaints.    He was given home exercise program, patient verbalizes/ demonstrates understanding.     Response to previous treatment: good   Functional change: ongoing     Pain: 0/10  Location:  NA     OBJECTIVE     Objective Measures updated at progress report unless specified.     Treatment     Jm received the treatments listed below:      therapeutic exercises to develop strength, endurance, ROM, flexibility, posture, and core stabilization for 45 minutes including:    10 minutes on [employer] seated elliptical for CV endurance and LE strength, level 6.0    Patient performed 3 x 10 reps of the following exercises that are included in the patient's home exercise program. Patient demonstrates and verbalizes understanding of home exercise program.    Sit to stands no UE support   Standing marches with 2 second holds   Standing hip abduction   Standing hamstring curls     2 x 10 reps bilateral leg press 120#  2 x 10 reps LLE only leg press 80#    10 reps BLE leading step up tp foam fitter with opposite  leg hike, one UE support, CGA    neuromuscular re-education activities to improve: Balance, Coordination, Kinesthetic, Sense, Proprioception, and Posture for 0 minutes. The following activities were included:      therapeutic activities to improve functional performance for 0  minutes, including:        Patient Education and Home Exercises     Home Exercises Provided and Patient Education Provided     Education provided:   - home exercise program     Written Home Exercises Provided: yes. Exercises were reviewed and Jm was able to demonstrate them prior to the end of the session.  Jm demonstrated good  understanding of the education provided. See EMR under Patient Instructions for exercises provided during therapy sessions    ASSESSMENT     Jm tolerated today's session very well this afternoon. Session focused primarily on performing home exercise program centered on LE strength and endurance exercises. The patient verbalizes/ demonstrates understanding. The remainder of the session focused on LE strength training which the patient tolerated well. The patient will benefit from continued physical therapy intervention to address remaining functional mobility deficits.     Jm Is progressing well towards his goals.   Pt prognosis is Good.     Pt will continue to benefit from skilled outpatient physical therapy to address the deficits listed in the problem list box on initial evaluation, provide pt/family education and to maximize pt's level of independence in the home and community environment.     Pt's spiritual, cultural and educational needs considered and pt agreeable to plan of care and goals.     Anticipated barriers to physical therapy: co-morbidities     Goals:    Short Term Goals: 4 weeks   1. Patient to be (I) with established home exercise program. Ongoing   2. Patient to improve left hip flexion by 1/3 MMT to improve balance and functional mobility. Ongoing   3. Patient to improve left knee  flexion strength by 1/3 MMT to improve balance and functional mobility. Ongoing   4. Patient to improve BLE SLS time to 10 seconds with BLE for improved static balance and decrease risk for falls. Ongoing   5. Patient to improve 5 time sit to stand time to 14 seconds for improved functional mobility and strength. Ongoing   6. Patient to improve SSWS to 1.0  m/sec for improved safety with ambulation. Ongoing   7. Patient to improve GST condition 4 to 30 seconds with minimal sway for improved static balance. Ongoing      Long Term Goals: 8 weeks   1. Patient to be (I) with advanced home exercise program.Ongoing   2. Patient to improve left hip flexion by 2/3 MMT to improve balance and functional mobility. Ongoing   3. Patient to improve BLE SLS time to 15 seconds with BLE for improved static balance and decrease risk for falls. Ongoing   4. Patient to improve 5 time sit to stand time to 12 seconds for improved functional mobility and strength. Ongoing   5. Patient to improve SSWS to 1.2  m/sec for improved safety with ambulation. Ongoing   6. Patient to improve GST condition 4 to 30 seconds with slight sway for improved static balance. Ongoing           PLAN     Continue to progress LE strength, endurance and static/dynamic balance training    Dorcas Hankins, PT

## 2023-09-27 NOTE — TELEPHONE ENCOUNTER
Called pt; informed pt I was calling to confirm his virtual EAWV on 9/29/23 at 1:00pm and to see if he needed any help; pt stated he did not need any help and would complete e-pre check later today; pt informed to login 10 minutes prior to appt time; [employer] message sent

## 2023-09-29 ENCOUNTER — TELEPHONE (OUTPATIENT)
Dept: ADMINISTRATIVE | Facility: CLINIC | Age: 68
End: 2023-09-29
Payer: MEDICARE

## 2023-09-29 ENCOUNTER — OFFICE VISIT (OUTPATIENT)
Dept: FAMILY MEDICINE | Facility: CLINIC | Age: 68
End: 2023-09-29
Payer: MEDICARE

## 2023-09-29 VITALS — WEIGHT: 203.06 LBS | HEIGHT: 68 IN | BODY MASS INDEX: 30.78 KG/M2

## 2023-09-29 DIAGNOSIS — Z00.00 ENCOUNTER FOR PREVENTIVE HEALTH EXAMINATION: Primary | ICD-10-CM

## 2023-09-29 DIAGNOSIS — E11.59 TYPE 2 DIABETES MELLITUS WITH OTHER CIRCULATORY COMPLICATION, WITH LONG-TERM CURRENT USE OF INSULIN: ICD-10-CM

## 2023-09-29 DIAGNOSIS — I70.0 AORTIC ARCH ATHEROSCLEROSIS: ICD-10-CM

## 2023-09-29 DIAGNOSIS — D69.6 THROMBOCYTOPENIA: ICD-10-CM

## 2023-09-29 DIAGNOSIS — D69.2 SENILE PURPURA: ICD-10-CM

## 2023-09-29 DIAGNOSIS — I25.118 CORONARY ARTERY DISEASE OF NATIVE ARTERY OF NATIVE HEART WITH STABLE ANGINA PECTORIS: ICD-10-CM

## 2023-09-29 DIAGNOSIS — M46.1 SACROILIITIS: ICD-10-CM

## 2023-09-29 DIAGNOSIS — E66.09 CLASS 1 OBESITY DUE TO EXCESS CALORIES WITHOUT SERIOUS COMORBIDITY WITH BODY MASS INDEX (BMI) OF 30.0 TO 30.9 IN ADULT: ICD-10-CM

## 2023-09-29 DIAGNOSIS — Z00.00 ENCOUNTER FOR MEDICARE ANNUAL WELLNESS EXAM: ICD-10-CM

## 2023-09-29 DIAGNOSIS — Z79.4 TYPE 2 DIABETES MELLITUS WITH OTHER CIRCULATORY COMPLICATION, WITH LONG-TERM CURRENT USE OF INSULIN: ICD-10-CM

## 2023-09-29 PROCEDURE — G0439 PPPS, SUBSEQ VISIT: HCPCS | Mod: 95,,, | Performed by: NURSE PRACTITIONER

## 2023-09-29 PROCEDURE — 1126F AMNT PAIN NOTED NONE PRSNT: CPT | Mod: CPTII,95,, | Performed by: NURSE PRACTITIONER

## 2023-09-29 PROCEDURE — 3288F PR FALLS RISK ASSESSMENT DOCUMENTED: ICD-10-PCS | Mod: CPTII,95,, | Performed by: NURSE PRACTITIONER

## 2023-09-29 PROCEDURE — 1159F MED LIST DOCD IN RCRD: CPT | Mod: CPTII,95,, | Performed by: NURSE PRACTITIONER

## 2023-09-29 PROCEDURE — 1160F RVW MEDS BY RX/DR IN RCRD: CPT | Mod: CPTII,95,, | Performed by: NURSE PRACTITIONER

## 2023-09-29 PROCEDURE — 3288F FALL RISK ASSESSMENT DOCD: CPT | Mod: CPTII,95,, | Performed by: NURSE PRACTITIONER

## 2023-09-29 PROCEDURE — 3008F BODY MASS INDEX DOCD: CPT | Mod: CPTII,95,, | Performed by: NURSE PRACTITIONER

## 2023-09-29 PROCEDURE — 3008F PR BODY MASS INDEX (BMI) DOCUMENTED: ICD-10-PCS | Mod: CPTII,95,, | Performed by: NURSE PRACTITIONER

## 2023-09-29 PROCEDURE — 3044F PR MOST RECENT HEMOGLOBIN A1C LEVEL <7.0%: ICD-10-PCS | Mod: CPTII,95,, | Performed by: NURSE PRACTITIONER

## 2023-09-29 PROCEDURE — 1170F PR FUNCTIONAL STATUS ASSESSED: ICD-10-PCS | Mod: CPTII,95,, | Performed by: NURSE PRACTITIONER

## 2023-09-29 PROCEDURE — 1126F PR PAIN SEVERITY QUANTIFIED, NO PAIN PRESENT: ICD-10-PCS | Mod: CPTII,95,, | Performed by: NURSE PRACTITIONER

## 2023-09-29 PROCEDURE — 4010F PR ACE/ARB THEARPY RXD/TAKEN: ICD-10-PCS | Mod: CPTII,95,, | Performed by: NURSE PRACTITIONER

## 2023-09-29 PROCEDURE — 1159F PR MEDICATION LIST DOCUMENTED IN MEDICAL RECORD: ICD-10-PCS | Mod: CPTII,95,, | Performed by: NURSE PRACTITIONER

## 2023-09-29 PROCEDURE — 1101F PT FALLS ASSESS-DOCD LE1/YR: CPT | Mod: CPTII,95,, | Performed by: NURSE PRACTITIONER

## 2023-09-29 PROCEDURE — G0439 PR MEDICARE ANNUAL WELLNESS SUBSEQUENT VISIT: ICD-10-PCS | Mod: 95,,, | Performed by: NURSE PRACTITIONER

## 2023-09-29 PROCEDURE — 1160F PR REVIEW ALL MEDS BY PRESCRIBER/CLIN PHARMACIST DOCUMENTED: ICD-10-PCS | Mod: CPTII,95,, | Performed by: NURSE PRACTITIONER

## 2023-09-29 PROCEDURE — 1170F FXNL STATUS ASSESSED: CPT | Mod: CPTII,95,, | Performed by: NURSE PRACTITIONER

## 2023-09-29 PROCEDURE — 3044F HG A1C LEVEL LT 7.0%: CPT | Mod: CPTII,95,, | Performed by: NURSE PRACTITIONER

## 2023-09-29 PROCEDURE — 1101F PR PT FALLS ASSESS DOC 0-1 FALLS W/OUT INJ PAST YR: ICD-10-PCS | Mod: CPTII,95,, | Performed by: NURSE PRACTITIONER

## 2023-09-29 PROCEDURE — 4010F ACE/ARB THERAPY RXD/TAKEN: CPT | Mod: CPTII,95,, | Performed by: NURSE PRACTITIONER

## 2023-09-29 NOTE — TELEPHONE ENCOUNTER
Called pt; no answer; left message informing patient I was calling to confirm pt's virtual EAWV today at 1:00pm and to see if pt needed any help with setting up for virtual visit and to login 10 minutes prior to scheduled appt; left my name & number for pt to return my call if pt had any questions or concerns;

## 2023-09-29 NOTE — PROGRESS NOTES
"The patient location is: [place]  The chief complaint leading to consultation is: Medicare AWV  Visit type: audiovisual    Face to Face time with patient: 22 minutes  40 minutes of total time spent on the encounter, which includes face to face time and non-face to face time preparing to see the patient (eg, review of tests), Obtaining and/or reviewing separately obtained history, Documenting clinical information in the electronic or other health record, Independently interpreting results (not separately reported) and communicating results to the patient/family/caregiver, or Care coordination (not separately reported).     Each patient to whom he or she provides medical services by telemedicine is:  (1) informed of the relationship between the physician and patient and the respective role of any other health care provider with respect to management of the patient; and (2) notified that he or she may decline to receive medical services by telemedicine and may withdraw from such care at any time.    Jm Deleon presented for a  Medicare AWV and comprehensive Health Risk Assessment today. The following components were reviewed and updated:    Medical history  Family History  Social history  Allergies and Current Medications  Health Risk Assessment  Health Maintenance  Care Team     ** See Completed Assessments for Annual Wellness Visit within the encounter summary.**         The following assessments were completed:  Living Situation  CAGE  Depression Screening  Fall Risk Assessment (MACH 10)  Hearing Assessment(HHI)  Cognitive Function Screening  Nutrition Screening  ADL Screening  PAQ Screening      Vitals:    09/29/23 1331   Weight: 92.1 kg (203 lb 0.7 oz)   Height: 5' 8" (1.727 m)     Body mass index is 30.87 kg/m².    Physical Exam  Constitutional:       General: He is not in acute distress.     Appearance: Normal appearance. He is well-developed and well-groomed.   HENT:      Head: Normocephalic.   Pulmonary: "      Effort: Pulmonary effort is normal. No respiratory distress.   Skin:     Coloration: Skin is not pale.      Comments: Purpura noted   Neurological:      Mental Status: He is alert and oriented to person, place, and time.   Psychiatric:         Attention and Perception: Attention normal.         Mood and Affect: Mood and affect normal.         Speech: Speech normal.         Behavior: Behavior normal. Behavior is cooperative.         Physical exam limited d/t virtual visit. Patient does not appear to be in any respiratory distress. Able to speak in complete sentences without becoming short of breath.        Diagnoses and health risks identified today and associated recommendations/orders:    1. Encounter for preventive health examination  Pain level assessed and reviewed. Not taking any opioids; at low risk for opioid use disorder. Follow up with PCP.    2. Sacroiliitis  Chronic; stable on medication. Follow up with PCP.    3. Type 2 diabetes mellitus with other circulatory complication, with long-term current use of insulin  Chronic; stable on medication. Follow up with PCP.    4. Aortic arch atherosclerosis  Chronic; stable on medication. As seen on previous CT imaging. Follow up with PCP.    5. Senile purpura  As seen on attached photo; follow up with PCP.    6. Thrombocytopenia  Chronic; stable. Follow up with PCP.    7. Coronary artery disease of native artery of native heart with stable angina pectoris  Chronic; stable on medication. Follow up with PCP.    8. Class 1 obesity due to excess calories without serious comorbidity with body mass index (BMI) of 30.0 to 30.9 in adult  Eat a low salt/low fat ADA diet and discussed importance of engaging in physical activity at least 5x/week for a minimum of 30 min/day.    9. Encounter for Medicare annual wellness exam  - Ambulatory Referral/Consult to Enhanced Annual Wellness Visit (eAWV)      Provided Jm with a 5-10 year written screening schedule and personal  prevention plan. Recommendations were developed using the USPSTF age appropriate recommendations. Education, counseling, and referrals were provided as needed. After Visit Summary given to patient which includes a list of additional screenings/tests needed.    Follow up for your next annual wellness visit.    Evonne Dennis NP      Advance Care Planning     I offered to discuss advanced care planning, including how to pick a person who would make decisions for you if you were unable to make them for yourself, called a health care power of , and what kind of decisions you might make such as use of life sustaining treatments such as ventilators and tube feeding when faced with a life limiting illness recorded on a living will that they will need to know. (How you want to be cared for as you near the end of your natural life)     X Patient is interested in learning more about how to make advanced directives. I offered to discuss this with them and instructed to follow up with PCP.

## 2023-10-02 ENCOUNTER — OFFICE VISIT (OUTPATIENT)
Dept: DERMATOLOGY | Facility: CLINIC | Age: 68
End: 2023-10-02
Payer: MEDICARE

## 2023-10-02 DIAGNOSIS — L29.9 PRURITUS: ICD-10-CM

## 2023-10-02 DIAGNOSIS — Z85.828 PERSONAL HISTORY OF SKIN CANCER: ICD-10-CM

## 2023-10-02 DIAGNOSIS — L57.0 AK (ACTINIC KERATOSIS): Primary | ICD-10-CM

## 2023-10-02 DIAGNOSIS — Z85.820 HISTORY OF MALIGNANT MELANOMA: ICD-10-CM

## 2023-10-02 DIAGNOSIS — D22.9 NEVUS: ICD-10-CM

## 2023-10-02 DIAGNOSIS — D18.01 CHERRY ANGIOMA: ICD-10-CM

## 2023-10-02 DIAGNOSIS — L81.4 LENTIGO: ICD-10-CM

## 2023-10-02 DIAGNOSIS — L82.1 SK (SEBORRHEIC KERATOSIS): ICD-10-CM

## 2023-10-02 PROCEDURE — 17000 PR DESTRUCTION(LASER SURGERY,CRYOSURGERY,CHEMOSURGERY),PREMALIGNANT LESIONS,FIRST LESION: ICD-10-PCS | Mod: S$GLB,,, | Performed by: DERMATOLOGY

## 2023-10-02 PROCEDURE — 3044F HG A1C LEVEL LT 7.0%: CPT | Mod: CPTII,S$GLB,, | Performed by: DERMATOLOGY

## 2023-10-02 PROCEDURE — 1100F PR PT FALLS ASSESS DOC 2+ FALLS/FALL W/INJURY/YR: ICD-10-PCS | Mod: CPTII,S$GLB,, | Performed by: DERMATOLOGY

## 2023-10-02 PROCEDURE — 99999 PR PBB SHADOW E&M-EST. PATIENT-LVL IV: ICD-10-PCS | Mod: PBBFAC,,, | Performed by: DERMATOLOGY

## 2023-10-02 PROCEDURE — 17003 DESTRUCTION, PREMALIGNANT LESIONS; SECOND THROUGH 14 LESIONS: ICD-10-PCS | Mod: S$GLB,,, | Performed by: DERMATOLOGY

## 2023-10-02 PROCEDURE — 4010F ACE/ARB THERAPY RXD/TAKEN: CPT | Mod: CPTII,S$GLB,, | Performed by: DERMATOLOGY

## 2023-10-02 PROCEDURE — 1159F MED LIST DOCD IN RCRD: CPT | Mod: CPTII,S$GLB,, | Performed by: DERMATOLOGY

## 2023-10-02 PROCEDURE — 99213 OFFICE O/P EST LOW 20 MIN: CPT | Mod: 25,S$GLB,, | Performed by: DERMATOLOGY

## 2023-10-02 PROCEDURE — 3288F PR FALLS RISK ASSESSMENT DOCUMENTED: ICD-10-PCS | Mod: CPTII,S$GLB,, | Performed by: DERMATOLOGY

## 2023-10-02 PROCEDURE — 4010F PR ACE/ARB THEARPY RXD/TAKEN: ICD-10-PCS | Mod: CPTII,S$GLB,, | Performed by: DERMATOLOGY

## 2023-10-02 PROCEDURE — 1159F PR MEDICATION LIST DOCUMENTED IN MEDICAL RECORD: ICD-10-PCS | Mod: CPTII,S$GLB,, | Performed by: DERMATOLOGY

## 2023-10-02 PROCEDURE — 17003 DESTRUCT PREMALG LES 2-14: CPT | Mod: S$GLB,,, | Performed by: DERMATOLOGY

## 2023-10-02 PROCEDURE — 1160F RVW MEDS BY RX/DR IN RCRD: CPT | Mod: CPTII,S$GLB,, | Performed by: DERMATOLOGY

## 2023-10-02 PROCEDURE — 1160F PR REVIEW ALL MEDS BY PRESCRIBER/CLIN PHARMACIST DOCUMENTED: ICD-10-PCS | Mod: CPTII,S$GLB,, | Performed by: DERMATOLOGY

## 2023-10-02 PROCEDURE — 1100F PTFALLS ASSESS-DOCD GE2>/YR: CPT | Mod: CPTII,S$GLB,, | Performed by: DERMATOLOGY

## 2023-10-02 PROCEDURE — 17000 DESTRUCT PREMALG LESION: CPT | Mod: S$GLB,,, | Performed by: DERMATOLOGY

## 2023-10-02 PROCEDURE — 1125F AMNT PAIN NOTED PAIN PRSNT: CPT | Mod: CPTII,S$GLB,, | Performed by: DERMATOLOGY

## 2023-10-02 PROCEDURE — 1125F PR PAIN SEVERITY QUANTIFIED, PAIN PRESENT: ICD-10-PCS | Mod: CPTII,S$GLB,, | Performed by: DERMATOLOGY

## 2023-10-02 PROCEDURE — 99213 PR OFFICE/OUTPT VISIT, EST, LEVL III, 20-29 MIN: ICD-10-PCS | Mod: 25,S$GLB,, | Performed by: DERMATOLOGY

## 2023-10-02 PROCEDURE — 99999 PR PBB SHADOW E&M-EST. PATIENT-LVL IV: CPT | Mod: PBBFAC,,, | Performed by: DERMATOLOGY

## 2023-10-02 PROCEDURE — 3288F FALL RISK ASSESSMENT DOCD: CPT | Mod: CPTII,S$GLB,, | Performed by: DERMATOLOGY

## 2023-10-02 PROCEDURE — 3044F PR MOST RECENT HEMOGLOBIN A1C LEVEL <7.0%: ICD-10-PCS | Mod: CPTII,S$GLB,, | Performed by: DERMATOLOGY

## 2023-10-02 NOTE — PROGRESS NOTES
Subjective:      Patient ID:  Jm Deleon is a 68 y.o. male who presents for   Chief Complaint   Patient presents with    Skin Check     tbse     Patient is here today for a TBSE.     Pt does not have any other concerns.     Pt has a h/o mm and SCC.       Date of biopsy: 09/20/2019  [place] test  no  Location: L Back  Depth: 2.7 mm  LN: yes L axilla x5- tested and negative   Date of excision: 10/18/2019     Pt has a history of  extensive sun exposure in the past.   Pt recalls several blistering sunburns in the past:  Yes teenage years  Pt has history of tanning bed use: no  Pt has had atypical moles removed in the past: yes  Pt has personal history of breast cancer: no  Pt has history of melanoma in first degree relatives:  no  Pt has family history of pancreatic cancer: no  Pt is currently immunosuppressed: no     Walters Type: 2     Last dental exam: 2015  Last eye exam: June or July 2023        Review of Systems   Skin:  Positive for activity-related sunscreen use and wears hat. Negative for daily sunscreen use, tendency to form keloidal scars and recent sunburn.   Hematologic/Lymphatic: Bruises/bleeds easily (baby aspirin daily).       Objective:   Physical Exam   Constitutional: He appears well-developed and well-nourished. No distress.   Musculoskeletal: Lymphadenopathy:      Cervical: No cervical adenopathy.      Upper Body:   No axillary adenopathy present.No supraclavicular adenopathy is present.     Lymphadenopathy: No supraclavicular adenopathy is present.     He has no cervical adenopathy.     He has no axillary adenopathy.   Neurological: He is alert and oriented to person, place, and time. He is not disoriented.   Psychiatric: He has a normal mood and affect.   Skin:   Areas Examined (abnormalities noted in diagram):   Scalp / Hair Palpated and Inspected  Head / Face Inspection Performed  Neck Inspection Performed  Chest / Axilla Inspection Performed  Abdomen Inspection Performed  Genitals /  Buttocks / Groin Inspection Performed  Back Inspection Performed  RUE Inspected  LUE Inspection Performed  RLE Inspected  LLE Inspection Performed  Nails and Digits Inspection Performed                     Diagram Legend     Erythematous scaling macule/papule c/w actinic keratosis       Vascular papule c/w angioma      Pigmented verrucoid papule/plaque c/w seborrheic keratosis      Yellow umbilicated papule c/w sebaceous hyperplasia      Irregularly shaped tan macule c/w lentigo     1-2 mm smooth white papules consistent with Milia      Movable subcutaneous cyst with punctum c/w epidermal inclusion cyst      Subcutaneous movable cyst c/w pilar cyst      Firm pink to brown papule c/w dermatofibroma      Pedunculated fleshy papule(s) c/w skin tag(s)      Evenly pigmented macule c/w junctional nevus     Mildly variegated pigmented, slightly irregular-bordered macule c/w mildly atypical nevus      Flesh colored to evenly pigmented papule c/w intradermal nevus       Pink pearly papule/plaque c/w basal cell carcinoma      Erythematous hyperkeratotic cursted plaque c/w SCC      Surgical scar with no sign of skin cancer recurrence      Open and closed comedones      Inflammatory papules and pustules      Verrucoid papule consistent consistent with wart     Erythematous eczematous patches and plaques     Dystrophic onycholytic nail with subungual debris c/w onychomycosis     Umbilicated papule    Erythematous-base heme-crusted tan verrucoid plaque consistent with inflamed seborrheic keratosis     Erythematous Silvery Scaling Plaque c/w Psoriasis     See annotation      Assessment / Plan:        AK (actinic keratosis)  Cryosurgery Procedure Note    Verbal consent from the patient is obtained including, but not limited to, risk of hypopigmentation/hyperpigmentation, scar, recurrence of lesion. The patient is aware of the precancerous quality and need for treatment of these lesions. Liquid nitrogen cryosurgery is applied to the  3 actinic keratoses, as detailed in the physical exam, to produce a freeze injury. The patient is aware that blisters may form and is instructed on wound care with gentle cleansing and use of vaseline ointment to keep moist until healed. The patient is supplied a handout on cryosurgery and is instructed to call if lesions do not completely resolve.    History of malignant melanoma- L back 2.7 mm, neg SLN bx  Personal history of skin cancer  Area of previous melanoma  and NMSC examined. Site well healed with no signs of recurrence.    Total body skin examination performed today including at least 12 points as noted in physical examination. No lesions suspicious for malignancy noted.    Recommend daily sun protection/avoidance, use of at least SPF 30, broad spectrum sunscreen (OTC drug), skin self examinations, and routine physician surveillance to optimize early detection      Lentigo  This is a benign hyperpigmented sun induced lesion. Recommend daily sun protection/avoidance and use of at least SPF 30, broad spectrum sunscreen (OTC drug) will reduce the number of new lesions. Treatment of these benign lesions are considered cosmetic.  The nature of sun-induced photo-aging and skin cancers is discussed.  Sun avoidance, protective clothing, and the use of 30-SPF sunscreens is advised. Observe closely for skin damage/changes, and call if such occurs.    SK (seborrheic keratosis)  These are benign inherited growths without a malignant potential. Reassurance given to patient. No treatment is necessary.     Cherry angioma  These are benign vascular lesions that are inherited.  Treatment is not necessary.    Nevus  Discussed ABCDE's of nevi.  Monitor for new mole or moles that are becoming bigger, darker, irritated, or developing irregular borders. Brochure provided. Instructed patient to observe lesion(s) for changes and follow up in clinic if changes are noted. Patient to monitor skin at home for new or changing  lesions.     Pruritus  Dermeleve  ice           Follow up in about 6 months (around 4/2/2024) for TBSE.

## 2023-10-06 NOTE — TELEPHONE ENCOUNTER
No new care gaps identified.  Powered by [employer]. Reference number: [id number].   1/23/2022 4:28:35 AM CST   yes

## 2023-10-17 ENCOUNTER — CLINICAL SUPPORT (OUTPATIENT)
Dept: REHABILITATION | Facility: HOSPITAL | Age: 68
End: 2023-10-17
Payer: MEDICARE

## 2023-10-17 DIAGNOSIS — Z74.09 IMPAIRED FUNCTIONAL MOBILITY, BALANCE, GAIT, AND ENDURANCE: Primary | ICD-10-CM

## 2023-10-17 PROCEDURE — 97530 THERAPEUTIC ACTIVITIES: CPT | Mod: PO

## 2023-10-17 PROCEDURE — 97110 THERAPEUTIC EXERCISES: CPT | Mod: PO

## 2023-10-17 PROCEDURE — 97112 NEUROMUSCULAR REEDUCATION: CPT | Mod: PO

## 2023-10-17 NOTE — PROGRESS NOTES
[facility]   Physical Therapy Treatment Note     Name: Jm Deleon  Clinic Number: [id number]    Therapy Diagnosis:   Encounter Diagnosis   Name Primary?    Impaired functional mobility, balance, gait, and endurance Yes       Physician: Dallas Martin MD    Visit Date: 10/17/2023     Therapy Diagnosis:        Encounter Diagnoses   Name Primary?    Balance disorder      Impaired functional mobility, balance, gait, and endurance        Physician: Dallas Martin MD     Physician Orders: PT Eval and Treat   Medical Diagnosis from Referral: R26.89 (ICD-10-CM) - Balance disorder  Evaluation Date: 9/21/2023  Authorization Period Expiration: 12/31/2023  Plan of Care Expiration: 11/16/2023  Visit # / Visits authorized: 02/ 10     Time In: 1415  Time Out: 1500  Total Billable Time: 45 minutes     Precautions: Standard      SUBJECTIVE     Pt reports: that his hip was bothering him after performing sit to stands on his home exercise program but he is doing fine now    He was given home exercise program, patient verbalizes/ demonstrates understanding. Patient was compliant with home exercise program     Response to previous treatment: good   Functional change: ongoing     Pain: 0/10  Location:  NA     OBJECTIVE     Objective Measures updated at progress report unless specified.     Treatment     Jm received the treatments listed below:      therapeutic exercises to develop strength, endurance, ROM, flexibility, posture, and core stabilization for 17 minutes including:    10 minutes on [employer] seated elliptical for CV endurance and LE strength, level 6.0    3 x 10 reps bilateral leg press 130#  2 x 10 reps LLE only leg press 80#    neuromuscular re-education activities to improve: Balance, Coordination, Kinesthetic, Sense, Proprioception, and Posture for 10 minutes. The following activities were included:    4 laps tandem ambulation, CGA   4 laps marching with three second holds, CGA      therapeutic activities to improve functional performance for 18  minutes, including:    3 x 10 reps sit to stands while holding tidal tank, CGA    10 reps BLE leading step up to foam fitter with opposite leg single large cone tap, one UE support, CGA    In hallway:   2 x 100 feet ambulation with horizontal head turns while holding tidal tank, CGA  2 x 100 feet ambulation with vertical head turns while holding tidal tank, CGA  2 x 100 feet forward/backwards ambulation with ball toss to/from tech, [facility]      Patient Education and Home Exercises     Home Exercises Provided and Patient Education Provided     Education provided:   - home exercise program     Written Home Exercises Provided: yes. Exercises were reviewed and Jm was able to demonstrate them prior to the end of the session.  Jm demonstrated good  understanding of the education provided. See EMR under Patient Instructions for exercises provided during therapy sessions    ASSESSMENT     Jm tolerated today's session very well this afternoon. Session focused primarily on LE strength and dynamic balance activities. The patient reports SLS on right side is challenging to him and feels very unstable. The patient will benefit from continued physical therapy intervention to address remaining functional mobility deficits.     Jm Is progressing well towards his goals.   Pt prognosis is Good.     Pt will continue to benefit from skilled outpatient physical therapy to address the deficits listed in the problem list box on initial evaluation, provide pt/family education and to maximize pt's level of independence in the home and community environment.     Pt's spiritual, cultural and educational needs considered and pt agreeable to plan of care and goals.     Anticipated barriers to physical therapy: co-morbidities     Goals:    Short Term Goals: 4 weeks   1. Patient to be (I) with established home exercise program. Ongoing   2. Patient to improve left hip  flexion by 1/3 MMT to improve balance and functional mobility. Ongoing   3. Patient to improve left knee flexion strength by 1/3 MMT to improve balance and functional mobility. Ongoing   4. Patient to improve BLE SLS time to 10 seconds with BLE for improved static balance and decrease risk for falls. Ongoing   5. Patient to improve 5 time sit to stand time to 14 seconds for improved functional mobility and strength. Ongoing   6. Patient to improve SSWS to 1.0  m/sec for improved safety with ambulation. Ongoing   7. Patient to improve GST condition 4 to 30 seconds with minimal sway for improved static balance. Ongoing      Long Term Goals: 8 weeks   1. Patient to be (I) with advanced home exercise program.Ongoing   2. Patient to improve left hip flexion by 2/3 MMT to improve balance and functional mobility. Ongoing   3. Patient to improve BLE SLS time to 15 seconds with BLE for improved static balance and decrease risk for falls. Ongoing   4. Patient to improve 5 time sit to stand time to 12 seconds for improved functional mobility and strength. Ongoing   5. Patient to improve SSWS to 1.2  m/sec for improved safety with ambulation. Ongoing   6. Patient to improve GST condition 4 to 30 seconds with slight sway for improved static balance. Ongoing           PLAN     Continue to progress LE strength, endurance and static/dynamic balance training    Dorcas Hankins, PT

## 2023-10-18 NOTE — PROGRESS NOTES
[facility]   Physical Therapy Treatment Note     Name: Jm Deleon  Clinic Number: [id number]    Therapy Diagnosis:   Encounter Diagnosis   Name Primary?    Impaired functional mobility, balance, gait, and endurance Yes     Physician: Dallas Martin MD    Visit Date: 10/19/2023     Therapy Diagnosis:        Encounter Diagnoses   Name Primary?    Balance disorder      Impaired functional mobility, balance, gait, and endurance        Physician: Dallas Martin MD     Physician Orders: PT Eval and Treat   Medical Diagnosis from Referral: R26.89 (ICD-10-CM) - Balance disorder  Evaluation Date: 9/21/2023  Authorization Period Expiration: 12/31/2023  Plan of Care Expiration: 11/16/2023  Visit # / Visits authorized: 03/ 10     Time In: 1330  Time Out: 1415  Total Billable Time: 45 minutes     Precautions: Standard      SUBJECTIVE     Pt reports: that he feels fine and has no new complaints     He was given home exercise program, patient verbalizes/ demonstrates understanding. Patient was compliant with home exercise program     Response to previous treatment: good   Functional change: ongoing     Pain: 0/10  Location:  NA     OBJECTIVE     Objective Measures updated at progress report unless specified.     Treatment     Jm received the treatments listed below:      therapeutic exercises to develop strength, endurance, ROM, flexibility, posture, and core stabilization for 15 minutes including:    10 minutes on [employer] seated elliptical for CV endurance and LE strength, level 6.0    3 x 10 reps bilateral leg press 130#   8 reps LLE only leg press 80#    neuromuscular re-education activities to improve: Balance, Coordination, Kinesthetic, Sense, Proprioception, and Posture for 5 minutes. The following activities were included:    At ballet bar:   2 x 30 seconds BLE leading split stance on foam fitter with eyes closed     therapeutic activities to improve functional performance for 25  minutes, including:    3 x 10 reps sit to stands while holding tidal tank, standing on foam pad, CGA    10 reps BLE leading step up to foam fitter with opposite leg single large cone tap, one UE support, CGA    In hallway:   2 x 100 feet ambulation with horizontal head turns while holding tidal tank, CGA  2 x 100 feet ambulation with vertical head turns while holding tidal tank, CGA  2 x 100 feet forward/backwards ambulation with ball toss to/from tech, CGA      Patient Education and Home Exercises     Home Exercises Provided and Patient Education Provided     Education provided:   - home exercise program     Written Home Exercises Provided: yes. Exercises were reviewed and Jm was able to demonstrate them prior to the end of the session.  Jm demonstrated good  understanding of the education provided. See EMR under Patient Instructions for exercises provided during therapy sessions    ASSESSMENT     Jm tolerated today's session very well this afternoon. One instance of LLE foot catching on the ground when performing head turns in the hallway noted causing LOB that the patient was able to self correct. The patient reports that LLE leg press was very challenging and the patient is only able to perform one set due to fatigue. the patient will benefit from continued physical therapy intervention to address remaining functional mobility deficits.      Jm Is progressing well towards his goals.   Pt prognosis is Good.     Pt will continue to benefit from skilled outpatient physical therapy to address the deficits listed in the problem list box on initial evaluation, provide pt/family education and to maximize pt's level of independence in the home and community environment.     Pt's spiritual, cultural and educational needs considered and pt agreeable to plan of care and goals.     Anticipated barriers to physical therapy: co-morbidities     Goals:    Short Term Goals: 4 weeks   1. Patient to be (I) with  established home exercise program. Ongoing   2. Patient to improve left hip flexion by 1/3 MMT to improve balance and functional mobility. Ongoing   3. Patient to improve left knee flexion strength by 1/3 MMT to improve balance and functional mobility. Ongoing   4. Patient to improve BLE SLS time to 10 seconds with BLE for improved static balance and decrease risk for falls. Ongoing   5. Patient to improve 5 time sit to stand time to 14 seconds for improved functional mobility and strength. Ongoing   6. Patient to improve SSWS to 1.0  m/sec for improved safety with ambulation. Ongoing   7. Patient to improve GST condition 4 to 30 seconds with minimal sway for improved static balance. Ongoing      Long Term Goals: 8 weeks   1. Patient to be (I) with advanced home exercise program.Ongoing   2. Patient to improve left hip flexion by 2/3 MMT to improve balance and functional mobility. Ongoing   3. Patient to improve BLE SLS time to 15 seconds with BLE for improved static balance and decrease risk for falls. Ongoing   4. Patient to improve 5 time sit to stand time to 12 seconds for improved functional mobility and strength. Ongoing   5. Patient to improve SSWS to 1.2  m/sec for improved safety with ambulation. Ongoing   6. Patient to improve GST condition 4 to 30 seconds with slight sway for improved static balance. Ongoing           PLAN     Continue to progress LE strength, endurance and static/dynamic balance training    Dorcas Hankins, PT

## 2023-10-19 ENCOUNTER — CLINICAL SUPPORT (OUTPATIENT)
Dept: REHABILITATION | Facility: HOSPITAL | Age: 68
End: 2023-10-19
Payer: MEDICARE

## 2023-10-19 DIAGNOSIS — Z74.09 IMPAIRED FUNCTIONAL MOBILITY, BALANCE, GAIT, AND ENDURANCE: Primary | ICD-10-CM

## 2023-10-19 PROCEDURE — 97110 THERAPEUTIC EXERCISES: CPT | Mod: PO

## 2023-10-19 PROCEDURE — 97530 THERAPEUTIC ACTIVITIES: CPT | Mod: PO

## 2023-10-24 ENCOUNTER — CLINICAL SUPPORT (OUTPATIENT)
Dept: REHABILITATION | Facility: HOSPITAL | Age: 68
End: 2023-10-24
Payer: MEDICARE

## 2023-10-24 DIAGNOSIS — Z74.09 IMPAIRED FUNCTIONAL MOBILITY, BALANCE, GAIT, AND ENDURANCE: Primary | ICD-10-CM

## 2023-10-24 PROCEDURE — 97530 THERAPEUTIC ACTIVITIES: CPT | Mod: PO

## 2023-10-24 PROCEDURE — 97112 NEUROMUSCULAR REEDUCATION: CPT | Mod: PO

## 2023-10-24 PROCEDURE — 97110 THERAPEUTIC EXERCISES: CPT | Mod: PO

## 2023-10-24 NOTE — PROGRESS NOTES
[facility]   Physical Therapy Treatment Note / Re-Assessment Note    Name: Jm Deleon  Clinic Number: [id number]    Therapy Diagnosis:   Encounter Diagnosis   Name Primary?    Impaired functional mobility, balance, gait, and endurance Yes       Physician: Dallas Martin MD    Visit Date: 10/24/2023     Therapy Diagnosis:        Encounter Diagnoses   Name Primary?    Balance disorder      Impaired functional mobility, balance, gait, and endurance        Physician: Dallas Martin MD     Physician Orders: PT Eval and Treat   Medical Diagnosis from Referral: R26.89 (ICD-10-CM) - Balance disorder  Evaluation Date: 9/21/2023  Progress Note Due: 11/24/2023  Authorization Period Expiration: 12/31/2023  Plan of Care Expiration: 11/16/2023  Visit # / Visits authorized: 04/ 10     Time In: 13:00  Time Out: 13:41  Total Billable Time: 41 minutes     Precautions: Standard      SUBJECTIVE     Pt reports: he had to cancel appointments in the following weeks because he's going to [place] to visit his son and will be there for 2 weeks. He does feel a lot better than before and thinks therapy has helped him.     He was given home exercise program, patient verbalizes/ demonstrates understanding. Patient was compliant with home exercise program     Response to previous treatment: good   Functional change: ongoing     Pain: 0/10  Location:  NA     OBJECTIVE     Objective Measures updated at progress report unless specified.     Lower Extremity Strength  Right LE   Left LE     Hip Flexion: 5/5 Hip Flexion: 5/5   Hip Extension:  5/5 Hip Extension: 5/5   Hip Abduction: 5/5 Hip Abduction: 5/5   Hip Adduction: 5/5 Hip Adduction 5/5   Knee Extension: 5/5 Knee Extension: 5/5   Knee Flexion: 5/5 Knee Flexion: 5/5   Ankle Dorsiflexion: 5/5 Ankle Dorsiflexion: 5/5   Ankle Plantarflexion: 5/5 Ankle Plantarflexion: 5/5           Evaluation 10/24/2023   Single Limb Stance R LE 2 seconds   (<10 sec = HIGH  FALL RISK) 2 sec   Single Limb Stance L LE 5 seconds   (<10 sec = HIGH FALL RISK) 5 sec          Evaluation 10/24/2023   30 second Chair Rise  (adults > 59 y/o) 7.5 completed with no arms but patient reports significant fatigue following test  12 completed with no arms   5 times sit-stand  (adults 18-65 y/o) 18 seconds with no arms   >12 sec= fall risk for general elderly  >16 sec= fall risk for Parkinson's disease  >10 sec= balance/vestibular dysfunction (<59 y/o)  >14.2 sec= balance/vestibular dysfunction (>59 y/o)  >12 sec= fall risk for CVA 13 seconds with no arms      Postural control:  MCTSIB:  1. Eyes Open/feet together/Firm: 30 seconds  2. Eyes Closed/feet together/Firm: 30 seconds  3. Eyes Open/feet together/Foam: 30 seconds  4. Eyes Closed/feet together/Foam: 30 seconds min sway       Evaluation 10/24/2023   Self Selected Walking Speed 0.85 m/sec (6m/7s) 1.0 m/sec (6m/6s)   Fast Walking Speed 1.2 m/sec (6m/5s) 1.5 m/sec (6m/4s)         CMS Impairment/Limitation/Restriction for FOTO Balance  Survey     Therapist reviewed FOTO scores for Jm Deleon on 10/24/2023.   FOTO documents entered into [employer] - see Media section.     Limitation Score: 49%  Current Score: 41% limitation        ABC Scale: 88.1% confidence    Treatment     Jm received the treatments listed below:      therapeutic exercises to develop strength, endurance, ROM, flexibility, posture, and core stabilization for 15 minutes including:    10 minutes on SCIFIT seated elliptical for CV endurance and LE strength, level 6.0  Objective testing    neuromuscular re-education activities to improve: Balance, Coordination, Kinesthetic, Sense, Proprioception, and Posture for 10 minutes. The following activities were included:    Objective testing  FOTO survey    therapeutic activities to improve functional performance for 16 minutes, including:    In open environment gym requiring turns and obstacle navigation:   2x laps around gym ambulation with  horizontal head turns while holding tidal tank, CGA  2x laps around gym ambulation with vertical head turns while holding tidal tank, CGA    Time above includes objective testing.     Patient Education and Home Exercises     Home Exercises Provided and Patient Education Provided     Education provided:   - home exercise program     Written Home Exercises Provided: yes. Exercises were reviewed and Jm was able to demonstrate them prior to the end of the session.  Jm demonstrated good  understanding of the education provided. See EMR under Patient Instructions for exercises provided during therapy sessions    ASSESSMENT     Jm has made great gains since initial evaluation, meeting several STG and LTG focused on balance and mobility. His single limb stance remains a difficult task but overall he is progressing towards his goals. He will likely benefit from a few more PT sessions following return from trip and then will be appropriate for discharge.       Jm Is progressing well towards his goals.   Pt prognosis is Good.     Pt will continue to benefit from skilled outpatient physical therapy to address the deficits listed in the problem list box on initial evaluation, provide pt/family education and to maximize pt's level of independence in the home and community environment.     Pt's spiritual, cultural and educational needs considered and pt agreeable to plan of care and goals.     Anticipated barriers to physical therapy: co-morbidities     Goals:    Short Term Goals: 4 weeks   1. Patient to be (I) with established home exercise program. MET 10/24/23   2. Patient to improve left hip flexion by 1/3 MMT to improve balance and functional mobility. MET 10/24/23   3. Patient to improve left knee flexion strength by 1/3 MMT to improve balance and functional mobility. MET 10/24/23   4. Patient to improve BLE SLS time to 10 seconds with BLE for improved static balance and decrease risk for falls. Ongoing   5.  Patient to improve 5 time sit to stand time to 14 seconds for improved functional mobility and strength. MET 10/24/23   6. Patient to improve SSWS to 1.0  m/sec for improved safety with ambulation. MET 10/24/23   7. Patient to improve GST condition 4 to 30 seconds with minimal sway for improved static balance. MET 10/24/23      Long Term Goals: 8 weeks   1. Patient to be (I) with advanced home exercise program.Ongoing   2. Patient to improve left hip flexion by 2/3 MMT to improve balance and functional mobility. MET 10/24/23   3. Patient to improve BLE SLS time to 15 seconds with BLE for improved static balance and decrease risk for falls. Ongoing   4. Patient to improve 5 time sit to stand time to 12 seconds for improved functional mobility and strength. MET 10/24/23   5. Patient to improve SSWS to 1.2 m/sec for improved safety with ambulation. Ongoing   6. Patient to improve GST condition 4 to 30 seconds with slight sway for improved static balance. Ongoing         PLAN     Continue to progress LE strength, endurance and static/dynamic balance training    Hanane Corey, PT

## 2023-10-30 NOTE — TELEPHONE ENCOUNTER
----- Message from Sharona Campbell sent at 3/21/2019  8:30 AM CDT -----  Contact: [place]/ [facility]/ [phone]  Type: Rx    Name of medication(s): insulin (LANTUS SOLOSTAR U-100 INSULIN) glargine 100 units/mL (3mL) SubQ pen, insulin aspart U-100 (NOVOLOG FLEXPEN U-100 INSULIN) 100 unit/mL InPn pen, and metFORMIN (GLUCOPHAGE) 1000 MG tablet    Is this a refill? New rx?New    Who prescribed medication?Dallas Martin MD    Pharmacy Name, Phone, & Location:[facility] - [facility] [address]    Comments:Please refill.      Thanks    
Ep in 2 months with prelabs.    Routing Comment      Patient scheduled for ep appt/labs, mailed out.  
negative

## 2023-11-16 DIAGNOSIS — Z96.641 STATUS POST RIGHT HIP REPLACEMENT: Primary | ICD-10-CM

## 2023-11-17 ENCOUNTER — HOSPITAL ENCOUNTER (OUTPATIENT)
Dept: RADIOLOGY | Facility: HOSPITAL | Age: 68
Discharge: HOME | End: 2023-11-17
Attending: ORTHOPAEDIC SURGERY
Payer: MEDICARE

## 2023-11-17 ENCOUNTER — OFFICE VISIT (OUTPATIENT)
Dept: ORTHOPEDICS | Facility: CLINIC | Age: 68
End: 2023-11-17
Payer: MEDICARE

## 2023-11-17 VITALS — HEIGHT: 68 IN | BODY MASS INDEX: 30.78 KG/M2 | WEIGHT: 203.06 LBS

## 2023-11-17 DIAGNOSIS — M25.552 LEFT HIP PAIN: ICD-10-CM

## 2023-11-17 DIAGNOSIS — Z96.641 STATUS POST RIGHT HIP REPLACEMENT: ICD-10-CM

## 2023-11-17 DIAGNOSIS — M25.552 LEFT HIP PAIN: Primary | ICD-10-CM

## 2023-11-17 DIAGNOSIS — S70.02XA TRAUMATIC HEMATOMA OF LEFT HIP, INITIAL ENCOUNTER: ICD-10-CM

## 2023-11-17 PROCEDURE — 1159F PR MEDICATION LIST DOCUMENTED IN MEDICAL RECORD: ICD-10-PCS | Mod: CPTII,S$GLB,, | Performed by: ORTHOPAEDIC SURGERY

## 2023-11-17 PROCEDURE — 99999 PR PBB SHADOW E&M-EST. PATIENT-LVL IV: CPT | Mod: PBBFAC,,, | Performed by: ORTHOPAEDIC SURGERY

## 2023-11-17 PROCEDURE — 3008F PR BODY MASS INDEX (BMI) DOCUMENTED: ICD-10-PCS | Mod: CPTII,S$GLB,, | Performed by: ORTHOPAEDIC SURGERY

## 2023-11-17 PROCEDURE — 99213 PR OFFICE/OUTPT VISIT, EST, LEVL III, 20-29 MIN: ICD-10-PCS | Mod: S$GLB,,, | Performed by: ORTHOPAEDIC SURGERY

## 2023-11-17 PROCEDURE — 73502 X-RAY EXAM HIP UNI 2-3 VIEWS: CPT | Mod: 26,LT,, | Performed by: RADIOLOGY

## 2023-11-17 PROCEDURE — 99999 PR PBB SHADOW E&M-EST. PATIENT-LVL IV: ICD-10-PCS | Mod: PBBFAC,,, | Performed by: ORTHOPAEDIC SURGERY

## 2023-11-17 PROCEDURE — 1125F PR PAIN SEVERITY QUANTIFIED, PAIN PRESENT: ICD-10-PCS | Mod: CPTII,S$GLB,, | Performed by: ORTHOPAEDIC SURGERY

## 2023-11-17 PROCEDURE — 73502 XR HIP WITH PELVIS WHEN PERFORMED, 2 OR 3 VIEWS LEFT: ICD-10-PCS | Mod: 26,LT,, | Performed by: RADIOLOGY

## 2023-11-17 PROCEDURE — 1160F PR REVIEW ALL MEDS BY PRESCRIBER/CLIN PHARMACIST DOCUMENTED: ICD-10-PCS | Mod: CPTII,S$GLB,, | Performed by: ORTHOPAEDIC SURGERY

## 2023-11-17 PROCEDURE — 3044F PR MOST RECENT HEMOGLOBIN A1C LEVEL <7.0%: ICD-10-PCS | Mod: CPTII,S$GLB,, | Performed by: ORTHOPAEDIC SURGERY

## 2023-11-17 PROCEDURE — 4010F ACE/ARB THERAPY RXD/TAKEN: CPT | Mod: CPTII,S$GLB,, | Performed by: ORTHOPAEDIC SURGERY

## 2023-11-17 PROCEDURE — 3044F HG A1C LEVEL LT 7.0%: CPT | Mod: CPTII,S$GLB,, | Performed by: ORTHOPAEDIC SURGERY

## 2023-11-17 PROCEDURE — 73502 X-RAY EXAM HIP UNI 2-3 VIEWS: CPT | Mod: TC,LT

## 2023-11-17 PROCEDURE — 1159F MED LIST DOCD IN RCRD: CPT | Mod: CPTII,S$GLB,, | Performed by: ORTHOPAEDIC SURGERY

## 2023-11-17 PROCEDURE — 99213 OFFICE O/P EST LOW 20 MIN: CPT | Mod: S$GLB,,, | Performed by: ORTHOPAEDIC SURGERY

## 2023-11-17 PROCEDURE — 4010F PR ACE/ARB THEARPY RXD/TAKEN: ICD-10-PCS | Mod: CPTII,S$GLB,, | Performed by: ORTHOPAEDIC SURGERY

## 2023-11-17 PROCEDURE — 1125F AMNT PAIN NOTED PAIN PRSNT: CPT | Mod: CPTII,S$GLB,, | Performed by: ORTHOPAEDIC SURGERY

## 2023-11-17 PROCEDURE — 1160F RVW MEDS BY RX/DR IN RCRD: CPT | Mod: CPTII,S$GLB,, | Performed by: ORTHOPAEDIC SURGERY

## 2023-11-17 PROCEDURE — 3008F BODY MASS INDEX DOCD: CPT | Mod: CPTII,S$GLB,, | Performed by: ORTHOPAEDIC SURGERY

## 2023-11-17 RX ORDER — CELECOXIB 200 MG/1
200 CAPSULE ORAL DAILY PRN
Qty: 30 CAPSULE | Refills: 1 | Status: SHIPPED | OUTPATIENT
Start: 2023-11-17

## 2023-11-19 ENCOUNTER — PATIENT MESSAGE (OUTPATIENT)
Dept: CARDIOLOGY | Facility: CLINIC | Age: 68
End: 2023-11-19
Payer: MEDICARE

## 2023-11-19 DIAGNOSIS — I25.10 CORONARY ARTERY DISEASE INVOLVING NATIVE CORONARY ARTERY OF NATIVE HEART WITHOUT ANGINA PECTORIS: ICD-10-CM

## 2023-11-19 DIAGNOSIS — I10 ESSENTIAL HYPERTENSION: ICD-10-CM

## 2023-11-20 ENCOUNTER — PATIENT MESSAGE (OUTPATIENT)
Dept: CARDIOLOGY | Facility: CLINIC | Age: 68
End: 2023-11-20
Payer: MEDICARE

## 2023-11-20 RX ORDER — VALSARTAN 320 MG/1
320 TABLET ORAL
Qty: 90 TABLET | Refills: 3 | Status: SHIPPED | OUTPATIENT
Start: 2023-11-20

## 2023-11-20 RX ORDER — NITROGLYCERIN 0.4 MG/1
TABLET SUBLINGUAL
Qty: 25 TABLET | Refills: 4 | Status: SHIPPED | OUTPATIENT
Start: 2023-11-20

## 2023-11-21 NOTE — PROGRESS NOTES
Subjective:      Patient ID: Jm Deleon is a 68 y.o. male.    Chief Complaint:   Pain of the Right Hip    HPI  He comes in complaining of a large knot on the left hip after he fell on it 11/07/2023.  Pain is only up to 4/10, getting better over time.  It was much worse with a lot more bruising which is resolving.  He had a left posterior approach total hip by Dr. [facility] September 24, 2014.       Objective:        Ortho/SPM Exam    On exam there is a small firm mass about the size of a ping pong ball over the greater trochanter.  The skin is intact, and there is no surrounding erythema or any fluctuance.  He has normal range of motion of the left hip replacement and walks with a normal gait.      IMAGING:  X-rays show left total hip arthroplasty components that are well-fixed and positioned without signs of loosening or other complications.  No acute findings, fractures, other adverse features.  Assessment:   Contusion, left hip with traumatic hematoma      Plan:   There is no evidence of infection, and his symptoms are resolving over time.  I suspect they will continue to do so.  Is reassured that his x-rays look good and that he should let us know if he needs further evaluation.    The patient's pathophysiology was explained in detail with reference to x-rays, models, other visual aids as appropriate.  Treatment options were discussed in detail.  Questions were invited and answered to the patient's satisfaction.      Markus Bautista MD  Orthopedic Surgery

## 2023-12-04 RX ORDER — CARVEDILOL 25 MG/1
25 TABLET ORAL 2 TIMES DAILY
Qty: 180 TABLET | Refills: 3 | Status: SHIPPED | OUTPATIENT
Start: 2023-12-04

## 2023-12-04 NOTE — TELEPHONE ENCOUNTER
Care Due:                  Date            Visit Type   Department     Provider  --------------------------------------------------------------------------------                                EP -                              PRIMARY      [facility] PRIMARY  Last Visit: 08-      CARE ([facility])   COLTEN Martin  Next Visit: None Scheduled  None         None Found                                                            Last  Test          Frequency    Reason                     Performed    Due Date  --------------------------------------------------------------------------------    HBA1C.......  6 months...  SAFIA metFORMIN.....  08- 02-    [facility] Embedded Care Due Messages. Reference number: [id number].   12/04/2023 3:18:45 PM CST

## 2023-12-05 RX ORDER — METFORMIN HYDROCHLORIDE 1000 MG/1
1000 TABLET ORAL 2 TIMES DAILY
Qty: 180 TABLET | Refills: 0 | Status: SHIPPED | OUTPATIENT
Start: 2023-12-05 | End: 2024-02-17

## 2023-12-05 NOTE — TELEPHONE ENCOUNTER
Refill Routing Note   Medication(s) are not appropriate for processing by [facility] for the following reason(s):        Due for refill >6 months ago    [facility] action(s):  Defer     Requires labs : Yes      Medication Therapy Plan: Metformin 1000 mg tab- 1 tab po bid-180 + 1 ordered 16 months ago.      Appointments  past 12m or future 3m with PCP    Date Provider   Last Visit   8/28/2023 Dallas Martin MD   Next Visit   Visit date not found Dallas Martin MD   ED visits in past 90 days: 0        Note composed:11:59 AM 12/05/2023

## 2024-01-02 RX ORDER — CELECOXIB 200 MG/1
CAPSULE ORAL
Qty: 30 CAPSULE | Refills: 3 | Status: SHIPPED | OUTPATIENT
Start: 2024-01-02

## 2024-01-03 DIAGNOSIS — Z79.4 TYPE 2 DIABETES MELLITUS WITH HYPERGLYCEMIA, WITH LONG-TERM CURRENT USE OF INSULIN: ICD-10-CM

## 2024-01-03 DIAGNOSIS — E11.65 TYPE 2 DIABETES MELLITUS WITH HYPERGLYCEMIA, WITH LONG-TERM CURRENT USE OF INSULIN: ICD-10-CM

## 2024-01-03 RX ORDER — INSULIN GLARGINE 300 U/ML
45 INJECTION, SOLUTION SUBCUTANEOUS DAILY
Qty: 15 ML | Refills: 3 | Status: SHIPPED | OUTPATIENT
Start: 2024-01-03

## 2024-01-04 DIAGNOSIS — E11.9 DIABETES MELLITUS WITHOUT COMPLICATION: ICD-10-CM

## 2024-01-04 RX ORDER — INSULIN ASPART 100 [IU]/ML
14 INJECTION, SOLUTION INTRAVENOUS; SUBCUTANEOUS
Qty: 45 ML | Refills: 3 | Status: SHIPPED | OUTPATIENT
Start: 2024-01-04

## 2024-01-10 DIAGNOSIS — E11.9 TYPE 2 DIABETES MELLITUS WITHOUT COMPLICATION: ICD-10-CM

## 2024-01-11 DIAGNOSIS — R35.1 NOCTURIA: ICD-10-CM

## 2024-01-11 DIAGNOSIS — R35.81 NOCTURNAL POLYURIA: ICD-10-CM

## 2024-01-11 DIAGNOSIS — R39.12 BENIGN PROSTATIC HYPERPLASIA WITH WEAK URINARY STREAM: ICD-10-CM

## 2024-01-11 DIAGNOSIS — N40.1 BENIGN PROSTATIC HYPERPLASIA WITH WEAK URINARY STREAM: ICD-10-CM

## 2024-01-11 RX ORDER — TAMSULOSIN HYDROCHLORIDE 0.4 MG/1
0.4 CAPSULE ORAL NIGHTLY
Qty: 90 CAPSULE | Refills: 0 | Status: SHIPPED | OUTPATIENT
Start: 2024-01-11

## 2024-01-11 NOTE — TELEPHONE ENCOUNTER
----- Message from Michelle Guillen LPN sent at 1/10/2024  4:32 PM CST -----  Regarding: FW: Transfer RX    ----- Message -----  From: Ml Botello  Sent: 1/10/2024   4:11 PM CST  To: [facility] Staff  Subject: Transfer RX                                      [facility] pharmacy is asking that tamsulosin (FLOMAX) 0.4 mg Cap be sent over. [facility] was not able to assist with transferring over the RX.            [facility] - [place], [place] - [address] [facility]  [address] [facility] [address]  Phone: [phone] Fax: [phone]

## 2024-01-16 ENCOUNTER — PATIENT MESSAGE (OUTPATIENT)
Dept: ADMINISTRATIVE | Facility: HOSPITAL | Age: 69
End: 2024-01-16
Payer: MEDICARE

## 2024-01-16 DIAGNOSIS — E11.9 TYPE 2 DIABETES MELLITUS WITHOUT COMPLICATION, UNSPECIFIED WHETHER LONG TERM INSULIN USE: Primary | ICD-10-CM

## 2024-01-23 DIAGNOSIS — I25.10 CORONARY ARTERY DISEASE INVOLVING NATIVE CORONARY ARTERY OF NATIVE HEART WITHOUT ANGINA PECTORIS: ICD-10-CM

## 2024-01-23 DIAGNOSIS — E78.5 DYSLIPIDEMIA: ICD-10-CM

## 2024-01-23 RX ORDER — EVOLOCUMAB 420 MG/3.5
420 KIT SUBCUTANEOUS
Qty: 3 EACH | Refills: 4 | Status: ACTIVE | OUTPATIENT
Start: 2024-01-23 | End: 2024-06-07

## 2024-01-26 ENCOUNTER — PATIENT OUTREACH (OUTPATIENT)
Dept: ADMINISTRATIVE | Facility: HOSPITAL | Age: 69
End: 2024-01-26
Payer: MEDICARE

## 2024-01-26 RX ORDER — SEMAGLUTIDE 1.34 MG/ML
1 INJECTION, SOLUTION SUBCUTANEOUS
Qty: 3 ML | Refills: 5 | Status: SHIPPED | OUTPATIENT
Start: 2024-01-26 | End: 2024-06-10

## 2024-01-26 NOTE — PROGRESS NOTES

## 2024-02-16 NOTE — TELEPHONE ENCOUNTER
Care Due:                  Date            Visit Type   Department     Provider  --------------------------------------------------------------------------------                                EP -                              PRIMARY      [facility] PRIMARY  Last Visit: 08-      CARE ([facility])   COLTEN Martin  Next Visit: None Scheduled  None         None Found                                                            Last  Test          Frequency    Reason                     Performed    Due Date  --------------------------------------------------------------------------------    HBA1C.......  6 months...  SAFIA metFORMIN.....  08- 02-    [facility] Embedded Care Due Messages. Reference number: [id number].   2/16/2024 9:47:45 AM CST

## 2024-02-17 RX ORDER — METFORMIN HYDROCHLORIDE 1000 MG/1
1000 TABLET ORAL 2 TIMES DAILY
Qty: 180 TABLET | Refills: 1 | Status: SHIPPED | OUTPATIENT
Start: 2024-02-17

## 2024-02-17 NOTE — TELEPHONE ENCOUNTER
Refill Routing Note   Medication(s) are not appropriate for processing by [facility] for the following reason(s):        Drug-disease interaction: metFORMIN and Dehydration     [facility] action(s):  Route     Requires labs : Yes             Appointments  past 12m or future 3m with PCP    Date Provider   Last Visit   8/28/2023 Dallas Martin MD   Next Visit   Visit date not found Dallas Martin MD   ED visits in past 90 days: 0        Note composed:9:18 PM 02/16/2024

## 2024-03-13 DIAGNOSIS — E11.9 TYPE 2 DIABETES MELLITUS WITHOUT COMPLICATION: ICD-10-CM

## 2024-03-18 ENCOUNTER — PATIENT MESSAGE (OUTPATIENT)
Dept: ADMINISTRATIVE | Facility: HOSPITAL | Age: 69
End: 2024-03-18
Payer: MEDICARE

## 2024-03-20 ENCOUNTER — PATIENT OUTREACH (OUTPATIENT)
Dept: ADMINISTRATIVE | Facility: HOSPITAL | Age: 69
End: 2024-03-20
Payer: MEDICARE

## 2024-03-20 NOTE — PROGRESS NOTES
Health Maintenance Due   Topic Date Due    RSV Vaccine (Age 60+ and Pregnant patients) (1 - 1-dose 60+ series) Never done    Diabetes Urine Screening  11/04/2023    Hemoglobin A1c  02/29/2024         Chart reviewed and updated. A1c Lab scheduled with previously scheduled labs.    Leanna Henley LPN   Clinical Care Coordinator  Primary Care and Wellness

## 2024-03-25 ENCOUNTER — TELEPHONE (OUTPATIENT)
Dept: ENDOCRINOLOGY | Facility: CLINIC | Age: 69
End: 2024-03-25
Payer: MEDICARE

## 2024-03-25 NOTE — TELEPHONE ENCOUNTER
"----- Message from Princess Haney sent at 3/25/2024 11:29 AM CDT -----  Consult/Advisory:      Name Of Caller: Self    Contact Preference:   [phone]     What is the nature of the call?:  Pt stated he would like a virtual apt with provider. Please call    No apts in epic       Additional Notes:  "Thank you for all that you do for our patients"          "

## 2024-04-04 ENCOUNTER — TELEPHONE (OUTPATIENT)
Dept: CARDIOLOGY | Facility: CLINIC | Age: 69
End: 2024-04-04
Payer: MEDICARE

## 2024-04-04 DIAGNOSIS — I25.118 CORONARY ARTERY DISEASE OF NATIVE ARTERY OF NATIVE HEART WITH STABLE ANGINA PECTORIS: ICD-10-CM

## 2024-04-04 DIAGNOSIS — E78.5 DYSLIPIDEMIA: ICD-10-CM

## 2024-04-04 DIAGNOSIS — I10 PRIMARY HYPERTENSION: Primary | ICD-10-CM

## 2024-04-08 ENCOUNTER — TELEPHONE (OUTPATIENT)
Dept: ENDOCRINOLOGY | Facility: CLINIC | Age: 69
End: 2024-04-08
Payer: MEDICARE

## 2024-04-08 DIAGNOSIS — Z79.4 TYPE 2 DIABETES MELLITUS WITH HYPERGLYCEMIA, WITH LONG-TERM CURRENT USE OF INSULIN: Primary | ICD-10-CM

## 2024-04-08 DIAGNOSIS — E11.65 TYPE 2 DIABETES MELLITUS WITH HYPERGLYCEMIA, WITH LONG-TERM CURRENT USE OF INSULIN: Primary | ICD-10-CM

## 2024-04-10 ENCOUNTER — OFFICE VISIT (OUTPATIENT)
Dept: ENDOCRINOLOGY | Facility: CLINIC | Age: 69
End: 2024-04-10
Payer: MEDICARE

## 2024-04-10 DIAGNOSIS — E11.59 TYPE 2 DIABETES MELLITUS WITH OTHER CIRCULATORY COMPLICATION, WITH LONG-TERM CURRENT USE OF INSULIN: ICD-10-CM

## 2024-04-10 DIAGNOSIS — Z79.4 TYPE 2 DIABETES MELLITUS WITH OTHER CIRCULATORY COMPLICATION, WITH LONG-TERM CURRENT USE OF INSULIN: ICD-10-CM

## 2024-04-10 DIAGNOSIS — Z79.4 TYPE 2 DIABETES MELLITUS WITH HYPERGLYCEMIA, WITH LONG-TERM CURRENT USE OF INSULIN: Primary | ICD-10-CM

## 2024-04-10 DIAGNOSIS — E11.65 TYPE 2 DIABETES MELLITUS WITH HYPERGLYCEMIA, WITH LONG-TERM CURRENT USE OF INSULIN: Primary | ICD-10-CM

## 2024-04-10 DIAGNOSIS — I25.118 CORONARY ARTERY DISEASE OF NATIVE ARTERY OF NATIVE HEART WITH STABLE ANGINA PECTORIS: ICD-10-CM

## 2024-04-10 DIAGNOSIS — E78.5 DYSLIPIDEMIA: ICD-10-CM

## 2024-04-10 DIAGNOSIS — E66.09 CLASS 1 OBESITY DUE TO EXCESS CALORIES WITHOUT SERIOUS COMORBIDITY WITH BODY MASS INDEX (BMI) OF 30.0 TO 30.9 IN ADULT: ICD-10-CM

## 2024-04-10 PROCEDURE — 1159F MED LIST DOCD IN RCRD: CPT | Mod: CPTII,95,, | Performed by: INTERNAL MEDICINE

## 2024-04-10 PROCEDURE — 1160F RVW MEDS BY RX/DR IN RCRD: CPT | Mod: CPTII,95,, | Performed by: INTERNAL MEDICINE

## 2024-04-10 PROCEDURE — 99214 OFFICE O/P EST MOD 30 MIN: CPT | Mod: 95,,, | Performed by: INTERNAL MEDICINE

## 2024-04-10 PROCEDURE — 95251 CONT GLUC MNTR ANALYSIS I&R: CPT | Mod: NDTC,S$GLB,, | Performed by: INTERNAL MEDICINE

## 2024-04-10 PROCEDURE — 4010F ACE/ARB THERAPY RXD/TAKEN: CPT | Mod: CPTII,95,, | Performed by: INTERNAL MEDICINE

## 2024-04-10 PROCEDURE — 3072F LOW RISK FOR RETINOPATHY: CPT | Mod: CPTII,95,, | Performed by: INTERNAL MEDICINE

## 2024-04-10 NOTE — PROGRESS NOTES
Jm Deleon is a 69 y.o. male with CAD, HLD presenting for follow-up of T2DM    The patient location is: [place]  The chief complaint leading to consultation is:   Chief Complaint   Patient presents with    Diabetes       Visit type: audiovisual    Face to Face time with patient: 20 minutes  30 minutes of total time spent on the encounter, which includes face to face time and non-face to face time preparing to see the patient (eg, review of tests), Obtaining and/or reviewing separately obtained history, Documenting clinical information in the electronic or other health record, Independently interpreting results (not separately reported) and communicating results to the patient/family/caregiver, or Care coordination (not separately reported).    Each patient to whom he or she provides medical services by telemedicine is:  (1) informed of the relationship between the physician and patient and the respective role of any other health care provider with respect to management of the patient; and (2) notified that he or she may decline to receive medical services by telemedicine and may withdraw from such care at any time.      History of Present Illness  T2DM  Diagnosed in early 2000's  Known complications: denies    Doing well since last visit. Some higher numbers recently  In [facility] but able to afford ozempic    Dinner is largest meals, often skips lunch    Current Diabetes Regimen:  Toujeo 45-50 units smith  novolog 14 units with meals plus scale  Metformin 1000 mg BID  Jardiance 25 mg daily  Ozempic 1 mg weekly    Omitted doses: rare    Prior mediations tried: none    DM education when first diagnosed    Recent Hgb A1C:  Lab Results   Component Value Date    HGBA1C 6.3 (H) 08/29/2023       Glucose Monitoring:  Dexcom- in media        Hypoglycemic Episodes: none as above    Screening / DM Complications:    Nephropathy:  ACEi/ARB: Taking  Lab Results   Component Value Date    MICALBCREAT 12.1 11/04/2022       Last  Lipid Panel:  Statin: Taking repatha, zetia, atorvastatin  Lab Results   Component Value Date    LDLCALC 7.6 (L) 08/29/2023       Last foot exam : 09/27/2023- sees outside podiatry every 3 months  Last eye exam : 08/03/2023;  no laser surgery or DR    B12:  Lab Results   Component Value Date    KMONMNIP70 396 08/29/2023       Aspirin: taking          Current Outpatient Medications:     ACCU-CHEK SMARTVIEW TEST STRIP Strp, 1 strip by Misc.(Non-Drug; Combo Route) route 3 (three) times daily. Patient needs an appointment, Disp: 300 strip, Rfl: 0    ammonium lactate 12 % Crea, Apply small amount to feet except for in between toes twice a day, Disp: 1 Tube, Rfl: 3    aspirin (ECOTRIN) 81 MG EC tablet, Take 81 mg by mouth once daily., Disp: , Rfl:     atorvastatin (LIPITOR) 80 MG tablet, TAKE 1 TABLET ONE TIME DAILY, Disp: 90 tablet, Rfl: 3    blood-glucose meter Misc, Use to check sugar 3 times daily. Accu-chek Emily. Patient needs an appointment, Disp: 1 each, Rfl: 0    carvediloL (COREG) 25 MG tablet, TAKE 1 TABLET TWICE DAILY, Disp: 180 tablet, Rfl: 3    celecoxib (CELEBREX) 200 MG capsule, Take 1 capsule (200 mg total) by mouth 2 (two) times daily., Disp: 180 capsule, Rfl: 0    celecoxib (CELEBREX) 200 MG capsule, Take 1 capsule (200 mg total) by mouth daily as needed for Pain., Disp: 30 capsule, Rfl: 1    celecoxib (CELEBREX) 200 MG capsule, TAKE 1 CAPSULE EVERY DAY AS NEEDED FOR PAIN, Disp: 30 capsule, Rfl: 3    diclofenac sodium (VOLTAREN) 1 % Gel, Apply 2 g topically 4 (four) times daily as needed (neck muscle spasm)., Disp: 100 g, Rfl: 3    diphenhydrAMINE (BENADRYL) 25 mg capsule, Take 50 mg by mouth nightly as needed for Itching. , Disp: , Rfl:     evolocumab (REPATHA PUSHTRONEX) 420 mg/3.5 mL Injt, Inject 3.5 mLs (420 mg total) into the skin every 30 days, Disp: 3 each, Rfl: 4    FOLIC ACID/MULTIVITS-MIN (MULTIVITAMIN FOR CHOLESTEROL ORAL), Take 1 tablet by mouth nightly. , Disp: , Rfl:     furosemide (LASIX)  "40 MG tablet, TAKE 1 TABLET EVERY DAY, Disp: 90 tablet, Rfl: 3    insulin glargine, TOUJEO, (TOUJEO SOLOSTAR U-300 INSULIN) 300 unit/mL (1.5 mL) InPn pen, Inject 45 Units into the skin once daily., Disp: 15 mL, Rfl: 3    insulin needles, disposable, (BD INSULIN PEN NEEDLE UF ORIG) 29 x 1/2 " Ndle, USE TWICE DAILY AS DIRECTED, Disp: 100 each, Rfl: 2    JARDIANCE 25 mg tablet, TAKE 1 TABLET EVERY DAY, Disp: 90 tablet, Rfl: 1    lancets Misc, 1 lancet by Misc.(Non-Drug; Combo Route) route 3 (three) times daily. accu-chek Fastclix. Patient needs an appointment, Disp: 300 each, Rfl: 0    metFORMIN (GLUCOPHAGE) 1000 MG tablet, Take 1 tablet (1,000 mg total) by mouth 2 (two) times daily., Disp: 180 tablet, Rfl: 1    mupirocin (BACTROBAN) 2 % ointment, AAA qd- bid, Disp: 30 g, Rfl: 3    nitroGLYCERIN (NITROSTAT) 0.4 MG SL tablet, PLACE 1 TABLET (0.4 MG TOTAL) UNDER THE TONGUE EVERY 5 (FIVE) MINUTES AS NEEDED FOR CHEST PAIN AS DIRECTED, Disp: 25 tablet, Rfl: 4    NOVOLOG FLEXPEN U-100 INSULIN 100 unit/mL (3 mL) InPn pen, Inject 14 Units into the skin 3 (three) times daily with meals., Disp: 45 mL, Rfl: 3    papaverine 30 mg/mL injection, Add: Phentolamine 1 mg Add: PGE1 10 mcg  Sig:  Inject 10 units as directed; titrate up by 5 units until desired results, Disp: 10 mL, Rfl: 12    pen needle, diabetic (BD INSULIN PEN NEEDLE UF MINI) 31 gauge x 3/16" Ndle, 1 each by Misc.(Non-Drug; Combo Route) route 4 (four) times daily. Patient needs an appointment, Disp: 400 each, Rfl: 0    semaglutide (OZEMPIC) 1 mg/dose (4 mg/3 mL), Inject 1 mg into the skin every 7 days., Disp: 3 mL, Rfl: 5    tamsulosin (FLOMAX) 0.4 mg Cap, Take 1 capsule (0.4 mg total) by mouth every evening., Disp: 90 capsule, Rfl: 0    valsartan (DIOVAN) 320 MG tablet, TAKE 1 TABLET EVERY DAY, Disp: 90 tablet, Rfl: 3    VASCEPA 1 gram Cap, TAKE 2 CAPSULES TWICE DAILY, Disp: 360 capsule, Rfl: 3  No current facility-administered medications for this " visit.    Facility-Administered Medications Ordered in Other Visits:     fentaNYL 50 mcg/mL injection 25 mcg, 25 mcg, Intravenous, Q5 Min PRN, Travis Corbin MD, 50 mcg at 05/12/21 1242    fentaNYL 50 mcg/mL injection  mcg,  mcg, Intravenous, PRN, Markus Esparza MD, 100 mcg at 11/24/21 1151    lidocaine (PF) 10 mg/ml (1%) injection 10 mg, 1 mL, Intradermal, Once, Deanne Mosqueda MD    LIDOcaine (PF) 10 mg/ml (1%) injection 10 mg, 1 mL, Intradermal, Once, Markus Esparza MD    midazolam (VERSED) 1 mg/mL injection 0.5 mg, 0.5 mg, Intravenous, PRN, Travis Corbin MD, 2 mg at 05/12/21 1242    midazolam (VERSED) 1 mg/mL injection 0.5-4 mg, 0.5-4 mg, Intravenous, PRN, Markus Esparza MD, 2 mg at 11/24/21 1151    ropivacaine 0.2% Nimbus PainPRO Pump infusion 500 ML, , Perineural, Continuous, Travis Corbin MD, New Bag at 05/12/21 1629    ropivacaine 0.2% Nimbus PainPRO Pump infusion 500 ML, , Perineural, Continuous, Markus Esparza MD, New Bag at 11/24/21 1450    triamcinolone acetonide injection 40 mg, 40 mg, Intra-articular, , Patricio Multani MD, 40 mg at 10/26/16 0841    ROS as above    Objective:     There were no vitals filed for this visit.    Wt Readings from Last 3 Encounters:   11/17/23 0906 92.1 kg (203 lb 0.7 oz)   09/29/23 1331 92.1 kg (203 lb 0.7 oz)   09/15/23 0953 92.1 kg (203 lb 0.7 oz)         There is no height or weight on file to calculate BMI.      Labs    Chemistry        Component Value Date/Time     08/29/2023 0846     08/29/2023 0846    K 5.2 (H) 08/29/2023 0846    K 5.2 (H) 08/29/2023 0846     08/29/2023 0846     08/29/2023 0846    CO2 29 08/29/2023 0846    CO2 29 08/29/2023 0846    BUN 14 08/29/2023 0846    BUN 14 08/29/2023 0846    CREATININE 0.9 08/29/2023 0846    CREATININE 0.9 08/29/2023 0846     (H) 08/29/2023 0846     (H) 08/29/2023 0846        Component Value  Date/Time    CALCIUM 9.5 08/29/2023 0846    CALCIUM 9.5 08/29/2023 0846    ALKPHOS 69 08/29/2023 0846    ALKPHOS 69 08/29/2023 0846    AST 21 08/29/2023 0846    AST 21 08/29/2023 0846    ALT 23 08/29/2023 0846    ALT 23 08/29/2023 0846    BILITOT 0.4 08/29/2023 0846    BILITOT 0.4 08/29/2023 0846    ESTGFRAFRICA >60.0 06/10/2022 0921    EGFRNONAA >60.0 06/10/2022 0921              Assessment and Plan     Type 2 diabetes mellitus with circulatory disorder, with long-term current use of insulin  Dexcom reviewed and since last visit TIR has gone down. Primarily due to hyperglycemia after dinner,?higher sugars with G7  Will increase dinner novolog dose to 18 units, continue other current doses  Consider increase in ozempic to 2 mg weekly but has 90 day supply 1 mg  Also discussed attention to diet, exercise  Reviewed how to watch clarity reports with goal to increase TIR  Labs before next visit  Cont regular foot and eye exams  Reviewed rotation of injection sites    Dyslipidemia  LDL excellent  Cont current therapy    Class 1 obesity due to excess calories without serious comorbidity with body mass index (BMI) of 30.0 to 30.9 in adult  Cont ozempic, dietary modification as above    Coronary artery disease of native artery of native heart with stable angina pectoris  On Jardiance and ozempic            RTC 4 months with A1c, DENILSON Galloway MD

## 2024-04-10 NOTE — ASSESSMENT & PLAN NOTE
Dexcom reviewed and since last visit TIR has gone down. Primarily due to hyperglycemia after dinner,?higher sugars with G7  Will increase dinner novolog dose to 18 units, continue other current doses  Consider increase in ozempic to 2 mg weekly but has 90 day supply 1 mg  Also discussed attention to diet, exercise  Reviewed how to watch clarity reports with goal to increase TIR  Labs before next visit  Cont regular foot and eye exams  Reviewed rotation of injection sites

## 2024-04-11 DIAGNOSIS — I10 PRIMARY HYPERTENSION: ICD-10-CM

## 2024-04-11 DIAGNOSIS — I25.118 CORONARY ARTERY DISEASE OF NATIVE ARTERY OF NATIVE HEART WITH STABLE ANGINA PECTORIS: Primary | ICD-10-CM

## 2024-04-11 RX ORDER — ATORVASTATIN CALCIUM 80 MG/1
80 TABLET, FILM COATED ORAL DAILY
Qty: 90 TABLET | Refills: 3 | Status: SHIPPED | OUTPATIENT
Start: 2024-04-11

## 2024-04-11 NOTE — TELEPHONE ENCOUNTER
No care due was identified.  [facility] Embedded Care Due Messages. Reference number: [id number].   4/11/2024 5:43:41 PM CDT

## 2024-04-11 NOTE — TELEPHONE ENCOUNTER
Refill Routing Note   Medication(s) are not appropriate for processing by [facility] for the following reason(s):        Required labs outdated: A1C    [facility] action(s):  Defer               Appointments  past 12m or future 3m with PCP    Date Provider   Last Visit   8/28/2023 Dallas Martin MD   Next Visit   Visit date not found Dallas Martin MD   ED visits in past 90 days: 0        Note composed:5:44 PM 04/11/2024

## 2024-05-08 ENCOUNTER — PATIENT MESSAGE (OUTPATIENT)
Dept: UROLOGY | Facility: CLINIC | Age: 69
End: 2024-05-08
Payer: MEDICARE

## 2024-05-08 DIAGNOSIS — N40.1 BENIGN PROSTATIC HYPERPLASIA WITH WEAK URINARY STREAM: ICD-10-CM

## 2024-05-08 DIAGNOSIS — R35.1 NOCTURIA: ICD-10-CM

## 2024-05-08 DIAGNOSIS — R35.81 NOCTURNAL POLYURIA: ICD-10-CM

## 2024-05-08 DIAGNOSIS — R39.12 BENIGN PROSTATIC HYPERPLASIA WITH WEAK URINARY STREAM: ICD-10-CM

## 2024-05-08 RX ORDER — TAMSULOSIN HYDROCHLORIDE 0.4 MG/1
0.4 CAPSULE ORAL NIGHTLY
Qty: 90 CAPSULE | Refills: 3 | Status: SHIPPED | OUTPATIENT
Start: 2024-05-08 | End: 2025-05-08

## 2024-06-07 DIAGNOSIS — I25.10 ASCVD (ARTERIOSCLEROTIC CARDIOVASCULAR DISEASE): Primary | ICD-10-CM

## 2024-06-07 DIAGNOSIS — E78.5 DYSLIPIDEMIA: ICD-10-CM

## 2024-06-07 RX ORDER — EVOLOCUMAB 140 MG/ML
140 INJECTION, SOLUTION SUBCUTANEOUS
Qty: 6 ML | Refills: 3 | Status: ACTIVE | OUTPATIENT
Start: 2024-06-07

## 2024-06-09 DIAGNOSIS — E11.9 TYPE 2 DIABETES MELLITUS WITHOUT COMPLICATION, UNSPECIFIED WHETHER LONG TERM INSULIN USE: ICD-10-CM

## 2024-06-10 ENCOUNTER — PATIENT MESSAGE (OUTPATIENT)
Dept: INTERNAL MEDICINE | Facility: CLINIC | Age: 69
End: 2024-06-10
Payer: MEDICARE

## 2024-06-10 RX ORDER — SEMAGLUTIDE 1.34 MG/ML
INJECTION, SOLUTION SUBCUTANEOUS
Qty: 9 EACH | Refills: 3 | Status: SHIPPED | OUTPATIENT
Start: 2024-06-10

## 2024-06-17 ENCOUNTER — OFFICE VISIT (OUTPATIENT)
Dept: OPTOMETRY | Facility: CLINIC | Age: 69
End: 2024-06-17
Payer: COMMERCIAL

## 2024-06-17 DIAGNOSIS — H52.03 HYPEROPIA OF BOTH EYES WITH ASTIGMATISM AND PRESBYOPIA: ICD-10-CM

## 2024-06-17 DIAGNOSIS — H26.9 CORTICAL CATARACT OF BOTH EYES: ICD-10-CM

## 2024-06-17 DIAGNOSIS — H52.4 PRESBYOPIA: Primary | ICD-10-CM

## 2024-06-17 DIAGNOSIS — H52.4 HYPEROPIA OF BOTH EYES WITH ASTIGMATISM AND PRESBYOPIA: ICD-10-CM

## 2024-06-17 DIAGNOSIS — H40.023 OPEN ANGLE WITH BORDERLINE FINDINGS AND HIGH GLAUCOMA RISK IN BOTH EYES: ICD-10-CM

## 2024-06-17 DIAGNOSIS — E11.9 TYPE 2 DIABETES MELLITUS WITHOUT RETINOPATHY: ICD-10-CM

## 2024-06-17 DIAGNOSIS — E11.59 TYPE 2 DIABETES MELLITUS WITH OTHER CIRCULATORY COMPLICATION, WITH LONG-TERM CURRENT USE OF INSULIN: ICD-10-CM

## 2024-06-17 DIAGNOSIS — H43.393 VITREOUS SYNERESIS OF BOTH EYES: ICD-10-CM

## 2024-06-17 DIAGNOSIS — H25.13 NS (NUCLEAR SCLEROSIS), BILATERAL: ICD-10-CM

## 2024-06-17 DIAGNOSIS — E11.9 TYPE 2 DIABETES MELLITUS WITHOUT OPHTHALMIC MANIFESTATIONS: ICD-10-CM

## 2024-06-17 DIAGNOSIS — H52.203 HYPEROPIA OF BOTH EYES WITH ASTIGMATISM AND PRESBYOPIA: ICD-10-CM

## 2024-06-17 DIAGNOSIS — Z79.4 TYPE 2 DIABETES MELLITUS WITH OTHER CIRCULATORY COMPLICATION, WITH LONG-TERM CURRENT USE OF INSULIN: ICD-10-CM

## 2024-06-17 DIAGNOSIS — I10 PRIMARY HYPERTENSION: ICD-10-CM

## 2024-06-17 PROCEDURE — 99999 PR PBB SHADOW E&M-EST. PATIENT-LVL I: CPT | Mod: PBBFAC,,, | Performed by: OPTOMETRIST

## 2024-06-17 PROCEDURE — 92014 COMPRE OPH EXAM EST PT 1/>: CPT | Mod: S$GLB,,, | Performed by: OPTOMETRIST

## 2024-06-17 PROCEDURE — 92015 DETERMINE REFRACTIVE STATE: CPT | Mod: S$GLB,,, | Performed by: OPTOMETRIST

## 2024-06-18 NOTE — PROGRESS NOTES
Subjective:   Patient ID:  Jm Deleon is a 69 y.o. male who presents for follow-up of CAD    HPI:  The patient is here for CAD.  The patient has no chest pain, SOB, TIA, palpitations, syncope or pre-syncope.Patient does not exercise a lot cuts grass weekly.        Review of Systems   Constitutional: Negative for chills, decreased appetite, diaphoresis, fever, malaise/fatigue, night sweats, weight gain and weight loss.   HENT:  Negative for congestion, hoarse voice, nosebleeds, sore throat and tinnitus.    Eyes:  Negative for blurred vision, double vision, vision loss in left eye, vision loss in right eye, visual disturbance and visual halos.   Cardiovascular:  Negative for chest pain, claudication, cyanosis, dyspnea on exertion, irregular heartbeat, leg swelling, near-syncope, orthopnea, palpitations, paroxysmal nocturnal dyspnea and syncope.   Respiratory:  Negative for cough, hemoptysis, shortness of breath, sleep disturbances due to breathing, snoring, sputum production and wheezing.    Endocrine: Negative for cold intolerance, heat intolerance, polydipsia, polyphagia and polyuria.   Hematologic/Lymphatic: Negative for adenopathy and bleeding problem. Does not bruise/bleed easily.   Skin:  Negative for color change, dry skin, flushing, itching, nail changes, poor wound healing, rash, skin cancer, suspicious lesions and unusual hair distribution.   Musculoskeletal:  Positive for arthritis, back pain, joint pain, myalgias and stiffness. Negative for falls, gout, joint swelling, muscle cramps and muscle weakness.   Gastrointestinal:  Negative for abdominal pain, anorexia, change in bowel habit, constipation, diarrhea, dysphagia, heartburn, hematemesis, hematochezia, melena and vomiting.   Genitourinary:  Negative for decreased libido, dysuria, hematuria, hesitancy and urgency.   Neurological:  Negative for excessive daytime sleepiness, dizziness, focal weakness, headaches, light-headedness, loss of balance,  "numbness, paresthesias, seizures, sensory change, tremors, vertigo and weakness.   Psychiatric/Behavioral:  Negative for altered mental status, depression, hallucinations, memory loss, substance abuse and suicidal ideas. The patient does not have insomnia and is not nervous/anxious.    Allergic/Immunologic: Negative for environmental allergies and hives.       Objective: /68   Pulse 68   Ht 5' 8" (1.727 m)   Wt 91.6 kg (201 lb 15.1 oz)   SpO2 98%   BMI 30.71 kg/m²      Physical Exam  Constitutional:       General: He is not in acute distress.     Appearance: He is well-developed. He is not diaphoretic.   HENT:      Head: Normocephalic.   Eyes:      Pupils: Pupils are equal, round, and reactive to light.   Neck:      Thyroid: No thyromegaly.   Cardiovascular:      Rate and Rhythm: Normal rate and regular rhythm.      Pulses: Intact distal pulses.           Carotid pulses are 3+ on the right side and 3+ on the left side.       Radial pulses are 3+ on the right side and 3+ on the left side.        Femoral pulses are 3+ on the right side and 3+ on the left side.       Popliteal pulses are 3+ on the right side and 3+ on the left side.        Dorsalis pedis pulses are 3+ on the right side and 3+ on the left side.        Posterior tibial pulses are 3+ on the right side and 3+ on the left side.      Heart sounds: Normal heart sounds. No murmur heard.     No friction rub. No gallop.   Pulmonary:      Effort: Pulmonary effort is normal. No respiratory distress.      Breath sounds: Normal breath sounds. No wheezing or rales.   Chest:      Chest wall: No tenderness.   Abdominal:      General: There is no distension.      Palpations: Abdomen is soft. There is no mass.      Tenderness: There is no abdominal tenderness.   Musculoskeletal:         General: Normal range of motion.      Cervical back: Normal range of motion.   Lymphadenopathy:      Cervical: No cervical adenopathy.   Skin:     General: Skin is warm.      " Nails: There is no clubbing.   Neurological:      Mental Status: He is alert and oriented to person, place, and time.   Psychiatric:         Speech: Speech normal.         Behavior: Behavior normal.         Thought Content: Thought content normal.         Judgment: Judgment normal.         Assessment:     1. Coronary artery disease of native artery of native heart with stable angina pectoris    2. Aortic arch atherosclerosis    3. S/P CABG (coronary artery bypass graft)    4. Venous insufficiency    5. Abnormal cardiovascular stress test    6. Lack of physical exercise    7. Hip disease    8. Long term (current) use of anticoagulants    9. Status post hip replacement, unspecified laterality    10. Abnormal nuclear stress test    11. Varicose veins of lower extremity with inflammation, unspecified laterality    12. Other chronic pain    13. Primary hypertension    14. Idiopathic hypotension    15. Type 2 diabetes mellitus with other circulatory complication, with long-term current use of insulin    16. Dyslipidemia        Plan:   Discussed diet , achieving and maintaining ideal body weight, and exercise.   We reviewed meds in detail.  Reassured-Discussed goals, options, plan.  Omega-3 > 800 mg/d combined EPA/DHA.  Goal BP< 130/80.  Goal LDL < 55  NTG should be refilled every 8-9 months.  Could consider repeat Stress    Jm was seen today for cad f/u.    Diagnoses and all orders for this visit:    Coronary artery disease of native artery of native heart with stable angina pectoris  -     Nuclear Stress - Cardiology Interpreted; Future    Aortic arch atherosclerosis    S/P CABG (coronary artery bypass graft)  -     Nuclear Stress - Cardiology Interpreted; Future    Venous insufficiency    Abnormal cardiovascular stress test  -     Nuclear Stress - Cardiology Interpreted; Future    Lack of physical exercise    Hip disease    Long term (current) use of anticoagulants    Status post hip replacement, unspecified  laterality  -     Nuclear Stress - Cardiology Interpreted; Future    Abnormal nuclear stress test    Varicose veins of lower extremity with inflammation, unspecified laterality    Other chronic pain    Primary hypertension    Idiopathic hypotension    Type 2 diabetes mellitus with other circulatory complication, with long-term current use of insulin    Dyslipidemia            Follow up for [facility] soon; ECG should be ordered.

## 2024-06-19 ENCOUNTER — TELEPHONE (OUTPATIENT)
Dept: CARDIOLOGY | Facility: CLINIC | Age: 69
End: 2024-06-19

## 2024-06-19 ENCOUNTER — OFFICE VISIT (OUTPATIENT)
Dept: CARDIOLOGY | Facility: CLINIC | Age: 69
End: 2024-06-19
Payer: MEDICARE

## 2024-06-19 VITALS
OXYGEN SATURATION: 98 % | SYSTOLIC BLOOD PRESSURE: 110 MMHG | DIASTOLIC BLOOD PRESSURE: 68 MMHG | WEIGHT: 201.94 LBS | BODY MASS INDEX: 30.61 KG/M2 | HEART RATE: 68 BPM | HEIGHT: 68 IN

## 2024-06-19 DIAGNOSIS — R94.39 ABNORMAL NUCLEAR STRESS TEST: ICD-10-CM

## 2024-06-19 DIAGNOSIS — R94.39 ABNORMAL CARDIOVASCULAR STRESS TEST: ICD-10-CM

## 2024-06-19 DIAGNOSIS — Z95.1 S/P CABG (CORONARY ARTERY BYPASS GRAFT): ICD-10-CM

## 2024-06-19 DIAGNOSIS — I83.10 VARICOSE VEINS OF LOWER EXTREMITY WITH INFLAMMATION, UNSPECIFIED LATERALITY: ICD-10-CM

## 2024-06-19 DIAGNOSIS — I95.0 IDIOPATHIC HYPOTENSION: ICD-10-CM

## 2024-06-19 DIAGNOSIS — I10 PRIMARY HYPERTENSION: ICD-10-CM

## 2024-06-19 DIAGNOSIS — Z72.3 LACK OF PHYSICAL EXERCISE: ICD-10-CM

## 2024-06-19 DIAGNOSIS — E78.5 DYSLIPIDEMIA: ICD-10-CM

## 2024-06-19 DIAGNOSIS — I25.118 CORONARY ARTERY DISEASE OF NATIVE ARTERY OF NATIVE HEART WITH STABLE ANGINA PECTORIS: Primary | ICD-10-CM

## 2024-06-19 DIAGNOSIS — I70.0 AORTIC ARCH ATHEROSCLEROSIS: ICD-10-CM

## 2024-06-19 DIAGNOSIS — E11.59 TYPE 2 DIABETES MELLITUS WITH OTHER CIRCULATORY COMPLICATION, WITH LONG-TERM CURRENT USE OF INSULIN: ICD-10-CM

## 2024-06-19 DIAGNOSIS — Z96.649 STATUS POST HIP REPLACEMENT, UNSPECIFIED LATERALITY: ICD-10-CM

## 2024-06-19 DIAGNOSIS — Z79.01 LONG TERM (CURRENT) USE OF ANTICOAGULANTS: ICD-10-CM

## 2024-06-19 DIAGNOSIS — M25.9 HIP DISEASE: ICD-10-CM

## 2024-06-19 DIAGNOSIS — Z79.4 TYPE 2 DIABETES MELLITUS WITH OTHER CIRCULATORY COMPLICATION, WITH LONG-TERM CURRENT USE OF INSULIN: ICD-10-CM

## 2024-06-19 DIAGNOSIS — I87.2 VENOUS INSUFFICIENCY: ICD-10-CM

## 2024-06-19 DIAGNOSIS — G89.29 OTHER CHRONIC PAIN: ICD-10-CM

## 2024-06-19 PROCEDURE — 99999 PR PBB SHADOW E&M-EST. PATIENT-LVL III: CPT | Mod: PBBFAC,HCNC,, | Performed by: INTERNAL MEDICINE

## 2024-06-19 NOTE — PATIENT INSTRUCTIONS
Discussed diet , achieving and maintaining ideal body weight, and exercise.   We reviewed meds in detail.  Reassured-Discussed goals, options, plan.  Omega-3 > 800 mg/d combined EPA/DHA.  Goal BP< 130/80.  Goal LDL < 55  NTG should be refilled every 8-9 months.  Could consider repeat Stress

## 2024-06-19 NOTE — PROGRESS NOTES
.   [facility] - [address] [place] [address]  Dept: [phone]  Dept Fax: [phone]    Visit type: Established patient    Reason for Visit: Hypothyroidism (6m follow up ) and Blood Sugar Problem (Impaired fasting glucose - 6m follow up )      Assessment & Plan   Assessment and Plan       1. Acquired hypothyroidism  -     CBC with Auto Differential; Future  -     TSH with Reflex; Future  -     Comprehensive Metabolic Panel; Future  2. Tachycardia  -     metoprolol tartrate (LOPRESSOR) 25 MG tablet; Take 1 tablet by mouth 2 times daily, Disp-180 tablet, R-0Normal  3. Ascending aorta dilation (HCC)  -     metoprolol tartrate (LOPRESSOR) 25 MG tablet; Take 1 tablet by mouth 2 times daily, Disp-180 tablet, R-0Normal  4. Vitamin D deficiency  -     vitamin D3 (NATURAL VITAMIN D-3) 125 MCG (5000 UT) TABS tablet; Take 1 tablet by mouth daily, Disp-90 tablet, R-2Normal  5. Mild intermittent asthma without complication  -     levocetirizine (XYZAL) 5 MG tablet; Take 1 tablet by mouth nightly, Disp-90 tablet, R-3Normal  -     albuterol sulfate HFA (PROVENTIL;VENTOLIN;PROAIR) 108 (90 Base) MCG/ACT inhaler; TAKE 2 PUFFS BY MOUTH EVERY 6 HOURS AS NEEDED FOR WHEEZE, Disp-8.5 each, R-5Normal  6. Seasonal allergies  -     levocetirizine (XYZAL) 5 MG tablet; Take 1 tablet by mouth nightly, Disp-90 tablet, R-3Normal  -     azelastine (ASTELIN) 0.1 % nasal spray; 2 sprays by Nasal route 2 times daily Use in each nostril as directed, Disp-120 mL, R-1Normal  7. Vitamin D deficiency disease  -     Vitamin D 25 Hydroxy; Future  8. Impaired fasting glucose  -     Hemoglobin A1C; Future    Get labs today  Add astelin spray as her allergies are not controlled  Rest of labs stable     Return in about 6 months (around 11/7/2024) for physical - get labs today .       Subjective       Working At [employer]-  and is traveling a lot     Here for follow up chronic issues     Chronic fatigue- with schedule not  HPI    STEPHANIE: 8/3/2023 - Dr. Horne     CC: Pt is here today for a routine eye exam. Pt states that he noticed a   decline with his near and distance vision since his last exam.     (-)Dryness  (-)Burning  (-)Itchiness  (-)Tearing  (+)Flashes  (+)Floaters   (+)Photophobia  (-)Eye Pain      Diabetic: yes  A1C: 6.3 on 8/29/2023    Contact Lens: no    Eye Meds: no    PD: 62.0    Last edited by Dorothy Solis MA on 6/17/2024  2:49 PM.            Assessment /Plan     For exam results, see Encounter Report.            Regular astigmatism of both eyes  Presbyopia              Rx specs      Vitreous syneresis of both eyes    NS (nuclear sclerosis), bilateral             Borderline Visually significant, monitor     Open angle with borderline findings and high glaucoma risk in both eyes  Glaucoma suspect, bilateral  -          HVF 2012: WNL, 2017 scattered defects/ stable WNL  OCT: sectoral thinning inf nasal OD, sup temp OS, stable vs ?Mild progression OU  PACHY: 541/537   IOP: normal/ low              Type II or unspecified type diabetes mellitus without mention of complication, uncontrolled  Type 2 diabetes mellitus without ophthalmic manifestations  Essential hypertension              No retinopathy, monitor                   RTC 1 year annual  Repeat HVF 24-2 and OCT optic nerve next year

## 2024-06-21 ENCOUNTER — HOSPITAL ENCOUNTER (OUTPATIENT)
Dept: CARDIOLOGY | Facility: CLINIC | Age: 69
Discharge: HOME | End: 2024-06-21
Payer: MEDICARE

## 2024-06-21 DIAGNOSIS — I25.118 CORONARY ARTERY DISEASE OF NATIVE ARTERY OF NATIVE HEART WITH STABLE ANGINA PECTORIS: ICD-10-CM

## 2024-06-21 DIAGNOSIS — I10 PRIMARY HYPERTENSION: ICD-10-CM

## 2024-06-21 LAB
OHS QRS DURATION: 106 MS
OHS QTC CALCULATION: 422 MS

## 2024-06-21 PROCEDURE — 93005 ELECTROCARDIOGRAM TRACING: CPT | Mod: HCNC,S$GLB,, | Performed by: INTERNAL MEDICINE

## 2024-06-21 PROCEDURE — 93010 ELECTROCARDIOGRAM REPORT: CPT | Mod: HCNC,S$GLB,, | Performed by: INTERNAL MEDICINE

## 2024-06-21 NOTE — PROGRESS NOTES
Your results look fine and do not require any change in treatment.     Please contact me if you have any additional concerns.    Sincerely,    Bc Salomon

## 2024-07-01 ENCOUNTER — TELEPHONE (OUTPATIENT)
Dept: CARDIOLOGY | Facility: CLINIC | Age: 69
End: 2024-07-01
Payer: MEDICARE

## 2024-07-02 NOTE — TELEPHONE ENCOUNTER
----- Message from Nazanin Hess sent at 2024 11:51 AM CDT -----  Regarding: New Order  Pt [phone] says he will be out of town the month of July and cannot do his nuclear test. The order will  and he was told to call and have new orders for August put in the computer. Please call him to yaima the test once done.    Thanks

## 2024-07-02 NOTE — TELEPHONE ENCOUNTER
Returned pt's call and will reschedule Nuclear stress for August. Pt will see in the portal.    Nazanin cortez Staff  Caller: Unspecified (Today, 11:51 AM)  Pt [phone] says he will be out of town the month of July and cannot do his nuclear test. The order will  and he was told to call and have new orders for August put in the computer. Please call him to yaima the test once done.    Thanks

## 2024-07-21 NOTE — TELEPHONE ENCOUNTER
Care Due:                  Date            Visit Type   Department     Provider  --------------------------------------------------------------------------------                                EP -                              PRIMARY      [facility] PRIMARY  Last Visit: 08-      CARE ([facility])   COLTEN Martin  Next Visit: None Scheduled  None         None Found                                                            Last  Test          Frequency    Reason                     Performed    Due Date  --------------------------------------------------------------------------------    Office Visit  12 months..  celecoxib................  08- 08-    CBC.........  12 months..  celecoxib................  08- 08-    CMP.........  12 months..  celecoxib, empagliflozin,   08- 08-                             metFORMIN................    HBA1C.......  6 months...  empagliflozin, metFORMIN.  08- 02-    [facility] Embedded Care Due Messages. Reference number: [id number].   7/21/2024 2:33:06 PM CDT

## 2024-07-21 NOTE — TELEPHONE ENCOUNTER
Refill Routing Note   Medication(s) are not appropriate for processing by [facility] for the following reason(s):        Required labs outdated    [facility] action(s):  Defer   Requires appointment : Yes        Medication Therapy Plan: FLOS      Appointments  past 12m or future 3m with PCP    Date Provider   Last Visit   8/28/2023 Dallas Martin MD   Next Visit   Visit date not found Dallas Martin MD   ED visits in past 90 days: 0        Note composed:5:33 PM 07/21/2024

## 2024-07-22 RX ORDER — METFORMIN HYDROCHLORIDE 1000 MG/1
1000 TABLET ORAL 2 TIMES DAILY
Qty: 180 TABLET | Refills: 3 | Status: SHIPPED | OUTPATIENT
Start: 2024-07-22

## 2024-07-29 ENCOUNTER — PATIENT MESSAGE (OUTPATIENT)
Dept: CARDIOLOGY | Facility: CLINIC | Age: 69
End: 2024-07-29
Payer: MEDICARE

## 2024-08-01 ENCOUNTER — PATIENT MESSAGE (OUTPATIENT)
Dept: INTERNAL MEDICINE | Facility: CLINIC | Age: 69
End: 2024-08-01
Payer: MEDICARE

## 2024-08-01 RX ORDER — ZOLPIDEM TARTRATE 5 MG/1
5 TABLET ORAL NIGHTLY PRN
Qty: 90 TABLET | Refills: 0 | Status: SHIPPED | OUTPATIENT
Start: 2024-08-01

## 2024-08-02 ENCOUNTER — LAB VISIT (OUTPATIENT)
Dept: LAB | Facility: HOSPITAL | Age: 69
End: 2024-08-02
Attending: INTERNAL MEDICINE
Payer: MEDICARE

## 2024-08-02 DIAGNOSIS — Z79.4 TYPE 2 DIABETES MELLITUS WITH HYPERGLYCEMIA, WITH LONG-TERM CURRENT USE OF INSULIN: ICD-10-CM

## 2024-08-02 DIAGNOSIS — E11.65 TYPE 2 DIABETES MELLITUS WITH HYPERGLYCEMIA, WITH LONG-TERM CURRENT USE OF INSULIN: ICD-10-CM

## 2024-08-02 LAB
ALBUMIN/CREAT UR: 5 UG/MG (ref 0–30)
CREAT UR-MCNC: 120 MG/DL (ref 23–375)
MICROALBUMIN UR DL<=1MG/L-MCNC: 6 UG/ML

## 2024-08-02 PROCEDURE — 82043 UR ALBUMIN QUANTITATIVE: CPT | Mod: HCNC | Performed by: INTERNAL MEDICINE

## 2024-08-02 PROCEDURE — 82570 ASSAY OF URINE CREATININE: CPT | Mod: HCNC | Performed by: INTERNAL MEDICINE

## 2024-08-09 ENCOUNTER — OFFICE VISIT (OUTPATIENT)
Dept: ENDOCRINOLOGY | Facility: CLINIC | Age: 69
End: 2024-08-09
Payer: MEDICARE

## 2024-08-09 ENCOUNTER — PATIENT MESSAGE (OUTPATIENT)
Dept: ENDOCRINOLOGY | Facility: CLINIC | Age: 69
End: 2024-08-09

## 2024-08-09 DIAGNOSIS — E78.5 DYSLIPIDEMIA: ICD-10-CM

## 2024-08-09 DIAGNOSIS — Z79.4 TYPE 2 DIABETES MELLITUS WITH OTHER CIRCULATORY COMPLICATION, WITH LONG-TERM CURRENT USE OF INSULIN: Primary | ICD-10-CM

## 2024-08-09 DIAGNOSIS — I25.118 CORONARY ARTERY DISEASE OF NATIVE ARTERY OF NATIVE HEART WITH STABLE ANGINA PECTORIS: ICD-10-CM

## 2024-08-09 DIAGNOSIS — E11.59 TYPE 2 DIABETES MELLITUS WITH OTHER CIRCULATORY COMPLICATION, WITH LONG-TERM CURRENT USE OF INSULIN: Primary | ICD-10-CM

## 2024-08-09 DIAGNOSIS — E66.09 CLASS 1 OBESITY DUE TO EXCESS CALORIES WITHOUT SERIOUS COMORBIDITY WITH BODY MASS INDEX (BMI) OF 30.0 TO 30.9 IN ADULT: ICD-10-CM

## 2024-08-09 RX ORDER — SEMAGLUTIDE 2.68 MG/ML
2 INJECTION, SOLUTION SUBCUTANEOUS
Qty: 9 ML | Refills: 3 | Status: SHIPPED | OUTPATIENT
Start: 2024-08-09 | End: 2025-08-09

## 2024-08-15 ENCOUNTER — PATIENT MESSAGE (OUTPATIENT)
Dept: CARDIOLOGY | Facility: CLINIC | Age: 69
End: 2024-08-15
Payer: MEDICARE

## 2024-08-19 ENCOUNTER — TELEPHONE (OUTPATIENT)
Dept: UROLOGY | Facility: CLINIC | Age: 69
End: 2024-08-19
Payer: MEDICARE

## 2024-08-20 ENCOUNTER — OFFICE VISIT (OUTPATIENT)
Dept: UROLOGY | Facility: CLINIC | Age: 69
End: 2024-08-20
Payer: MEDICARE

## 2024-08-20 VITALS
SYSTOLIC BLOOD PRESSURE: 124 MMHG | BODY MASS INDEX: 31.48 KG/M2 | WEIGHT: 207.69 LBS | HEIGHT: 68 IN | HEART RATE: 65 BPM | DIASTOLIC BLOOD PRESSURE: 72 MMHG

## 2024-08-20 DIAGNOSIS — N40.1 BPH ASSOCIATED WITH NOCTURIA: Primary | ICD-10-CM

## 2024-08-20 DIAGNOSIS — R35.1 BPH ASSOCIATED WITH NOCTURIA: Primary | ICD-10-CM

## 2024-08-20 PROCEDURE — 3074F SYST BP LT 130 MM HG: CPT | Mod: HCNC,CPTII,S$GLB, | Performed by: UROLOGY

## 2024-08-20 PROCEDURE — 3078F DIAST BP <80 MM HG: CPT | Mod: HCNC,CPTII,S$GLB, | Performed by: UROLOGY

## 2024-08-20 PROCEDURE — 3051F HG A1C>EQUAL 7.0%<8.0%: CPT | Mod: HCNC,CPTII,S$GLB, | Performed by: UROLOGY

## 2024-08-20 PROCEDURE — 4010F ACE/ARB THERAPY RXD/TAKEN: CPT | Mod: HCNC,CPTII,S$GLB, | Performed by: UROLOGY

## 2024-08-20 PROCEDURE — 1159F MED LIST DOCD IN RCRD: CPT | Mod: HCNC,CPTII,S$GLB, | Performed by: UROLOGY

## 2024-08-20 PROCEDURE — 3008F BODY MASS INDEX DOCD: CPT | Mod: HCNC,CPTII,S$GLB, | Performed by: UROLOGY

## 2024-08-20 PROCEDURE — 3066F NEPHROPATHY DOC TX: CPT | Mod: HCNC,CPTII,S$GLB, | Performed by: UROLOGY

## 2024-08-20 PROCEDURE — 3061F NEG MICROALBUMINURIA REV: CPT | Mod: HCNC,CPTII,S$GLB, | Performed by: UROLOGY

## 2024-08-20 PROCEDURE — 99999 PR PBB SHADOW E&M-EST. PATIENT-LVL II: CPT | Mod: PBBFAC,HCNC,, | Performed by: UROLOGY

## 2024-08-20 PROCEDURE — 99214 OFFICE O/P EST MOD 30 MIN: CPT | Mod: HCNC,S$GLB,, | Performed by: UROLOGY

## 2024-08-20 NOTE — PROGRESS NOTES
CC: nocturia 4 to 5 x, urinary incontinence, ED    Jm Deleon is a 69 y.o. man who is here for the evaluation of Benign Prostatic Hypertrophy (Yearly visit -doing well on flomax, nocturia is 0-1 times )    Today  He reports he is doing well. His nocturia has decreased to 0-1x. Patient is very content on this current dosage.He is no longer using pads. He reports he has strong urine follow, complete emptying.  As far as the ED, he has not been using the trimix injections due to both confidence in using the injections, but also his wife's sexual function.    He denies flank pain, dysuria, hematuria.    From previous  A new pt referred by his PCP, Dallas Martin MD   C/o increased nocturia 4 to 5 x,  no urine leakage but drips noted after finishing urination.  Thus he uses a one pad a day.  C/o weak urine flow  C/o ED.  Tried viagra and cialis but did not respond well at all.  Denies flank pain, dysuria, hematuria.      S/p CABG at age 45 and has been on Lasix.   Because of bothersome frequent urination , he stopped taking lasix.    His son graduated from [employer] and is stationed at [employer] now.  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  Speaks Greek fluently.    Past Medical History:   Diagnosis Date    Allergy     seasonal    Arthritis     Coronary artery disease     Diabetes mellitus     Diabetes mellitus, type 2     Dysplastic nevus of trunk 03/2022    moderately atypical     Hyperlipidemia     Hypertension     Joint pain     Melanoma 10/2019    MM left back 2.7mm; neg LNs    Open angle with borderline findings and high glaucoma risk in both eyes 2018    Squamous cell carcinoma 2017    scalp - SSW     Past Surgical History:   Procedure Laterality Date    ARTHROSCOPIC REPAIR OF ROTATOR CUFF OF SHOULDER Left 5/12/2021    Procedure: REPAIR, ROTATOR CUFF, ARTHROSCOPIC;  Surgeon: Johnny Ventura MD;  Location: [facility];  Service: Orthopedics;  Laterality: Left;  Labral Debridement    ARTHROSCOPY OF  SHOULDER WITH DECOMPRESSION OF SUBACROMIAL SPACE  5/12/2021    Procedure: ARTHROSCOPY, SHOULDER, WITH SUBACROMIAL SPACE DECOMPRESSION;  Surgeon: Johnny Ventura MD;  Location: [facility] OR;  Service: Orthopedics;;    BELPHAROPTOSIS REPAIR  10 years ago    both eyes    CARDIAC SURGERY      3 vessel bypass    COLONOSCOPY N/A 10/4/2019    Procedure: COLONOSCOPY;  Surgeon: Isaiah Booker MD;  Location: [facility] ENDO (4TH FLR);  Service: Endoscopy;  Laterality: N/A;  Cardilogy Clearance received rom Dr. QUEENIE Salomon, see telephone encounter 8/26/19-BB    CORONARY ARTERY BYPASS GRAFT  x4 age 45 2000    DISTAL CLAVICLE EXCISION Left 5/12/2021    Procedure: EXCISION, CLAVICLE, DISTAL;  Surgeon: Johnny Ventura MD;  Location: [facility] OR;  Service: Orthopedics;  Laterality: Left;    FIXATION OF TENDON Left 5/12/2021    Procedure: FIXATION, TENDON;  Surgeon: Johnny Ventura MD;  Location: [facility] OR;  Service: Orthopedics;  Laterality: Left;    HARDWARE REMOVAL Left 11/24/2021    Procedure: REMOVAL, HARDWARE;  Surgeon: Johnny Ventura MD;  Location: [facility] OR;  Service: Orthopedics;  Laterality: Left;    HERNIA REPAIR      HIP SURGERY  9-24-14    left YANICK    INJECTION, SACROILIAC JOINT Right 6/6/2023    Procedure: INJECTION,SACROILIAC JOINT RIGHT;  Surgeon: Rosana Lopez MD;  Location: [facility];  Service: Pain Management;  Laterality: Right;    JOINT REPLACEMENT      left hip    MANIPULATION WITH ANESTHESIA Left 11/24/2021    Procedure: MANIPULATION, WITH ANESTHESIA;  Surgeon: Johnny Ventura MD;  Location: [facility] OR;  Service: Orthopedics;  Laterality: Left;    SENTINEL LYMPH NODE BIOPSY Left 10/18/2019    Procedure: BIOPSY, LYMPH NODE, SENTINEL;  Surgeon: Fabián Villeda MD;  Location: [facility] OR 2ND FLR;  Service: General;  Laterality: Left;    SHOULDER ARTHROSCOPY      SHOULDER ARTHROSCOPY Left 11/24/2021    Procedure: ARTHROSCOPY, SHOULDER;  Surgeon: Johnny Ventura MD;  Location: [facility] OR;  Service:  Orthopedics;  Laterality: Left;  regional w/catheter (interscalene)    SHOULDER SURGERY      TRANSFORAMINAL EPIDURAL INJECTION OF STEROID Left 8/7/2020    Procedure: LUMBAR TRANSFORAMINAL LEFT L3/4 AND L5/S1 DIRECT REFERRAL;  Surgeon: Ronny Rangel MD;  Location: [facility] PAIN T;  Service: Pain Management;  Laterality: Left;  NEEDS CONSENT, DIABETIC     Social History     Tobacco Use    Smoking status: Never    Smokeless tobacco: Never   Substance Use Topics    Alcohol use: Yes     Alcohol/week: 3.0 standard drinks of alcohol     Types: 3 Cans of beer per week     Comment: on weekend    Drug use: Not Currently     Types: Marijuana     Family History   Problem Relation Name Age of Onset    Coronary artery disease Mother      Cancer Father          lung cancer, smoker    Hypertension Brother      Cancer Brother          colon    Amblyopia Neg Hx      Blindness Neg Hx      Cataracts Neg Hx      Glaucoma Neg Hx      Macular degeneration Neg Hx      Retinal detachment Neg Hx      Strabismus Neg Hx      Melanoma Neg Hx       Allergy:  Review of patient's allergies indicates:  No Known Allergies    Outpatient Encounter Medications as of 8/20/2024   Medication Sig Dispense Refill    ACCU-CHEK SMARTVIEW TEST STRIP Strp 1 strip by Misc.(Non-Drug; Combo Route) route 3 (three) times daily. Patient needs an appointment 300 strip 0    ammonium lactate 12 % Crea Apply small amount to feet except for in between toes twice a day 1 Tube 3    aspirin (ECOTRIN) 81 MG EC tablet Take 81 mg by mouth once daily.      atorvastatin (LIPITOR) 80 MG tablet Take 1 tablet (80 mg total) by mouth once daily. 90 tablet 3    blood-glucose meter Misc Use to check sugar 3 times daily. Accu-chek Emily. Patient needs an appointment 1 each 0    carvediloL (COREG) 25 MG tablet TAKE 1 TABLET TWICE DAILY 180 tablet 3    celecoxib (CELEBREX) 200 MG capsule Take 1 capsule (200 mg total) by mouth 2 (two) times daily. 180 capsule 0    celecoxib (CELEBREX) 200  "MG capsule Take 1 capsule (200 mg total) by mouth daily as needed for Pain. 30 capsule 1    celecoxib (CELEBREX) 200 MG capsule TAKE 1 CAPSULE EVERY DAY AS NEEDED FOR PAIN 30 capsule 3    diclofenac sodium (VOLTAREN) 1 % Gel Apply 2 g topically 4 (four) times daily as needed (neck muscle spasm). 100 g 3    diphenhydrAMINE (BENADRYL) 25 mg capsule Take 50 mg by mouth nightly as needed for Itching.       empagliflozin (JARDIANCE) 25 mg tablet Take 1 tablet (25 mg total) by mouth once daily. 90 tablet 1    evolocumab (REPATHA SURECLICK) 140 mg/mL PnIj Inject 1 mL (140 mg total) into the skin every 14 (fourteen) days. 6 mL 3    FOLIC ACID/MULTIVITS-MIN (MULTIVITAMIN FOR CHOLESTEROL ORAL) Take 1 tablet by mouth nightly.       furosemide (LASIX) 40 MG tablet TAKE 1 TABLET EVERY DAY 90 tablet 3    insulin glargine, TOUJEO, (TOUJEO SOLOSTAR U-300 INSULIN) 300 unit/mL (1.5 mL) InPn pen Inject 45 Units into the skin once daily. 15 mL 3    insulin needles, disposable, (BD INSULIN PEN NEEDLE UF ORIG) 29 x 1/2 " Ndle USE TWICE DAILY AS DIRECTED 100 each 2    lancets Misc 1 lancet by Misc.(Non-Drug; Combo Route) route 3 (three) times daily. accu-chek Fastclix. Patient needs an appointment 300 each 0    metFORMIN (GLUCOPHAGE) 1000 MG tablet TAKE 1 TABLET TWICE DAILY 180 tablet 3    mupirocin (BACTROBAN) 2 % ointment AAA qd- bid 30 g 3    nitroGLYCERIN (NITROSTAT) 0.4 MG SL tablet PLACE 1 TABLET (0.4 MG TOTAL) UNDER THE TONGUE EVERY 5 (FIVE) MINUTES AS NEEDED FOR CHEST PAIN AS DIRECTED 25 tablet 4    NOVOLOG FLEXPEN U-100 INSULIN 100 unit/mL (3 mL) InPn pen Inject 14 Units into the skin 3 (three) times daily with meals. 45 mL 3    papaverine 30 mg/mL injection Add: Phentolamine 1 mg  Add: PGE1 10 mcg    Sig:  Inject 10 units as directed; titrate up by 5 units until desired results 10 mL 12    pen needle, diabetic (BD INSULIN PEN NEEDLE UF MINI) 31 gauge x 3/16" Ndle 1 each by Misc.(Non-Drug; Combo Route) route 4 (four) times daily. " Patient needs an appointment 400 each 0    semaglutide (OZEMPIC) 2 mg/dose (8 mg/3 mL) PnIj Inject 2 mg into the skin every 7 days. 9 mL 3    tamsulosin (FLOMAX) 0.4 mg Cap Take 1 capsule (0.4 mg total) by mouth every evening. 90 capsule 3    valsartan (DIOVAN) 320 MG tablet TAKE 1 TABLET EVERY DAY 90 tablet 3    VASCEPA 1 gram Cap TAKE 2 CAPSULES TWICE DAILY 360 capsule 3    zolpidem (AMBIEN) 5 MG Tab Take 1 tablet (5 mg total) by mouth nightly as needed (insomnia). 90 tablet 0    [DISCONTINUED] OZEMPIC 1 mg/dose (4 mg/3 mL) INJECT 1MG UNDER THE SKIN ONE TIME WEEKLY 9 each 3     Facility-Administered Encounter Medications as of 8/20/2024   Medication Dose Route Frequency Provider Last Rate Last Admin    fentaNYL 50 mcg/mL injection 25 mcg  25 mcg Intravenous Q5 Min PRN Travis Corbin MD   50 mcg at 05/12/21 1242    fentaNYL 50 mcg/mL injection  mcg   mcg Intravenous PRN Markus Esparza MD   100 mcg at 11/24/21 1151    lidocaine (PF) 10 mg/ml (1%) injection 10 mg  1 mL Intradermal Once Deanne Mosqueda MD        LIDOcaine (PF) 10 mg/ml (1%) injection 10 mg  1 mL Intradermal Once Markus Esparza MD        midazolam (VERSED) 1 mg/mL injection 0.5 mg  0.5 mg Intravenous PRN Travis Corbin MD   2 mg at 05/12/21 1242    midazolam (VERSED) 1 mg/mL injection 0.5-4 mg  0.5-4 mg Intravenous PRN Markus Esparza MD   2 mg at 11/24/21 1151    ropivacaine 0.2% Nimbus PainPRO Pump infusion 500 ML   Perineural Continuous Travis Corbin MD   New Bag at 05/12/21 1629    ropivacaine 0.2% Nimbus PainPRO Pump infusion 500 ML   Perineural Continuous Markus Esparza MD   New Bag at 11/24/21 1450    triamcinolone acetonide injection 40 mg  40 mg Intra-articular  Patricio Mlutani MD   40 mg at 10/26/16 0841     Review of Systems   ROS  Physical Exam     Vitals:    08/20/24 0821   BP: 124/72   Pulse: 65     Physical Exam  Constitutional:       General: He is  not in acute distress.     Appearance: He is well-developed. He is not diaphoretic.   HENT:      Head: Normocephalic and atraumatic.      Right Ear: External ear normal.      Left Ear: External ear normal.      Nose: Nose normal.   Eyes:      Conjunctiva/sclera: Conjunctivae normal.      Pupils: Pupils are equal, round, and reactive to light.   Neck:      Thyroid: No thyromegaly.      Vascular: No JVD.      Trachea: No tracheal deviation.   Cardiovascular:      Rate and Rhythm: Normal rate and regular rhythm.      Heart sounds: Normal heart sounds. No murmur heard.     No friction rub. No gallop.   Pulmonary:      Effort: Pulmonary effort is normal. No respiratory distress.      Breath sounds: Normal breath sounds. No wheezing.   Chest:      Chest wall: No tenderness.   Abdominal:      General: Bowel sounds are normal. There is no distension.      Palpations: Abdomen is soft. There is no mass.      Tenderness: There is no abdominal tenderness. There is no guarding or rebound.   Genitourinary:     Penis: Normal. No tenderness.       Rectum: Normal.      Comments: Prostate 30 grams with negative nodule or negative tenderness  No tenderness noted on his levator ani.  Able to contract and relax without any pain.    Musculoskeletal:         General: No tenderness or deformity. Normal range of motion.      Cervical back: Normal range of motion and neck supple.   Lymphadenopathy:      Cervical: No cervical adenopathy.   Skin:     General: Skin is warm and dry.   Neurological:      Mental Status: He is alert and oriented to person, place, and time.   Psychiatric:         Behavior: Behavior normal.         Thought Content: Thought content normal.         LABS:  Lab Results   Component Value Date    PSA 1.2 08/29/2023    PSA 2.0 04/30/2021    PSA 0.89 07/20/2020    PSA 0.97 10/26/2018    PSA 0.76 09/08/2017    PSA 0.88 05/23/2016    PSA 0.68 01/30/2015    PSA 0.96 07/11/2014    PSA 0.60 11/28/2011    PSA 0.63 12/03/2009  "    No results found. However, due to the size of the patient record, not all encounters were searched. Please check Results Review for a complete set of results.  Lab Results   Component Value Date    CREATININE 0.9 08/29/2023    CREATININE 0.9 08/29/2023    CREATININE 0.9 11/04/2022     Results for orders placed or performed in visit on 10/26/18   Testosterone   Result Value Ref Range    Testosterone, Total 339 304 - 1227 ng/dL     *Note: Due to a large number of results and/or encounters for the requested time period, some results have not been displayed. A complete set of results can be found in Results Review.     No results found for: "LABURIN"  Hemoglobin A1C   Date Value Ref Range Status   08/02/2024 7.0 (H) 4.0 - 5.6 % Final     Comment:     ADA Screening Guidelines:  5.7-6.4%  Consistent with prediabetes  >or=6.5%  Consistent with diabetes    High levels of fetal hemoglobin interfere with the HbA1C  assay. Heterozygous hemoglobin variants (HbS, HgC, etc)do  not significantly interfere with this assay.   However, presence of multiple variants may affect accuracy.     08/29/2023 6.3 (H) 4.0 - 5.6 % Final     Comment:     ADA Screening Guidelines:  5.7-6.4%  Consistent with prediabetes  >or=6.5%  Consistent with diabetes    High levels of fetal hemoglobin interfere with the HbA1C  assay. Heterozygous hemoglobin variants (HbS, HgC, etc)do  not significantly interfere with this assay.   However, presence of multiple variants may affect accuracy.       Previous  UA clear 100 + glucose  PVR per bladder scan: 6 ml    Assessment and Plan:  BPH associated with nocturia      Nocturia symptoms treated with flomax .4mg once a day. Continue this regimen.   He can follow up with his PCP for future refills of flomax.  RTC prn for worsening urinary symptoms.  Continue to control his DM well.      For his ED, he met with Zoila Etienne for [facility], not interested at the moment.    I spent 25 minutes with the patient of which " more than half was spent in direct consultation with the patient in regards to our treatment and plan.     Follow-up:  RTC in 1 year for follow up

## 2024-08-31 DIAGNOSIS — I25.10 CORONARY ARTERY DISEASE INVOLVING NATIVE CORONARY ARTERY OF NATIVE HEART WITHOUT ANGINA PECTORIS: ICD-10-CM

## 2024-08-31 DIAGNOSIS — I10 ESSENTIAL HYPERTENSION: ICD-10-CM

## 2024-08-31 NOTE — TELEPHONE ENCOUNTER
Care Due:                  Date            Visit Type   Department     Provider  --------------------------------------------------------------------------------                                EP -                              PRIMARY      [facility] PRIMARY  Last Visit: 08-      CARE ([facility])   COLTEN Martin  Next Visit: None Scheduled  None         None Found                                                            Last  Test          Frequency    Reason                     Performed    Due Date  --------------------------------------------------------------------------------    Cr..........  12 months..  empagliflozin, metFORMIN.  08- 08-    [facility] Embedded Care Due Messages. Reference number: [id number].   8/31/2024 2:47:23 AM CDT

## 2024-09-02 RX ORDER — EMPAGLIFLOZIN 25 MG/1
25 TABLET, FILM COATED ORAL
Qty: 90 TABLET | Refills: 3 | Status: SHIPPED | OUTPATIENT
Start: 2024-09-02

## 2024-09-02 NOTE — TELEPHONE ENCOUNTER
Refill Routing Note   Medication(s) are not appropriate for processing by [facility] for the following reason(s):        Required labs outdated    [facility] action(s):  Defer   Requires labs : Yes             Appointments  past 12m or future 3m with PCP    Date Provider   Last Visit   8/28/2023 Dallas Martin MD   Next Visit   Visit date not found Dallas Martin MD   ED visits in past 90 days: 0        Note composed:7:48 PM 09/01/2024

## 2024-09-04 RX ORDER — VALSARTAN 320 MG/1
320 TABLET ORAL
Qty: 90 TABLET | Refills: 3 | Status: SHIPPED | OUTPATIENT
Start: 2024-09-04

## 2024-09-08 DIAGNOSIS — E78.5 DYSLIPIDEMIA: ICD-10-CM

## 2024-09-08 DIAGNOSIS — I25.10 CORONARY ARTERY DISEASE INVOLVING NATIVE CORONARY ARTERY OF NATIVE HEART WITHOUT ANGINA PECTORIS: ICD-10-CM

## 2024-09-10 RX ORDER — ICOSAPENT ETHYL 1000 MG/1
CAPSULE ORAL
Qty: 360 CAPSULE | Refills: 3 | Status: SHIPPED | OUTPATIENT
Start: 2024-09-10

## 2024-09-13 ENCOUNTER — HOSPITAL ENCOUNTER (OUTPATIENT)
Dept: RADIOLOGY | Facility: HOSPITAL | Age: 69
Discharge: HOME | End: 2024-09-13
Attending: NURSE PRACTITIONER
Payer: MEDICARE

## 2024-09-13 DIAGNOSIS — C43.59 MALIGNANT MELANOMA OF BACK: ICD-10-CM

## 2024-09-13 PROCEDURE — 25500020 PHARM REV CODE 255: Mod: HCNC | Performed by: NURSE PRACTITIONER

## 2024-09-13 PROCEDURE — 74177 CT ABD & PELVIS W/CONTRAST: CPT | Mod: 26,HCNC,, | Performed by: INTERNAL MEDICINE

## 2024-09-13 PROCEDURE — 71260 CT THORAX DX C+: CPT | Mod: TC,HCNC

## 2024-09-13 PROCEDURE — 71260 CT THORAX DX C+: CPT | Mod: 26,HCNC,, | Performed by: INTERNAL MEDICINE

## 2024-09-13 PROCEDURE — 74177 CT ABD & PELVIS W/CONTRAST: CPT | Mod: TC,HCNC

## 2024-09-13 RX ADMIN — IOHEXOL 100 ML: 350 INJECTION, SOLUTION INTRAVENOUS at 10:09

## 2024-09-14 LAB
CREAT SERPL-MCNC: 0.9 MG/DL (ref 0.5–1.4)
SAMPLE: NORMAL

## 2024-09-19 ENCOUNTER — TELEPHONE (OUTPATIENT)
Dept: HEMATOLOGY/ONCOLOGY | Facility: CLINIC | Age: 69
End: 2024-09-19
Payer: MEDICARE

## 2024-09-20 ENCOUNTER — OFFICE VISIT (OUTPATIENT)
Dept: HEMATOLOGY/ONCOLOGY | Facility: CLINIC | Age: 69
End: 2024-09-20
Payer: MEDICARE

## 2024-09-20 VITALS
HEART RATE: 65 BPM | RESPIRATION RATE: 16 BRPM | HEIGHT: 68 IN | OXYGEN SATURATION: 99 % | WEIGHT: 203.06 LBS | BODY MASS INDEX: 30.78 KG/M2 | DIASTOLIC BLOOD PRESSURE: 70 MMHG | TEMPERATURE: 98 F | SYSTOLIC BLOOD PRESSURE: 121 MMHG

## 2024-09-20 DIAGNOSIS — C43.59 MALIGNANT MELANOMA OF BACK: Primary | ICD-10-CM

## 2024-09-20 PROCEDURE — 99999 PR PBB SHADOW E&M-EST. PATIENT-LVL V: CPT | Mod: PBBFAC,HCNC,, | Performed by: INTERNAL MEDICINE

## 2024-09-20 NOTE — PROGRESS NOTES
ONCOLOGY FOLLOW UP VISIT    Reason for visit: surveillance for stage IIA melanoma    Cancer/Stage/TNM:   Cancer Staging  Malignant melanoma of back  Staging form: Melanoma of the Skin, AJCC 8th Edition  - Pathologic: Stage IIA (pT3a, pN0, cM0) - Unsigned     Interval History:   Patient returns to clinic to review imaging results. No new concerning lesions. Follows with Dr. Shannon for dermatology. Wearing sunscreen and using hats.       Review of Systems   Constitutional:  Negative for chills, fever and weight loss.   HENT:  Negative for hearing loss, nosebleeds and sore throat.    Eyes:  Negative for blurred vision and double vision.   Respiratory:  Negative for cough, hemoptysis and shortness of breath.    Cardiovascular:  Negative for chest pain and leg swelling.   Gastrointestinal:  Negative for abdominal pain, blood in stool, diarrhea, nausea and vomiting.   Genitourinary:  Negative for dysuria, frequency and hematuria.   Musculoskeletal:  Negative for joint pain and myalgias.   Skin:  Negative for itching and rash.   Neurological:  Negative for dizziness, tingling, sensory change, speech change and headaches.   Endo/Heme/Allergies:  Does not bruise/bleed easily.   Psychiatric/Behavioral:  Negative for memory loss. The patient does not have insomnia.          A complete 12-point review of systems was reviewed and is negative except as mentioned above.     Past Medical History:   Past Medical History:   Diagnosis Date    Allergy     seasonal    Arthritis     Coronary artery disease     Diabetes mellitus     Diabetes mellitus, type 2     Dysplastic nevus of trunk 03/2022    moderately atypical     Hyperlipidemia     Hypertension     Joint pain     Melanoma 10/2019    MM left back 2.7mm; neg LNs    Open angle with borderline findings and high glaucoma risk in both eyes 2018    Squamous cell carcinoma 2017    scalp - SSW        Allergies:   Review of patient's allergies indicates:  No Known Allergies    "    Medications:   Current Outpatient Medications   Medication Sig Dispense Refill    ACCU-CHEK SMARTVIEW TEST STRIP Strp 1 strip by Misc.(Non-Drug; Combo Route) route 3 (three) times daily. Patient needs an appointment 300 strip 0    ammonium lactate 12 % Crea Apply small amount to feet except for in between toes twice a day 1 Tube 3    aspirin (ECOTRIN) 81 MG EC tablet Take 81 mg by mouth once daily.      atorvastatin (LIPITOR) 80 MG tablet Take 1 tablet (80 mg total) by mouth once daily. 90 tablet 3    blood-glucose meter Misc Use to check sugar 3 times daily. Accu-chek Emily. Patient needs an appointment 1 each 0    carvediloL (COREG) 25 MG tablet TAKE 1 TABLET TWICE DAILY 180 tablet 3    celecoxib (CELEBREX) 200 MG capsule Take 1 capsule (200 mg total) by mouth 2 (two) times daily. 180 capsule 0    celecoxib (CELEBREX) 200 MG capsule Take 1 capsule (200 mg total) by mouth daily as needed for Pain. 30 capsule 1    celecoxib (CELEBREX) 200 MG capsule TAKE 1 CAPSULE EVERY DAY AS NEEDED FOR PAIN 30 capsule 3    diclofenac sodium (VOLTAREN) 1 % Gel Apply 2 g topically 4 (four) times daily as needed (neck muscle spasm). 100 g 3    diphenhydrAMINE (BENADRYL) 25 mg capsule Take 50 mg by mouth nightly as needed for Itching.       evolocumab (REPATHA SURECLICK) 140 mg/mL PnIj Inject 1 mL (140 mg total) into the skin every 14 (fourteen) days. 6 mL 3    FOLIC ACID/MULTIVITS-MIN (MULTIVITAMIN FOR CHOLESTEROL ORAL) Take 1 tablet by mouth nightly.       furosemide (LASIX) 40 MG tablet TAKE 1 TABLET EVERY DAY 90 tablet 3    insulin glargine, TOUJEO, (TOUJEO SOLOSTAR U-300 INSULIN) 300 unit/mL (1.5 mL) InPn pen Inject 45 Units into the skin once daily. 15 mL 3    insulin needles, disposable, (BD INSULIN PEN NEEDLE UF ORIG) 29 x 1/2 " Ndle USE TWICE DAILY AS DIRECTED 100 each 2    JARDIANCE 25 mg tablet TAKE 1 TABLET EVERY DAY 90 tablet 3    lancets Misc 1 lancet by Misc.(Non-Drug; Combo Route) route 3 (three) times daily. " "accu-chek Fastclix. Patient needs an appointment 300 each 0    metFORMIN (GLUCOPHAGE) 1000 MG tablet TAKE 1 TABLET TWICE DAILY 180 tablet 3    mupirocin (BACTROBAN) 2 % ointment AAA qd- bid 30 g 3    nitroGLYCERIN (NITROSTAT) 0.4 MG SL tablet PLACE 1 TABLET (0.4 MG TOTAL) UNDER THE TONGUE EVERY 5 (FIVE) MINUTES AS NEEDED FOR CHEST PAIN AS DIRECTED 25 tablet 4    NOVOLOG FLEXPEN U-100 INSULIN 100 unit/mL (3 mL) InPn pen Inject 14 Units into the skin 3 (three) times daily with meals. 45 mL 3    pen needle, diabetic (BD INSULIN PEN NEEDLE UF MINI) 31 gauge x 3/16" Ndle 1 each by Misc.(Non-Drug; Combo Route) route 4 (four) times daily. Patient needs an appointment 400 each 0    semaglutide (OZEMPIC) 2 mg/dose (8 mg/3 mL) PnIj Inject 2 mg into the skin every 7 days. 9 mL 3    tamsulosin (FLOMAX) 0.4 mg Cap Take 1 capsule (0.4 mg total) by mouth every evening. 90 capsule 3    valsartan (DIOVAN) 320 MG tablet TAKE 1 TABLET EVERY DAY 90 tablet 3    VASCEPA 1 gram Cap TAKE 2 CAPSULES TWICE DAILY 360 capsule 3    zolpidem (AMBIEN) 5 MG Tab Take 1 tablet (5 mg total) by mouth nightly as needed (insomnia). 90 tablet 0     No current facility-administered medications for this visit.     Facility-Administered Medications Ordered in Other Visits   Medication Dose Route Frequency Provider Last Rate Last Admin    fentaNYL 50 mcg/mL injection 25 mcg  25 mcg Intravenous Q5 Min PRN Travis Corbin MD   50 mcg at 05/12/21 1242    fentaNYL 50 mcg/mL injection  mcg   mcg Intravenous PRN Markus Esparza MD   100 mcg at 11/24/21 1151    lidocaine (PF) 10 mg/ml (1%) injection 10 mg  1 mL Intradermal Once Deanne Mosqueda MD        LIDOcaine (PF) 10 mg/ml (1%) injection 10 mg  1 mL Intradermal Once Markus Esparza MD        midazolam (VERSED) 1 mg/mL injection 0.5 mg  0.5 mg Intravenous PRN Travis Corbin MD   2 mg at 05/12/21 1242    midazolam (VERSED) 1 mg/mL injection 0.5-4 mg  0.5-4 mg " Intravenous PRN Markus Esparza MD   2 mg at 11/24/21 1151    ropivacaine 0.2% Nimbus PainPRO Pump infusion 500 ML   Perineural Continuous Travis Corbin MD   New Bag at 05/12/21 1629    ropivacaine 0.2% Nimbus PainPRO Pump infusion 500 ML   Perineural Continuous Markus Esparza MD   New Bag at 11/24/21 1450    triamcinolone acetonide injection 40 mg  40 mg Intra-articular  Patricio Multani MD   40 mg at 10/26/16 0841        Physical Exam:   There were no vitals taken for this visit.     ECOG Performance Status: (foot note - ECOG PS provided by Eastern Cooperative Oncology Group) 0 - Asymptomatic    Physical Exam  Constitutional:       General: He is not in acute distress.  HENT:      Head: Normocephalic and atraumatic.   Eyes:      Conjunctiva/sclera: Conjunctivae normal.   Pulmonary:      Effort: Pulmonary effort is normal. No respiratory distress.   Abdominal:      General: There is no distension.      Palpations: Abdomen is soft.      Tenderness: There is no abdominal tenderness.   Musculoskeletal:         General: Normal range of motion.      Cervical back: Normal range of motion and neck supple.   Skin:     General: Skin is warm and dry.      Findings: No rash.   Neurological:      Mental Status: He is alert and oriented to person, place, and time.   Psychiatric:         Mood and Affect: Affect normal.         Judgment: Judgment normal.                 Labs:   No results found for this or any previous visit (from the past 48 hour(s)).         Imaging: I have personally reviewed patient's CT scnas imaging showing no evidence of recurrence.  CT Chest Abdomen Pelvis With IV Contrast (XPD) NO Oral Contrast  Narrative: EXAMINATION:  CT CHEST ABDOMEN PELVIS WITH IV CONTRAST (XPD)    CLINICAL HISTORY:  Metastatic disease evaluation; Malignant melanoma of other part of trunk    TECHNIQUE:  Using 100 cc of  Omnipaque 350 IV contrast, and multi-detector helical CT technique, axial CT  images of the chest, abdomen and pelvis were obtained. 2D post-processing coronal and sagittal reconstructions was performed.    COMPARISON:  CT chest abdomen pelvis 08/29/2024    FINDINGS:  Thoracic soft tissues: Left paramedian back vertical linear subcutaneous soft tissue thickening likely postop change similar to prior.  Gynecomastia.    Heart: No cardiomegaly or pericardial effusion.  Coronary artery calcification    Pulmonary vasculature: Unremarkable.    Mediastinum/hilum: Unremarkable.    Central airway/esophagus: Unremarkable.    Lungs: Mild dependent atelectasis.  No consolidation.  Left lower lobe 4 mm nodule (6:277) and right lower lobe micronodule (6:208) similar to prior.  No pleural effusion.    Liver: Normal in size.Suspected hepatic steatosis.No focal hepatic abnormality.    Gallbladder: Unremarkable.    Bile Ducts: No intra or extrahepatic biliary ductal dilation.    Spleen: Unremarkable.    Pancreas: Unremarkable.    Adrenals: Unremarkable.    Genitourinary: Stable bilateral kidney subcentimeter hypodensities too small to characterize.  Bilateral ureters are normal in course and caliber.  No nephrolithiasis or hydroureteronephrosis.  Bladder demonstrates no acute abnormality.    Reproductive organs: Mild prostatic enlargement.    GI tract/Mesentery: Stomach is of normal appearance.  Bowel loops are normal in caliber without evidence for inflammation or obstruction. Appendix unremarkable.    Peritoneal Space: No abdominopelvic ascites or intraperitoneal free air.    Lymph nodes: No significant adenopathy.    Vasculature: Normal caliber of abdominal aorta with moderate atherosclerosis.  Moderate stenosis at SMA origin.  Common hepatic artery originates from SMA.    Abdominal wall: Mild bilateral fat containing inguinal hernia.    Bones: Left hip arthroplasty.  Status post sternotomy.  No acute osseous abnormality.  Impression: In this patient with history of melanoma, there is no evidence for  metastatic disease.    Stable small pulmonary nodules.    Additional findings as above.    Electronically signed by resident: Ledy Bansal  Date:    09/13/2024  Time:    10:24    Electronically signed by: Dana Summers MD  Date:    09/13/2024  Time:    11:13            Diagnoses:       1. Malignant melanoma of back              Assessment and Plan:         1. Stage IIA Cutaneous Melanoma:   -s/p WLE and SNLBx 10/18/2019 with 0/5 negative lymph nodes.  No evidence for adjuvant immunotherapy or targeted for stage IIA melanoma.  Will initiate active surveillance with CT scans every 3 months.  Will broaden to every 6 months after 2 years.  - needs to continue skin exams every 3-6 months with dermatology, stressed importance.  Patient will schedule an appointment with Dana Cardoza next available  - CT CAP every 6-12 months thereafter until 5 years. Will broaden to every 9 months.  - CT CAP (7/16/21) shows no evidence of metastatic disease in the chest, abdomen, or pelvis in this patient with history of back melanoma. There are no measurable lesions per RECIST criteria.  - Lost to f/u for 2 years, imaging 8/31/23 shows no evidence of recurrence.   - CT CAP reviewed today, no evidence of metastatic disease or recurrence  - He is done with imaging surveillance, will have him RTC in 1 year for H&P  - Continue to f/w Dermatology closely.          Liu Ness DO  Hematology/Oncology Fellow, PGY-IV  [facility] [place]        Route Chart for Scheduling    Med Onc Chart Routing      Follow up with physician 1 year.   Follow up with DESTINEY    Infusion scheduling note    Injection scheduling note    Labs    Imaging    Pharmacy appointment    Other referrals

## 2024-10-02 RX ORDER — CARVEDILOL 25 MG/1
25 TABLET ORAL 2 TIMES DAILY
Qty: 180 TABLET | Refills: 3 | Status: SHIPPED | OUTPATIENT
Start: 2024-10-02

## 2024-10-22 NOTE — TELEPHONE ENCOUNTER
Care Due:                  Date            Visit Type   Department     Provider  --------------------------------------------------------------------------------                                EP -                              PRIMARY      [facility] PRIMARY  Last Visit: 08-      CARE ([facility])   COLTEN Martin  Next Visit: None Scheduled  None         None Found                                                            Last  Test          Frequency    Reason                     Performed    Due Date  --------------------------------------------------------------------------------    Office Visit  15 months..  Contreras BAIG.....  08- 11-    [facility] Embedded Care Due Messages. Reference number: [id number].   10/22/2024 10:22:17 AM CDT

## 2024-10-23 DIAGNOSIS — Z79.4 TYPE 2 DIABETES MELLITUS WITH HYPERGLYCEMIA, WITH LONG-TERM CURRENT USE OF INSULIN: ICD-10-CM

## 2024-10-23 DIAGNOSIS — E11.65 TYPE 2 DIABETES MELLITUS WITH HYPERGLYCEMIA, WITH LONG-TERM CURRENT USE OF INSULIN: ICD-10-CM

## 2024-10-23 RX ORDER — INSULIN GLARGINE 300 U/ML
45 INJECTION, SOLUTION SUBCUTANEOUS DAILY
Qty: 15 ML | Refills: 3 | Status: SHIPPED | OUTPATIENT
Start: 2024-10-23

## 2024-10-25 ENCOUNTER — PATIENT MESSAGE (OUTPATIENT)
Dept: INTERNAL MEDICINE | Facility: CLINIC | Age: 69
End: 2024-10-25
Payer: MEDICARE

## 2024-11-07 ENCOUNTER — PATIENT MESSAGE (OUTPATIENT)
Dept: DERMATOLOGY | Facility: CLINIC | Age: 69
End: 2024-11-07
Payer: MEDICARE

## 2024-11-12 ENCOUNTER — OFFICE VISIT (OUTPATIENT)
Dept: DERMATOLOGY | Facility: CLINIC | Age: 69
End: 2024-11-12
Payer: MEDICARE

## 2024-11-12 DIAGNOSIS — L57.0 AK (ACTINIC KERATOSIS): Primary | ICD-10-CM

## 2024-11-12 PROCEDURE — 3051F HG A1C>EQUAL 7.0%<8.0%: CPT | Mod: HCNC,CPTII,S$GLB, | Performed by: DERMATOLOGY

## 2024-11-12 PROCEDURE — 4010F ACE/ARB THERAPY RXD/TAKEN: CPT | Mod: HCNC,CPTII,S$GLB, | Performed by: DERMATOLOGY

## 2024-11-12 PROCEDURE — 3066F NEPHROPATHY DOC TX: CPT | Mod: HCNC,CPTII,S$GLB, | Performed by: DERMATOLOGY

## 2024-11-12 PROCEDURE — 1160F RVW MEDS BY RX/DR IN RCRD: CPT | Mod: HCNC,CPTII,S$GLB, | Performed by: DERMATOLOGY

## 2024-11-12 PROCEDURE — 99999 PR PBB SHADOW E&M-EST. PATIENT-LVL III: CPT | Mod: PBBFAC,HCNC,, | Performed by: DERMATOLOGY

## 2024-11-12 PROCEDURE — 3061F NEG MICROALBUMINURIA REV: CPT | Mod: HCNC,CPTII,S$GLB, | Performed by: DERMATOLOGY

## 2024-11-12 PROCEDURE — 17000 DESTRUCT PREMALG LESION: CPT | Mod: HCNC,S$GLB,, | Performed by: DERMATOLOGY

## 2024-11-12 PROCEDURE — 1101F PT FALLS ASSESS-DOCD LE1/YR: CPT | Mod: HCNC,CPTII,S$GLB, | Performed by: DERMATOLOGY

## 2024-11-12 PROCEDURE — 99212 OFFICE O/P EST SF 10 MIN: CPT | Mod: 25,HCNC,S$GLB, | Performed by: DERMATOLOGY

## 2024-11-12 PROCEDURE — 17003 DESTRUCT PREMALG LES 2-14: CPT | Mod: 59,HCNC,S$GLB, | Performed by: DERMATOLOGY

## 2024-11-12 PROCEDURE — 3288F FALL RISK ASSESSMENT DOCD: CPT | Mod: HCNC,CPTII,S$GLB, | Performed by: DERMATOLOGY

## 2024-11-12 PROCEDURE — 1159F MED LIST DOCD IN RCRD: CPT | Mod: HCNC,CPTII,S$GLB, | Performed by: DERMATOLOGY

## 2024-11-12 PROCEDURE — 1125F AMNT PAIN NOTED PAIN PRSNT: CPT | Mod: HCNC,CPTII,S$GLB, | Performed by: DERMATOLOGY

## 2024-11-12 NOTE — PROGRESS NOTES
Subjective:      Patient ID:  Jm Deleon is a 69 y.o. male who presents for   Chief Complaint   Patient presents with    Lesion     Mr. Deleon is a 68yo M with history of SCC, melanoma (left back, negative SLNB) who presents to acute derm clinic for concern of x2 scaly lesions on the scalp. Lesions have been present for months, not tender or itchy or non-healing.     Personal history of skin cancer:  - L Back malignant melanoma 2.7 mm, negative SLN bx; s/p excision 10/18/2019  - Right lower Back moderate DN s/p excision 5/2022  - Left anterior scalp SCC s/p Mohs 2017      Review of Systems   Constitutional: Negative.    Skin:  Positive for wears hat. Negative for itching, rash, daily sunscreen use and activity-related sunscreen use.       Objective:   Physical Exam   Constitutional: He appears well-developed and well-nourished. No distress.   Neurological: He is alert and oriented to person, place, and time. He is not disoriented.   Psychiatric: He has a normal mood and affect.   Skin:   Areas Examined (abnormalities noted in diagram):   Head / Face Inspection Performed  Neck Inspection Performed  RUE Inspected  LUE Inspection Performed  Nails and Digits Inspection Performed            Diagram Legend     Erythematous scaling macule/papule c/w actinic keratosis       Vascular papule c/w angioma      Pigmented verrucoid papule/plaque c/w seborrheic keratosis      Yellow umbilicated papule c/w sebaceous hyperplasia      Irregularly shaped tan macule c/w lentigo     1-2 mm smooth white papules consistent with Milia      Movable subcutaneous cyst with punctum c/w epidermal inclusion cyst      Subcutaneous movable cyst c/w pilar cyst      Firm pink to brown papule c/w dermatofibroma      Pedunculated fleshy papule(s) c/w skin tag(s)      Evenly pigmented macule c/w junctional nevus     Mildly variegated pigmented, slightly irregular-bordered macule c/w mildly atypical nevus      Flesh colored to evenly pigmented  papule c/w intradermal nevus       Pink pearly papule/plaque c/w basal cell carcinoma      Erythematous hyperkeratotic cursted plaque c/w SCC      Surgical scar with no sign of skin cancer recurrence      Open and closed comedones      Inflammatory papules and pustules      Verrucoid papule consistent consistent with wart     Erythematous eczematous patches and plaques     Dystrophic onycholytic nail with subungual debris c/w onychomycosis     Umbilicated papule    Erythematous-base heme-crusted tan verrucoid plaque consistent with inflamed seborrheic keratosis     Erythematous Silvery Scaling Plaque c/w Psoriasis     See annotation      Assessment / Plan:        AK (actinic keratosis)  - counseled on nature of lesions on scalp  - cryosurgical destruction today as below    Cryosurgery Procedure Note    Verbal consent from the patient is obtained including, but not limited to, risk of hypopigmentation/hyperpigmentation, scar, recurrence of lesion. The patient is aware of the precancerous quality and need for treatment of these lesions. Liquid nitrogen cryosurgery is applied to the 3 actinic keratoses, as detailed in the physical exam, to produce a freeze injury. The patient is aware that blisters may form and is instructed on wound care with gentle cleansing and use of vaseline ointment to keep moist until healed. The patient is supplied a handout on cryosurgery and is instructed to call if lesions do not completely resolve.      Follow up if symptoms worsen or fail to improve.  Follow-up with Dr. Cardoza for routine skin exams.     Delilah Mishra MD  Dermatology, PGY-3  11/12/2024

## 2024-11-13 ENCOUNTER — TELEPHONE (OUTPATIENT)
Dept: CARDIOLOGY | Facility: HOSPITAL | Age: 69
End: 2024-11-13
Payer: MEDICARE

## 2024-11-13 DIAGNOSIS — G47.00 INSOMNIA, UNSPECIFIED TYPE: Primary | ICD-10-CM

## 2024-11-13 RX ORDER — ZOLPIDEM TARTRATE 5 MG/1
5 TABLET ORAL NIGHTLY PRN
Qty: 90 TABLET | Refills: 1 | Status: SHIPPED | OUTPATIENT
Start: 2024-11-13

## 2024-11-13 RX ORDER — ZOLPIDEM TARTRATE 5 MG/1
5 TABLET ORAL NIGHTLY PRN
Qty: 90 TABLET | Refills: 1 | OUTPATIENT
Start: 2024-11-13

## 2024-11-13 NOTE — TELEPHONE ENCOUNTER
Care Due:                  Date            Visit Type   Department     Provider  --------------------------------------------------------------------------------                                EP -                              PRIMARY      [facility] PRIMARY  Last Visit: 08-      CARE ([facility])   COLTEN Martin                              [facility]                              ANNUAL                              CHECKUP/Y  [facility] INTERNAL  Next Visit: 12-      [facility]       [place] Martin                                                            Last  Test          Frequency    Reason                     Performed    Due Date  --------------------------------------------------------------------------------    HBA1C.......  6 months...  SAFIA, metFORMIN.....  08- 01-    [facility] Embedded Care Due Messages. Reference number: [id number].   11/13/2024 10:13:59 AM CST

## 2024-11-13 NOTE — TELEPHONE ENCOUNTER
Patient Comment: My pharmacy said they have not received my prescription from Dr. Hickman. Could you please send in a renewal for my ZOLPIDEM 5MG to my online pharmacy or let me know   either way , Thanks, Jm Deleon     Refill Request.

## 2024-11-15 ENCOUNTER — HOSPITAL ENCOUNTER (OUTPATIENT)
Dept: CARDIOLOGY | Facility: HOSPITAL | Age: 69
Discharge: HOME | End: 2024-11-15
Attending: INTERNAL MEDICINE
Payer: MEDICARE

## 2024-11-15 VITALS
BODY MASS INDEX: 30.77 KG/M2 | WEIGHT: 203 LBS | SYSTOLIC BLOOD PRESSURE: 115 MMHG | HEART RATE: 62 BPM | DIASTOLIC BLOOD PRESSURE: 67 MMHG | HEIGHT: 68 IN

## 2024-11-15 DIAGNOSIS — R94.39 ABNORMAL CARDIOVASCULAR STRESS TEST: ICD-10-CM

## 2024-11-15 DIAGNOSIS — Z95.1 S/P CABG (CORONARY ARTERY BYPASS GRAFT): ICD-10-CM

## 2024-11-15 DIAGNOSIS — Z96.649 STATUS POST HIP REPLACEMENT, UNSPECIFIED LATERALITY: ICD-10-CM

## 2024-11-15 DIAGNOSIS — I25.118 CORONARY ARTERY DISEASE OF NATIVE ARTERY OF NATIVE HEART WITH STABLE ANGINA PECTORIS: ICD-10-CM

## 2024-11-15 LAB
CV STRESS BASE HR: 62 BPM
DIASTOLIC BLOOD PRESSURE: 67 MMHG
EJECTION FRACTION- HIGH: 59 %
END DIASTOLIC INDEX-HIGH: 155 ML/M2
END DIASTOLIC INDEX-LOW: 91 ML/M2
END SYSTOLIC INDEX-HIGH: 78 ML/M2
END SYSTOLIC INDEX-LOW: 40 ML/M2
NUC REST DIASTOLIC VOLUME INDEX: 68
NUC REST EJECTION FRACTION: 66
NUC REST SYSTOLIC VOLUME INDEX: 23
NUC STRESS DIASTOLIC VOLUME INDEX: 75
NUC STRESS EJECTION FRACTION: 65 %
NUC STRESS SYSTOLIC VOLUME INDEX: 27
OHS CV CPX 1 MINUTE RECOVERY HEART RATE: 79 BPM
OHS CV CPX 85 PERCENT MAX PREDICTED HEART RATE MALE: 127
OHS CV CPX MAX PREDICTED HEART RATE: 149
OHS CV CPX PATIENT IS FEMALE: 0
OHS CV CPX PATIENT IS MALE: 1
OHS CV CPX PEAK DIASTOLIC BLOOD PRESSURE: 66 MMHG
OHS CV CPX PEAK HEAR RATE: 68 BPM
OHS CV CPX PEAK RATE PRESSURE PRODUCT: 7820
OHS CV CPX PEAK SYSTOLIC BLOOD PRESSURE: 115 MMHG
OHS CV CPX PERCENT MAX PREDICTED HEART RATE ACHIEVED: 46
OHS CV CPX RATE PRESSURE PRODUCT PRESENTING: 7130
OHS CV INITIAL DOSE: 7.3 MCG/KG/MIN
OHS CV PEAK DOSE: 22 MCG/KG/MIN
RETIRED EF AND QEF - SEE NOTES: 47 %
SYSTOLIC BLOOD PRESSURE: 115 MMHG

## 2024-11-15 PROCEDURE — 93018 CV STRESS TEST I&R ONLY: CPT | Mod: HCNC,,, | Performed by: INTERNAL MEDICINE

## 2024-11-15 PROCEDURE — A9502 TC99M TETROFOSMIN: HCPCS | Mod: HCNC | Performed by: INTERNAL MEDICINE

## 2024-11-15 PROCEDURE — 78452 HT MUSCLE IMAGE SPECT MULT: CPT | Mod: HCNC

## 2024-11-15 PROCEDURE — 93016 CV STRESS TEST SUPVJ ONLY: CPT | Mod: HCNC,,, | Performed by: INTERNAL MEDICINE

## 2024-11-15 PROCEDURE — 63600175 PHARM REV CODE 636 W HCPCS: Mod: HCNC | Performed by: INTERNAL MEDICINE

## 2024-11-15 PROCEDURE — 78452 HT MUSCLE IMAGE SPECT MULT: CPT | Mod: 26,HCNC,, | Performed by: INTERNAL MEDICINE

## 2024-11-15 RX ORDER — AMINOPHYLLINE 25 MG/ML
75 INJECTION, SOLUTION INTRAVENOUS ONCE
Status: COMPLETED | OUTPATIENT
Start: 2024-11-15 | End: 2024-11-15

## 2024-11-15 RX ORDER — REGADENOSON 0.08 MG/ML
0.4 INJECTION, SOLUTION INTRAVENOUS
Status: COMPLETED | OUTPATIENT
Start: 2024-11-15 | End: 2024-11-15

## 2024-11-15 RX ADMIN — AMINOPHYLLINE 75 MG: 25 INJECTION, SOLUTION INTRAVENOUS at 09:11

## 2024-11-15 RX ADMIN — REGADENOSON 0.4 MG: 0.08 INJECTION, SOLUTION INTRAVENOUS at 09:11

## 2024-11-15 RX ADMIN — TETROFOSMIN 22 MILLICURIE: 1.38 INJECTION, POWDER, LYOPHILIZED, FOR SOLUTION INTRAVENOUS at 09:11

## 2024-11-15 RX ADMIN — TETROFOSMIN 7.3 MILLICURIE: 1.38 INJECTION, POWDER, LYOPHILIZED, FOR SOLUTION INTRAVENOUS at 08:11

## 2024-11-20 ENCOUNTER — TELEPHONE (OUTPATIENT)
Dept: ADMINISTRATIVE | Facility: CLINIC | Age: 69
End: 2024-11-20
Payer: MEDICARE

## 2024-11-20 ENCOUNTER — PATIENT MESSAGE (OUTPATIENT)
Dept: ADMINISTRATIVE | Facility: CLINIC | Age: 69
End: 2024-11-20
Payer: MEDICARE

## 2024-11-22 ENCOUNTER — OFFICE VISIT (OUTPATIENT)
Dept: FAMILY MEDICINE | Facility: CLINIC | Age: 69
End: 2024-11-22
Payer: MEDICARE

## 2024-11-22 VITALS — HEIGHT: 68 IN | WEIGHT: 203.06 LBS | BODY MASS INDEX: 30.78 KG/M2

## 2024-11-22 DIAGNOSIS — D69.2 SENILE PURPURA: ICD-10-CM

## 2024-11-22 DIAGNOSIS — M46.1 SACROILIITIS: ICD-10-CM

## 2024-11-22 DIAGNOSIS — Z00.00 ENCOUNTER FOR PREVENTIVE HEALTH EXAMINATION: Primary | ICD-10-CM

## 2024-11-22 DIAGNOSIS — Z79.4 TYPE 2 DIABETES MELLITUS WITH OTHER CIRCULATORY COMPLICATION, WITH LONG-TERM CURRENT USE OF INSULIN: ICD-10-CM

## 2024-11-22 DIAGNOSIS — E11.59 TYPE 2 DIABETES MELLITUS WITH OTHER CIRCULATORY COMPLICATION, WITH LONG-TERM CURRENT USE OF INSULIN: ICD-10-CM

## 2024-11-22 DIAGNOSIS — Z12.11 SCREENING FOR COLON CANCER: ICD-10-CM

## 2024-11-22 DIAGNOSIS — E66.811 CLASS 1 OBESITY DUE TO EXCESS CALORIES WITHOUT SERIOUS COMORBIDITY WITH BODY MASS INDEX (BMI) OF 30.0 TO 30.9 IN ADULT: ICD-10-CM

## 2024-11-22 DIAGNOSIS — I25.118 CORONARY ARTERY DISEASE OF NATIVE ARTERY OF NATIVE HEART WITH STABLE ANGINA PECTORIS: ICD-10-CM

## 2024-11-22 DIAGNOSIS — E66.09 CLASS 1 OBESITY DUE TO EXCESS CALORIES WITHOUT SERIOUS COMORBIDITY WITH BODY MASS INDEX (BMI) OF 30.0 TO 30.9 IN ADULT: ICD-10-CM

## 2024-11-22 DIAGNOSIS — I70.0 AORTIC ARCH ATHEROSCLEROSIS: ICD-10-CM

## 2024-11-22 PROBLEM — R42 DIZZINESS: Status: RESOLVED | Noted: 2022-07-08 | Resolved: 2024-11-22

## 2024-11-22 PROBLEM — C43.59 MALIGNANT MELANOMA OF BACK: Status: RESOLVED | Noted: 2019-10-12 | Resolved: 2024-11-22

## 2024-11-22 NOTE — PROGRESS NOTES
"The patient location is: [place]  The chief complaint leading to consultation is: Medicare AWV  Visit type: audiovisual  Face to Face time with patient: 42 min  60 minutes of total time spent on the encounter, which includes face to face time and non-face to face time preparing to see the patient (eg, review of tests), Obtaining and/or reviewing separately obtained history, Documenting clinical information in the electronic or other health record, Independently interpreting results (not separately reported) and communicating results to the patient/family/caregiver, or Care coordination (not separately reported).     Each patient to whom he or she provides medical services by telemedicine is:  (1) informed of the relationship between the physician and patient and the respective role of any other health care provider with respect to management of the patient; and (2) notified that he or she may decline to receive medical services by telemedicine and may withdraw from such care at any time.    Jm Deleon presented for a  Medicare AWV and comprehensive Health Risk Assessment today. The following components were reviewed and updated:    Medical history  Family History  Social history  Allergies and Current Medications  Health Risk Assessment  Health Maintenance  Care Team         ** See Completed Assessments for Annual Wellness Visit within the encounter summary.**         The following assessments were completed:  Living Situation  CAGE  Depression Screening  Fall Risk Assessment (MACH 10)  Hearing Assessment(HHI)  Cognitive Function Screening  Nutrition Screening  ADL Screening  PAQ Screening    Opioid documentation:      Patient does not have a current opioid prescription.        Vitals:    11/22/24 1010   Weight: 92.1 kg (203 lb 0.7 oz)   Height: 5' 8" (1.727 m)     Body mass index is 30.87 kg/m².    Physical Exam  Constitutional:       General: He is not in acute distress.     Appearance: Normal appearance. He " is well-developed and well-groomed.   HENT:      Head: Normocephalic.   Pulmonary:      Effort: Pulmonary effort is normal. No respiratory distress.   Skin:     Coloration: Skin is not pale.   Neurological:      Mental Status: He is alert and oriented to person, place, and time.   Psychiatric:         Attention and Perception: Attention normal.         Mood and Affect: Mood and affect normal.         Speech: Speech normal.         Behavior: Behavior normal. Behavior is cooperative.     Physical exam limited d/t virtual visit. Patient does not appear to be in any respiratory distress. Able to speak in complete sentences without becoming short of breath.            Diagnoses and health risks identified today and associated recommendations/orders:    1. Encounter for preventive health examination    2. Sacroiliitis  Chronic; stable on Celebrex medication. Follow up with PCP.    3. Type 2 diabetes mellitus with other circulatory complication, with long-term current use of insulin  Chronic; stable on metformin, Jardiance, Toujeo, Novolog, Ozempic  medication. Follow up with PCP.    4. Aortic arch atherosclerosis  Chronic; stable on statin medication. As seen on previous imaging. Follow up with PCP.    5. Senile purpura  As noted on previous exams; follow up with PCP.    6. Coronary artery disease of native artery of native heart with stable angina pectoris  Chronic; stable on carvedilol medication and PRN Nitro (has not had to use this). Follow up with PCP.    7. Screening for colon cancer  - Ambulatory referral/consult to Endo Procedure ; Future    8. Class 1 obesity due to excess calories without serious comorbidity with body mass index (BMI) of 30.0 to 30.9 in adult  Eat a low salt/low fat ADA diet and discussed importance of engaging in physical activity at least 5x/week for a minimum of 30 min/day.      Provided Jm with a 5-10 year written screening schedule and personal prevention plan. Recommendations  were developed using the USPSTF age appropriate recommendations. Education, counseling, and referrals were provided as needed. After Visit Summary given to patient which includes a list of additional screenings/tests needed.    Follow up for your next annual wellness visit.    Evonne Dennis NP      Advance Care Planning     I offered to discuss advanced care planning, including how to pick a person who would make decisions for you if you were unable to make them for yourself, called a health care power of , and what kind of decisions you might make such as use of life sustaining treatments such as ventilators and tube feeding when faced with a life limiting illness recorded on a living will that they will need to know. (How you want to be cared for as you near the end of your natural life)     X Patient is interested in learning more about how to make advanced directives.  I  offered to discuss this with them and instructed to follow up with their PCP.

## 2024-11-22 NOTE — PATIENT INSTRUCTIONS
Counseling and Referral of Other Preventative  (Italic type indicates deductible and co-insurance are waived)    Patient Name: Jm Deleon  Today's Date: 11/22/2024    Health Maintenance       Date Due Completion Date    RSV Vaccine (Age 60+ and Pregnant patients) (1 - Risk 60-74 years 1-dose series) Never done ---    Lipid Panel 08/29/2024 8/29/2023    Influenza Vaccine (1) 09/01/2024 9/18/2023    COVID-19 Vaccine (6 - 2024-25 season) 09/01/2024 10/11/2023    Foot Exam 09/27/2024 9/27/2023 (Done)    Override on 9/27/2023: Done (Sees Dr. Fields)    Override on 11/18/2021: Done (outside system; linked in careeverywhere)    Colorectal Cancer Screening 10/04/2024 10/4/2019    Hemoglobin A1c 02/02/2025 8/2/2024    Eye Exam 06/17/2025 6/17/2024    Override on 6/17/2024: Done    Override on 6/7/2023: Done    Override on 5/27/2022: Done    Diabetes Urine Screening 08/02/2025 8/2/2024    High Dose Statin 09/20/2025 9/20/2024    Aspirin/Antiplatelet Therapy 09/20/2025 9/20/2024    TETANUS VACCINE 05/20/2026 5/20/2016        Orders Placed This Encounter   Procedures    Ambulatory referral/consult to Endo Procedure        The following information is provided to all patients.  This information is to help you find resources for any of the problems found today that may be affecting your health:                  Living healthy guide: www.[facility].[place].gov      Understanding Diabetes: www.diabetes.org      Eating healthy: www.cdc.gov/healthyweight      CDC home safety checklist: www.cdc.gov/steadi/patient.html      Agency on Aging: www.goea.[place].gov      Alcoholics anonymous (AA): www.aa.org      Physical Activity: www.magen.nih.gov/yd9abpd      Tobacco use: www.quitwithusla.org

## 2024-12-16 NOTE — PROGRESS NOTES
MEDICAL HISTORY:   Coronary artery disease with coronary bypass surgery in year .  Hypertension.  Hyperlipidemia.  Type 2 diabetes  Osteoarthritis of the hip with left total hip replacement.  Right inguinal hernia repair.  Left foot surgery.  Fistulotomy.  Melanoma, thoracic area, wide local excision  Right and left rotator cuff repair, subacromial decompression, labrum debridement     SOCIAL HISTORY:  Tobacco use, none.  Alcohol use, limited.       FAMILY HISTORY:    Father  , lung cancer.    Mother  , heart disease.    One brother , colon cancer  another brother with Hypertension.     MEDICATIONS:  Atorvastatin 80 mg.  Aspirin 81 mg.  Carvedilol 25 mg  tablet twice a day.   Multivitamin.  Lasix 40 mg.  Toujeo 45 milligrams units in the evening.  Novolog 14 units 3 times a day with sliding scale  Jardiance 25 mg  Metformin 1000 mg twice a day.  Vascepa 1 g 2 capsules b.i.d..  Valsartan 320 mg half a tablet  Repatha 420 mg q.month  Ozempic 2 mg   Tamsulosin 0.4 mg  Celebrex 200 mg daily    Answers submitted by the patient for this visit:  Review of Systems Questionnaire (Submitted on 2024)  activity change: No  unexpected weight change: No  neck pain: No  hearing loss: No  rhinorrhea: No  trouble swallowing: No  eye discharge: No  visual disturbance: No  chest tightness: No  wheezing: No  chest pain: No  palpitations: No  blood in stool: No  constipation: Yes  vomiting: No  diarrhea: No  polydipsia: No  polyuria: No  difficulty urinating: No  urgency: No  hematuria: No  joint swelling: No  arthralgias: Yes  headaches: No  weakness: Yes  confusion: No  dysphoric mood: No      69-year-old male   Presents for an annual routine visit   Overall in general he feels well, is only off and on problems that has pain in his joints particularly of the right hip.  He is taking Celebrex 200 mg daily    He has followed up with Cardiology, Urology, Endocrinology.    Early in the year had a nuclear  medicine stress test which was negative for ischemia.  No change in medications  Followed up with Urology, urination is fine, told to continue with tamsulosin 0.4 mg.  The urine flows good that has no urgency frequency, nocturia x1  No change in management of the diabetes other than increasing the dose of the Ozempic to 2 mg    Review of symptoms   Negative for chest pain, palpitations, shortness for breath, abdominal pain.  Has a trend toward constipation.  Bowel movement every couple of days.  Will have an upcoming colonoscopy next year.  Reports no heartburn indigestion headaches    Examination   Weight 200 lb   BMI 30.44   Pulse 72   Blood pressure 120/62  HEENT exam no abnormal findings   Neck, no thyromegaly no masses   Chest, clear breath sounds   Heart, regular rate rhythm no murmurs gallops  Abdominal exam,, soft nontender no hepatosplenomegaly abdominal masses or bruits   Pulses 2+ carotid pulses no bruits 1+ dorsalis pedal pulses  Extremities no edema   Lymph gland no palpable adenopathy  Skin, no gross abnormal findings surgical scar in his upper back way had resection of the melanoma   Musculoskeletal slight limitation of the abduction of the right leg at the hip joint when compared to the left    Impression   General exam   Type 2 diabetes   Hypertension   Hyperlipidemia  Coronary artery disease previous bypass surgery  Right hip osteoarthritis  BPH  History of melanoma   Prostate cancer screening history of adenomatous colon polyps   Family history colon cancer    Plan   Routine labs today   COVID flu vaccine  Continue with attention appropriate diet physical activity.  That has stretching exercises legs discussed    Addendum   Lab testing reviewed.  All look fine.  Platelet is minimally low.  This is fluctuated up and down in the past and will continue to observe through periodic testing.  My chart message sent.  Maintain yearly exams

## 2024-12-17 ENCOUNTER — LAB VISIT (OUTPATIENT)
Dept: LAB | Facility: HOSPITAL | Age: 69
End: 2024-12-17
Attending: INTERNAL MEDICINE
Payer: MEDICARE

## 2024-12-17 ENCOUNTER — OFFICE VISIT (OUTPATIENT)
Dept: INTERNAL MEDICINE | Facility: CLINIC | Age: 69
End: 2024-12-17
Payer: MEDICARE

## 2024-12-17 ENCOUNTER — IMMUNIZATION (OUTPATIENT)
Dept: INTERNAL MEDICINE | Facility: CLINIC | Age: 69
End: 2024-12-17
Payer: MEDICARE

## 2024-12-17 VITALS
DIASTOLIC BLOOD PRESSURE: 66 MMHG | WEIGHT: 200.19 LBS | HEIGHT: 68 IN | SYSTOLIC BLOOD PRESSURE: 112 MMHG | BODY MASS INDEX: 30.34 KG/M2 | HEART RATE: 71 BPM | OXYGEN SATURATION: 98 %

## 2024-12-17 DIAGNOSIS — E78.5 HYPERLIPIDEMIA, UNSPECIFIED HYPERLIPIDEMIA TYPE: ICD-10-CM

## 2024-12-17 DIAGNOSIS — Z80.0 FAMILY HISTORY OF COLON CANCER: ICD-10-CM

## 2024-12-17 DIAGNOSIS — M16.11 PRIMARY OSTEOARTHRITIS OF RIGHT HIP: ICD-10-CM

## 2024-12-17 DIAGNOSIS — D69.6 THROMBOCYTOPENIA: ICD-10-CM

## 2024-12-17 DIAGNOSIS — Z12.5 PROSTATE CANCER SCREENING: ICD-10-CM

## 2024-12-17 DIAGNOSIS — Z23 NEED FOR VACCINATION: Primary | ICD-10-CM

## 2024-12-17 DIAGNOSIS — E11.9 TYPE 2 DIABETES MELLITUS WITHOUT COMPLICATION, WITH LONG-TERM CURRENT USE OF INSULIN: ICD-10-CM

## 2024-12-17 DIAGNOSIS — Z79.4 TYPE 2 DIABETES MELLITUS WITHOUT COMPLICATION, WITH LONG-TERM CURRENT USE OF INSULIN: ICD-10-CM

## 2024-12-17 DIAGNOSIS — I25.810 CORONARY ARTERY DISEASE INVOLVING CORONARY BYPASS GRAFT OF NATIVE HEART WITHOUT ANGINA PECTORIS: ICD-10-CM

## 2024-12-17 DIAGNOSIS — R35.1 BENIGN PROSTATIC HYPERPLASIA WITH NOCTURIA: ICD-10-CM

## 2024-12-17 DIAGNOSIS — Z86.0101 HISTORY OF ADENOMATOUS POLYP OF COLON: ICD-10-CM

## 2024-12-17 DIAGNOSIS — I10 PRIMARY HYPERTENSION: ICD-10-CM

## 2024-12-17 DIAGNOSIS — N40.1 BENIGN PROSTATIC HYPERPLASIA WITH NOCTURIA: ICD-10-CM

## 2024-12-17 DIAGNOSIS — Z00.00 ANNUAL PHYSICAL EXAM: ICD-10-CM

## 2024-12-17 DIAGNOSIS — Z85.820 HISTORY OF MELANOMA: ICD-10-CM

## 2024-12-17 DIAGNOSIS — Z00.00 ANNUAL PHYSICAL EXAM: Primary | ICD-10-CM

## 2024-12-17 LAB
ALBUMIN SERPL BCP-MCNC: 3.6 G/DL (ref 3.5–5.2)
ALP SERPL-CCNC: 63 U/L (ref 40–150)
ALT SERPL W/O P-5'-P-CCNC: 24 U/L (ref 10–44)
ANION GAP SERPL CALC-SCNC: 6 MMOL/L (ref 8–16)
AST SERPL-CCNC: 20 U/L (ref 10–40)
BASOPHILS # BLD AUTO: 0.03 K/UL (ref 0–0.2)
BASOPHILS NFR BLD: 0.4 % (ref 0–1.9)
BILIRUB SERPL-MCNC: 0.6 MG/DL (ref 0.1–1)
BUN SERPL-MCNC: 9 MG/DL (ref 8–23)
CALCIUM SERPL-MCNC: 9 MG/DL (ref 8.7–10.5)
CHLORIDE SERPL-SCNC: 109 MMOL/L (ref 95–110)
CHOLEST SERPL-MCNC: 58 MG/DL (ref 120–199)
CHOLEST/HDLC SERPL: 1.9 {RATIO} (ref 2–5)
CO2 SERPL-SCNC: 26 MMOL/L (ref 23–29)
COMPLEXED PSA SERPL-MCNC: 1.7 NG/ML (ref 0–4)
CREAT SERPL-MCNC: 0.8 MG/DL (ref 0.5–1.4)
DIFFERENTIAL METHOD BLD: ABNORMAL
EOSINOPHIL # BLD AUTO: 0 K/UL (ref 0–0.5)
EOSINOPHIL NFR BLD: 0.4 % (ref 0–8)
ERYTHROCYTE [DISTWIDTH] IN BLOOD BY AUTOMATED COUNT: 13.4 % (ref 11.5–14.5)
EST. GFR  (NO RACE VARIABLE): >60 ML/MIN/1.73 M^2
ESTIMATED AVG GLUCOSE: 137 MG/DL (ref 68–131)
GLUCOSE SERPL-MCNC: 125 MG/DL (ref 70–110)
HBA1C MFR BLD: 6.4 % (ref 4–5.6)
HCT VFR BLD AUTO: 45.5 % (ref 40–54)
HDLC SERPL-MCNC: 31 MG/DL (ref 40–75)
HDLC SERPL: 53.4 % (ref 20–50)
HGB BLD-MCNC: 15 G/DL (ref 14–18)
IMM GRANULOCYTES # BLD AUTO: 0.01 K/UL (ref 0–0.04)
IMM GRANULOCYTES NFR BLD AUTO: 0.1 % (ref 0–0.5)
LDLC SERPL CALC-MCNC: 14 MG/DL (ref 63–159)
LYMPHOCYTES # BLD AUTO: 2 K/UL (ref 1–4.8)
LYMPHOCYTES NFR BLD: 28.2 % (ref 18–48)
MCH RBC QN AUTO: 32 PG (ref 27–31)
MCHC RBC AUTO-ENTMCNC: 33 G/DL (ref 32–36)
MCV RBC AUTO: 97 FL (ref 82–98)
MONOCYTES # BLD AUTO: 0.5 K/UL (ref 0.3–1)
MONOCYTES NFR BLD: 7.7 % (ref 4–15)
NEUTROPHILS # BLD AUTO: 4.4 K/UL (ref 1.8–7.7)
NEUTROPHILS NFR BLD: 63.2 % (ref 38–73)
NONHDLC SERPL-MCNC: 27 MG/DL
NRBC BLD-RTO: 0 /100 WBC
PLATELET # BLD AUTO: 135 K/UL (ref 150–450)
PMV BLD AUTO: 12.4 FL (ref 9.2–12.9)
POTASSIUM SERPL-SCNC: 5 MMOL/L (ref 3.5–5.1)
PROT SERPL-MCNC: 5.7 G/DL (ref 6–8.4)
RBC # BLD AUTO: 4.69 M/UL (ref 4.6–6.2)
SODIUM SERPL-SCNC: 141 MMOL/L (ref 136–145)
TRIGL SERPL-MCNC: 65 MG/DL (ref 30–150)
TSH SERPL DL<=0.005 MIU/L-ACNC: 1.68 UIU/ML (ref 0.4–4)
WBC # BLD AUTO: 7.03 K/UL (ref 3.9–12.7)

## 2024-12-17 PROCEDURE — 3288F FALL RISK ASSESSMENT DOCD: CPT | Mod: HCNC,CPTII,S$GLB, | Performed by: INTERNAL MEDICINE

## 2024-12-17 PROCEDURE — 83036 HEMOGLOBIN GLYCOSYLATED A1C: CPT | Mod: HCNC | Performed by: INTERNAL MEDICINE

## 2024-12-17 PROCEDURE — 99397 PER PM REEVAL EST PAT 65+ YR: CPT | Mod: HCNC,S$GLB,, | Performed by: INTERNAL MEDICINE

## 2024-12-17 PROCEDURE — 1160F RVW MEDS BY RX/DR IN RCRD: CPT | Mod: HCNC,CPTII,S$GLB, | Performed by: INTERNAL MEDICINE

## 2024-12-17 PROCEDURE — 3066F NEPHROPATHY DOC TX: CPT | Mod: HCNC,CPTII,S$GLB, | Performed by: INTERNAL MEDICINE

## 2024-12-17 PROCEDURE — 84153 ASSAY OF PSA TOTAL: CPT | Mod: HCNC | Performed by: INTERNAL MEDICINE

## 2024-12-17 PROCEDURE — 3051F HG A1C>EQUAL 7.0%<8.0%: CPT | Mod: HCNC,CPTII,S$GLB, | Performed by: INTERNAL MEDICINE

## 2024-12-17 PROCEDURE — 80053 COMPREHEN METABOLIC PANEL: CPT | Mod: HCNC | Performed by: INTERNAL MEDICINE

## 2024-12-17 PROCEDURE — 1126F AMNT PAIN NOTED NONE PRSNT: CPT | Mod: HCNC,CPTII,S$GLB, | Performed by: INTERNAL MEDICINE

## 2024-12-17 PROCEDURE — 99999 PR PBB SHADOW E&M-EST. PATIENT-LVL IV: CPT | Mod: PBBFAC,HCNC,, | Performed by: INTERNAL MEDICINE

## 2024-12-17 PROCEDURE — 85025 COMPLETE CBC W/AUTO DIFF WBC: CPT | Mod: HCNC | Performed by: INTERNAL MEDICINE

## 2024-12-17 PROCEDURE — 3061F NEG MICROALBUMINURIA REV: CPT | Mod: HCNC,CPTII,S$GLB, | Performed by: INTERNAL MEDICINE

## 2024-12-17 PROCEDURE — G0008 ADMIN INFLUENZA VIRUS VAC: HCPCS | Mod: HCNC,S$GLB,, | Performed by: INTERNAL MEDICINE

## 2024-12-17 PROCEDURE — 90653 IIV ADJUVANT VACCINE IM: CPT | Mod: HCNC,S$GLB,, | Performed by: INTERNAL MEDICINE

## 2024-12-17 PROCEDURE — 84443 ASSAY THYROID STIM HORMONE: CPT | Mod: HCNC | Performed by: INTERNAL MEDICINE

## 2024-12-17 PROCEDURE — 80061 LIPID PANEL: CPT | Mod: HCNC | Performed by: INTERNAL MEDICINE

## 2024-12-17 PROCEDURE — 3078F DIAST BP <80 MM HG: CPT | Mod: HCNC,CPTII,S$GLB, | Performed by: INTERNAL MEDICINE

## 2024-12-17 PROCEDURE — 1101F PT FALLS ASSESS-DOCD LE1/YR: CPT | Mod: HCNC,CPTII,S$GLB, | Performed by: INTERNAL MEDICINE

## 2024-12-17 PROCEDURE — 3074F SYST BP LT 130 MM HG: CPT | Mod: HCNC,CPTII,S$GLB, | Performed by: INTERNAL MEDICINE

## 2024-12-17 PROCEDURE — 3008F BODY MASS INDEX DOCD: CPT | Mod: HCNC,CPTII,S$GLB, | Performed by: INTERNAL MEDICINE

## 2024-12-17 PROCEDURE — 1159F MED LIST DOCD IN RCRD: CPT | Mod: HCNC,CPTII,S$GLB, | Performed by: INTERNAL MEDICINE

## 2024-12-17 PROCEDURE — 4010F ACE/ARB THERAPY RXD/TAKEN: CPT | Mod: HCNC,CPTII,S$GLB, | Performed by: INTERNAL MEDICINE

## 2024-12-18 ENCOUNTER — PATIENT MESSAGE (OUTPATIENT)
Dept: INTERNAL MEDICINE | Facility: CLINIC | Age: 69
End: 2024-12-18
Payer: MEDICARE

## 2025-01-30 RX ORDER — ATORVASTATIN CALCIUM 80 MG/1
80 TABLET, FILM COATED ORAL
Qty: 90 TABLET | Refills: 3 | Status: SHIPPED | OUTPATIENT
Start: 2025-01-30

## 2025-02-12 ENCOUNTER — OFFICE VISIT (OUTPATIENT)
Dept: DERMATOLOGY | Facility: CLINIC | Age: 70
End: 2025-02-12
Payer: MEDICARE

## 2025-02-12 DIAGNOSIS — L82.1 SK (SEBORRHEIC KERATOSIS): ICD-10-CM

## 2025-02-12 DIAGNOSIS — L57.0 AK (ACTINIC KERATOSIS): ICD-10-CM

## 2025-02-12 DIAGNOSIS — Z85.828 PERSONAL HISTORY OF SKIN CANCER: ICD-10-CM

## 2025-02-12 DIAGNOSIS — D18.01 CHERRY ANGIOMA: ICD-10-CM

## 2025-02-12 DIAGNOSIS — D22.9 NEVUS: ICD-10-CM

## 2025-02-12 DIAGNOSIS — L81.4 LENTIGO: ICD-10-CM

## 2025-02-12 DIAGNOSIS — Z85.820 HISTORY OF MALIGNANT MELANOMA: Primary | ICD-10-CM

## 2025-02-12 PROCEDURE — 4010F ACE/ARB THERAPY RXD/TAKEN: CPT | Mod: HCNC,CPTII,S$GLB, | Performed by: DERMATOLOGY

## 2025-02-12 PROCEDURE — 99214 OFFICE O/P EST MOD 30 MIN: CPT | Mod: HCNC,25,S$GLB, | Performed by: DERMATOLOGY

## 2025-02-12 PROCEDURE — 1101F PT FALLS ASSESS-DOCD LE1/YR: CPT | Mod: HCNC,CPTII,S$GLB, | Performed by: DERMATOLOGY

## 2025-02-12 PROCEDURE — 17003 DESTRUCT PREMALG LES 2-14: CPT | Mod: HCNC,S$GLB,, | Performed by: DERMATOLOGY

## 2025-02-12 PROCEDURE — 1160F RVW MEDS BY RX/DR IN RCRD: CPT | Mod: HCNC,CPTII,S$GLB, | Performed by: DERMATOLOGY

## 2025-02-12 PROCEDURE — 3072F LOW RISK FOR RETINOPATHY: CPT | Mod: HCNC,CPTII,S$GLB, | Performed by: DERMATOLOGY

## 2025-02-12 PROCEDURE — 17000 DESTRUCT PREMALG LESION: CPT | Mod: HCNC,S$GLB,, | Performed by: DERMATOLOGY

## 2025-02-12 PROCEDURE — 1159F MED LIST DOCD IN RCRD: CPT | Mod: HCNC,CPTII,S$GLB, | Performed by: DERMATOLOGY

## 2025-02-12 PROCEDURE — 99999 PR PBB SHADOW E&M-EST. PATIENT-LVL III: CPT | Mod: PBBFAC,HCNC,, | Performed by: DERMATOLOGY

## 2025-02-12 PROCEDURE — 3288F FALL RISK ASSESSMENT DOCD: CPT | Mod: HCNC,CPTII,S$GLB, | Performed by: DERMATOLOGY

## 2025-02-12 PROCEDURE — 1126F AMNT PAIN NOTED NONE PRSNT: CPT | Mod: HCNC,CPTII,S$GLB, | Performed by: DERMATOLOGY

## 2025-02-12 NOTE — PROGRESS NOTES
Subjective:      Patient ID:  Jm Deleon is a 69 y.o. male who presents for   Chief Complaint   Patient presents with    Skin Check     TBSE     Patient is here today for a TBSE.     Pt does not have any other concerns.     Pt has a h/o mm and SCC.       Date of biopsy: 09/20/2019  Marcus test  no  Location: L Back  Depth: 2.7 mm  LN: yes L axilla x5- tested and negative   Date of excision: 10/18/2019     Pt has a history of  extensive sun exposure in the past.   Pt recalls several blistering sunburns in the past:  Yes teenage years  Pt has history of tanning bed use: no  Pt has had atypical moles removed in the past: yes  Pt has personal history of breast cancer: no  Pt has history of melanoma in first degree relatives:  no  Pt has family history of pancreatic cancer: no  Pt is currently immunosuppressed: no     Walters Type: 2     Last dental exam: 02/2025  Last eye exam: summer 2024        Review of Systems   Constitutional:  Negative for fever, chills, weight loss, fatigue, night sweats and malaise.   HENT:  Negative for headaches.    Respiratory:  Negative for cough and shortness of breath.    Gastrointestinal:  Negative for indigestion.   Skin:  Positive for daily sunscreen use, activity-related sunscreen use and wears hat. Negative for tendency to form keloidal scars and recent sunburn.        Hat on head   Neurological:  Negative for headaches.   Hematologic/Lymphatic: Negative for adenopathy. Bruises/bleeds easily (baby aspirin daily).       Objective:   Physical Exam   Constitutional: He appears well-developed and well-nourished. No distress.   Musculoskeletal: Lymphadenopathy:      Cervical: No cervical adenopathy.      Upper Body:   No axillary adenopathy present.No supraclavicular adenopathy is present.     Lymphadenopathy: No supraclavicular adenopathy is present.     He has no cervical adenopathy.     He has no axillary adenopathy.   Neurological: He is alert and oriented to person, place,  and time. He is not disoriented.   Psychiatric: He has a normal mood and affect.   Skin:   Areas Examined (abnormalities noted in diagram):   Scalp / Hair Palpated and Inspected  Head / Face Inspection Performed  Neck Inspection Performed  Chest / Axilla Inspection Performed  Abdomen Inspection Performed  Genitals / Buttocks / Groin Inspection Performed  Back Inspection Performed  RUE Inspected  LUE Inspection Performed  RLE Inspected  LLE Inspection Performed  Nails and Digits Inspection Performed                     Diagram Legend     Erythematous scaling macule/papule c/w actinic keratosis       Vascular papule c/w angioma      Pigmented verrucoid papule/plaque c/w seborrheic keratosis      Yellow umbilicated papule c/w sebaceous hyperplasia      Irregularly shaped tan macule c/w lentigo     1-2 mm smooth white papules consistent with Milia      Movable subcutaneous cyst with punctum c/w epidermal inclusion cyst      Subcutaneous movable cyst c/w pilar cyst      Firm pink to brown papule c/w dermatofibroma      Pedunculated fleshy papule(s) c/w skin tag(s)      Evenly pigmented macule c/w junctional nevus     Mildly variegated pigmented, slightly irregular-bordered macule c/w mildly atypical nevus      Flesh colored to evenly pigmented papule c/w intradermal nevus       Pink pearly papule/plaque c/w basal cell carcinoma      Erythematous hyperkeratotic cursted plaque c/w SCC      Surgical scar with no sign of skin cancer recurrence      Open and closed comedones      Inflammatory papules and pustules      Verrucoid papule consistent consistent with wart     Erythematous eczematous patches and plaques     Dystrophic onycholytic nail with subungual debris c/w onychomycosis     Umbilicated papule    Erythematous-base heme-crusted tan verrucoid plaque consistent with inflamed seborrheic keratosis     Erythematous Silvery Scaling Plaque c/w Psoriasis     See annotation      Assessment / Plan:            AK (actinic  keratosis)  Cryosurgery Procedure Note    Verbal consent from the patient is obtained including, but not limited to, risk of hypopigmentation/hyperpigmentation, scar, recurrence of lesion. The patient is aware of the precancerous quality and need for treatment of these lesions. Liquid nitrogen cryosurgery is applied to the 3 actinic keratoses, as detailed in the physical exam, to produce a freeze injury. The patient is aware that blisters may form and is instructed on wound care with gentle cleansing and use of vaseline ointment to keep moist until healed. The patient is supplied a handout on cryosurgery and is instructed to call if lesions do not completely resolve.    SK (seborrheic keratosis)  These are benign inherited growths without a malignant potential. Reassurance given to patient. No treatment is necessary.     Lentigo  This is a benign hyperpigmented sun induced lesion. Recommend daily sun protection/avoidance and use of at least SPF 30, broad spectrum sunscreen (OTC drug) will reduce the number of new lesions. Treatment of these benign lesions are considered cosmetic.  The nature of sun-induced photo-aging and skin cancers is discussed.  Sun avoidance, protective clothing, and the use of 30-SPF sunscreens is advised. Observe closely for skin damage/changes, and call if such occurs.    Nevus  Discussed ABCDE's of nevi.  Monitor for new mole or moles that are becoming bigger, darker, irritated, or developing irregular borders. Brochure provided. Instructed patient to observe lesion(s) for changes and follow up in clinic if changes are noted. Patient to monitor skin at home for new or changing lesions.     Cherry angioma  These are benign vascular lesions that are inherited.  Treatment is not necessary.    Personal history of skin cancer  History of malignant melanoma- 2.7 mm back , 2019  Area of previous melanoma  and NMSC examined. Site well healed with no signs of recurrence.    Total body skin  examination performed today including at least 12 points as noted in physical examination. No lesions suspicious for malignancy noted.    Recommend daily sun protection/avoidance, use of at least SPF 30, broad spectrum sunscreen (OTC drug), skin self examinations, and routine physician surveillance to optimize early detection  Continue yearly f/u with heme onc. Last note 09/2024 reviewed and last scan from 09/2024 reviewed.            Follow up in about 1 year (around 2/12/2026) for TBSE.

## 2025-02-17 ENCOUNTER — TELEPHONE (OUTPATIENT)
Dept: ENDOSCOPY | Facility: HOSPITAL | Age: 70
End: 2025-02-17

## 2025-02-17 ENCOUNTER — CLINICAL SUPPORT (OUTPATIENT)
Dept: ENDOSCOPY | Facility: HOSPITAL | Age: 70
End: 2025-02-17
Payer: MEDICARE

## 2025-02-17 VITALS — HEIGHT: 68 IN | BODY MASS INDEX: 29.25 KG/M2 | WEIGHT: 193 LBS

## 2025-02-17 DIAGNOSIS — Z12.11 SCREENING FOR COLON CANCER: ICD-10-CM

## 2025-02-17 DIAGNOSIS — Z12.11 SPECIAL SCREENING FOR MALIGNANT NEOPLASMS, COLON: ICD-10-CM

## 2025-02-17 NOTE — TELEPHONE ENCOUNTER
Referral for procedure from PAT appointment      Spoke to patient to schedule procedure(s) Colonoscopy       Physician to perform procedure(s) Dr. PINEDA Denney  Date of Procedure (s) 3/27/25  Arrival Time 2:10 PM  Time of Procedure(s) 3:10 PM   Location of Procedure(s) [place] [address] [facility]  Type of Rx Prep sent to patient: PEG  Instructions provided to patient via [facility]    Patient was informed on the following information and verbalized understanding. Screening questionnaire reviewed with patient and complete. If procedure requires anesthesia, a responsible adult needs to be present to accompany the patient home, patient cannot drive after receiving anesthesia. Appointment details are tentative, especially check-in time. Patient will receive a prep-op call 7 days prior to confirm check-in time for procedure. If applicable the patient should contact their pharmacy to verify Rx for procedure prep is ready for pick-up. Patient was advised to call the scheduling department at [phone] if pharmacy states no Rx is available. Patient was advised to call the endoscopy scheduling department if any questions or concerns arise.      [facility] Scheduling Department

## 2025-02-23 DIAGNOSIS — G47.00 INSOMNIA, UNSPECIFIED TYPE: ICD-10-CM

## 2025-02-23 NOTE — TELEPHONE ENCOUNTER
No care due was identified.  [facility] Embedded Care Due Messages. Reference number: [id number].   2/23/2025 5:28:52 PM CST

## 2025-02-24 RX ORDER — ZOLPIDEM TARTRATE 5 MG/1
5 TABLET ORAL NIGHTLY PRN
Qty: 90 TABLET | Refills: 1 | Status: SHIPPED | OUTPATIENT
Start: 2025-02-24

## 2025-02-24 NOTE — TELEPHONE ENCOUNTER
Refill Routing Note   Medication(s) are not appropriate for processing by [facility] for the following reason(s):        Outside of protocol    [facility] action(s):  Route             Appointments  past 12m or future 3m with PCP    Date Provider   Last Visit   12/17/2024 Dallas Martin MD   Next Visit   Visit date not found Dallas Martin MD   ED visits in past 90 days: 0        Note composed:11:03 AM 02/24/2025

## 2025-02-26 DIAGNOSIS — N40.1 BENIGN PROSTATIC HYPERPLASIA WITH WEAK URINARY STREAM: ICD-10-CM

## 2025-02-26 DIAGNOSIS — R35.1 NOCTURIA: ICD-10-CM

## 2025-02-26 DIAGNOSIS — R35.81 NOCTURNAL POLYURIA: ICD-10-CM

## 2025-02-26 DIAGNOSIS — R39.12 BENIGN PROSTATIC HYPERPLASIA WITH WEAK URINARY STREAM: ICD-10-CM

## 2025-03-12 ENCOUNTER — PATIENT MESSAGE (OUTPATIENT)
Dept: INTERNAL MEDICINE | Facility: CLINIC | Age: 70
End: 2025-03-12
Payer: MEDICARE

## 2025-03-17 RX ORDER — TAMSULOSIN HYDROCHLORIDE 0.4 MG/1
1 CAPSULE ORAL NIGHTLY
Qty: 90 CAPSULE | Refills: 3 | Status: SHIPPED | OUTPATIENT
Start: 2025-03-17

## 2025-03-27 ENCOUNTER — ANESTHESIA (OUTPATIENT)
Dept: ENDOSCOPY | Facility: HOSPITAL | Age: 70
End: 2025-03-27
Payer: MEDICARE

## 2025-03-27 ENCOUNTER — HOSPITAL ENCOUNTER (OUTPATIENT)
Facility: HOSPITAL | Age: 70
Discharge: HOME | End: 2025-03-27
Attending: STUDENT IN AN ORGANIZED HEALTH CARE EDUCATION/TRAINING PROGRAM | Admitting: STUDENT IN AN ORGANIZED HEALTH CARE EDUCATION/TRAINING PROGRAM
Payer: MEDICARE

## 2025-03-27 ENCOUNTER — TELEPHONE (OUTPATIENT)
Dept: ENDOSCOPY | Facility: HOSPITAL | Age: 70
End: 2025-03-27
Payer: MEDICARE

## 2025-03-27 ENCOUNTER — ANESTHESIA EVENT (OUTPATIENT)
Dept: ENDOSCOPY | Facility: HOSPITAL | Age: 70
End: 2025-03-27
Payer: MEDICARE

## 2025-03-27 VITALS
DIASTOLIC BLOOD PRESSURE: 65 MMHG | BODY MASS INDEX: 29.01 KG/M2 | SYSTOLIC BLOOD PRESSURE: 135 MMHG | HEIGHT: 68 IN | WEIGHT: 191.38 LBS | HEART RATE: 61 BPM | RESPIRATION RATE: 16 BRPM | TEMPERATURE: 98 F | OXYGEN SATURATION: 99 %

## 2025-03-27 DIAGNOSIS — Z12.11 SCREENING FOR COLON CANCER: ICD-10-CM

## 2025-03-27 DIAGNOSIS — Z12.11 ENCOUNTER FOR SCREENING COLONOSCOPY: ICD-10-CM

## 2025-03-27 LAB — POCT GLUCOSE: 98 MG/DL (ref 70–110)

## 2025-03-27 PROCEDURE — 45385 COLONOSCOPY W/LESION REMOVAL: CPT | Mod: PT,HCNC,, | Performed by: STUDENT IN AN ORGANIZED HEALTH CARE EDUCATION/TRAINING PROGRAM

## 2025-03-27 PROCEDURE — 63600175 PHARM REV CODE 636 W HCPCS: Mod: HCNC | Performed by: NURSE ANESTHETIST, CERTIFIED REGISTERED

## 2025-03-27 PROCEDURE — 37000008 HC ANESTHESIA 1ST 15 MINUTES: Mod: HCNC | Performed by: STUDENT IN AN ORGANIZED HEALTH CARE EDUCATION/TRAINING PROGRAM

## 2025-03-27 PROCEDURE — 45385 COLONOSCOPY W/LESION REMOVAL: CPT | Mod: PT,HCNC | Performed by: STUDENT IN AN ORGANIZED HEALTH CARE EDUCATION/TRAINING PROGRAM

## 2025-03-27 PROCEDURE — 27201089 HC SNARE, DISP (ANY): Mod: HCNC | Performed by: STUDENT IN AN ORGANIZED HEALTH CARE EDUCATION/TRAINING PROGRAM

## 2025-03-27 PROCEDURE — 25000003 PHARM REV CODE 250: Mod: HCNC | Performed by: REGISTERED NURSE

## 2025-03-27 PROCEDURE — 37000009 HC ANESTHESIA EA ADD 15 MINS: Mod: HCNC | Performed by: STUDENT IN AN ORGANIZED HEALTH CARE EDUCATION/TRAINING PROGRAM

## 2025-03-27 PROCEDURE — 88305 TISSUE EXAM BY PATHOLOGIST: CPT | Mod: 26,HCNC,, | Performed by: PATHOLOGY

## 2025-03-27 PROCEDURE — 88305 TISSUE EXAM BY PATHOLOGIST: CPT | Mod: TC,HCNC | Performed by: STUDENT IN AN ORGANIZED HEALTH CARE EDUCATION/TRAINING PROGRAM

## 2025-03-27 PROCEDURE — 63600175 PHARM REV CODE 636 W HCPCS: Mod: HCNC | Performed by: REGISTERED NURSE

## 2025-03-27 RX ORDER — LIDOCAINE HYDROCHLORIDE 20 MG/ML
INJECTION INTRAVENOUS
Status: DISCONTINUED | OUTPATIENT
Start: 2025-03-27 | End: 2025-03-27

## 2025-03-27 RX ORDER — SODIUM CHLORIDE 9 MG/ML
INJECTION, SOLUTION INTRAVENOUS CONTINUOUS
Status: DISCONTINUED | OUTPATIENT
Start: 2025-03-27 | End: 2025-03-27 | Stop reason: HOSPADM

## 2025-03-27 RX ORDER — PROPOFOL 10 MG/ML
VIAL (ML) INTRAVENOUS CONTINUOUS PRN
Status: DISCONTINUED | OUTPATIENT
Start: 2025-03-27 | End: 2025-03-27

## 2025-03-27 RX ORDER — PHENYLEPHRINE HYDROCHLORIDE 10 MG/ML
INJECTION INTRAVENOUS
Status: DISCONTINUED | OUTPATIENT
Start: 2025-03-27 | End: 2025-03-27

## 2025-03-27 RX ORDER — PROPOFOL 10 MG/ML
VIAL (ML) INTRAVENOUS
Status: DISCONTINUED | OUTPATIENT
Start: 2025-03-27 | End: 2025-03-27

## 2025-03-27 RX ADMIN — LIDOCAINE HYDROCHLORIDE 100 MG: 20 INJECTION INTRAVENOUS at 02:03

## 2025-03-27 RX ADMIN — SODIUM CHLORIDE: 0.9 INJECTION, SOLUTION INTRAVENOUS at 01:03

## 2025-03-27 RX ADMIN — PROPOFOL 150 MCG/KG/MIN: 10 INJECTION, EMULSION INTRAVENOUS at 02:03

## 2025-03-27 RX ADMIN — PHENYLEPHRINE HYDROCHLORIDE 100 MCG: 10 INJECTION INTRAVENOUS at 02:03

## 2025-03-27 RX ADMIN — PROPOFOL 80 MG: 10 INJECTION, EMULSION INTRAVENOUS at 02:03

## 2025-03-27 RX ADMIN — PROPOFOL 20 MG: 10 INJECTION, EMULSION INTRAVENOUS at 02:03

## 2025-03-27 NOTE — H&P
COLONOSCOPY HISTORY AND PE    Procedure : Colonoscopy    INDICATIONS: asymptomatic screening exam, family history of colon cancer (brother, age 53), personal history of colon polyps, and most recent endoscopic exam 5 years ago    Denies family history of IBD. No recent change in bowel habits, abdominal symptoms or blood in stool. +[id number]    Last Colonoscopy (10/4/19, complete):       The perianal and digital rectal examinations were normal.        Two flat polyps were found in the transverse colon. The polyps were        5 to 7 mm in size. These polyps were removed with a cold snare.        Resection and retrieval were complete. +TA       The exam was otherwise without abnormality on direct and        retroflexion views.     Past Medical History:   Diagnosis Date    Allergy     seasonal    Arthritis     Coronary artery disease     Diabetes mellitus     Diabetes mellitus, type 2     Dysplastic nevus of trunk 03/2022    moderately atypical     Hyperlipidemia     Hypertension     Joint pain     Melanoma 10/2019    MM left back 2.7mm; neg LNs    Open angle with borderline findings and high glaucoma risk in both eyes 2018    Squamous cell carcinoma 2017    scalp - [facility]       Past Surgical History:   Procedure Laterality Date    ARTHROSCOPIC REPAIR OF ROTATOR CUFF OF SHOULDER Left 05/12/2021    Procedure: REPAIR, ROTATOR CUFF, ARTHROSCOPIC;  Surgeon: Johnny Ventura MD;  Location: [facility];  Service: Orthopedics;  Laterality: Left;  Labral Debridement    ARTHROSCOPY OF SHOULDER WITH DECOMPRESSION OF SUBACROMIAL SPACE  05/12/2021    Procedure: ARTHROSCOPY, SHOULDER, WITH SUBACROMIAL SPACE DECOMPRESSION;  Surgeon: Johnny Ventura MD;  Location: [facility] OR;  Service: Orthopedics;;    BELPHAROPTOSIS REPAIR  10 years ago    both eyes    CARDIAC SURGERY      3 vessel bypass    COLONOSCOPY N/A 10/04/2019    Procedure: COLONOSCOPY;  Surgeon: Isaiah Booker MD;  Location: [facility] ([address] [place] [address]);  Service: Endoscopy;   Laterality: N/A;  Cardilogy Clearance received rom Dr. QUEENIE Salomon, see telephone encounter 8/26/19-BB    CORONARY ARTERY BYPASS GRAFT  x4 age 45 2000    DISTAL CLAVICLE EXCISION Left 05/12/2021    Procedure: EXCISION, CLAVICLE, DISTAL;  Surgeon: Johnny Ventura MD;  Location: [facility] OR;  Service: Orthopedics;  Laterality: Left;    EXCISION, LESION, SKIN      melanoma removed    FIXATION OF TENDON Left 05/12/2021    Procedure: FIXATION, TENDON;  Surgeon: Johnyn Ventura MD;  Location: [facility] OR;  Service: Orthopedics;  Laterality: Left;    HARDWARE REMOVAL Left 11/24/2021    Procedure: REMOVAL, HARDWARE;  Surgeon: Johnny Ventura MD;  Location: [facility] OR;  Service: Orthopedics;  Laterality: Left;    HERNIA REPAIR      HIP SURGERY  09/24/2014    left YANICK    INJECTION, SACROILIAC JOINT Right 06/06/2023    Procedure: INJECTION,SACROILIAC JOINT RIGHT;  Surgeon: Rosana Lopez MD;  Location: [facility] [place] [facility];  Service: Pain Management;  Laterality: Right;    JOINT REPLACEMENT      left hip    MANIPULATION WITH ANESTHESIA Left 11/24/2021    Procedure: MANIPULATION, WITH ANESTHESIA;  Surgeon: Johnny Ventura MD;  Location: [facility] OR;  Service: Orthopedics;  Laterality: Left;    SENTINEL LYMPH NODE BIOPSY Left 10/18/2019    Procedure: BIOPSY, LYMPH NODE, SENTINEL;  Surgeon: Fabián Villeda MD;  Location: [address];  Service: General;  Laterality: Left;    SHOULDER ARTHROSCOPY      SHOULDER ARTHROSCOPY Left 11/24/2021    Procedure: ARTHROSCOPY, SHOULDER;  Surgeon: Johnny Ventura MD;  Location: [facility];  Service: Orthopedics;  Laterality: Left;  regional w/catheter (interscalene)    SHOULDER SURGERY      TRANSFORAMINAL EPIDURAL INJECTION OF STEROID Left 08/07/2020    Procedure: LUMBAR TRANSFORAMINAL LEFT L3/4 AND L5/S1 DIRECT REFERRAL;  Surgeon: Ronny Rangel MD;  Location: [facility] [place] [facility];  Service: Pain Management;  Laterality: Left;  NEEDS CONSENT, DIABETIC       Review of patient's allergies  indicates:  No Known Allergies    Medications Ordered Prior to Encounter[1]    Family History   Problem Relation Name Age of Onset    Coronary artery disease Mother      Cancer Father          lung cancer, smoker    Hypertension Brother      Cancer Brother          colon    Amblyopia Neg Hx      Blindness Neg Hx      Cataracts Neg Hx      Glaucoma Neg Hx      Macular degeneration Neg Hx      Retinal detachment Neg Hx      Strabismus Neg Hx      Melanoma Neg Hx         Social History[2]    Review of Systems -    Respiratory : no cough, shortness of breath, or wheezing  Cardiovascular  no chest pain or dyspnea on exertion  Gastrointestinal no abdominal pain, change in bowel habits, or black or bloody stools  Musculoskeletal no deformities, swelling  Neurological no TIA or stroke symptoms    Physical Exam:  General: NAD  AT NC EOMI  Mallampati Score   Neck supple, trachea midline  Lungs: nl excursions, no retractions.  Breathing comfortably  Abdomen ND soft NT.  No masses  Extremities: No CCE.      ASA:  III    PLAN  COLONOSCOPY.  The details of the procedure, the possible need for biopsy or polypectomy and the potential risks including bleeding, perforation, missed polyps were discussed in detail.    Doug Denney MD  Staff Surgeon  Colon & Rectal Surgery         [1]   Current Facility-Administered Medications on File Prior to Encounter   Medication Dose Route Frequency Provider Last Rate Last Admin    fentaNYL 50 mcg/mL injection 25 mcg  25 mcg Intravenous Q5 Min PRN Travis Corbin MD   50 mcg at 05/12/21 1242    fentaNYL 50 mcg/mL injection  mcg   mcg Intravenous PRN Markus Esparza MD   100 mcg at 11/24/21 1151    lidocaine (PF) 10 mg/ml (1%) injection 10 mg  1 mL Intradermal Once Deanne Mosqueda MD        LIDOcaine (PF) 10 mg/ml (1%) injection 10 mg  1 mL Intradermal Once Markus Esparza MD        midazolam (VERSED) 1 mg/mL injection 0.5 mg  0.5 mg Intravenous PRN  "Travis Corbin MD   2 mg at 05/12/21 1242    midazolam (VERSED) 1 mg/mL injection 0.5-4 mg  0.5-4 mg Intravenous PRN Markus Esparza MD   2 mg at 11/24/21 1151    ropivacaine 0.2% Nimbus PainPRO Pump infusion 500 ML   Perineural Continuous Travis Corbin MD   New Bag at 05/12/21 1629    ropivacaine 0.2% Nimbus PainPRO Pump infusion 500 ML   Perineural Continuous Markus Esparza MD   New Bag at 11/24/21 1450    triamcinolone acetonide injection 40 mg  40 mg Intra-articular  Patricio Multani MD   40 mg at 10/26/16 0841     Current Outpatient Medications on File Prior to Encounter   Medication Sig Dispense Refill    ACCU-CHEK SMARTVIEW TEST STRIP Strp 1 strip by Misc.(Non-Drug; Combo Route) route 3 (three) times daily. Patient needs an appointment (Patient not taking: Reported on 12/17/2024) 300 strip 0    aspirin (ECOTRIN) 81 MG EC tablet Take 81 mg by mouth once daily.      atorvastatin (LIPITOR) 80 MG tablet TAKE 1 TABLET EVERY DAY 90 tablet 3    blood-glucose meter Misc Use to check sugar 3 times daily. Accu-chek Emily. Patient needs an appointment (Patient not taking: Reported on 12/17/2024) 1 each 0    carvediloL (COREG) 25 MG tablet TAKE 1 TABLET TWICE DAILY 180 tablet 3    celecoxib (CELEBREX) 200 MG capsule Take 1 capsule (200 mg total) by mouth daily as needed for Pain. 30 capsule 1    evolocumab (REPATHA SURECLICK) 140 mg/mL PnIj Inject 1 mL (140 mg total) into the skin every 14 (fourteen) days. 6 mL 3    FOLIC ACID/MULTIVITS-MIN (MULTIVITAMIN FOR CHOLESTEROL ORAL) Take 1 tablet by mouth nightly.       insulin needles, disposable, (BD INSULIN PEN NEEDLE UF ORIG) 29 x 1/2 " Ndle USE TWICE DAILY AS DIRECTED (Patient not taking: Reported on 12/17/2024) 100 each 2    JARDIANCE 25 mg tablet TAKE 1 TABLET EVERY DAY 90 tablet 3    lancets Misc 1 lancet by Misc.(Non-Drug; Combo Route) route 3 (three) times daily. accu-chek Fastclix. Patient needs an appointment (Patient not " "taking: Reported on 12/17/2024) 300 each 0    metFORMIN (GLUCOPHAGE) 1000 MG tablet TAKE 1 TABLET TWICE DAILY 180 tablet 3    nitroGLYCERIN (NITROSTAT) 0.4 MG SL tablet PLACE 1 TABLET (0.4 MG TOTAL) UNDER THE TONGUE EVERY 5 (FIVE) MINUTES AS NEEDED FOR CHEST PAIN AS DIRECTED (Patient not taking: Reported on 9/20/2024) 25 tablet 4    NOVOLOG FLEXPEN U-100 INSULIN 100 unit/mL (3 mL) InPn pen Inject 14 Units into the skin 3 (three) times daily with meals. 45 mL 3    pen needle, diabetic (BD INSULIN PEN NEEDLE UF MINI) 31 gauge x 3/16" Ndle 1 each by Misc.(Non-Drug; Combo Route) route 4 (four) times daily. Patient needs an appointment (Patient not taking: Reported on 12/17/2024) 400 each 0    semaglutide (OZEMPIC) 2 mg/dose (8 mg/3 mL) PnIj Inject 2 mg into the skin every 7 days. 9 mL 3    TOUJEO SOLOSTAR U-300 INSULIN 300 unit/mL (1.5 mL) InPn pen Inject 45 Units into the skin once daily. 15 mL 3    valsartan (DIOVAN) 320 MG tablet TAKE 1 TABLET EVERY DAY 90 tablet 3    VASCEPA 1 gram Cap TAKE 2 CAPSULES TWICE DAILY 360 capsule 3   [2]   Social History  Socioeconomic History    Marital status:    Tobacco Use    Smoking status: Never    Smokeless tobacco: Never   Substance and Sexual Activity    Alcohol use: Yes     Alcohol/week: 3.0 standard drinks of alcohol     Types: 3 Cans of beer per week     Comment: on weekend    Drug use: Not Currently     Types: Marijuana    Sexual activity: Yes     Partners: Female     Social Drivers of Health     Financial Resource Strain: Low Risk  (11/22/2024)    Overall Financial Resource Strain (CARDIA)     Difficulty of Paying Living Expenses: Not hard at all   Food Insecurity: No Food Insecurity (11/22/2024)    Hunger Vital Sign     Worried About Running Out of Food in the Last Year: Never true     Ran Out of Food in the Last Year: Never true   Transportation Needs: No Transportation Needs (11/22/2024)    PRAPARE - Transportation     Lack of Transportation (Medical): No     Lack " of Transportation (Non-Medical): No   Physical Activity: Insufficiently Active (11/22/2024)    Exercise Vital Sign     Days of Exercise per Week: 2 days     Minutes of Exercise per Session: 60 min   Stress: No Stress Concern Present (11/22/2024)    [place] [employer] - Occupational Stress Questionnaire     Feeling of Stress : Not at all   Housing Stability: Unknown (11/22/2024)    Housing Stability Vital Sign     Unable to Pay for Housing in the Last Year: No     Homeless in the Last Year: No

## 2025-03-27 NOTE — OR NURSING
Per Dr Canales - additional bolus of 500cc NS to be given total of 1500 cc. Pt is awake and talking BP soft- additional dose of jaylen given by CRNA.

## 2025-03-27 NOTE — ANESTHESIA PREPROCEDURE EVALUATION
03/27/2025  Jm Deleon is a 70 y.o., male Pre-operative evaluation for Procedure(s) (LRB):  COLONOSCOPY, SCREENING, LOW RISK PATIENT (N/A)    Jm Deleon is a 70 y.o. male     Problem List[1]    Review of patient's allergies indicates:  No Known Allergies    Medications Ordered Prior to Encounter[2]    Past Surgical History:   Procedure Laterality Date    ARTHROSCOPIC REPAIR OF ROTATOR CUFF OF SHOULDER Left 05/12/2021    Procedure: REPAIR, ROTATOR CUFF, ARTHROSCOPIC;  Surgeon: Johnny Ventura MD;  Location: [facility] OR;  Service: Orthopedics;  Laterality: Left;  Labral Debridement    ARTHROSCOPY OF SHOULDER WITH DECOMPRESSION OF SUBACROMIAL SPACE  05/12/2021    Procedure: ARTHROSCOPY, SHOULDER, WITH SUBACROMIAL SPACE DECOMPRESSION;  Surgeon: Johnny Ventura MD;  Location: [facility] OR;  Service: Orthopedics;;    BELPHAROPTOSIS REPAIR  10 years ago    both eyes    CARDIAC SURGERY      3 vessel bypass    COLONOSCOPY N/A 10/04/2019    Procedure: COLONOSCOPY;  Surgeon: Isaiah Booker MD;  Location: [facility] ([address] [place] [address]);  Service: Endoscopy;  Laterality: N/A;  Cardilogy Clearance received rom Dr. QUEENIE Salomon, see telephone encounter 8/26/19-BB    CORONARY ARTERY BYPASS GRAFT  x4 age 45 2000    DISTAL CLAVICLE EXCISION Left 05/12/2021    Procedure: EXCISION, CLAVICLE, DISTAL;  Surgeon: Johnny Ventura MD;  Location: [facility] OR;  Service: Orthopedics;  Laterality: Left;    EXCISION, LESION, SKIN      melanoma removed    FIXATION OF TENDON Left 05/12/2021    Procedure: FIXATION, TENDON;  Surgeon: Johnny Ventura MD;  Location: [facility] OR;  Service: Orthopedics;  Laterality: Left;    HARDWARE REMOVAL Left 11/24/2021    Procedure: REMOVAL, HARDWARE;  Surgeon: Johnny Ventura MD;  Location: [facility] OR;  Service: Orthopedics;  Laterality: Left;    HERNIA REPAIR      HIP SURGERY  09/24/2014    left YANICK     INJECTION, SACROILIAC JOINT Right 06/06/2023    Procedure: INJECTION,SACROILIAC JOINT RIGHT;  Surgeon: Rosana Lopez MD;  Location: [facility] PAIN MGT;  Service: Pain Management;  Laterality: Right;    JOINT REPLACEMENT      left hip    MANIPULATION WITH ANESTHESIA Left 11/24/2021    Procedure: MANIPULATION, WITH ANESTHESIA;  Surgeon: Johnny Ventura MD;  Location: [facility] OR;  Service: Orthopedics;  Laterality: Left;    SENTINEL LYMPH NODE BIOPSY Left 10/18/2019    Procedure: BIOPSY, LYMPH NODE, SENTINEL;  Surgeon: Fabián Villeda MD;  [facility];  Service: General;  Laterality: Left;    SHOULDER ARTHROSCOPY      SHOULDER ARTHROSCOPY Left 11/24/2021    Procedure: ARTHROSCOPY, SHOULDER;  Surgeon: Johnny Ventura MD;  Location: [facility] OR;  Service: Orthopedics;  Laterality: Left;  regional w/catheter (interscalene)    SHOULDER SURGERY      TRANSFORAMINAL EPIDURAL INJECTION OF STEROID Left 08/07/2020    Procedure: LUMBAR TRANSFORAMINAL LEFT L3/4 AND L5/S1 DIRECT REFERRAL;  Surgeon: Ronny Rangel MD;  Location: [facility] PAIN [facility]GT;  Service: Pain Management;  Laterality: Left;  NEEDS CONSENT, DIABETIC       Pre-op Assessment    I have reviewed the Patient Summary Reports.     I have reviewed the Nursing Notes. I have reviewed the NPO Status.   I have reviewed the Medications.     Review of Systems  Anesthesia Hx:  No problems with previous Anesthesia   History of prior surgery of interest to airway management or planning:          Denies Family Hx of Anesthesia complications.    Denies Personal Hx of Anesthesia complications.                    Social:  No Alcohol Use, Non-Smoker       Hematology/Oncology:  Hematology Normal   Oncology Normal                                   EENT/Dental:  EENT/Dental Normal           Cardiovascular:  Exercise tolerance: good   Hypertension   CAD   CABG/stent                                       Pulmonary:  Pulmonary Normal                        Renal/:  Renal/ Normal                 Hepatic/GI:  Hepatic/GI Normal                    Musculoskeletal:  Arthritis               Neurological:  Neurology Normal                                      Endocrine:  Diabetes, type 2, using insulin         Obesity / BMI > 30  Psych:  Psychiatric Normal                    Physical Exam  General: Well nourished, Cooperative, Alert and Oriented    Airway:  Mallampati: III   Mouth Opening: Normal  TM Distance: Normal  Tongue: Normal  Neck ROM: Normal ROM    Dental:  Intact    Chest/Lungs:  Normal Respiratory Rate, Clear to auscultation    Heart:  Rate: Normal  Rhythm: Regular Rhythm        Anesthesia Plan  Type of Anesthesia, risks & benefits discussed:    Anesthesia Type: Gen Natural Airway  Intra-op Monitoring Plan: Standard ASA Monitors  Post Op Pain Control Plan: multimodal analgesia and IV/PO Opioids PRN  Induction:  IV  Airway Plan: Direct  Informed Consent: Informed consent signed with the Patient and all parties understand the risks and agree with anesthesia plan.  All questions answered. Patient consented to blood products? No  ASA Score: 3  Day of Surgery Review of History & Physical: H&P Update referred to the surgeon/provider.    Ready For Surgery From Anesthesia Perspective.     .           [1]   Patient Active Problem List  Diagnosis    Type 2 diabetes mellitus with circulatory disorder, with long-term current use of insulin    Coronary artery disease involving coronary bypass graft of native heart without angina pectoris    Hypertension    S/P CABG (coronary artery bypass graft)    Lack of physical exercise    Venous insufficiency    Abnormal cardiovascular stress test    Class 1 obesity due to excess calories without serious comorbidity with body mass index (BMI) of 30.0 to 30.9 in adult    Hyperlipidemia    Varicose veins    Hyperkalemia    Osteoarthritis of left hip    Long term (current) use of anticoagulants    Deep vein thrombosis prophylaxis     Status post hip replacement left, 9/24/2014    Abnormal nuclear stress test    Dehydration    Varicose veins of lower extremities with inflammation    Decreased right shoulder range of motion    Weakness of shoulder    Family history of colon cancer requiring screening colonoscopy    Decreased range of motion of left shoulder    Decreased strength of upper extremity    Chronic pain    Rotator cuff syndrome of left shoulder    Nontraumatic tear of left rotator cuff    Limited range of motion (ROM) of shoulder    Adhesive capsulitis of left shoulder    Idiopathic hypotension    History of dysplastic nevus    Personal history of skin cancer    Impaired functional mobility, balance, gait, and endurance    Thrombocytopenia    Aortic arch atherosclerosis    Sacroiliitis    History of malignant melanoma    Benign prostatic hyperplasia with nocturia    Type 2 diabetes mellitus without complication, with long-term current use of insulin    Primary osteoarthritis of right hip   [2]   Current Facility-Administered Medications on File Prior to Encounter   Medication Dose Route Frequency Provider Last Rate Last Admin    fentaNYL 50 mcg/mL injection 25 mcg  25 mcg Intravenous Q5 Min PRN Travis Corbin MD   50 mcg at 05/12/21 1242    fentaNYL 50 mcg/mL injection  mcg   mcg Intravenous PRN Markus sEparza MD   100 mcg at 11/24/21 1151    lidocaine (PF) 10 mg/ml (1%) injection 10 mg  1 mL Intradermal Once Deanne Mosqueda MD        LIDOcaine (PF) 10 mg/ml (1%) injection 10 mg  1 mL Intradermal Once Markus Esparza MD        midazolam (VERSED) 1 mg/mL injection 0.5 mg  0.5 mg Intravenous PRN Travis Corbin MD   2 mg at 05/12/21 1242    midazolam (VERSED) 1 mg/mL injection 0.5-4 mg  0.5-4 mg Intravenous PRN Markus Esparza MD   2 mg at 11/24/21 1151    ropivacaine 0.2% Nimbus PainPRO Pump infusion 500 ML   Perineural Continuous Travis Corbin MD   New Bag at  "05/12/21 1629    ropivacaine 0.2% [facility] PainPRO Pump infusion 500 ML   Perineural Continuous Markus Esparza MD   New Bag at 11/24/21 1450    triamcinolone acetonide injection 40 mg  40 mg Intra-articular  Patricio Multani MD   40 mg at 10/26/16 0841     Current Outpatient Medications on File Prior to Encounter   Medication Sig Dispense Refill    ACCU-CHEK SMARTVIEW TEST STRIP Strp 1 strip by Misc.(Non-Drug; Combo Route) route 3 (three) times daily. Patient needs an appointment (Patient not taking: Reported on 12/17/2024) 300 strip 0    aspirin (ECOTRIN) 81 MG EC tablet Take 81 mg by mouth once daily.      atorvastatin (LIPITOR) 80 MG tablet TAKE 1 TABLET EVERY DAY 90 tablet 3    blood-glucose meter Misc Use to check sugar 3 times daily. Accu-chek Emily. Patient needs an appointment (Patient not taking: Reported on 12/17/2024) 1 each 0    carvediloL (COREG) 25 MG tablet TAKE 1 TABLET TWICE DAILY 180 tablet 3    celecoxib (CELEBREX) 200 MG capsule Take 1 capsule (200 mg total) by mouth daily as needed for Pain. 30 capsule 1    evolocumab (REPATHA SURECLICK) 140 mg/mL PnIj Inject 1 mL (140 mg total) into the skin every 14 (fourteen) days. 6 mL 3    FOLIC ACID/MULTIVITS-MIN (MULTIVITAMIN FOR CHOLESTEROL ORAL) Take 1 tablet by mouth nightly.       insulin needles, disposable, (BD INSULIN PEN NEEDLE UF ORIG) 29 x 1/2 " Ndle USE TWICE DAILY AS DIRECTED (Patient not taking: Reported on 12/17/2024) 100 each 2    JARDIANCE 25 mg tablet TAKE 1 TABLET EVERY DAY 90 tablet 3    lancets Misc 1 lancet by Misc.(Non-Drug; Combo Route) route 3 (three) times daily. accu-chek Fastclix. Patient needs an appointment (Patient not taking: Reported on 12/17/2024) 300 each 0    metFORMIN (GLUCOPHAGE) 1000 MG tablet TAKE 1 TABLET TWICE DAILY 180 tablet 3    nitroGLYCERIN (NITROSTAT) 0.4 MG SL tablet PLACE 1 TABLET (0.4 MG TOTAL) UNDER THE TONGUE EVERY 5 (FIVE) MINUTES AS NEEDED FOR CHEST PAIN AS DIRECTED (Patient not taking: " "Reported on 9/20/2024) 25 tablet 4    NOVOLOG FLEXPEN U-100 INSULIN 100 unit/mL (3 mL) InPn pen Inject 14 Units into the skin 3 (three) times daily with meals. 45 mL 3    pen needle, diabetic (BD INSULIN PEN NEEDLE UF MINI) 31 gauge x 3/16" Ndle 1 each by Misc.(Non-Drug; Combo Route) route 4 (four) times daily. Patient needs an appointment (Patient not taking: Reported on 12/17/2024) 400 each 0    semaglutide (OZEMPIC) 2 mg/dose (8 mg/3 mL) PnIj Inject 2 mg into the skin every 7 days. 9 mL 3    TOUJEO SOLOSTAR U-300 INSULIN 300 unit/mL (1.5 mL) InPn pen Inject 45 Units into the skin once daily. 15 mL 3    valsartan (DIOVAN) 320 MG tablet TAKE 1 TABLET EVERY DAY 90 tablet 3    VASCEPA 1 gram Cap TAKE 2 CAPSULES TWICE DAILY 360 capsule 3     "

## 2025-03-27 NOTE — ANESTHESIA POSTPROCEDURE EVALUATION
Anesthesia Post Evaluation    Patient: Jm Deleon    Procedure(s) Performed: Procedure(s) (LRB):  COLONOSCOPY, SCREENING, LOW RISK PATIENT (N/A)    Final Anesthesia Type: general      Patient location during evaluation: GI PACU  Patient participation: Yes- Able to Participate  Level of consciousness: awake and alert and oriented  Post-procedure vital signs: reviewed and stable  Pain management: adequate  Airway patency: patent    PONV status at discharge: No PONV  Anesthetic complications: no      Cardiovascular status: blood pressure returned to baseline and hemodynamically stable  Respiratory status: unassisted, spontaneous ventilation and room air  Hydration status: euvolemic  Follow-up not needed.              Vitals Value Taken Time   /65 03/27/25 15:27   Temp 36.5 °C (97.7 °F) 03/27/25 14:41   Pulse 61 03/27/25 15:27   Resp 16 03/27/25 15:03   SpO2 99 % 03/27/25 15:27         Event Time   Out of Recovery 15:36:12         Pain/Franco Score: Franco Score: 8 (3/27/2025  2:41 PM)

## 2025-03-27 NOTE — TRANSFER OF CARE
"Anesthesia Transfer of Care Note    Patient: Jm Deleon    Procedure(s) Performed: Procedure(s) (LRB):  COLONOSCOPY, SCREENING, LOW RISK PATIENT (N/A)    Patient location: PACU    Anesthesia Type: general    Transport from OR: Transported from OR on room air with adequate spontaneous ventilation    Post pain: adequate analgesia    Post assessment: no apparent anesthetic complications and tolerated procedure well    Post vital signs: stable    Level of consciousness: awake, alert and oriented    Nausea/Vomiting: no nausea/vomiting    Complications: none    Transfer of care protocol was followedComments: BP a little low Angel give 500 ml of fluid Continue to monitor.  Dr Chandler aware Pt awake and talking     Last vitals: Visit Vitals  BP (!) 141/62 (BP Location: Left arm)   Pulse 62   Temp 36.6 °C (97.9 °F) (Temporal)   Resp 16   Ht 5' 8" (1.727 m)   Wt 86.8 kg (191 lb 5.8 oz)   SpO2 98%   BMI 29.10 kg/m²     "

## 2025-03-27 NOTE — PROVATION PATIENT INSTRUCTIONS
Discharge Summary/Instructions after an Endoscopic Procedure  Patient Name: Jm Deleon  Patient MRN: [id number]  Patient YOB: 1955  Thursday, March 27, 2025  Doug Denney MD  Dear patient,  As a result of recent federal legislation (The Federal Cures Act), you may   receive lab or pathology results from your procedure in your [facility]   account before your physician is able to contact you. Your physician or   their representative will relay the results to you with their   recommendations at their soonest availability.  Thank you,  RESTRICTIONS:  During your procedure today, you received medications for sedation.  These   medications may affect your judgment, balance and coordination.  Therefore,   for 24 hours, you have the following restrictions:   - DO NOT drive a car, operate machinery, make legal/financial decisions,   sign important papers or drink alcohol.    ACTIVITY:  Today: no heavy lifting, straining or running due to procedural   sedation/anesthesia.  The following day: return to full activity including work.  DIET:  Eat and drink normally unless instructed otherwise.     TREATMENT FOR COMMON SIDE EFFECTS:  - Mild abdominal pain, nausea, belching, bloating or excessive gas:  rest,   eat lightly and use a heating pad.  - Sore Throat: treat with throat lozenges and/or gargle with warm salt   water.  - Because air was used during the procedure, expelling large amounts of air   from your rectum or belching is normal.  - If a bowel prep was taken, you may not have a bowel movement for 1-3 days.    This is normal.  SYMPTOMS TO WATCH FOR AND REPORT TO YOUR PHYSICIAN:  1. Abdominal pain or bloating, other than gas cramps.  2. Chest pain.  3. Back pain.  4. Signs of infection such as: chills or fever occurring within 24 hours   after the procedure.  5. Rectal bleeding, which would show as bright red, maroon, or black stools.   (A tablespoon of blood from the rectum is not serious, especially if    hemorrhoids are present.)  6. Vomiting.  7. Weakness or dizziness.  GO DIRECTLY TO THE NEAREST EMERGENCY ROOM IF YOU HAVE ANY OF THE FOLLOWING:      Difficulty breathing              Chills and/or fever over 101 F   Persistent vomiting and/or vomiting blood   Severe abdominal pain   Severe chest pain   Black, tarry stools   Bleeding- more than one tablespoon   Any other symptom or condition that you feel may need urgent attention  Your doctor recommends these additional instructions:  If any biopsies were taken, your doctors clinic will contact you in 1 to 2   weeks with any results.  - Discharge patient to home.   - Resume previous diet.   - Continue present medications.   - Await pathology results.   - Repeat colonoscopy in 5 years for surveillance of multiple polyps.   - Patient has a contact number available for emergencies.  The signs and   symptoms of potential delayed complications were discussed with the   patient.  Return to normal activities tomorrow.  Written discharge   instructions were provided to the patient.  For questions, problems or results please call your physician - Doug Denney MD at Work:  [phone].  [facility], EMERGENCY ROOM PHONE NUMBER: [phone]  IF A COMPLICATION OR EMERGENCY SITUATION ARISES AND YOU ARE UNABLE TO REACH   YOUR PHYSICIAN - GO DIRECTLY TO THE EMERGENCY ROOM.  MD Doug Liang MD  3/27/2025 2:40:26 PM  This report has been verified and signed electronically.  Dear patient,  As a result of recent federal legislation (The Federal Cures Act), you may   receive lab or pathology results from your procedure in your [facility]   account before your physician is able to contact you. Your physician or   their representative will relay the results to you with their   recommendations at their soonest availability.  Thank you,  PROVATION

## 2025-03-28 ENCOUNTER — RESULTS FOLLOW-UP (OUTPATIENT)
Dept: FAMILY MEDICINE | Facility: CLINIC | Age: 70
End: 2025-03-28

## 2025-03-31 LAB
ESTROGEN SERPL-MCNC: NORMAL PG/ML
INSULIN SERPL-ACNC: NORMAL U[IU]/ML
LAB AP CLINICAL INFORMATION: NORMAL
LAB AP GROSS DESCRIPTION: NORMAL
LAB AP PERFORMING LOCATION(S): NORMAL
LAB AP REPORT FOOTNOTES: NORMAL
T3RU NFR SERPL: NORMAL %

## 2025-05-10 DIAGNOSIS — E11.65 TYPE 2 DIABETES MELLITUS WITH HYPERGLYCEMIA, WITH LONG-TERM CURRENT USE OF INSULIN: Primary | ICD-10-CM

## 2025-05-10 DIAGNOSIS — Z79.4 TYPE 2 DIABETES MELLITUS WITH HYPERGLYCEMIA, WITH LONG-TERM CURRENT USE OF INSULIN: Primary | ICD-10-CM

## 2025-05-12 RX ORDER — SEMAGLUTIDE 2.68 MG/ML
2 INJECTION, SOLUTION SUBCUTANEOUS
Qty: 9 ML | Refills: 0 | Status: SHIPPED | OUTPATIENT
Start: 2025-05-12

## 2025-06-18 DIAGNOSIS — I25.10 ASCVD (ARTERIOSCLEROTIC CARDIOVASCULAR DISEASE): ICD-10-CM

## 2025-06-18 DIAGNOSIS — E78.5 DYSLIPIDEMIA: ICD-10-CM

## 2025-06-18 RX ORDER — EVOLOCUMAB 140 MG/ML
140 INJECTION, SOLUTION SUBCUTANEOUS
Qty: 6 ML | Refills: 3 | Status: ACTIVE | OUTPATIENT
Start: 2025-06-18

## 2025-06-26 DIAGNOSIS — I25.10 CORONARY ARTERY DISEASE INVOLVING NATIVE CORONARY ARTERY OF NATIVE HEART WITHOUT ANGINA PECTORIS: ICD-10-CM

## 2025-06-26 DIAGNOSIS — Z79.4 TYPE 2 DIABETES MELLITUS WITHOUT COMPLICATION, WITH LONG-TERM CURRENT USE OF INSULIN: Primary | ICD-10-CM

## 2025-06-26 DIAGNOSIS — E78.5 DYSLIPIDEMIA: ICD-10-CM

## 2025-06-26 DIAGNOSIS — I10 ESSENTIAL HYPERTENSION: ICD-10-CM

## 2025-06-26 DIAGNOSIS — E11.9 TYPE 2 DIABETES MELLITUS WITHOUT COMPLICATION, WITH LONG-TERM CURRENT USE OF INSULIN: Primary | ICD-10-CM

## 2025-06-26 RX ORDER — ICOSAPENT ETHYL 1000 MG/1
2 CAPSULE ORAL 2 TIMES DAILY
Qty: 360 CAPSULE | Refills: 0 | Status: SHIPPED | OUTPATIENT
Start: 2025-06-26

## 2025-06-26 RX ORDER — VALSARTAN 320 MG/1
320 TABLET ORAL
Qty: 90 TABLET | Refills: 0 | Status: SHIPPED | OUTPATIENT
Start: 2025-06-26

## 2025-06-26 NOTE — TELEPHONE ENCOUNTER
Refill Decision Note   Jm Deleon  is requesting a refill authorization.  Brief Assessment and Rationale for Refill:  Approve     Medication Therapy Plan:         Comments:     Note composed:6:12 AM 06/26/2025

## 2025-06-26 NOTE — TELEPHONE ENCOUNTER
No care due was identified.  [facility] Embedded Care Due Messages. Reference number: [id number].   6/26/2025 3:05:04 AM CDT

## 2025-07-07 RX ORDER — EMPAGLIFLOZIN 25 MG/1
25 TABLET, FILM COATED ORAL
Qty: 90 TABLET | Refills: 3 | Status: SHIPPED | OUTPATIENT
Start: 2025-07-07

## 2025-07-10 DIAGNOSIS — E11.9 TYPE 2 DIABETES MELLITUS WITHOUT COMPLICATION: ICD-10-CM

## 2025-07-11 ENCOUNTER — OFFICE VISIT (OUTPATIENT)
Dept: ENDOCRINOLOGY | Facility: CLINIC | Age: 70
End: 2025-07-11
Payer: MEDICARE

## 2025-07-11 ENCOUNTER — PATIENT MESSAGE (OUTPATIENT)
Dept: ENDOCRINOLOGY | Facility: CLINIC | Age: 70
End: 2025-07-11

## 2025-07-11 VITALS — HEIGHT: 68 IN | BODY MASS INDEX: 29.18 KG/M2 | WEIGHT: 192.5 LBS

## 2025-07-11 DIAGNOSIS — E66.09 CLASS 1 OBESITY DUE TO EXCESS CALORIES WITHOUT SERIOUS COMORBIDITY WITH BODY MASS INDEX (BMI) OF 30.0 TO 30.9 IN ADULT: ICD-10-CM

## 2025-07-11 DIAGNOSIS — E78.5 DYSLIPIDEMIA: ICD-10-CM

## 2025-07-11 DIAGNOSIS — Z79.4 TYPE 2 DIABETES MELLITUS WITH HYPERGLYCEMIA, WITH LONG-TERM CURRENT USE OF INSULIN: Primary | ICD-10-CM

## 2025-07-11 DIAGNOSIS — E11.65 TYPE 2 DIABETES MELLITUS WITH HYPERGLYCEMIA, WITH LONG-TERM CURRENT USE OF INSULIN: Primary | ICD-10-CM

## 2025-07-11 DIAGNOSIS — E66.811 CLASS 1 OBESITY DUE TO EXCESS CALORIES WITHOUT SERIOUS COMORBIDITY WITH BODY MASS INDEX (BMI) OF 30.0 TO 30.9 IN ADULT: ICD-10-CM

## 2025-07-11 DIAGNOSIS — I25.118 CORONARY ARTERY DISEASE OF NATIVE ARTERY OF NATIVE HEART WITH STABLE ANGINA PECTORIS: ICD-10-CM

## 2025-07-11 NOTE — PROGRESS NOTES
Jm Deleon is a 70 y.o. male with CAD, HLD presenting for follow-up of T2DM    The patient location is: [place]  The chief complaint leading to consultation is:   Chief Complaint   Patient presents with    Diabetes       Visit type: audiovisual    Face to Face time with patient: 19 minutes  30 minutes of total time spent on the encounter, which includes face to face time and non-face to face time preparing to see the patient (eg, review of tests), Obtaining and/or reviewing separately obtained history, Documenting clinical information in the electronic or other health record, Independently interpreting results (not separately reported) and communicating results to the patient/family/caregiver, or Care coordination (not separately reported).    Each patient to whom he or she provides medical services by telemedicine is:  (1) informed of the relationship between the physician and patient and the respective role of any other health care provider with respect to management of the patient; and (2) notified that he or she may decline to receive medical services by telemedicine and may withdraw from such care at any time.      History of Present Illness  T2DM  Diagnosed in early 2000's  Known complications: denies    History of Present Illness    CHIEF COMPLAINT:  Patient presents today for diabetes follow up.    DIABETES MANAGEMENT:  He reports running out of Dexcom continuous glucose monitor around the third of the month and is awaiting a new shipment.     Prior to this, Dexcom data showed estimated A1C of 7%, with 75% time in goal range. He demonstrated consistent overnight glucose levels with a pattern of slightly elevated blood sugar after dinner.     He continues Ozempic 2.0 mg which he tolerates well without GI side effects. He also continues Jardiance, Toujeo, and Novolog insulin without any significant issues.    WEIGHT MANAGEMENT:  He reports significant weight loss from 255-260 lbs to current weight of 190  lbs, representing a 60-65 pound reduction. He indicates being currently at a plateau at 190 lbs.          Current Diabetes Regimen:  Toujeo 45-50 units smith  novolog 14 units with meals plus scale, 18 units dinner  Metformin 1000 mg BID  Jardiance 25 mg daily  Ozempic 2 mg weekly    Omitted doses: rare    Prior mediations tried: none    DM education when first diagnosed    Recent Hgb A1C:  Lab Results   Component Value Date    HGBA1C 6.4 (H) 12/17/2024       Glucose Monitoring:  Dexcom G7- in media        Hypoglycemic Episodes: none as above    Screening / DM Complications:    Nephropathy:  ACEi/ARB: Taking  Lab Results   Component Value Date    MICALBCREAT 5.0 08/02/2024       Last Lipid Panel:  Statin: Taking repatha, zetia, atorvastatin  Lab Results   Component Value Date    LDLCALC 14.0 (L) 12/17/2024       Last foot exam : 09/27/2023- sees outside podiatry every 3 months  Last eye exam : 06/17/2024;  no laser surgery or DR    B12:  Lab Results   Component Value Date    [id number] 396 08/29/2023       Aspirin: taking          Current Outpatient Medications:     ACCU-CHEK SMARTVIEW TEST STRIP Strp, 1 strip by Misc.(Non-Drug; Combo Route) route 3 (three) times daily. Patient needs an appointment, Disp: 300 strip, Rfl: 0    aspirin (ECOTRIN) 81 MG EC tablet, Take 81 mg by mouth once daily., Disp: , Rfl:     atorvastatin (LIPITOR) 80 MG tablet, TAKE 1 TABLET EVERY DAY, Disp: 90 tablet, Rfl: 3    blood-glucose meter Misc, Use to check sugar 3 times daily. Accu-chek Emily. Patient needs an appointment, Disp: 1 each, Rfl: 0    carvediloL (COREG) 25 MG tablet, TAKE 1 TABLET TWICE DAILY, Disp: 180 tablet, Rfl: 3    celecoxib (CELEBREX) 200 MG capsule, Take 1 capsule (200 mg total) by mouth daily as needed for Pain., Disp: 30 capsule, Rfl: 1    evolocumab (REPATHA SURECLICK) 140 mg/mL PnIj, Inject 1 mL (140 mg total) into the skin every 14 (fourteen) days., Disp: 6 mL, Rfl: 3    FOLIC ACID/MULTIVITS-MIN (MULTIVITAMIN FOR  "CHOLESTEROL ORAL), Take 1 tablet by mouth nightly. , Disp: , Rfl:     JARDIANCE 25 mg tablet, TAKE 1 TABLET EVERY DAY, Disp: 90 tablet, Rfl: 3    lancets Misc, 1 lancet by Misc.(Non-Drug; Combo Route) route 3 (three) times daily. accu-chek Fastclix. Patient needs an appointment, Disp: 300 each, Rfl: 0    metFORMIN (GLUCOPHAGE) 1000 MG tablet, TAKE 1 TABLET TWICE DAILY, Disp: 180 tablet, Rfl: 0    nitroGLYCERIN (NITROSTAT) 0.4 MG SL tablet, PLACE 1 TABLET (0.4 MG TOTAL) UNDER THE TONGUE EVERY 5 (FIVE) MINUTES AS NEEDED FOR CHEST PAIN AS DIRECTED, Disp: 25 tablet, Rfl: 4    NOVOLOG FLEXPEN U-100 INSULIN 100 unit/mL (3 mL) InPn pen, Inject 14 Units into the skin 3 (three) times daily with meals., Disp: 45 mL, Rfl: 3    pen needle, diabetic (BD INSULIN PEN NEEDLE UF MINI) 31 gauge x 3/16" Ndle, 1 each by Misc.(Non-Drug; Combo Route) route 4 (four) times daily. Patient needs an appointment, Disp: 400 each, Rfl: 0    semaglutide (OZEMPIC) 2 mg/dose (8 mg/3 mL) PnIj, Inject 2 mg into the skin every 7 days., Disp: 9 mL, Rfl: 0    tamsulosin (FLOMAX) 0.4 mg Cap, TAKE 1 CAPSULE EVERY EVENING, Disp: 90 capsule, Rfl: 3    TOUJEO SOLOSTAR U-300 INSULIN 300 unit/mL (1.5 mL) InPn pen, Inject 45 Units into the skin once daily., Disp: 15 mL, Rfl: 3    valsartan (DIOVAN) 320 MG tablet, TAKE 1 TABLET EVERY DAY, Disp: 90 tablet, Rfl: 0    VASCEPA 1 gram Cap, TAKE 2 CAPSULES TWICE DAILY, Disp: 360 capsule, Rfl: 0    zolpidem (AMBIEN) 5 MG Tab, Take 1 tablet (5 mg total) by mouth nightly as needed (insomnia)., Disp: 90 tablet, Rfl: 1  No current facility-administered medications for this visit.    Facility-Administered Medications Ordered in Other Visits:     fentaNYL 50 mcg/mL injection 25 mcg, 25 mcg, Intravenous, Q5 Min PRN, Travis Corbinlt, MD, 50 mcg at 05/12/21 1242    fentaNYL 50 mcg/mL injection  mcg,  mcg, Intravenous, PRN, Markus Esparza MD, 100 mcg at 11/24/21 1151    lidocaine (PF) 10 mg/ml (1%) " injection 10 mg, 1 mL, Intradermal, Once, Deanne Mosqueda MD    LIDOcaine (PF) 10 mg/ml (1%) injection 10 mg, 1 mL, Intradermal, Once, Markus Esparza MD    midazolam (VERSED) 1 mg/mL injection 0.5 mg, 0.5 mg, Intravenous, PRN, Travis Cobrin MD, 2 mg at 05/12/21 1242    midazolam (VERSED) 1 mg/mL injection 0.5-4 mg, 0.5-4 mg, Intravenous, PRN, Markus Esparza MD, 2 mg at 11/24/21 1151    ropivacaine 0.2% Nimbus PainPRO Pump infusion 500 ML, , Perineural, Continuous, Travis Corbin MD, New Bag at 05/12/21 1629    ropivacaine 0.2% Nimbus PainPRO Pump infusion 500 ML, , Perineural, Continuous, Markus Esparza MD, New Bag at 11/24/21 1450    triamcinolone acetonide injection 40 mg, 40 mg, Intra-articular, , Patricio Multani MD, 40 mg at 10/26/16 0841    ROS as above    Objective:     There were no vitals filed for this visit.    Wt Readings from Last 3 Encounters:   07/11/25 1006 87.3 kg (192 lb 8 oz)   03/27/25 1247 86.8 kg (191 lb 5.8 oz)   02/17/25 1507 87.5 kg (193 lb)         Body mass index is 29.27 kg/m².      Labs    Chemistry        Component Value Date/Time     12/17/2024 0921    K 5.0 12/17/2024 0921     12/17/2024 0921    CO2 26 12/17/2024 0921    BUN 9 12/17/2024 0921    CREATININE 0.8 12/17/2024 0921     (H) 12/17/2024 0921        Component Value Date/Time    CALCIUM 9.0 12/17/2024 0921    ALKPHOS 63 12/17/2024 0921    AST 20 12/17/2024 0921    ALT 24 12/17/2024 0921    BILITOT 0.6 12/17/2024 0921    ESTGFRAFRICA >60.0 06/10/2022 0921    EGFRNONAA >60.0 06/10/2022 0921          1. Type 2 diabetes mellitus with hyperglycemia, with long-term current use of insulin    2. Class 1 obesity due to excess calories without serious comorbidity with body mass index (BMI) of 30.0 to 30.9 in adult    3. Dyslipidemia    4. Coronary artery disease of native artery of native heart with stable angina pectoris        Assessment & Plan      TYPE 2  DIABETES MELLITUS:  - Dexcom data with estimated A1C of 7%, with 75% time in goal range. He demonstrated consistent overnight glucose levels with a pattern of slightly elevated blood sugar after dinner.   - Today began discussion of insulin pump therapy which I think he would enjoy both for quality of life and potentially lower insulin needs  - will refer for pump eval. Discussed OP5 and Tandem but can review in more detail with education team  - Discussed how insulin pumps integrate with continuous glucose monitors to adjust insulin delivery based on real-time glucose data.  - Provided overview of carbohydrate counting basics and its importance in insulin pump management.  - Maintained Ozempic due to proven cardiovascular benefits in patients with known CAD.    CORONARY ARTERY DISEASE:  - Maintained Ozempic due to proven cardiovascular benefits in patients with known CAD.    HLD  - LDL at goal on current therapy    WEIGHT MANAGEMENT  - Excellent loss with ozempic although now plateau  - will work to reduce insulin doses as above  - consider change to Mounjaro in future; did discuss that CV data not yet available    RTC 3-4 months pump clinic      More Galloway MD    Visit today included increased complexity associated with the care of the problems addressed and managing the longitudinal care of the patient due to the serious and/or complex managed problems      This note was generated with the assistance of ambient listening technology. Verbal consent was obtained by the patient and accompanying visitor(s) for the recording of patient appointment to facilitate this note. I attest to having reviewed and edited the generated note for accuracy, though some syntax or spelling errors may persist. Please contact the author of this note for any clarification.

## 2025-07-16 ENCOUNTER — TELEPHONE (OUTPATIENT)
Dept: OPHTHALMOLOGY | Facility: CLINIC | Age: 70
End: 2025-07-16
Payer: MEDICARE

## 2025-07-16 ENCOUNTER — OFFICE VISIT (OUTPATIENT)
Dept: OPTOMETRY | Facility: CLINIC | Age: 70
End: 2025-07-16
Payer: COMMERCIAL

## 2025-07-16 DIAGNOSIS — H40.023 OPEN ANGLE WITH BORDERLINE FINDINGS AND HIGH GLAUCOMA RISK IN BOTH EYES: ICD-10-CM

## 2025-07-16 DIAGNOSIS — H52.4 PRESBYOPIA: Primary | ICD-10-CM

## 2025-07-16 DIAGNOSIS — E11.9 TYPE 2 DIABETES MELLITUS WITHOUT OPHTHALMIC MANIFESTATIONS: ICD-10-CM

## 2025-07-16 DIAGNOSIS — H26.9 CORTICAL CATARACT OF BOTH EYES: ICD-10-CM

## 2025-07-16 DIAGNOSIS — H25.13 NS (NUCLEAR SCLEROSIS), BILATERAL: ICD-10-CM

## 2025-07-16 DIAGNOSIS — H52.203 HYPEROPIA OF BOTH EYES WITH ASTIGMATISM AND PRESBYOPIA: ICD-10-CM

## 2025-07-16 DIAGNOSIS — E11.59 TYPE 2 DIABETES MELLITUS WITH OTHER CIRCULATORY COMPLICATION, WITH LONG-TERM CURRENT USE OF INSULIN: ICD-10-CM

## 2025-07-16 DIAGNOSIS — H52.03 HYPEROPIA OF BOTH EYES WITH ASTIGMATISM AND PRESBYOPIA: ICD-10-CM

## 2025-07-16 DIAGNOSIS — Z01.00 DIABETIC EYE EXAM: ICD-10-CM

## 2025-07-16 DIAGNOSIS — E11.9 DIABETIC EYE EXAM: ICD-10-CM

## 2025-07-16 DIAGNOSIS — Z79.4 TYPE 2 DIABETES MELLITUS WITH OTHER CIRCULATORY COMPLICATION, WITH LONG-TERM CURRENT USE OF INSULIN: ICD-10-CM

## 2025-07-16 DIAGNOSIS — H52.4 HYPEROPIA OF BOTH EYES WITH ASTIGMATISM AND PRESBYOPIA: ICD-10-CM

## 2025-07-16 PROCEDURE — 92015 DETERMINE REFRACTIVE STATE: CPT | Mod: S$GLB,,, | Performed by: OPTOMETRIST

## 2025-07-16 PROCEDURE — 92133 CPTRZD OPH DX IMG PST SGM ON: CPT | Mod: S$GLB,,, | Performed by: OPTOMETRIST

## 2025-07-16 PROCEDURE — 92014 COMPRE OPH EXAM EST PT 1/>: CPT | Mod: S$GLB,,, | Performed by: OPTOMETRIST

## 2025-07-16 PROCEDURE — 99999 PR PBB SHADOW E&M-EST. PATIENT-LVL III: CPT | Mod: PBBFAC,,, | Performed by: OPTOMETRIST

## 2025-07-16 NOTE — PROGRESS NOTES
HPI    STEPHANIE: 06/17/2024 - Dr. Long    CC: Pt is here today for a routine eye exam. Pt states that he noticed a   decline with his near vision since his last exam.     (-)Dryness  (-)Burning  (-)Itchiness  (-)Tearing  (-)Flashes  (-)Floaters   (-)Photophobia  (-)Eye Pain      Diabetic: yes   A1C:   Date                     Value                   12/17/2024               6.4 (H)           Contact Lens: no    Eye Meds: None    PD: 64.50    Last edited by Celia Love MA on 7/16/2025  9:54 AM.            Assessment /Plan     For exam results, see Encounter Report.             Regular astigmatism of both eyes  Presbyopia              Rx specs          Type II or unspecified type diabetes mellitus without mention of complication, uncontrolled  Type 2 diabetes mellitus without ophthalmic manifestations  Essential hypertension              No retinopathy, monitor         Vitreous syneresis of both eyes     Open angle with borderline findings and high glaucoma risk in both eyes  Glaucoma suspect, bilateral  -          HVF 2012: WNL, 2017 scattered defects/ stable WNL  OCT: sectoral thinning inf nasal OD, sup temp OS, stable vs ?Mild progression OU- stable OU  PACHY: 541/537   IOP: normal/ low        NS (nuclear sclerosis), bilateral  Cortical cataract of both eyes   Pt unable to see well with specs, no improvement in vision with new Mrx   Consult for cat eval             RTC 1 year annual  Repeat HVF 24-2 and OCT optic nerve next year

## 2025-07-21 DIAGNOSIS — Z79.4 TYPE 2 DIABETES MELLITUS WITH HYPERGLYCEMIA, WITH LONG-TERM CURRENT USE OF INSULIN: ICD-10-CM

## 2025-07-21 DIAGNOSIS — E11.65 TYPE 2 DIABETES MELLITUS WITH HYPERGLYCEMIA, WITH LONG-TERM CURRENT USE OF INSULIN: ICD-10-CM

## 2025-07-21 RX ORDER — SEMAGLUTIDE 2.68 MG/ML
INJECTION, SOLUTION SUBCUTANEOUS
Qty: 9 EACH | Refills: 3 | Status: SHIPPED | OUTPATIENT
Start: 2025-07-21

## 2025-07-25 ENCOUNTER — CLINICAL SUPPORT (OUTPATIENT)
Dept: DIABETES | Facility: CLINIC | Age: 70
End: 2025-07-25
Payer: MEDICARE

## 2025-07-25 VITALS — WEIGHT: 192.44 LBS | BODY MASS INDEX: 29.26 KG/M2

## 2025-07-25 DIAGNOSIS — E11.65 TYPE 2 DIABETES MELLITUS WITH HYPERGLYCEMIA, WITH LONG-TERM CURRENT USE OF INSULIN: Primary | ICD-10-CM

## 2025-07-25 DIAGNOSIS — Z79.4 TYPE 2 DIABETES MELLITUS WITH HYPERGLYCEMIA, WITH LONG-TERM CURRENT USE OF INSULIN: Primary | ICD-10-CM

## 2025-07-25 DIAGNOSIS — E11.65 TYPE 2 DIABETES MELLITUS WITH HYPERGLYCEMIA, WITH LONG-TERM CURRENT USE OF INSULIN: ICD-10-CM

## 2025-07-25 DIAGNOSIS — Z79.4 TYPE 2 DIABETES MELLITUS WITH HYPERGLYCEMIA, WITH LONG-TERM CURRENT USE OF INSULIN: ICD-10-CM

## 2025-07-25 PROCEDURE — 99999 PR PBB SHADOW E&M-EST. PATIENT-LVL II: CPT | Mod: PBBFAC,HCNC,,

## 2025-07-25 RX ORDER — INSULIN ASPART 100 [IU]/ML
INJECTION, SOLUTION INTRAVENOUS; SUBCUTANEOUS
Qty: 90 ML | Refills: 3 | Status: SHIPPED | OUTPATIENT
Start: 2025-07-25

## 2025-07-25 RX ORDER — INSULIN PMP CART,AUT,G6/7,CNTR
1 EACH SUBCUTANEOUS
Qty: 15 EACH | Refills: 11 | Status: SHIPPED | OUTPATIENT
Start: 2025-07-25

## 2025-07-25 RX ORDER — INSULIN PMP CART,AUT,G6/7,CNTR
1 EACH SUBCUTANEOUS
Qty: 1 EACH | Refills: 0 | Status: SHIPPED | OUTPATIENT
Start: 2025-07-25

## 2025-07-25 NOTE — PROGRESS NOTES
Diabetes Care Specialist Progress Note  Author: Lorie Degroot RN, [facility]  Date: 7/25/2025    Intake    Program Intake  Reason for Diabetes Program Visit:: Initial Diabetes Assessment (Insulin pump evaluation)  Current diabetes risk level:: low  In the last month, have you used the ER or been admitted to the hospital: No  Permission to speak with others about care:: no    Current Diabetes Treatment: Insulin, DM Injectables, Oral Medications  Oral Medication Type/Dose: Metformin 1000 mg bid  DM Injectables Type/Dose: Ozempic 2 mg weekly  Method of insulin delivery?: Injections  Injection Type: Pens  Pen Type/Dose: Toujeo 45 units; Novolog 14 units ac meals    Continuous Glucose Monitoring  Patient has CGM: Yes  Personal CGM type:: Dexcom G7  GMI Date: 07/25/25  GMI Value: 6.9 %    Lab Results   Component Value Date    HGBA1C 6.4 (H) 12/17/2024       Weight: 87.3 kg (192 lb 7.4 oz)       Body mass index is 29.26 kg/m².    Lifestyle Coping Support & Clinical    Lifestyle/Coping/Support  Compared to other people your age, how would you rate your health?: Fair  Learning Barriers:: None  Psychosocial/Coping Skills Assessment Completed: : Yes  Assessment indicates:: Adequate understanding  Area of need?: No    Problem Review  Active Comorbidities: Chronic Pain, Cardiovascular Disease, Hypertension, Hyperlipidemia/Dyslipidemia, Cancer    Diabetes Self-Management Skills Assessment    Medication Skills Assessment  Patient is able to identify current diabetes medications, dosages, and appropriate timing of medications.: yes  Patient reports problems or concerns with current medication regimen.: no  Patient is  aware that some diabetes medications can cause low blood sugar?: Yes  Medication Skills Assessment Completed:: Yes  Assessment indicates:: Knowledge deficit  Area of need?: Yes    Diabetes Disease Process/Treatment Options  Diabetes Type?: Type II  When were you diagnosed?: early 2000  Is patient aware of what causes  diabetes?: Yes  Does patient understand the pathophysiology of diabetes?: Yes  Diabetes Disease Process/Treatment Options: Skills Assessment Completed: Yes  Assessment indicates:: Adequate understanding  Area of need?: No    Nutrition/Healthy Eating  Patient can identify foods that impact blood sugar.: yes  Challenges to healthy eating:: eating out, going to parties, portion control  Nutrition/Healthy Eating Skills Assessment Completed:: Yes  Assessment indicates:: Instruction Needed  Area of need?: Yes    Physical Activity/Exercise  Physical Activity/Exercise Skills Assessment Completed: : No  Deffered due to:: Time    Home Blood Glucose Monitoring  Patient states that blood sugar is checked at home daily.: yes  Monitoring Method:: personal continuous glucose monitor  Personal CGM type:: Dexcom G7   What is your current Time in Range?: 81%  Home Blood Glucose Monitoring Skills Assessment Completed: : Yes  Assessment indicates:: Adequate understanding  Area of need?: No    Acute Complications  Have you ever had hypoglycemia (low BG 70 or less)?: no  Do you know the symptoms of low blood sugar and how to treat?: shaky  Have you ever had hyperglycemia (high  or more)?: no  Acute Complications Skills Assessment Completed: : Yes  Assessment indicates:: Knowledge deficit  Area of need?: Yes    Chronic Complications  Chronic Complications Skills Assessment Completed: : No  Deferred due to:: Time      Assessment Summary and Plan    Based on today's diabetes care assessment, the following areas of need were identified:      Identified Areas of Need      Medication/Current Diabetes Treatment: Yes, see care planning   Lifestyle Coping/Support: No   Diabetes Disease Process/Treatment Options: No   Nutrition/Healthy Eating: Yes, see care planning   Home Blood Glucose Monitoring: No    Acute Complications: Yes, Discussed signs/symptoms of hypoglycemia. Treatment to follow rule of 15 guidelines, 15 gram of carbs and recheck  in 15 mins.        Today's interventions were provided through individual discussion, instruction, and written materials were provided.      Patient verbalized understanding of instruction and written materials.  Pt was able to return back demonstration of instructions today. Patient understood key points, needs reinforcement and further instruction.     Diabetes Self-Management Care Plan:    Today's Diabetes Self-Management Care Plan was developed with Jm's input. Jm has agreed to work toward the following goal(s) to improve his/her overall diabetes control.      Care Plan: Diabetes Management   Updates made since 7/25/2024 12:00 AM        Problem: Medications         Goal: Patient Agrees to take Diabetes Medication(s) as prescribed.  Insulin pump evaluation    Start Date: 7/25/2025   Expected End Date: 7/23/2026   Priority: Low   Barriers: Knowledge deficit   Note:    Patient currently on MDI. Interested in insulin pump therapy. Has spoken to Endo provider.    Omnipod Introduction to Insulin Pump Therapy:  Reviewed basics of insulin pump therapy   Discussed basic problem-solving skills for responding to hypo and/or hyperglycemia regarding insulin pump therapy.   Introduced and discussed common terms such as: ISF/Correction Factor, CHO ratio, Goal BG, Bolus, Basal Rate, Active Insulin Time, and Insulin on Board.     Explained the importance of learning to calculate prandial insulin dose/Bolus using advanced carbohydrate counting (Insulin to Carbohydrate Ratio) and Correction Dose (Sensitivity/Correction Factor) prior to starting pump therapy   Encouraged patient to check with their insurance provider regarding information such as possible costs associated with initial and monthly pump costs.   Pros and cons of pump therapy discussed  Patient is interested in a tubeless system  Discussed how the integration of the sensors work with the Omnipod pump.  Used virtual pump noe to demonstrate in real time how the  systems operate    Patient was able to see actual pumps in clinic  Manual vs automated features of the pumps were discussed.   Currently using a G7 sensor which is compatible with pump.  Discussed option of using his compatible phone or controller.   Explained the the pod and sensor will need to be on the same side of the body to communicate efficiently.     Patient would like to move forward with the pump.          Problem: Healthy Eating         Goal: Patient will begin to carb count including measuring foods and label reading in preparation for insulin pump    Start Date: 7/25/2025   Expected End Date: 7/25/2025   Priority: High   Barriers: Knowledge deficit   Note:    Reviewed basic food groups with patient (carbohydrate, protein, and fat).   Carbohydrate sources reviewed in detail.  Typical food intake obtained from patient.  Practiced reading food labels with patient focusing on serving size and total carbohydrate intake (not sugar intake).  Mostly eating a low carb diet.  Suggested increasing protein at breakfast and lunch.  Breakfast sm bowl of fruit (1 cup berries, cantaloupe a few bananas) coffee  Lunch 6 pack of peanut butter crackers  Dinner protein, salad, non starchy veggies  Drinks SF gingerale       Task: Reviewed the sources and role of Carbohydrate, Protein, and Fat and how each nutrient impacts blood sugar. Completed 7/25/2025        Task: Provided visual examples using dry measuring cups, food models, and other familiar objects such as computer mouse, deck or cards, tennis ball etc. to help with visualization of portions. Completed 7/25/2025        Task: Explained how to count carbohydrates using the food label and the use of dry measuring cups for accurate carb counting. Completed 7/25/2025     Task: Review the importance of balancing carbohydrates with each meal using portion control techniques to count servings of carbohydrate and label reading to identify serving size and amount of total  carbs per serving. Completed 7/25/2025        Task: Provided Sample plate method and reviewed the use of the plate to estimate amounts of carbohydrate per meal. Completed 7/25/2025          Follow Up Plan     Will reach out when he receives his Omnipod pump and insulin    Today's care plan and follow up schedule was discussed with patient.  Jm verbalized understanding of the care plan, goals, and agrees to follow up plan.        The patient was encouraged to communicate with his/her health care provider/physician and care team regarding his/her condition(s) and treatment.  I provided the patient with my contact information today and encouraged to contact me via phone or [facility] Patient Portal as needed.     Length of Visit   Total Time: 90 Minutes

## 2025-07-25 NOTE — TELEPHONE ENCOUNTER
----- Message from Diabetic Educator Lorie sent at 7/25/2025  2:53 PM CDT -----  Regarding: RX for OP5 and vial insulin  Pt would like to move forward with OP5 starter kit and pods. May need to change pod q2 days based on current volume of insulin.  Please order vials.    Please send to [facility] in [place]    Thanks, Lorie

## 2025-07-26 ENCOUNTER — TELEPHONE (OUTPATIENT)
Dept: ENDOCRINOLOGY | Facility: HOSPITAL | Age: 70
End: 2025-07-26
Payer: MEDICARE

## 2025-07-26 NOTE — TELEPHONE ENCOUNTER
Current TDD  units  Reduce 20% for conversion to pump= 72 units    Pump Settings  Basal Rate  12A:1.5 units/hr    Carb Ratio  12A:7    ISF  12A:25    Target: 110-120  IAT: 3.5 hours

## 2025-07-30 ENCOUNTER — PATIENT MESSAGE (OUTPATIENT)
Dept: DIABETES | Facility: CLINIC | Age: 70
End: 2025-07-30
Payer: MEDICARE

## 2025-07-31 ENCOUNTER — TELEPHONE (OUTPATIENT)
Dept: HEMATOLOGY/ONCOLOGY | Facility: CLINIC | Age: 70
End: 2025-07-31
Payer: MEDICARE

## 2025-07-31 RX ORDER — CARVEDILOL 25 MG/1
25 TABLET ORAL 2 TIMES DAILY
Qty: 180 TABLET | Refills: 0 | Status: SHIPPED | OUTPATIENT
Start: 2025-07-31

## 2025-07-31 RX ORDER — METFORMIN HYDROCHLORIDE 1000 MG/1
1000 TABLET ORAL 2 TIMES DAILY
Qty: 180 TABLET | Refills: 1 | Status: SHIPPED | OUTPATIENT
Start: 2025-07-31

## 2025-07-31 NOTE — TELEPHONE ENCOUNTER
Care Due:                  Date            Visit Type   Department     Provider  --------------------------------------------------------------------------------                                MYCHART                              ANNUAL                              CHECKUP/PHY  [facility] INTERNAL  Last Visit: 12-      [facility]       Dallas Martin  Next Visit: None Scheduled  None         None Found                                                            Last  Test          Frequency    Reason                     Performed    Due Date  --------------------------------------------------------------------------------    HBA1C.......  6 months...  SAFIA, metFORMIN.....  12-   06-    [facility] Embedded Care Due Messages. Reference number: [id number].   7/31/2025 1:53:12 AM CDT  
Refill Routing Note   Medication(s) are not appropriate for processing by [facility] for the following reason(s):        Required labs outdated    [facility] action(s):  Defer   Requires labs : Yes             Appointments  past 12m or future 3m with PCP    Date Provider   Last Visit   12/17/2024 Dallas Martin MD   Next Visit   Visit date not found Dallas Martin MD   ED visits in past 90 days: 0        Note composed:6:51 AM 07/31/2025               
Patient expressed no known problems or needs

## 2025-07-31 NOTE — TELEPHONE ENCOUNTER
Refill Decision Note   Jm Deleon  is requesting a refill authorization.  Brief Assessment and Rationale for Refill:  Approve     Medication Therapy Plan:         Comments:     Note composed:7:21 AM 07/31/2025

## 2025-08-01 ENCOUNTER — PATIENT MESSAGE (OUTPATIENT)
Dept: INTERNAL MEDICINE | Facility: CLINIC | Age: 70
End: 2025-08-01
Payer: MEDICARE

## 2025-08-08 DIAGNOSIS — Z79.4 TYPE 2 DIABETES MELLITUS WITH HYPERGLYCEMIA, WITH LONG-TERM CURRENT USE OF INSULIN: Primary | ICD-10-CM

## 2025-08-08 DIAGNOSIS — I25.10 CORONARY ARTERY DISEASE INVOLVING NATIVE CORONARY ARTERY OF NATIVE HEART WITHOUT ANGINA PECTORIS: ICD-10-CM

## 2025-08-08 DIAGNOSIS — E78.5 DYSLIPIDEMIA: ICD-10-CM

## 2025-08-08 DIAGNOSIS — I10 ESSENTIAL HYPERTENSION: ICD-10-CM

## 2025-08-08 DIAGNOSIS — E11.65 TYPE 2 DIABETES MELLITUS WITH HYPERGLYCEMIA, WITH LONG-TERM CURRENT USE OF INSULIN: Primary | ICD-10-CM

## 2025-08-08 RX ORDER — INSULIN LISPRO 100 [IU]/ML
INJECTION, SOLUTION INTRAVENOUS; SUBCUTANEOUS
Qty: 90 ML | Refills: 3 | Status: SHIPPED | OUTPATIENT
Start: 2025-08-08

## 2025-08-08 NOTE — TELEPHONE ENCOUNTER
Copied from CRM #[id number]. Topic: Medications - Medication Question  >> Aug 8, 2025  8:44 AM [place] wrote:  .Who Called: Tessy momin/[facility]    Caller is requesting assistance/information from provider's office.    Symptoms (please be specific): caller stated the medication listed is not covered under their policy as the pt just switched insurance companies. They are providing alternatives  : himalog , injection solution or the quick pen. If the doctor does not like that pls call to better discuss another one or to get the novolog covered as they don't normally cover novolog brand , [phone] is the provider line to discuss   List of preferred pharmacies on file (remove unneeded):   [facility] - [place] [id number] [place] [id number] [address]  Phone: [phone] Fax: [phone]    Preferred Method of Contact: Phone Call   Best Call Back Number, if different: [phone]  Additional Information: NOVOLOG U-100 INSULIN ASPART 100 unit/mL injection

## 2025-08-08 NOTE — TELEPHONE ENCOUNTER
Insurance changed - novolog no longer covered, pt needs new Rx for insulin covered by insurance    Lov 7/2025

## 2025-08-09 RX ORDER — ICOSAPENT ETHYL 1 G/1
2 CAPSULE ORAL 2 TIMES DAILY
Qty: 360 CAPSULE | Refills: 3 | Status: SHIPPED | OUTPATIENT
Start: 2025-08-09

## 2025-08-09 RX ORDER — VALSARTAN 320 MG/1
320 TABLET ORAL DAILY
Qty: 90 TABLET | Refills: 3 | Status: SHIPPED | OUTPATIENT
Start: 2025-08-09

## 2025-08-09 RX ORDER — CARVEDILOL 25 MG/1
25 TABLET ORAL 2 TIMES DAILY
Qty: 180 TABLET | Refills: 3 | Status: SHIPPED | OUTPATIENT
Start: 2025-08-09

## 2025-08-13 DIAGNOSIS — E11.9 TYPE 2 DIABETES MELLITUS WITHOUT COMPLICATION: ICD-10-CM

## 2025-08-21 ENCOUNTER — PATIENT MESSAGE (OUTPATIENT)
Dept: DIABETES | Facility: CLINIC | Age: 70
End: 2025-08-21
Payer: COMMERCIAL

## 2025-08-21 DIAGNOSIS — E11.65 TYPE 2 DIABETES MELLITUS WITH HYPERGLYCEMIA, WITH LONG-TERM CURRENT USE OF INSULIN: ICD-10-CM

## 2025-08-21 DIAGNOSIS — Z79.4 TYPE 2 DIABETES MELLITUS WITH HYPERGLYCEMIA, WITH LONG-TERM CURRENT USE OF INSULIN: ICD-10-CM

## 2025-08-21 RX ORDER — INSULIN PMP CART,AUT,G6/7,CNTR
1 EACH SUBCUTANEOUS
Qty: 1 EACH | Refills: 0 | Status: SHIPPED | OUTPATIENT
Start: 2025-08-21

## 2025-09-05 ENCOUNTER — CLINICAL SUPPORT (OUTPATIENT)
Dept: DIABETES | Facility: CLINIC | Age: 70
End: 2025-09-05
Payer: MEDICARE

## 2025-09-05 DIAGNOSIS — E11.9 TYPE 2 DIABETES MELLITUS WITHOUT COMPLICATION, WITH LONG-TERM CURRENT USE OF INSULIN: Primary | ICD-10-CM

## 2025-09-05 DIAGNOSIS — Z79.4 TYPE 2 DIABETES MELLITUS WITHOUT COMPLICATION, WITH LONG-TERM CURRENT USE OF INSULIN: Primary | ICD-10-CM

## 2025-09-05 PROCEDURE — 99999 PR PBB SHADOW E&M-EST. PATIENT-LVL I: CPT | Mod: PBBFAC,,,

## (undated) DEVICE — DRAPE SURG W/TWL 17 5/8X23

## (undated) DEVICE — GLOVE BIOGEL SKINSENSE PI 8.0

## (undated) DEVICE — SLING SHOT II LARGE

## (undated) DEVICE — SHAVER ULTRAFFR 4.2MM

## (undated) DEVICE — SUT MONOCRYL 4-0 PS-2

## (undated) DEVICE — NDL HYPO REG 25G X 1 1/2

## (undated) DEVICE — CONTAINER SPECIMEN STRL 4OZ

## (undated) DEVICE — DRESSING ABD OPEN GRANUFOAM

## (undated) DEVICE — NEOGUARD COVER 4X30CM STERILE

## (undated) DEVICE — SEE MEDLINE ITEM 157160

## (undated) DEVICE — SEE MEDLINE ITEM 157128

## (undated) DEVICE — SEE MEDLINE ITEM 157117

## (undated) DEVICE — BNDG COFLEX FOAM LF2 ST 4X5YD

## (undated) DEVICE — UNDERGLOVES BIOGEL PI SIZE 8

## (undated) DEVICE — GRASPER SUTURE 60 DEG 15CM PNK

## (undated) DEVICE — NDL SUTURE FLEXIBLE

## (undated) DEVICE — DRAPE SPLIT STERILE

## (undated) DEVICE — SEE MEDLINE ITEM 153151

## (undated) DEVICE — SUT 2-0 VICRYL / CT-1

## (undated) DEVICE — SKIN MARKER DEVON 160

## (undated) DEVICE — ELECTRODE VAPR S 90 4.0MM

## (undated) DEVICE — APPLIER LIGACLIP SM 9.38IN

## (undated) DEVICE — ELECTRODE BLADE INSULATED 1 IN

## (undated) DEVICE — PAD SHOULDER CARE POLAR

## (undated) DEVICE — KIT SHOULDER POSITIONER SPIDER

## (undated) DEVICE — ELECTRODE 90 DEGREE ANGLE

## (undated) DEVICE — SEE MEDLINE ITEM 152622

## (undated) DEVICE — ELECTRODE REM PLYHSV RETURN 9

## (undated) DEVICE — BLADE SURG STAINLESS STEEL #15

## (undated) DEVICE — ADHESIVE DERMABOND ADVANCED

## (undated) DEVICE — GLOVE BIOGEL SKINSENSE PI 7.5

## (undated) DEVICE — TUBE SET INFLOW/OUTFLOW

## (undated) DEVICE — DRESSING XEROFORM 1X8IN

## (undated) DEVICE — SUT MCRYL PLUS 4-0 PS2 27IN

## (undated) DEVICE — SEE MEDLINE ITEM 146417

## (undated) DEVICE — SUT PDS VIL/BLU DUAL ORTHO

## (undated) DEVICE — SPONGE DERMACEA 4X4IN 12PLY

## (undated) DEVICE — Device

## (undated) DEVICE — APPLICATOR CHLORAPREP ORN 26ML

## (undated) DEVICE — SUT VICRYL PLUS 3-0 SH 18IN

## (undated) DEVICE — KIT TRIMANO CHIN

## (undated) DEVICE — DRESSING XEROFORM FOIL PK 1X8

## (undated) DEVICE — SEE MEDLINE ITEM 157166

## (undated) DEVICE — SUT MONOCRYL 3-0 PS-2 UND

## (undated) DEVICE — SOL IRR NACL .9% 3000ML

## (undated) DEVICE — SEE MEDLINE ITEM 152530

## (undated) DEVICE — TUBING SUC UNIV W/CONN 12FT

## (undated) DEVICE — NDL 18GA X1 1/2 REG BEVEL

## (undated) DEVICE — ADHESIVE MASTISOL VIAL 48/BX

## (undated) DEVICE — DRESSING LEUKOPLAST FLEX 1X3IN

## (undated) DEVICE — POSITIONER IV ARMBOARD FOAM

## (undated) DEVICE — PAD ABD 8X10 STERILE

## (undated) DEVICE — SUT VICRYL BR 1 GEN 27 CT-1

## (undated) DEVICE — TRAY MINOR GEN SURG

## (undated) DEVICE — DRESSING TELFA STRL 4X3 LF

## (undated) DEVICE — UNDERGLOVES BIOGEL PI SIZE 7.5

## (undated) DEVICE — PAD SHOULDER POLAR CARE XL

## (undated) DEVICE — TRAY MINOR ORTHO

## (undated) DEVICE — BLADE SHAVER LANZA 4.2X13CM

## (undated) DEVICE — SOL 9P NACL IRR PIC IL

## (undated) DEVICE — BURR OVAL CUTTING 6 MM

## (undated) DEVICE — SYR SLIP TIP 1CC

## (undated) DEVICE — REPAIR KIT SMALL JOINT BIO TEN

## (undated) DEVICE — CLOSURE SKIN STERI STRIP 1/2X4

## (undated) DEVICE — GAUZE FLUFF XXLG 36X36 2 PLY

## (undated) DEVICE — DRAPE PLASTIC U 60X72

## (undated) DEVICE — SUT ETHILON 3-0 PS2 18 BLK

## (undated) DEVICE — GAUZE SPONGE 4X4 12PLY

## (undated) DEVICE — TAPE ADH MEDIPORE 4 X 10YDS

## (undated) DEVICE — SPONGE GAUZE 16PLY 4X4

## (undated) DEVICE — SUPPORT SLING SHOT II MEDIUM

## (undated) DEVICE — SUT PROLENE 0 MO6 30IN BLUE

## (undated) DEVICE — PENCIL ELECTROSURG HOLST W/BLD

## (undated) DEVICE — SEE MEDLINE ITEM 157150

## (undated) DEVICE — DRESSING TELFA N ADH 3X8

## (undated) DEVICE — DRAPE STERI U-SHAPED 47X51IN

## (undated) DEVICE — PAD ELECTRODE STER 1.5X3

## (undated) DEVICE — BLADE SAGITTA 5/BX

## (undated) DEVICE — NDL SPINAL 18GX3.5 SPINOCAN

## (undated) DEVICE — BLADE SURG CARBON STEEL SZ11

## (undated) DEVICE — TAPE SURG MEDIPORE 6X72IN

## (undated) DEVICE — GLOVE BIOGEL SENSOR SIZE 8

## (undated) DEVICE — SUT CTD VICRYL 0 UND BR

## (undated) DEVICE — UNDERGLOVE BIOGEL PI SZ 6.5 LF

## (undated) DEVICE — SYR 30CC LUER LOCK

## (undated) DEVICE — PACK UNIVERSAL SPLIT II

## (undated) DEVICE — SEE MEDLINE ITEM 157216

## (undated) DEVICE — CANNULA DRY DOC 7 X 85

## (undated) DEVICE — STOCKINET 4INX48

## (undated) DEVICE — SYR DISP LL 5CC

## (undated) DEVICE — SUT VICRYL 3-0 27 SH

## (undated) DEVICE — DRAPE STERI INSTRUMENT 1018

## (undated) DEVICE — ACL DISPOSABLE KIT

## (undated) DEVICE — GOWN SMARTGOWN LVL4 X-LONG XL

## (undated) DEVICE — PILLOW TROOP ELEVATION DISP

## (undated) DEVICE — DRESSING AQUACEL AG FOAM 4X4

## (undated) DEVICE — SYS LABEL CORRECT MED

## (undated) DEVICE — DRESSING AQUACEL AG SILVER 4X4